# Patient Record
Sex: FEMALE | Race: WHITE | NOT HISPANIC OR LATINO | Employment: OTHER | URBAN - METROPOLITAN AREA
[De-identification: names, ages, dates, MRNs, and addresses within clinical notes are randomized per-mention and may not be internally consistent; named-entity substitution may affect disease eponyms.]

---

## 2017-03-04 ENCOUNTER — TRANSCRIBE ORDERS (OUTPATIENT)
Dept: ADMINISTRATIVE | Facility: HOSPITAL | Age: 57
End: 2017-03-04

## 2017-03-04 ENCOUNTER — APPOINTMENT (OUTPATIENT)
Dept: LAB | Facility: HOSPITAL | Age: 57
End: 2017-03-04
Payer: MEDICARE

## 2017-03-04 DIAGNOSIS — G40.901 STATUS EPILEPTICUS (HCC): Primary | ICD-10-CM

## 2017-03-04 DIAGNOSIS — G40.901 STATUS EPILEPTICUS (HCC): ICD-10-CM

## 2017-03-04 LAB
ANION GAP SERPL CALCULATED.3IONS-SCNC: 9 MMOL/L (ref 4–13)
BUN SERPL-MCNC: 25 MG/DL (ref 5–25)
CALCIUM SERPL-MCNC: 8.7 MG/DL (ref 8.3–10.1)
CHLORIDE SERPL-SCNC: 103 MMOL/L (ref 100–108)
CO2 SERPL-SCNC: 28 MMOL/L (ref 21–32)
CREAT SERPL-MCNC: 1.12 MG/DL (ref 0.6–1.3)
GFR SERPL CREATININE-BSD FRML MDRD: 50 ML/MIN/1.73SQ M
GLUCOSE SERPL-MCNC: 90 MG/DL (ref 65–140)
POTASSIUM SERPL-SCNC: 3.9 MMOL/L (ref 3.5–5.3)
SODIUM SERPL-SCNC: 140 MMOL/L (ref 136–145)

## 2017-03-04 PROCEDURE — 80157 ASSAY CARBAMAZEPINE FREE: CPT

## 2017-03-04 PROCEDURE — 36415 COLL VENOUS BLD VENIPUNCTURE: CPT

## 2017-03-04 PROCEDURE — 80048 BASIC METABOLIC PNL TOTAL CA: CPT

## 2017-03-04 PROCEDURE — 80177 DRUG SCRN QUAN LEVETIRACETAM: CPT

## 2017-03-06 LAB — CARBAMAZEPINE FREE SERPL-MCNC: 1.9 UG/ML (ref 0.6–4.2)

## 2017-03-07 LAB — LEVETIRACETAM SERPL-MCNC: 76.2 UG/ML (ref 10–40)

## 2017-11-09 ENCOUNTER — TRANSCRIBE ORDERS (OUTPATIENT)
Dept: ADMINISTRATIVE | Facility: HOSPITAL | Age: 57
End: 2017-11-09

## 2017-11-09 ENCOUNTER — APPOINTMENT (OUTPATIENT)
Dept: LAB | Facility: HOSPITAL | Age: 57
End: 2017-11-09
Payer: MEDICARE

## 2017-11-09 DIAGNOSIS — G40.901 STATUS EPILEPTICUS (HCC): ICD-10-CM

## 2017-11-09 DIAGNOSIS — E78.5 HYPERLIPIDEMIA, UNSPECIFIED HYPERLIPIDEMIA TYPE: Primary | ICD-10-CM

## 2017-11-09 DIAGNOSIS — E78.5 HYPERLIPIDEMIA, UNSPECIFIED HYPERLIPIDEMIA TYPE: ICD-10-CM

## 2017-11-09 LAB
ALT SERPL W P-5'-P-CCNC: 25 U/L (ref 12–78)
ANION GAP SERPL CALCULATED.3IONS-SCNC: 10 MMOL/L (ref 4–13)
AST SERPL W P-5'-P-CCNC: 17 U/L (ref 5–45)
BUN SERPL-MCNC: 27 MG/DL (ref 5–25)
CALCIUM SERPL-MCNC: 9.2 MG/DL (ref 8.3–10.1)
CARBAMAZEPINE SERPL-MCNC: 5.6 UG/ML (ref 4–12)
CHLORIDE SERPL-SCNC: 105 MMOL/L (ref 100–108)
CHOLEST SERPL-MCNC: 195 MG/DL (ref 50–200)
CO2 SERPL-SCNC: 26 MMOL/L (ref 21–32)
CREAT SERPL-MCNC: 1.17 MG/DL (ref 0.6–1.3)
ERYTHROCYTE [DISTWIDTH] IN BLOOD BY AUTOMATED COUNT: 14 % (ref 11.6–15.1)
GFR SERPL CREATININE-BSD FRML MDRD: 51 ML/MIN/1.73SQ M
GLUCOSE P FAST SERPL-MCNC: 103 MG/DL (ref 65–99)
HCT VFR BLD AUTO: 39.4 % (ref 37–47)
HDLC SERPL-MCNC: 89 MG/DL (ref 40–60)
HGB BLD-MCNC: 13.3 G/DL (ref 12–16)
LDLC SERPL CALC-MCNC: 92 MG/DL (ref 0–100)
MCH RBC QN AUTO: 31.4 PG (ref 27–31)
MCHC RBC AUTO-ENTMCNC: 33.8 G/DL (ref 31.4–37.4)
MCV RBC AUTO: 93 FL (ref 82–98)
PLATELET # BLD AUTO: 326 THOUSANDS/UL (ref 130–400)
PMV BLD AUTO: 9 FL (ref 8.9–12.7)
POTASSIUM SERPL-SCNC: 4.1 MMOL/L (ref 3.5–5.3)
RBC # BLD AUTO: 4.24 MILLION/UL (ref 4.2–5.4)
SODIUM SERPL-SCNC: 141 MMOL/L (ref 136–145)
TRIGL SERPL-MCNC: 71 MG/DL
WBC # BLD AUTO: 10.2 THOUSAND/UL (ref 4.8–10.8)

## 2017-11-09 PROCEDURE — 85027 COMPLETE CBC AUTOMATED: CPT

## 2017-11-09 PROCEDURE — 84460 ALANINE AMINO (ALT) (SGPT): CPT

## 2017-11-09 PROCEDURE — 80177 DRUG SCRN QUAN LEVETIRACETAM: CPT

## 2017-11-09 PROCEDURE — 80156 ASSAY CARBAMAZEPINE TOTAL: CPT

## 2017-11-09 PROCEDURE — 36415 COLL VENOUS BLD VENIPUNCTURE: CPT

## 2017-11-09 PROCEDURE — 80048 BASIC METABOLIC PNL TOTAL CA: CPT

## 2017-11-09 PROCEDURE — 84450 TRANSFERASE (AST) (SGOT): CPT

## 2017-11-09 PROCEDURE — 80061 LIPID PANEL: CPT

## 2017-11-12 LAB — LEVETIRACETAM SERPL-MCNC: 27.4 UG/ML (ref 10–40)

## 2018-02-18 ENCOUNTER — HOSPITAL ENCOUNTER (EMERGENCY)
Facility: HOSPITAL | Age: 58
Discharge: HOME | End: 2018-02-18
Admitting: EMERGENCY MEDICINE
Payer: MEDICARE

## 2018-02-18 VITALS
HEIGHT: 63 IN | SYSTOLIC BLOOD PRESSURE: 138 MMHG | BODY MASS INDEX: 46.07 KG/M2 | HEART RATE: 99 BPM | TEMPERATURE: 98 F | OXYGEN SATURATION: 97 % | RESPIRATION RATE: 18 BRPM | DIASTOLIC BLOOD PRESSURE: 83 MMHG | WEIGHT: 260 LBS

## 2018-02-18 DIAGNOSIS — H61.20 CERUMEN IMPACTION: Primary | ICD-10-CM

## 2018-02-18 PROCEDURE — 99282 EMERGENCY DEPT VISIT SF MDM: CPT

## 2018-02-18 RX ORDER — PRAMIPEXOLE DIHYDROCHLORIDE 1.5 MG/1
1.5 TABLET ORAL DAILY
COMMUNITY
End: 2019-02-13 | Stop reason: SDUPTHER

## 2018-02-18 RX ORDER — HYDROCHLOROTHIAZIDE 25 MG/1
25 TABLET ORAL DAILY
COMMUNITY
End: 2020-11-10 | Stop reason: ALTCHOICE

## 2018-02-18 RX ORDER — PAROXETINE HYDROCHLORIDE 20 MG/1
20 TABLET, FILM COATED ORAL DAILY
COMMUNITY
End: 2019-02-13

## 2018-02-18 RX ORDER — CARBAMAZEPINE 200 MG/1
200 TABLET ORAL 3 TIMES DAILY
COMMUNITY
End: 2019-02-13 | Stop reason: SDUPTHER

## 2018-02-18 RX ORDER — LEVETIRACETAM 1000 MG/1
2000 TABLET ORAL 2 TIMES DAILY
COMMUNITY
End: 2019-02-13

## 2018-02-18 NOTE — ED PROVIDER NOTES
History  Chief Complaint   Patient presents with    Ear Problem     pt c/o B/L ears feeling clogged  left one since last night, right one since this am        History provided by:  Patient   used: No    Ear Fullness   Location:  Bilateral ears  Quality:  Decreased hearing acuity, and feeling of fullness in bilateral ears  Severity:  Mild  Onset quality:  Sudden  Duration:  1 day  Timing:  Constant  Progression:  Worsening  Chronicity:  New  Context:  Patient states that she started with left ear pressure an loss of hearing acuity beginning last night  Woke up this morning with save in right ear to a lesser severity  Associated symptoms: no abdominal pain, no chest pain, no congestion, no cough, no diarrhea, no fatigue, no fever, no headaches, no loss of consciousness, no myalgias, no nausea, no rash, no rhinorrhea, no shortness of breath, no sore throat, no vomiting and no wheezing        Prior to Admission Medications   Prescriptions Last Dose Informant Patient Reported? Taking? PARoxetine (PAXIL) 20 mg tablet   Yes Yes   Sig: Take 20 mg by mouth daily   carBAMazepine (TEGretol) 200 mg tablet   Yes Yes   Sig: Take 200 mg by mouth 3 (three) times a day   hydrochlorothiazide (HYDRODIURIL) 25 mg tablet   Yes Yes   Sig: Take 25 mg by mouth daily   levETIRAcetam (KEPPRA) 1000 MG tablet   Yes Yes   Sig: Take 2,000 mg by mouth 2 (two) times a day   pramipexole (MIRAPEX) 1 5 MG tablet   Yes Yes   Sig: Take 1 5 mg by mouth daily      Facility-Administered Medications: None       Past Medical History:   Diagnosis Date    Depression     Epilepsy ([address] )     Restless leg        Past Surgical History:   Procedure Laterality Date    LASER ABLATION OF THE CERVIX         History reviewed  No pertinent family history  I have reviewed and agree with the history as documented      Social History   Substance Use Topics    Smoking status: Current Every Day Smoker    Smokeless tobacco: Current User Types: Chew    Alcohol use No        Review of Systems   Constitutional: Negative for appetite change, chills, diaphoresis, fatigue and fever  HENT: Positive for hearing loss  Negative for congestion, rhinorrhea, sore throat and trouble swallowing  Ear fullness   Eyes: Negative for photophobia and visual disturbance  Respiratory: Negative for cough, chest tightness, shortness of breath and wheezing  Cardiovascular: Negative for chest pain  Gastrointestinal: Negative for abdominal pain, diarrhea, nausea and vomiting  Genitourinary: Negative  Musculoskeletal: Negative for myalgias and neck stiffness  Skin: Negative for color change, pallor and rash  Neurological: Negative for dizziness, seizures, loss of consciousness, weakness, light-headedness, numbness and headaches  Hematological: Negative for adenopathy  Physical Exam  ED Triage Vitals [02/18/18 1033]   Temperature Pulse Respirations Blood Pressure SpO2   98 °F (36 7 °C) 99 18 138/83 97 %      Temp Source Heart Rate Source Patient Position - Orthostatic VS BP Location FiO2 (%)   Tympanic Monitor Sitting Left arm --      Pain Score       Worst Possible Pain           Orthostatic Vital Signs  Vitals:    02/18/18 1033   BP: 138/83   Pulse: 99   Patient Position - Orthostatic VS: Sitting       Physical Exam   Constitutional: She is oriented to person, place, and time  She appears well-developed and well-nourished  No distress  HENT:   Head: Normocephalic and atraumatic  Right Ear: External ear normal  No tenderness  No mastoid tenderness  Decreased hearing is noted  Left Ear: External ear normal  No tenderness  No mastoid tenderness  Decreased hearing is noted  Nose: Nose normal    Mouth/Throat: Oropharynx is clear and moist    Excessive cerumen buildup of bilateral auditory canals  No signs of bleeding or erythema  Eyes: Conjunctivae are normal  Right eye exhibits no discharge  Left eye exhibits no discharge     Neck: Normal range of motion  Neck supple  Cardiovascular: Normal rate, regular rhythm, normal heart sounds and intact distal pulses  Exam reveals no friction rub  No murmur heard  Pulmonary/Chest: Effort normal and breath sounds normal  No respiratory distress  She has no wheezes  Lymphadenopathy:     She has no cervical adenopathy  Neurological: She is alert and oriented to person, place, and time  She exhibits normal muscle tone  Coordination normal    Skin: Skin is warm and dry  Capillary refill takes less than 2 seconds  No rash noted  She is not diaphoretic  No pallor  Nursing note and vitals reviewed  ED Medications  Medications - No data to display    Diagnostic Studies  Results Reviewed     None                 No orders to display              Procedures  Procedures       Phone Contacts  ED Phone Contact    ED Course  ED Course                                MDM  Number of Diagnoses or Management Options  Cerumen impaction: new and does not require workup  Diagnosis management comments: Patient was discharged in acute distress with Debrox drops and instructed follow with her PCP if no improvement in 4-5 days, or return to the ED if any worsening symptoms develop  Amount and/or Complexity of Data Reviewed  Review and summarize past medical records: yes      CritCare Time    Disposition  Final diagnoses:   Cerumen impaction     Time reflects when diagnosis was documented in both MDM as applicable and the Disposition within this note     Time User Action Codes Description Comment    2/18/2018 11:21 AM Safia Standing Add [H61 20] Cerumen impaction       ED Disposition     ED Disposition Condition Comment    Discharge  Pearla Left discharge to home/self care      Condition at discharge: Stable        Follow-up Information     Follow up With Specialties Details Why John [facility] Emergency Department Emergency Medicine Go to If symptoms worsen [address] [facility] ED, [place], [place], [address] [place] [address], MD Internal Medicine Go to If no improvement in 3-4 days [facility] [address]           Discharge Medication List as of 2/18/2018 11:24 AM      START taking these medications    Details   carbamide peroxide (DEBROX) 6 5 % otic solution Administer 10 drops into both ears 2 (two) times a day for 4 days, Starting Sun 2/18/2018, Until Thu 2/22/2018, Print         CONTINUE these medications which have NOT CHANGED    Details   carBAMazepine (TEGretol) 200 mg tablet Take 200 mg by mouth 3 (three) times a day, Historical Med      hydrochlorothiazide (HYDRODIURIL) 25 mg tablet Take 25 mg by mouth daily, Historical Med      levETIRAcetam (KEPPRA) 1000 MG tablet Take 2,000 mg by mouth 2 (two) times a day, Historical Med      PARoxetine (PAXIL) 20 mg tablet Take 20 mg by mouth daily, Historical Med      pramipexole (MIRAPEX) 1 5 MG tablet Take 1 5 mg by mouth daily, Historical Med           No discharge procedures on file      ED Provider  Electronically Signed by           Melva Espinosa PA-C  02/18/18 2107

## 2018-02-18 NOTE — DISCHARGE INSTRUCTIONS
Cerumen Impaction   WHAT YOU NEED TO KNOW:   Cerumen impaction is the blockage of the outer ear canal by tightly packed cerumen (earwax)  It is generally treated with procedures such as flushing or suctioning the ear canal or the use of instruments to remove the impaction  DISCHARGE INSTRUCTIONS:   Medicines:  · Ear drops: These are used to soften the wax in your ear  Wax softening ear drops may be bought without a prescription  Ask your healthcare provider how often you should use this medicine  Read the instructions carefully before you use the ear drops  Do the following when you put in ear drops:     ¨ Warm the drops by holding the bottle in your hands for a few minutes  Cold ear drops may make you dizzy  ¨ Lie down with the affected ear toward the ceiling  You may also stand with your head tilted to one side  ¨ Pull your ear lobe up and back, and place the correct number of drops into the ear  ¨ Keep your ear facing up for 5 to 10 minutes so the drops coat the outer ear canal      ¨ Gently clean the outer part of the ear with a cotton swab  Do not  place the cotton swab or anything inside your ear canal  This increases the risk of damaging your eardrum  · Take your medicine as directed  Contact your healthcare provider if you think your medicine is not helping or if you have side effects  Tell him of her if you are allergic to any medicine  Keep a list of the medicines, vitamins, and herbs you take  Include the amounts, and when and why you take them  Bring the list or the pill bottles to follow-up visits  Carry your medicine list with you in case of an emergency  Follow up with your healthcare provider as directed:  Write down your questions so you remember to ask them during your visits  Contact your healthcare provider if:   · You have a fever  · You have trouble hearing or ringing in your ear  · You have questions about your condition or care    Return to the emergency department if:   · You feel dizzy  · You have discharge or blood coming out of your ear  · Your ear pain does not go away or gets worse  © 2017 [address] [employer] Information is for End User's use only and may not be sold, redistributed or otherwise used for commercial purposes  All illustrations and images included in CareNotes® are the copyrighted property of A D A M [employer]  or [employer] Raines  The above information is an  only  It is not intended as medical advice for individual conditions or treatments  Talk to your doctor, nurse or pharmacist before following any medical regimen to see if it is safe and effective for you

## 2018-02-20 ENCOUNTER — HOSPITAL ENCOUNTER (EMERGENCY)
Facility: HOSPITAL | Age: 58
Discharge: HOME | End: 2018-02-20
Admitting: EMERGENCY MEDICINE
Payer: MEDICARE

## 2018-02-20 VITALS
RESPIRATION RATE: 18 BRPM | DIASTOLIC BLOOD PRESSURE: 91 MMHG | WEIGHT: 240 LBS | HEART RATE: 86 BPM | OXYGEN SATURATION: 96 % | HEIGHT: 63 IN | TEMPERATURE: 98.3 F | SYSTOLIC BLOOD PRESSURE: 156 MMHG | BODY MASS INDEX: 42.52 KG/M2

## 2018-02-20 DIAGNOSIS — H61.23 BILATERAL IMPACTED CERUMEN: Primary | ICD-10-CM

## 2018-02-20 PROCEDURE — 99282 EMERGENCY DEPT VISIT SF MDM: CPT

## 2018-04-23 ENCOUNTER — APPOINTMENT (EMERGENCY)
Dept: RADIOLOGY | Facility: HOSPITAL | Age: 58
End: 2018-04-23
Payer: MEDICARE

## 2018-04-23 ENCOUNTER — HOSPITAL ENCOUNTER (EMERGENCY)
Facility: HOSPITAL | Age: 58
Discharge: HOME | End: 2018-04-23
Attending: EMERGENCY MEDICINE | Admitting: EMERGENCY MEDICINE
Payer: MEDICARE

## 2018-04-23 VITALS
DIASTOLIC BLOOD PRESSURE: 83 MMHG | SYSTOLIC BLOOD PRESSURE: 167 MMHG | WEIGHT: 250 LBS | BODY MASS INDEX: 44.29 KG/M2 | RESPIRATION RATE: 18 BRPM | HEART RATE: 106 BPM | TEMPERATURE: 98.6 F | OXYGEN SATURATION: 97 %

## 2018-04-23 DIAGNOSIS — G40.909 SEIZURE DISORDER (HCC): Primary | ICD-10-CM

## 2018-04-23 DIAGNOSIS — S09.90XA HEAD INJURY: ICD-10-CM

## 2018-04-23 PROCEDURE — 70450 CT HEAD/BRAIN W/O DYE: CPT

## 2018-04-23 PROCEDURE — 70486 CT MAXILLOFACIAL W/O DYE: CPT

## 2018-04-23 PROCEDURE — 99284 EMERGENCY DEPT VISIT MOD MDM: CPT

## 2018-04-23 RX ORDER — OXYCODONE HYDROCHLORIDE AND ACETAMINOPHEN 5; 325 MG/1; MG/1
1 TABLET ORAL ONCE
Status: COMPLETED | OUTPATIENT
Start: 2018-04-23 | End: 2018-04-23

## 2018-04-23 RX ADMIN — OXYCODONE HYDROCHLORIDE AND ACETAMINOPHEN 1 TABLET: 5; 325 TABLET ORAL at 18:40

## 2018-04-23 NOTE — DISCHARGE INSTRUCTIONS
Epilepsy   WHAT YOU NEED TO KNOW:   Epilepsy is a brain disorder that causes seizures  It is also called a seizure disorder  A seizure means an abnormal area in your brain sometimes sends bursts of electrical activity  A seizure may start in one part of your brain, or both sides may be affected  Depending on the type of seizure, you may have movements you cannot control, lose consciousness, or stare straight ahead  You may be confused or tired after the seizure  A seizure may last a few seconds or longer than 5 minutes  A birth defect, tumor, stroke, dementia, injury, or infection may cause epilepsy  The cause of your epilepsy may not be known  If your seizures are not controlled, epilepsy may become life-threatening  DISCHARGE INSTRUCTIONS:   Call 911 for any of the following:   · Your seizure lasts longer than 5 minutes  · You have trouble breathing after a seizure  · You have diabetes or are pregnant and have a seizure  · You have a seizure in water, such as a swimming pool or bathtub  Return to the emergency department if:   · You have a second seizure within 24 hours of the first      · You are injured during a seizure  Contact your healthcare provider if:   · You feel you are not able to cope with your condition  · Your seizures start to happen more often  · You are confused longer than usual after a seizure  · You are planning to get pregnant or are currently pregnant  · You have questions or concerns about your condition or care  Medicines:   · Antiseizure medicine  may control or prevent another seizure  Do not stop taking this medicine  Another person may need to give you rescue medicine to stop a seizure at home  Ask your healthcare provider for more information about rescue medicine  · Take your medicine as directed  Contact your healthcare provider if you think your medicine is not helping or if you have side effects  Tell him of her if you are allergic to any medicine  Keep a list of the medicines, vitamins, and herbs you take  Include the amounts, and when and why you take them  Bring the list or the pill bottles to follow-up visits  Carry your medicine list with you in case of an emergency  Follow up with your neurologist as directed: You may need tests to check the level of antiseizure medicine in your blood  Your neurologist may need to change or adjust your medicine  Write down your questions so you remember to ask them during your visits  What you can do to prevent a seizure: You may not be able to prevent every seizure  The following can help you manage triggers that may make a seizure start:  · Take your medicine every day at the same time  This will also help prevent medicine side effects  Set an alarm to help remind you to take your medicine every day  · Manage stress  Stress can be a trigger for epilepsy  Exercise can help you reduce stress  Talk to your healthcare provider about exercise that is safe for you  Illness can be a form of stress  Eat a variety of healthy foods and drink plenty of liquids during an illness  Talk to your healthcare provider about other ways to manage stress  · Set a regular sleep schedule  A lack of sleep can trigger a seizure  Try to go to sleep and wake up at the same time every day  Keep your bedroom quiet and dark  Talk to your healthcare provider if you are having trouble sleeping  · Limit or do not drink alcohol as directed  Alcohol can trigger a seizure, especially if you drink a large amount at one time  A drink of alcohol is 12 ounces of beer, 1½ ounces of liquor, or 5 ounces of wine  Talk to your healthcare provider about a safe amount of alcohol for you  Your provider may recommend that you do not drink any alcohol  Tell him or her if you need help to quit drinking  What you can do to manage epilepsy:   · Keep a seizure diary  This can help you find your triggers and avoid them   Write down the dates of your seizures, where you were, and what you were doing  Include how you felt before and after  Possible triggers include illness, lack of sleep, hormonal changes, alcohol, drugs, lights, or stress  · Record any auras you have before a seizure  An aura is a sign that you are about to have a seizure  Auras happen before certain types of seizures that are in only 1 part of the brain  The aura may happen seconds before a seizure, or up to an hour before  You may feel, see, hear, or smell something  Examples include part of your body becoming hot  You may see a flash of light or hear something  You may have anxiety or déjà vu  If you have an aura, include it in your seizure diary  · Create a care plan  Tell family, friends, and coworkers about your epilepsy  Give them instructions that tell them how they can keep you safe if you have a seizure  · Find support  You may be referred to a psychologist or   Ask your healthcare provider about support groups for people with epilepsy  · Ask what safety precautions you should take  Talk with your healthcare provider about driving  You may not be able to drive until you are seizure-free for a period of time  You will need to check the law where you live  Also talk to your healthcare provider about swimming and bathing  You may drown or develop life-threatening heart or lung damage if you have a seizure in water  · Carry medical alert identification  Wear medical alert jewelry or carry a card that says you have epilepsy  Ask your healthcare provider where to get these items  How others can keep you safe during a seizure:  Give the following instructions to family, friends, and coworkers:  · Do not panic  · Do not hold me down or put anything in my mouth  · Gently guide me to the floor or a soft surface  · Place me on my side to help prevent me from swallowing saliva or vomit  · Protect me from injury   Remove sharp or hard objects from the area surrounding me, or cushion my head  · Loosen the clothing around my head and neck  · Time how long my seizure lasts  Call 911 if my seizure lasts longer than 5 minutes or if I have a second seizure  · Stay with me until my seizure ends  Let me rest until I am fully awake  · Perform CPR if I stop breathing or you cannot feel my pulse  · Do not give me anything to eat or drink until I am fully awake  © 2017 [address] [facility] Information is for End User's use only and may not be sold, redistributed or otherwise used for commercial purposes  All illustrations and images included in CareNotes® are the copyrighted property of [employer] M , Inc  or [employer] Raines  The above information is an  only  It is not intended as medical advice for individual conditions or treatments  Talk to your doctor, nurse or pharmacist before following any medical regimen to see if it is safe and effective for you

## 2018-04-23 NOTE — ED NOTES
Pt reports having seizure today and falling  Pt noted with abrasion to lower right orbit of eye  Pt reports shoulder pain to right shoulder  Pt noted with full range of motion with right shoulder  Dr Zenia Sutherland aware, no new orders         Kam Schuster RN  04/23/18 2784

## 2018-04-23 NOTE — ED PROVIDER NOTES
History  Chief Complaint   Patient presents with    Seizure - Prior Hx Of    Fall     Patient states she had a seizure and fell down about 10 steps, c/o pain in right side of face, right wrist and left foot/ankle     Hx Sz  NO CHANGE IN MEDS  HAD Sz AT HOME AND FELL 10 STEPS  AWAKE, ALERT, ORIENTED, RT LAT PERIORBITAL ECCHYMOSIS, OTHERWISE NO INJURIES  History provided by:  Patient  Seizure - Prior Hx Of   Seizure activity on arrival: no    Seizure type:  Grand mal  Initial focality:  None  Episode characteristics: generalized shaking and unresponsiveness    Postictal symptoms: no confusion, no memory loss and no somnolence    Return to baseline: yes    Severity:  Unable to specify  Timing:  Once  Recent head injury:  No recent head injuries  Fall   Associated symptoms: seizures        Prior to Admission Medications   Prescriptions Last Dose Informant Patient Reported? Taking? PARoxetine (PAXIL) 20 mg tablet 4/23/2018 at 0630  Yes Yes   Sig: Take 20 mg by mouth daily   carBAMazepine (TEGretol) 200 mg tablet 4/23/2018 at 1700  Yes Yes   Sig: Take 200 mg by mouth 3 (three) times a day   hydrochlorothiazide (HYDRODIURIL) 25 mg tablet 4/23/2018 at 0630  Yes Yes   Sig: Take 25 mg by mouth daily   levETIRAcetam (KEPPRA) 1000 MG tablet 4/23/2018 at 1500  Yes Yes   Sig: Take 2,000 mg by mouth 2 (two) times a day   pramipexole (MIRAPEX) 1 5 MG tablet 4/22/2018 at 2030  Yes Yes   Sig: Take 1 5 mg by mouth daily      Facility-Administered Medications: None       Past Medical History:   Diagnosis Date    Depression     [address] [address] )     Restless leg        Past Surgical History:   Procedure Laterality Date    LASER ABLATION OF THE CERVIX         History reviewed  No pertinent family history  I have reviewed and agree with the history as documented      Social History   Substance Use Topics    Smoking status: Current Every Day Smoker     Packs/day: 0 50    Smokeless tobacco: Current User     Types: [place] Alcohol use No        Review of Systems   Musculoskeletal:        FALL     Skin:        RT FOREHEAD ECCHYMOSIS   Neurological: Positive for seizures  All other systems reviewed and are negative  Physical Exam  ED Triage Vitals [04/23/18 1740]   Temperature Pulse Respirations Blood Pressure SpO2   98 6 °F (37 °C) (!) 106 18 167/83 97 %      Temp Source Heart Rate Source Patient Position - Orthostatic VS BP Location FiO2 (%)   Tympanic Monitor Sitting Left arm --      Pain Score       9           Orthostatic Vital Signs  Vitals:    04/23/18 1740   BP: 167/83   Pulse: (!) 106   Patient Position - Orthostatic VS: Sitting       Physical Exam   Constitutional: She is oriented to person, place, and time  She appears well-developed and well-nourished  No distress  HENT:   RT FOREHEAD ECCHYMOSIS   Eyes: Conjunctivae and EOM are normal  Pupils are equal, round, and reactive to light  Neck: Normal range of motion  Neck supple  Cardiovascular: Normal rate and regular rhythm  Pulmonary/Chest: Effort normal and breath sounds normal  She exhibits no tenderness  Abdominal: Soft  There is no tenderness  Musculoskeletal: Normal range of motion  She exhibits no edema, tenderness or deformity  Neurological: She is alert and oriented to person, place, and time  No cranial nerve deficit  She exhibits normal muscle tone  Coordination normal    Skin: Skin is warm and dry  She is not diaphoretic  Nursing note and vitals reviewed  ED Medications  Medications   oxyCODONE-acetaminophen (PERCOCET) 5-325 mg per tablet 1 tablet (1 tablet Oral Given 4/23/18 1840)       Diagnostic Studies  Results Reviewed     None                 CT facial bones without contrast   Final Result by Kristyn Hernandez MD (04/23 1920)         1  No evidence of acute maxillofacial fracture  2   Right facial contusions  No retrobulbar hematoma  3   Chronic right maxillary sinus disease                 Workstation performed: [id number] CT head wo contrast   Final Result by Dea Ohara MD (04/23 1913)         1  No acute intracranial hemorrhage, mass effect or extra-axial collection  2   Right frontal orbital scalp and facial contusions  No acute calvarial fracture  Workstation performed: [id number]                    Procedures  Procedures       Phone Contacts  ED Phone Contact    ED Course  ED Course                                MDM  Number of Diagnoses or Management Options  Diagnosis management comments: PT COMPLIANT W/ HER MEDS, REMAINED NEG EXAM, WILL SEE HER NEURO TOMORROW    CritCare Time    Disposition  Final diagnoses:   Seizure disorder ([address] )   Head injury     Time reflects when diagnosis was documented in both MDM as applicable and the Disposition within this note     Time User Action Codes Description Comment    4/23/2018  7:25 PM Barsml Lonny Add [C31 676] Seizure disorder ([address] )     4/23/2018  7:25 PM Soniya Willsat Add [S09 90XA] Head injury       ED Disposition     ED Disposition Condition Comment    Discharge  Miah Layman discharge to home/self care  Condition at discharge: Stable        Follow-up Information     Follow up With Specialties Details Why [address] Madonna Ruiz MD Internal Medicine Schedule an appointment as soon as possible for a visit in 2 days  [address] [place] [address]  [phone]          Patient's Medications   Discharge Prescriptions    No medications on file     No discharge procedures on file      ED Provider  Electronically Signed by           Maxwell Salinas MD  04/23/18 5252

## 2018-07-05 NOTE — ED PROVIDER NOTES
History  Chief Complaint   Patient presents with    Ear Problem     pt c/o continued "clogged ears", seen & evaluated here this past weekend for same  60 y/o female presenting with bilateral ear fullness over the past few days  Was recently seen here for a cerumen impaction and given Debrox drops  Relays that she has been using the 3 times a day however persists with clogged ears  Denies fevers, nausea, vomiting, dizziness, ear drainage, facial swelling, fevers  Prior to Admission Medications   Prescriptions Last Dose Informant Patient Reported? Taking? PARoxetine (PAXIL) 20 mg tablet   Yes Yes   Sig: Take 20 mg by mouth daily   carBAMazepine (TEGretol) 200 mg tablet   Yes Yes   Sig: Take 200 mg by mouth 3 (three) times a day   carbamide peroxide (DEBROX) 6 5 % otic solution   No Yes   Sig: Administer 10 drops into both ears 2 (two) times a day for 4 days   hydrochlorothiazide (HYDRODIURIL) 25 mg tablet   Yes Yes   Sig: Take 25 mg by mouth daily   levETIRAcetam (KEPPRA) 1000 MG tablet   Yes Yes   Sig: Take 2,000 mg by mouth 2 (two) times a day   pramipexole (MIRAPEX) 1 5 MG tablet   Yes Yes   Sig: Take 1 5 mg by mouth daily      Facility-Administered Medications: None       Past Medical History:   Diagnosis Date    Depression     Epilepsy ([address] )     Restless leg        Past Surgical History:   Procedure Laterality Date    LASER ABLATION OF THE CERVIX         History reviewed  No pertinent family history  I have reviewed and agree with the history as documented  Social History   Substance Use Topics    Smoking status: Current Every Day Smoker    Smokeless tobacco: Current User     Types: Chew    Alcohol use No        Review of Systems   Constitutional: Negative  HENT: Positive for ear pain and hearing loss   Negative for congestion, dental problem, drooling, ear discharge, facial swelling, mouth sores, nosebleeds, postnasal drip, rhinorrhea, sinus pain, sinus pressure, sneezing, sore throat, tinnitus, trouble swallowing and voice change  Eyes: Negative  Respiratory: Negative  Cardiovascular: Negative  Gastrointestinal: Negative  Genitourinary: Negative  Musculoskeletal: Negative  Skin: Negative  Neurological: Negative  All other systems reviewed and are negative  Physical Exam  ED Triage Vitals [02/20/18 0900]   Temperature Pulse Respirations Blood Pressure SpO2   98 3 °F (36 8 °C) 86 18 156/91 96 %      Temp Source Heart Rate Source Patient Position - Orthostatic VS BP Location FiO2 (%)   Oral Monitor Sitting Left arm --      Pain Score       Worst Possible Pain           Orthostatic Vital Signs  Vitals:    02/20/18 0900   BP: 156/91   Pulse: 86   Patient Position - Orthostatic VS: Sitting       Physical Exam   Constitutional: She is oriented to person, place, and time  She appears well-developed and well-nourished  HENT:   Head: Normocephalic and atraumatic  Nose: Nose normal    Mouth/Throat: Oropharynx is clear and moist    Bilateral cerumen impaction  No swelling, drainage, exudates  Eyes: Conjunctivae and EOM are normal  Pupils are equal, round, and reactive to light  Neck: Normal range of motion  Neck supple  Cardiovascular: Normal rate, regular rhythm, normal heart sounds and intact distal pulses  Pulmonary/Chest: Effort normal and breath sounds normal    spo2 is 96% indicating adequate oxygenation  Neurological: She is alert and oriented to person, place, and time  Skin: Skin is warm and dry  Capillary refill takes less than 2 seconds  Nursing note and vitals reviewed        ED Medications  Medications - No data to display    Diagnostic Studies  Results Reviewed     None                 No orders to display              Procedures  Cerumen Removal  Date/Time: 2/20/2018 10:14 AM  Performed by: Lauren Tian by: Osorio Tucker     Patient location:  ED  Indications / Diagnosis:  Bilateral cerumen impaction Consent:     Consent obtained:  Verbal    Consent given by:  Patient    Risks discussed:  Bleeding, dizziness, incomplete removal, infection, pain and TM perforation  Universal protocol:     Patient identity confirmed:  Verbally with patient  Procedure details:     Local anesthetic:  None    Location: bilateral ears  Procedure type: irrigation      Approach:  External and natural orifice    Equipment used:  50 cc syringe with a 16 gauge rubber portion of an angiocath  Post-procedure details:     Complication:  None    Hearing quality:  Improved    Patient tolerance of procedure: Tolerated well, no immediate complications  Comments:      50/50 mixture of warm water and peroxide used to flush ears  Phone Contacts  ED Phone Contact    ED Course  ED Course                                MDM  Number of Diagnoses or Management Options  Bilateral impacted cerumen:   Diagnosis management comments: Able to remove most of the impacted ear wax from the right ear however some hardened wax remained  Left ear wax completely removed  Will have patient continue debrox drops in the right ear and have her f/u with her pcp for re-evaluation  Patient is informed to return to the emergency department for worsening of symptoms and was given proper education regarding their diagnosis and symptoms  Otherwise the patient is informed to follow up with their primary care doctor for re-evaluation  The patient verbalizes understanding and agrees with above assessment and plan  All questions were answered  Please Note: Fluency Direct voice recognition software may have been used in the creation of this document  Wrong words or sound a like substitutions may have occurred due to the inherent limitations of the voice software             Amount and/or Complexity of Data Reviewed  Review and summarize past medical records: yes  Independent visualization of images, tracings, or specimens: yes      Yuridia Time    Disposition  Final diagnoses:   Bilateral impacted cerumen     Time reflects when diagnosis was documented in both MDM as applicable and the Disposition within this note     Time User Action Codes Description Comment    2/20/2018 10:17 AM Lavina Gosselin Add [Q31 23] Bilateral impacted cerumen       ED Disposition     ED Disposition Condition Comment    Discharge  Geryl Murders discharge to home/self care  Condition at discharge: Good        Follow-up Information     Follow up With Specialties Details Why Contact Info Additional [address] Emergency Department Emergency Medicine Go to If symptoms worsen such as facial swelling, pus like drainage, fevers  Otherise please make an appointment with your primary care doctor in a few days  [address] [facility]  [phone] [facility] ED, [facility], [address]        Patient's Medications   Discharge Prescriptions    No medications on file     No discharge procedures on file      ED Provider  Electronically Signed by           Theodore Latham PA-C  02/20/18 5819 done

## 2018-12-02 ENCOUNTER — HOSPITAL ENCOUNTER (EMERGENCY)
Facility: HOSPITAL | Age: 58
Discharge: HOME | End: 2018-12-02
Attending: EMERGENCY MEDICINE
Payer: MEDICARE

## 2018-12-02 VITALS
TEMPERATURE: 98.5 F | HEART RATE: 76 BPM | RESPIRATION RATE: 20 BRPM | DIASTOLIC BLOOD PRESSURE: 59 MMHG | OXYGEN SATURATION: 96 % | WEIGHT: 273 LBS | BODY MASS INDEX: 48.36 KG/M2 | SYSTOLIC BLOOD PRESSURE: 130 MMHG

## 2018-12-02 DIAGNOSIS — E46 PROTEIN CALORIE MALNUTRITION (HCC): Primary | ICD-10-CM

## 2018-12-02 DIAGNOSIS — R60.0 BILATERAL LEG EDEMA: ICD-10-CM

## 2018-12-02 LAB
ALBUMIN SERPL BCP-MCNC: 2.8 G/DL (ref 3.5–5)
ALP SERPL-CCNC: 108 U/L (ref 46–116)
ALT SERPL W P-5'-P-CCNC: 18 U/L (ref 12–78)
ANION GAP SERPL CALCULATED.3IONS-SCNC: 12 MMOL/L (ref 4–13)
APTT PPP: 25 SECONDS (ref 26–38)
AST SERPL W P-5'-P-CCNC: 15 U/L (ref 5–45)
BASOPHILS # BLD AUTO: 0.07 THOUSANDS/ΜL (ref 0–0.1)
BASOPHILS NFR BLD AUTO: 1 % (ref 0–1)
BILIRUB SERPL-MCNC: 0.1 MG/DL (ref 0.2–1)
BUN SERPL-MCNC: 19 MG/DL (ref 5–25)
CALCIUM SERPL-MCNC: 8.8 MG/DL (ref 8.3–10.1)
CHLORIDE SERPL-SCNC: 107 MMOL/L (ref 100–108)
CO2 SERPL-SCNC: 24 MMOL/L (ref 21–32)
CREAT SERPL-MCNC: 1.13 MG/DL (ref 0.6–1.3)
EOSINOPHIL # BLD AUTO: 0.25 THOUSAND/ΜL (ref 0–0.61)
EOSINOPHIL NFR BLD AUTO: 2 % (ref 0–6)
ERYTHROCYTE [DISTWIDTH] IN BLOOD BY AUTOMATED COUNT: 13.3 % (ref 11.6–15.1)
GFR SERPL CREATININE-BSD FRML MDRD: 53 ML/MIN/1.73SQ M
GLUCOSE SERPL-MCNC: 108 MG/DL (ref 65–140)
HCT VFR BLD AUTO: 38.2 % (ref 34.8–46.1)
HGB BLD-MCNC: 12.1 G/DL (ref 11.5–15.4)
IMM GRANULOCYTES # BLD AUTO: 0.05 THOUSAND/UL (ref 0–0.2)
IMM GRANULOCYTES NFR BLD AUTO: 0 % (ref 0–2)
INR PPP: 0.98 (ref 0.86–1.16)
LYMPHOCYTES # BLD AUTO: 2.84 THOUSANDS/ΜL (ref 0.6–4.47)
LYMPHOCYTES NFR BLD AUTO: 22 % (ref 14–44)
MCH RBC QN AUTO: 30.6 PG (ref 26.8–34.3)
MCHC RBC AUTO-ENTMCNC: 31.7 G/DL (ref 31.4–37.4)
MCV RBC AUTO: 97 FL (ref 82–98)
MONOCYTES # BLD AUTO: 0.66 THOUSAND/ΜL (ref 0.17–1.22)
MONOCYTES NFR BLD AUTO: 5 % (ref 4–12)
NEUTROPHILS # BLD AUTO: 8.79 THOUSANDS/ΜL (ref 1.85–7.62)
NEUTS SEG NFR BLD AUTO: 70 % (ref 43–75)
NRBC BLD AUTO-RTO: 0 /100 WBCS
NT-PROBNP SERPL-MCNC: 157 PG/ML
PLATELET # BLD AUTO: 299 THOUSANDS/UL (ref 149–390)
PMV BLD AUTO: 10.7 FL (ref 8.9–12.7)
POTASSIUM SERPL-SCNC: 3.5 MMOL/L (ref 3.5–5.3)
PROT SERPL-MCNC: 7.2 G/DL (ref 6.4–8.2)
PROTHROMBIN TIME: 10.3 SECONDS (ref 9.4–11.7)
RBC # BLD AUTO: 3.95 MILLION/UL (ref 3.81–5.12)
SODIUM SERPL-SCNC: 143 MMOL/L (ref 136–145)
WBC # BLD AUTO: 12.66 THOUSAND/UL (ref 4.31–10.16)

## 2018-12-02 PROCEDURE — 36415 COLL VENOUS BLD VENIPUNCTURE: CPT | Performed by: EMERGENCY MEDICINE

## 2018-12-02 PROCEDURE — 80053 COMPREHEN METABOLIC PANEL: CPT | Performed by: EMERGENCY MEDICINE

## 2018-12-02 PROCEDURE — 83880 ASSAY OF NATRIURETIC PEPTIDE: CPT | Performed by: EMERGENCY MEDICINE

## 2018-12-02 PROCEDURE — 93005 ELECTROCARDIOGRAM TRACING: CPT

## 2018-12-02 PROCEDURE — 85025 COMPLETE CBC W/AUTO DIFF WBC: CPT | Performed by: EMERGENCY MEDICINE

## 2018-12-02 PROCEDURE — 85730 THROMBOPLASTIN TIME PARTIAL: CPT | Performed by: EMERGENCY MEDICINE

## 2018-12-02 PROCEDURE — 85610 PROTHROMBIN TIME: CPT | Performed by: EMERGENCY MEDICINE

## 2018-12-02 PROCEDURE — 99283 EMERGENCY DEPT VISIT LOW MDM: CPT

## 2018-12-02 NOTE — ED PROVIDER NOTES
History  Chief Complaint   Patient presents with    Foot Swelling     Pt reports swollen feet "really started right after I gave up my job in April "  Came in today "because it's bothering me right now "     The patient has noted progressive and increasing edema of both lower extremities since quitting her job in April 7 months ago  Patient states that when she wakes up in the morning she has only mild swelling and gets worse throughout the day  She has no breathing difficulty and no orthopnea  She states her weight has been roughly consistent over the last 7 months  She does not know of any heart kidney or liver problems  She states that she is finally just tired of dealing with the swelling and decided to be checked in the ER on this Sunday night  Prior to Admission Medications   Prescriptions Last Dose Informant Patient Reported? Taking? PARoxetine (PAXIL) 20 mg tablet   Yes No   Sig: Take 20 mg by mouth daily   carBAMazepine (TEGretol) 200 mg tablet   Yes No   Sig: Take 200 mg by mouth 3 (three) times a day   hydrochlorothiazide (HYDRODIURIL) 25 mg tablet   Yes No   Sig: Take 25 mg by mouth daily   levETIRAcetam (KEPPRA) 1000 MG tablet   Yes No   Sig: Take 2,000 mg by mouth 2 (two) times a day   pramipexole (MIRAPEX) 1 5 MG tablet   Yes No   Sig: Take 1 5 mg by mouth daily      Facility-Administered Medications: None       Past Medical History:   Diagnosis Date    Depression     EP [facility]epilepsy) ([facility] )     Epilepsy ([facility] )     Restless leg        Past Surgical History:   Procedure Laterality Date    LASER ABLATION OF THE CERVIX         History reviewed  No pertinent family history  I have reviewed and agree with the history as documented  Social History   Substance Use Topics    Smoking status: Current Every Day Smoker     Packs/day: 0 50    Smokeless tobacco: Never Used    Alcohol use No        Review of Systems   Constitutional: Negative for fever  HENT: Negative for congestion  Respiratory: Negative for cough and shortness of breath  Cardiovascular: Positive for leg swelling  Negative for chest pain  Gastrointestinal: Negative for abdominal pain and vomiting  Genitourinary: Negative for decreased urine volume and dysuria  Musculoskeletal: Negative for arthralgias and back pain  Skin: Negative for rash and wound  Neurological: Negative for weakness and numbness  Hematological: Does not bruise/bleed easily  Psychiatric/Behavioral: The patient is not nervous/anxious  All other systems reviewed and are negative  Physical Exam  Physical Exam   Constitutional: She is oriented to person, place, and time  Obese female NAD   HENT:   Head: Normocephalic and atraumatic  Eyes: Pupils are equal, round, and reactive to light  Conjunctivae and EOM are normal    Neck: Normal range of motion  Neck supple  Cardiovascular: Normal rate, regular rhythm and normal heart sounds  Pulmonary/Chest: Effort normal and breath sounds normal  She has no rales  Abdominal: Soft  Bowel sounds are normal  There is no tenderness  Musculoskeletal: Normal range of motion  She exhibits edema  She exhibits no tenderness  Pitting edema both feet and distal legs   Neurological: She is alert and oriented to person, place, and time  Skin: Skin is warm and dry  Psychiatric: She has a normal mood and affect  Her behavior is normal    Nursing note and vitals reviewed        Vital Signs  ED Triage Vitals [12/02/18 1845]   Temperature Pulse Respirations Blood Pressure SpO2   98 5 °F (36 9 °C) 100 20 (!) 178/80 99 %      Temp Source Heart Rate Source Patient Position - Orthostatic VS BP Location FiO2 (%)   Tympanic Monitor Sitting Right arm --      Pain Score       8           Vitals:    12/02/18 1845 12/02/18 1930   BP: (!) 178/80    Pulse: 100 84   Patient Position - Orthostatic VS: Sitting        Visual Acuity      ED Medications  Medications - No data to display    Diagnostic Studies  Results Reviewed     Procedure Component Value Units Date/Time    B-type natriuretic peptide [[id number]]  (Abnormal) Collected:  12/02/18 1938    Lab Status:  Final result Specimen:  Blood from Arm, Left Updated:  12/02/18 2012     NT-proBNP 157 (H) pg/mL     Comprehensive metabolic panel [[id number]]  (Abnormal) Collected:  12/02/18 1938    Lab Status:  Final result Specimen:  Blood from Arm, Left Updated:  12/02/18 2006     Sodium 143 mmol/L      Potassium 3 5 mmol/L      Chloride 107 mmol/L      CO2 24 mmol/L      ANION GAP 12 mmol/L      BUN 19 mg/dL      Creatinine 1 13 mg/dL      Glucose 108 mg/dL      Calcium 8 8 mg/dL      AST 15 U/L      ALT 18 U/L      Alkaline Phosphatase 108 U/L      Total Protein 7 2 g/dL      Albumin 2 8 (L) g/dL      Total Bilirubin 0 10 (L) mg/dL      eGFR 53 ml/min/1 73sq m     Narrative:         National Kidney Disease Education Program recommendations are as follows:  GFR calculation is accurate only with a steady state creatinine  Chronic Kidney disease less than 60 ml/min/1 73 sq  meters  Kidney failure less than 15 ml/min/1 73 sq  meters      CBC and differential [[id number]]  (Abnormal) Collected:  12/02/18 1938    Lab Status:  Final result Specimen:  Blood from Arm, Left Updated:  12/02/18 1948     WBC 12 66 (H) Thousand/uL      RBC 3 95 Million/uL      Hemoglobin 12 1 g/dL      Hematocrit 38 2 %      MCV 97 fL      MCH 30 6 pg      MCHC 31 7 g/dL      RDW 13 3 %      MPV 10 7 fL      Platelets 643 Thousands/uL      nRBC 0 /100 WBCs      Neutrophils Relative 70 %      Immat GRANS % 0 %      Lymphocytes Relative 22 %      Monocytes Relative 5 %      Eosinophils Relative 2 %      Basophils Relative 1 %      Neutrophils Absolute 8 79 (H) Thousands/µL      Immature Grans Absolute 0 05 Thousand/uL      Lymphocytes Absolute 2 84 Thousands/µL      Monocytes Absolute 0 66 Thousand/µL      Eosinophils Absolute 0 25 Thousand/µL      Basophils Absolute 0 07 Thousands/µL Logan Iraheta [[id number]] Collected:  12/02/18 1938    Lab Status: In process Specimen:  Blood from Arm, Left Updated:  12/02/18 1945    APTT [[id number]] Collected:  12/02/18 1938    Lab Status: In process Specimen:  Blood from Arm, Left Updated:  12/02/18 1945                 No orders to display              Procedures  ECG 12 Lead Documentation  Date/Time: 12/2/2018 7:20 PM  Performed by: Lety Marin  Authorized by: Lety Marin     Indications / Diagnosis:  LE edema  ECG reviewed by me, the ED Provider: yes    Patient location:  ED  Interpretation:     Interpretation: normal    Rate:     ECG rate:  83    ECG rate assessment: normal    Rhythm:     Rhythm: sinus rhythm    Ectopy:     Ectopy: none    QRS:     QRS axis:  Normal    QRS intervals:  Normal  Conduction:     Conduction: normal    ST segments:     ST segments:  Normal  T waves:     T waves: normal             Phone Contacts  ED Phone Contact    ED Course                               MDM  Number of Diagnoses or Management Options  Diagnosis management comments: Will check patient for possible kidney dysfunction, congestive heart failure, protein calorie malnutrition as possible causes for her LE edema  Either way she should increased elevation    CritCare Time    Disposition  Final diagnoses:   Protein calorie malnutrition ([address] )   Bilateral leg edema     Time reflects when diagnosis was documented in both MDM as applicable and the Disposition within this note     Time User Action Codes Description Comment    12/2/2018  8:22 PM Karly Taylor Add [E46] Protein calorie malnutrition ([address] )     12/2/2018  8:23 PM Christina Levo A Add [R60 0] Bilateral leg edema       ED Disposition     ED Disposition Condition Comment    Discharge  Romulo Tiesha discharge to home/self care      Condition at discharge: Stable        Follow-up Information     Follow up With Specialties Details Why Michell Bustamante MD Internal Medicine Schedule an appointment as soon as possible for a visit in 1 day  [facility]  [phone]            Patient's Medications   Discharge Prescriptions    No medications on file     No discharge procedures on file      ED Provider  Electronically Signed by           Vaishnavi Wellington MD  12/02/18 2023

## 2018-12-03 LAB
ATRIAL RATE: 83 BPM
P AXIS: 59 DEGREES
PR INTERVAL: 150 MS
QRS AXIS: 35 DEGREES
QRSD INTERVAL: 72 MS
QT INTERVAL: 380 MS
QTC INTERVAL: 446 MS
T WAVE AXIS: 76 DEGREES
VENTRICULAR RATE: 83 BPM

## 2018-12-03 PROCEDURE — 93010 ELECTROCARDIOGRAM REPORT: CPT | Performed by: INTERNAL MEDICINE

## 2018-12-03 NOTE — DISCHARGE INSTRUCTIONS
Malnutrition   WHAT YOU NEED TO KNOW:   Malnutrition occurs when you do not get enough calories or nutrients to keep you healthy  Nutrients include protein, fat, carbohydrates, vitamins, and minerals  DISCHARGE INSTRUCTIONS:   Medicines: You may need any of the following:  · Vitamins and minerals  may be needed to replace vitamins and minerals your body needs  They may be given in your IV, as a shot, or as a pill  · Appetite stimulants  are medicines that help improve your appetite so you will want to eat more  · Take your medicine as directed  Contact your healthcare provider if you think your medicine is not helping or you have side effects  Tell him if you are allergic to any medicine  Keep a list of the medicines, vitamins, and herbs you take  Include the amounts, and when and why you take them  Bring the list or the pill bottles to follow-up visits  Carry your medicine list with you in case of an emergency  Follow up with your healthcare provider as directed:  Write down your questions so you remember to ask them during your visits  Self-care:   · Increase calories and nutrients  A dietitian may help you plan larger, healthy meals  If you have trouble eating larger meals, eat small meals throughout the day  You may need to include snacks between meals  You may need to eat or drink a nutrition supplement if you have trouble eating the right kinds and amounts of food  · Find support  If you cannot buy or prepare the right kinds of foods, talk to your healthcare provider  Ask for information about community programs that can help you  Contact your healthcare provider if:   · You lose a large amount of weight within a short amount of time  · You feel depressed, confused, tired, irritable, and you do not feel like eating  · You have questions or concerns about your condition or care    Seek care immediately or call 911 if:   · You have pain in your chest, back, neck, jaw, stomach, or down one or both arms  · You have shortness of breath  © 2017 [address] Information is for End User's use only and may not be sold, redistributed or otherwise used for commercial purposes  All illustrations and images included in CareNotes® are the copyrighted property of A D SHAY BENITEZ , Wendy  or Pito Raines  The above information is an  only  It is not intended as medical advice for individual conditions or treatments  Talk to your doctor, nurse or pharmacist before following any medical regimen to see if it is safe and effective for you  Leg Edema   [facility] Jalloh EJ, Kevin RO, et al: AHA/ACCF Secondary Prevention and Risk Reduction Therapy for Patients With Coronary and Other Atherosclerotic Vascular Disease: 2011 Update: A Guideline From the American Heart Association and American College of Cardiology Foundation  Circulation 2011; 124(22):1450-6985  Blageraldine AD & Jacque GY: Venous thromboembolism  BMJ 2006; 332(3274):215-219  Papi Anderson RH & Brittanie PJ: Management of varicose veins  Am Fam Physician 2008; 78(11):5914-6538  Socrates Cano PA: Lower extremity venous disorders: implications for nursing practice  J Cardiovasc Nurs 2008; 23(2):132-143  Rebekah Guajardo: Venous Disease  In: [facility], [address] [place] Karla Robledobull  Cardiology, 3rd ed  [address] [place], [place], 2010  [place] L: Edema  In: Jamshid Huston RM, [employer], et al  Becki Gay  [address] Emergency Medicine Consult, 4th ed  [address] [place] [address], 2011  National Heart Lung and [place] (NHLBI): Heart Failure  National Heart Lung and Blood [place] ([address])  Shell, MD  2010  Available from URL: [employer]   As accessed 2011-04-07  Arpit BA & Joni RT: Chronic Venous Disorders: General Considerations  In: Johana Ulrich  Reynolds's Vascular Surgery, 7th ed   [address], Marie Kelley, 2010  Lamonte VF: Acute pulmonary embolism  N Engl J Med 2008; 358(10):9961-6871  © 2017 [address] Information is for End User's use only and may not be sold, redistributed or otherwise used for commercial purposes  All illustrations and images included in CareNotes® are the copyrighted property of A D A M , Inc  or Pito Raines  The above information is an  only  It is not intended as medical advice for individual conditions or treatments  Talk to your doctor, nurse or pharmacist before following any medical regimen to see if it is safe and effective for you

## 2018-12-10 ENCOUNTER — APPOINTMENT (OUTPATIENT)
Dept: LAB | Facility: HOSPITAL | Age: 58
End: 2018-12-10
Payer: MEDICARE

## 2018-12-10 ENCOUNTER — TRANSCRIBE ORDERS (OUTPATIENT)
Dept: ADMINISTRATIVE | Facility: HOSPITAL | Age: 58
End: 2018-12-10

## 2018-12-10 DIAGNOSIS — I10 HYPERTENSION, UNSPECIFIED TYPE: Primary | ICD-10-CM

## 2018-12-10 DIAGNOSIS — G40.909 SEIZURE DISORDER (HCC): ICD-10-CM

## 2018-12-10 DIAGNOSIS — G40.A11: Primary | ICD-10-CM

## 2018-12-10 DIAGNOSIS — G40.A11: ICD-10-CM

## 2018-12-10 LAB
ANION GAP SERPL CALCULATED.3IONS-SCNC: 8 MMOL/L (ref 4–13)
BUN SERPL-MCNC: 26 MG/DL (ref 5–25)
CALCIUM SERPL-MCNC: 9 MG/DL (ref 8.3–10.1)
CHLORIDE SERPL-SCNC: 104 MMOL/L (ref 100–108)
CO2 SERPL-SCNC: 29 MMOL/L (ref 21–32)
CREAT SERPL-MCNC: 1.32 MG/DL (ref 0.6–1.3)
ERYTHROCYTE [DISTWIDTH] IN BLOOD BY AUTOMATED COUNT: 13.2 % (ref 11.6–15.1)
GFR SERPL CREATININE-BSD FRML MDRD: 44 ML/MIN/1.73SQ M
GLUCOSE P FAST SERPL-MCNC: 88 MG/DL (ref 65–99)
HCT VFR BLD AUTO: 40 % (ref 34.8–46.1)
HGB BLD-MCNC: 12.7 G/DL (ref 11.5–15.4)
MCH RBC QN AUTO: 31.1 PG (ref 26.8–34.3)
MCHC RBC AUTO-ENTMCNC: 31.8 G/DL (ref 31.4–37.4)
MCV RBC AUTO: 98 FL (ref 82–98)
PLATELET # BLD AUTO: 356 THOUSANDS/UL (ref 149–390)
PMV BLD AUTO: 11.3 FL (ref 8.9–12.7)
POTASSIUM SERPL-SCNC: 3.9 MMOL/L (ref 3.5–5.3)
RBC # BLD AUTO: 4.09 MILLION/UL (ref 3.81–5.12)
SODIUM SERPL-SCNC: 141 MMOL/L (ref 136–145)
WBC # BLD AUTO: 10.97 THOUSAND/UL (ref 4.31–10.16)

## 2018-12-10 PROCEDURE — 80048 BASIC METABOLIC PNL TOTAL CA: CPT

## 2018-12-10 PROCEDURE — 36415 COLL VENOUS BLD VENIPUNCTURE: CPT

## 2018-12-10 PROCEDURE — 85027 COMPLETE CBC AUTOMATED: CPT | Performed by: PSYCHIATRY & NEUROLOGY

## 2018-12-10 PROCEDURE — 80177 DRUG SCRN QUAN LEVETIRACETAM: CPT

## 2018-12-10 PROCEDURE — 80157 ASSAY CARBAMAZEPINE FREE: CPT

## 2018-12-12 LAB — LEVETIRACETAM SERPL-MCNC: 46.5 UG/ML (ref 10–40)

## 2018-12-13 ENCOUNTER — HOSPITAL ENCOUNTER (OUTPATIENT)
Dept: RADIOLOGY | Facility: HOSPITAL | Age: 58
Discharge: HOME | End: 2018-12-13
Payer: MEDICARE

## 2018-12-13 DIAGNOSIS — I10 HYPERTENSION, UNSPECIFIED TYPE: ICD-10-CM

## 2018-12-13 DIAGNOSIS — G40.909 SEIZURE DISORDER (HCC): ICD-10-CM

## 2018-12-13 PROCEDURE — 93880 EXTRACRANIAL BILAT STUDY: CPT

## 2018-12-14 PROCEDURE — 93880 EXTRACRANIAL BILAT STUDY: CPT | Performed by: SURGERY

## 2018-12-15 LAB — CARBAMAZEPINE FREE SERPL-MCNC: 1 UG/ML (ref 0.6–4.2)

## 2019-01-15 ENCOUNTER — TELEPHONE (OUTPATIENT)
Dept: NEUROLOGY | Facility: CLINIC | Age: 59
End: 2019-01-15

## 2019-02-08 ENCOUNTER — TELEPHONE (OUTPATIENT)
Dept: NEUROLOGY | Facility: CLINIC | Age: 59
End: 2019-02-08

## 2019-02-08 ENCOUNTER — DOCUMENTATION (OUTPATIENT)
Dept: NEUROLOGY | Facility: CLINIC | Age: 59
End: 2019-02-08

## 2019-02-08 NOTE — TELEPHONE ENCOUNTER
Called patient regarding appointment with Dr Berry Camarena on 2/13/19  Reminded patient to bring completed new patient paperwork, medication list, and to arrive 15 minutes earlier for registration purposes

## 2019-02-08 NOTE — PROGRESS NOTES
A request for MRI disk images was faxed to [facility] today  MRI testing was done on Jan 2015  Waiting on disk

## 2019-02-13 ENCOUNTER — OFFICE VISIT (OUTPATIENT)
Dept: NEUROLOGY | Facility: CLINIC | Age: 59
End: 2019-02-13
Payer: MEDICARE

## 2019-02-13 VITALS
BODY MASS INDEX: 47.66 KG/M2 | DIASTOLIC BLOOD PRESSURE: 86 MMHG | WEIGHT: 269 LBS | HEART RATE: 108 BPM | HEIGHT: 63 IN | SYSTOLIC BLOOD PRESSURE: 110 MMHG

## 2019-02-13 DIAGNOSIS — G40.919 EPILEPSY WITH ALTERED CONSCIOUSNESS WITH INTRACTABLE EPILEPSY (HCC): Primary | ICD-10-CM

## 2019-02-13 DIAGNOSIS — M83.5 ANTICONVULSANT DRUG-INDUCED OSTEOMALACIA: ICD-10-CM

## 2019-02-13 DIAGNOSIS — F34.1 DYSTHYMIA: ICD-10-CM

## 2019-02-13 DIAGNOSIS — G31.84 MILD NEUROCOGNITIVE DISORDER: ICD-10-CM

## 2019-02-13 DIAGNOSIS — T42.75XA ANTICONVULSANT DRUG-INDUCED OSTEOMALACIA: ICD-10-CM

## 2019-02-13 DIAGNOSIS — E66.01 MORBIDLY OBESE (HCC): ICD-10-CM

## 2019-02-13 DIAGNOSIS — G25.81 RESTLESS LEG SYNDROME: ICD-10-CM

## 2019-02-13 DIAGNOSIS — M89.9 DISORDER OF BONE: ICD-10-CM

## 2019-02-13 PROCEDURE — 99205 OFFICE O/P NEW HI 60 MIN: CPT | Performed by: PSYCHIATRY & NEUROLOGY

## 2019-02-13 RX ORDER — CARBAMAZEPINE 200 MG/1
200 TABLET ORAL 3 TIMES DAILY
Qty: 270 TABLET | Refills: 1 | Status: ON HOLD | OUTPATIENT
Start: 2019-02-13 | End: 2020-11-02 | Stop reason: SDUPTHER

## 2019-02-13 RX ORDER — PRAMIPEXOLE DIHYDROCHLORIDE 1.5 MG/1
1.5 TABLET ORAL
Qty: 90 TABLET | Refills: 1 | Status: ON HOLD | OUTPATIENT
Start: 2019-02-13 | End: 2020-11-02 | Stop reason: SDUPTHER

## 2019-02-13 RX ORDER — LAMOTRIGINE 25 MG/1
TABLET ORAL
Qty: 170 TABLET | Refills: 0 | Status: SHIPPED | OUTPATIENT
Start: 2019-02-13 | End: 2019-02-14 | Stop reason: SDUPTHER

## 2019-02-13 RX ORDER — FLUOXETINE HYDROCHLORIDE 20 MG/1
CAPSULE ORAL
COMMUNITY
Start: 2018-11-16 | End: 2019-02-13

## 2019-02-13 RX ORDER — LAMOTRIGINE 100 MG/1
TABLET ORAL
Qty: 90 TABLET | Refills: 1 | Status: SHIPPED | OUTPATIENT
Start: 2019-02-13 | End: 2020-11-02 | Stop reason: HOSPADM

## 2019-02-13 RX ORDER — LEVETIRACETAM 750 MG/1
1500 TABLET ORAL 2 TIMES DAILY
Qty: 180 TABLET | Refills: 1 | Status: SHIPPED | OUTPATIENT
Start: 2019-02-13 | End: 2020-11-02 | Stop reason: HOSPADM

## 2019-02-13 NOTE — PROGRESS NOTES
Patient ID: Stephanie Nicholas is a 59 y o  female  Assessment/Plan:    No problem-specific Assessment & Plan notes found for this encounter  {Assess/PlanSmartLinks:90008}       Subjective:    HPI    {[facility] HPI texts:95619}    {Common ambulatory SmartLinks:74367}         Objective:    Height 5' 2 5" (1 588 m), weight 122 kg (269 lb), not currently breastfeeding  Physical Exam    Neurological Exam      ROS:    Review of Systems   Constitutional: Negative  HENT: Negative  Eyes: Negative  Respiratory: Positive for cough, shortness of breath and wheezing  Cardiovascular: Positive for leg swelling  Ankle swelling   Gastrointestinal: Negative  Endocrine: Negative  Genitourinary: Negative  Musculoskeletal: Negative  Skin: Negative  Allergic/Immunologic: Negative  Neurological: Positive for dizziness, seizures, light-headedness, numbness and headaches  Feet   Hematological: Negative  Psychiatric/Behavioral: The patient is nervous/anxious

## 2019-02-13 NOTE — PROGRESS NOTES
[facility]  Patient's Name: Grace Douglas   Patient's : 1960   Visit Type: consultation  Referring MD / PCP:  Bibi Carpio MD    Assessment:  Ms Grace Doulgas is a 59 y o  woman who qualifies for intractable epilepsy due to recurrent seizures in the setting of using two antiepileptic medications  She has variable frequency of breakthrough seizures but it is in part also due to a degree of her not being a great historian when it comes to her seizures  She may not be fully aware of what is happening, but I suspect that these are focal unaware seizures  An ambulatory EEG from nearly 10 years ago captured left frontotemporal epileptiform discharges  She also has co-morbid depression, and possibly a degree of neurocognitive impairment  I reviewed the diagnosis of epilepsy and focal epilepsy with the patient  Due to recurrent seizures, I recommend that she has an MRI brain imaging study to assess for focal pathology that can cause seizures (such as mesial temporal sclerosis or focal cortical malformation)  Since she continues to have seizures, she does not need to be on a high dose of levetiracetam   I will recommend that she start a new antiepileptic medication  Due to her depression, I recommend that we start with lamotrigine  I reviewed side effects of lamotrigine, which include, but not limited to, drug rash, Lanette Harms syndrome, fevers, bone marrow suppression, aplastic anemia, liver toxicity, mood swings, irritability, suicidal ideation, abnormal liver enzymes, ataxia, incoordination, cognitive impairment, headaches and insomnia  Alternatives included topiramate  We can wean her off of carbamazepine assuming that it had been ineffective since she has been on this medication since   Enzyme inducing AEDs also increases the risk of bone demineralization and I recommend that she takes extra vitamin D    We will check a vitamin D level to determine how much she needs to supplement  A DEXA scan can assess the severity of bone demineralization and risk of fracture  I will recommend a routine EEG study, and subsequently an ambulatory EEG study to determine if she is having subclinical seizures  However, if she is having frequent seizures then an inpatient EMU study is recommended for presurgical evaluation  The EMU study would also be useful in determining if she is having focal onset seizures or generalized seizures or nonepileptic events (accounting for dizziness or periods of confusion)  I discussed seizure safety and seizure first aid with the patient  Limits would be no unprotected heights (ladders, standing on chairs/tables), should not swim alone (need partners or ), cooking with a partner to avoid burn injuries, showers instead of baths  I reviewed that convulsive seizures typically last about 2 minutes with about 15-30 minutes of recovery  Should a seizure last more than 5 minutes or patient fails to recover after 30 minutes or there are multiple seizures in a day or if there is a suspected head injury then EMS should be called or they go to the nearest emergency room  If she only experiences altered awareness, confusion, or focal seizures, then they may call the office for guidance  Seizure first aid consists of preventing the patient from wandering or removal of dangerous objects or prevention of injury, for convulsive seizures, position the patient on the ground, may place a pillow under the head, turn the patient to his side, no objects or reaching for the mouth, no restraints but prevent injuries by removing glasses or sharp/heavy objects, and time the duration of the seizure  I recommend that she obtain a medical alert bracelet that states "Seizure disorder" or "Epilepsy" along with any medication allergies  She is not currently driving      Plan:   1 - start Lamotrigine titration (Lamictal) with 25mg tabs  Week 1 and 2 - take 2 tabs at bedtime  Week 3 and 4 - take 2 tabs twice a day  Week 5 and 6 - take 3 tabs twice a day  Week 7 - start using the 100mg tabs  Week 7 and 8 - take one 100mg tab twice a day  Week 9 and afterwards - take one and a half tab twice a day  2 - continue with Levetiracetam (Keppra) 1000mg tab take 2 tabs twice a day until you run out then change over to Levetiracetam 750mg tabs take 2 tabs twice a day  3 - continue with carbamazepine (Tegretol) 200mg tab take 1 tab three times a day  4 - check CBC, CMP, vitamin D, levetiracetam and carbamazepine level now  5 - MRI brain w/wo contrat  6 - Routine EEG  7 - DXA scan of your bone to assess for osteoporosis  8 - you should be taking vitamin D 2000 units daily with calcium 1200mg daily because you are on carbamazepine  9 - consider inpatient admission to the epilepsy monitoring unit for seizure characterization  10 - follow-up in 3 months  11 - she will continue with pramipexole 1 5mg at night for restless leg syndrome        Problem List Items Addressed This Visit        Nervous and Auditory    Epilepsy with altered consciousness with intractable epilepsy (HCC) - Primary    Relevant Medications    pramipexole (MIRAPEX) 1 5 MG tablet    carBAMazepine (TEGretol) 200 mg tablet    levETIRAcetam (KEPPRA) 750 mg tablet    lamoTRIgine (LaMICtal) 100 mg tablet    lamoTRIgine (LaMICtal) 25 mg tablet    Other Relevant Orders    Carbamazepine level, total    Levetiracetam level    MRI brain seizure wo and w contrast    EEG Awake and asleep       Musculoskeletal and Integument    Anticonvulsant drug-induced osteomalacia    Relevant Orders    Vitamin D 25 hydroxy    DXA bone density spine hip and pelvis       Other    Dysthymia    Morbidly obese (HCC)    Restless leg syndrome    RESOLVED: Mild neurocognitive disorder      Other Visit Diagnoses     Disorder of bone         Relevant Orders    DXA bone density spine hip and pelvis              Chief Complaint:    Chief Complaint Patient presents with    Seizures      HPI:      Dong Martinez is a 59 y o  right handed female here for consultation evaluation of epilepsy  She was previously evaluated by Dr Nelson Flanagan  The following is from interviewing the patient and review of the available office/hospital notes  Intake History 2/13/2019  Ms Danny Duckworth believes that she was diagnosed with epilepsy when she was 1years old  She was told that she had measles, mumps, and pneumonia in relatively quick succession  Her mother told her during that time she had a convulsion  She recalls that she was on phenobarbital and phenytoin until she was in her 35s  She continued to have "grand mal" seizures (she cannot remember what happened to her when she had her grand mal seizure)  She is unable to recall when she last had a grand mal seizure  She later started to have petit mal seizure in her late 35s  These are associated with a light feeling (floating) like something is going to happen, confused, unable to talk, staring, but she does not lose consciousness; this would last 3-4 minutes  She had been following up with Dr Nelson Flanagan from 2009 to 2018; over the course of 9 years she has stayed on Keppra and Tegretol with slow dose adjustment for suspected seizures  She is currently on Keppra 2000mg twice a day and Tegretol 200mg three times a day  The last time she had her "petit mal" seizure was this morning  She has multiple "petit mal" seizures in a week  In April 2018, she had an episode of dizziness walking upstairs, then blacked out and fell down the stair  She has no way of knowing if she had a convulsion  I am unable to get further information about her seizures from her  because he does not have a phone for us to call him  Review of Dr Jay Bravo notes:  Seen in 2009  When she was in her 25s she had near daily seizures  Seizures would start with squeezing her right forearm, then she passes out    There was an EEG in 10/2009 that showed brief paroxysmal burst of mixed frequency slowing and mild background slowing  She was previously seen by Dr Cande Ochoa and MRI brain study in  done at [facility]   She was on CBZ 600mg BID and LEV 1000mg BID  She had a 72 hours ambulatory EEG that showed left temporal and frontotemporal epileptogenic foci  LEV was increased to 4733-9952 but she was on -200  Gap in care until  (moved to [place]), neurologist in [place] recommended brain surgery  She had intermittently complained of lightheadedness, dizziness, and possible syncope  Dr Sara Thompson had found a vestibular problem early on  He also recommended referral to cardiology for possible syncope evaluation  Last visit was in 2018, when her LEV was at 8301-3234 and CBZ was at 200 TID  Restless leg symptoms - around 7-8 PM she notices that her legs will start kicking, worse and worse at night, these symptoms are not leg uneasiness, sensory changes, not improved by ambulation; however, since taking the pramipexole her symptoms are alleviated  AED/side effects/compliance:  Levetiracetam 2000mg twice a day  Carbamazepine 200mg three times a day    Event/Seizure semiology:  1  Grand mal seizures  2   Petit mal seizures - light feeling (floating) like something is going to happen, confused, unable to talk, staring, but she does not lose consciousness; this would last 3-4 minutes    Prior Epilepsy History:  x    Special Features  Status epilepticus: No  Self Injury Seizures: No  Precipitating Factors: None  Post-ictal state: None    Epilepsy Risk Factors:  Abnormal pregnancy: No  Abnormal birth/: No  Abnormal Development: No  Febrile seizures, simple: No  Febrile seizures, complex: No  CNS infection: unclear but she was told that she had measles and mumps and pneumonia when she had convulsions when she was 1years old  Mental retardation: No; but she was slow at learning  Cerebral palsy: No  Head injury (moderate/severe): No  CNS neoplasm: No  CNS malformation: No  Neurosurgical procedure: No  Stroke: No  Alcohol abuse: No  Drug abuse: No  Family history Sz/epilepsy: No    Prior AEDs:  medication Max dose Time used Reason to stop   phenytoin      phenobarbital      carbamazepine      Levitracetam                    Prior workup:  x  Imaging:  MRI brain 1/20/2015 [facility]  Cerebellar atrophy  Normal MR brain study    EEGs:  12/11-12/14/2009 - Dr Margareth Gurrola  Frequent spike and spike-wave discharges from the left mid-temporal and left frontotemporal head regions    Labs:  11/11/2015  LEV 32 6  Vitamin D 17 7    General exam   /86 (BP Location: Left arm, Patient Position: Sitting, Cuff Size: Large)   Pulse (!) 108   Ht 5' 2 5" (1 588 m)   Wt 122 kg (269 lb)   BMI 48 42 kg/m²    Appearance: depressed affect and normally developed, appears well  Carotids: no bruits present  Cardiovascular: regular rate and rhythm and normal heart sounds  Pulmonary: clear to auscultation  Abdominal: obese    HEENT: anicteric and moist mucus membranes / oral cavity ; there is a slight head tilt to the left (top of head to the left, more facing the right)  Fundoscopy: normal    Mental status  Orientation: alert and oriented to name, date, but unable to give [employer] of Knowledge: fair-limited degree of knowledge   Attention and Concentration: able to spell HOUSE forwards but unable to do so backwards   [facility]  Current and Remote Memory:recalled 0/3 words after five minutes  Language: she has difficulty naming specific parts of objects (face of a watch, collar of a coat), spontaneous speech is normal and comprehension is intact    Cranial Nerves  CN 1: not tested  CN 2: Visual fields intact to confrontation and pupils equal round reactive to direct and consenual light   CN 3, 4, 6: EOMI, no nystagmus  CN 5:sensation intact to all distriubtion V1, V2, V3  CN 7:muscles of facial expression are symmetric  CN 8:symmetric to finger rubs bilaterally  CN 9, 10:symmetric elevation of soft palate and uvula is midline  CN 11:symmetric strength of sternocleidomastoid and trapezius muscles  CN 12:tongue is midline    Motor:  Bulk, Tone: normal bulk, normal tone  Pronation: no pronator drift  Strength: Symmetric strength of the arms and legs, no lateralizing weakness     Sensory:  Lighttouch: intact in all limbs  Vibration: unable to feel vibration on the big toes bilaterally  Romberg:normal    Coordination:  FNF:FNF bilaterally intact  LEAH:intact  FFM:intact  Gait/Station:normal gait and normal tandem gait    Reflexes:  bilateral toes were down going and reflexes normal except: ankle reflexes 2+/4 on the left and 0/4 on the right    Past Medical/Surgical History:  Patient Active Problem List   Diagnosis    Epilepsy with altered consciousness with intractable epilepsy ([facility] )    Dysthymia    Morbidly obese ([facility] )    Anticonvulsant drug-induced osteomalacia    Restless leg syndrome     Past Medical History:   Diagnosis Date    Depression     EP (epilepsy) ([facility] )     Epilepsy ([place] [address] )     Restless leg      Past Surgical History:   Procedure Laterality Date    DENTAL SURGERY      all teeth removed    LASER ABLATION OF THE CERVIX         Past Psychiatric History:  Depression: Yes  Anxiety: No  Psychosis: No    Medications:    Current Outpatient Medications:     carBAMazepine (TEGretol) 200 mg tablet, Take 1 tablet (200 mg total) by mouth 3 (three) times a day, Disp: 270 tablet, Rfl: 1    hydrochlorothiazide (HYDRODIURIL) 25 mg tablet, Take 25 mg by mouth daily, Disp: , Rfl:     levETIRAcetam (KEPPRA) 750 mg tablet, Take 2 tablets (1,500 mg total) by mouth 2 (two) times a day, Disp: 180 tablet, Rfl: 1    pramipexole (MIRAPEX) 1 5 MG tablet, Take 1 tablet (1 5 mg total) by mouth daily at bedtime, Disp: 90 tablet, Rfl: 1    lamoTRIgine (LaMICtal) 100 mg tablet, After titration with 25mg tabs, take one 100mg tab twice a day for 2 weeks, then one and a half tab twice a day , Disp: 90 tablet, Rfl: 1    lamoTRIgine (LaMICtal) 25 mg tablet, Week 1 and 2 - take 2 tabs qHS, Week 3 and 4 - take 2 tabs twice a day, Week 5 and 6 - take 3 tabs twice a day, Week 7 change to 100mg tabs, Disp: 170 tablet, Rfl: 0    Allergies:  No Known Allergies    Family history:  Family History   Problem Relation Age of Onset    Kidney disease Mother     No Known Problems Brother     No Known Problems Son      There is no family history of seizure, epilepsy or developmental delay  Social History  Living situation:  Lives with   Work:  Disability due to depression  Driving:  Never had a 's license   reports that she has been smoking  She has been smoking about 0 50 packs per day  She has never used smokeless tobacco  She reports that she does not drink alcohol or use drugs  Review of Systems  A review of at least 12 organ/systems was obtained by the medical assistant and reviewed by me, including additional positives/negatives:  Respiratory: Positive for cough, shortness of breath and wheezing  Cardiovascular: Positive for leg swelling  Ankle swelling   Neurological: Positive for dizziness, seizures, light-headedness, numbness and headaches  Feet   Psychiatric/Behavioral: The patient is nervous/anxious  Decision making was of high-complexity due to the patient's high risk condition (seizures), psychiatric and neuropsychological comorbidities, behavioral problems, memory and cognitive problems and medication side effects

## 2019-02-13 NOTE — PATIENT INSTRUCTIONS
PLAN:  1 - start Lamotrigine titration (Lamictal) with 25mg tabs  Week 1 and 2 - take 2 tabs at bedtime  Week 3 and 4 - take 2 tabs twice a day  Week 5 and 6 - take 3 tabs twice a day  Week 7 - start using the 100mg tabs  Week 7 and 8 - take one 100mg tab twice a day  Week 9 and afterwards - take one and a half tab twice a day  2 - continue with Levetiracetam (Keppra) 1000mg tab take 2 tabs twice a day until you run out then change over to Levetiracetam 750mg tabs take 2 tabs twice a day  3 - continue with carbamazepine (Tegretol) 200mg tab take 1 tab three times a day  4 - check CBC, CMP, vitamin D, levetiracetam and carbamazepine level now  5 - MRI brain w/wo contrat  6 - Routine EEG  7 - DXA scan of your bone to assess for osteoporosis  8 - you should be taking vitamin D 2000 units daily with calcium 1200mg daily because you are on carbamazepine  9 - consider inpatient admission to the epilepsy monitoring unit for seizure characterization  10 - follow-up in 3 months    I reviewed side effects of lamotrigine, which include, but not limited to, drug rash, Iqra Abelson syndrome, fevers, bone marrow suppression, aplastic anemia, liver toxicity, mood swings, irritability, suicidal ideation, abnormal liver enzymes, ataxia, incoordination, cognitive impairment, headaches and insomnia  FIRST AID FOR SEIZURES    What to Do If You Witness a Seizure:     Generalized convulsive (called a generalized tonic-clonic or grand mal seizure)  During this seizure, the person may cry out, suddenly stiffen up, make jerking movements, and fall  The person may turn pale or blue from difficulty breathing  Actions:  1  Stay calm  Talk in a soothing voice and if possible keep onlookers away  2  Prevent injury  Move objects away that the person might hit while jerking uncontrollably  3  Time when the seizure starts and ends  Most seizures stop after only a few minutes  4  Turn him or her gently onto one side   This will help keep the airway clear  5  Never place anything in his/her mouth or give him/her anything by mouth during a seizure  -- Do not give the person water, pills, or food until fully alert  6  Loosen tight clothing or jewelry around his/her neck  7  Make the person as comfortable as possible  8  Place something soft under their head  9  Do not hold the person down  If the person having a seizure thrashes around there is no need for you to restrain them  They are more likely to be combative if restrained  Remember to consider your safety as well  10  Keep onlookers away  11  Be sensitive and supportive, and ask others to do the same  12  Stay with the person until he/she is fully alert  Complex partial seizure (confusional spells)  During this kind of seizure, the person may have a glassy stare; give no response or inappropriate responses when questioned; sit, stand, or walk about aimlessly; make lip smacking or chewing motions; fidget with clothes; appear to be drunk, drugged, or confused  Actions:  1  Make sure the person is safe and wont harm themselves  2  Try to remove harmful objects from around the person (tools, utensils, glasses)  3  Do NOT be aggressive or attempt to restrain the person  They are more likely to be combative if restrained  Remember to consider your safety as well  4  Help prevent the person from wandering, and direct the person to chair or safe position  5  Never place anything in his/her mouth or give him/her anything by mouth during a seizure  -- Do not give the person water, pills, or food until fully alert  6  Keep onlookers away  7  Be sensitive and supportive, and ask others to do the same  8  Stay with the person until he/she is fully alert  CALL 911 if:  1  A convulsive seizure lasts more than 5 minutes  2  The person turns blue during the seizure  3  The person does not start breathing after the seizure   Begin mouth to mouth resuscitation if this would occur  4  The person has one seizure right after the other without coming back to normal consciousness between the seizures  5  The person has not regained consciousness or is still confused after 30 minutes  6  You know the person does not have epilepsy  7  You know the person has diabetes or low blood sugar  8  The person is pregnant, ill, or injured  9  The seizure occurred in water, because the person may have inhaled water  10  The person requests an ambulance or medical help  Rescue medication  Your doctor may prescribe a rescue medication such as lorazepam (Ativan), diazepam (Valium / Diastat), or clonazepam (Klonopin) to terminate a seizure or if you have a history of cluster of seizures   Follow the instructions given by your doctor for these medications    Recognizing Common Seizures (examples)   · Simple partial seizures: Isolated twitching, numbness, sweating, dizziness, nausea/vomiting, disturbances to hearing, vision, smell or taste  No loss of consciousness occurs, and the person remains aware of his/her environment  · Complex partial seizures: Staring, motionless, picking at clothes, smacking lips, swallowing repeatedly or wandering around  The person is not aware of their surroundings and is not fully responsive  · Atonic seizures: Drop attacks or sudden, rapid fall to ground with rapid recovery  · Myoclonic seizures: Brief forceful jerks which can affect the whole body or just part of it  · Absence seizures: May appear to be daydreaming or spacing out   The person is momentarily unresponsive and unaware of what is happening around him/her  · Tonic seizures: Stiffening of part or of the entire body  · Generalized Tonic-Clonic Seizures  Grand-mal seizure   Sudden loss of consciousness with body stiffening followed by continuous jerking movements  A blue tinge around the mouth is likely but lack of oxygen is rare   Loss of bladder and/or bowel control may occur     Video Electroencephalopathy (VEEG) and the Epilepsy Monitoring Unit (EMU)    What is video EEG?  In video-EEG, you are video taped at the same time as your brain waves are recorded   This typically occurs in the hospital over a long period of time, often an average of 3-7 days   The doctor is able to review both the video and EEG at the same time to see exactly what your brain waves are doing while watching what your body is doing  Why do I need video-EEG? Typically the reasons to have a video-EEG study are to make a diagnosis, classify the type of seizures and epilepsy, and if medications do not prevent the seizures then offer an alternative therapy (pre-surgical evaluation)   Some people have events that look like an epileptic seizure (caused by electrical storm of the brain); but are in fact not due to abnormal electrical activity in the brain  Other causes include cardiac or vascular pathology, other neurological causes such as tics and movement disorders, or psychological / psychogenic nonepileptic events   There are also many different kinds of epileptic seizures  The video EEG will help classify the seizures and epilepsy syndrome to determine the best medications or treatments   Nearly a third of epilepsy patients are medically refractory (failed 2 or more appropriate anti-seizure medications) and epilepsy surgery may be an option for these patients  VEEG is used to evaluate where seizures start in the brain, determine if surgery may be possible and guide the specific surgical approach for patients who are found to be surgical candidates  What to expect in the Epilepsy Monitoring Unit (EMU)  The EMU is a specialized inpatient unit in the hospital specially designed for evaluation of seizure disorders  The unit is equipped with computer-based monitoring equipment, certified EEG technologists, trained nurses, and attending physicians trained in the management of epilepsy   In the EMU, seizures are recorded and specially reviewed so that a proper diagnosis can be made and treatment can be optimized   Each patient will have a private room and a private bathroom   Patients will be connected to video-EEG equipment continuously (24 hours a day)  o There is no video recording while in the restroom, but brain waves are recorded on the EEG at all times  o Although this can be bothersome, continuous monitoring at all times is necessary to make sure patients are safe and that the necessary information is collected   Because patients are connected to recording equipment, mobility is restricted to the patients room   Patients are not be able to take a shower or wash your hair while on EEG monitoring  o This would interfere with your EEG electrodes  o Washing with a basin or sink is permitted   Patients are asked to activate a push button when they experience an event and then state aloud what they are experiencing   Nurses will test the patient during an event to help the doctors see what is occurring   To increase the chance of capturing a seizure in a limited amount of time a variety of activation procedures are employed  o Sleep deprivation (no naps during the day and attempts to keep you awake all night as often as every other night)  o Photic stimulation and hyperventilation procedures  o Weaning or withdrawal of medications used to control seizures (the attending physician will discuss this with you)   You will need to have a peripheral IV placed in your hand or arm  This allows the doctors to give rescue medications in the event of a medical emergency while you are in the hospital  You also may be given a blood thinner in the form of daily subcutaneous injections to prevent deep venous thrombosis (DVT)   The hospitals is a non-smoking facility, therefore smoking is not allowed   Chewing gum is not allowed, this creates artifact on the EEG     If you are a woman of childbearing age, you may be asked to take a urine pregnancy test    We strive to make the EMU as safe as possible  Throughout the world video EEG monitoring is the standard of care and thousands of patients have been admitted to epilepsy monitoring units and there are rare cases of complications due to severely difficult to control epilepsy  These have included seizure clusters, status epilepticus (seizures that do not stop with typical medications and advanced measures are required), injuries (fractures and head injury), and very rarely death  How long do patients stay in the EMU?  The length of time varies for each patient   Prepare to stay about 1 week (even though it may not last that long)  o Typically, patients stay between 3-7 days, but may stay more or less time in special circumstances  Things you can do to prepare for admission   Take a shower and wash your hair the night before or the morning of admission   Do not use any hair products such as gels, sprays, mousse, or hair weaves  o It is NOT necessary to cut your hair or shave your head for the test     Unless you received specific instructions from your physician, continue to take your medications as you normally do, including the morning of your admission   Bring all of your current medications in the original prescription bottles to the hospital (some specialty medications may not be available in the hospital)   Bring a complete list of your medical and surgical history   Bring your seizure calendar   Wear comfortable clothing or pajamas  o Button-down shirts and elastic-waist pants are preferred    o You can bring slippers or sneakers for when you are walking around the room   You can bring activities like computers, phones, cards, music, movies, etc  with you to keep you occupied     o Electronic devices cannot be plugged in while you are touching them (this interferes with the equipment), but can be charging on the other side of the room      If you need any medical devices (such as a CPAP for obstructive sleep apnea), please bring it to the hospital

## 2019-02-14 ENCOUNTER — TELEPHONE (OUTPATIENT)
Dept: NEUROLOGY | Facility: CLINIC | Age: 59
End: 2019-02-14

## 2019-02-14 DIAGNOSIS — G40.919 EPILEPSY WITH ALTERED CONSCIOUSNESS WITH INTRACTABLE EPILEPSY (HCC): Primary | ICD-10-CM

## 2019-02-14 RX ORDER — LAMOTRIGINE 25 MG/1
TABLET ORAL
Qty: 170 TABLET | Refills: 0 | Status: SHIPPED | OUTPATIENT
Start: 2019-02-14 | End: 2020-11-02 | Stop reason: HOSPADM

## 2019-02-14 NOTE — TELEPHONE ENCOUNTER
Received a call from Daniel Monsivais with radiology at [facility]  Patient needs BUN and creat  Orders entered  Patient made aware  Labs faxed to [facility]  Unrelated, patient stated that Dr Charly Bruno wanted to speak to her  regarding her seizures, and he can be contacted at [phone] if Dr Charly Bruno wanted to call him

## 2019-02-14 NOTE — TELEPHONE ENCOUNTER
I looked at the Lamotrigine 25mg prescription: there were instructions in the Sig section and a different set of instruction in the Pharmacy Instruction that was confusing the pharmacist   I changed the lamotrigine 25mg tab prescription to correct the difference  Please call the pharmacy to make sure there is no conflict with instructions

## 2019-02-15 NOTE — TELEPHONE ENCOUNTER
Essie Todd called from [facility] stating script was sent out for Lamotrigine 25mg tabs with directions to continue with 25mg tabs for week 7&8  I reviewed with Essie Todd that I do not see that on any of the electronic scripts in patient's chart  We also discussed that patient does not have enough 25mg tabs to cover week 7&8  Per Essie Todd, she will call patient and notify her that week 7&8 she will switch to using the Lamotrigine 100mg tablets which is stated on the prescription for this medication  Call placed to patient and requested return call to our office to review above

## 2019-02-15 NOTE — TELEPHONE ENCOUNTER
Spoke with Tani Moscoso at [facility] regarding clarification  Nothing additional is needed at this time

## 2019-02-17 PROBLEM — G31.84 MILD NEUROCOGNITIVE DISORDER: Status: RESOLVED | Noted: 2019-02-13 | Resolved: 2019-02-17

## 2019-02-20 ENCOUNTER — TRANSCRIBE ORDERS (OUTPATIENT)
Dept: LAB | Facility: CLINIC | Age: 59
End: 2019-02-20

## 2019-02-25 ENCOUNTER — DOCUMENTATION (OUTPATIENT)
Dept: NEUROLOGY | Facility: CLINIC | Age: 59
End: 2019-02-25

## 2019-02-25 NOTE — TELEPHONE ENCOUNTER
I called [phone], to speak to Ms Lim's   I left a message for a return phone call to the office regarding Ms Lim's seizures  I want his witness account of what Massiel Wayne is doing during her seizures  Ms Tiana Amos does not seem to be sure what happens because she is confused

## 2019-02-25 NOTE — PROGRESS NOTES
Reviewed 2015 MRI brain study from [facility]  Incorrectly protocol'ed for coronal images, the images are not perpendicular to the axis of the hippocampi  Suspicious looking left hippocampus T2 signal     Patient needs repeat MRI brain study to be performed with T2 coronal acquisition and fine sections through the temporal lobes

## 2019-02-25 NOTE — TELEPHONE ENCOUNTER
Noted- when he calls back to you want me/one of the nurses to get the account or would you like to speak with him personally?

## 2019-02-25 NOTE — PROGRESS NOTES
Patient's MRI of brain images from [facility] have been loaded into PACS and are now available to view  The disk has been mailed to patient's mailing address today

## 2019-02-26 NOTE — TELEPHONE ENCOUNTER
Have not received a tomas back from the patient's , Did you want to attempt to call him again or would you like me to try to get him on the phone for you?

## 2019-03-11 ENCOUNTER — TELEPHONE (OUTPATIENT)
Dept: NEUROLOGY | Facility: CLINIC | Age: 59
End: 2019-03-11

## 2019-04-29 ENCOUNTER — TELEPHONE (OUTPATIENT)
Dept: NEUROLOGY | Facility: CLINIC | Age: 59
End: 2019-04-29

## 2019-06-06 LAB — HBA1C MFR BLD HPLC: 5.9 %

## 2019-08-28 ENCOUNTER — TELEPHONE (OUTPATIENT)
Dept: HEMATOLOGY ONCOLOGY | Facility: CLINIC | Age: 59
End: 2019-08-28

## 2019-08-28 NOTE — TELEPHONE ENCOUNTER
New Patient Encounter    New Patient Intake Form   Patient Details:  Antione Horton  1960  [id number]    Background Information:  [address] starts by opening a telephone encounter and gathering the following information   Who is calling to schedule? If not self, relationship to patient? self   Referring Provider Fiona Cr   What is the diagnosis? leukocytosis   When was the diagnosis? 8/2019   Is patient aware of diagnosis? Yes   Reason for visit? NP DX   Have you had any testing done? If so: when, where? Yes   Are records in [email or web address]? yes   Was the patient told to bring a disk? no   Scheduling Information:   [facility] [place]     Requesting Specific Provider? no   Are there any dates/time the patient cannot be seen? no   Counseling Pre-Screen:  If the patient answers YES to any of the below questions, please route to the appropriate location specific counselor    Have you felt anxious or worried about cancer and the treatment you are receiving? No   Has your diagnosis caused physical, emotional, or financial hardship for you? No   Note: Do not ask the patient about transportation issues/needs  Please notate if the patient brings it up and the counselor will schedule accordingly  Miscellaneous: outside records rcd and sent to [facility]   After completing the above information, please route to Financial Counselor and the appropriate Nurse Navigator for review

## 2019-10-10 ENCOUNTER — TELEPHONE (OUTPATIENT)
Dept: NEUROLOGY | Facility: CLINIC | Age: 59
End: 2019-10-10

## 2019-10-10 NOTE — TELEPHONE ENCOUNTER
Saurabh,   Can you look at this Faxed form from her PCP? Patient did not follow-up with me because she was going to see [facility] instead  But in the Faxed office note, there is a referral to -Neurology Adriane Stoddard  Is the PCP trying to refer the patient back to me? Or does she want to stay with [facility]        Huma Carver      ----- Message -----   From: Interface, Transcription Incoming   Sent: 10/10/2019   1:02 PM EDT   To: Magalis Greene MD

## 2019-10-10 NOTE — TELEPHONE ENCOUNTER
Hi Dr Martha Crook contacted the sender to clarify whether this is a re-referral to our practice  I was informed that the patient is unsatisfied with [facility] and wishes to re-establish care with [address] [place] [address] Neurology  Are you agreeable to her establishing care with another epileptologist at the [facility] office  I am guessing that transportation to [facility] might be issue

## 2019-10-11 NOTE — TELEPHONE ENCOUNTER
Can she follow-up with me, can she go to the [facility]? Or does she only want to go to the [facility] office

## 2019-10-17 NOTE — TELEPHONE ENCOUNTER
I spoke with patient today  She is willing to go to [facility] office to see you  I am waiting to hear from Neuro Ins referral team to eduardoiirm her insurance will allow  Provided patient with address to [facility] office address  She will look into getting transportation to this location in the meantime  Placed a hold on a 60 min appt for 1/8/2020, which is your next available  Will let you know the outcome

## 2019-10-23 NOTE — TELEPHONE ENCOUNTER
Received confirmation from Neurology Ins verification team, patient can be seen at [facility] office  Spoke with patient today to make aware  She accepted appt on 1/8/2020 at 10:30am with Dr Courtney Martinez  Mailed appt card and directions to [facility] office

## 2020-02-12 ENCOUNTER — TELEPHONE (OUTPATIENT)
Dept: NEUROLOGY | Facility: CLINIC | Age: 60
End: 2020-02-12

## 2020-02-17 NOTE — TELEPHONE ENCOUNTER
The patient was sent dismissal letter, and the dismissal was entered in [address] [place] [address] as well

## 2020-06-25 DIAGNOSIS — G40.919 EPILEPSY WITH ALTERED CONSCIOUSNESS WITH INTRACTABLE EPILEPSY (HCC): ICD-10-CM

## 2020-06-25 RX ORDER — CARBAMAZEPINE 200 MG/1
TABLET ORAL
Qty: 270 TABLET | Refills: 3 | OUTPATIENT
Start: 2020-06-25

## 2020-09-09 ENCOUNTER — APPOINTMENT (EMERGENCY)
Dept: CT IMAGING | Facility: HOSPITAL | Age: 60
End: 2020-09-09
Payer: MEDICARE

## 2020-09-09 ENCOUNTER — HOSPITAL ENCOUNTER (EMERGENCY)
Facility: HOSPITAL | Age: 60
Discharge: HOME | End: 2020-09-09
Attending: EMERGENCY MEDICINE | Admitting: EMERGENCY MEDICINE
Payer: MEDICARE

## 2020-09-09 VITALS
OXYGEN SATURATION: 98 % | HEIGHT: 63 IN | TEMPERATURE: 97.5 F | DIASTOLIC BLOOD PRESSURE: 79 MMHG | RESPIRATION RATE: 18 BRPM | WEIGHT: 215 LBS | SYSTOLIC BLOOD PRESSURE: 136 MMHG | BODY MASS INDEX: 38.09 KG/M2 | HEART RATE: 89 BPM

## 2020-09-09 DIAGNOSIS — R55 SYNCOPE: ICD-10-CM

## 2020-09-09 DIAGNOSIS — E04.1 THYROID NODULE: ICD-10-CM

## 2020-09-09 DIAGNOSIS — R91.1 LUNG NODULE SEEN ON IMAGING STUDY: Primary | ICD-10-CM

## 2020-09-09 DIAGNOSIS — N39.0 UTI (URINARY TRACT INFECTION): ICD-10-CM

## 2020-09-09 LAB
ALBUMIN SERPL BCP-MCNC: 3.5 G/DL (ref 3.4–4.8)
ALP SERPL-CCNC: 87.3 U/L (ref 35–140)
ALT SERPL W P-5'-P-CCNC: 11 U/L (ref 5–54)
AMMONIA PLAS-SCNC: 48.32 UMOL/L
AMPHETAMINES SERPL QL SCN: NEGATIVE
ANION GAP SERPL CALCULATED.3IONS-SCNC: 9 MMOL/L (ref 4–13)
APTT PPP: 22 SECONDS (ref 23–31)
AST SERPL W P-5'-P-CCNC: 11 U/L (ref 15–41)
BACTERIA UR QL AUTO: ABNORMAL /HPF
BARBITURATES UR QL: NEGATIVE
BASOPHILS # BLD AUTO: 0.04 THOUSANDS/ΜL (ref 0–0.1)
BASOPHILS NFR BLD AUTO: 0 % (ref 0–1)
BENZODIAZ UR QL: NEGATIVE
BILIRUB SERPL-MCNC: 0.24 MG/DL (ref 0.3–1.2)
BILIRUB UR QL STRIP: NEGATIVE
BUN SERPL-MCNC: 22 MG/DL (ref 6–20)
CALCIUM SERPL-MCNC: 9 MG/DL (ref 8.4–10.2)
CHLORIDE SERPL-SCNC: 103 MMOL/L (ref 96–108)
CLARITY UR: ABNORMAL
CO2 SERPL-SCNC: 26 MMOL/L (ref 22–33)
COCAINE UR QL: NEGATIVE
COLOR UR: YELLOW
CREAT SERPL-MCNC: 1.28 MG/DL (ref 0.4–1.1)
EOSINOPHIL # BLD AUTO: 0.3 THOUSAND/ΜL (ref 0–0.61)
EOSINOPHIL NFR BLD AUTO: 2 % (ref 0–6)
ERYTHROCYTE [DISTWIDTH] IN BLOOD BY AUTOMATED COUNT: 14.4 % (ref 11.6–15.1)
ETHANOL SERPL-MCNC: <10 MG/DL
GFR SERPL CREATININE-BSD FRML MDRD: 45 ML/MIN/1.73SQ M
GLUCOSE SERPL-MCNC: 94 MG/DL (ref 65–140)
GLUCOSE UR STRIP-MCNC: NEGATIVE MG/DL
HCT VFR BLD AUTO: 35 % (ref 34.8–46.1)
HGB BLD-MCNC: 11.2 G/DL (ref 11.5–15.4)
HGB UR QL STRIP.AUTO: ABNORMAL
IMM GRANULOCYTES # BLD AUTO: 0.05 THOUSAND/UL (ref 0–0.2)
IMM GRANULOCYTES NFR BLD AUTO: 0 % (ref 0–2)
INR PPP: 0.95 (ref 0.9–1.1)
KETONES UR STRIP-MCNC: NEGATIVE MG/DL
LEUKOCYTE ESTERASE UR QL STRIP: NEGATIVE
LYMPHOCYTES # BLD AUTO: 3.09 THOUSANDS/ΜL (ref 0.6–4.47)
LYMPHOCYTES NFR BLD AUTO: 23 % (ref 14–44)
MAGNESIUM SERPL-MCNC: 1.8 MG/DL (ref 1.6–2.6)
MCH RBC QN AUTO: 30.3 PG (ref 26.8–34.3)
MCHC RBC AUTO-ENTMCNC: 32 G/DL (ref 31.4–37.4)
MCV RBC AUTO: 95 FL (ref 82–98)
METHADONE UR QL: NEGATIVE
MONOCYTES # BLD AUTO: 0.63 THOUSAND/ΜL (ref 0.17–1.22)
MONOCYTES NFR BLD AUTO: 5 % (ref 4–12)
NEUTROPHILS # BLD AUTO: 9.16 THOUSANDS/ΜL (ref 1.85–7.62)
NEUTS SEG NFR BLD AUTO: 70 % (ref 43–75)
NITRITE UR QL STRIP: POSITIVE
NON-SQ EPI CELLS URNS QL MICRO: ABNORMAL /HPF
OPIATES UR QL SCN: NEGATIVE
OXYCODONE+OXYMORPHONE UR QL SCN: NEGATIVE
PCP UR QL: NEGATIVE
PH UR STRIP.AUTO: 5.5 [PH]
PLATELET # BLD AUTO: 385 THOUSANDS/UL (ref 149–390)
PMV BLD AUTO: 10.6 FL (ref 8.9–12.7)
POTASSIUM SERPL-SCNC: 3.7 MMOL/L (ref 3.5–5)
PROT SERPL-MCNC: 7.5 G/DL (ref 6.4–8.3)
PROT UR STRIP-MCNC: NEGATIVE MG/DL
PROTHROMBIN TIME: 10.1 SECONDS (ref 9.5–12.1)
RBC # BLD AUTO: 3.7 MILLION/UL (ref 3.81–5.12)
RBC #/AREA URNS AUTO: ABNORMAL /HPF
SODIUM SERPL-SCNC: 138 MMOL/L (ref 133–145)
SP GR UR STRIP.AUTO: 1.02 (ref 1–1.03)
THC UR QL: NEGATIVE
TROPONIN I SERPL-MCNC: <0.03 NG/ML (ref 0–0.07)
UROBILINOGEN UR QL STRIP.AUTO: 0.2 E.U./DL
WBC # BLD AUTO: 13.27 THOUSAND/UL (ref 4.31–10.16)
WBC #/AREA URNS AUTO: ABNORMAL /HPF

## 2020-09-09 PROCEDURE — 83735 ASSAY OF MAGNESIUM: CPT | Performed by: EMERGENCY MEDICINE

## 2020-09-09 PROCEDURE — 99285 EMERGENCY DEPT VISIT HI MDM: CPT

## 2020-09-09 PROCEDURE — 96365 THER/PROPH/DIAG IV INF INIT: CPT

## 2020-09-09 PROCEDURE — 36415 COLL VENOUS BLD VENIPUNCTURE: CPT | Performed by: EMERGENCY MEDICINE

## 2020-09-09 PROCEDURE — 93005 ELECTROCARDIOGRAM TRACING: CPT

## 2020-09-09 PROCEDURE — 85025 COMPLETE CBC W/AUTO DIFF WBC: CPT | Performed by: EMERGENCY MEDICINE

## 2020-09-09 PROCEDURE — 70450 CT HEAD/BRAIN W/O DYE: CPT

## 2020-09-09 PROCEDURE — 85730 THROMBOPLASTIN TIME PARTIAL: CPT | Performed by: EMERGENCY MEDICINE

## 2020-09-09 PROCEDURE — 84484 ASSAY OF TROPONIN QUANT: CPT | Performed by: EMERGENCY MEDICINE

## 2020-09-09 PROCEDURE — G1004 CDSM NDSC: HCPCS

## 2020-09-09 PROCEDURE — 87077 CULTURE AEROBIC IDENTIFY: CPT | Performed by: EMERGENCY MEDICINE

## 2020-09-09 PROCEDURE — 82140 ASSAY OF AMMONIA: CPT | Performed by: EMERGENCY MEDICINE

## 2020-09-09 PROCEDURE — 80320 DRUG SCREEN QUANTALCOHOLS: CPT | Performed by: EMERGENCY MEDICINE

## 2020-09-09 PROCEDURE — 87186 SC STD MICRODIL/AGAR DIL: CPT | Performed by: EMERGENCY MEDICINE

## 2020-09-09 PROCEDURE — 72125 CT NECK SPINE W/O DYE: CPT

## 2020-09-09 PROCEDURE — 80307 DRUG TEST PRSMV CHEM ANLYZR: CPT | Performed by: EMERGENCY MEDICINE

## 2020-09-09 PROCEDURE — 81001 URINALYSIS AUTO W/SCOPE: CPT | Performed by: EMERGENCY MEDICINE

## 2020-09-09 PROCEDURE — 99284 EMERGENCY DEPT VISIT MOD MDM: CPT | Performed by: EMERGENCY MEDICINE

## 2020-09-09 PROCEDURE — 87086 URINE CULTURE/COLONY COUNT: CPT | Performed by: EMERGENCY MEDICINE

## 2020-09-09 PROCEDURE — 85610 PROTHROMBIN TIME: CPT | Performed by: EMERGENCY MEDICINE

## 2020-09-09 PROCEDURE — 80053 COMPREHEN METABOLIC PANEL: CPT | Performed by: EMERGENCY MEDICINE

## 2020-09-09 PROCEDURE — 96361 HYDRATE IV INFUSION ADD-ON: CPT

## 2020-09-09 PROCEDURE — 80177 DRUG SCRN QUAN LEVETIRACETAM: CPT | Performed by: EMERGENCY MEDICINE

## 2020-09-09 RX ORDER — SULFAMETHOXAZOLE AND TRIMETHOPRIM 800; 160 MG/1; MG/1
1 TABLET ORAL 2 TIMES DAILY
Qty: 14 TABLET | Refills: 0 | Status: SHIPPED | OUTPATIENT
Start: 2020-09-09 | End: 2020-09-16

## 2020-09-09 RX ORDER — CEFTRIAXONE 1 G/50ML
1000 INJECTION, SOLUTION INTRAVENOUS ONCE
Status: COMPLETED | OUTPATIENT
Start: 2020-09-09 | End: 2020-09-09

## 2020-09-09 RX ADMIN — CEFTRIAXONE 1000 MG: 1 INJECTION, SOLUTION INTRAVENOUS at 18:01

## 2020-09-09 RX ADMIN — SODIUM CHLORIDE 1000 ML: 0.9 INJECTION, SOLUTION INTRAVENOUS at 15:28

## 2020-09-09 NOTE — DISCHARGE INSTRUCTIONS
There was an incidental finding on your CT scan as follows - it is important that you follow up with your PCP so that this maybe be further worked up:     CT spine cervical without contrast: 1  No cervical spine fracture or traumatic malalignment  Loss of the normal cervical lordosis which is likely secondary to patient positioning, cervical collar, or muscle spasm  Ligamentous injury cannot be entirely excluded , 2   3 mm right upper lobe nodule  Based on current Fleischner Society 2017 Guidelines on incidental pulmonary nodule, no routine follow-up is needed if the patient is considered low risk for lung cancer  If the patient is considered high risk for lung , cancer, 12 month follow-up non-contrast chest CT is recommended  , 3   Incidental thyroid nodule(s) for which nonemergent thyroid ultrasound is recommended ,      There also was an incidental finding of a thyroid nodule as follows - again it is imperative that you follow up with your PCP for further testing:    PREVERTEBRAL AND PARASPINAL SOFT TISSUES: Incidental discovery of one or more thyroid nodule(s) measuring more than 1 5 cm and without suspicious features is noted in this patient who is above [place]ayears old; according to guidelines published in the   February 2015 white paper on incidental thyroid nodules in the Journal of the [employer] of Radiology [facility]), further characterization with thyroid ultrasound is recommended

## 2020-09-09 NOTE — ED PROVIDER NOTES
History  Chief Complaint   Patient presents with    Dizziness     pt presents to ed via EMS after she was at the store and had a ?seizure/syncopy fell to the floor unsure if she hit her head was confused for EMS upon arrival      This is a 62year old obese female that presented to the ED by EMS    She has a PMH of a seizure disorder for which she does not know what meds she has on as she reports that her medications have recently been changed - cannot obtain accurate hx of when her last doses were taken  Today she reports that she was in the grocery store and felt lightheaded and then fell to the floor - does not know if she had a head strike or not  Denies feeling post-ictal     Denies incontinence  Denies recent illness  Blood sugar 77 en route by EMS    Denies recent illness, chest pain or shortness of breath          Prior to Admission Medications   Prescriptions Last Dose Informant Patient Reported? Taking?   carBAMazepine (TEGretol) 200 mg tablet 9/9/2020 at Unknown time  No Yes   Sig: Take 1 tablet (200 mg total) by mouth 3 (three) times a day   hydrochlorothiazide (HYDRODIURIL) 25 mg tablet 9/9/2020 at Unknown time  Yes Yes   Sig: Take 25 mg by mouth daily   lamoTRIgine (LaMICtal) 100 mg tablet 9/9/2020 at Unknown time  No Yes   Sig: After titration with 25mg tabs, take one 100mg tab twice a day for 2 weeks, then one and a half tab twice a day     lamoTRIgine (LaMICtal) 25 mg tablet Not Taking at Unknown time  No No   Sig: Week 1 and 2 - take 2 tabs qHS, Week 3 and 4 - take 2 tabs twice a day, Week 5 and 6 - take 3 tabs twice a day, Week 7 change to 100mg tabs   Patient not taking: Reported on 9/9/2020   levETIRAcetam (KEPPRA) 750 mg tablet 9/9/2020 at Unknown time  No Yes   Sig: Take 2 tablets (1,500 mg total) by mouth 2 (two) times a day   pramipexole (MIRAPEX) 1 5 MG tablet 9/9/2020 at Unknown time  No Yes   Sig: Take 1 tablet (1 5 mg total) by mouth daily at bedtime      Facility-Administered Medications: None       Past Medical History:   Diagnosis Date    Depression     [facility] [address] [facility]     Epilepsy [facility] [address] [facility]     Obesity     Restless leg        Past Surgical History:   Procedure Laterality Date    DENTAL SURGERY      all teeth removed    LASER ABLATION OF THE CERVIX      MAMMO (HISTORICAL)  05/01/2017       Family History   Problem Relation Age of Onset    Kidney disease Mother     Liver disease Mother     Heart attack Mother     Heart attack Father     No Known Problems Brother     No Known Problems Son      I have reviewed and agree with the history as documented  E-Cigarette/Vaping    E-Cigarette Use Never User      E-Cigarette/Vaping Substances    Nicotine No     THC No     CBD No     Flavoring No     Other No     Unknown No      Social History     Tobacco Use    Smoking status: Current Every Day Smoker     Packs/day: 0 50     Years: 23 00     Pack years: 11 50    Smokeless tobacco: Never Used    Tobacco comment: currently using nicotine 21 patch    Substance Use Topics    Alcohol use: No    Drug use: Never       Review of Systems   Constitutional: Negative for activity change, appetite change, chills, diaphoresis, fatigue, fever and unexpected weight change  HENT: Negative for congestion, ear discharge, ear pain, mouth sores, sinus pressure, sinus pain, sneezing, sore throat, trouble swallowing and voice change  Eyes: Negative for photophobia, pain, discharge, redness, itching and visual disturbance  Respiratory: Negative for cough, chest tightness and shortness of breath  Cardiovascular: Negative for chest pain, palpitations and leg swelling  Gastrointestinal: Negative for abdominal pain, constipation, nausea and vomiting  Endocrine: Negative for cold intolerance, heat intolerance, polydipsia, polyphagia and polyuria  Genitourinary: Negative for decreased urine volume, difficulty urinating, dysuria, frequency, hematuria and urgency     Musculoskeletal: Negative for arthralgias, back pain, gait problem, joint swelling, myalgias, neck pain and neck stiffness  Skin: Negative for color change and rash  Allergic/Immunologic: Negative for immunocompromised state  Neurological: Positive for syncope and light-headedness  Negative for dizziness, tremors, seizures, speech difficulty, weakness, numbness and headaches  Hematological: Does not bruise/bleed easily  Psychiatric/Behavioral: Negative for behavioral problems and suicidal ideas  Physical Exam  Physical Exam  Vitals signs and nursing note reviewed  Constitutional:       General: She is not in acute distress  Appearance: Normal appearance  She is well-developed  She is obese  She is not ill-appearing, toxic-appearing or diaphoretic  Comments: unkempt   HENT:      Head: Normocephalic and atraumatic  Right Ear: Tympanic membrane, ear canal and external ear normal  There is no impacted cerumen  Left Ear: Tympanic membrane, ear canal and external ear normal  There is no impacted cerumen  Nose: Nose normal  No congestion or rhinorrhea  Mouth/Throat:      Mouth: Mucous membranes are moist       Pharynx: Oropharynx is clear  No oropharyngeal exudate or posterior oropharyngeal erythema  Eyes:      General: No scleral icterus  Right eye: No discharge  Left eye: No discharge  Extraocular Movements: Extraocular movements intact  Conjunctiva/sclera: Conjunctivae normal       Pupils: Pupils are equal, round, and reactive to light  Neck:      Musculoskeletal: Normal range of motion and neck supple  No neck rigidity or muscular tenderness  Thyroid: No thyromegaly  Vascular: No hepatojugular reflux or JVD  Trachea: No tracheal deviation  Cardiovascular:      Rate and Rhythm: Normal rate and regular rhythm  Pulses: Normal pulses  Carotid pulses are 2+ on the right side and 2+ on the left side         Radial pulses are 2+ on the right side and 2+ on the left side  Dorsalis pedis pulses are 2+ on the right side and 2+ on the left side  Posterior tibial pulses are 2+ on the right side and 2+ on the left side  Heart sounds: Normal heart sounds  No murmur  Pulmonary:      Effort: Pulmonary effort is normal  No accessory muscle usage or respiratory distress  Breath sounds: Normal breath sounds  No wheezing or rales  Chest:      Chest wall: No mass, deformity, tenderness, crepitus or edema  Abdominal:      General: Bowel sounds are normal  There is no distension or abdominal bruit  Palpations: Abdomen is soft  There is no hepatomegaly, splenomegaly or mass  Tenderness: There is no abdominal tenderness  There is no right CVA tenderness, left CVA tenderness, guarding or rebound  Hernia: No hernia is present  Musculoskeletal: Normal range of motion  General: No tenderness  Right lower leg: She exhibits no tenderness  No edema  Left lower leg: She exhibits no tenderness  No edema  Lymphadenopathy:      Cervical: No cervical adenopathy  Skin:     General: Skin is warm and dry  Capillary Refill: Capillary refill takes less than 2 seconds  Findings: No ecchymosis or rash  Neurological:      General: No focal deficit present  Mental Status: She is alert and oriented to person, place, and time  Cranial Nerves: No cranial nerve deficit  Sensory: No sensory deficit  Motor: No weakness or abnormal muscle tone  Deep Tendon Reflexes: Reflexes normal    Psychiatric:         Mood and Affect: Mood normal          Behavior: Behavior normal          Thought Content:  Thought content normal          Judgment: Judgment normal          Vital Signs  ED Triage Vitals [09/09/20 1506]   Temperature Pulse Respirations Blood Pressure SpO2   97 5 °F (36 4 °C) 98 17 137/64 100 %      Temp Source Heart Rate Source Patient Position - Orthostatic VS BP Location FiO2 (%) Tympanic Monitor Lying Right arm --      Pain Score       --           Vitals:    09/09/20 1506 09/09/20 1709 09/09/20 1830   BP: 137/64 138/72 136/79   Pulse: 98 91 89   Patient Position - Orthostatic VS: Lying Lying Lying         Visual Acuity      ED Medications  Medications   sodium chloride 0 9 % bolus 1,000 mL (0 mL Intravenous Stopped 9/9/20 1802)   cefTRIAXone (ROCEPHIN) IVPB (premix) 1,000 mg 50 mL (0 mg Intravenous Stopped 9/9/20 1829)       Diagnostic Studies  Results Reviewed     Procedure Component Value Units Date/Time    Rapid drug screen, urine [[id number]]  (Normal) Collected:  09/09/20 1704    Lab Status:  Final result Specimen:  Urine, Clean Catch Updated:  09/09/20 1732     Amph/Meth UR Negative     Barbiturate Ur Negative     Benzodiazepine Urine Negative     Cocaine Urine Negative     Methadone Urine Negative     Opiate Urine Negative     PCP Ur Negative     THC Urine Negative     Oxycodone Urine Negative    Narrative:       FOR MEDICAL PURPOSES ONLY  IF CONFIRMATION NEEDED PLEASE CONTACT THE LAB WITHIN 5 DAYS  Drug Screen Cutoff Levels:  AMPHETAMINE/METHAMPHETAMINES  1000 ng/mL  BARBITURATES     200 ng/mL  BENZODIAZEPINES     200 ng/mL  COCAINE      300 ng/mL  METHADONE      300 ng/mL  OPIATES      300 ng/mL  PHENCYCLIDINE     25 ng/mL  THC       50 ng/mL  OXYCODONE      100 ng/mL    Urine Microscopic [[id number]]  (Abnormal) Collected:  09/09/20 1703    Lab Status:  Final result Specimen:  Urine, Clean Catch Updated:  09/09/20 1727     RBC, UA 1-2 /hpf      WBC, UA 10-20 /hpf      Epithelial Cells Innumerable /hpf      Bacteria, UA Innumerable /hpf     Urine culture [[id number]] Collected:  09/09/20 1703    Lab Status:   In process Specimen:  Urine, Clean Catch Updated:  09/09/20 1727    UA w Reflex to Microscopic w Reflex to Culture [[id number]]  (Abnormal) Collected:  09/09/20 1703    Lab Status:  Final result Specimen:  Urine, Clean Catch Updated:  09/09/20 1712     Color, UA Yellow Clarity, UA Slightly Cloudy     Specific Andrews, UA 1 020     pH, UA 5 5     Leukocytes, UA Negative     Nitrite, UA Positive     Protein, UA Negative mg/dl      Glucose, UA Negative mg/dl      Ketones, UA Negative mg/dl      Urobilinogen, UA 0 2 E U /dl      Bilirubin, UA Negative     Blood, UA 3+    Ammonia [[id number]]  (Abnormal) Collected:  09/09/20 1527    Lab Status:  Final result Specimen:  Blood from Arm, Left Updated:  09/09/20 1630     Ammonia 48 32 umol/L     Ethanol [[id number]]  (Normal) Collected:  09/09/20 1527    Lab Status:  Final result Specimen:  Blood from Arm, Left Updated:  09/09/20 1605     Ethanol Lvl <10 mg/dL     Comprehensive metabolic panel [[id number]]  (Abnormal) Collected:  09/09/20 1515    Lab Status:  Final result Specimen:  Blood from Arm, Left Updated:  09/09/20 1554     Sodium 138 mmol/L      Potassium 3 7 mmol/L      Chloride 103 mmol/L      CO2 26 mmol/L      ANION GAP 9 mmol/L      BUN 22 mg/dL      Creatinine 1 28 mg/dL      Glucose 94 mg/dL      Calcium 9 0 mg/dL      AST 11 U/L      ALT 11 U/L      Alkaline Phosphatase 87 3 U/L      Total Protein 7 5 g/dL      Albumin 3 5 g/dL      Total Bilirubin 0 24 mg/dL      eGFR 45 ml/min/1 73sq m     Narrative:       Adam guidelines for Chronic Kidney Disease (CKD):     Stage 1 with normal or high GFR (GFR > 90 mL/min/1 73 square meters)    Stage 2 Mild CKD (GFR = 60-89 mL/min/1 73 square meters)    Stage 3A Moderate CKD (GFR = 45-59 mL/min/1 73 square meters)    Stage 3B Moderate CKD (GFR = 30-44 mL/min/1 73 square meters)    Stage 4 Severe CKD (GFR = 15-29 mL/min/1 73 square meters)    Stage 5 End Stage CKD (GFR <15 mL/min/1 73 square meters)  Note: GFR calculation is accurate only with a steady state creatinine    Magnesium [[id number]]  (Normal) Collected:  09/09/20 1515    Lab Status:  Final result Specimen:  Blood from Arm, Left Updated:  09/09/20 1553     Magnesium 1 8 mg/dL     Troponin I [[id number]]  (Normal) Collected:  09/09/20 1514    Lab Status:  Final result Specimen:  Blood from Arm, Left Updated:  09/09/20 1553     Troponin I <0 03 ng/mL     Protime-INR [[id number]]  (Normal) Collected:  09/09/20 1515    Lab Status:  Final result Specimen:  Blood from Arm, Left Updated:  09/09/20 1536     Protime 10 1 seconds      INR 0 95    Narrative:       INR Reference Ranges:  No Anticoagulant, Normal:           0 9-1 1  Standard Dose, Oral Anticoagulant:  2 0-3 0  High Dose, Oral Anticoagulant:      2 5-3 5    APTT [[id number]]  (Abnormal) Collected:  09/09/20 1515    Lab Status:  Final result Specimen:  Blood from Arm, Left Updated:  09/09/20 1536     PTT 22 seconds     Levetiracetam level [[id number]] Collected:  09/09/20 1527    Lab Status: In process Specimen:  Blood from Arm, Left Updated:  09/09/20 1534    CBC and differential [[id number]]  (Abnormal) Collected:  09/09/20 1514    Lab Status:  Final result Specimen:  Blood from Arm, Left Updated:  09/09/20 1528     WBC 13 27 Thousand/uL      RBC 3 70 Million/uL      Hemoglobin 11 2 g/dL      Hematocrit 35 0 %      MCV 95 fL      MCH 30 3 pg      MCHC 32 0 g/dL      RDW 14 4 %      MPV 10 6 fL      Platelets 950 Thousands/uL      Neutrophils Relative 70 %      Immat GRANS % 0 %      Lymphocytes Relative 23 %      Monocytes Relative 5 %      Eosinophils Relative 2 %      Basophils Relative 0 %      Neutrophils Absolute 9 16 Thousands/µL      Immature Grans Absolute 0 05 Thousand/uL      Lymphocytes Absolute 3 09 Thousands/µL      Monocytes Absolute 0 63 Thousand/µL      Eosinophils Absolute 0 30 Thousand/µL      Basophils Absolute 0 04 Thousands/µL                  CT spine cervical without contrast   Final Result by Maggie Koch MD (09/09 1551)      1  No cervical spine fracture or traumatic malalignment  Loss of the normal cervical lordosis which is likely secondary to patient positioning, cervical collar, or muscle spasm    Ligamentous injury cannot be entirely excluded  2   3 mm right upper lobe nodule  Based on current Fleischner Society 2017 Guidelines on incidental pulmonary nodule, no routine follow-up is needed if the patient is considered low risk for lung cancer  If the patient is considered high risk for lung    cancer, 12 month follow-up non-contrast chest CT is recommended  3   Incidental thyroid nodule(s) for which nonemergent thyroid ultrasound is recommended  The study was marked in EPIC for significant notification  Workstation performed: [id number]         CT head without contrast   Final Result by Marin Andersen MD (09/09 1541)      No acute intracranial abnormality  Workstation performed: [id number]                    Procedures  Procedures         ED Course  ED Course as of Sep 09 1846   Wed Sep 09, 2020   1641 Ammonia(!): 48 32   1835 This is a 62year old obese female that presented to the ED after having a syncopal episode in a grocery store - reports that she was lightheaded at the time  Also reports that she has had many episodes such as this "through the years " Denies chest pain or shortness of breath  Head and cervical spine Cts obtained - no acute findings - incidental findings of thyroid nodule and lung nodule - discussed these findings with pt and the urgency of outpt follow up - also addressed the findings on her discharge instructions  She verbalized understanding of the need for follow up    UDS negative and ETOH negative  Creatinine elevated at 1 28 - no prior labs available for comparison - hydrated with one liter of NS  Leukocytosis at 13 27 and UA positive for UTI - 1 gram Rocephin given IV and ordered Bactrim  Trop negative and no acute EKG changes  Has a hx of seizure - Keppra level pending - denies post ictal feeling  Hemoglobin stable      Ammonia slightly elevated - alert and oriented - transaminases normal - to follow up with PCP    Ambulated - gait steady - denies lightheadedness    Stable for discahrge                              SBIRT 22yo+      Most Recent Value   SBIRT (24 yo +)   In order to provide better care to our patients, we are screening all of our patients for alcohol and drug use  Would it be okay to ask you these screening questions? Yes Filed at: 09/09/2020 1709   Initial Alcohol Screen: US AUDIT-C    1  How often do you have a drink containing alcohol?  0 Filed at: 09/09/2020 1709   2  How many drinks containing alcohol do you have on a typical day you are drinking? 0 Filed at: 09/09/2020 1709   3a  Male UNDER 65: How often do you have five or more drinks on one occasion? 0 Filed at: 09/09/2020 1709   3b  FEMALE Any Age, or MALE 65+: How often do you have 4 or more drinks on one occassion? 0 Filed at: 09/09/2020 1709   Audit-C Score  0 Filed at: 09/09/2020 1709   JOHN: How many times in the past year have you    Used an illegal drug or used a prescription medication for non-medical reasons?   Never Filed at: 09/09/2020 1720                  MDM  Number of Diagnoses or Management Options  Lung nodule seen on imaging study: new and requires workup  Syncope: established and worsening  Thyroid nodule: new and requires workup  UTI (urinary tract infection): new and requires workup     Amount and/or Complexity of Data Reviewed  Clinical lab tests: ordered and reviewed  Tests in the radiology section of CPT®: ordered and reviewed  Obtain history from someone other than the patient: yes (EMS)  Independent visualization of images, tracings, or specimens: yes    Risk of Complications, Morbidity, and/or Mortality  Presenting problems: high  Diagnostic procedures: low  Management options: low    Patient Progress  Patient progress: improved      Disposition  Final diagnoses:   Lung nodule seen on imaging study   Thyroid nodule   Syncope   UTI (urinary tract infection)     Time reflects when diagnosis was documented in both MDM as applicable and the Disposition within this note     Time User Action Codes Description Comment    9/9/2020  4:02 PM Di Galas Add [R91 1] Lung nodule seen on imaging study     9/9/2020  4:40 PM Di Galas Add [E04 1] Thyroid nodule     9/9/2020  5:48 PM Di Galas Add [R55] Syncope     9/9/2020  5:49 PM Di Galas Add [N39 0] UTI (urinary tract infection)       ED Disposition     ED Disposition Condition Date/Time Comment    Discharge Stable Wed Sep 9, 2020  4:40 PM Tod Duron discharge to home/self care              Follow-up Information     Follow up With Specialties Details Why Contact Info    DRU Escoto Nurse Practitioner, Family Medicine In 2 days  [address]   [phone]            Discharge Medication List as of 9/9/2020  5:52 PM      START taking these medications    Details   sulfamethoxazole-trimethoprim (BACTRIM DS) 800-160 mg per tablet Take 1 tablet by mouth 2 (two) times a day for 7 days smx-tmp DS (BACTRIM) 800-160 mg tabs (1tab q12 D10), Starting Wed 9/9/2020, Until Wed 9/16/2020, Normal         CONTINUE these medications which have NOT CHANGED    Details   carBAMazepine (TEGretol) 200 mg tablet Take 1 tablet (200 mg total) by mouth 3 (three) times a day, Starting Wed 2/13/2019, Normal      hydrochlorothiazide (HYDRODIURIL) 25 mg tablet Take 25 mg by mouth daily, Historical Med      !! lamoTRIgine (LaMICtal) 100 mg tablet After titration with 25mg tabs, take one 100mg tab twice a day for 2 weeks, then one and a half tab twice a day , Normal      levETIRAcetam (KEPPRA) 750 mg tablet Take 2 tablets (1,500 mg total) by mouth 2 (two) times a day, Starting Wed 2/13/2019, Normal      pramipexole (MIRAPEX) 1 5 MG tablet Take 1 tablet (1 5 mg total) by mouth daily at bedtime, Starting Wed 2/13/2019, Normal      !! lamoTRIgine (LaMICtal) 25 mg tablet Week 1 and 2 - take 2 tabs qHS, Week 3 and 4 - take 2 tabs twice a day, Week 5 and 6 - take 3 tabs twice a day, Week 7 change to 100mg tabs, Normal       !! - Potential duplicate medications found  Please discuss with provider  No discharge procedures on file      PDMP Review       Value Time User    PDMP Reviewed  Yes 9/9/2020  5:50 PM Jackson Donnelly MD          ED Provider  Electronically Signed by           Jackson Donnelly MD  09/09/20 7528

## 2020-09-09 NOTE — ED PROCEDURE NOTE
PROCEDURE  ECG 12 Lead Documentation Only    Date/Time: 9/9/2020 3:13 PM  Performed by: Sadaf Cast MD  Authorized by:  Sadaf Cast MD     Indications / Diagnosis:  Syncope  ECG reviewed by me, the ED Provider: yes    Patient location:  ED and bedside  Previous ECG:     Previous ECG:  Unavailable    Comparison to cardiac monitor: Yes    Interpretation:     Interpretation: normal    Rate:     ECG rate assessment: normal    Rhythm:     Rhythm: sinus rhythm    Ectopy:     Ectopy: none    QRS:     QRS axis:  Normal    QRS intervals:  Normal  Conduction:     Conduction: normal    ST segments:     ST segments:  Normal  T waves:     T waves: non-specific      T waves comment:  Borderline T wave abnormalities in anterior leads  Comments:      No acute ischemia or infarction         Sadaf Cast MD  09/09/20 0722

## 2020-09-09 NOTE — ED NOTES
Pt asked again at this time to provide urine sample pt states she doesn't have to go and doesn't want to try      Autumn Gomez RN  09/09/20 9114

## 2020-09-10 LAB
ATRIAL RATE: 95 BPM
P AXIS: 56 DEGREES
PR INTERVAL: 146 MS
QRS AXIS: 48 DEGREES
QRSD INTERVAL: 75 MS
QT INTERVAL: 351 MS
QTC INTERVAL: 442 MS
T WAVE AXIS: 53 DEGREES
VENTRICULAR RATE: 95 BPM

## 2020-09-10 PROCEDURE — 93010 ELECTROCARDIOGRAM REPORT: CPT | Performed by: INTERNAL MEDICINE

## 2020-09-11 LAB — BACTERIA UR CULT: ABNORMAL

## 2020-09-14 LAB — LEVETIRACETAM SERPL-MCNC: <1 UG/ML (ref 10–40)

## 2020-10-31 ENCOUNTER — APPOINTMENT (EMERGENCY)
Dept: RADIOLOGY | Facility: HOSPITAL | Age: 60
DRG: 604 | End: 2020-10-31
Payer: MEDICARE

## 2020-10-31 ENCOUNTER — HOSPITAL ENCOUNTER (INPATIENT)
Facility: HOSPITAL | Age: 60
LOS: 1 days | Discharge: HOME HEALTH | DRG: 604 | End: 2020-11-02
Attending: EMERGENCY MEDICINE | Admitting: INTERNAL MEDICINE
Payer: MEDICARE

## 2020-10-31 ENCOUNTER — APPOINTMENT (EMERGENCY)
Dept: CT IMAGING | Facility: HOSPITAL | Age: 60
DRG: 604 | End: 2020-10-31
Payer: MEDICARE

## 2020-10-31 DIAGNOSIS — S01.01XA SCALP LACERATION, INITIAL ENCOUNTER: ICD-10-CM

## 2020-10-31 DIAGNOSIS — N28.9 ACUTE RENAL INSUFFICIENCY: ICD-10-CM

## 2020-10-31 DIAGNOSIS — R55 SYNCOPE: Primary | ICD-10-CM

## 2020-10-31 DIAGNOSIS — S09.90XA CLOSED HEAD INJURY, INITIAL ENCOUNTER: ICD-10-CM

## 2020-10-31 DIAGNOSIS — Z72.0 TOBACCO ABUSE: ICD-10-CM

## 2020-10-31 DIAGNOSIS — N18.9 ACUTE KIDNEY INJURY SUPERIMPOSED ON CKD (HCC): ICD-10-CM

## 2020-10-31 DIAGNOSIS — G40.919 EPILEPSY WITH ALTERED CONSCIOUSNESS WITH INTRACTABLE EPILEPSY (HCC): ICD-10-CM

## 2020-10-31 DIAGNOSIS — N17.9 ACUTE KIDNEY INJURY SUPERIMPOSED ON CKD (HCC): ICD-10-CM

## 2020-10-31 PROBLEM — N18.4 CKD (CHRONIC KIDNEY DISEASE) STAGE 4, GFR 15-29 ML/MIN (HCC): Status: ACTIVE | Noted: 2020-10-31

## 2020-10-31 LAB
ALBUMIN SERPL BCP-MCNC: 4.1 G/DL (ref 3.4–4.8)
ALP SERPL-CCNC: 101.4 U/L (ref 35–140)
ALT SERPL W P-5'-P-CCNC: 16 U/L (ref 5–54)
AMMONIA PLAS-SCNC: 49.92 UMOL/L
ANION GAP SERPL CALCULATED.3IONS-SCNC: 10 MMOL/L (ref 4–13)
AST SERPL W P-5'-P-CCNC: 16 U/L (ref 15–41)
BACTERIA UR QL AUTO: ABNORMAL /HPF
BASOPHILS # BLD AUTO: 0.03 THOUSANDS/ΜL (ref 0–0.1)
BASOPHILS NFR BLD AUTO: 0 % (ref 0–1)
BILIRUB SERPL-MCNC: 0.35 MG/DL (ref 0.3–1.2)
BILIRUB UR QL STRIP: NEGATIVE
BUN SERPL-MCNC: 54 MG/DL (ref 6–20)
CALCIUM SERPL-MCNC: 9.3 MG/DL (ref 8.4–10.2)
CARBAMAZEPINE SERPL-MCNC: 8.2 UG/ML (ref 4–12)
CHLORIDE SERPL-SCNC: 101 MMOL/L (ref 96–108)
CK MB SERPL-MCNC: 3.5 % (ref 0–2.5)
CK MB SERPL-MCNC: 5.6 NG/ML (ref 0.1–5.9)
CK SERPL-CCNC: 161.3 U/L (ref 38–234)
CLARITY UR: CLEAR
CO2 SERPL-SCNC: 23 MMOL/L (ref 22–33)
COLOR UR: YELLOW
CREAT SERPL-MCNC: 2.06 MG/DL (ref 0.4–1.1)
EOSINOPHIL # BLD AUTO: 0.18 THOUSAND/ΜL (ref 0–0.61)
EOSINOPHIL NFR BLD AUTO: 2 % (ref 0–6)
ERYTHROCYTE [DISTWIDTH] IN BLOOD BY AUTOMATED COUNT: 13.9 % (ref 11.6–15.1)
GFR SERPL CREATININE-BSD FRML MDRD: 25 ML/MIN/1.73SQ M
GLUCOSE SERPL-MCNC: 106 MG/DL (ref 65–140)
GLUCOSE UR STRIP-MCNC: NEGATIVE MG/DL
HCT VFR BLD AUTO: 37.3 % (ref 34.8–46.1)
HGB BLD-MCNC: 12.3 G/DL (ref 11.5–15.4)
HGB UR QL STRIP.AUTO: ABNORMAL
IMM GRANULOCYTES # BLD AUTO: 0.03 THOUSAND/UL (ref 0–0.2)
IMM GRANULOCYTES NFR BLD AUTO: 0 % (ref 0–2)
KETONES UR STRIP-MCNC: NEGATIVE MG/DL
LEUKOCYTE ESTERASE UR QL STRIP: NEGATIVE
LYMPHOCYTES # BLD AUTO: 2.67 THOUSANDS/ΜL (ref 0.6–4.47)
LYMPHOCYTES NFR BLD AUTO: 23 % (ref 14–44)
MCH RBC QN AUTO: 30.9 PG (ref 26.8–34.3)
MCHC RBC AUTO-ENTMCNC: 33 G/DL (ref 31.4–37.4)
MCV RBC AUTO: 94 FL (ref 82–98)
MONOCYTES # BLD AUTO: 0.96 THOUSAND/ΜL (ref 0.17–1.22)
MONOCYTES NFR BLD AUTO: 8 % (ref 4–12)
NEUTROPHILS # BLD AUTO: 7.94 THOUSANDS/ΜL (ref 1.85–7.62)
NEUTS SEG NFR BLD AUTO: 67 % (ref 43–75)
NITRITE UR QL STRIP: NEGATIVE
NON-SQ EPI CELLS URNS QL MICRO: ABNORMAL /HPF
PH UR STRIP.AUTO: 5.5 [PH]
PLATELET # BLD AUTO: 353 THOUSANDS/UL (ref 149–390)
PMV BLD AUTO: 11.2 FL (ref 8.9–12.7)
POTASSIUM SERPL-SCNC: 3.8 MMOL/L (ref 3.5–5)
PROT SERPL-MCNC: 8.2 G/DL (ref 6.4–8.3)
PROT UR STRIP-MCNC: NEGATIVE MG/DL
RBC # BLD AUTO: 3.98 MILLION/UL (ref 3.81–5.12)
RBC #/AREA URNS AUTO: ABNORMAL /HPF
SODIUM SERPL-SCNC: 134 MMOL/L (ref 133–145)
SP GR UR STRIP.AUTO: 1.02 (ref 1–1.03)
TROPONIN I SERPL-MCNC: <0.03 NG/ML (ref 0–0.07)
UROBILINOGEN UR QL STRIP.AUTO: 0.2 E.U./DL
WBC # BLD AUTO: 11.81 THOUSAND/UL (ref 4.31–10.16)
WBC #/AREA URNS AUTO: ABNORMAL /HPF

## 2020-10-31 PROCEDURE — 72125 CT NECK SPINE W/O DYE: CPT

## 2020-10-31 PROCEDURE — G0008 ADMIN INFLUENZA VIRUS VAC: HCPCS | Performed by: INTERNAL MEDICINE

## 2020-10-31 PROCEDURE — 90682 RIV4 VACC RECOMBINANT DNA IM: CPT | Performed by: INTERNAL MEDICINE

## 2020-10-31 PROCEDURE — 82550 ASSAY OF CK (CPK): CPT | Performed by: INTERNAL MEDICINE

## 2020-10-31 PROCEDURE — 0HQ0XZZ REPAIR SCALP SKIN, EXTERNAL APPROACH: ICD-10-PCS | Performed by: EMERGENCY MEDICINE

## 2020-10-31 PROCEDURE — 12001 RPR S/N/AX/GEN/TRNK 2.5CM/<: CPT | Performed by: EMERGENCY MEDICINE

## 2020-10-31 PROCEDURE — 81001 URINALYSIS AUTO W/SCOPE: CPT | Performed by: PHYSICIAN ASSISTANT

## 2020-10-31 PROCEDURE — 80156 ASSAY CARBAMAZEPINE TOTAL: CPT | Performed by: INTERNAL MEDICINE

## 2020-10-31 PROCEDURE — 80177 DRUG SCRN QUAN LEVETIRACETAM: CPT | Performed by: INTERNAL MEDICINE

## 2020-10-31 PROCEDURE — 81003 URINALYSIS AUTO W/O SCOPE: CPT | Performed by: PHYSICIAN ASSISTANT

## 2020-10-31 PROCEDURE — 99285 EMERGENCY DEPT VISIT HI MDM: CPT | Performed by: EMERGENCY MEDICINE

## 2020-10-31 PROCEDURE — 82140 ASSAY OF AMMONIA: CPT | Performed by: INTERNAL MEDICINE

## 2020-10-31 PROCEDURE — 99220 PR INITIAL OBSERVATION CARE/DAY 70 MINUTES: CPT | Performed by: INTERNAL MEDICINE

## 2020-10-31 PROCEDURE — 85025 COMPLETE CBC W/AUTO DIFF WBC: CPT | Performed by: PHYSICIAN ASSISTANT

## 2020-10-31 PROCEDURE — 99285 EMERGENCY DEPT VISIT HI MDM: CPT

## 2020-10-31 PROCEDURE — 82553 CREATINE MB FRACTION: CPT | Performed by: INTERNAL MEDICINE

## 2020-10-31 PROCEDURE — 71046 X-RAY EXAM CHEST 2 VIEWS: CPT

## 2020-10-31 PROCEDURE — 84484 ASSAY OF TROPONIN QUANT: CPT | Performed by: PHYSICIAN ASSISTANT

## 2020-10-31 PROCEDURE — 80053 COMPREHEN METABOLIC PANEL: CPT | Performed by: PHYSICIAN ASSISTANT

## 2020-10-31 PROCEDURE — 36415 COLL VENOUS BLD VENIPUNCTURE: CPT | Performed by: PHYSICIAN ASSISTANT

## 2020-10-31 PROCEDURE — 70450 CT HEAD/BRAIN W/O DYE: CPT

## 2020-10-31 PROCEDURE — G1004 CDSM NDSC: HCPCS

## 2020-10-31 RX ORDER — SODIUM CHLORIDE, SODIUM LACTATE, POTASSIUM CHLORIDE, CALCIUM CHLORIDE 600; 310; 30; 20 MG/100ML; MG/100ML; MG/100ML; MG/100ML
100 INJECTION, SOLUTION INTRAVENOUS CONTINUOUS
Status: DISCONTINUED | OUTPATIENT
Start: 2020-10-31 | End: 2020-11-02 | Stop reason: HOSPADM

## 2020-10-31 RX ORDER — CARBAMAZEPINE 200 MG/1
200 TABLET ORAL 3 TIMES DAILY
Status: DISCONTINUED | OUTPATIENT
Start: 2020-10-31 | End: 2020-11-02 | Stop reason: HOSPADM

## 2020-10-31 RX ORDER — DOCUSATE SODIUM 100 MG/1
100 CAPSULE, LIQUID FILLED ORAL 2 TIMES DAILY PRN
Status: DISCONTINUED | OUTPATIENT
Start: 2020-10-31 | End: 2020-11-02 | Stop reason: HOSPADM

## 2020-10-31 RX ORDER — ONDANSETRON 2 MG/ML
4 INJECTION INTRAMUSCULAR; INTRAVENOUS EVERY 6 HOURS PRN
Status: DISCONTINUED | OUTPATIENT
Start: 2020-10-31 | End: 2020-11-02 | Stop reason: HOSPADM

## 2020-10-31 RX ORDER — NICOTINE 21 MG/24HR
1 PATCH, TRANSDERMAL 24 HOURS TRANSDERMAL DAILY
Status: DISCONTINUED | OUTPATIENT
Start: 2020-11-01 | End: 2020-11-02 | Stop reason: HOSPADM

## 2020-10-31 RX ORDER — HEPARIN SODIUM 5000 [USP'U]/ML
5000 INJECTION, SOLUTION INTRAVENOUS; SUBCUTANEOUS EVERY 8 HOURS SCHEDULED
Status: DISCONTINUED | OUTPATIENT
Start: 2020-10-31 | End: 2020-11-02 | Stop reason: HOSPADM

## 2020-10-31 RX ORDER — LEVETIRACETAM 500 MG/1
1500 TABLET ORAL EVERY 12 HOURS SCHEDULED
Status: DISCONTINUED | OUTPATIENT
Start: 2020-10-31 | End: 2020-11-02 | Stop reason: HOSPADM

## 2020-10-31 RX ORDER — MAGNESIUM HYDROXIDE/ALUMINUM HYDROXICE/SIMETHICONE 120; 1200; 1200 MG/30ML; MG/30ML; MG/30ML
30 SUSPENSION ORAL EVERY 6 HOURS PRN
Status: DISCONTINUED | OUTPATIENT
Start: 2020-10-31 | End: 2020-11-02 | Stop reason: HOSPADM

## 2020-10-31 RX ORDER — ACETAMINOPHEN 325 MG/1
650 TABLET ORAL EVERY 6 HOURS PRN
Status: DISCONTINUED | OUTPATIENT
Start: 2020-10-31 | End: 2020-11-02 | Stop reason: HOSPADM

## 2020-10-31 RX ORDER — LIDOCAINE HYDROCHLORIDE AND EPINEPHRINE 10; 10 MG/ML; UG/ML
10 INJECTION, SOLUTION INFILTRATION; PERINEURAL ONCE
Status: COMPLETED | OUTPATIENT
Start: 2020-10-31 | End: 2020-10-31

## 2020-10-31 RX ADMIN — CARBAMAZEPINE 200 MG: 200 TABLET ORAL at 17:23

## 2020-10-31 RX ADMIN — SODIUM CHLORIDE, SODIUM LACTATE, POTASSIUM CHLORIDE, AND CALCIUM CHLORIDE 100 ML/HR: .6; .31; .03; .02 INJECTION, SOLUTION INTRAVENOUS at 17:23

## 2020-10-31 RX ADMIN — SODIUM CHLORIDE 1000 ML: 0.9 INJECTION, SOLUTION INTRAVENOUS at 14:30

## 2020-10-31 RX ADMIN — CARBAMAZEPINE 200 MG: 200 TABLET ORAL at 21:19

## 2020-10-31 RX ADMIN — PRAMIPEXOLE DIHYDROCHLORIDE 2.25 MG: 0.5 TABLET ORAL at 21:19

## 2020-10-31 RX ADMIN — INFLUENZA A VIRUS A/HAWAII/70/2019 (H1N1) RECOMBINANT HEMAGGLUTININ ANTIGEN, INFLUENZA A VIRUS A/MINNESOTA/41/2019 (H3N2) RECOMBINANT HEMAGGLUTININ ANTIGEN, INFLUENZA B VIRUS B/WASHINGTON/02/2019 RECOMBINANT HEMAGGLUTININ ANTIGEN, AND INFLUENZA B VIRUS B/PHUKET/3073/2013 RECOMBINANT HEMAGGLUTININ ANTIGEN 0.5 ML: 45; 45; 45; 45 INJECTION INTRAMUSCULAR at 17:23

## 2020-10-31 RX ADMIN — LEVETIRACETAM 1500 MG: 500 TABLET, FILM COATED ORAL at 21:19

## 2020-10-31 RX ADMIN — LIDOCAINE HYDROCHLORIDE,EPINEPHRINE BITARTRATE 10 ML: 10; .01 INJECTION, SOLUTION INFILTRATION; PERINEURAL at 12:52

## 2020-10-31 RX ADMIN — HEPARIN SODIUM 5000 UNITS: 5000 INJECTION INTRAVENOUS; SUBCUTANEOUS at 21:23

## 2020-11-01 PROBLEM — N18.9 ACUTE RENAL FAILURE SUPERIMPOSED ON CHRONIC KIDNEY DISEASE (HCC): Status: ACTIVE | Noted: 2020-11-01

## 2020-11-01 PROBLEM — N17.9 ACUTE RENAL FAILURE SUPERIMPOSED ON CHRONIC KIDNEY DISEASE (HCC): Status: ACTIVE | Noted: 2020-11-01

## 2020-11-01 LAB
ALBUMIN SERPL BCP-MCNC: 3.4 G/DL (ref 3.4–4.8)
ALP SERPL-CCNC: 89.1 U/L (ref 35–140)
ALT SERPL W P-5'-P-CCNC: 13 U/L (ref 5–54)
ANION GAP SERPL CALCULATED.3IONS-SCNC: 9 MMOL/L (ref 4–13)
AST SERPL W P-5'-P-CCNC: 14 U/L (ref 15–41)
BASOPHILS # BLD AUTO: 0.05 THOUSANDS/ΜL (ref 0–0.1)
BASOPHILS NFR BLD AUTO: 1 % (ref 0–1)
BILIRUB SERPL-MCNC: 0.33 MG/DL (ref 0.3–1.2)
BUN SERPL-MCNC: 49 MG/DL (ref 6–20)
CALCIUM ALBUM COR SERPL-MCNC: 9.2 MG/DL (ref 8.3–10.1)
CALCIUM SERPL-MCNC: 8.7 MG/DL (ref 8.4–10.2)
CHLORIDE SERPL-SCNC: 104 MMOL/L (ref 96–108)
CO2 SERPL-SCNC: 24 MMOL/L (ref 22–33)
CREAT SERPL-MCNC: 1.68 MG/DL (ref 0.4–1.1)
EOSINOPHIL # BLD AUTO: 0.15 THOUSAND/ΜL (ref 0–0.61)
EOSINOPHIL NFR BLD AUTO: 2 % (ref 0–6)
ERYTHROCYTE [DISTWIDTH] IN BLOOD BY AUTOMATED COUNT: 13.9 % (ref 11.6–15.1)
GFR SERPL CREATININE-BSD FRML MDRD: 32 ML/MIN/1.73SQ M
GLUCOSE SERPL-MCNC: 89 MG/DL (ref 65–140)
HCT VFR BLD AUTO: 33.4 % (ref 34.8–46.1)
HGB BLD-MCNC: 10.9 G/DL (ref 11.5–15.4)
IMM GRANULOCYTES # BLD AUTO: 0.01 THOUSAND/UL (ref 0–0.2)
IMM GRANULOCYTES NFR BLD AUTO: 0 % (ref 0–2)
LYMPHOCYTES # BLD AUTO: 2.53 THOUSANDS/ΜL (ref 0.6–4.47)
LYMPHOCYTES NFR BLD AUTO: 27 % (ref 14–44)
MAGNESIUM SERPL-MCNC: 2.1 MG/DL (ref 1.6–2.6)
MCH RBC QN AUTO: 30.8 PG (ref 26.8–34.3)
MCHC RBC AUTO-ENTMCNC: 32.6 G/DL (ref 31.4–37.4)
MCV RBC AUTO: 94 FL (ref 82–98)
MONOCYTES # BLD AUTO: 0.71 THOUSAND/ΜL (ref 0.17–1.22)
MONOCYTES NFR BLD AUTO: 8 % (ref 4–12)
NEUTROPHILS # BLD AUTO: 5.85 THOUSANDS/ΜL (ref 1.85–7.62)
NEUTS SEG NFR BLD AUTO: 62 % (ref 43–75)
PHOSPHATE SERPL-MCNC: 4.1 MG/DL (ref 2.5–5)
PLATELET # BLD AUTO: 273 THOUSANDS/UL (ref 149–390)
PMV BLD AUTO: 11.9 FL (ref 8.9–12.7)
POTASSIUM SERPL-SCNC: 3.9 MMOL/L (ref 3.5–5)
PROT SERPL-MCNC: 6.9 G/DL (ref 6.4–8.3)
RBC # BLD AUTO: 3.54 MILLION/UL (ref 3.81–5.12)
SODIUM SERPL-SCNC: 137 MMOL/L (ref 133–145)
URATE SERPL-MCNC: 11.5 MG/DL (ref 2.6–8)
WBC # BLD AUTO: 9.3 THOUSAND/UL (ref 4.31–10.16)

## 2020-11-01 PROCEDURE — 80053 COMPREHEN METABOLIC PANEL: CPT | Performed by: INTERNAL MEDICINE

## 2020-11-01 PROCEDURE — 99233 SBSQ HOSP IP/OBS HIGH 50: CPT | Performed by: INTERNAL MEDICINE

## 2020-11-01 PROCEDURE — 99223 1ST HOSP IP/OBS HIGH 75: CPT | Performed by: INTERNAL MEDICINE

## 2020-11-01 PROCEDURE — 84100 ASSAY OF PHOSPHORUS: CPT | Performed by: INTERNAL MEDICINE

## 2020-11-01 PROCEDURE — 83735 ASSAY OF MAGNESIUM: CPT | Performed by: INTERNAL MEDICINE

## 2020-11-01 PROCEDURE — 85025 COMPLETE CBC W/AUTO DIFF WBC: CPT | Performed by: INTERNAL MEDICINE

## 2020-11-01 PROCEDURE — 84550 ASSAY OF BLOOD/URIC ACID: CPT | Performed by: INTERNAL MEDICINE

## 2020-11-01 RX ORDER — CALCIUM CARBONATE 200(500)MG
1000 TABLET,CHEWABLE ORAL DAILY
Qty: 30 TABLET | Refills: 2 | Status: CANCELLED | OUTPATIENT
Start: 2020-11-01

## 2020-11-01 RX ORDER — PRAMIPEXOLE DIHYDROCHLORIDE 0.5 MG/1
1.5 TABLET ORAL
Status: DISCONTINUED | OUTPATIENT
Start: 2020-11-01 | End: 2020-11-02 | Stop reason: HOSPADM

## 2020-11-01 RX ORDER — MELATONIN
2000 DAILY
Status: DISCONTINUED | OUTPATIENT
Start: 2020-11-01 | End: 2020-11-02 | Stop reason: HOSPADM

## 2020-11-01 RX ORDER — CALCIUM CARBONATE 200(500)MG
1000 TABLET,CHEWABLE ORAL DAILY
Status: DISCONTINUED | OUTPATIENT
Start: 2020-11-01 | End: 2020-11-02 | Stop reason: HOSPADM

## 2020-11-01 RX ADMIN — ONDANSETRON 4 MG: 2 INJECTION INTRAMUSCULAR; INTRAVENOUS at 00:10

## 2020-11-01 RX ADMIN — CARBAMAZEPINE 200 MG: 200 TABLET ORAL at 15:13

## 2020-11-01 RX ADMIN — HEPARIN SODIUM 5000 UNITS: 5000 INJECTION INTRAVENOUS; SUBCUTANEOUS at 22:06

## 2020-11-01 RX ADMIN — ACETAMINOPHEN 650 MG: 325 TABLET, FILM COATED ORAL at 13:40

## 2020-11-01 RX ADMIN — Medication 1 PATCH: at 10:00

## 2020-11-01 RX ADMIN — PRAMIPEXOLE DIHYDROCHLORIDE 1.5 MG: 0.5 TABLET ORAL at 22:06

## 2020-11-01 RX ADMIN — CARBAMAZEPINE 200 MG: 200 TABLET ORAL at 22:06

## 2020-11-01 RX ADMIN — HEPARIN SODIUM 5000 UNITS: 5000 INJECTION INTRAVENOUS; SUBCUTANEOUS at 13:36

## 2020-11-01 RX ADMIN — Medication 2000 UNITS: at 22:15

## 2020-11-01 RX ADMIN — LEVETIRACETAM 1500 MG: 500 TABLET, FILM COATED ORAL at 22:06

## 2020-11-01 RX ADMIN — CARBAMAZEPINE 200 MG: 200 TABLET ORAL at 09:59

## 2020-11-01 RX ADMIN — CALCIUM CARBONATE (ANTACID) CHEW TAB 500 MG 1000 MG: 500 CHEW TAB at 22:15

## 2020-11-01 RX ADMIN — HEPARIN SODIUM 5000 UNITS: 5000 INJECTION INTRAVENOUS; SUBCUTANEOUS at 06:36

## 2020-11-01 RX ADMIN — SODIUM CHLORIDE, SODIUM LACTATE, POTASSIUM CHLORIDE, AND CALCIUM CHLORIDE 100 ML/HR: .6; .31; .03; .02 INJECTION, SOLUTION INTRAVENOUS at 09:00

## 2020-11-01 RX ADMIN — LEVETIRACETAM 1500 MG: 500 TABLET, FILM COATED ORAL at 10:00

## 2020-11-02 ENCOUNTER — TELEPHONE (OUTPATIENT)
Dept: NEPHROLOGY | Facility: CLINIC | Age: 60
End: 2020-11-02

## 2020-11-02 ENCOUNTER — TELEPHONE (OUTPATIENT)
Dept: FAMILY MEDICINE CLINIC | Facility: CLINIC | Age: 60
End: 2020-11-02

## 2020-11-02 ENCOUNTER — TELEPHONE (OUTPATIENT)
Dept: OTHER | Facility: HOSPITAL | Age: 60
End: 2020-11-02

## 2020-11-02 ENCOUNTER — PATIENT OUTREACH (OUTPATIENT)
Dept: CASE MANAGEMENT | Facility: OTHER | Age: 60
End: 2020-11-02

## 2020-11-02 VITALS
HEIGHT: 63 IN | HEART RATE: 102 BPM | DIASTOLIC BLOOD PRESSURE: 74 MMHG | TEMPERATURE: 97.4 F | OXYGEN SATURATION: 96 % | RESPIRATION RATE: 20 BRPM | WEIGHT: 243.38 LBS | SYSTOLIC BLOOD PRESSURE: 141 MMHG | BODY MASS INDEX: 43.12 KG/M2

## 2020-11-02 DIAGNOSIS — Z71.89 COMPLEX CARE COORDINATION: Primary | ICD-10-CM

## 2020-11-02 PROBLEM — Z72.0 TOBACCO ABUSE: Status: ACTIVE | Noted: 2020-11-02

## 2020-11-02 LAB
25(OH)D3 SERPL-MCNC: 27 NG/ML (ref 30–100)
ANION GAP SERPL CALCULATED.3IONS-SCNC: 7 MMOL/L (ref 4–13)
BASOPHILS # BLD AUTO: 0.04 THOUSANDS/ΜL (ref 0–0.1)
BASOPHILS NFR BLD AUTO: 1 % (ref 0–1)
BUN SERPL-MCNC: 36 MG/DL (ref 6–20)
CALCIUM SERPL-MCNC: 9.2 MG/DL (ref 8.4–10.2)
CHLORIDE SERPL-SCNC: 104 MMOL/L (ref 96–108)
CO2 SERPL-SCNC: 26 MMOL/L (ref 22–33)
CREAT SERPL-MCNC: 1.27 MG/DL (ref 0.4–1.1)
EOSINOPHIL # BLD AUTO: 0.17 THOUSAND/ΜL (ref 0–0.61)
EOSINOPHIL NFR BLD AUTO: 3 % (ref 0–6)
ERYTHROCYTE [DISTWIDTH] IN BLOOD BY AUTOMATED COUNT: 13.8 % (ref 11.6–15.1)
GFR SERPL CREATININE-BSD FRML MDRD: 45 ML/MIN/1.73SQ M
GLUCOSE SERPL-MCNC: 95 MG/DL (ref 65–140)
HCT VFR BLD AUTO: 34.3 % (ref 34.8–46.1)
HGB BLD-MCNC: 10.8 G/DL (ref 11.5–15.4)
IMM GRANULOCYTES # BLD AUTO: 0.01 THOUSAND/UL (ref 0–0.2)
IMM GRANULOCYTES NFR BLD AUTO: 0 % (ref 0–2)
LYMPHOCYTES # BLD AUTO: 2.08 THOUSANDS/ΜL (ref 0.6–4.47)
LYMPHOCYTES NFR BLD AUTO: 30 % (ref 14–44)
MCH RBC QN AUTO: 29.8 PG (ref 26.8–34.3)
MCHC RBC AUTO-ENTMCNC: 31.5 G/DL (ref 31.4–37.4)
MCV RBC AUTO: 95 FL (ref 82–98)
MONOCYTES # BLD AUTO: 0.62 THOUSAND/ΜL (ref 0.17–1.22)
MONOCYTES NFR BLD AUTO: 9 % (ref 4–12)
NEUTROPHILS # BLD AUTO: 3.95 THOUSANDS/ΜL (ref 1.85–7.62)
NEUTS SEG NFR BLD AUTO: 57 % (ref 43–75)
PLATELET # BLD AUTO: 276 THOUSANDS/UL (ref 149–390)
PMV BLD AUTO: 11.9 FL (ref 8.9–12.7)
POTASSIUM SERPL-SCNC: 3.9 MMOL/L (ref 3.5–5)
RBC # BLD AUTO: 3.62 MILLION/UL (ref 3.81–5.12)
SODIUM SERPL-SCNC: 137 MMOL/L (ref 133–145)
WBC # BLD AUTO: 6.87 THOUSAND/UL (ref 4.31–10.16)

## 2020-11-02 PROCEDURE — 80048 BASIC METABOLIC PNL TOTAL CA: CPT | Performed by: INTERNAL MEDICINE

## 2020-11-02 PROCEDURE — 97116 GAIT TRAINING THERAPY: CPT

## 2020-11-02 PROCEDURE — 85025 COMPLETE CBC W/AUTO DIFF WBC: CPT | Performed by: INTERNAL MEDICINE

## 2020-11-02 PROCEDURE — 82306 VITAMIN D 25 HYDROXY: CPT | Performed by: INTERNAL MEDICINE

## 2020-11-02 PROCEDURE — 97163 PT EVAL HIGH COMPLEX 45 MIN: CPT

## 2020-11-02 PROCEDURE — 97166 OT EVAL MOD COMPLEX 45 MIN: CPT

## 2020-11-02 PROCEDURE — 99217 PR OBSERVATION CARE DISCHARGE MANAGEMENT: CPT | Performed by: INTERNAL MEDICINE

## 2020-11-02 RX ORDER — MELATONIN
2000 DAILY
Qty: 60 TABLET | Refills: 2 | Status: SHIPPED | OUTPATIENT
Start: 2020-11-02 | End: 2020-11-10 | Stop reason: SDUPTHER

## 2020-11-02 RX ORDER — CALCIUM CARBONATE 200(500)MG
1000 TABLET,CHEWABLE ORAL DAILY
Qty: 60 TABLET | Refills: 1 | Status: SHIPPED | OUTPATIENT
Start: 2020-11-03 | End: 2021-01-17

## 2020-11-02 RX ORDER — LEVETIRACETAM 750 MG/1
1500 TABLET ORAL EVERY 12 HOURS SCHEDULED
Qty: 120 TABLET | Refills: 1 | Status: SHIPPED | OUTPATIENT
Start: 2020-11-02 | End: 2020-11-06 | Stop reason: SDUPTHER

## 2020-11-02 RX ORDER — PRAMIPEXOLE DIHYDROCHLORIDE 1.5 MG/1
1.5 TABLET ORAL
Qty: 60 TABLET | Refills: 1 | Status: SHIPPED | OUTPATIENT
Start: 2020-11-02 | End: 2020-11-10 | Stop reason: SDUPTHER

## 2020-11-02 RX ORDER — CARBAMAZEPINE 200 MG/1
200 TABLET ORAL 3 TIMES DAILY
Qty: 90 TABLET | Refills: 1 | Status: SHIPPED | OUTPATIENT
Start: 2020-11-02 | End: 2020-11-10 | Stop reason: SDUPTHER

## 2020-11-02 RX ORDER — NICOTINE 21 MG/24HR
1 PATCH, TRANSDERMAL 24 HOURS TRANSDERMAL DAILY
Qty: 28 PATCH | Refills: 0 | Status: SHIPPED | OUTPATIENT
Start: 2020-11-02 | End: 2020-11-02 | Stop reason: HOSPADM

## 2020-11-02 RX ADMIN — CALCIUM CARBONATE (ANTACID) CHEW TAB 500 MG 1000 MG: 500 CHEW TAB at 08:21

## 2020-11-02 RX ADMIN — SODIUM CHLORIDE, SODIUM LACTATE, POTASSIUM CHLORIDE, AND CALCIUM CHLORIDE 100 ML/HR: .6; .31; .03; .02 INJECTION, SOLUTION INTRAVENOUS at 05:13

## 2020-11-02 RX ADMIN — Medication 1 PATCH: at 08:25

## 2020-11-02 RX ADMIN — HEPARIN SODIUM 5000 UNITS: 5000 INJECTION INTRAVENOUS; SUBCUTANEOUS at 05:14

## 2020-11-02 RX ADMIN — Medication 2000 UNITS: at 08:20

## 2020-11-02 RX ADMIN — LEVETIRACETAM 1500 MG: 500 TABLET, FILM COATED ORAL at 08:24

## 2020-11-02 RX ADMIN — CARBAMAZEPINE 200 MG: 200 TABLET ORAL at 08:21

## 2020-11-03 ENCOUNTER — TELEPHONE (OUTPATIENT)
Dept: NEUROLOGY | Facility: CLINIC | Age: 60
End: 2020-11-03

## 2020-11-03 ENCOUNTER — PATIENT OUTREACH (OUTPATIENT)
Dept: CASE MANAGEMENT | Facility: OTHER | Age: 60
End: 2020-11-03

## 2020-11-04 DIAGNOSIS — G40.919 EPILEPSY WITH ALTERED CONSCIOUSNESS WITH INTRACTABLE EPILEPSY (HCC): ICD-10-CM

## 2020-11-05 ENCOUNTER — PATIENT OUTREACH (OUTPATIENT)
Dept: CASE MANAGEMENT | Facility: OTHER | Age: 60
End: 2020-11-05

## 2020-11-06 DIAGNOSIS — G40.919 EPILEPSY WITH ALTERED CONSCIOUSNESS WITH INTRACTABLE EPILEPSY (HCC): ICD-10-CM

## 2020-11-06 LAB — LEVETIRACETAM SERPL-MCNC: <1 UG/ML (ref 10–40)

## 2020-11-06 RX ORDER — MELATONIN
2000 DAILY
Qty: 60 TABLET | Refills: 2 | OUTPATIENT
Start: 2020-11-06

## 2020-11-06 RX ORDER — LEVETIRACETAM 750 MG/1
1500 TABLET ORAL EVERY 12 HOURS SCHEDULED
Qty: 120 TABLET | Refills: 1 | OUTPATIENT
Start: 2020-11-06

## 2020-11-06 RX ORDER — LEVETIRACETAM 750 MG/1
1500 TABLET ORAL EVERY 12 HOURS SCHEDULED
Qty: 120 TABLET | Refills: 0 | Status: SHIPPED | OUTPATIENT
Start: 2020-11-06 | End: 2020-11-09 | Stop reason: SDUPTHER

## 2020-11-06 RX ORDER — CALCIUM CARBONATE 200(500)MG
1000 TABLET,CHEWABLE ORAL DAILY
Qty: 60 TABLET | Refills: 1 | OUTPATIENT
Start: 2020-11-06

## 2020-11-09 ENCOUNTER — PATIENT OUTREACH (OUTPATIENT)
Dept: CASE MANAGEMENT | Facility: OTHER | Age: 60
End: 2020-11-09

## 2020-11-09 DIAGNOSIS — G40.919 EPILEPSY WITH ALTERED CONSCIOUSNESS WITH INTRACTABLE EPILEPSY (HCC): ICD-10-CM

## 2020-11-09 RX ORDER — LEVETIRACETAM 750 MG/1
1500 TABLET ORAL EVERY 12 HOURS SCHEDULED
Qty: 120 TABLET | Refills: 0 | Status: SHIPPED | OUTPATIENT
Start: 2020-11-09 | End: 2020-11-10

## 2020-11-09 RX ORDER — LEVETIRACETAM 750 MG/1
1500 TABLET ORAL EVERY 12 HOURS SCHEDULED
Qty: 120 TABLET | Refills: 0 | Status: SHIPPED | OUTPATIENT
Start: 2020-11-09 | End: 2020-11-09 | Stop reason: SDUPTHER

## 2020-11-10 ENCOUNTER — OFFICE VISIT (OUTPATIENT)
Dept: FAMILY MEDICINE CLINIC | Facility: CLINIC | Age: 60
End: 2020-11-10
Payer: MEDICARE

## 2020-11-10 VITALS
HEART RATE: 86 BPM | TEMPERATURE: 97.5 F | RESPIRATION RATE: 12 BRPM | DIASTOLIC BLOOD PRESSURE: 70 MMHG | SYSTOLIC BLOOD PRESSURE: 122 MMHG | OXYGEN SATURATION: 98 %

## 2020-11-10 DIAGNOSIS — N17.9 ACUTE RENAL FAILURE SUPERIMPOSED ON CHRONIC KIDNEY DISEASE, UNSPECIFIED CKD STAGE, UNSPECIFIED ACUTE RENAL FAILURE TYPE (HCC): ICD-10-CM

## 2020-11-10 DIAGNOSIS — N18.9 ACUTE RENAL FAILURE SUPERIMPOSED ON CHRONIC KIDNEY DISEASE, UNSPECIFIED CKD STAGE, UNSPECIFIED ACUTE RENAL FAILURE TYPE (HCC): ICD-10-CM

## 2020-11-10 DIAGNOSIS — G40.919 EPILEPSY WITH ALTERED CONSCIOUSNESS WITH INTRACTABLE EPILEPSY (HCC): Primary | ICD-10-CM

## 2020-11-10 DIAGNOSIS — R73.03 PREDIABETES: ICD-10-CM

## 2020-11-10 DIAGNOSIS — E66.01 MORBIDLY OBESE (HCC): ICD-10-CM

## 2020-11-10 DIAGNOSIS — G40.919 EPILEPSY WITH ALTERED CONSCIOUSNESS WITH INTRACTABLE EPILEPSY (HCC): ICD-10-CM

## 2020-11-10 DIAGNOSIS — G25.81 RESTLESS LEG SYNDROME: ICD-10-CM

## 2020-11-10 DIAGNOSIS — I10 ESSENTIAL HYPERTENSION: ICD-10-CM

## 2020-11-10 DIAGNOSIS — R60.9 WATER RETENTION: ICD-10-CM

## 2020-11-10 PROCEDURE — 99214 OFFICE O/P EST MOD 30 MIN: CPT | Performed by: NURSE PRACTITIONER

## 2020-11-10 RX ORDER — CEPHALEXIN 500 MG/1
500 CAPSULE ORAL EVERY 6 HOURS SCHEDULED
OUTPATIENT
Start: 2020-11-10

## 2020-11-10 RX ORDER — BENZONATATE 100 MG/1
100 CAPSULE ORAL 3 TIMES DAILY PRN
COMMUNITY
End: 2020-11-10 | Stop reason: ALTCHOICE

## 2020-11-10 RX ORDER — CHLORTHALIDONE 50 MG/1
25 TABLET ORAL DAILY
COMMUNITY
End: 2021-07-26 | Stop reason: SDUPTHER

## 2020-11-10 RX ORDER — BENZONATATE 100 MG/1
100 CAPSULE ORAL
Qty: 20 CAPSULE | OUTPATIENT
Start: 2020-11-10

## 2020-11-10 RX ORDER — PRAMIPEXOLE DIHYDROCHLORIDE 1.5 MG/1
1.5 TABLET ORAL
Qty: 90 TABLET | Refills: 1 | Status: SHIPPED | OUTPATIENT
Start: 2020-11-10 | End: 2020-12-07 | Stop reason: SDUPTHER

## 2020-11-10 RX ORDER — LEVETIRACETAM 750 MG/1
1500 TABLET ORAL EVERY 12 HOURS SCHEDULED
Qty: 360 TABLET | Refills: 1 | Status: SHIPPED | OUTPATIENT
Start: 2020-11-10 | End: 2021-02-19 | Stop reason: SDUPTHER

## 2020-11-10 RX ORDER — PRAMIPEXOLE DIHYDROCHLORIDE 1.5 MG/1
2.5 TABLET ORAL
Qty: 60 TABLET | Refills: 3 | Status: CANCELLED | OUTPATIENT
Start: 2020-11-10

## 2020-11-10 RX ORDER — CEPHALEXIN 500 MG/1
500 CAPSULE ORAL EVERY 6 HOURS SCHEDULED
COMMUNITY
End: 2020-11-10 | Stop reason: ALTCHOICE

## 2020-11-10 RX ORDER — MELATONIN
2000 DAILY
Qty: 180 TABLET | Refills: 1 | Status: SHIPPED | OUTPATIENT
Start: 2020-11-10 | End: 2021-07-23 | Stop reason: HOSPADM

## 2020-11-10 RX ORDER — CHLORTHALIDONE 50 MG/1
50 TABLET ORAL DAILY
OUTPATIENT
Start: 2020-11-10

## 2020-11-10 RX ORDER — CARBAMAZEPINE 200 MG/1
200 TABLET ORAL 3 TIMES DAILY
Qty: 90 TABLET | Refills: 1 | Status: ON HOLD | OUTPATIENT
Start: 2020-11-10 | End: 2021-07-23 | Stop reason: SDUPTHER

## 2020-11-10 RX ORDER — METOPROLOL SUCCINATE 50 MG/1
50 TABLET, EXTENDED RELEASE ORAL DAILY
Qty: 90 TABLET | Refills: 0 | Status: SHIPPED | OUTPATIENT
Start: 2020-11-10 | End: 2021-02-03

## 2020-11-11 ENCOUNTER — PATIENT OUTREACH (OUTPATIENT)
Dept: CASE MANAGEMENT | Facility: OTHER | Age: 60
End: 2020-11-11

## 2020-11-12 ENCOUNTER — PATIENT OUTREACH (OUTPATIENT)
Dept: CASE MANAGEMENT | Facility: OTHER | Age: 60
End: 2020-11-12

## 2020-11-16 ENCOUNTER — TELEPHONE (OUTPATIENT)
Dept: FAMILY MEDICINE CLINIC | Facility: CLINIC | Age: 60
End: 2020-11-16

## 2020-11-17 ENCOUNTER — TELEPHONE (OUTPATIENT)
Dept: FAMILY MEDICINE CLINIC | Facility: CLINIC | Age: 60
End: 2020-11-17

## 2020-11-17 RX ORDER — LEVETIRACETAM 1000 MG/1
1000 TABLET ORAL 2 TIMES DAILY
COMMUNITY
Start: 2020-11-09 | End: 2020-11-17 | Stop reason: CLARIF

## 2020-11-18 ENCOUNTER — TELEPHONE (OUTPATIENT)
Dept: FAMILY MEDICINE CLINIC | Facility: CLINIC | Age: 60
End: 2020-11-18

## 2020-11-19 ENCOUNTER — PATIENT OUTREACH (OUTPATIENT)
Dept: CASE MANAGEMENT | Facility: OTHER | Age: 60
End: 2020-11-19

## 2020-11-25 ENCOUNTER — TELEPHONE (OUTPATIENT)
Dept: FAMILY MEDICINE CLINIC | Facility: CLINIC | Age: 60
End: 2020-11-25

## 2020-11-25 ENCOUNTER — PATIENT OUTREACH (OUTPATIENT)
Dept: CASE MANAGEMENT | Facility: HOSPITAL | Age: 60
End: 2020-11-25

## 2020-12-02 PROBLEM — E87.6 HYPOKALEMIA: Status: ACTIVE | Noted: 2020-12-02

## 2020-12-02 PROBLEM — N18.31 ANEMIA DUE TO STAGE 3A CHRONIC KIDNEY DISEASE (HCC): Status: ACTIVE | Noted: 2020-12-02

## 2020-12-02 PROBLEM — I12.9 PARENCHYMAL RENAL HYPERTENSION: Status: ACTIVE | Noted: 2020-12-02

## 2020-12-02 PROBLEM — N18.30 STAGE 3 CHRONIC KIDNEY DISEASE (HCC): Status: ACTIVE | Noted: 2020-12-02

## 2020-12-02 PROBLEM — D63.1 ANEMIA DUE TO STAGE 3A CHRONIC KIDNEY DISEASE (HCC): Status: ACTIVE | Noted: 2020-12-02

## 2020-12-03 ENCOUNTER — OFFICE VISIT (OUTPATIENT)
Dept: NEPHROLOGY | Facility: CLINIC | Age: 60
End: 2020-12-03
Payer: MEDICARE

## 2020-12-03 ENCOUNTER — TELEPHONE (OUTPATIENT)
Dept: NEPHROLOGY | Facility: CLINIC | Age: 60
End: 2020-12-03

## 2020-12-03 VITALS — BODY MASS INDEX: 46 KG/M2 | WEIGHT: 259.6 LBS | HEIGHT: 63 IN

## 2020-12-03 DIAGNOSIS — N18.9 ACUTE RENAL FAILURE SUPERIMPOSED ON CHRONIC KIDNEY DISEASE, UNSPECIFIED CKD STAGE, UNSPECIFIED ACUTE RENAL FAILURE TYPE (HCC): ICD-10-CM

## 2020-12-03 DIAGNOSIS — I10 ESSENTIAL HYPERTENSION: ICD-10-CM

## 2020-12-03 DIAGNOSIS — N17.0 ACUTE RENAL FAILURE WITH ACUTE TUBULAR NECROSIS SUPERIMPOSED ON STAGE 3A CHRONIC KIDNEY DISEASE (HCC): ICD-10-CM

## 2020-12-03 DIAGNOSIS — N17.9 ACUTE RENAL FAILURE SUPERIMPOSED ON CHRONIC KIDNEY DISEASE, UNSPECIFIED CKD STAGE, UNSPECIFIED ACUTE RENAL FAILURE TYPE (HCC): ICD-10-CM

## 2020-12-03 DIAGNOSIS — R60.9 WATER RETENTION: ICD-10-CM

## 2020-12-03 DIAGNOSIS — D63.1 ANEMIA DUE TO STAGE 3A CHRONIC KIDNEY DISEASE (HCC): ICD-10-CM

## 2020-12-03 DIAGNOSIS — N18.31 ANEMIA DUE TO STAGE 3A CHRONIC KIDNEY DISEASE (HCC): ICD-10-CM

## 2020-12-03 DIAGNOSIS — N18.31 ACUTE RENAL FAILURE WITH ACUTE TUBULAR NECROSIS SUPERIMPOSED ON STAGE 3A CHRONIC KIDNEY DISEASE (HCC): ICD-10-CM

## 2020-12-03 DIAGNOSIS — E87.6 HYPOKALEMIA: ICD-10-CM

## 2020-12-03 DIAGNOSIS — E55.9 VITAMIN D DEFICIENCY: ICD-10-CM

## 2020-12-03 DIAGNOSIS — N18.31 STAGE 3A CHRONIC KIDNEY DISEASE (HCC): ICD-10-CM

## 2020-12-03 DIAGNOSIS — I12.9 PARENCHYMAL RENAL HYPERTENSION, STAGE 1 THROUGH STAGE 4 OR UNSPECIFIED CHRONIC KIDNEY DISEASE: Primary | ICD-10-CM

## 2020-12-03 PROCEDURE — 99215 OFFICE O/P EST HI 40 MIN: CPT | Performed by: INTERNAL MEDICINE

## 2020-12-03 RX ORDER — LOSARTAN POTASSIUM 25 MG/1
25 TABLET ORAL DAILY
Qty: 90 TABLET | Refills: 3 | Status: SHIPPED | OUTPATIENT
Start: 2020-12-03 | End: 2021-07-26 | Stop reason: SDUPTHER

## 2020-12-03 RX ORDER — ERGOCALCIFEROL 1.25 MG/1
50000 CAPSULE ORAL WEEKLY
Qty: 12 CAPSULE | Refills: 0 | Status: SHIPPED | OUTPATIENT
Start: 2020-12-03 | End: 2021-07-26 | Stop reason: SDUPTHER

## 2020-12-04 ENCOUNTER — TELEPHONE (OUTPATIENT)
Dept: NEPHROLOGY | Facility: CLINIC | Age: 60
End: 2020-12-04

## 2020-12-04 ENCOUNTER — HOSPITAL ENCOUNTER (OUTPATIENT)
Dept: VASCULAR ULTRASOUND | Facility: HOSPITAL | Age: 60
Discharge: HOME | End: 2020-12-04
Attending: INTERNAL MEDICINE
Payer: MEDICARE

## 2020-12-04 DIAGNOSIS — N18.31 STAGE 3A CHRONIC KIDNEY DISEASE (HCC): ICD-10-CM

## 2020-12-04 DIAGNOSIS — N18.9 ACUTE RENAL FAILURE SUPERIMPOSED ON CHRONIC KIDNEY DISEASE, UNSPECIFIED CKD STAGE, UNSPECIFIED ACUTE RENAL FAILURE TYPE (HCC): ICD-10-CM

## 2020-12-04 DIAGNOSIS — N18.31 ANEMIA DUE TO STAGE 3A CHRONIC KIDNEY DISEASE (HCC): ICD-10-CM

## 2020-12-04 DIAGNOSIS — E87.6 HYPOKALEMIA: ICD-10-CM

## 2020-12-04 DIAGNOSIS — D63.1 ANEMIA DUE TO STAGE 3A CHRONIC KIDNEY DISEASE (HCC): ICD-10-CM

## 2020-12-04 DIAGNOSIS — N18.31 ACUTE RENAL FAILURE WITH ACUTE TUBULAR NECROSIS SUPERIMPOSED ON STAGE 3A CHRONIC KIDNEY DISEASE (HCC): ICD-10-CM

## 2020-12-04 DIAGNOSIS — N17.9 ACUTE RENAL FAILURE SUPERIMPOSED ON CHRONIC KIDNEY DISEASE, UNSPECIFIED CKD STAGE, UNSPECIFIED ACUTE RENAL FAILURE TYPE (HCC): ICD-10-CM

## 2020-12-04 DIAGNOSIS — I12.9 PARENCHYMAL RENAL HYPERTENSION, STAGE 1 THROUGH STAGE 4 OR UNSPECIFIED CHRONIC KIDNEY DISEASE: ICD-10-CM

## 2020-12-04 DIAGNOSIS — N17.0 ACUTE RENAL FAILURE WITH ACUTE TUBULAR NECROSIS SUPERIMPOSED ON STAGE 3A CHRONIC KIDNEY DISEASE (HCC): ICD-10-CM

## 2020-12-04 DIAGNOSIS — R60.9 WATER RETENTION: ICD-10-CM

## 2020-12-04 DIAGNOSIS — I10 ESSENTIAL HYPERTENSION: ICD-10-CM

## 2020-12-04 PROCEDURE — 93970 EXTREMITY STUDY: CPT

## 2020-12-04 PROCEDURE — 93970 EXTREMITY STUDY: CPT | Performed by: SURGERY

## 2020-12-07 ENCOUNTER — PATIENT OUTREACH (OUTPATIENT)
Dept: CASE MANAGEMENT | Facility: HOSPITAL | Age: 60
End: 2020-12-07

## 2020-12-07 DIAGNOSIS — G40.919 EPILEPSY WITH ALTERED CONSCIOUSNESS WITH INTRACTABLE EPILEPSY (HCC): ICD-10-CM

## 2020-12-07 RX ORDER — PRAMIPEXOLE DIHYDROCHLORIDE 1.5 MG/1
1.5 TABLET ORAL
Qty: 90 TABLET | Refills: 1 | Status: SHIPPED | OUTPATIENT
Start: 2020-12-07 | End: 2021-07-26 | Stop reason: SDUPTHER

## 2020-12-08 ENCOUNTER — PATIENT OUTREACH (OUTPATIENT)
Dept: CASE MANAGEMENT | Facility: HOSPITAL | Age: 60
End: 2020-12-08

## 2020-12-08 ENCOUNTER — HOSPITAL ENCOUNTER (OUTPATIENT)
Dept: CT IMAGING | Facility: HOSPITAL | Age: 60
Discharge: HOME | End: 2020-12-08
Attending: INTERNAL MEDICINE
Payer: MEDICARE

## 2020-12-08 DIAGNOSIS — N18.31 ANEMIA DUE TO STAGE 3A CHRONIC KIDNEY DISEASE (HCC): ICD-10-CM

## 2020-12-08 DIAGNOSIS — D63.1 ANEMIA DUE TO STAGE 3A CHRONIC KIDNEY DISEASE (HCC): ICD-10-CM

## 2020-12-08 DIAGNOSIS — I10 ESSENTIAL HYPERTENSION: ICD-10-CM

## 2020-12-08 DIAGNOSIS — N17.0 ACUTE RENAL FAILURE WITH ACUTE TUBULAR NECROSIS SUPERIMPOSED ON STAGE 3A CHRONIC KIDNEY DISEASE (HCC): ICD-10-CM

## 2020-12-08 DIAGNOSIS — I12.9 PARENCHYMAL RENAL HYPERTENSION, STAGE 1 THROUGH STAGE 4 OR UNSPECIFIED CHRONIC KIDNEY DISEASE: ICD-10-CM

## 2020-12-08 DIAGNOSIS — N18.31 STAGE 3A CHRONIC KIDNEY DISEASE (HCC): ICD-10-CM

## 2020-12-08 DIAGNOSIS — N18.9 ACUTE RENAL FAILURE SUPERIMPOSED ON CHRONIC KIDNEY DISEASE, UNSPECIFIED CKD STAGE, UNSPECIFIED ACUTE RENAL FAILURE TYPE (HCC): ICD-10-CM

## 2020-12-08 DIAGNOSIS — R60.9 WATER RETENTION: ICD-10-CM

## 2020-12-08 DIAGNOSIS — E87.6 HYPOKALEMIA: ICD-10-CM

## 2020-12-08 DIAGNOSIS — N17.9 ACUTE RENAL FAILURE SUPERIMPOSED ON CHRONIC KIDNEY DISEASE, UNSPECIFIED CKD STAGE, UNSPECIFIED ACUTE RENAL FAILURE TYPE (HCC): ICD-10-CM

## 2020-12-08 DIAGNOSIS — N18.31 ACUTE RENAL FAILURE WITH ACUTE TUBULAR NECROSIS SUPERIMPOSED ON STAGE 3A CHRONIC KIDNEY DISEASE (HCC): ICD-10-CM

## 2020-12-08 PROCEDURE — G1004 CDSM NDSC: HCPCS

## 2020-12-08 PROCEDURE — 74176 CT ABD & PELVIS W/O CONTRAST: CPT

## 2020-12-15 ENCOUNTER — PATIENT OUTREACH (OUTPATIENT)
Dept: CASE MANAGEMENT | Facility: HOSPITAL | Age: 60
End: 2020-12-15

## 2020-12-16 ENCOUNTER — PATIENT OUTREACH (OUTPATIENT)
Dept: CASE MANAGEMENT | Facility: HOSPITAL | Age: 60
End: 2020-12-16

## 2020-12-17 ENCOUNTER — PATIENT OUTREACH (OUTPATIENT)
Dept: CASE MANAGEMENT | Facility: HOSPITAL | Age: 60
End: 2020-12-17

## 2020-12-18 ENCOUNTER — PATIENT OUTREACH (OUTPATIENT)
Dept: CASE MANAGEMENT | Facility: HOSPITAL | Age: 60
End: 2020-12-18

## 2020-12-23 ENCOUNTER — HOSPITAL ENCOUNTER (EMERGENCY)
Facility: HOSPITAL | Age: 60
Discharge: HOME | End: 2020-12-23
Attending: EMERGENCY MEDICINE
Payer: MEDICARE

## 2020-12-23 ENCOUNTER — PATIENT OUTREACH (OUTPATIENT)
Dept: CASE MANAGEMENT | Facility: HOSPITAL | Age: 60
End: 2020-12-23

## 2020-12-23 ENCOUNTER — APPOINTMENT (EMERGENCY)
Dept: CT IMAGING | Facility: HOSPITAL | Age: 60
End: 2020-12-23
Payer: MEDICARE

## 2020-12-23 VITALS
RESPIRATION RATE: 19 BRPM | DIASTOLIC BLOOD PRESSURE: 79 MMHG | HEART RATE: 86 BPM | SYSTOLIC BLOOD PRESSURE: 156 MMHG | TEMPERATURE: 98.1 F | OXYGEN SATURATION: 98 %

## 2020-12-23 DIAGNOSIS — T74.21XA: ICD-10-CM

## 2020-12-23 DIAGNOSIS — G40.909 RECURRENT SEIZURES (HCC): Primary | ICD-10-CM

## 2020-12-23 DIAGNOSIS — R41.0 CONFUSION: ICD-10-CM

## 2020-12-23 PROBLEM — T76.22XA ALLEGED CHILD SEXUAL ABUSE: Status: ACTIVE | Noted: 2020-12-23

## 2020-12-23 LAB
ALBUMIN SERPL BCP-MCNC: 3.6 G/DL (ref 3.4–4.8)
ALP SERPL-CCNC: 98.1 U/L (ref 35–140)
ALT SERPL W P-5'-P-CCNC: 10 U/L (ref 5–54)
ANION GAP SERPL CALCULATED.3IONS-SCNC: 6 MMOL/L (ref 4–13)
AST SERPL W P-5'-P-CCNC: 10 U/L (ref 15–41)
BASOPHILS # BLD AUTO: 0.04 THOUSANDS/ΜL (ref 0–0.1)
BASOPHILS NFR BLD AUTO: 0 % (ref 0–1)
BILIRUB SERPL-MCNC: 0.25 MG/DL (ref 0.3–1.2)
BUN SERPL-MCNC: 21 MG/DL (ref 6–20)
CALCIUM SERPL-MCNC: 9.2 MG/DL (ref 8.4–10.2)
CHLORIDE SERPL-SCNC: 105 MMOL/L (ref 96–108)
CO2 SERPL-SCNC: 29 MMOL/L (ref 22–33)
CREAT SERPL-MCNC: 1.01 MG/DL (ref 0.4–1.1)
EOSINOPHIL # BLD AUTO: 0.26 THOUSAND/ΜL (ref 0–0.61)
EOSINOPHIL NFR BLD AUTO: 2 % (ref 0–6)
ERYTHROCYTE [DISTWIDTH] IN BLOOD BY AUTOMATED COUNT: 13.8 % (ref 11.6–15.1)
GFR SERPL CREATININE-BSD FRML MDRD: 60 ML/MIN/1.73SQ M
GLUCOSE SERPL-MCNC: 90 MG/DL (ref 65–140)
HCT VFR BLD AUTO: 36.6 % (ref 34.8–46.1)
HGB BLD-MCNC: 11.7 G/DL (ref 11.5–15.4)
IMM GRANULOCYTES # BLD AUTO: 0.03 THOUSAND/UL (ref 0–0.2)
IMM GRANULOCYTES NFR BLD AUTO: 0 % (ref 0–2)
LYMPHOCYTES # BLD AUTO: 2.4 THOUSANDS/ΜL (ref 0.6–4.47)
LYMPHOCYTES NFR BLD AUTO: 22 % (ref 14–44)
MCH RBC QN AUTO: 30.4 PG (ref 26.8–34.3)
MCHC RBC AUTO-ENTMCNC: 32 G/DL (ref 31.4–37.4)
MCV RBC AUTO: 95 FL (ref 82–98)
MONOCYTES # BLD AUTO: 0.75 THOUSAND/ΜL (ref 0.17–1.22)
MONOCYTES NFR BLD AUTO: 7 % (ref 4–12)
NEUTROPHILS # BLD AUTO: 7.51 THOUSANDS/ΜL (ref 1.85–7.62)
NEUTS SEG NFR BLD AUTO: 69 % (ref 43–75)
PLATELET # BLD AUTO: 328 THOUSANDS/UL (ref 149–390)
PMV BLD AUTO: 11.1 FL (ref 8.9–12.7)
POTASSIUM SERPL-SCNC: 3.9 MMOL/L (ref 3.5–5)
PROT SERPL-MCNC: 7 G/DL (ref 6.4–8.3)
RBC # BLD AUTO: 3.85 MILLION/UL (ref 3.81–5.12)
SODIUM SERPL-SCNC: 140 MMOL/L (ref 133–145)
WBC # BLD AUTO: 10.99 THOUSAND/UL (ref 4.31–10.16)

## 2020-12-23 PROCEDURE — 36415 COLL VENOUS BLD VENIPUNCTURE: CPT | Performed by: EMERGENCY MEDICINE

## 2020-12-23 PROCEDURE — 99285 EMERGENCY DEPT VISIT HI MDM: CPT | Performed by: EMERGENCY MEDICINE

## 2020-12-23 PROCEDURE — 80177 DRUG SCRN QUAN LEVETIRACETAM: CPT | Performed by: EMERGENCY MEDICINE

## 2020-12-23 PROCEDURE — 80053 COMPREHEN METABOLIC PANEL: CPT | Performed by: EMERGENCY MEDICINE

## 2020-12-23 PROCEDURE — 70450 CT HEAD/BRAIN W/O DYE: CPT

## 2020-12-23 PROCEDURE — 99285 EMERGENCY DEPT VISIT HI MDM: CPT

## 2020-12-23 PROCEDURE — 85025 COMPLETE CBC W/AUTO DIFF WBC: CPT | Performed by: EMERGENCY MEDICINE

## 2020-12-23 RX ORDER — LEVETIRACETAM 500 MG/1
500 TABLET ORAL ONCE
Status: COMPLETED | OUTPATIENT
Start: 2020-12-23 | End: 2020-12-23

## 2020-12-23 RX ADMIN — LEVETIRACETAM 500 MG: 500 TABLET ORAL at 17:24

## 2020-12-28 ENCOUNTER — PATIENT OUTREACH (OUTPATIENT)
Dept: CASE MANAGEMENT | Facility: OTHER | Age: 60
End: 2020-12-28

## 2020-12-28 PROBLEM — T74.21XA SEXUAL ABUSE OF ADULT: Status: ACTIVE | Noted: 2020-12-28

## 2020-12-28 LAB — LEVETIRACETAM SERPL-MCNC: 35.7 UG/ML (ref 10–40)

## 2020-12-29 PROBLEM — I89.0 LYMPHEDEMA: Status: ACTIVE | Noted: 2020-12-29

## 2021-01-07 ENCOUNTER — TELEPHONE (OUTPATIENT)
Dept: NEPHROLOGY | Facility: CLINIC | Age: 61
End: 2021-01-07

## 2021-01-07 ENCOUNTER — PATIENT OUTREACH (OUTPATIENT)
Dept: CASE MANAGEMENT | Facility: HOSPITAL | Age: 61
End: 2021-01-07

## 2021-01-07 NOTE — PROGRESS NOTES
Chart review completed  Outside records through [facility] requested and refreshed  Patient was a no show for appointment 12/29 with vascular and dis not show for appointment with nephro yesterday  No response to unable to reach letter mailed to patient mid month of Dec     Referral closed, complex episode resolved and CM removed self from care team      TT sent to CRNP regarding closure

## 2021-01-07 NOTE — TELEPHONE ENCOUNTER
----- Message from Aden Becker MD sent at 1/7/2021  7:19 AM EST -----  For completeness I would order a an aldosterone/renin ratio as well as plasma free metanephrines  Thanks

## 2021-01-15 ENCOUNTER — APPOINTMENT (EMERGENCY)
Dept: CT IMAGING | Facility: HOSPITAL | Age: 61
End: 2021-01-15
Payer: MEDICARE

## 2021-01-15 ENCOUNTER — APPOINTMENT (EMERGENCY)
Dept: RADIOLOGY | Facility: HOSPITAL | Age: 61
End: 2021-01-15
Payer: MEDICARE

## 2021-01-15 ENCOUNTER — HOSPITAL ENCOUNTER (EMERGENCY)
Facility: HOSPITAL | Age: 61
Discharge: HOME | End: 2021-01-15
Attending: EMERGENCY MEDICINE | Admitting: EMERGENCY MEDICINE
Payer: MEDICARE

## 2021-01-15 VITALS
HEIGHT: 63 IN | TEMPERATURE: 97.6 F | OXYGEN SATURATION: 97 % | HEART RATE: 71 BPM | DIASTOLIC BLOOD PRESSURE: 60 MMHG | BODY MASS INDEX: 46.09 KG/M2 | RESPIRATION RATE: 18 BRPM | SYSTOLIC BLOOD PRESSURE: 130 MMHG | WEIGHT: 260.14 LBS

## 2021-01-15 DIAGNOSIS — M54.30 SCIATICA: Primary | ICD-10-CM

## 2021-01-15 DIAGNOSIS — G40.909 RECURRENT SEIZURES (HCC): ICD-10-CM

## 2021-01-15 LAB
ALBUMIN SERPL BCP-MCNC: 3.5 G/DL (ref 3.4–4.8)
ALP SERPL-CCNC: 92.3 U/L (ref 35–140)
ALT SERPL W P-5'-P-CCNC: 14 U/L (ref 5–54)
ANION GAP SERPL CALCULATED.3IONS-SCNC: 5 MMOL/L (ref 4–13)
APTT PPP: 27 SECONDS (ref 23–31)
AST SERPL W P-5'-P-CCNC: 17 U/L (ref 15–41)
BASOPHILS # BLD AUTO: 0.04 THOUSANDS/ΜL (ref 0–0.1)
BASOPHILS NFR BLD AUTO: 0 % (ref 0–1)
BILIRUB SERPL-MCNC: 0.29 MG/DL (ref 0.3–1.2)
BNP SERPL-MCNC: 166.6 PG/ML (ref 1–100)
BUN SERPL-MCNC: 23 MG/DL (ref 6–20)
CALCIUM SERPL-MCNC: 8.7 MG/DL (ref 8.4–10.2)
CHLORIDE SERPL-SCNC: 105 MMOL/L (ref 96–108)
CO2 SERPL-SCNC: 26 MMOL/L (ref 22–33)
CREAT SERPL-MCNC: 1.1 MG/DL (ref 0.4–1.1)
EOSINOPHIL # BLD AUTO: 0.3 THOUSAND/ΜL (ref 0–0.61)
EOSINOPHIL NFR BLD AUTO: 3 % (ref 0–6)
ERYTHROCYTE [DISTWIDTH] IN BLOOD BY AUTOMATED COUNT: 13.7 % (ref 11.6–15.1)
GFR SERPL CREATININE-BSD FRML MDRD: 54 ML/MIN/1.73SQ M
GLUCOSE SERPL-MCNC: 92 MG/DL (ref 65–140)
HCT VFR BLD AUTO: 32.6 % (ref 34.8–46.1)
HGB BLD-MCNC: 10.6 G/DL (ref 11.5–15.4)
IMM GRANULOCYTES # BLD AUTO: 0.02 THOUSAND/UL (ref 0–0.2)
IMM GRANULOCYTES NFR BLD AUTO: 0 % (ref 0–2)
INR PPP: 0.99 (ref 0.9–1.1)
LYMPHOCYTES # BLD AUTO: 2.38 THOUSANDS/ΜL (ref 0.6–4.47)
LYMPHOCYTES NFR BLD AUTO: 21 % (ref 14–44)
MCH RBC QN AUTO: 31 PG (ref 26.8–34.3)
MCHC RBC AUTO-ENTMCNC: 32.5 G/DL (ref 31.4–37.4)
MCV RBC AUTO: 95 FL (ref 82–98)
MONOCYTES # BLD AUTO: 0.86 THOUSAND/ΜL (ref 0.17–1.22)
MONOCYTES NFR BLD AUTO: 8 % (ref 4–12)
NEUTROPHILS # BLD AUTO: 7.75 THOUSANDS/ΜL (ref 1.85–7.62)
NEUTS SEG NFR BLD AUTO: 68 % (ref 43–75)
PLATELET # BLD AUTO: 291 THOUSANDS/UL (ref 149–390)
PMV BLD AUTO: 10.7 FL (ref 8.9–12.7)
POTASSIUM SERPL-SCNC: 3.9 MMOL/L (ref 3.5–5)
PROT SERPL-MCNC: 6.8 G/DL (ref 6.4–8.3)
PROTHROMBIN TIME: 11.2 SECONDS (ref 9.5–12.1)
RBC # BLD AUTO: 3.42 MILLION/UL (ref 3.81–5.12)
SODIUM SERPL-SCNC: 136 MMOL/L (ref 133–145)
WBC # BLD AUTO: 11.35 THOUSAND/UL (ref 4.31–10.16)

## 2021-01-15 PROCEDURE — 85610 PROTHROMBIN TIME: CPT | Performed by: EMERGENCY MEDICINE

## 2021-01-15 PROCEDURE — 99284 EMERGENCY DEPT VISIT MOD MDM: CPT | Performed by: EMERGENCY MEDICINE

## 2021-01-15 PROCEDURE — 36415 COLL VENOUS BLD VENIPUNCTURE: CPT | Performed by: EMERGENCY MEDICINE

## 2021-01-15 PROCEDURE — 83880 ASSAY OF NATRIURETIC PEPTIDE: CPT | Performed by: EMERGENCY MEDICINE

## 2021-01-15 PROCEDURE — 85025 COMPLETE CBC W/AUTO DIFF WBC: CPT | Performed by: EMERGENCY MEDICINE

## 2021-01-15 PROCEDURE — G1004 CDSM NDSC: HCPCS

## 2021-01-15 PROCEDURE — 72131 CT LUMBAR SPINE W/O DYE: CPT

## 2021-01-15 PROCEDURE — 96372 THER/PROPH/DIAG INJ SC/IM: CPT

## 2021-01-15 PROCEDURE — 80053 COMPREHEN METABOLIC PANEL: CPT | Performed by: EMERGENCY MEDICINE

## 2021-01-15 PROCEDURE — 85730 THROMBOPLASTIN TIME PARTIAL: CPT | Performed by: EMERGENCY MEDICINE

## 2021-01-15 PROCEDURE — 96374 THER/PROPH/DIAG INJ IV PUSH: CPT

## 2021-01-15 PROCEDURE — 71045 X-RAY EXAM CHEST 1 VIEW: CPT

## 2021-01-15 PROCEDURE — 99285 EMERGENCY DEPT VISIT HI MDM: CPT

## 2021-01-15 RX ORDER — KETOROLAC TROMETHAMINE 30 MG/ML
15 INJECTION, SOLUTION INTRAMUSCULAR; INTRAVENOUS ONCE
Status: COMPLETED | OUTPATIENT
Start: 2021-01-15 | End: 2021-01-15

## 2021-01-15 RX ORDER — DIAZEPAM 5 MG/ML
5 INJECTION, SOLUTION INTRAMUSCULAR; INTRAVENOUS ONCE
Status: COMPLETED | OUTPATIENT
Start: 2021-01-15 | End: 2021-01-15

## 2021-01-15 RX ADMIN — DIAZEPAM 5 MG: 5 INJECTION, SOLUTION INTRAMUSCULAR; INTRAVENOUS at 21:40

## 2021-01-15 RX ADMIN — KETOROLAC TROMETHAMINE 15 MG: 30 INJECTION, SOLUTION INTRAMUSCULAR at 21:43

## 2021-01-16 ENCOUNTER — APPOINTMENT (EMERGENCY)
Dept: CT IMAGING | Facility: HOSPITAL | Age: 61
End: 2021-01-16
Payer: MEDICARE

## 2021-01-16 ENCOUNTER — HOSPITAL ENCOUNTER (EMERGENCY)
Facility: HOSPITAL | Age: 61
Discharge: HOME | End: 2021-01-16
Attending: EMERGENCY MEDICINE | Admitting: EMERGENCY MEDICINE
Payer: MEDICARE

## 2021-01-16 ENCOUNTER — APPOINTMENT (EMERGENCY)
Dept: RADIOLOGY | Facility: HOSPITAL | Age: 61
End: 2021-01-16
Payer: MEDICARE

## 2021-01-16 VITALS
SYSTOLIC BLOOD PRESSURE: 134 MMHG | WEIGHT: 240 LBS | BODY MASS INDEX: 42.52 KG/M2 | TEMPERATURE: 95 F | HEIGHT: 63 IN | RESPIRATION RATE: 20 BRPM | DIASTOLIC BLOOD PRESSURE: 77 MMHG | HEART RATE: 78 BPM | OXYGEN SATURATION: 97 %
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
## 2021-01-16 DIAGNOSIS — R07.9 CHEST PAIN: ICD-10-CM

## 2021-01-16 DIAGNOSIS — S20.20XA TRAUMATIC ECCHYMOSIS OF CHEST, INITIAL ENCOUNTER: ICD-10-CM

## 2021-01-16 DIAGNOSIS — S09.90XA CLOSED HEAD INJURY, INITIAL ENCOUNTER: ICD-10-CM

## 2021-01-16 LAB
ALBUMIN SERPL BCP-MCNC: 3.6 G/DL (ref 3.4–4.8)
ALP SERPL-CCNC: 96.2 U/L (ref 35–140)
ALT SERPL W P-5'-P-CCNC: 15 U/L (ref 5–54)
ANION GAP SERPL CALCULATED.3IONS-SCNC: 5 MMOL/L (ref 4–13)
AST SERPL W P-5'-P-CCNC: 19 U/L (ref 15–41)
ATRIAL RATE: 74 BPM
BASOPHILS # BLD AUTO: 0.03 THOUSANDS/ΜL (ref 0–0.1)
BASOPHILS NFR BLD AUTO: 0 % (ref 0–1)
BILIRUB SERPL-MCNC: 0.28 MG/DL (ref 0.3–1.2)
BUN SERPL-MCNC: 25 MG/DL (ref 6–20)
CALCIUM SERPL-MCNC: 8.7 MG/DL (ref 8.4–10.2)
CARBAMAZEPINE SERPL-MCNC: 4.9 UG/ML (ref 4–12)
CHLORIDE SERPL-SCNC: 106 MMOL/L (ref 96–108)
CO2 SERPL-SCNC: 27 MMOL/L (ref 22–33)
CREAT SERPL-MCNC: 1.01 MG/DL (ref 0.4–1.1)
EOSINOPHIL # BLD AUTO: 0.24 THOUSAND/ΜL (ref 0–0.61)
EOSINOPHIL NFR BLD AUTO: 2 % (ref 0–6)
ERYTHROCYTE [DISTWIDTH] IN BLOOD BY AUTOMATED COUNT: 13.8 % (ref 11.6–15.1)
GFR SERPL CREATININE-BSD FRML MDRD: 60 ML/MIN/1.73SQ M
GLUCOSE SERPL-MCNC: 100 MG/DL (ref 65–140)
HCT VFR BLD AUTO: 33.7 % (ref 34.8–46.1)
HGB BLD-MCNC: 10.9 G/DL (ref 11.5–15.4)
IMM GRANULOCYTES # BLD AUTO: 0.03 THOUSAND/UL (ref 0–0.2)
IMM GRANULOCYTES NFR BLD AUTO: 0 % (ref 0–2)
LYMPHOCYTES # BLD AUTO: 2.09 THOUSANDS/ΜL (ref 0.6–4.47)
LYMPHOCYTES NFR BLD AUTO: 17 % (ref 14–44)
MCH RBC QN AUTO: 30.8 PG (ref 26.8–34.3)
MCHC RBC AUTO-ENTMCNC: 32.3 G/DL (ref 31.4–37.4)
MCV RBC AUTO: 95 FL (ref 82–98)
MONOCYTES # BLD AUTO: 0.78 THOUSAND/ΜL (ref 0.17–1.22)
MONOCYTES NFR BLD AUTO: 6 % (ref 4–12)
NEUTROPHILS # BLD AUTO: 9.34 THOUSANDS/ΜL (ref 1.85–7.62)
NEUTS SEG NFR BLD AUTO: 75 % (ref 43–75)
P AXIS: 67 DEGREES
PLATELET # BLD AUTO: 300 THOUSANDS/UL (ref 149–390)
PMV BLD AUTO: 10.7 FL (ref 8.9–12.7)
POTASSIUM SERPL-SCNC: 3.9 MMOL/L (ref 3.5–5)
PR INTERVAL: 142 MS
PROT SERPL-MCNC: 6.9 G/DL (ref 6.4–8.3)
QRS AXIS: 52 DEGREES
QRSD INTERVAL: 74 MS
QT INTERVAL: 376 MS
QTC INTERVAL: 420 MS
RBC # BLD AUTO: 3.54 MILLION/UL (ref 3.81–5.12)
SODIUM SERPL-SCNC: 138 MMOL/L (ref 133–145)
T WAVE AXIS: 57 DEGREES
TROPONIN I SERPL-MCNC: <0.03 NG/ML (ref 0–0.07)
VENTRICULAR RATE: 75 BPM
WBC # BLD AUTO: 12.51 THOUSAND/UL (ref 4.31–10.16)

## 2021-01-16 PROCEDURE — 99285 EMERGENCY DEPT VISIT HI MDM: CPT | Performed by: EMERGENCY MEDICINE

## 2021-01-16 PROCEDURE — 36415 COLL VENOUS BLD VENIPUNCTURE: CPT | Performed by: EMERGENCY MEDICINE

## 2021-01-16 PROCEDURE — 72125 CT NECK SPINE W/O DYE: CPT

## 2021-01-16 PROCEDURE — 84484 ASSAY OF TROPONIN QUANT: CPT | Performed by: EMERGENCY MEDICINE

## 2021-01-16 PROCEDURE — 85025 COMPLETE CBC W/AUTO DIFF WBC: CPT | Performed by: EMERGENCY MEDICINE

## 2021-01-16 PROCEDURE — 71260 CT THORAX DX C+: CPT

## 2021-01-16 PROCEDURE — 99284 EMERGENCY DEPT VISIT MOD MDM: CPT

## 2021-01-16 PROCEDURE — 80156 ASSAY CARBAMAZEPINE TOTAL: CPT | Performed by: EMERGENCY MEDICINE

## 2021-01-16 PROCEDURE — 93010 ELECTROCARDIOGRAM REPORT: CPT | Performed by: INTERNAL MEDICINE

## 2021-01-16 PROCEDURE — 80177 DRUG SCRN QUAN LEVETIRACETAM: CPT | Performed by: EMERGENCY MEDICINE

## 2021-01-16 PROCEDURE — 93005 ELECTROCARDIOGRAM TRACING: CPT

## 2021-01-16 PROCEDURE — G1004 CDSM NDSC: HCPCS

## 2021-01-16 PROCEDURE — 70486 CT MAXILLOFACIAL W/O DYE: CPT

## 2021-01-16 PROCEDURE — 74177 CT ABD & PELVIS W/CONTRAST: CPT

## 2021-01-16 PROCEDURE — 80053 COMPREHEN METABOLIC PANEL: CPT | Performed by: EMERGENCY MEDICINE

## 2021-01-16 PROCEDURE — 73564 X-RAY EXAM KNEE 4 OR MORE: CPT

## 2021-01-16 PROCEDURE — 70450 CT HEAD/BRAIN W/O DYE: CPT

## 2021-01-16 PROCEDURE — 96360 HYDRATION IV INFUSION INIT: CPT

## 2021-01-16 RX ADMIN — SODIUM CHLORIDE 1000 ML: 0.9 INJECTION, SOLUTION INTRAVENOUS at 14:28

## 2021-01-16 RX ADMIN — IOHEXOL 100 ML: 350 INJECTION, SOLUTION INTRAVENOUS at 14:54

## 2021-01-16 NOTE — ED PROVIDER NOTES
History  Chief Complaint   Patient presents with    Fall     Patient arrives via EMS post fall from top of steps  No head injury, negative for thinners  HPI    56 y o  F hx of seizures on tegretol, htn presents to ed for eval after fall  Fell down 15 steps, after she tripped and fell  Sustained abrasion to forehead  No headache or vision changes  Patient is not on thinners  No neck pain  Denies loc  Denied chest pain initially, but after exam began to complain of left sided 3/10 chest pain  Also has left knee pain on ROS  Prior to Admission Medications   Prescriptions Last Dose Informant Patient Reported?  Taking?   calcium carbonate (TUMS) 500 mg chewable tablet   No No   Sig: Chew 2 tablets (1,000 mg total) daily   Patient not taking: Reported on 1/15/2021   carBAMazepine (TEGretol) 200 mg tablet   No No   Sig: Take 1 tablet (200 mg total) by mouth 3 (three) times a day   chlorthalidone (HYGROTEN) 50 MG tablet   Yes No   Sig: Take 25 mg by mouth daily   cholecalciferol (VITAMIN D3) 1,000 units tablet   No No   Sig: Take 2 tablets (2,000 Units total) by mouth daily   ergocalciferol (VITAMIN D2) 50,000 units   No No   Sig: Take 1 capsule (50,000 Units total) by mouth once a week for 12 doses   levETIRAcetam (KEPPRA) 750 mg tablet   No No   Sig: Take 2 tablets (1,500 mg total) by mouth every 12 (twelve) hours   losartan (COZAAR) 25 mg tablet   No No   Sig: Take 1 tablet (25 mg total) by mouth daily   metoprolol succinate (Toprol XL) 50 mg 24 hr tablet   No No   Sig: Take 1 tablet (50 mg total) by mouth daily   pramipexole (MIRAPEX) 1 5 MG tablet   No No   Sig: Take 1 tablet (1 5 mg total) by mouth daily at bedtime      Facility-Administered Medications: None       Past Medical History:   Diagnosis Date    Depression     [facility] [address] [facility]     Epilepsy [facility] [address] [facility]     Obesity     Restless leg        Past Surgical History:   Procedure Laterality Date    DENTAL SURGERY      all teeth removed    LASER ABLATION OF THE CERVIX      MAMMO (HISTORICAL)  05/01/2017       Family History   Problem Relation Age of Onset    Kidney disease Mother     Liver disease Mother     Heart attack Mother     Heart attack Father     No Known Problems Brother     No Known Problems Son      I have reviewed and agree with the history as documented  E-Cigarette/Vaping    E-Cigarette Use Never User      E-Cigarette/Vaping Substances    Nicotine No     THC No     CBD No     Flavoring No     Other No     Unknown No      Social History     Tobacco Use    Smoking status: Current Every Day Smoker     Packs/day: 0 50     Years: 23 00     Pack years: 11 50     Types: Cigarettes    Smokeless tobacco: Never Used   Substance Use Topics    Alcohol use: Never     Frequency: Never     Drinks per session: Patient refused     Binge frequency: Never     Comment: pt denies alcohol use    Drug use: Never       Review of Systems   Constitutional: Negative for chills, fatigue and fever  HENT: Negative for sore throat  Eyes: Negative for redness and visual disturbance  Respiratory: Negative for cough and shortness of breath  Cardiovascular: Positive for chest pain  Gastrointestinal: Negative for abdominal pain, diarrhea and nausea  Genitourinary: Negative for difficulty urinating, dysuria and pelvic pain  Musculoskeletal: Negative for back pain  Skin: Positive for wound  Negative for rash  Neurological: Negative for syncope, weakness and headaches  All other systems reviewed and are negative  Physical Exam  Physical Exam  Vitals signs and nursing note reviewed  Constitutional:       General: She is not in acute distress  HENT:      Head: Normocephalic  Comments: abrasion above nasal bridge  Eyes:      Conjunctiva/sclera: Conjunctivae normal    Neck:      Musculoskeletal: Normal range of motion  Cardiovascular:      Rate and Rhythm: Normal rate and regular rhythm  Heart sounds: Normal heart sounds  Pulmonary:      Effort: Pulmonary effort is normal  No respiratory distress  Breath sounds: Normal breath sounds  Abdominal:      General: Bowel sounds are normal       Palpations: Abdomen is soft  Tenderness: There is no abdominal tenderness  Musculoskeletal: Normal range of motion  Comments: No midline c t l spine tenderness  Tenderness on lateral aspect of left knee with echymosis   Skin:     General: Skin is warm and dry  Findings: No rash  Comments: Ecchymosis underneath left breast   Neurological:      Mental Status: She is alert and oriented to person, place, and time  Cranial Nerves: No cranial nerve deficit  Sensory: No sensory deficit  Motor: No abnormal muscle tone        Coordination: Coordination normal          Vital Signs  ED Triage Vitals   Temperature Pulse Respirations Blood Pressure SpO2   01/16/21 1337 01/16/21 1335 01/16/21 1335 01/16/21 1336 01/16/21 1335   (!) 95 °F (35 °C) 78 20 134/77 97 %      Temp Source Heart Rate Source Patient Position - Orthostatic VS BP Location FiO2 (%)   01/16/21 1337 01/16/21 1335 -- -- --   Tympanic Monitor         Pain Score       01/16/21 1335       Worst Possible Pain           Vitals:    01/16/21 1335 01/16/21 1336   BP:  134/77   Pulse: 78          Visual Acuity      ED Medications  Medications   sodium chloride 0 9 % bolus 1,000 mL (0 mL Intravenous Stopped 1/16/21 1544)   iohexol (OMNIPAQUE) 350 MG/ML injection (MULTI-DOSE) 100 mL (100 mL Intravenous Given 1/16/21 1454)       Diagnostic Studies  Results Reviewed     Procedure Component Value Units Date/Time    Troponin I [[id number]]  (Normal) Collected: 01/16/21 1423    Lab Status: Final result Specimen: Blood from Arm, Left Updated: 01/16/21 1452     Troponin I <0 03 ng/mL     Comprehensive metabolic panel [[id number]]  (Abnormal) Collected: 01/16/21 1423    Lab Status: Final result Specimen: Blood from Arm, Left Updated: 01/16/21 1449     Sodium 138 mmol/L Potassium 3 9 mmol/L      Chloride 106 mmol/L      CO2 27 mmol/L      ANION GAP 5 mmol/L      BUN 25 mg/dL      Creatinine 1 01 mg/dL      Glucose 100 mg/dL      Calcium 8 7 mg/dL      AST 19 U/L      ALT 15 U/L      Alkaline Phosphatase 96 2 U/L      Total Protein 6 9 g/dL      Albumin 3 6 g/dL      Total Bilirubin 0 28 mg/dL      eGFR 60 ml/min/1 73sq m     Narrative:      National Kid[employer] guidelines for Chronic Kidney Disease (CKD):     Stage 1 with normal or high GFR (GFR > 90 mL/min/1 73 square meters)    Stage 2 Mild CKD (GFR = 60-89 mL/min/1 73 square meters)    Stage 3A Moderate CKD (GFR = 45-59 mL/min/1 73 square meters)    Stage 3B Moderate CKD (GFR = 30-44 mL/min/1 73 square meters)    Stage 4 Severe CKD (GFR = 15-29 mL/min/1 73 square meters)    Stage 5 End Stage CKD (GFR <15 mL/min/1 73 square meters)  Note: GFR calculation is accurate only with a steady state creatinine    Carbamazepine level, total [[id number]]  (Normal) Collected: 01/16/21 1423    Lab Status: Final result Specimen: Blood from Arm, Left Updated: 01/16/21 1448     Carbamazepine Lvl 4 9 ug/mL     CBC and differential [[id number]]  (Abnormal) Collected: 01/16/21 1423    Lab Status: Final result Specimen: Blood from Arm, Left Updated: 01/16/21 1433     WBC 12 51 Thousand/uL      RBC 3 54 Million/uL      Hemoglobin 10 9 g/dL      Hematocrit 33 7 %      MCV 95 fL      MCH 30 8 pg      MCHC 32 3 g/dL      RDW 13 8 %      MPV 10 7 fL      Platelets 267 Thousands/uL      Neutrophils Relative 75 %      Immat GRANS % 0 %      Lymphocytes Relative 17 %      Monocytes Relative 6 %      Eosinophils Relative 2 %      Basophils Relative 0 %      Neutrophils Absolute 9 34 Thousands/µL      Immature Grans Absolute 0 03 Thousand/uL      Lymphocytes Absolute 2 09 Thousands/µL      Monocytes Absolute 0 78 Thousand/µL      Eosinophils Absolute 0 24 Thousand/µL      Basophils Absolute 0 03 Thousands/µL     Levetiracetam level [[id number]] Collected: 01/16/21 1423    Lab Status: In process Specimen: Blood from Arm, Left Updated: 01/16/21 1428                 CT head without contrast   Final Result by Marley Smiley MD (01/16 1459)      No acute intracranial abnormality  Minimal right frontal scalp swelling  No fracture  Workstation performed: [id number]         CT cervical spine without contrast   Final Result by Marley Smiley MD (01/16 1502)      No cervical spine fracture or traumatic malalignment  Workstation performed: [id number]         CT facial bones without contrast   Final Result by Marley Smiley MD (01/16 1510)      Normal noncontrast CT of the facial bones  Chronic stable right maxillary sinus disease  Workstation performed: [id number]         CT chest abdomen pelvis w contrast   Final Result by Kody Crawford MD (01/16 1526)      1  Subcutaneous fat stranding in the anterior proximal right thigh, nonspecific and may be due to contusion  Otherwise, no findings of acute traumatic injury in the chest, abdomen, or pelvis  2   1 5 cm subcutaneous nodule in the right axillary region, nonspecific  Correlation with physical exam is suggested  Workstation performed: [id number]         XR knee 4+ views Right injury   ED Interpretation by Olvin Pompa MD (01/16 1525)   No acute fx or dislocation                 Procedures  Procedures         ED Course  ED Course as of Jan 16 1650   Sat Jan 16, 2021   1522 Troponin I: <0 03   1541 Patient was re-evaluated after negative C spine imaging  Patient had no midline neck pain, no focal neurologic deficits signs/symptoms  Patient had Full ROM, and no neck pain with strength testing  Patient C-collar cleared  1542 Patient ambulated to the bathroom without difficulty              MDM   78-year-old female presents to the ED for evaluation after 15 stepped fall  Will get a head CT, C-spine, xray of knee   At time of eval, patient also complaining of chest pain, will check labs CT scan  Patient has ecchymosis of left breast, suspect contusion, less likely ACS  Imaging was negative for any acute pathology, no fracture, no head bleed  Trop negative  Patient ambulated without any difficulty, C-collar was cleared, see ED course  Discharged with PCP follow up  The patient was instructed to follow up as documented  Strict return precautions were discussed with the patient and the patient was instructed to return to the emergency department immediately if symptoms worsen  The patient/patient family member acknowledged and were in agreement with plan  Disposition  Final diagnoses:   Fall, initial encounter   Closed head injury, initial encounter   Chest pain   Traumatic ecchymosis of chest, initial encounter     Time reflects when diagnosis was documented in both MDM as applicable and the Disposition within this note     Time User Action Codes Description Comment    1/16/2021  3:42 PM Zacarias Mullins Ou  [id number]] Fall, initial encounter     1/16/2021  3:43 PM Shannon Litter Add [S09 90XA] Closed head injury, initial encounter     1/16/2021  3:43 PM Shannon Litter Add [R07 9] Chest pain     1/16/2021  3:43 PM Shannon Litter Add [S20 [id number]] Traumatic ecchymosis of chest, initial encounter       ED Disposition     ED Disposition Condition Date/Time Comment    Discharge Stable Sat Jan 16, 2021  3:42 PM Aidan Kendall discharge to home/self care              Follow-up Information     Follow up With Specialties Details Why Contact Info    DRU So Nurse Practitioner, Family Medicine Schedule an appointment as soon as possible for a visit   [phone]  [phone]            Discharge Medication List as of 1/16/2021  3:44 PM      CONTINUE these medications which have NOT CHANGED    Details   calcium carbonate (TUMS) 500 mg chewable tablet Chew 2 tablets (1,000 mg total) daily, Starting Tue 11/3/2020, Normal      carBAMazepine (TEGretol) 200 mg tablet Take 1 tablet (200 mg total) by mouth 3 (three) times a day, Starting Tue 11/10/2020, Normal      chlorthalidone (HYGROTEN) 50 MG tablet Take 25 mg by mouth daily, Historical Med      cholecalciferol (VITAMIN D3) 1,000 units tablet Take 2 tablets (2,000 Units total) by mouth daily, Starting Tue 11/10/2020, Until Mon 2/8/2021, Normal      ergocalciferol (VITAMIN D2) 50,000 units Take 1 capsule (50,000 Units total) by mouth once a week for 12 doses, Starting Thu 12/3/2020, Until Fri 2/19/2021, Normal      levETIRAcetam (KEPPRA) 750 mg tablet Take 2 tablets (1,500 mg total) by mouth every 12 (twelve) hours, Starting Tue 11/10/2020, Until Mon 2/8/2021, Normal      losartan (COZAAR) 25 mg tablet Take 1 tablet (25 mg total) by mouth daily, Starting Thu 12/3/2020, Normal      metoprolol succinate (Toprol XL) 50 mg 24 hr tablet Take 1 tablet (50 mg total) by mouth daily, Starting Tue 11/10/2020, Until Mon 2/8/2021, Normal      pramipexole (MIRAPEX) 1 5 MG tablet Take 1 tablet (1 5 mg total) by mouth daily at bedtime, Starting Mon 12/7/2020, Until Sat 6/5/2021, Normal           No discharge procedures on file      PDMP Review       Value Time User    PDMP Reviewed  Yes 9/9/2020  5:50 PM Skip Churchill MD          ED Provider  Electronically Signed by           Yuki Doss MD  01/16/21 [phone]

## 2021-01-16 NOTE — ED PROVIDER NOTES
History  Chief Complaint   Patient presents with    Leg Pain     Patient reports right leg pain and numbness, also reports history of seizures and says she had one seizure early this morning, describes it as a "dizzy spell"    Seizure - Prior Hx Of     This is a 56year old female with a hx of recurrent seizures that presented to the ED via EMS with complaints of right leg pain  Reports that the pain is not new  Reports that she has this pain frequently     She also reports that she had a short seizure earlier today however does admit that she has these mini seizures about every 3 days and that this seizure was no different than the ones she has frequently  Reports that she continues to take her medications as ordered          Prior to Admission Medications   Prescriptions Last Dose Informant Patient Reported?  Taking?   calcium carbonate (TUMS) 500 mg chewable tablet Not Taking at Unknown time  No No   Sig: Chew 2 tablets (1,000 mg total) daily   Patient not taking: Reported on 1/15/2021   carBAMazepine (TEGretol) 200 mg tablet 1/15/2021 at Unknown time  No Yes   Sig: Take 1 tablet (200 mg total) by mouth 3 (three) times a day   chlorthalidone (HYGROTEN) 50 MG tablet Not Taking at Unknown time  Yes No   Sig: Take 25 mg by mouth daily   cholecalciferol (VITAMIN D3) 1,000 units tablet 1/15/2021 at Unknown time  No Yes   Sig: Take 2 tablets (2,000 Units total) by mouth daily   ergocalciferol (VITAMIN D2) 50,000 units Past Week at Unknown time  No Yes   Sig: Take 1 capsule (50,000 Units total) by mouth once a week for 12 doses   levETIRAcetam (KEPPRA) 750 mg tablet 1/15/2021 at Unknown time  No Yes   Sig: Take 2 tablets (1,500 mg total) by mouth every 12 (twelve) hours   losartan (COZAAR) 25 mg tablet 1/15/2021 at Unknown time  No Yes   Sig: Take 1 tablet (25 mg total) by mouth daily   metoprolol succinate (Toprol XL) 50 mg 24 hr tablet 1/15/2021 at Unknown time  No Yes   Sig: Take 1 tablet (50 mg total) by mouth daily   pramipexole (MIRAPEX) 1 5 MG tablet 1/14/2021 at Unknown time  No Yes   Sig: Take 1 tablet (1 5 mg total) by mouth daily at bedtime      Facility-Administered Medications: None       Past Medical History:   Diagnosis Date    Depression     [facility]) [facility] [address] [facility]     [facility]     Obesity     Restless leg        Past Surgical History:   Procedure Laterality Date    DENTAL SURGERY      all teeth removed    LASER ABLATION OF THE CERVIX      MAMMO (HISTORICAL)  05/01/2017       Family History   Problem Relation Age of Onset    Kidney disease Mother     Liver disease Mother     Heart attack Mother     Heart attack Father     No Known Problems Brother     No Known Problems Son      I have reviewed and agree with the history as documented  E-Cigarette/Vaping    E-Cigarette Use Never User      E-Cigarette/Vaping Substances    Nicotine No     THC No     CBD No     Flavoring No     Other No     Unknown No      Social History     Tobacco Use    Smoking status: Current Every Day Smoker     Packs/day: 0 50     Years: 23 00     Pack years: 11 50     Types: Cigarettes    Smokeless tobacco: Never Used   Substance Use Topics    Alcohol use: Never     Frequency: Never     Drinks per session: Patient refused     Binge frequency: Never     Comment: pt denies alcohol use    Drug use: Never       Review of Systems   Constitutional: Negative for activity change, appetite change, chills, diaphoresis, fatigue, fever and unexpected weight change  HENT: Negative for congestion, ear discharge, ear pain, mouth sores, sinus pressure, sinus pain, sneezing, sore throat, trouble swallowing and voice change  Eyes: Negative for photophobia, pain, discharge, redness, itching and visual disturbance  Respiratory: Negative for cough, chest tightness and shortness of breath  Cardiovascular: Negative for chest pain, palpitations and leg swelling     Gastrointestinal: Negative for abdominal pain, constipation, nausea and vomiting  Endocrine: Negative for cold intolerance, heat intolerance, polydipsia, polyphagia and polyuria  Genitourinary: Negative for decreased urine volume, difficulty urinating, dysuria, frequency, hematuria and urgency  Musculoskeletal: Negative for arthralgias, back pain, gait problem, joint swelling, myalgias, neck pain and neck stiffness  Right leg pain as per HPI   Skin: Negative for color change and rash  Allergic/Immunologic: Negative for immunocompromised state  Neurological: Negative for dizziness, tremors, seizures, syncope, speech difficulty, weakness, light-headedness, numbness and headaches  Hx of seizures   Hematological: Does not bruise/bleed easily  Psychiatric/Behavioral: Negative for behavioral problems and suicidal ideas  Physical Exam  Physical Exam  Vitals signs and nursing note reviewed  Constitutional:       General: She is not in acute distress  Appearance: Normal appearance  She is well-developed  She is obese  She is not ill-appearing, toxic-appearing or diaphoretic  Comments: unkempt   HENT:      Head: Normocephalic and atraumatic  Right Ear: Tympanic membrane, ear canal and external ear normal  There is no impacted cerumen  Left Ear: Tympanic membrane, ear canal and external ear normal  There is no impacted cerumen  Nose: Nose normal  No congestion or rhinorrhea  Mouth/Throat:      Mouth: Mucous membranes are moist       Pharynx: Oropharynx is clear  No oropharyngeal exudate or posterior oropharyngeal erythema  Eyes:      General: No scleral icterus  Right eye: No discharge  Left eye: No discharge  Extraocular Movements: Extraocular movements intact  Conjunctiva/sclera: Conjunctivae normal       Pupils: Pupils are equal, round, and reactive to light  Neck:      Musculoskeletal: Normal range of motion and neck supple  No neck rigidity or muscular tenderness        Thyroid: No thyromegaly  Vascular: No hepatojugular reflux or JVD  Trachea: No tracheal deviation  Cardiovascular:      Rate and Rhythm: Normal rate and regular rhythm  Pulses: Normal pulses  Carotid pulses are 2+ on the right side and 2+ on the left side  Radial pulses are 2+ on the right side and 2+ on the left side  Dorsalis pedis pulses are 2+ on the right side and 2+ on the left side  Posterior tibial pulses are 2+ on the right side and 2+ on the left side  Heart sounds: Normal heart sounds  No murmur  Pulmonary:      Effort: Pulmonary effort is normal  No accessory muscle usage or respiratory distress  Breath sounds: Normal breath sounds  No wheezing or rales  Chest:      Chest wall: No mass, deformity, tenderness, crepitus or edema  Abdominal:      General: Bowel sounds are normal  There is no distension or abdominal bruit  Palpations: Abdomen is soft  There is no hepatomegaly, splenomegaly or mass  Tenderness: There is no abdominal tenderness  There is no right CVA tenderness, left CVA tenderness, guarding or rebound  Hernia: No hernia is present  Musculoskeletal: Normal range of motion  General: No tenderness  Right lower leg: She exhibits no tenderness  No edema  Left lower leg: She exhibits no tenderness  No edema  Lymphadenopathy:      Cervical: No cervical adenopathy  Skin:     General: Skin is warm and dry  Capillary Refill: Capillary refill takes less than 2 seconds  Findings: No ecchymosis or rash  Neurological:      General: No focal deficit present  Mental Status: She is alert and oriented to person, place, and time  Cranial Nerves: No cranial nerve deficit  Sensory: No sensory deficit  Motor: No weakness or abnormal muscle tone        Deep Tendon Reflexes: Reflexes normal    Psychiatric:         Mood and Affect: Mood normal          Behavior: Behavior normal          Thought Content:  Thought content normal          Judgment: Judgment normal          Vital Signs  ED Triage Vitals [01/15/21 2134]   Temperature Pulse Respirations Blood Pressure SpO2   97 6 °F (36 4 °C) 71 18 130/60 97 %      Temp Source Heart Rate Source Patient Position - Orthostatic VS BP Location FiO2 (%)   Oral Monitor Lying Left arm --      Pain Score       Worst Possible Pain           Vitals:    01/15/21 2134   BP: 130/60   Pulse: 71   Patient Position - Orthostatic VS: Lying         Visual Acuity  Visual Acuity      Most Recent Value   L Pupil Size (mm)  2   R Pupil Size (mm)  2          ED Medications  Medications   diazepam (VALIUM) injection 5 mg (5 mg Intramuscular Given 1/15/21 2140)   ketorolac (TORADOL) injection 15 mg (15 mg Intravenous Given 1/15/21 2143)       Diagnostic Studies  Results Reviewed     Procedure Component Value Units Date/Time    B-Type Natriuretic Peptide [facility] ONLY) [[id number]]  (Abnormal) Collected: 01/15/21 2143    Lab Status: Final result Specimen: Blood from Arm, Right Updated: 01/15/21 2214      6 pg/mL     Comprehensive metabolic panel [[id number]]  (Abnormal) Collected: 01/15/21 2143    Lab Status: Final result Specimen: Blood from Arm, Right Updated: 01/15/21 2208     Sodium 136 mmol/L      Potassium 3 9 mmol/L      Chloride 105 mmol/L      CO2 26 mmol/L      ANION GAP 5 mmol/L      BUN 23 mg/dL      Creatinine 1 10 mg/dL      Glucose 92 mg/dL      Calcium 8 7 mg/dL      AST 17 U/L      ALT 14 U/L      Alkaline Phosphatase 92 3 U/L      Total Protein 6 8 g/dL      Albumin 3 5 g/dL      Total Bilirubin 0 29 mg/dL      eGFR 54 ml/min/1 73sq m     Narrative:      Adam guidelines for Chronic Kidney Disease (CKD):     Stage 1 with normal or high GFR (GFR > 90 mL/min/1 73 square meters)    Stage 2 Mild CKD (GFR = 60-89 mL/min/1 73 square meters)    Stage 3A Moderate CKD (GFR = 45-59 mL/min/1 73 square meters)    Stage 3B Moderate CKD (GFR = 30-44 mL/min/1 73 square meters)    Stage 4 Severe CKD (GFR = 15-29 mL/min/1 73 square meters)    Stage 5 End Stage CKD (GFR <15 mL/min/1 73 square meters)  Note: GFR calculation is accurate only with a steady state creatinine    APTT [[id number]]  (Normal) Collected: 01/15/21 2143    Lab Status: Final result Specimen: Blood from Arm, Right Updated: 01/15/21 2204     PTT 27 seconds     Protime-INR [[id number]]  (Normal) Collected: 01/15/21 2143    Lab Status: Final result Specimen: Blood from Arm, Right Updated: 01/15/21 2204     Protime 11 2 seconds      INR 0 99    Narrative:      INR Reference Ranges:  No Anticoagulant, Normal:           0 9-1 1  Standard Dose, Oral Anticoagulant:  2 0-3 0  High Dose, Oral Anticoagulant:      2 5-3 5    CBC and differential [[id number]]  (Abnormal) Collected: 01/15/21 2143    Lab Status: Final result Specimen: Blood from Arm, Right Updated: 01/15/21 2149     WBC 11 35 Thousand/uL      RBC 3 42 Million/uL      Hemoglobin 10 6 g/dL      Hematocrit 32 6 %      MCV 95 fL      MCH 31 0 pg      MCHC 32 5 g/dL      RDW 13 7 %      MPV 10 7 fL      Platelets 760 Thousands/uL      Neutrophils Relative 68 %      Immat GRANS % 0 %      Lymphocytes Relative 21 %      Monocytes Relative 8 %      Eosinophils Relative 3 %      Basophils Relative 0 %      Neutrophils Absolute 7 75 Thousands/µL      Immature Grans Absolute 0 02 Thousand/uL      Lymphocytes Absolute 2 38 Thousands/µL      Monocytes Absolute 0 86 Thousand/µL      Eosinophils Absolute 0 30 Thousand/µL      Basophils Absolute 0 04 Thousands/µL                  CT spine lumbar without contrast   Final Result by Janet Persaud MD (01/15 2213)      Disc and facet degenerative change in the lower lumbar spine resulting in moderate to severe neural foraminal narrowing at L4-5 and L5-S1           Workstation performed: [id number]         XR chest 1 view portable   Final Result by Neyda Westbrook MD (01/16 6945)      No acute cardiopulmonary disease  Workstation performed: [id number]                    Procedures  Procedures         ED Course  ED Course as of Jan 17 0505   Javier Lozano Jan 17, 2021   51 This 56year old female presented with right sciatic distribution pain  CT of the lumbar spine shows no acute findings - mediated with Toradol IV and Valium IM - reported that the pain was "starting to go away " Also reported a short breakthrough seizure this AM which are recurrent  CMP unremarkable  No acute findings on CBC  Pt refused to provide urine sample for UA  No change in seizure pattern from her baseline  Stable for discharge  All imaging and/or lab testing discussed with patient, strict return to ED precautions discussed  Patient recommended to follow up promptly with appropriate outpatient provider  Patient and/or family members verbalizes understanding and agrees with plan  Patient is stable for discharge      Portions of the record may have been created with voice recognition software  Occasional wrong word or "sound a like" substitutions may have occurred due to the inherent limitations of voice recognition software  Read the chart carefully and recognize, using context, where substitutions have occurred  SBIRT 20yo+      Most Recent Value   SBIRT (24 yo +)   In order to provide better care to our patients, we are screening all of our patients for alcohol and drug use  Would it be okay to ask you these screening questions?   No Filed at: 01/15/2021 2208                    MDM  Number of Diagnoses or Management Options  Recurrent seizures ([facility] [address] ): established and worsening  Sciatica: new and requires workup  Diagnosis management comments: Sciatica,lumbar spine pathology,malingering,electrolyte imbalance, dehydration       Amount and/or Complexity of Data Reviewed  Clinical lab tests: ordered and reviewed  Tests in the radiology section of CPT®: ordered and reviewed  Obtain history from someone other than the patient: (EMS)  Independent visualization of images, tracings, or specimens: yes    Risk of Complications, Morbidity, and/or Mortality  Presenting problems: moderate  Diagnostic procedures: moderate  Management options: low    Patient Progress  Patient progress: improved      Disposition  Final diagnoses:   Sciatica   Recurrent seizures ([address] )     Time reflects when diagnosis was documented in both MDM as applicable and the Disposition within this note     Time User Action Codes Description Comment    1/15/2021 10:19 PM Alena Friend Add [M54 30] Sciatica     1/15/2021 10:20 PM Alena Friend Add [G40 909] Recurrent seizures [facility])       ED Disposition     ED Disposition Condition Date/Time Comment    Discharge Stable Fri Sumit 15, 2021 10:19 PM Kendal Linn discharge to home/self care              Follow-up Information     Follow up With Specialties Details Why Contact Info Additional [address] [facility], [facility]  Nurse Practitioner, Family Medicine Schedule an appointment as soon as possible for a visit in 3 days  Laura [address] [place] [address]       [address] In 1 week  [address], [address] [facility], [address] , [place], [place], [address] [facility]          Discharge Medication List as of 1/15/2021 10:21 PM      CONTINUE these medications which have NOT CHANGED    Details   carBAMazepine (TEGretol) 200 mg tablet Take 1 tablet (200 mg total) by mouth 3 (three) times a day, Starting Tue 11/10/2020, Normal      cholecalciferol (VITAMIN D3) 1,000 units tablet Take 2 tablets (2,000 Units total) by mouth daily, Starting Tue 11/10/2020, Until Mon 2/8/2021, Normal      ergocalciferol (VITAMIN D2) 50,000 units Take 1 capsule (50,000 Units total) by mouth once a week for 12 doses, Starting Thu 12/3/2020, Until Fri 2/19/2021, Normal levETIRAcetam (KEPPRA) 750 mg tablet Take 2 tablets (1,500 mg total) by mouth every 12 (twelve) hours, Starting Tue 11/10/2020, Until Mon 2/8/2021, Normal      losartan (COZAAR) 25 mg tablet Take 1 tablet (25 mg total) by mouth daily, Starting Thu 12/3/2020, Normal      metoprolol succinate (Toprol XL) 50 mg 24 hr tablet Take 1 tablet (50 mg total) by mouth daily, Starting Tue 11/10/2020, Until Mon 2/8/2021, Normal      pramipexole (MIRAPEX) 1 5 MG tablet Take 1 tablet (1 5 mg total) by mouth daily at bedtime, Starting Mon 12/7/2020, Until Sat 6/5/2021, Normal      calcium carbonate (TUMS) 500 mg chewable tablet Chew 2 tablets (1,000 mg total) daily, Starting Tue 11/3/2020, Normal      chlorthalidone (HYGROTEN) 50 MG tablet Take 25 mg by mouth daily, Historical Med           No discharge procedures on file      PDMP Review       Value Time User    PDMP Reviewed  Yes 9/9/2020  5:50 PM Pam Dhillon MD          ED Provider  Electronically Signed by           Pam Dhillon MD  01/17/21 0135

## 2021-01-17 ENCOUNTER — HOSPITAL ENCOUNTER (EMERGENCY)
Facility: HOSPITAL | Age: 61
Discharge: HOME | End: 2021-01-17
Attending: EMERGENCY MEDICINE
Payer: MEDICARE

## 2021-01-17 ENCOUNTER — APPOINTMENT (EMERGENCY)
Dept: RADIOLOGY | Facility: HOSPITAL | Age: 61
End: 2021-01-17
Payer: MEDICARE

## 2021-01-17 VITALS
HEIGHT: 63 IN | OXYGEN SATURATION: 99 % | TEMPERATURE: 98.1 F | WEIGHT: 240 LBS | SYSTOLIC BLOOD PRESSURE: 127 MMHG | BODY MASS INDEX: 42.52 KG/M2 | HEART RATE: 86 BPM | DIASTOLIC BLOOD PRESSURE: 57 MMHG | RESPIRATION RATE: 18 BRPM

## 2021-01-17 DIAGNOSIS — M79.604 RIGHT LEG PAIN: Primary | ICD-10-CM

## 2021-01-17 PROCEDURE — 99285 EMERGENCY DEPT VISIT HI MDM: CPT | Performed by: EMERGENCY MEDICINE

## 2021-01-17 PROCEDURE — 73502 X-RAY EXAM HIP UNI 2-3 VIEWS: CPT

## 2021-01-17 PROCEDURE — 99284 EMERGENCY DEPT VISIT MOD MDM: CPT

## 2021-01-17 PROCEDURE — 96372 THER/PROPH/DIAG INJ SC/IM: CPT

## 2021-01-17 RX ORDER — MORPHINE SULFATE 4 MG/ML
4 INJECTION, SOLUTION INTRAMUSCULAR; INTRAVENOUS ONCE
Status: COMPLETED | OUTPATIENT
Start: 2021-01-17 | End: 2021-01-17

## 2021-01-17 RX ORDER — ACETAMINOPHEN 325 MG/1
975 TABLET ORAL ONCE
Status: COMPLETED | OUTPATIENT
Start: 2021-01-17 | End: 2021-01-17

## 2021-01-17 RX ORDER — ACETAMINOPHEN 500 MG
500 TABLET ORAL EVERY 6 HOURS PRN
Qty: 20 TABLET | Refills: 0 | Status: SHIPPED | OUTPATIENT
Start: 2021-01-17 | End: 2021-01-19

## 2021-01-17 RX ADMIN — ACETAMINOPHEN 975 MG: 325 TABLET, FILM COATED ORAL at 09:15

## 2021-01-17 RX ADMIN — MORPHINE SULFATE 4 MG: 4 INJECTION INTRAVENOUS at 09:18

## 2021-01-17 RX ADMIN — DICLOFENAC SODIUM 2 G: 10 GEL TOPICAL at 09:15

## 2021-01-17 NOTE — ED PROVIDER NOTES
History  Chief Complaint   Patient presents with    Leg Pain     Patient arrives from home by EMS for persisting RIGHT leg pain s/p fall yesterday  Patient was here yesterday for the same  motrin @ 0530 per patient     HPI     56 y o  F known well to the ER, seen several times in the past week for various body pains, presenting to the ed for eval of right leg pain  She was seen yesterday for a fall  Had negative imaging of CT C spine head, negative ct chest a/p, and negative xray of her right knee  Continues to have pain  Per EMS patient was standing on her porch waiting for them, and walked stepped up into the ambulance without diffiuculty  10/10 pain in her right leg, radiates up from knee per patient, worse with ambulation  Denies any seizures  Patient tried motrin last night at 9 pm with some relief  No other complaints on ROS  Prior to Admission Medications   Prescriptions Last Dose Informant Patient Reported?  Taking?   carBAMazepine (TEGretol) 200 mg tablet   No No   Sig: Take 1 tablet (200 mg total) by mouth 3 (three) times a day   chlorthalidone (HYGROTEN) 50 MG tablet   Yes No   Sig: Take 25 mg by mouth daily   cholecalciferol (VITAMIN D3) 1,000 units tablet   No No   Sig: Take 2 tablets (2,000 Units total) by mouth daily   ergocalciferol (VITAMIN D2) 50,000 units   No No   Sig: Take 1 capsule (50,000 Units total) by mouth once a week for 12 doses   levETIRAcetam (KEPPRA) 750 mg tablet   No No   Sig: Take 2 tablets (1,500 mg total) by mouth every 12 (twelve) hours   Patient taking differently: Take 750 mg by mouth every 12 (twelve) hours    losartan (COZAAR) 25 mg tablet   No No   Sig: Take 1 tablet (25 mg total) by mouth daily   metoprolol succinate (Toprol XL) 50 mg 24 hr tablet   No No   Sig: Take 1 tablet (50 mg total) by mouth daily   pramipexole (MIRAPEX) 1 5 MG tablet   No No   Sig: Take 1 tablet (1 5 mg total) by mouth daily at bedtime      Facility-Administered Medications: None       Past Medical History:   Diagnosis Date    Depression     [facility] [address] [facility]     Epilepsy [facility] [address] [facility]     Obesity     Restless leg        Past Surgical History:   Procedure Laterality Date    DENTAL SURGERY      all teeth removed    LASER ABLATION OF THE CERVIX      MAMMO (HISTORICAL)  05/01/2017       Family History   Problem Relation Age of Onset    Kidney disease Mother     Liver disease Mother     Heart attack Mother     Heart attack Father     No Known Problems Brother     No Known Problems Son      I have reviewed and agree with the history as documented  E-Cigarette/Vaping    E-Cigarette Use Never User      E-Cigarette/Vaping Substances    Nicotine No     THC No     CBD No     Flavoring No     Other No     Unknown No      Social History     Tobacco Use    Smoking status: Current Every Day Smoker     Packs/day: 0 50     Years: 23 00     Pack years: 11 50     Types: Cigarettes    Smokeless tobacco: Never Used   Substance Use Topics    Alcohol use: Never     Frequency: Never     Drinks per session: Patient refused     Binge frequency: Never     Comment: pt denies alcohol use    Drug use: Never       Review of Systems   Constitutional: Negative for chills, fatigue and fever  HENT: Negative for sore throat  Eyes: Negative for redness and visual disturbance  Respiratory: Negative for cough and shortness of breath  Cardiovascular: Negative for chest pain  Gastrointestinal: Negative for abdominal pain, diarrhea and nausea  Genitourinary: Negative for difficulty urinating, dysuria and pelvic pain  Musculoskeletal: Positive for arthralgias  Negative for back pain  Right hip and knee pain   Skin: Negative for rash  Neurological: Negative for syncope, weakness and headaches  All other systems reviewed and are negative  Physical Exam  Physical Exam  Vitals signs and nursing note reviewed  Constitutional:       General: She is not in acute distress       Appearance: She is obese    HENT:      Head: Normocephalic  Comments: Abrasion noted over nasal bridge noted from yesterday     Right Ear: External ear normal       Left Ear: External ear normal    Eyes:      Extraocular Movements: Extraocular movements intact  Conjunctiva/sclera: Conjunctivae normal    Neck:      Musculoskeletal: Normal range of motion  Cardiovascular:      Rate and Rhythm: Normal rate and regular rhythm  Heart sounds: Normal heart sounds  Pulmonary:      Effort: Pulmonary effort is normal  No respiratory distress  Breath sounds: Normal breath sounds  Abdominal:      General: Bowel sounds are normal       Palpations: Abdomen is soft  Tenderness: There is no abdominal tenderness  Musculoskeletal: Normal range of motion  Comments: On examination:  Patient has full ROM  Ecchymosis developed over R hip and ecchymosis from yesterday of right knee still present  No laxity of the joint  Distally neurovascularly in tact    Generalized Tenderness on palpation of right hip and right knee     Skin:     General: Skin is warm and dry  Findings: No rash  Neurological:      Mental Status: She is alert and oriented to person, place, and time  Cranial Nerves: No cranial nerve deficit  Sensory: No sensory deficit  Motor: No abnormal muscle tone        Coordination: Coordination normal          Vital Signs  ED Triage Vitals   Temperature Pulse Respirations Blood Pressure SpO2   01/17/21 0854 01/17/21 0854 01/17/21 0854 01/17/21 0854 01/17/21 0854   98 1 °F (36 7 °C) 86 18 127/57 99 %      Temp Source Heart Rate Source Patient Position - Orthostatic VS BP Location FiO2 (%)   01/17/21 0854 01/17/21 0854 01/17/21 0854 01/17/21 0854 --   Oral Monitor Lying Right arm       Pain Score       01/17/21 0915       Worst Possible Pain           Vitals:    01/17/21 0854   BP: 127/57   Pulse: 86   Patient Position - Orthostatic VS: Lying         Visual Acuity      ED Medications  Medications   Diclofenac Sodium (VOLTAREN) 1 % topical gel 2 g (2 g Topical Given 1/17/21 0915)   morphine (PF) 4 mg/mL injection 4 mg (4 mg Intramuscular Given 1/17/21 0918)   acetaminophen (TYLENOL) tablet 975 mg (975 mg Oral Given 1/17/21 0915)       Diagnostic Studies  Results Reviewed     None                 XR hip/pelv 2-3 vws right   ED Interpretation by Wade Huston MD (01/17 2612)   No acute fx or dislocation                 Procedures  Procedures         ED Course  ED Course as of Jan 17 1000   Sun Jan 17, 2021   0908 Patient offered knee immobilizer and crutches but declined due to the steps in her house  MDM      56 y o  F presenting for re eval of right leg pain  MSK exam within normal limits  Xray of hip showed no acute pathology  Patient declined crutches and immobilizer  Given ace wrap  Limited analgesia due to ckd  Patient given voltaren gel  Follow up with ortho  The patient was instructed to follow up as documented  Strict return precautions were discussed with the patient and the patient was instructed to return to the emergency department immediately if symptoms worsen  The patient/patient family member acknowledged and were in agreement with plan  Disposition  Final diagnoses:   Right leg pain     Time reflects when diagnosis was documented in both MDM as applicable and the Disposition within this note     Time User Action Codes Description Comment    1/17/2021  9:40 AM Yin Brain Add [S85 532] Right leg pain       ED Disposition     ED Disposition Condition Date/Time Comment    Discharge Stable Sun Jan 17, 2021  9:40 AM Farhat Douglas discharge to home/self care              Follow-up Information     Follow up With Specialties Details Why Contact Info Additional [address] [facility] [place] [facility] Schedule an appointment as soon as possible for a visit in 1 week For follow up regarding your symptoms and recheck [address] [facility] [address] [facility] [address] [facility] [address] [place], [place], [facility]          Discharge Medication List as of 1/17/2021  9:41 AM      START taking these medications    Details   acetaminophen (TYLENOL) 500 mg tablet Take 1 tablet (500 mg total) by mouth every 6 (six) hours as needed for mild pain for up to 7 days, Starting Sun 1/17/2021, Until Sun 1/24/2021, Normal         CONTINUE these medications which have NOT CHANGED    Details   carBAMazepine (TEGretol) 200 mg tablet Take 1 tablet (200 mg total) by mouth 3 (three) times a day, Starting Tue 11/10/2020, Normal      chlorthalidone (HYGROTEN) 50 MG tablet Take 25 mg by mouth daily, Historical Med      cholecalciferol (VITAMIN D3) 1,000 units tablet Take 2 tablets (2,000 Units total) by mouth daily, Starting Tue 11/10/2020, Until Mon 2/8/2021, Normal      ergocalciferol (VITAMIN D2) 50,000 units Take 1 capsule (50,000 Units total) by mouth once a week for 12 doses, Starting Thu 12/3/2020, Until Fri 2/19/2021, Normal      levETIRAcetam (KEPPRA) 750 mg tablet Take 2 tablets (1,500 mg total) by mouth every 12 (twelve) hours, Starting Tue 11/10/2020, Until Mon 2/8/2021, Normal      losartan (COZAAR) 25 mg tablet Take 1 tablet (25 mg total) by mouth daily, Starting Thu 12/3/2020, Normal      metoprolol succinate (Toprol XL) 50 mg 24 hr tablet Take 1 tablet (50 mg total) by mouth daily, Starting Tue 11/10/2020, Until Mon 2/8/2021, Normal      pramipexole (MIRAPEX) 1 5 MG tablet Take 1 tablet (1 5 mg total) by mouth daily at bedtime, Starting Mon 12/7/2020, Until Sat 6/5/2021, Normal           No discharge procedures on file      PDMP Review       Value Time User    PDMP Reviewed  Yes 9/9/2020  5:50 PM Kallie Betts MD          ED Provider  Electronically Signed by           Maci Saeed MD  01/17/21 2692

## 2021-01-17 NOTE — ED NOTES
Patient transported to [address] [place] [address] [address]  Nitesh Ruiz, FABIAN  01/17/21 0671

## 2021-01-19 ENCOUNTER — OFFICE VISIT (OUTPATIENT)
Dept: FAMILY MEDICINE CLINIC | Facility: CLINIC | Age: 61
End: 2021-01-19
Payer: MEDICARE

## 2021-01-19 VITALS
TEMPERATURE: 96.8 F | HEART RATE: 91 BPM | HEIGHT: 63 IN | DIASTOLIC BLOOD PRESSURE: 64 MMHG | OXYGEN SATURATION: 98 % | SYSTOLIC BLOOD PRESSURE: 132 MMHG | WEIGHT: 256.8 LBS | BODY MASS INDEX: 45.5 KG/M2 | RESPIRATION RATE: 19 BRPM

## 2021-01-19 DIAGNOSIS — F33.9 DEPRESSION, RECURRENT (HCC): ICD-10-CM

## 2021-01-19 DIAGNOSIS — R56.9 CONVULSIONS, UNSPECIFIED CONVULSION TYPE (HCC): ICD-10-CM

## 2021-01-19 DIAGNOSIS — E46 PROTEIN-CALORIE MALNUTRITION, UNSPECIFIED SEVERITY (HCC): ICD-10-CM

## 2021-01-19 DIAGNOSIS — G89.4 CHRONIC PAIN SYNDROME: Primary | ICD-10-CM

## 2021-01-19 DIAGNOSIS — E66.01 MORBIDLY OBESE (HCC): ICD-10-CM

## 2021-01-19 DIAGNOSIS — N18.4 CHRONIC KIDNEY DISEASE, STAGE 4 (SEVERE) (HCC): ICD-10-CM

## 2021-01-19 PROCEDURE — 99214 OFFICE O/P EST MOD 30 MIN: CPT | Performed by: NURSE PRACTITIONER

## 2021-01-19 RX ORDER — DULOXETIN HYDROCHLORIDE 30 MG/1
30 CAPSULE, DELAYED RELEASE ORAL DAILY
Qty: 30 CAPSULE | Refills: 1 | Status: SHIPPED | OUTPATIENT
Start: 2021-01-19 | End: 2021-01-19

## 2021-01-19 RX ORDER — SENNOSIDES 8.6 MG
650 CAPSULE ORAL EVERY 8 HOURS PRN
Qty: 100 TABLET | Refills: 1 | Status: SHIPPED | OUTPATIENT
Start: 2021-01-19 | End: 2021-02-18
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
## 2021-01-20 ENCOUNTER — TELEPHONE (OUTPATIENT)
Dept: NEPHROLOGY | Facility: CLINIC | Age: 61
End: 2021-01-20

## 2021-01-20 ENCOUNTER — TELEPHONE (OUTPATIENT)
Dept: FAMILY MEDICINE CLINIC | Facility: HOSPITAL | Age: 61
End: 2021-01-20

## 2021-01-20 LAB — LEVETIRACETAM SERPL-MCNC: 30.8 UG/ML (ref 10–40)

## 2021-01-20 NOTE — TELEPHONE ENCOUNTER
Called and spoke with lynette tinajero have name and address and phone number for [facility]

## 2021-01-20 NOTE — TELEPHONE ENCOUNTER
Neurology ref -  Pt is confused about where you want her to go  - she said that you made an appt for her? But she has no phone# or name of Dr      There is no name in the referral in Epic  Please let her neighbor know the info since her phone is not working      Call  Her neighbor Yeimi Pompa - [phone]

## 2021-01-20 NOTE — TELEPHONE ENCOUNTER
I tried calling patient to reschedule her missed appointment on 1/6/21 but I was unable to leave a message due to patient not accepting calls at this time

## 2021-01-20 NOTE — TELEPHONE ENCOUNTER
[facility] neurology at [place]   I have her a sheet with address  and phone number I attached it to the referral

## 2021-01-22 NOTE — PATIENT INSTRUCTIONS
Recurrent Seizures in Adults   WHAT YOU NEED TO KNOW:   A seizure is an episode of abnormal brain activity  A seizure can cause jerky muscle movements, loss of consciousness, or confusion  Recurrent means you have a seizure more than once  The cause of your seizures may not be known  Recurrent seizures may occur if you do not take antiseizure medicine as directed  Some common triggers are alcohol, drugs, lack of sleep, fever, or a virus  High or low blood sugar levels can also trigger a seizure  DISCHARGE INSTRUCTIONS:   Call your local emergency number (911 in the [address] [facility]) or have someone else call for any of the following:   · Your seizure lasts longer than 5 minutes  · You have a second seizure within 24 hours of your first     · You have trouble breathing after a seizure  · You cannot be woken after your seizure  · You have more than 1 seizure before you are fully awake or aware  · You have diabetes or are pregnant and have a seizure  · You have a seizure in water  Call your doctor if:   · You are injured during a seizure  · You have a fever  · You are planning to get pregnant or are currently pregnant  · You have questions or concerns about your condition or care  Medicine:   · Antiepileptic  medicine may be given to control or prevent seizures  Do not  stop taking this medicine without the direction of a healthcare provider  · Take your medicine as directed  Contact your healthcare provider if you think your medicine is not helping or if you have side effects  Tell him of her if you are allergic to any medicine  Keep a list of the medicines, vitamins, and herbs you take  Include the amounts, and when and why you take them  Bring the list or the pill bottles to follow-up visits  Carry your medicine list with you in case of an emergency  Prevent another seizure:   · Take your antiseizure medicine every day at the same time  This will also help reduce side effects   Do not skip any doses  Do not stop taking this medicine unless directed by a healthcare provider  · Manage stress  Stress can trigger a seizure  Exercise can help you reduce stress  Talk to your healthcare provider about exercise that is safe for you  Other ways to manage stress include yoga, meditation, and biofeedback  Illness can be a form of stress  Eat a variety of healthy foods and drink plenty of liquids during an illness  · Set a regular sleep schedule  A lack of sleep can trigger a seizure  Try to go to sleep and wake up at the same times every day  Keep your bedroom quiet and dark  Talk to your healthcare provider if you are having trouble sleeping  · Manage other medical conditions  Manage other health conditions that may increase your risk for a seizure  Keep your blood sugar levels and blood pressure under control  · Limit or do not drink alcohol as directed  Alcohol can trigger a seizure, especially if you drink a large amount at one time  A drink of alcohol is 12 ounces of beer, 1½ ounces of liquor, or 5 ounces of wine  Talk to your healthcare provider about a safe amount of alcohol for you  Your provider may recommend that you do not drink any alcohol  Tell him or her if you need help to quit drinking  Manage recurrent seizures:   · Ask what safety precautions you should take  Talk with your healthcare provider about driving  You may not be able to drive until you are seizure-free for a period of time  You will need to check the law where you live  Also talk to your healthcare provider about swimming and bathing  You may drown or develop life-threatening heart or lung damage if you have a seizure in water  · Tell your friends, family members, and coworkers that you had a seizure  Give them the following instructions to use if you have another seizure:     ? Do not panic  ? Gently guide me to the floor or a soft surface  ? Do not hold me down or put anything in my mouth      ? Place me on my side to help prevent me from swallowing saliva or vomit  ? Protect me from injury  Remove sharp or hard objects from the area surrounding me, or cushion my head  ? Loosen the clothing around my head and neck  ? Time how long my seizure lasts  Call 911 if my seizure lasts longer than 5 minutes or if I have a second seizure  ? Stay with me until my seizure ends  Let me rest until I am fully awake  ? Perform CPR if I stop breathing or you cannot feel my pulse  ? Do not give me anything to eat or drink until I am fully awake  Follow up with your doctor or neurologist as directed: You may need more tests to find the cause of your seizure  You may also need tests to check the level of antiseizure medicine in your blood  Your neurologist may need to change or adjust your medicine  Write down your questions so you remember to ask them during your visits  © Copyright [address] Information is for End User's use only and may not be sold, redistributed or otherwise used for commercial purposes  All illustrations and images included in CareNotes® are the copyrighted property of A D A M , Inc  or Formerly named [facility] [employer]   The above information is an  only  It is not intended as medical advice for individual conditions or treatments  Talk to your doctor, nurse or pharmacist before following any medical regimen to see if it is safe and effective for you

## 2021-01-28 ENCOUNTER — CONSULT (OUTPATIENT)
Dept: VASCULAR SURGERY | Facility: CLINIC | Age: 61
End: 2021-01-28
Payer: MEDICARE

## 2021-01-28 VITALS
RESPIRATION RATE: 17 BRPM | SYSTOLIC BLOOD PRESSURE: 142 MMHG | HEART RATE: 82 BPM | BODY MASS INDEX: 47.13 KG/M2 | WEIGHT: 266 LBS | DIASTOLIC BLOOD PRESSURE: 82 MMHG | HEIGHT: 63 IN | TEMPERATURE: 98.2 F

## 2021-01-28 DIAGNOSIS — N18.31 ANEMIA DUE TO STAGE 3A CHRONIC KIDNEY DISEASE (HCC): ICD-10-CM

## 2021-01-28 DIAGNOSIS — E87.6 HYPOKALEMIA: ICD-10-CM

## 2021-01-28 DIAGNOSIS — N17.9 ACUTE RENAL FAILURE SUPERIMPOSED ON CHRONIC KIDNEY DISEASE, UNSPECIFIED CKD STAGE, UNSPECIFIED ACUTE RENAL FAILURE TYPE (HCC): ICD-10-CM

## 2021-01-28 DIAGNOSIS — N17.0 ACUTE RENAL FAILURE WITH ACUTE TUBULAR NECROSIS SUPERIMPOSED ON STAGE 3A CHRONIC KIDNEY DISEASE (HCC): ICD-10-CM

## 2021-01-28 DIAGNOSIS — N18.31 STAGE 3A CHRONIC KIDNEY DISEASE (HCC): ICD-10-CM

## 2021-01-28 DIAGNOSIS — N18.9 ACUTE RENAL FAILURE SUPERIMPOSED ON CHRONIC KIDNEY DISEASE, UNSPECIFIED CKD STAGE, UNSPECIFIED ACUTE RENAL FAILURE TYPE (HCC): ICD-10-CM

## 2021-01-28 DIAGNOSIS — E55.9 VITAMIN D DEFICIENCY: ICD-10-CM

## 2021-01-28 DIAGNOSIS — D63.1 ANEMIA DUE TO STAGE 3A CHRONIC KIDNEY DISEASE (HCC): ICD-10-CM

## 2021-01-28 DIAGNOSIS — I12.9 PARENCHYMAL RENAL HYPERTENSION, STAGE 1 THROUGH STAGE 4 OR UNSPECIFIED CHRONIC KIDNEY DISEASE: ICD-10-CM

## 2021-01-28 DIAGNOSIS — I10 ESSENTIAL HYPERTENSION: ICD-10-CM

## 2021-01-28 DIAGNOSIS — N18.31 ACUTE RENAL FAILURE WITH ACUTE TUBULAR NECROSIS SUPERIMPOSED ON STAGE 3A CHRONIC KIDNEY DISEASE (HCC): ICD-10-CM

## 2021-01-28 DIAGNOSIS — I89.0 LYMPHEDEMA: Primary | ICD-10-CM

## 2021-01-28 DIAGNOSIS — R60.9 WATER RETENTION: ICD-10-CM

## 2021-01-28 PROCEDURE — 99203 OFFICE O/P NEW LOW 30 MIN: CPT | Performed by: NURSE PRACTITIONER

## 2021-01-28 NOTE — ASSESSMENT & PLAN NOTE
58-year-old female with morbid obesity, chronic kidney disease, epilepsy, bilateral lower extremity lymphedema, L>R referred by nephrology for evaluation of lymphedema   Stage II/II BLE lymphedema   -LEV negative for acute or chronic DVT, triphasic  Arterial waveforms  -CT scan without any significant abdominal obstructing mass   -echo scheduled tomorrow   -Recommended prescription grade compression socks, weight loss, periodic leg elevation and regular exercise  -Moisturizers to maintain skin integrity   -Pneumatic compression pumps ordered to be used BID x1 hour each  -Follow up in the office in 1 month
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
## 2021-01-28 NOTE — PATIENT INSTRUCTIONS
Lymphedema   AMBULATORY CARE:   The lymphatic system  contains fluid, vessels, tissue, and organs  This system removes and carries fluid throughout the body  It also helps the body fight infection  Lymphedema is the buildup of lymph fluid in fat tissue under your skin  The buildup causes the area to swell  Lymphedema can happen any time that lymphatic vessels are blocked or damaged  Damage to lymphatic vessels may be caused by surgery, infection, injury, cancer, radiation, or scar tissue     Common signs and symptoms include the following:  Signs and symptoms may happen where lymph nodes were removed, or in the arm, leg, chest, or underarm  · Swelling or itching    · Pain, burning, or aching    · Tight, hard, or red skin    · Hair loss    · Heaviness or fullness    · Numbness or tingling    · Stiffness    Contact your healthcare provider or lymphedema specialist if:   · You have a fever or chills  · You have an open area of skin that looks red or swollen, or drains pus  · Your symptoms, such as swelling or pain, get worse  · Your arms or legs feel heavy, or you cannot move them  · Your skin becomes hard, thick, or rough  · You have a skin wound that will not heal      · Your shoes, clothes, or jewelry feel tighter  · You have questions or concerns about your condition or care  Treatment for lymphedema  can relieve symptoms and prevent lymphedema from getting worse  You may need therapeutic massage, physical therapy, or compression devices to help decrease swelling and pain  Surgery may be needed if other treatments do not work  Self-care:   · Elevate  your arm or leg above the level of your heart as often as you can  This will help decrease swelling and pain  Prop your arm or leg on pillows or blankets to keep it elevated comfortably  · Wear compression socks, sleeves, or bandages  as directed  Compression devices must be fitted by a healthcare provider   Compression devices may need to be replaced every 3 to 6 months  · Exercise  can help you maintain or regain function of your arm or leg  Ask your healthcare provider what type of exercise to do and how often to do it  Start slow, take breaks, and gradually do more each day  Do not do vigorous, repeated exercises  Watch for changes in your arm or leg during exercise  Stop and rest if you have swelling, increased pain, or heaviness  Elevate your arm or leg above the level of your heart  · Change your position often  to help move lymphatic fluid through your body  Do not sit or  one position for more than 30 minutes  Do not cross your legs when you sit  These actions can cause lymphatic fluid to buildup  · Maintain a healthy weight  Ask your healthcare provider what you should weigh  Weight loss may improve your symptoms  If you need to lose weight, your healthcare provider can help you create a weight loss program     Prevent infection with proper skin care:  A skin infection can make lymphedema worse  Do the following to decrease your risk for a skin infection in your arm or leg with lymphedema:  · Wash your skin gently and dry it well  Use a mild soap to wash your skin  Gently pat your skin dry after you bathe  Apply a mild cream or lotion to moisturize your skin and prevent dryness or cracking  Keep your feet clean and dry  · Protect your skin from injury  Wear gloves when you garden and wash dishes  Cut your nails straight across to prevent injury to your fingers and toes  Use sunscreen and insect repellant to avoid burns and punctures  Wear slippers in the house  Wear shoes when you go outside  · Check your skin every day for signs of infection  Signs of infection include redness, swelling, increased heat, or pus  You may also have a fever or chills  · Care for cuts, scratches or burns  Apply antibiotic ointment to cuts and other small breaks in the skin   Apply a cold pack or cold water to a burn for 15 minutes  Then wash it with soap and water  Cover scratches, cuts, or burns with a clean, dry gauze or bandage as directed  Keep the area clean and dry  · Tell healthcare providers that you have lymphedema  Tell them not to do, IVs, blood draws, and blood pressure readings in the arm or leg with lymphedema  If there is lymphedema in both arms, ask them to take your blood pressure on your leg  Do not allow flu shots or vaccinations in your arm with lymphedema  Follow up with your healthcare provider or lymphedema specialist as directed: You will need regular visits so healthcare providers can examine the affected areas  You may also be referred to a clinic that specializes in lymphedema treatment  Write down your questions so you remember to ask them during your visits  © Copyright [address] Information is for End User's use only and may not be sold, redistributed or otherwise used for commercial purposes  All illustrations and images included in CareNotes® are the copyrighted property of A D A M , Inc  or [facility] [employer]   The above information is an  only  It is not intended as medical advice for individual conditions or treatments  Talk to your doctor, nurse or pharmacist before following any medical regimen to see if it is safe and effective for you

## 2021-01-29 ENCOUNTER — TELEPHONE (OUTPATIENT)
Dept: VASCULAR SURGERY | Facility: CLINIC | Age: 61
End: 2021-01-29

## 2021-01-29 NOTE — TELEPHONE ENCOUNTER
DRU aDmon Ala (NPI: [id number]) ordered lymphedema pumps for patient  I have faxed the lymphedema pump order, patient's demographics, current medication list, office note, and recent imaging studies to [facility] - Phone: [phone] / Fax: [phone]

## 2021-02-03 DIAGNOSIS — I10 ESSENTIAL HYPERTENSION: ICD-10-CM

## 2021-02-03 RX ORDER — METOPROLOL SUCCINATE 50 MG/1
TABLET, EXTENDED RELEASE ORAL
Qty: 90 TABLET | Refills: 0 | Status: SHIPPED | OUTPATIENT
Start: 2021-02-03 | End: 2021-05-01

## 2021-02-17 DIAGNOSIS — R56.9 SEIZURES (HCC): Primary | ICD-10-CM

## 2021-02-19 ENCOUNTER — APPOINTMENT (EMERGENCY)
Dept: RADIOLOGY | Facility: HOSPITAL | Age: 61
End: 2021-02-19
Payer: MEDICARE

## 2021-02-19 ENCOUNTER — HOSPITAL ENCOUNTER (EMERGENCY)
Facility: HOSPITAL | Age: 61
Discharge: HOME | End: 2021-02-19
Attending: EMERGENCY MEDICINE | Admitting: EMERGENCY MEDICINE
Payer: MEDICARE

## 2021-02-19 ENCOUNTER — TELEPHONE (OUTPATIENT)
Dept: FAMILY MEDICINE CLINIC | Facility: CLINIC | Age: 61
End: 2021-02-19

## 2021-02-19 VITALS
WEIGHT: 273 LBS | DIASTOLIC BLOOD PRESSURE: 88 MMHG | SYSTOLIC BLOOD PRESSURE: 161 MMHG | HEART RATE: 85 BPM | OXYGEN SATURATION: 98 % | TEMPERATURE: 98.4 F | RESPIRATION RATE: 16 BRPM | BODY MASS INDEX: 48.36 KG/M2

## 2021-02-19 DIAGNOSIS — G40.919 EPILEPSY WITH ALTERED CONSCIOUSNESS WITH INTRACTABLE EPILEPSY (HCC): ICD-10-CM

## 2021-02-19 DIAGNOSIS — S43.401A SPRAIN OF RIGHT SHOULDER, UNSPECIFIED SHOULDER SPRAIN TYPE, INITIAL ENCOUNTER: Primary | ICD-10-CM

## 2021-02-19 PROCEDURE — 99284 EMERGENCY DEPT VISIT MOD MDM: CPT | Performed by: EMERGENCY MEDICINE

## 2021-02-19 PROCEDURE — 72040 X-RAY EXAM NECK SPINE 2-3 VW: CPT

## 2021-02-19 PROCEDURE — 99283 EMERGENCY DEPT VISIT LOW MDM: CPT

## 2021-02-19 PROCEDURE — 73030 X-RAY EXAM OF SHOULDER: CPT

## 2021-02-19 RX ORDER — LEVETIRACETAM 750 MG/1
1500 TABLET ORAL EVERY 12 HOURS SCHEDULED
Qty: 360 TABLET | Refills: 1 | Status: SHIPPED | OUTPATIENT
Start: 2021-02-19 | End: 2021-02-22 | Stop reason: SDUPTHER

## 2021-02-19 RX ORDER — CELECOXIB 100 MG/1
100 CAPSULE ORAL 2 TIMES DAILY
Qty: 30 CAPSULE | Refills: 0 | Status: SHIPPED | OUTPATIENT
Start: 2021-02-19 | End: 2021-07-23 | Stop reason: HOSPADM

## 2021-02-19 RX ORDER — LIDOCAINE 50 MG/G
1 PATCH TOPICAL DAILY
Qty: 30 PATCH | Refills: 0 | Status: SHIPPED | OUTPATIENT
Start: 2021-02-19

## 2021-02-19 RX ORDER — LEVETIRACETAM 1000 MG/1
TABLET ORAL
Qty: 60 TABLET | Refills: 1 | OUTPATIENT
Start: 2021-02-19

## 2021-02-19 NOTE — TELEPHONE ENCOUNTER
I see she missed her Neurology follow up   Please send a letter to make sure she sets an appt   We have set multiple appts and she now showed them now noone would give her an appt     And her phone is always not receiving calls    She needs to reach out to her insurance and a  with them or her own  and have them set her up with an appt with Neurology

## 2021-02-19 NOTE — ED PROVIDER NOTES
History  Chief Complaint   Patient presents with    Shoulder Injury     Rosibel Hackett onto right shoulder 1 week ago slipped on ice, taking aspirin and tylenol for pain  This is a 49-year-old female presenting for evaluation of right neck and shoulder pain that started shortly after fall on the ice about 1 week ago  States there was no loss of consciousness, no head strike, but she landed directly on her right shoulder  Later that night she became sore to her right shoulder and since then she has had gradual onset of pain with reduced range of motion to her right shoulder with pain and tenderness to the right side of her neck and right upper back  She denies any weakness, numbness or tingling  Denies taking any blood thinners  History provided by:  Patient   used: No    Shoulder Injury  Location:  Shoulder  Shoulder location:  R shoulder  Injury: yes    Time since incident:  1 week  Mechanism of injury: fall    Fall:     Fall occurred:  Standing    Impact surface:  [employer] of impact: R shoulder  Entrapped after fall: no    Pain details:     Quality:  Aching    Severity:  Moderate    Onset quality:  Gradual    Duration:  1 week  Associated symptoms: neck pain        Prior to Admission Medications   Prescriptions Last Dose Informant Patient Reported?  Taking?   acetaminophen (TYLENOL) 650 mg CR tablet  Self No No   Sig: Take 1 tablet (650 mg total) by mouth every 8 (eight) hours as needed for mild pain   carBAMazepine (TEGretol) 200 mg tablet  Self No No   Sig: Take 1 tablet (200 mg total) by mouth 3 (three) times a day   chlorthalidone (HYGROTEN) 50 MG tablet  Self Yes No   Sig: Take 25 mg by mouth daily   cholecalciferol (VITAMIN D3) 1,000 units tablet  Self No No   Sig: Take 2 tablets (2,000 Units total) by mouth daily   ergocalciferol (VITAMIN D2) 50,000 units  Self No No   Sig: Take 1 capsule (50,000 Units total) by mouth once a week for 12 doses   levETIRAcetam (KEPPRA) 750 mg tablet   No No   Sig: Take 2 tablets (1,500 mg total) by mouth every 12 (twelve) hours   losartan (COZAAR) 25 mg tablet  Self No No   Sig: Take 1 tablet (25 mg total) by mouth daily   metoprolol succinate (TOPROL-XL) 50 mg 24 hr tablet   No No   Sig: take 1 tablet by mouth daily   pramipexole (MIRAPEX) 1 5 MG tablet  Self No No   Sig: Take 1 tablet (1 5 mg total) by mouth daily at bedtime      Facility-Administered Medications: None       Past Medical History:   Diagnosis Date    Depression     [facility] [address] [facility]     [facility]ilepsy [facility] [address] [facility]     Obesity     Restless leg        Past Surgical History:   Procedure Laterality Date    DENTAL SURGERY      all teeth removed    LASER ABLATION OF THE CERVIX      MAMMO (HISTORICAL)  05/01/2017       Family History   Problem Relation Age of Onset    Kidney disease Mother     Liver disease Mother     Heart attack Mother     Heart attack Father     No Known Problems Brother     No Known Problems Son      I have reviewed and agree with the history as documented  E-Cigarette/Vaping    E-Cigarette Use Never User      E-Cigarette/Vaping Substances    Nicotine No     THC No     CBD No     Flavoring No     Other No     Unknown No      Social History     Tobacco Use    Smoking status: Current Every Day Smoker     Packs/day: 0 50     Years: 23 00     Pack years: 11 50     Types: Cigarettes    Smokeless tobacco: Never Used   Substance Use Topics    Alcohol use: Never     Frequency: Never     Drinks per session: Patient refused     Binge frequency: Never     Comment: pt denies alcohol use    Drug use: Never       Review of Systems   Musculoskeletal: Positive for neck pain  All other systems reviewed and are negative  Physical Exam  Physical Exam  Vitals signs and nursing note reviewed  Constitutional:       General: She is not in acute distress  Appearance: Normal appearance  She is well-developed and normal weight     HENT:      Head: Normocephalic and atraumatic  Nose: Nose normal       Mouth/Throat:      Mouth: Mucous membranes are moist       Pharynx: Oropharynx is clear  Eyes:      Conjunctiva/sclera: Conjunctivae normal    Neck:      Musculoskeletal: Normal range of motion and neck supple  Muscular tenderness present  Cardiovascular:      Rate and Rhythm: Normal rate and regular rhythm  Pulses: Normal pulses  Heart sounds: Normal heart sounds  No murmur  Pulmonary:      Effort: Pulmonary effort is normal  No respiratory distress  Breath sounds: Normal breath sounds  Abdominal:      General: Abdomen is flat  Palpations: Abdomen is soft  Tenderness: There is no abdominal tenderness  Musculoskeletal:         General: Swelling and tenderness present  No deformity  Comments: R shoulder - no deformity  + tenderness diffusely, mild swelling  No bony tenderness  No clavicular tenderness  Reduce ROM in shoulder abduction, flexion, extension  Unable to lift arm above 90 degrees actively  + pain w/ passive shoulder abduction above 90 degrees  + spasm and tenderness to R paraspinal neck and trapezius musculature  Skin:     General: Skin is warm and dry  Capillary Refill: Capillary refill takes less than 2 seconds  Neurological:      General: No focal deficit present  Mental Status: She is alert  Mental status is at baseline     Psychiatric:         Mood and Affect: Mood normal          Behavior: Behavior normal          Vital Signs  ED Triage Vitals [02/19/21 1500]   Temperature Pulse Respirations Blood Pressure SpO2   98 4 °F (36 9 °C) 85 16 161/88 98 %      Temp Source Heart Rate Source Patient Position - Orthostatic VS BP Location FiO2 (%)   Oral Monitor Sitting Left arm --      Pain Score       --           Vitals:    02/19/21 1500   BP: 161/88   Pulse: 85   Patient Position - Orthostatic VS: Sitting         Visual Acuity      ED Medications  Medications - No data to display    Diagnostic Studies  Results Reviewed     None                 XR shoulder 2+ views RIGHT   Final Result by Mathew Gaviria MD (02/19 6350)      No acute osseous abnormality  Workstation performed: [id number]         XR cervical spine 2 or 3 views   Final Result by Mathew Gaviria MD (02/19 6676)         1  Moderate to advanced multilevel spondylosis without acute fracture   2  There is nonspecific straightening of the cervical lordosis without subluxation  Workstation performed: [id number]                    Procedures  Procedures         ED Course                             SBIRT 20yo+      Most Recent Value   SBIRT (22 yo +)   In order to provide better care to our patients, we are screening all of our patients for alcohol and drug use  Would it be okay to ask you these screening questions? Yes Filed at: 02/19/2021 1508   Initial Alcohol Screen: US AUDIT-C    1  How often do you have a drink containing alcohol?  0 Filed at: 02/19/2021 1508   2  How many drinks containing alcohol do you have on a typical day you are drinking? 0 Filed at: 02/19/2021 1508   3b  FEMALE Any Age, or MALE 65+: How often do you have 4 or more drinks on one occassion? 0 Filed at: 02/19/2021 1508   Audit-C Score  0 Filed at: 02/19/2021 1508   JOHN: How many times in the past year have you    Used an illegal drug or used a prescription medication for non-medical reasons? Never Filed at: 02/19/2021 1508                    MDM  Number of Diagnoses or Management Options  Sprain of right shoulder, unspecified shoulder sprain type, initial encounter: new and requires workup  Diagnosis management comments: 63yo F w/ R shoulder sprain, spasms s/p fall  Likely rotator cuff injury  F/u ortho, given sling, pain meds         Amount and/or Complexity of Data Reviewed  Tests in the radiology section of CPT®: ordered and reviewed  Independent visualization of images, tracings, or specimens: yes    Risk of Complications, Morbidity, and/or Mortality  Presenting problems: low  Diagnostic procedures: low  Management options: low    Patient Progress  Patient progress: stable      Disposition  Final diagnoses:   Sprain of right shoulder, unspecified shoulder sprain type, initial encounter     Time reflects when diagnosis was documented in both MDM as applicable and the Disposition within this note     Time User Action Codes Description Comment    2/19/2021  4:08 PM Haily Walter Add [S45 401A] Sprain of right shoulder, unspecified shoulder sprain type, initial encounter       ED Disposition     ED Disposition Condition Date/Time Comment    Discharge Stable Fri Feb 19, 2021  4:07 PM Shaye Kemp discharge to home/self care              Follow-up Information     Follow up With Specialties Details Why Contact Info Additional [address], [address] Jordi Tran Nurse Practitioner, Family Medicine In 3 days  [address] [place] [address]       [address] [facility] In 3 days  [address] [phone] [address] [facility] [place], [address] [facility] [address] [place], [place], [facility]          Discharge Medication List as of 2/19/2021  4:13 PM      START taking these medications    Details   celecoxib (CeleBREX) 100 mg capsule Take 1 capsule (100 mg total) by mouth 2 (two) times a day, Starting Fri 2/19/2021, Normal      lidocaine (LIDODERM) 5 % Apply 1 patch topically daily Remove & Discard patch within 12 hours or as directed by MD, Starting Fri 2/19/2021, Normal         CONTINUE these medications which have NOT CHANGED    Details   carBAMazepine (TEGretol) 200 mg tablet Take 1 tablet (200 mg total) by mouth 3 (three) times a day, Starting Tue 11/10/2020, Normal      chlorthalidone (HYGROTEN) 50 MG tablet Take 25 mg by mouth daily, Historical Med      cholecalciferol (VITAMIN D3) 1,000 units tablet Take 2 tablets (2,000 Units total) by mouth daily, Starting Tue 11/10/2020, Until Mon 2/8/2021, Normal      ergocalciferol (VITAMIN D2) 50,000 units Take 1 capsule (50,000 Units total) by mouth once a week for 12 doses, Starting Thu 12/3/2020, Until Fri 2/19/2021, Normal      levETIRAcetam (KEPPRA) 750 mg tablet Take 2 tablets (1,500 mg total) by mouth every 12 (twelve) hours, Starting Fri 2/19/2021, Until Thu 5/20/2021, Normal      losartan (COZAAR) 25 mg tablet Take 1 tablet (25 mg total) by mouth daily, Starting Thu 12/3/2020, Normal      metoprolol succinate (TOPROL-XL) 50 mg 24 hr tablet take 1 tablet by mouth daily, Normal      pramipexole (MIRAPEX) 1 5 MG tablet Take 1 tablet (1 5 mg total) by mouth daily at bedtime, Starting Mon 12/7/2020, Until Sat 6/5/2021, Normal         STOP taking these medications       acetaminophen (TYLENOL) 650 mg CR tablet Comments:   Reason for Stopping:             No discharge procedures on file      PDMP Review       Value Time User    PDMP Reviewed  Yes 9/9/2020  5:50 PM Luis Pena MD          ED Provider  Electronically Signed by           Michael Ferrell DO  02/19/21 5441

## 2021-02-22 DIAGNOSIS — G40.919 EPILEPSY WITH ALTERED CONSCIOUSNESS WITH INTRACTABLE EPILEPSY (HCC): ICD-10-CM

## 2021-02-22 RX ORDER — LEVETIRACETAM 750 MG/1
1500 TABLET ORAL EVERY 12 HOURS SCHEDULED
Qty: 360 TABLET | Refills: 1 | Status: SHIPPED | OUTPATIENT
Start: 2021-02-22 | End: 2021-03-17 | Stop reason: SDUPTHER

## 2021-02-24 ENCOUNTER — DOCUMENTATION (OUTPATIENT)
Dept: FAMILY MEDICINE CLINIC | Facility: CLINIC | Age: 61
End: 2021-02-24

## 2021-02-24 NOTE — PROGRESS NOTES
We have been unable to reach you  We would like you to reach out to your  and/or insurance to help you set up an appointment with Neurology  You missed your appointment and we want to make sure that you follow up  Please call us if you have any questions or concerns

## 2021-03-02 DIAGNOSIS — I95.1 ORTHOSTATIC HYPOTENSION: ICD-10-CM

## 2021-03-02 DIAGNOSIS — N18.32 CHRONIC KIDNEY DISEASE (CKD) STAGE G3B/A2, MODERATELY DECREASED GLOMERULAR FILTRATION RATE (GFR) BETWEEN 30-44 ML/MIN/1.73 SQUARE METER AND ALBUMINURIA CREATININE RATIO BETWEEN 30-299 MG/G (HCC): Primary | ICD-10-CM

## 2021-03-03 ENCOUNTER — TELEPHONE (OUTPATIENT)
Dept: VASCULAR SURGERY | Facility: CLINIC | Age: 61
End: 2021-03-03

## 2021-03-04 ENCOUNTER — OFFICE VISIT (OUTPATIENT)
Dept: VASCULAR SURGERY | Facility: CLINIC | Age: 61
End: 2021-03-04
Payer: MEDICARE

## 2021-03-04 VITALS
HEIGHT: 63 IN | SYSTOLIC BLOOD PRESSURE: 136 MMHG | TEMPERATURE: 97.6 F | HEART RATE: 68 BPM | WEIGHT: 269 LBS | DIASTOLIC BLOOD PRESSURE: 88 MMHG | BODY MASS INDEX: 47.66 KG/M2

## 2021-03-04 DIAGNOSIS — I89.0 LYMPHEDEMA: Primary | ICD-10-CM

## 2021-03-04 PROCEDURE — 99213 OFFICE O/P EST LOW 20 MIN: CPT | Performed by: NURSE PRACTITIONER

## 2021-03-04 NOTE — PATIENT INSTRUCTIONS
Lymphedema   AMBULATORY CARE:   The lymphatic system  contains fluid, vessels, tissue, and organs  This system removes and carries fluid throughout the body  It also helps the body fight infection  Lymphedema is the buildup of lymph fluid in fat tissue under your skin  The buildup causes the area to swell  Lymphedema can happen any time that lymphatic vessels are blocked or damaged  Damage to lymphatic vessels may be caused by surgery, infection, injury, cancer, radiation, or scar tissue     Common signs and symptoms include the following:  Signs and symptoms may happen where lymph nodes were removed, or in the arm, leg, chest, or underarm  · Swelling or itching    · Pain, burning, or aching    · Tight, hard, or red skin    · Hair loss    · Heaviness or fullness    · Numbness or tingling    · Stiffness    Contact your healthcare provider or lymphedema specialist if:   · You have a fever or chills  · You have an open area of skin that looks red or swollen, or drains pus  · Your symptoms, such as swelling or pain, get worse  · Your arms or legs feel heavy, or you cannot move them  · Your skin becomes hard, thick, or rough  · You have a skin wound that will not heal      · Your shoes, clothes, or jewelry feel tighter  · You have questions or concerns about your condition or care  Treatment for lymphedema  can relieve symptoms and prevent lymphedema from getting worse  You may need therapeutic massage, physical therapy, or compression devices to help decrease swelling and pain  Surgery may be needed if other treatments do not work  Self-care:   · Elevate  your arm or leg above the level of your heart as often as you can  This will help decrease swelling and pain  Prop your arm or leg on pillows or blankets to keep it elevated comfortably  · Wear compression socks, sleeves, or bandages  as directed  Compression devices must be fitted by a healthcare provider   Compression devices may need to be replaced every 3 to 6 months  · Exercise  can help you maintain or regain function of your arm or leg  Ask your healthcare provider what type of exercise to do and how often to do it  Start slow, take breaks, and gradually do more each day  Do not do vigorous, repeated exercises  Watch for changes in your arm or leg during exercise  Stop and rest if you have swelling, increased pain, or heaviness  Elevate your arm or leg above the level of your heart  · Change your position often  to help move lymphatic fluid through your body  Do not sit or  one position for more than 30 minutes  Do not cross your legs when you sit  These actions can cause lymphatic fluid to buildup  · Maintain a healthy weight  Ask your healthcare provider what you should weigh  Weight loss may improve your symptoms  If you need to lose weight, your healthcare provider can help you create a weight loss program     Prevent infection with proper skin care:  A skin infection can make lymphedema worse  Do the following to decrease your risk for a skin infection in your arm or leg with lymphedema:  · Wash your skin gently and dry it well  Use a mild soap to wash your skin  Gently pat your skin dry after you bathe  Apply a mild cream or lotion to moisturize your skin and prevent dryness or cracking  Keep your feet clean and dry  · Protect your skin from injury  Wear gloves when you garden and wash dishes  Cut your nails straight across to prevent injury to your fingers and toes  Use sunscreen and insect repellant to avoid burns and punctures  Wear slippers in the house  Wear shoes when you go outside  · Check your skin every day for signs of infection  Signs of infection include redness, swelling, increased heat, or pus  You may also have a fever or chills  · Care for cuts, scratches or burns  Apply antibiotic ointment to cuts and other small breaks in the skin   Apply a cold pack or cold water to a burn for 15 minutes  Then wash it with soap and water  Cover scratches, cuts, or burns with a clean, dry gauze or bandage as directed  Keep the area clean and dry  · Tell healthcare providers that you have lymphedema  Tell them not to do, IVs, blood draws, and blood pressure readings in the arm or leg with lymphedema  If there is lymphedema in both arms, ask them to take your blood pressure on your leg  Do not allow flu shots or vaccinations in your arm with lymphedema  Follow up with your healthcare provider or lymphedema specialist as directed: You will need regular visits so healthcare providers can examine the affected areas  You may also be referred to a clinic that specializes in lymphedema treatment  Write down your questions so you remember to ask them during your visits  © Copyright [address] Information is for End User's use only and may not be sold, redistributed or otherwise used for commercial purposes  All illustrations and images included in CareNotes® are the copyrighted property of A D A M , Inc  or [facility] [employer]   The above information is an  only  It is not intended as medical advice for individual conditions or treatments  Talk to your doctor, nurse or pharmacist before following any medical regimen to see if it is safe and effective for you

## 2021-03-04 NOTE — ASSESSMENT & PLAN NOTE
38-year-old female with morbid obesity, chronic kidney disease, epilepsy, bilateral lower extremity lymphedema, L>R who returns to the office for one-month follow-up   -No reduction in lower extremity edema with conservative measures   -Formal compression unable to fit the size and the shape of her legs     -Continue double-layer Tubigrip size G   -Compreflex compression ordered   -Will follow-up on lymphedema pumps   -Follow up in the office in 1 month

## 2021-03-04 NOTE — TELEPHONE ENCOUNTER
Patient was here for her follow up visit today and didn't receive her lymphedema pumps yet  I have called and left a message for barak letting him know I will be faxing over lebron's note from today, and asked that he call us if he needs anything further to complete her pumps

## 2021-03-09 NOTE — TELEPHONE ENCOUNTER
Pt called the office today because she still has not heard anything from [address]  Called [facility] main number and spoke w/ Ilda Hernández  She said the reps have been trying to get in contact w/ the pt but were unable to leave a message  The number she gave is listed has the 's number  I gave them the number that is listed under the pt's mobile number  I tried to call that number back and there was no answer  Left a message requesting she call the office back

## 2021-03-16 DIAGNOSIS — N18.31 ACUTE RENAL FAILURE WITH ACUTE TUBULAR NECROSIS SUPERIMPOSED ON STAGE 3A CHRONIC KIDNEY DISEASE (HCC): ICD-10-CM

## 2021-03-16 DIAGNOSIS — N18.31 ANEMIA DUE TO STAGE 3A CHRONIC KIDNEY DISEASE (HCC): ICD-10-CM

## 2021-03-16 DIAGNOSIS — N18.9 ACUTE RENAL FAILURE SUPERIMPOSED ON CHRONIC KIDNEY DISEASE, UNSPECIFIED CKD STAGE, UNSPECIFIED ACUTE RENAL FAILURE TYPE (HCC): ICD-10-CM

## 2021-03-16 DIAGNOSIS — E87.6 HYPOKALEMIA: ICD-10-CM

## 2021-03-16 DIAGNOSIS — N18.31 STAGE 3A CHRONIC KIDNEY DISEASE (HCC): ICD-10-CM

## 2021-03-16 DIAGNOSIS — D63.1 ANEMIA DUE TO STAGE 3A CHRONIC KIDNEY DISEASE (HCC): ICD-10-CM

## 2021-03-16 DIAGNOSIS — N17.9 ACUTE RENAL FAILURE SUPERIMPOSED ON CHRONIC KIDNEY DISEASE, UNSPECIFIED CKD STAGE, UNSPECIFIED ACUTE RENAL FAILURE TYPE (HCC): ICD-10-CM

## 2021-03-16 DIAGNOSIS — I10 ESSENTIAL HYPERTENSION: ICD-10-CM

## 2021-03-16 DIAGNOSIS — R60.9 WATER RETENTION: ICD-10-CM

## 2021-03-16 DIAGNOSIS — I12.9 PARENCHYMAL RENAL HYPERTENSION, STAGE 1 THROUGH STAGE 4 OR UNSPECIFIED CHRONIC KIDNEY DISEASE: ICD-10-CM

## 2021-03-16 DIAGNOSIS — N17.0 ACUTE RENAL FAILURE WITH ACUTE TUBULAR NECROSIS SUPERIMPOSED ON STAGE 3A CHRONIC KIDNEY DISEASE (HCC): ICD-10-CM

## 2021-03-16 DIAGNOSIS — E55.9 VITAMIN D DEFICIENCY: ICD-10-CM

## 2021-03-16 RX ORDER — ERGOCALCIFEROL 1.25 MG/1
CAPSULE ORAL
Qty: 12 CAPSULE | Refills: 3 | OUTPATIENT
Start: 2021-03-16

## 2021-03-17 DIAGNOSIS — G40.919 EPILEPSY WITH ALTERED CONSCIOUSNESS WITH INTRACTABLE EPILEPSY (HCC): ICD-10-CM

## 2021-03-19 RX ORDER — LEVETIRACETAM 750 MG/1
1500 TABLET ORAL EVERY 12 HOURS SCHEDULED
Qty: 180 TABLET | Refills: 3 | Status: ON HOLD | OUTPATIENT
Start: 2021-03-19 | End: 2021-07-23

## 2021-03-24 ENCOUNTER — TELEPHONE (OUTPATIENT)
Dept: FAMILY MEDICINE CLINIC | Facility: CLINIC | Age: 61
End: 2021-03-24

## 2021-04-08 ENCOUNTER — TELEPHONE (OUTPATIENT)
Dept: VASCULAR SURGERY | Facility: CLINIC | Age: 61
End: 2021-04-08

## 2021-04-08 ENCOUNTER — OFFICE VISIT (OUTPATIENT)
Dept: VASCULAR SURGERY | Facility: CLINIC | Age: 61
End: 2021-04-08
Payer: MEDICARE

## 2021-04-08 VITALS
HEART RATE: 87 BPM | DIASTOLIC BLOOD PRESSURE: 80 MMHG | WEIGHT: 267 LBS | SYSTOLIC BLOOD PRESSURE: 148 MMHG | HEIGHT: 63 IN | TEMPERATURE: 96.8 F | BODY MASS INDEX: 47.31 KG/M2

## 2021-04-08 DIAGNOSIS — I89.0 LYMPHEDEMA: Primary | ICD-10-CM

## 2021-04-08 PROCEDURE — 99213 OFFICE O/P EST LOW 20 MIN: CPT | Performed by: NURSE PRACTITIONER

## 2021-04-08 NOTE — TELEPHONE ENCOUNTER
Patient states she has not received her compreflex  Spoke with Garrett Hallman at [facility]  He faxed me paper worked to fill out   Filled out  Order and sent back to don at [phone]

## 2021-04-08 NOTE — ASSESSMENT & PLAN NOTE
64-year-old female with morbid obesity, chronic kidney disease, epilepsy, tobacco abuse, restless leg, seizure disorder,  bilateral lower extremity stage II lymphedema, L>R who returns to the office for one-month follow-up   -Using pumps once a day in afternoon  Recommended she start using pumps twice a day for 1 hour in the morning 1 hour in the evening   -Follow up with [facility] for compreflex   -Continue current compression   -Exercise and weight loss recommended  -Follow up in the office in 3 months to recheck lower extremity swelling with the use of pumps consistently twice per day

## 2021-04-08 NOTE — PATIENT INSTRUCTIONS
Use pumps twice per day for 1 hour in the morning 1 hour in the afternoon  Attempt 30 minutes of exercise per day -can be broken down into 10 minutes 3 times per day  Follow-up in 1 month to recheck lower extremity lymphedema with the use of pumps twice per day  Will follow up with pump company for compreflex compression    Lymphedema   AMBULATORY CARE:   The lymphatic system  contains fluid, vessels, tissue, and organs  This system removes and carries fluid throughout the body  It also helps the body fight infection  Lymphedema is the buildup of lymph fluid in fat tissue under your skin  The buildup causes the area to swell  Lymphedema can happen any time that lymphatic vessels are blocked or damaged  Damage to lymphatic vessels may be caused by surgery, infection, injury, cancer, radiation, or scar tissue     Common signs and symptoms include the following:  Signs and symptoms may happen where lymph nodes were removed, or in the arm, leg, chest, or underarm  · Swelling or itching    · Pain, burning, or aching    · Tight, hard, or red skin    · Hair loss    · Heaviness or fullness    · Numbness or tingling    · Stiffness    Contact your healthcare provider or lymphedema specialist if:   · You have a fever or chills  · You have an open area of skin that looks red or swollen, or drains pus  · Your symptoms, such as swelling or pain, get worse  · Your arms or legs feel heavy, or you cannot move them  · Your skin becomes hard, thick, or rough  · You have a skin wound that will not heal      · Your shoes, clothes, or jewelry feel tighter  · You have questions or concerns about your condition or care  Treatment for lymphedema  can relieve symptoms and prevent lymphedema from getting worse  You may need therapeutic massage, physical therapy, or compression devices to help decrease swelling and pain  Surgery may be needed if other treatments do not work     Self-care:   · Elevate  your arm or leg above the level of your heart as often as you can  This will help decrease swelling and pain  Prop your arm or leg on pillows or blankets to keep it elevated comfortably  · Wear compression socks, sleeves, or bandages  as directed  Compression devices must be fitted by a healthcare provider  Compression devices may need to be replaced every 3 to 6 months  · Exercise  can help you maintain or regain function of your arm or leg  Ask your healthcare provider what type of exercise to do and how often to do it  Start slow, take breaks, and gradually do more each day  Do not do vigorous, repeated exercises  Watch for changes in your arm or leg during exercise  Stop and rest if you have swelling, increased pain, or heaviness  Elevate your arm or leg above the level of your heart  · Change your position often  to help move lymphatic fluid through your body  Do not sit or  one position for more than 30 minutes  Do not cross your legs when you sit  These actions can cause lymphatic fluid to buildup  · Maintain a healthy weight  Ask your healthcare provider what you should weigh  Weight loss may improve your symptoms  If you need to lose weight, your healthcare provider can help you create a weight loss program     Prevent infection with proper skin care:  A skin infection can make lymphedema worse  Do the following to decrease your risk for a skin infection in your arm or leg with lymphedema:  · Wash your skin gently and dry it well  Use a mild soap to wash your skin  Gently pat your skin dry after you bathe  Apply a mild cream or lotion to moisturize your skin and prevent dryness or cracking  Keep your feet clean and dry  · Protect your skin from injury  Wear gloves when you garden and wash dishes  Cut your nails straight across to prevent injury to your fingers and toes  Use sunscreen and insect repellant to avoid burns and punctures  Wear slippers in the house   Wear shoes when you go outside  · Check your skin every day for signs of infection  Signs of infection include redness, swelling, increased heat, or pus  You may also have a fever or chills  · Care for cuts, scratches or burns  Apply antibiotic ointment to cuts and other small breaks in the skin  Apply a cold pack or cold water to a burn for 15 minutes  Then wash it with soap and water  Cover scratches, cuts, or burns with a clean, dry gauze or bandage as directed  Keep the area clean and dry  · Tell healthcare providers that you have lymphedema  Tell them not to do, IVs, blood draws, and blood pressure readings in the arm or leg with lymphedema  If there is lymphedema in both arms, ask them to take your blood pressure on your leg  Do not allow flu shots or vaccinations in your arm with lymphedema  Follow up with your healthcare provider or lymphedema specialist as directed: You will need regular visits so healthcare providers can examine the affected areas  You may also be referred to a clinic that specializes in lymphedema treatment  Write down your questions so you remember to ask them during your visits  © Copyright [address] Information is for End User's use only and may not be sold, redistributed or otherwise used for commercial purposes  All illustrations and images included in CareNotes® are the copyrighted property of [employer]  or [facility] [employer]  The above information is an  only  It is not intended as medical advice for individual conditions or treatments  Talk to your doctor, nurse or pharmacist before following any medical regimen to see if it is safe and effective for you
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
## 2021-04-12 PROBLEM — E55.9 VITAMIN D DEFICIENCY: Status: ACTIVE | Noted: 2021-04-12

## 2021-04-12 NOTE — PROGRESS NOTES
RENAL FOLLOW UP NOTE: td    ASSESSMENT AND PLAN:  78-year-old female with history of seizure disorder/hypertension/pre diabetes mellitus/depression/obesity who was seen in [facility] for BETH on top of chronic kidney disease     1  CKD stage 3    · Etiology:  Probable hypertensive nephrosclerosis/arteriolar nephrosclerosis/AK I probably from prerenal state with maximal creatinine of 2 06 on 10/31/2020 associated with some mild hyponatremia  Renal function improved upon discharge after IV fluids  Certainly high-dose chlorthalidone may be contributing to the elevated creatinine  · Baseline creatinine:? 1 2-1 3  · Current creatinine:  0 97 mg/dL at baseline  · Urine protein creatinine ratio: To be obtained  · CT scan of the chest abdomen pelvis 01/16/2021:  Demonstrated subcutaneous fat stranding in the anterior proximal right thigh nonspecific possibly secondary to contusion otherwise negative except for 1 5 cm subcutaneous nodule in the right axillary region  Kidneys negative and no obstructive process leading to lower extremity edema  Recommendations:  · Treat hypertension-please see below  · Treat dyslipidemia-please see below  · Maintain proteinuria less than 1 g or as low as possible  · Avoid nephrotoxic agents such as NSAIDs, and proton pump inhibitors if possible; patient counseled as such       2   Volume:  Actually appears euvolemic, some lymphedema  To be complete would recommend a simple workup as follows:   · Thyroid function profile to rule out hypothyroidism  · 24 hour urine for free cortisol to rule out Cushing  · Urine protein creatinine ratio to make sure no evidence of nephrotic proteinuria:  Pending  · Liver function studies/albumin normal  · Venous duplex to rule out DVT: Negative  · Echocardiogram to evaluate ejection fraction  · CT scan to evaluate lymphatics: Negative for any obstruction of the lymphatics:  As of 01/16/2021  Treatment:  · Avoid salt/sodium restriction  · Leg elevation when able  · Vascular surgery is following closely for lymphedema using pumps exercise and weight loss per vascular     3  Hypertension:       Current blood pressure averages: none at this time    Secondary workup: To be done  · Aldosterone/renin ratio:  Pending  · Plasma free metanephrine:  Pending  · Thyroid profile: To be done     Goal blood pressure:  Less than 125 -130/80 given CKD     Recommendations:  · Push nonmedical regimen including weight loss, isotonic exercise and a low sodium diet  Patient has been counseled the such  ? MedicationChanges today:    § Please begin spironolactone 25 mg once a day for the potassium and the blood pressure     4   Electrolytes:   · Hypokalemia:  Most compatible with high-dose chlorthalidone  Recommend:  · High potassium diet  · Spironolactone to help both blood pressures/hypokalemia/borderline hypomagnesemia  · Magnesium supplement     5  Mineral bone disorder:  Of chronic kidney disease:  · Calcium/magnesium/phosphorus:   · Calcium and magnesium acceptable  · Borderline hypomagnesemia:  Replete  · PTH intact:  Check  · Vitamin-D:  31 3 at goal     6  Dyslipidemia:  · Goal LDL:  Less than 100  · Current lipid profile:  LDL 92/HDL 72/triglycerides 67 6  Recommendations:  no changes as patient is at goal     7  Anemia:  · Current hemoglobin:  Normal at 12 3  · Only further evaluation if recurrent     8  Other problems:  · Seizure disorder  · Lung nodule seen on admission  · RLS  · Thyroid nodule  · Recent hospitalization for syncope  · Depression  · Obesity  · Chronic mild leukocytosis stable with a WBC of 12 26 K    PATIENT INSTRUCTIONS:    Patient Instructions   1  Medication changes today:   Please begin spironolactone 25 mg once a day for your potassium which is low and for your blood pressure    Watch for breast soreness or breast enlargement and call if you developed either the symptoms   Please take magnesium over-the-counter once a day watch for diarrhea    2  Please go for non fasting  lab work BUT IN THE MORNING:  Please go 1 week after starting the new medication      ALSO THIS WILL INCLUDE A 24 HOUR URINE COLLECTION  [address]  3  Please also go for an echocardiogram of your heart at this time we will help to arrange for this study     4  PLEASE PURCHASE A BLOOD PRESSURE MACHINE FROM YOUR DRUG STORE WITH AN EXTRA LARGE CUFF TO FIT YOUR ARM:   THEN Please take 1 week a blood pressure readings  AT THIS TIME     AS FOLLOWS  MORNING AND EVENING, SITTING AND STANDING as follows:  · TAKE THE MORNING READINGS BEFORE ANY MEDICATIONS AND WHEN YOU ARE RELAXED FOR SEVERAL MINUTES  · TAKE THE EVENING READINGS:  BETWEEN 7-10 P M ; PRIOR TO ANY MEDICATIONS; AT LEAST IN OUR  FROM DINNER; AND CERTAINLY AFTER RELAXING FOR A FEW MINUTES  · PLEASE INCLUDE HEART RATE WITH YOUR BLOOD PRESSURE READINGS  · When taking standing readings, keep your arm supported at heart level and not dangling  · Make sure you are sitting with your back supported and feet on the ground and do not cross your legs or feet  · Make sure you have not taken any coffee or caffeine products or exercised or smoke cigarettes at least 30 minutes before taking your blood pressure  Then please bring in your blood pressure machine along with your blood pressure readings to see 1 of my advanced practitioner's in the next couple weeks  PLEASE REMEMBER TO TAKE YOUR BLOOD PRESSURE IN YOUR LEFT ARM    5  Follow-up in 4  months   Please bring in 1 week a blood pressure readings morning evening, sitting and standing is outlined above     Please go for fasting lab work 1-2 weeks prior to your appointment      6  General instructions:   AVOID SALT BUT NOT ADDING AN READING LABELS TO MAKE SURE THERE IS LOW-SALT IN THE FOOD THAT YOU ARE EATING  o Goal is less than 2 g of sodium intake or less than 5 g of sodium chloride intake per day  · PLEASE EAT A HIGH POTASSIUM DIET WITH A LOT OF FRUITS AND VEGETABLES, PLEASE SEE BELOW FOR A LIST OF FOODS THAT HAVE A LOT OF POTASSIUM IN THEM     Avoid nonsteroidal anti-inflammatory drugs such as Naprosyn, ibuprofen, Aleve, Advil, Celebrex, Meloxicam (Mobic) etc   You can use Tylenol as needed if you do not have any liver condition to be concerned about     Avoid medications such as Sudafed or decongestants and antihistamines that contained the D component which is the decongestant  You can take antihistamines without the decongestant or D component   Try to avoid medications such as pantoprazole or  Protonix/Nexium or Esomeprazole)/Prilosec or omeprazole/Prevacid or lansoprazole/AcipHex or Rabeprazole  If you are able to, use Pepcid as this is safer for your kidneys   Try to exercise at least 30 minutes 3 days a week to begin with with an ultimate goal of 5 days a week for at least 30 minutes     Try to lose 5-10 lb by your next visit     Please do not drink more than 2 glasses of alcohol/wine on a daily basis as this may contribute to your high blood pressure  Potassium Content of Foods List   WHAT YOU NEED TO KNOW:   Potassium is a mineral that is found in most foods  Potassium helps to balance fluids and minerals in your body  It also helps your body maintain a normal blood pressure  Potassium helps your muscles contract and your nerves function normally  DISCHARGE INSTRUCTIONS:   Why you may need to change the amount of potassium you eat:   · You may need more potassium  if you have hypokalemia (low potassium levels) or high blood pressure  You may also need more potassium if you are taking diuretics  Diuretics and certain medicines cause your body to lose potassium  · You may need less potassium  in your diet if you have hyperkalemia (high potassium levels) or kidney disease  Potassium content of fruit:  The amount of potassium in milligrams (mg) contained in each fruit or serving of fruit is listed beside the item    · High-potassium foods (more than 200 mg per serving):      ? 1 medium banana (425)    ? ½ of a papaya (390)    ? ½ cup of prune juice (370)    ? ¼ cup of raisins (270)    ? 1 medium connie (325) or kiwi (240)    ? 1 small orange (240) or ½ cup of orange juice (235)    ? ½ cup of cubed cantaloupe (215) or diced honeydew melon (200)    ? 1 medium pear (200)    · Medium-potassium foods (50 to 200 mg per serving):      ? 1 medium peach (185)    ? 1 small apple or ½ cup of apple juice (150)    ? ½ cup of peaches canned in juice (120)    ? ½ cup of canned pineapple (100)    ? ½ cup of fresh, sliced strawberries (022)    ? ½ cup of watermelon (85)    · Low-potassium foods (less than 50 mg per serving):      ? ½ cup of cranberries (45) or cranberry juice cocktail (20)    ? ½ cup of nectar of papaya, connie, or pear (35)    Potassium content of vegetables:   · High-potassium foods (more than 200 mg per serving):      ? 1 medium baked potato, with skin (925)    ? 1 baked medium sweet potato, with skin (450)    ? ½ cup of tomato or vegetable juice (275), or 1 medium raw tomato (290)    ? ½ cup of mushrooms (280)    ? ½ cup of fresh brussels sprouts (250)    ? ½ cup of cooked zucchini (220) or winter squash (250)    ? ¼ of a medium avocado (245)    ? ½ cup of broccoli (230)    · Medium-potassium foods (50 to 200 mg per serving):      ? ½ cup of corn (195)    ? ½ cup of fresh or cooked carrots (180)    ? ½ cup of fresh cauliflower (150)    ? ½ cup of asparagus (155)    ? ½ cup of canned peas (90)     ? 1 cup of lettuce, all types (100)    ? ½ cup of fresh green beans (90)    ? ½ cup of frozen green beans (85)    ? ½ cup of cucumber (80)    Potassium content of protein foods:   · High-potassium foods (more than 200 mg per serving):      ? ½ cup of cooked wilson beans (400) or lentils (365)    ? 1 cup of soy milk (300)    ? 3 ounces of baked or broiled salmon (319)    ? 3 ounces of roasted turkey, dark meat (250)    ?  ¼ cup of sunflower seeds (241)    ? 3 ounces of cooked lean beef (224)    ? 2 tablespoons of smooth peanut butter (210)    · Medium-potassium foods (50 to 200 mg per serving):      ? 1 ounce of salted peanuts, almonds, or cashews (200)    ? 1 large egg (60 mg)    Potassium content of dairy foods:   · High-potassium foods (more than 200 mg per serving):      ? 6 ounces of yogurt (260 to 435)    ? 1 cup of nonfat, low-fat, or whole milk (350 to 380)    · Medium-potassium foods (50 to 200 mg per serving):      ? ½ cup of ricotta cheese (154)    ? ½ cup of vanilla ice cream (131)    ? ½ cup of low-fat (2%) cottage cheese (110)    · Low-potassium foods (less than 50 mg per serving):      ? 1 ounce of cheese (20 to 30)      Potassium content of grains:   · 1 slice of white bread (30)    · ½ cup of white or brown rice (50)    · ½ cup of spaghetti or macaroni (30)    · 1 flour or corn tortilla (50)    · 1 four-inch waffle (50)    Potassium content of other foods:   · 1 tablespoon of molasses (295)    · 1½ ounces of chocolate (165)    · Some salt substitutes may contain a high amount of potassium  Check the food label to find the amount of potassium it contains  © Copyright [address] Information is for End User's use only and may not be sold, redistributed or otherwise used for commercial purposes  All illustrations and images included in CareNotes® are the copyrighted property of [facility] [employer]  or [facility] [employer]   The above information is an  only  It is not intended as medical advice for individual conditions or treatments  Talk to your doctor, nurse or pharmacist before following any medical regimen to see if it is safe and effective for you  Subjective: There has been no hospitalizations or acute illnesses since last visit  The patient overall is feeling well except lymphedema, evaluated by vascular surgery  No fevers, chills, or cough or colds    Good appetite and good energy  No hematuria, dysuria, voiding symptoms or foamy urine  No gastrointestinal symptoms  No chest pain, chronic dyspnea on exertion up steps unchanged  No headaches, does get dizziness which is chronic, but not position  Blood pressure medications:  · Toprol XL 50 mg daily in the a m  · Losartan 25 mg daily in the morning  · Chlorthalidone 25 mg daily in the morning    ROS:  See HPI, otherwise review of systems as completely reviewed with the patient are negative    Past Medical History:   Diagnosis Date    Depression     [facility] [address] [facility]     [facility]ilepsy [facility] [address] [facility]     Obesity     Restless leg      Past Surgical History:   Procedure Laterality Date    DENTAL SURGERY      all teeth removed    LASER ABLATION OF THE CERVIX      MAMMO (HISTORICAL)  05/01/2017     Family History   Problem Relation Age of Onset    Kidney disease Mother     Liver disease Mother     Heart attack Mother     Heart attack Father     No Known Problems Brother     No Known Problems Son       reports that she has been smoking cigarettes  She has a 11 50 pack-year smoking history  She has never used smokeless tobacco  She reports that she does not drink alcohol or use drugs  I COMPLETELY REVIEWED THE PAST MEDICAL HISTORY/PAST SURGICAL HISTORY/SOCIAL HISTORY/FAMILY HISTORY/AND MEDICATIONS  AND UPDATED ALL    Objective:     Vitals:   BP sitting on left arm:  146/86 with a heart rate of 72 and regular  BP standing on left arm:  164/90 with a heart rate 78 and regular      Weight (last 2 days)     Date/Time   Weight    04/20/21 1335   122 (269 2)            Wt Readings from Last 3 Encounters:   04/20/21 122 kg (269 lb 3 2 oz)   04/08/21 121 kg (267 lb)   03/04/21 122 kg (269 lb)       Body mass index is 47 69 kg/m²      Physical Exam: General:  No acute distress/morbidly obese  Skin:  No acute rash  Eyes:  No scleral icterus, noninjected, no discharge from eyes  ENT:  Moist mucous membranes  Neck:  Supple, no jugular venous distention, trachea is midline, no lymphadenopathy and no thyromegaly  Back   No CVAT  Chest:  Clear to auscultation and percussion, good respiratory effort  CVS:  Regular rate and rhythm without a rub, or gallops or murmurs  Abdomen:  Soft and nontender with normal bowel sounds/morbidly obese  Extremities:  No cyanosis and chronic lymphedema 1/2 the way up the pretibial region bilaterally essentially nonpitting, no arthritic changes, normal range of motion  Neuro:  Grossly intact  Psych:  Alert, oriented x3 and appropriate      Medications:    Current Outpatient Medications:     carBAMazepine (TEGretol) 200 mg tablet, Take 1 tablet (200 mg total) by mouth 3 (three) times a day, Disp: 90 tablet, Rfl: 1    chlorthalidone (HYGROTEN) 50 MG tablet, Take 25 mg by mouth daily, Disp: , Rfl:     ergocalciferol (VITAMIN D2) 50,000 units, Take 1 capsule (50,000 Units total) by mouth once a week for 12 doses, Disp: 12 capsule, Rfl: 0    levETIRAcetam (KEPPRA) 750 mg tablet, Take 2 tablets (1,500 mg total) by mouth every 12 (twelve) hours, Disp: 180 tablet, Rfl: 3    lidocaine (LIDODERM) 5 %, Apply 1 patch topically daily Remove & Discard patch within 12 hours or as directed by MD, Disp: 30 patch, Rfl: 0    losartan (COZAAR) 25 mg tablet, Take 1 tablet (25 mg total) by mouth daily, Disp: 90 tablet, Rfl: 3    metoprolol succinate (TOPROL-XL) 50 mg 24 hr tablet, take 1 tablet by mouth daily, Disp: 90 tablet, Rfl: 0    nicotine (NICODERM CQ) 21 mg/24 hr TD 24 hr patch, Place 1 patch on the skin every 24 hours, Disp: 28 patch, Rfl: 1    pramipexole (MIRAPEX) 1 5 MG tablet, Take 1 tablet (1 5 mg total) by mouth daily at bedtime, Disp: 90 tablet, Rfl: 1    celecoxib (CeleBREX) 100 mg capsule, Take 1 capsule (100 mg total) by mouth 2 (two) times a day (Patient not taking: Reported on 3/4/2021), Disp: 30 capsule, Rfl: 0    cholecalciferol (VITAMIN D3) 1,000 units tablet, Take 2 tablets (2,000 Units total) by mouth daily (Patient not taking: Reported on 4/20/2021), Disp: 180 tablet, Rfl: 1    spironolactone (ALDACTONE) 25 mg tablet, Take 1 tablet (25 mg total) by mouth daily, Disp: 90 tablet, Rfl: 3    Lab, Imaging and other studies: I have personally reviewed pertinent labs    Laboratory Results:  Results for orders placed or performed in visit on 04/19/21   Comprehensive metabolic panel   Result Value Ref Range    Sodium 140 133 - 145 mmol/L    Potassium 3 3 (L) 3 5 - 5 0 mmol/L    Chloride 103 96 - 108 mmol/L    CO2 29 22 - 33 mmol/L    ANION GAP 8 4 - 13 mmol/L    BUN 17 6 - 20 mg/dL    Creatinine 0 97 0 40 - 1 10 mg/dL    Glucose, Fasting 95 70 - 105 mg/dL    Calcium 8 9 8 4 - 10 2 mg/dL    Corrected Calcium 9 4 8 3 - 10 1 mg/dL    AST 11 (L) 15 - 41 U/L    ALT 9 5 - 54 U/L    Alkaline Phosphatase 115 5 35 - 140 U/L    Total Protein 7 2 6 4 - 8 3 g/dL    Albumin 3 4 3 4 - 4 8 g/dL    Total Bilirubin 0 32 0 30 - 1 20 mg/dL    eGFR 63 ml/min/1 73sq m   CBC   Result Value Ref Range    WBC 12 26 (H) 4 31 - 10 16 Thousand/uL    RBC 4 16 3 81 - 5 12 Million/uL    Hemoglobin 12 3 11 5 - 15 4 g/dL    Hematocrit 38 5 34 8 - 46 1 %    MCV 93 82 - 98 fL    MCH 29 6 26 8 - 34 3 pg    MCHC 31 9 31 4 - 37 4 g/dL    RDW 13 6 11 6 - 15 1 %    Platelets 834 160 - 938 Thousands/uL    MPV 10 9 8 9 - 12 7 fL   Lipid Panel with Direct LDL reflex   Result Value Ref Range    Cholesterol 178 <=200 mg/dL    Triglycerides 67 6 <=150 mg/dL    HDL, Direct 72 >=50 mg/dL    LDL Calculated 92 0 - 100 mg/dL   Magnesium   Result Value Ref Range    Magnesium 1 7 1 6 - 2 6 mg/dL   Phosphorus   Result Value Ref Range    Phosphorus 3 5 2 5 - 5 0 mg/dL   Vitamin D 25 hydroxy   Result Value Ref Range    Vit D, 25-Hydroxy 31 3 30 0 - 100 0 ng/mL       Results from last 7 days   Lab Units 04/19/21  0954   WBC Thousand/uL 12 26*   HEMOGLOBIN g/dL 12 3   HEMATOCRIT % 38 5   PLATELETS Thousands/uL 368   POTASSIUM mmol/L 3 3*   CHLORIDE mmol/L 103   CO2 mmol/L 29   BUN mg/dL 17   CREATININE mg/dL 0 97 CALCIUM mg/dL 8 9   MAGNESIUM mg/dL 1 7   PHOSPHORUS mg/dL 3 5         Radiology review:   chest X-ray    Ultrasound      Portions of the record may have been created with voice recognition software  Occasional wrong word or "sound a like" substitutions may have occurred due to the inherent limitations of voice recognition software  Read the chart carefully and recognize, using context, where substitutions have occurred

## 2021-04-13 DIAGNOSIS — G40.919 EPILEPSY WITH ALTERED CONSCIOUSNESS WITH INTRACTABLE EPILEPSY (HCC): ICD-10-CM

## 2021-04-13 RX ORDER — PRAMIPEXOLE DIHYDROCHLORIDE 1.5 MG/1
TABLET ORAL
Qty: 60 TABLET | OUTPATIENT
Start: 2021-04-13

## 2021-04-15 ENCOUNTER — TELEPHONE (OUTPATIENT)
Dept: FAMILY MEDICINE CLINIC | Facility: CLINIC | Age: 61
End: 2021-04-15

## 2021-04-15 DIAGNOSIS — F17.200 SMOKER: Primary | ICD-10-CM

## 2021-04-15 RX ORDER — NICOTINE 21 MG/24HR
1 PATCH, TRANSDERMAL 24 HOURS TRANSDERMAL EVERY 24 HOURS
COMMUNITY
End: 2021-04-15 | Stop reason: SDUPTHER

## 2021-04-15 RX ORDER — NICOTINE 21 MG/24HR
1 PATCH, TRANSDERMAL 24 HOURS TRANSDERMAL EVERY 24 HOURS
Qty: 28 PATCH | Refills: 1 | Status: SHIPPED | OUTPATIENT
Start: 2021-04-15 | End: 2021-07-26

## 2021-04-19 ENCOUNTER — APPOINTMENT (OUTPATIENT)
Dept: LAB | Facility: HOSPITAL | Age: 61
End: 2021-04-19
Attending: INTERNAL MEDICINE
Payer: MEDICARE

## 2021-04-19 ENCOUNTER — TELEPHONE (OUTPATIENT)
Dept: NEPHROLOGY | Facility: CLINIC | Age: 61
End: 2021-04-19

## 2021-04-19 DIAGNOSIS — E87.6 HYPOKALEMIA: ICD-10-CM

## 2021-04-19 DIAGNOSIS — I12.9 PARENCHYMAL RENAL HYPERTENSION, STAGE 1 THROUGH STAGE 4 OR UNSPECIFIED CHRONIC KIDNEY DISEASE: ICD-10-CM

## 2021-04-19 DIAGNOSIS — N18.31 ACUTE RENAL FAILURE WITH ACUTE TUBULAR NECROSIS SUPERIMPOSED ON STAGE 3A CHRONIC KIDNEY DISEASE (HCC): ICD-10-CM

## 2021-04-19 DIAGNOSIS — D63.1 ANEMIA DUE TO STAGE 3A CHRONIC KIDNEY DISEASE (HCC): ICD-10-CM

## 2021-04-19 DIAGNOSIS — I95.1 ORTHOSTATIC HYPOTENSION: ICD-10-CM

## 2021-04-19 DIAGNOSIS — N18.31 ANEMIA DUE TO STAGE 3A CHRONIC KIDNEY DISEASE (HCC): ICD-10-CM

## 2021-04-19 DIAGNOSIS — N18.32 CHRONIC KIDNEY DISEASE (CKD) STAGE G3B/A2, MODERATELY DECREASED GLOMERULAR FILTRATION RATE (GFR) BETWEEN 30-44 ML/MIN/1.73 SQUARE METER AND ALBUMINURIA CREATININE RATIO BETWEEN 30-299 MG/G (HCC): ICD-10-CM

## 2021-04-19 DIAGNOSIS — E55.9 VITAMIN D DEFICIENCY: ICD-10-CM

## 2021-04-19 DIAGNOSIS — N18.31 STAGE 3A CHRONIC KIDNEY DISEASE (HCC): ICD-10-CM

## 2021-04-19 DIAGNOSIS — N17.0 ACUTE RENAL FAILURE WITH ACUTE TUBULAR NECROSIS SUPERIMPOSED ON STAGE 3A CHRONIC KIDNEY DISEASE (HCC): ICD-10-CM

## 2021-04-19 DIAGNOSIS — R60.9 WATER RETENTION: ICD-10-CM

## 2021-04-19 DIAGNOSIS — N18.9 ACUTE RENAL FAILURE SUPERIMPOSED ON CHRONIC KIDNEY DISEASE, UNSPECIFIED CKD STAGE, UNSPECIFIED ACUTE RENAL FAILURE TYPE (HCC): ICD-10-CM

## 2021-04-19 DIAGNOSIS — N17.9 ACUTE RENAL FAILURE SUPERIMPOSED ON CHRONIC KIDNEY DISEASE, UNSPECIFIED CKD STAGE, UNSPECIFIED ACUTE RENAL FAILURE TYPE (HCC): ICD-10-CM

## 2021-04-19 DIAGNOSIS — I10 ESSENTIAL HYPERTENSION: ICD-10-CM

## 2021-04-19 PROBLEM — E83.42 HYPOMAGNESEMIA: Status: ACTIVE | Noted: 2021-04-19

## 2021-04-19 LAB
25(OH)D3 SERPL-MCNC: 31.3 NG/ML (ref 30–100)
ALBUMIN SERPL BCP-MCNC: 3.4 G/DL (ref 3.4–4.8)
ALP SERPL-CCNC: 115.5 U/L (ref 35–140)
ALT SERPL W P-5'-P-CCNC: 9 U/L (ref 5–54)
ANION GAP SERPL CALCULATED.3IONS-SCNC: 8 MMOL/L (ref 4–13)
AST SERPL W P-5'-P-CCNC: 11 U/L (ref 15–41)
BILIRUB SERPL-MCNC: 0.32 MG/DL (ref 0.3–1.2)
BUN SERPL-MCNC: 17 MG/DL (ref 6–20)
CALCIUM ALBUM COR SERPL-MCNC: 9.4 MG/DL (ref 8.3–10.1)
CALCIUM SERPL-MCNC: 8.9 MG/DL (ref 8.4–10.2)
CHLORIDE SERPL-SCNC: 103 MMOL/L (ref 96–108)
CHOLEST SERPL-MCNC: 178 MG/DL
CO2 SERPL-SCNC: 29 MMOL/L (ref 22–33)
CREAT SERPL-MCNC: 0.97 MG/DL (ref 0.4–1.1)
ERYTHROCYTE [DISTWIDTH] IN BLOOD BY AUTOMATED COUNT: 13.6 % (ref 11.6–15.1)
GFR SERPL CREATININE-BSD FRML MDRD: 63 ML/MIN/1.73SQ M
GLUCOSE P FAST SERPL-MCNC: 95 MG/DL (ref 70–105)
HCT VFR BLD AUTO: 38.5 % (ref 34.8–46.1)
HDLC SERPL-MCNC: 72 MG/DL
HGB BLD-MCNC: 12.3 G/DL (ref 11.5–15.4)
LDLC SERPL CALC-MCNC: 92 MG/DL (ref 0–100)
MAGNESIUM SERPL-MCNC: 1.7 MG/DL (ref 1.6–2.6)
MCH RBC QN AUTO: 29.6 PG (ref 26.8–34.3)
MCHC RBC AUTO-ENTMCNC: 31.9 G/DL (ref 31.4–37.4)
MCV RBC AUTO: 93 FL (ref 82–98)
PHOSPHATE SERPL-MCNC: 3.5 MG/DL (ref 2.5–5)
PLATELET # BLD AUTO: 368 THOUSANDS/UL (ref 149–390)
PMV BLD AUTO: 10.9 FL (ref 8.9–12.7)
POTASSIUM SERPL-SCNC: 3.3 MMOL/L (ref 3.5–5)
PROT SERPL-MCNC: 7.2 G/DL (ref 6.4–8.3)
RBC # BLD AUTO: 4.16 MILLION/UL (ref 3.81–5.12)
SODIUM SERPL-SCNC: 140 MMOL/L (ref 133–145)
TRIGL SERPL-MCNC: 67.6 MG/DL
WBC # BLD AUTO: 12.26 THOUSAND/UL (ref 4.31–10.16)

## 2021-04-19 PROCEDURE — 83735 ASSAY OF MAGNESIUM: CPT

## 2021-04-19 PROCEDURE — 83835 ASSAY OF METANEPHRINES: CPT

## 2021-04-19 PROCEDURE — 84244 ASSAY OF RENIN: CPT

## 2021-04-19 PROCEDURE — 82088 ASSAY OF ALDOSTERONE: CPT

## 2021-04-19 PROCEDURE — 80053 COMPREHEN METABOLIC PANEL: CPT

## 2021-04-19 PROCEDURE — 84100 ASSAY OF PHOSPHORUS: CPT

## 2021-04-19 PROCEDURE — 80061 LIPID PANEL: CPT

## 2021-04-19 PROCEDURE — 85027 COMPLETE CBC AUTOMATED: CPT

## 2021-04-19 PROCEDURE — 36415 COLL VENOUS BLD VENIPUNCTURE: CPT

## 2021-04-19 PROCEDURE — 82306 VITAMIN D 25 HYDROXY: CPT

## 2021-04-19 NOTE — TELEPHONE ENCOUNTER
Lm for the patient to increase fruits and vegetables in her diet for the remainder of the day until she is seen tomorrow  e

## 2021-04-19 NOTE — TELEPHONE ENCOUNTER
----- Message from Ang Worthington MD sent at 4/19/2021 11:11 AM EDT -----  The patient's labs will be reviewed at tomorrow's appointment but her potassium is low 3 3  I would recommend increasing the potassium in her diet until I see   her tomorrow    Thank you

## 2021-04-20 ENCOUNTER — OFFICE VISIT (OUTPATIENT)
Dept: NEPHROLOGY | Facility: CLINIC | Age: 61
End: 2021-04-20
Payer: MEDICARE

## 2021-04-20 VITALS — WEIGHT: 269.2 LBS | BODY MASS INDEX: 47.7 KG/M2 | HEIGHT: 63 IN

## 2021-04-20 DIAGNOSIS — E87.6 HYPOKALEMIA: ICD-10-CM

## 2021-04-20 DIAGNOSIS — E55.9 VITAMIN D DEFICIENCY: ICD-10-CM

## 2021-04-20 DIAGNOSIS — E83.42 HYPOMAGNESEMIA: ICD-10-CM

## 2021-04-20 DIAGNOSIS — D63.1 ANEMIA DUE TO STAGE 3A CHRONIC KIDNEY DISEASE (HCC): ICD-10-CM

## 2021-04-20 DIAGNOSIS — N18.31 STAGE 3A CHRONIC KIDNEY DISEASE (HCC): ICD-10-CM

## 2021-04-20 DIAGNOSIS — N18.31 ANEMIA DUE TO STAGE 3A CHRONIC KIDNEY DISEASE (HCC): ICD-10-CM

## 2021-04-20 DIAGNOSIS — I89.0 LYMPHEDEMA: ICD-10-CM

## 2021-04-20 DIAGNOSIS — I12.9 PARENCHYMAL RENAL HYPERTENSION, STAGE 1 THROUGH STAGE 4 OR UNSPECIFIED CHRONIC KIDNEY DISEASE: Primary | ICD-10-CM

## 2021-04-20 PROCEDURE — 99214 OFFICE O/P EST MOD 30 MIN: CPT | Performed by: INTERNAL MEDICINE

## 2021-04-20 RX ORDER — SPIRONOLACTONE 25 MG/1
25 TABLET ORAL DAILY
Qty: 90 TABLET | Refills: 3 | Status: SHIPPED | OUTPATIENT
Start: 2021-04-20 | End: 2021-07-26 | Stop reason: SDUPTHER

## 2021-04-20 NOTE — LETTER
April 20, 2021     Betty Falnnery, [facility] [address] [place], [place] [id number]    Patient: Maritza Schmid   YOB: 1960   Date of Visit: 4/20/2021       Dear Dr Celina Vidales: Thank you for referring Alexx Rice to me for evaluation  Below are my notes for this consultation  If you have questions, please do not hesitate to call me  I look forward to following your patient along with you  Sincerely,        Zee Arroyo MD        CC: DRU Ray MD  4/20/2021  2:06 PM  Sign when Signing Visit  RENAL FOLLOW UP NOTE: td    ASSESSMENT AND PLAN:  78-year-old female with history of seizure disorder/hypertension/pre diabetes mellitus/depression/obesity who was seen in [facility] for BETH on top of chronic kidney disease     1  CKD stage 3    · Etiology:  Probable hypertensive nephrosclerosis/arteriolar nephrosclerosis/AK I probably from prerenal state with maximal creatinine of 2 06 on 10/31/2020 associated with some mild hyponatremia  Renal function improved upon discharge after IV fluids  Certainly high-dose chlorthalidone may be contributing to the elevated creatinine  · Baseline creatinine:? 1 2-1 3  · Current creatinine:  0 97 mg/dL at baseline  · Urine protein creatinine ratio: To be obtained  · CT scan of the chest abdomen pelvis 01/16/2021:  Demonstrated subcutaneous fat stranding in the anterior proximal right thigh nonspecific possibly secondary to contusion otherwise negative except for 1 5 cm subcutaneous nodule in the right axillary region  Kidneys negative and no obstructive process leading to lower extremity edema  Recommendations:  · Treat hypertension-please see below  · Treat dyslipidemia-please see below  · Maintain proteinuria less than 1 g or as low as possible  · Avoid nephrotoxic agents such as NSAIDs, and proton pump inhibitors if possible; patient counseled as such       2   Volume:  Actually appears euvolemic, some lymphedema    To be complete would recommend a simple workup as follows: · Thyroid function profile to rule out hypothyroidism  · 24 hour urine for free cortisol to rule out Cushing  · Urine protein creatinine ratio to make sure no evidence of nephrotic proteinuria:  Pending  · Liver function studies/albumin normal  · Venous duplex to rule out DVT: Negative  · Echocardiogram to evaluate ejection fraction  · CT scan to evaluate lymphatics: Negative for any obstruction of the lymphatics:  As of 01/16/2021  Treatment:  · Avoid salt/sodium restriction  · Leg elevation when able  · Vascular surgery is following closely for lymphedema using pumps exercise and weight loss per vascular     3  Hypertension:       Current blood pressure averages: none at this time    Secondary workup: To be done  · Aldosterone/renin ratio:  Pending  · Plasma free metanephrine:  Pending  · Thyroid profile: To be done     Goal blood pressure:  Less than 125 -130/80 given CKD     Recommendations:  · Push nonmedical regimen including weight loss, isotonic exercise and a low sodium diet  Patient has been counseled the such  ? MedicationChanges today:    § Please begin spironolactone 25 mg once a day for the potassium and the blood pressure     4   Electrolytes:   · Hypokalemia:  Most compatible with high-dose chlorthalidone  Recommend:  · High potassium diet  · Spironolactone to help both blood pressures/hypokalemia/borderline hypomagnesemia  · Magnesium supplement     5  Mineral bone disorder:  Of chronic kidney disease:  · Calcium/magnesium/phosphorus:   · Calcium and magnesium acceptable  · Borderline hypomagnesemia:  Replete  · PTH intact:  Check  · Vitamin-D:  31 3 at goal     6  Dyslipidemia:  · Goal LDL:  Less than 100  · Current lipid profile:  LDL 92/HDL 72/triglycerides 67 6  Recommendations:  no changes as patient is at goal     7  Anemia:  · Current hemoglobin:  Normal at 12 3  · Only further evaluation if recurrent     8    Other problems:  · Seizure disorder  · Lung nodule seen on admission  · RLS  · Thyroid nodule  · Recent hospitalization for syncope  · Depression  · Obesity  · Chronic mild leukocytosis stable with a WBC of 12 26 K    PATIENT INSTRUCTIONS:    Patient Instructions   1  Medication changes today:   Please begin spironolactone 25 mg once a day for your potassium which is low and for your blood pressure  Watch for breast soreness or breast enlargement and call if you developed either the symptoms   Please take magnesium over-the-counter once a day watch for diarrhea    2  Please go for non fasting  lab work BUT IN THE MORNING:  Please go 1 week after starting the new medication      ALSO THIS WILL INCLUDE A 24 HOUR URINE COLLECTION AT [address] [place] [address]  3  Please also go for an echocardiogram of your heart at this time we will help to arrange for this study     4   PLEASE PURCHASE A BLOOD PRESSURE MACHINE FROM YOUR DRUG STORE WITH AN EXTRA LARGE CUFF TO FIT YOUR ARM:   THEN Please take 1 week a blood pressure readings  AT THIS TIME     AS FOLLOWS  MORNING AND EVENING, SITTING AND STANDING as follows:  · TAKE THE MORNING READINGS BEFORE ANY MEDICATIONS AND WHEN YOU ARE RELAXED FOR SEVERAL MINUTES  · TAKE THE EVENING READINGS:  BETWEEN 7-10 P M ; PRIOR TO ANY MEDICATIONS; AT LEAST IN OUR  FROM DINNER; AND CERTAINLY AFTER RELAXING FOR A FEW MINUTES  · PLEASE INCLUDE HEART RATE WITH YOUR BLOOD PRESSURE READINGS  · When taking standing readings, keep your arm supported at heart level and not dangling  · Make sure you are sitting with your back supported and feet on the ground and do not cross your legs or feet  · Make sure you have not taken any coffee or caffeine products or exercised or smoke cigarettes at least 30 minutes before taking your blood pressure  Then please bring in your blood pressure machine along with your blood pressure readings to see 1 of my advanced practitioner's in the next couple weeks  PLEASE REMEMBER TO TAKE YOUR BLOOD PRESSURE IN YOUR LEFT ARM    5  Follow-up in 4  months   Please bring in 1 week a blood pressure readings morning evening, sitting and standing is outlined above     Please go for fasting lab work 1-2 weeks prior to your appointment      6  General instructions:   AVOID SALT BUT NOT ADDING AN READING LABELS TO MAKE SURE THERE IS LOW-SALT IN THE FOOD THAT YOU ARE EATING  o Goal is less than 2 g of sodium intake or less than 5 g of sodium chloride intake per day  · PLEASE EAT A HIGH POTASSIUM DIET WITH A LOT OF FRUITS AND VEGETABLES, PLEASE SEE BELOW FOR A LIST OF FOODS THAT HAVE A LOT OF POTASSIUM IN THEM     Avoid nonsteroidal anti-inflammatory drugs such as Naprosyn, ibuprofen, Aleve, Advil, Celebrex, Meloxicam (Mobic) etc   You can use Tylenol as needed if you do not have any liver condition to be concerned about     Avoid medications such as Sudafed or decongestants and antihistamines that contained the D component which is the decongestant  You can take antihistamines without the decongestant or D component   Try to avoid medications such as pantoprazole or  Protonix/Nexium or Esomeprazole)/Prilosec or omeprazole/Prevacid or lansoprazole/AcipHex or Rabeprazole  If you are able to, use Pepcid as this is safer for your kidneys   Try to exercise at least 30 minutes 3 days a week to begin with with an ultimate goal of 5 days a week for at least 30 minutes     Try to lose 5-10 lb by your next visit     Please do not drink more than 2 glasses of alcohol/wine on a daily basis as this may contribute to your high blood pressure  Potassium Content of Foods List   WHAT YOU NEED TO KNOW:   Potassium is a mineral that is found in most foods  Potassium helps to balance fluids and minerals in your body  It also helps your body maintain a normal blood pressure  Potassium helps your muscles contract and your nerves function normally    DISCHARGE INSTRUCTIONS:   Why you may need to change the amount of potassium you eat:   · You may need more potassium  if you have hypokalemia (low potassium levels) or high blood pressure  You may also need more potassium if you are taking diuretics  Diuretics and certain medicines cause your body to lose potassium  · You may need less potassium  in your diet if you have hyperkalemia (high potassium levels) or kidney disease  Potassium content of fruit:  The amount of potassium in milligrams (mg) contained in each fruit or serving of fruit is listed beside the item  · High-potassium foods (more than 200 mg per serving):      ? 1 medium banana (425)    ? ½ of a papaya (390)    ? ½ cup of prune juice (370)    ? ¼ cup of raisins (270)    ? 1 medium connie (325) or kiwi (240)    ? 1 small orange (240) or ½ cup of orange juice (235)    ? ½ cup of cubed cantaloupe (215) or diced honeydew melon (200)    ? 1 medium pear (200)    · Medium-potassium foods (50 to 200 mg per serving):      ? 1 medium peach (185)    ? 1 small apple or ½ cup of apple juice (150)    ? ½ cup of peaches canned in juice (120)    ? ½ cup of canned pineapple (100)    ? ½ cup of fresh, sliced strawberries (018)    ? ½ cup of watermelon (85)    · Low-potassium foods (less than 50 mg per serving):      ? ½ cup of cranberries (45) or cranberry juice cocktail (20)    ? ½ cup of nectar of papaya, connie, or pear (35)    Potassium content of vegetables:   · High-potassium foods (more than 200 mg per serving):      ? 1 medium baked potato, with skin (925)    ? 1 baked medium sweet potato, with skin (450)    ? ½ cup of tomato or vegetable juice (275), or 1 medium raw tomato (290)    ? ½ cup of mushrooms (280)    ? ½ cup of fresh brussels sprouts (250)    ? ½ cup of cooked zucchini (220) or winter squash (250)    ? ¼ of a medium avocado (245)    ? ½ cup of broccoli (230)    · Medium-potassium foods (50 to 200 mg per serving):      ? ½ cup of corn (195)    ?  ½ cup of fresh or cooked carrots (180)    ? ½ cup of fresh cauliflower (150)    ? ½ cup of asparagus (155)    ? ½ cup of canned peas (90)     ? 1 cup of lettuce, all types (100)    ? ½ cup of fresh green beans (90)    ? ½ cup of frozen green beans (85)    ? ½ cup of cucumber (80)    Potassium content of protein foods:   · High-potassium foods (more than 200 mg per serving):      ? ½ cup of cooked wilson beans (400) or lentils (365)    ? 1 cup of soy milk (300)    ? 3 ounces of baked or broiled salmon (319)    ? 3 ounces of roasted turkey, dark meat (250)    ? ¼ cup of sunflower seeds (241)    ? 3 ounces of cooked lean beef (224)    ? 2 tablespoons of smooth peanut butter (210)    · Medium-potassium foods (50 to 200 mg per serving):      ? 1 ounce of salted peanuts, almonds, or cashews (200)    ? 1 large egg (60 mg)    Potassium content of dairy foods:   · High-potassium foods (more than 200 mg per serving):      ? 6 ounces of yogurt (260 to 435)    ? 1 cup of nonfat, low-fat, or whole milk (350 to 380)    · Medium-potassium foods (50 to 200 mg per serving):      ? ½ cup of ricotta cheese (154)    ? ½ cup of vanilla ice cream (131)    ? ½ cup of low-fat (2%) cottage cheese (110)    · Low-potassium foods (less than 50 mg per serving):      ? 1 ounce of cheese (20 to 30)      Potassium content of grains:   · 1 slice of white bread (30)    · ½ cup of white or brown rice (50)    · ½ cup of spaghetti or macaroni (30)    · 1 flour or corn tortilla (50)    · 1 four-inch waffle (50)    Potassium content of other foods:   · 1 tablespoon of molasses (295)    · 1½ ounces of chocolate (165)    · Some salt substitutes may contain a high amount of potassium  Check the food label to find the amount of potassium it contains  © [address] Information is for End User's use only and may not be sold, redistributed or otherwise used for commercial purposes   All illustrations and images included in CareNotes® are the copyrighted property of [employer]  or [facility]  The above information is an  only  It is not intended as medical advice for individual conditions or treatments  Talk to your doctor, nurse or pharmacist before following any medical regimen to see if it is safe and effective for you  Subjective: There has been no hospitalizations or acute illnesses since last visit  The patient overall is feeling well except lymphedema, evaluated by vascular surgery  No fevers, chills, or cough or colds  Good appetite and good energy  No hematuria, dysuria, voiding symptoms or foamy urine  No gastrointestinal symptoms  No chest pain, chronic dyspnea on exertion up steps unchanged  No headaches, does get dizziness which is chronic, but not position  Blood pressure medications:  · Toprol XL 50 mg daily in the a m  · Losartan 25 mg daily in the morning  · Chlorthalidone 25 mg daily in the morning    ROS:  See HPI, otherwise review of systems as completely reviewed with the patient are negative    Past Medical History:   Diagnosis Date    Depression     [facility] [address] [facility]     [facility] [address] [facility]     Obesity     Restless leg      Past Surgical History:   Procedure Laterality Date    DENTAL SURGERY      all teeth removed    LASER ABLATION OF THE CERVIX      MAMMO (HISTORICAL)  05/01/2017     Family History   Problem Relation Age of Onset    Kidney disease Mother     Liver disease Mother     Heart attack Mother     Heart attack Father     No Known Problems Brother     No Known Problems Son       reports that she has been smoking cigarettes  She has a 11 50 pack-year smoking history  She has never used smokeless tobacco  She reports that she does not drink alcohol or use drugs      I COMPLETELY REVIEWED THE PAST MEDICAL HISTORY/PAST SURGICAL HISTORY/SOCIAL HISTORY/FAMILY HISTORY/AND MEDICATIONS  AND UPDATED ALL    Objective:     Vitals:   BP sitting on left arm:  146/86 with a heart rate of 72 and regular  BP standing on left arm:  164/90 with a heart rate 78 and regular      Weight (last 2 days)     Date/Time   Weight    04/20/21 1335   122 (269 2)            Wt Readings from Last 3 Encounters:   04/20/21 122 kg (269 lb 3 2 oz)   04/08/21 121 kg (267 lb)   03/04/21 122 kg (269 lb)       Body mass index is 47 69 kg/m²      Physical Exam: General:  No acute distress/morbidly obese  Skin:  No acute rash  Eyes:  No scleral icterus, noninjected, no discharge from eyes  ENT:  Moist mucous membranes  Neck:  Supple, no jugular venous distention, trachea is midline, no lymphadenopathy and no thyromegaly  Back   No CVAT  Chest:  Clear to auscultation and percussion, good respiratory effort  CVS:  Regular rate and rhythm without a rub, or gallops or murmurs  Abdomen:  Soft and nontender with normal bowel sounds/morbidly obese  Extremities:  No cyanosis and chronic lymphedema 1/2 the way up the pretibial region bilaterally essentially nonpitting, no arthritic changes, normal range of motion  Neuro:  Grossly intact  Psych:  Alert, oriented x3 and appropriate      Medications:    Current Outpatient Medications:     carBAMazepine (TEGretol) 200 mg tablet, Take 1 tablet (200 mg total) by mouth 3 (three) times a day, Disp: 90 tablet, Rfl: 1    chlorthalidone (HYGROTEN) 50 MG tablet, Take 25 mg by mouth daily, Disp: , Rfl:     ergocalciferol (VITAMIN D2) 50,000 units, Take 1 capsule (50,000 Units total) by mouth once a week for 12 doses, Disp: 12 capsule, Rfl: 0    levETIRAcetam (KEPPRA) 750 mg tablet, Take 2 tablets (1,500 mg total) by mouth every 12 (twelve) hours, Disp: 180 tablet, Rfl: 3    lidocaine (LIDODERM) 5 %, Apply 1 patch topically daily Remove & Discard patch within 12 hours or as directed by MD, Disp: 30 patch, Rfl: 0    losartan (COZAAR) 25 mg tablet, Take 1 tablet (25 mg total) by mouth daily, Disp: 90 tablet, Rfl: 3    metoprolol succinate (TOPROL-XL) 50 mg 24 hr tablet, take 1 tablet by mouth daily, Disp: 90 tablet, Rfl: 0    nicotine (NICODERM CQ) 21 mg/24 hr TD 24 hr patch, Place 1 patch on the skin every 24 hours, Disp: 28 patch, Rfl: 1    pramipexole (MIRAPEX) 1 5 MG tablet, Take 1 tablet (1 5 mg total) by mouth daily at bedtime, Disp: 90 tablet, Rfl: 1    celecoxib (CeleBREX) 100 mg capsule, Take 1 capsule (100 mg total) by mouth 2 (two) times a day (Patient not taking: Reported on 3/4/2021), Disp: 30 capsule, Rfl: 0    cholecalciferol (VITAMIN D3) 1,000 units tablet, Take 2 tablets (2,000 Units total) by mouth daily (Patient not taking: Reported on 4/20/2021), Disp: 180 tablet, Rfl: 1    spironolactone (ALDACTONE) 25 mg tablet, Take 1 tablet (25 mg total) by mouth daily, Disp: 90 tablet, Rfl: 3    Lab, Imaging and other studies: I have personally reviewed pertinent labs    Laboratory Results:  Results for orders placed or performed in visit on 04/19/21   Comprehensive metabolic panel   Result Value Ref Range    Sodium 140 133 - 145 mmol/L    Potassium 3 3 (L) 3 5 - 5 0 mmol/L    Chloride 103 96 - 108 mmol/L    CO2 29 22 - 33 mmol/L    ANION GAP 8 4 - 13 mmol/L    BUN 17 6 - 20 mg/dL    Creatinine 0 97 0 40 - 1 10 mg/dL    Glucose, Fasting 95 70 - 105 mg/dL    Calcium 8 9 8 4 - 10 2 mg/dL    Corrected Calcium 9 4 8 3 - 10 1 mg/dL    AST 11 (L) 15 - 41 U/L    ALT 9 5 - 54 U/L    Alkaline Phosphatase 115 5 35 - 140 U/L    Total Protein 7 2 6 4 - 8 3 g/dL    Albumin 3 4 3 4 - 4 8 g/dL    Total Bilirubin 0 32 0 30 - 1 20 mg/dL    eGFR 63 ml/min/1 73sq m   CBC   Result Value Ref Range    WBC 12 26 (H) 4 31 - 10 16 Thousand/uL    RBC 4 16 3 81 - 5 12 Million/uL    Hemoglobin 12 3 11 5 - 15 4 g/dL    Hematocrit 38 5 34 8 - 46 1 %    MCV 93 82 - 98 fL    MCH 29 6 26 8 - 34 3 pg    MCHC 31 9 31 4 - 37 4 g/dL    RDW 13 6 11 6 - 15 1 %    Platelets 591 221 - 416 Thousands/uL    MPV 10 9 8 9 - 12 7 fL   Lipid Panel with Direct LDL reflex   Result Value Ref Range    Cholesterol 178 <=200 mg/dL    Triglycerides 67 6 <=150 mg/dL    HDL, Direct 72 >=50 mg/dL    LDL Calculated 92 0 - 100 mg/dL   Magnesium   Result Value Ref Range    Magnesium 1 7 1 6 - 2 6 mg/dL   Phosphorus   Result Value Ref Range    Phosphorus 3 5 2 5 - 5 0 mg/dL   Vitamin D 25 hydroxy   Result Value Ref Range    Vit D, 25-Hydroxy 31 3 30 0 - 100 0 ng/mL       Results from last 7 days   Lab Units 04/19/21  0954   WBC Thousand/uL 12 26*   HEMOGLOBIN g/dL 12 3   HEMATOCRIT % 38 5   PLATELETS Thousands/uL 368   POTASSIUM mmol/L 3 3*   CHLORIDE mmol/L 103   CO2 mmol/L 29   BUN mg/dL 17   CREATININE mg/dL 0 97   CALCIUM mg/dL 8 9   MAGNESIUM mg/dL 1 7   PHOSPHORUS mg/dL 3 5         Radiology review:   chest X-ray    Ultrasound      Portions of the record may have been created with voice recognition software  Occasional wrong word or "sound a like" substitutions may have occurred due to the inherent limitations of voice recognition software  Read the chart carefully and recognize, using context, where substitutions have occurred

## 2021-04-20 NOTE — PATIENT INSTRUCTIONS
1  Medication changes today:   Please begin spironolactone 25 mg once a day for your potassium which is low and for your blood pressure  Watch for breast soreness or breast enlargement and call if you developed either the symptoms   Please take magnesium over-the-counter once a day watch for diarrhea    2  Please go for non fasting  lab work BUT IN THE MORNING:  Please go 1 week after starting the new medication      ALSO THIS WILL INCLUDE A 24 HOUR URINE COLLECTION AT [address] [place] [address]  3  Please also go for an echocardiogram of your heart at this time we will help to arrange for this study     4  PLEASE PURCHASE A BLOOD PRESSURE MACHINE FROM YOUR DRUG STORE WITH AN EXTRA LARGE CUFF TO FIT YOUR ARM:   THEN Please take 1 week a blood pressure readings  AT THIS TIME     AS FOLLOWS  MORNING AND EVENING, SITTING AND STANDING as follows:  · TAKE THE MORNING READINGS BEFORE ANY MEDICATIONS AND WHEN YOU ARE RELAXED FOR SEVERAL MINUTES  · TAKE THE EVENING READINGS:  BETWEEN 7-10 P M ; PRIOR TO ANY MEDICATIONS; AT LEAST IN OUR  FROM DINNER; AND CERTAINLY AFTER RELAXING FOR A FEW MINUTES  · PLEASE INCLUDE HEART RATE WITH YOUR BLOOD PRESSURE READINGS  · When taking standing readings, keep your arm supported at heart level and not dangling  · Make sure you are sitting with your back supported and feet on the ground and do not cross your legs or feet  · Make sure you have not taken any coffee or caffeine products or exercised or smoke cigarettes at least 30 minutes before taking your blood pressure  Then please bring in your blood pressure machine along with your blood pressure readings to see 1 of my advanced practitioner's in the next couple weeks  PLEASE REMEMBER TO TAKE YOUR BLOOD PRESSURE IN YOUR LEFT ARM    5   Follow-up in 4  months   Please bring in 1 week a blood pressure readings morning evening, sitting and standing is outlined above     Please go for fasting lab work 1-2 weeks prior to your appointment      6  General instructions:   AVOID SALT BUT NOT ADDING AN READING LABELS TO MAKE SURE THERE IS LOW-SALT IN THE FOOD THAT YOU ARE EATING  o Goal is less than 2 g of sodium intake or less than 5 g of sodium chloride intake per day  · PLEASE EAT A HIGH POTASSIUM DIET WITH A LOT OF FRUITS AND VEGETABLES, PLEASE SEE BELOW FOR A LIST OF FOODS THAT HAVE A LOT OF POTASSIUM IN THEM     Avoid nonsteroidal anti-inflammatory drugs such as Naprosyn, ibuprofen, Aleve, Advil, Celebrex, Meloxicam (Mobic) etc   You can use Tylenol as needed if you do not have any liver condition to be concerned about     Avoid medications such as Sudafed or decongestants and antihistamines that contained the D component which is the decongestant  You can take antihistamines without the decongestant or D component   Try to avoid medications such as pantoprazole or  Protonix/Nexium or Esomeprazole)/Prilosec or omeprazole/Prevacid or lansoprazole/AcipHex or Rabeprazole  If you are able to, use Pepcid as this is safer for your kidneys   Try to exercise at least 30 minutes 3 days a week to begin with with an ultimate goal of 5 days a week for at least 30 minutes     Try to lose 5-10 lb by your next visit     Please do not drink more than 2 glasses of alcohol/wine on a daily basis as this may contribute to your high blood pressure  Potassium Content of Foods List   WHAT YOU NEED TO KNOW:   Potassium is a mineral that is found in most foods  Potassium helps to balance fluids and minerals in your body  It also helps your body maintain a normal blood pressure  Potassium helps your muscles contract and your nerves function normally  DISCHARGE INSTRUCTIONS:   Why you may need to change the amount of potassium you eat:   · You may need more potassium  if you have hypokalemia (low potassium levels) or high blood pressure  You may also need more potassium if you are taking diuretics   Diuretics and certain medicines cause your body to lose potassium  · You may need less potassium  in your diet if you have hyperkalemia (high potassium levels) or kidney disease  Potassium content of fruit:  The amount of potassium in milligrams (mg) contained in each fruit or serving of fruit is listed beside the item  · High-potassium foods (more than 200 mg per serving):      ? 1 medium banana (425)    ? ½ of a papaya (390)    ? ½ cup of prune juice (370)    ? ¼ cup of raisins (270)    ? 1 medium connie (325) or kiwi (240)    ? 1 small orange (240) or ½ cup of orange juice (235)    ? ½ cup of cubed cantaloupe (215) or diced honeydew melon (200)    ? 1 medium pear (200)    · Medium-potassium foods (50 to 200 mg per serving):      ? 1 medium peach (185)    ? 1 small apple or ½ cup of apple juice (150)    ? ½ cup of peaches canned in juice (120)    ? ½ cup of canned pineapple (100)    ? ½ cup of fresh, sliced strawberries (605)    ? ½ cup of watermelon (85)    · Low-potassium foods (less than 50 mg per serving):      ? ½ cup of cranberries (45) or cranberry juice cocktail (20)    ? ½ cup of nectar of papaya, connie, or pear (35)    Potassium content of vegetables:   · High-potassium foods (more than 200 mg per serving):      ? 1 medium baked potato, with skin (925)    ? 1 baked medium sweet potato, with skin (450)    ? ½ cup of tomato or vegetable juice (275), or 1 medium raw tomato (290)    ? ½ cup of mushrooms (280)    ? ½ cup of fresh brussels sprouts (250)    ? ½ cup of cooked zucchini (220) or winter squash (250)    ? ¼ of a medium avocado (245)    ? ½ cup of broccoli (230)    · Medium-potassium foods (50 to 200 mg per serving):      ? ½ cup of corn (195)    ? ½ cup of fresh or cooked carrots (180)    ? ½ cup of fresh cauliflower (150)    ? ½ cup of asparagus (155)    ? ½ cup of canned peas (90)     ? 1 cup of lettuce, all types (100)    ? ½ cup of fresh green beans (90)    ? ½ cup of frozen green beans (85)    ?  ½ cup of cucumber (80)    Potassium content of protein foods:   · High-potassium foods (more than 200 mg per serving):      ? ½ cup of cooked wilson beans (400) or lentils (365)    ? 1 cup of soy milk (300)    ? 3 ounces of baked or broiled salmon (319)    ? 3 ounces of roasted turkey, dark meat (250)    ? ¼ cup of sunflower seeds (241)    ? 3 ounces of cooked lean beef (224)    ? 2 tablespoons of smooth peanut butter (210)    · Medium-potassium foods (50 to 200 mg per serving):      ? 1 ounce of salted peanuts, almonds, or cashews (200)    ? 1 large egg (60 mg)    Potassium content of dairy foods:   · High-potassium foods (more than 200 mg per serving):      ? 6 ounces of yogurt (260 to 435)    ? 1 cup of nonfat, low-fat, or whole milk (350 to 380)    · Medium-potassium foods (50 to 200 mg per serving):      ? ½ cup of ricotta cheese (154)    ? ½ cup of vanilla ice cream (131)    ? ½ cup of low-fat (2%) cottage cheese (110)    · Low-potassium foods (less than 50 mg per serving):      ? 1 ounce of cheese (20 to 30)      Potassium content of grains:   · 1 slice of white bread (30)    · ½ cup of white or brown rice (50)    · ½ cup of spaghetti or macaroni (30)    · 1 flour or corn tortilla (50)    · 1 four-inch waffle (50)    Potassium content of other foods:   · 1 tablespoon of molasses (295)    · 1½ ounces of chocolate (165)    · Some salt substitutes may contain a high amount of potassium  Check the food label to find the amount of potassium it contains  © Copyright [address] Information is for End User's use only and may not be sold, redistributed or otherwise used for commercial purposes  All illustrations and images included in CareNotes® are the copyrighted property of [employer]  or [employer]  The above information is an  only  It is not intended as medical advice for individual conditions or treatments   Talk to your doctor, nurse or pharmacist before following any medical regimen to see if it is safe and effective for you

## 2021-04-24 LAB — RENIN PLAS-CCNC: <0.167 NG/ML/HR (ref 0.17–5.38)

## 2021-04-25 LAB — ALDOST SERPL-MCNC: <1 NG/DL (ref 0–30)

## 2021-04-26 LAB
METANEPH FREE SERPL-MCNC: 10.9 PG/ML (ref 0–88)
NORMETANEPHRINE SERPL-MCNC: 46.3 PG/ML (ref 0–191.8)

## 2021-05-01 DIAGNOSIS — I10 ESSENTIAL HYPERTENSION: ICD-10-CM

## 2021-05-01 RX ORDER — METOPROLOL SUCCINATE 50 MG/1
TABLET, EXTENDED RELEASE ORAL
Qty: 90 TABLET | Refills: 0 | Status: SHIPPED | OUTPATIENT
Start: 2021-05-01 | End: 2021-07-26 | Stop reason: SDUPTHER

## 2021-05-13 PROBLEM — I10 ESSENTIAL HYPERTENSION: Status: ACTIVE | Noted: 2021-05-13

## 2021-05-23 ENCOUNTER — HOSPITAL ENCOUNTER (EMERGENCY)
Facility: HOSPITAL | Age: 61
Discharge: HOME | End: 2021-05-23
Attending: EMERGENCY MEDICINE | Admitting: EMERGENCY MEDICINE
Payer: MEDICARE

## 2021-05-23 ENCOUNTER — APPOINTMENT (EMERGENCY)
Dept: CT IMAGING | Facility: HOSPITAL | Age: 61
End: 2021-05-23
Payer: MEDICARE

## 2021-05-23 VITALS
OXYGEN SATURATION: 98 % | RESPIRATION RATE: 18 BRPM | SYSTOLIC BLOOD PRESSURE: 152 MMHG | HEART RATE: 101 BPM | TEMPERATURE: 98.9 F | DIASTOLIC BLOOD PRESSURE: 98 MMHG

## 2021-05-23 DIAGNOSIS — G44.89 OTHER HEADACHE SYNDROME: Primary | ICD-10-CM

## 2021-05-23 PROCEDURE — 70450 CT HEAD/BRAIN W/O DYE: CPT

## 2021-05-23 PROCEDURE — 96375 TX/PRO/DX INJ NEW DRUG ADDON: CPT

## 2021-05-23 PROCEDURE — 99284 EMERGENCY DEPT VISIT MOD MDM: CPT | Performed by: PHYSICIAN ASSISTANT

## 2021-05-23 PROCEDURE — 96374 THER/PROPH/DIAG INJ IV PUSH: CPT

## 2021-05-23 PROCEDURE — G1004 CDSM NDSC: HCPCS

## 2021-05-23 PROCEDURE — 99284 EMERGENCY DEPT VISIT MOD MDM: CPT

## 2021-05-23 RX ORDER — ONDANSETRON 2 MG/ML
4 INJECTION INTRAMUSCULAR; INTRAVENOUS ONCE
Status: COMPLETED | OUTPATIENT
Start: 2021-05-23 | End: 2021-05-23

## 2021-05-23 RX ORDER — DIPHENHYDRAMINE HYDROCHLORIDE 50 MG/ML
50 INJECTION INTRAMUSCULAR; INTRAVENOUS ONCE
Status: COMPLETED | OUTPATIENT
Start: 2021-05-23 | End: 2021-05-23

## 2021-05-23 RX ORDER — BUTALBITAL, ACETAMINOPHEN AND CAFFEINE 50; 325; 40 MG/1; MG/1; MG/1
1 TABLET ORAL EVERY 6 HOURS PRN
Qty: 15 TABLET | Refills: 0 | Status: SHIPPED | OUTPATIENT
Start: 2021-05-23 | End: 2021-07-26

## 2021-05-23 RX ORDER — BUTALBITAL, ACETAMINOPHEN AND CAFFEINE 50; 325; 40 MG/1; MG/1; MG/1
1 TABLET ORAL ONCE
Status: COMPLETED | OUTPATIENT
Start: 2021-05-23 | End: 2021-05-23

## 2021-05-23 RX ORDER — KETOROLAC TROMETHAMINE 30 MG/ML
15 INJECTION, SOLUTION INTRAMUSCULAR; INTRAVENOUS ONCE
Status: COMPLETED | OUTPATIENT
Start: 2021-05-23 | End: 2021-05-23

## 2021-05-23 RX ADMIN — DIPHENHYDRAMINE HYDROCHLORIDE 50 MG: 50 INJECTION, SOLUTION INTRAMUSCULAR; INTRAVENOUS at 14:40

## 2021-05-23 RX ADMIN — BUTALBITAL, ACETAMINOPHEN, AND CAFFEINE 1 TABLET: 50; 325; 40 TABLET ORAL at 16:10

## 2021-05-23 RX ADMIN — ONDANSETRON 4 MG: 2 INJECTION INTRAMUSCULAR; INTRAVENOUS at 14:38

## 2021-05-23 RX ADMIN — KETOROLAC TROMETHAMINE 15 MG: 30 INJECTION, SOLUTION INTRAMUSCULAR at 14:39

## 2021-05-23 NOTE — ED PROVIDER NOTES
History  Chief Complaint   Patient presents with    Migraine     right sided head ache x 3 days, took one tylenol around noon, no relief     Pt with Past Medical History: Depression, Epilepsy, Obesity, Restless leg,   No pertinent PSH   presents to emergency department complaining of 3 day history of atraumatic, intermittent but persistent right-sided headache without radiation, no fever, no visual changes, no chest pain, shortness of breath, no abdominal pain, no NVD, no rash, no joint pain or swelling  Patient took 1 dose of Tylenol with no improvement of symptoms  Prior to Admission Medications   Prescriptions Last Dose Informant Patient Reported?  Taking?   carBAMazepine (TEGretol) 200 mg tablet  Self No No   Sig: Take 1 tablet (200 mg total) by mouth 3 (three) times a day   celecoxib (CeleBREX) 100 mg capsule  Self No No   Sig: Take 1 capsule (100 mg total) by mouth 2 (two) times a day   Patient not taking: Reported on 3/4/2021   chlorthalidone (HYGROTEN) 50 MG tablet  Self Yes No   Sig: Take 25 mg by mouth daily   cholecalciferol (VITAMIN D3) 1,000 units tablet  Self No No   Sig: Take 2 tablets (2,000 Units total) by mouth daily   Patient not taking: Reported on 4/20/2021   ergocalciferol (VITAMIN D2) 50,000 units  Self No No   Sig: Take 1 capsule (50,000 Units total) by mouth once a week for 12 doses   levETIRAcetam (KEPPRA) 750 mg tablet  Self No No   Sig: Take 2 tablets (1,500 mg total) by mouth every 12 (twelve) hours   lidocaine (LIDODERM) 5 %  Self No No   Sig: Apply 1 patch topically daily Remove & Discard patch within 12 hours or as directed by MD   losartan (COZAAR) 25 mg tablet  Self No No   Sig: Take 1 tablet (25 mg total) by mouth daily   metoprolol succinate (TOPROL-XL) 50 mg 24 hr tablet   No No   Sig: take 1 tablet by mouth once daily   nicotine (NICODERM CQ) 21 mg/24 hr TD 24 hr patch   No No   Sig: Place 1 patch on the skin every 24 hours   pramipexole (MIRAPEX) 1 5 MG tablet  Self No No   Sig: Take 1 tablet (1 5 mg total) by mouth daily at bedtime   spironolactone (ALDACTONE) 25 mg tablet   No No   Sig: Take 1 tablet (25 mg total) by mouth daily      Facility-Administered Medications: None       Past Medical History:   Diagnosis Date    Depression     [facility] [address] [facility]     [facility]     Obesity     Restless leg        Past Surgical History:   Procedure Laterality Date    DENTAL SURGERY      all teeth removed    LASER ABLATION OF THE CERVIX      MAMMO (HISTORICAL)  05/01/2017       Family History   Problem Relation Age of Onset    Kidney disease Mother     Liver disease Mother     Heart attack Mother     Heart attack Father     No Known Problems Brother     No Known Problems Son      I have reviewed and agree with the history as documented  E-Cigarette/Vaping    E-Cigarette Use Never User      E-Cigarette/Vaping Substances    Nicotine No     THC No     CBD No     Flavoring No     Other No     Unknown No      Social History     Tobacco Use    Smoking status: Current Every Day Smoker     Packs/day: 0 50     Years: 23 00     Pack years: 11 50     Types: Cigarettes    Smokeless tobacco: Never Used   Substance Use Topics    Alcohol use: Never     Frequency: Never     Drinks per session: Patient refused     Binge frequency: Never     Comment: pt denies alcohol use    Drug use: Never       Review of Systems   Constitutional: Negative for chills and fever  HENT: Negative for hearing loss, mouth sores, sore throat and trouble swallowing  Eyes: Negative for visual disturbance  Respiratory: Negative for cough and shortness of breath  Cardiovascular: Negative for chest pain and leg swelling  Gastrointestinal: Negative for abdominal pain and vomiting  Genitourinary: Negative for dysuria, frequency and genital sores  Musculoskeletal: Negative for arthralgias and myalgias  Skin: Negative for color change and pallor  Neurological: Positive for headaches  Negative for dizziness, seizures, weakness, light-headedness and numbness  Psychiatric/Behavioral: Negative for behavioral problems  The patient is not nervous/anxious  All other systems reviewed and are negative  Physical Exam  Physical Exam  Vitals signs and nursing note reviewed  Constitutional:       General: She is in acute distress (Mild)  Appearance: She is well-developed  She is not ill-appearing  HENT:      Head: Normocephalic and atraumatic  Right Ear: External ear normal       Left Ear: External ear normal       Nose: Nose normal       Mouth/Throat:      Mouth: Mucous membranes are moist       Pharynx: Oropharynx is clear  Eyes:      Conjunctiva/sclera: Conjunctivae normal    Neck:      Musculoskeletal: Normal range of motion and neck supple  Cardiovascular:      Rate and Rhythm: Normal rate and regular rhythm  Pulmonary:      Effort: Pulmonary effort is normal  No respiratory distress  Breath sounds: Normal breath sounds  Abdominal:      General: Bowel sounds are normal       Palpations: Abdomen is soft  Tenderness: There is no abdominal tenderness  There is no right CVA tenderness or left CVA tenderness  Musculoskeletal: Normal range of motion  Right lower leg: No edema  Left lower leg: No edema  Lymphadenopathy:      Cervical: No cervical adenopathy  Skin:     General: Skin is warm and dry  Capillary Refill: Capillary refill takes less than 2 seconds  Findings: No rash  Neurological:      General: No focal deficit present  Mental Status: She is alert and oriented to person, place, and time  Mental status is at baseline  Cranial Nerves: No cranial nerve deficit  Motor: No weakness     Psychiatric:         Mood and Affect: Mood normal          Behavior: Behavior normal          Vital Signs  ED Triage Vitals [05/23/21 1406]   Temperature Pulse Respirations Blood Pressure SpO2   98 9 °F (37 2 °C) 101 18 152/98 98 % Temp src Heart Rate Source Patient Position - Orthostatic VS BP Location FiO2 (%)   -- -- -- -- --      Pain Score       --           Vitals:    05/23/21 1406   BP: 152/98   Pulse: 101         Visual Acuity      ED Medications  Medications   butalbital-acetaminophen-caffeine (FIORICET,ESGIC) -40 mg per tablet 1 tablet (has no administration in time range)   ondansetron (ZOFRAN) injection 4 mg (4 mg Intravenous Given 5/23/21 1438)   ketorolac (TORADOL) injection 15 mg (15 mg Intravenous Given 5/23/21 1439)   diphenhydrAMINE (BENADRYL) injection 50 mg (50 mg Intravenous Given 5/23/21 1440)       Diagnostic Studies  Results Reviewed     None                 CT head without contrast   Final Result by Kyra Watts MD (05/23 1501)      No acute intracranial abnormality  Trace sphenoid sinusitis  Workstation performed: [id number]                    Procedures  Procedures         ED Course                             SBIRT 22yo+      Most Recent Value   SBIRT (24 yo +)   In order to provide better care to our patients, we are screening all of our patients for alcohol and drug use  Would it be okay to ask you these screening questions? Yes Filed at: 05/23/2021 1406   Initial Alcohol Screen: US AUDIT-C    1  How often do you have a drink containing alcohol?  0 Filed at: 05/23/2021 1406   2  How many drinks containing alcohol do you have on a typical day you are drinking? 0 Filed at: 05/23/2021 1406   3a  Male UNDER 65: How often do you have five or more drinks on one occasion? 0 Filed at: 05/23/2021 1406   3b  FEMALE Any Age, or MALE 65+: How often do you have 4 or more drinks on one occassion? 0 Filed at: 05/23/2021 1406   Audit-C Score  0 Filed at: 05/23/2021 1406   JOHN: How many times in the past year have you    Used an illegal drug or used a prescription medication for non-medical reasons?   Never Filed at: 05/23/2021 1406                    MDM    Disposition  Final diagnoses: Other headache syndrome     Time reflects when diagnosis was documented in both MDM as applicable and the Disposition within this note     Time User Action Codes Description Comment    5/23/2021  3:59 PM Tania Dickerson [address]  [[id number] [id number]] Other headache syndrome       ED Disposition     ED Disposition Condition Date/Time Comment    Discharge Stable Sun May 23, 2021  3:58 PM Dong Martinez discharge to home/self care  Follow-up Information     Follow up With Specialties Details Why Contact Info    DRU [place] Nurse Practitioner, Family Medicine  As needed Laura [address] [facility]   [phone]            Patient's Medications   Discharge Prescriptions    BUTALBITAL-ACETAMINOPHEN-CAFFEINE (FIORICET,ESGIC) -40 MG PER TABLET    Take 1 tablet by mouth every 6 (six) hours as needed for headaches       Start Date: 5/23/2021 End Date: --       Order Dose: 1 tablet       Quantity: 15 tablet    Refills: 0     No discharge procedures on file      PDMP Review       Value Time User    PDMP Reviewed  Yes 9/9/2020  5:50 PM Richard Treviño MD          ED Provider  Electronically Signed by           Bridgette Hauser PA-C  05/23/21 5369

## 2021-05-23 NOTE — DISCHARGE INSTRUCTIONS
Use Tylenol every 4 hours or Motrin every 6 hours; you can alternate the 2 medications taking something every 3 hours for pain/Headache, no no relief you can try OTC Excedrin migraine and if that doesn't work, use  Rx: Fioricet    If no improvement follow-up with your doctor in next few days

## 2021-05-27 ENCOUNTER — TELEPHONE (OUTPATIENT)
Dept: FAMILY MEDICINE CLINIC | Facility: CLINIC | Age: 61
End: 2021-05-27

## 2021-06-18 ENCOUNTER — TELEPHONE (OUTPATIENT)
Dept: FAMILY MEDICINE CLINIC | Facility: CLINIC | Age: 61
End: 2021-06-18

## 2021-06-18 DIAGNOSIS — E04.1 THYROID NODULE: Primary | ICD-10-CM

## 2021-06-18 NOTE — TELEPHONE ENCOUNTER
She needs follow up US of thyroid for nodule noted  and  CT chest for nodules noted on the lung   Needs follow up     Please let her know I have ordered the tests and schedule for follow up in 3-4 months after done     CT chest was done on 1/16/2021 -- she needs US of thyroid I have ordered   Noted     CT chest abdomen pelvis w contrast  Status: Final result   PACS Images     Show images for CT chest abdomen pelvis w contrast   Study Result    Narrative & Impression   CT CHEST, ABDOMEN AND PELVIS WITH IV CONTRAST     INDICATION:  Trauma      COMPARISON:  CT abdomen and pelvis 1220     TECHNIQUE: CT examination of the chest, abdomen and pelvis was performed  Axial, sagittal, and coronal 2D reformatted images were created from the source data and submitted for interpretation      Radiation dose length product (DLP) for this visit:  4583 mGy-cm   This examination, like all CT scans performed in the [facility], was performed utilizing techniques to minimize radiation dose exposure, including the use of iterative   reconstruction and automated exposure control      IV Contrast:  100 mL of iohexol (OMNIPAQUE)  Enteric Contrast: Enteric contrast was administered      FINDINGS:     CHEST     LUNGS:  No focal consolidation  Central airways are patent  Left upper lobe calcified granuloma      PLEURA:  Unremarkable      HEART, GREAT VESSELS:  Unremarkable for patient's age      MEDIASTINUM, SERVANDO:  Unremarkable      CHEST WALL, VISUALIZED LOWER NECK:  1 5 cm  subcutaneous nodule in the right axillary region (series 01 image 11), nonspecific        ABDOMEN     LIVER/BILIARY TREE:  Unremarkable      GALLBLADDER:  No calcified gallstones  No pericholecystic inflammatory change      SPLEEN:  Unremarkable      PANCREAS:  Unremarkable      ADRENAL GLANDS:  Similar nodular thickening of the adrenal glands, nonspecific and could be due to adenomatous hyperplasia      KIDNEYS/URETERS:  Unremarkable    No hydronephrosis  STOMACH AND BOWEL:  No disproportionate dilatation of small or large bowel  Colonic diverticulosis without findings of acute diverticulitis  Small hiatal hernia      APPENDIX:  The appendix is unremarkable      ABDOMINOPELVIC CAVITY:  No ascites  No pneumoperitoneum  No lymphadenopathy      VESSELS:  No abdominal aortic aneurysm  Mild aortoiliac atherosclerotic changes      PELVIS     REPRODUCTIVE ORGANS:  Leiomyomatous uterus      URINARY BLADDER:  Unremarkable      ABDOMINAL WALL/INGUINAL REGIONS:  Subcutaneous fat stranding in the anterior proximal right thigh (series 301 image 122), nonspecific and may be due to contusion        OSSEOUS STRUCTURES:  No acute fracture or destructive osseous lesion  Degenerative changes of the spine  Stable grade 1 anterolisthesis of L4 on L5      IMPRESSION:     1   Subcutaneous fat stranding in the anterior proximal right thigh, nonspecific and may be due to contusion  Otherwise, no findings of acute traumatic injury in the chest, abdomen, or pelvis      2   1 5 cm subcutaneous nodule in the right axillary region, nonspecific    Correlation with physical exam is suggested            Workstation performed: [id number]      Imaging    CT chest abdomen pelvis w contrast (Order: [id number]) - 1/16/2021

## 2021-07-22 ENCOUNTER — APPOINTMENT (EMERGENCY)
Dept: RADIOLOGY | Facility: HOSPITAL | Age: 61
End: 2021-07-22
Payer: MEDICARE

## 2021-07-22 ENCOUNTER — HOSPITAL ENCOUNTER (OUTPATIENT)
Facility: HOSPITAL | Age: 61
Setting detail: OBSERVATION
Discharge: HOME | End: 2021-07-23
Attending: INTERNAL MEDICINE | Admitting: INTERNAL MEDICINE
Payer: MEDICARE

## 2021-07-22 DIAGNOSIS — G40.909 RECURRENT SEIZURES (HCC): Primary | ICD-10-CM

## 2021-07-22 DIAGNOSIS — G40.919 EPILEPSY WITH ALTERED CONSCIOUSNESS WITH INTRACTABLE EPILEPSY (HCC): ICD-10-CM

## 2021-07-22 PROBLEM — R65.10 SIRS (SYSTEMIC INFLAMMATORY RESPONSE SYNDROME) (HCC): Status: ACTIVE | Noted: 2021-07-22

## 2021-07-22 LAB
ALBUMIN SERPL BCP-MCNC: 3.4 G/DL (ref 3.4–4.8)
ALP SERPL-CCNC: 93.8 U/L (ref 35–140)
ALT SERPL W P-5'-P-CCNC: 11 U/L (ref 5–54)
ANION GAP SERPL CALCULATED.3IONS-SCNC: 8 MMOL/L (ref 4–13)
AST SERPL W P-5'-P-CCNC: 12 U/L (ref 15–41)
ATRIAL RATE: 100 BPM
BASOPHILS # BLD AUTO: 0.03 THOUSANDS/ΜL (ref 0–0.1)
BASOPHILS NFR BLD AUTO: 0 % (ref 0–1)
BILIRUB SERPL-MCNC: 0.39 MG/DL (ref 0.3–1.2)
BUN SERPL-MCNC: 14 MG/DL (ref 6–20)
CALCIUM ALBUM COR SERPL-MCNC: 9.3 MG/DL (ref 8.3–10.1)
CALCIUM SERPL-MCNC: 8.8 MG/DL (ref 8.4–10.2)
CARBAMAZEPINE SERPL-MCNC: <2 UG/ML (ref 4–12)
CHLORIDE SERPL-SCNC: 104 MMOL/L (ref 96–108)
CO2 SERPL-SCNC: 25 MMOL/L (ref 22–33)
CREAT SERPL-MCNC: 1.02 MG/DL (ref 0.4–1.1)
EOSINOPHIL # BLD AUTO: 0.13 THOUSAND/ΜL (ref 0–0.61)
EOSINOPHIL NFR BLD AUTO: 1 % (ref 0–6)
ERYTHROCYTE [DISTWIDTH] IN BLOOD BY AUTOMATED COUNT: 14 % (ref 11.6–15.1)
GFR SERPL CREATININE-BSD FRML MDRD: 59 ML/MIN/1.73SQ M
GLUCOSE SERPL-MCNC: 105 MG/DL (ref 65–140)
HCT VFR BLD AUTO: 35.7 % (ref 34.8–46.1)
HGB BLD-MCNC: 11.4 G/DL (ref 11.5–15.4)
IMM GRANULOCYTES # BLD AUTO: 0.06 THOUSAND/UL (ref 0–0.2)
IMM GRANULOCYTES NFR BLD AUTO: 0 % (ref 0–2)
LYMPHOCYTES # BLD AUTO: 2.22 THOUSANDS/ΜL (ref 0.6–4.47)
LYMPHOCYTES NFR BLD AUTO: 13 % (ref 14–44)
MCH RBC QN AUTO: 29.5 PG (ref 26.8–34.3)
MCHC RBC AUTO-ENTMCNC: 31.9 G/DL (ref 31.4–37.4)
MCV RBC AUTO: 92 FL (ref 82–98)
MONOCYTES # BLD AUTO: 0.79 THOUSAND/ΜL (ref 0.17–1.22)
MONOCYTES NFR BLD AUTO: 5 % (ref 4–12)
NEUTROPHILS # BLD AUTO: 13.41 THOUSANDS/ΜL (ref 1.85–7.62)
NEUTS SEG NFR BLD AUTO: 81 % (ref 43–75)
P AXIS: 27 DEGREES
PLATELET # BLD AUTO: 355 THOUSANDS/UL (ref 149–390)
PLATELET # BLD AUTO: 369 THOUSANDS/UL (ref 149–390)
PMV BLD AUTO: 11.2 FL (ref 8.9–12.7)
PMV BLD AUTO: 11.4 FL (ref 8.9–12.7)
POTASSIUM SERPL-SCNC: 3.2 MMOL/L (ref 3.5–5)
PR INTERVAL: 129 MS
PROT SERPL-MCNC: 7.1 G/DL (ref 6.4–8.3)
QRS AXIS: 36 DEGREES
QRSD INTERVAL: 120 MS
QT INTERVAL: 384 MS
QTC INTERVAL: 501 MS
RBC # BLD AUTO: 3.87 MILLION/UL (ref 3.81–5.12)
SODIUM SERPL-SCNC: 137 MMOL/L (ref 133–145)
T WAVE AXIS: 25 DEGREES
TROPONIN I SERPL-MCNC: 0.05 NG/ML (ref 0–0.07)
TSH SERPL DL<=0.05 MIU/L-ACNC: 3.39 UIU/ML (ref 0.34–5.6)
VENTRICULAR RATE: 102 BPM
WBC # BLD AUTO: 16.64 THOUSAND/UL (ref 4.31–10.16)

## 2021-07-22 PROCEDURE — 96360 HYDRATION IV INFUSION INIT: CPT

## 2021-07-22 PROCEDURE — 85049 AUTOMATED PLATELET COUNT: CPT | Performed by: PHYSICIAN ASSISTANT

## 2021-07-22 PROCEDURE — 99285 EMERGENCY DEPT VISIT HI MDM: CPT

## 2021-07-22 PROCEDURE — 85025 COMPLETE CBC W/AUTO DIFF WBC: CPT | Performed by: INTERNAL MEDICINE

## 2021-07-22 PROCEDURE — 93010 ELECTROCARDIOGRAM REPORT: CPT | Performed by: INTERNAL MEDICINE

## 2021-07-22 PROCEDURE — 80053 COMPREHEN METABOLIC PANEL: CPT | Performed by: INTERNAL MEDICINE

## 2021-07-22 PROCEDURE — 80156 ASSAY CARBAMAZEPINE TOTAL: CPT | Performed by: INTERNAL MEDICINE

## 2021-07-22 PROCEDURE — 80177 DRUG SCRN QUAN LEVETIRACETAM: CPT | Performed by: INTERNAL MEDICINE

## 2021-07-22 PROCEDURE — 36415 COLL VENOUS BLD VENIPUNCTURE: CPT | Performed by: INTERNAL MEDICINE

## 2021-07-22 PROCEDURE — 84443 ASSAY THYROID STIM HORMONE: CPT | Performed by: PHYSICIAN ASSISTANT

## 2021-07-22 PROCEDURE — 99220 PR INITIAL OBSERVATION CARE/DAY 70 MINUTES: CPT | Performed by: PHYSICIAN ASSISTANT

## 2021-07-22 PROCEDURE — 99285 EMERGENCY DEPT VISIT HI MDM: CPT | Performed by: INTERNAL MEDICINE

## 2021-07-22 PROCEDURE — 84484 ASSAY OF TROPONIN QUANT: CPT | Performed by: INTERNAL MEDICINE

## 2021-07-22 PROCEDURE — 71045 X-RAY EXAM CHEST 1 VIEW: CPT

## 2021-07-22 PROCEDURE — 93005 ELECTROCARDIOGRAM TRACING: CPT

## 2021-07-22 RX ORDER — METOPROLOL SUCCINATE 50 MG/1
50 TABLET, EXTENDED RELEASE ORAL DAILY
Status: DISCONTINUED | OUTPATIENT
Start: 2021-07-23 | End: 2021-07-23 | Stop reason: HOSPADM

## 2021-07-22 RX ORDER — CARBAMAZEPINE 200 MG/1
600 TABLET ORAL ONCE
Status: COMPLETED | OUTPATIENT
Start: 2021-07-22 | End: 2021-07-22

## 2021-07-22 RX ORDER — HEPARIN SODIUM 5000 [USP'U]/ML
5000 INJECTION, SOLUTION INTRAVENOUS; SUBCUTANEOUS EVERY 8 HOURS SCHEDULED
Status: DISCONTINUED | OUTPATIENT
Start: 2021-07-22 | End: 2021-07-23 | Stop reason: HOSPADM

## 2021-07-22 RX ORDER — ACETAMINOPHEN 325 MG/1
650 TABLET ORAL EVERY 6 HOURS PRN
Status: DISCONTINUED | OUTPATIENT
Start: 2021-07-22 | End: 2021-07-23 | Stop reason: HOSPADM

## 2021-07-22 RX ORDER — SPIRONOLACTONE 25 MG/1
25 TABLET ORAL DAILY
Status: DISCONTINUED | OUTPATIENT
Start: 2021-07-23 | End: 2021-07-23 | Stop reason: HOSPADM

## 2021-07-22 RX ORDER — NICOTINE 21 MG/24HR
1 PATCH, TRANSDERMAL 24 HOURS TRANSDERMAL DAILY
Status: DISCONTINUED | OUTPATIENT
Start: 2021-07-23 | End: 2021-07-23 | Stop reason: HOSPADM

## 2021-07-22 RX ORDER — LEVETIRACETAM 500 MG/1
1500 TABLET ORAL EVERY 12 HOURS SCHEDULED
Status: DISCONTINUED | OUTPATIENT
Start: 2021-07-22 | End: 2021-07-23 | Stop reason: HOSPADM

## 2021-07-22 RX ORDER — CLONAZEPAM 0.5 MG/1
0.5 TABLET ORAL ONCE
Status: COMPLETED | OUTPATIENT
Start: 2021-07-22 | End: 2021-07-22

## 2021-07-22 RX ORDER — POTASSIUM CHLORIDE 20 MEQ/1
40 TABLET, EXTENDED RELEASE ORAL ONCE
Status: COMPLETED | OUTPATIENT
Start: 2021-07-22 | End: 2021-07-22

## 2021-07-22 RX ORDER — BUTALBITAL, ACETAMINOPHEN AND CAFFEINE 50; 325; 40 MG/1; MG/1; MG/1
1 TABLET ORAL EVERY 6 HOURS PRN
Status: DISCONTINUED | OUTPATIENT
Start: 2021-07-22 | End: 2021-07-23 | Stop reason: HOSPADM

## 2021-07-22 RX ORDER — LEVETIRACETAM 10 MG/ML
1000 INJECTION INTRAVASCULAR ONCE
Status: COMPLETED | OUTPATIENT
Start: 2021-07-22 | End: 2021-07-22

## 2021-07-22 RX ORDER — CARBAMAZEPINE 200 MG/1
200 TABLET ORAL 3 TIMES DAILY
Status: DISCONTINUED | OUTPATIENT
Start: 2021-07-23 | End: 2021-07-23 | Stop reason: HOSPADM

## 2021-07-22 RX ORDER — ONDANSETRON 2 MG/ML
4 INJECTION INTRAMUSCULAR; INTRAVENOUS EVERY 6 HOURS PRN
Status: DISCONTINUED | OUTPATIENT
Start: 2021-07-22 | End: 2021-07-23 | Stop reason: HOSPADM

## 2021-07-22 RX ORDER — CHLORTHALIDONE 25 MG/1
25 TABLET ORAL DAILY
Status: DISCONTINUED | OUTPATIENT
Start: 2021-07-23 | End: 2021-07-23 | Stop reason: HOSPADM

## 2021-07-22 RX ORDER — PRAMIPEXOLE DIHYDROCHLORIDE 0.5 MG/1
1.5 TABLET ORAL
Status: DISCONTINUED | OUTPATIENT
Start: 2021-07-22 | End: 2021-07-23 | Stop reason: HOSPADM

## 2021-07-22 RX ORDER — SODIUM CHLORIDE 9 MG/ML
125 INJECTION, SOLUTION INTRAVENOUS CONTINUOUS
Status: DISCONTINUED | OUTPATIENT
Start: 2021-07-22 | End: 2021-07-23

## 2021-07-22 RX ORDER — LOSARTAN POTASSIUM 25 MG/1
25 TABLET ORAL DAILY
Status: DISCONTINUED | OUTPATIENT
Start: 2021-07-23 | End: 2021-07-23 | Stop reason: HOSPADM

## 2021-07-22 RX ADMIN — CLONAZEPAM 0.5 MG: 0.5 TABLET ORAL at 18:44

## 2021-07-22 RX ADMIN — CARBAMAZEPINE 600 MG: 200 TABLET ORAL at 18:44

## 2021-07-22 RX ADMIN — PRAMIPEXOLE DIHYDROCHLORIDE 1.5 MG: 0.5 TABLET ORAL at 22:39

## 2021-07-22 RX ADMIN — POTASSIUM CHLORIDE 40 MEQ: 1500 TABLET, EXTENDED RELEASE ORAL at 18:44

## 2021-07-22 RX ADMIN — LEVETIRACETAM 1500 MG: 500 TABLET, FILM COATED ORAL at 22:39

## 2021-07-22 RX ADMIN — LEVETIRACETAM 1000 MG: 10 INJECTION INTRAVENOUS at 18:43

## 2021-07-22 RX ADMIN — HEPARIN SODIUM 5000 UNITS: 5000 INJECTION INTRAVENOUS; SUBCUTANEOUS at 22:40

## 2021-07-22 RX ADMIN — SODIUM CHLORIDE 125 ML/HR: 0.9 INJECTION, SOLUTION INTRAVENOUS at 18:00

## 2021-07-22 NOTE — ED PROVIDER NOTES
History  Chief Complaint   Patient presents with    Altered Mental Status     ems called by neighbor who initially reported a "seizure"  ems states patient just wanted to sit down  shei is not answering questions appropriately  neighbor reports patient is normally confused     This is a 56years old brought by EMS as patient having seizure  Patient was visiting her neighbors and while she was there she has seizure so the call 911 and by the time EMS at home patient we was awake alert oriented and in no distress  Patient has very long history of seizure since was child  Patient she is on medication for it she is on Tegretol 200 mg 3 times a day, Keppra 1500 mg twice daily  Patient used to follow-up with neurologist but she has no follow-up for some time  Patient has 3 seizures today 1 at 2:00 a m  The 2nd at 8:00 a m  3rd just before came to the ER  Patient has seizure almost every few days  This is a history from the patient who is alert oriented x3 and she can answer all the questions  The neighbors said that she has history of confusion and this is her baseline  When I talked to the patient patient is not confused at all and she give good history  Patient denies any chest pain shortness of breaths abdominal pain  Patient is obese 269 lb patient has no symptoms at the ER at this time  Patient denies any dysuria hematuria also she stated that her urine smells bad  Patient has no vaginal bleeding or discharge  Prior to Admission Medications   Prescriptions Last Dose Informant Patient Reported? Taking?    butalbital-acetaminophen-caffeine (FIORICET,ESGIC) -40 mg per tablet   No No   Sig: Take 1 tablet by mouth every 6 (six) hours as needed for headaches   carBAMazepine (TEGretol) 200 mg tablet  Self No No   Sig: Take 1 tablet (200 mg total) by mouth 3 (three) times a day   celecoxib (CeleBREX) 100 mg capsule  Self No No   Sig: Take 1 capsule (100 mg total) by mouth 2 (two) times a day Patient not taking: Reported on 3/4/2021   chlorthalidone (HYGROTEN) 50 MG tablet  Self Yes No   Sig: Take 25 mg by mouth daily   cholecalciferol (VITAMIN D3) 1,000 units tablet  Self No No   Sig: Take 2 tablets (2,000 Units total) by mouth daily   Patient not taking: Reported on 4/20/2021   ergocalciferol (VITAMIN D2) 50,000 units  Self No No   Sig: Take 1 capsule (50,000 Units total) by mouth once a week for 12 doses   levETIRAcetam (KEPPRA) 750 mg tablet  Self No No   Sig: Take 2 tablets (1,500 mg total) by mouth every 12 (twelve) hours   lidocaine (LIDODERM) 5 %  Self No No   Sig: Apply 1 patch topically daily Remove & Discard patch within 12 hours or as directed by MD   losartan (COZAAR) 25 mg tablet  Self No No   Sig: Take 1 tablet (25 mg total) by mouth daily   metoprolol succinate (TOPROL-XL) 50 mg 24 hr tablet   No No   Sig: take 1 tablet by mouth once daily   nicotine (NICODERM CQ) 21 mg/24 hr TD 24 hr patch   No No   Sig: Place 1 patch on the skin every 24 hours   pramipexole (MIRAPEX) 1 5 MG tablet  Self No No   Sig: Take 1 tablet (1 5 mg total) by mouth daily at bedtime   spironolactone (ALDACTONE) 25 mg tablet   No No   Sig: Take 1 tablet (25 mg total) by mouth daily      Facility-Administered Medications: None       Past Medical History:   Diagnosis Date    Depression     [facility]) [facility] [address] [facility]     Epilepsy ([facility] )     Obesity     Restless leg        Past Surgical History:   Procedure Laterality Date    DENTAL SURGERY      all teeth removed    LASER ABLATION OF THE CERVIX      MAMMO (HISTORICAL)  05/01/2017       Family History   Problem Relation Age of Onset    Kidney disease Mother     Liver disease Mother     Heart attack Mother     Heart attack Father     No Known Problems Brother     No Known Problems Son      I have reviewed and agree with the history as documented      E-Cigarette/Vaping    E-Cigarette Use Never User      E-Cigarette/Vaping Substances    Nicotine No     THC No     CBD No     Flavoring No     Other No     Unknown No      Social History     Tobacco Use    Smoking status: Current Every Day Smoker     Packs/day: 0 50     Years: 23 00     Pack years: 11 50     Types: Cigarettes    Smokeless tobacco: Never Used   Vaping Use    Vaping Use: Never used   Substance Use Topics    Alcohol use: Never     Comment: pt denies alcohol use    Drug use: Never       Review of Systems   Constitutional: Negative for fatigue and fever  Respiratory: Negative for cough and shortness of breath  Cardiovascular: Negative for chest pain and palpitations  Gastrointestinal: Negative for abdominal pain, diarrhea, nausea and vomiting  Genitourinary: Negative for difficulty urinating, dysuria, flank pain and frequency  Musculoskeletal: Negative for back pain, myalgias, neck pain and neck stiffness  Skin: Negative for color change, pallor and rash  Neurological: Positive for seizures  Negative for dizziness, tremors, weakness, light-headedness and headaches  Hematological: Negative for adenopathy  Does not bruise/bleed easily  Psychiatric/Behavioral: Negative for agitation and behavioral problems  Physical Exam  Physical Exam  Vitals and nursing note reviewed  Constitutional:       General: She is not in acute distress  Appearance: She is well-developed  She is not ill-appearing, toxic-appearing or diaphoretic  HENT:      Head: Normocephalic and atraumatic  Mouth/Throat:      Mouth: Mucous membranes are moist       Pharynx: No oropharyngeal exudate  Eyes:      General: No visual field deficit or scleral icterus  Extraocular Movements: Extraocular movements intact  Right eye: Normal extraocular motion and no nystagmus  Left eye: Normal extraocular motion and no nystagmus  Pupils: Pupils are equal       Right eye: Pupil is round and reactive  Left eye: Pupil is round and reactive     Cardiovascular:      Rate and Rhythm: Normal rate and regular rhythm  Heart sounds: Normal heart sounds  No murmur heard  No friction rub  No gallop  Pulmonary:      Effort: Pulmonary effort is normal  No respiratory distress  Breath sounds: Normal breath sounds  No wheezing, rhonchi or rales  Abdominal:      General: Bowel sounds are normal  There is no distension  Palpations: Abdomen is soft  There is no mass  Tenderness: There is no abdominal tenderness  There is no guarding  Musculoskeletal:         General: No swelling, tenderness or deformity  Normal range of motion  Cervical back: Normal range of motion and neck supple  No rigidity  Lymphadenopathy:      Cervical: No cervical adenopathy  Skin:     General: Skin is warm and dry  Capillary Refill: Capillary refill takes less than 2 seconds  Neurological:      Mental Status: She is alert and oriented to person, place, and time  Mental status is at baseline  GCS: GCS eye subscore is 4  GCS verbal subscore is 5  GCS motor subscore is 6  Cranial Nerves: No cranial nerve deficit, dysarthria or facial asymmetry  Sensory: No sensory deficit  Motor: No weakness  Coordination: Romberg sign negative   Coordination normal       Deep Tendon Reflexes: Reflexes normal    Psychiatric:         Mood and Affect: Mood normal          Behavior: Behavior normal          Vital Signs  ED Triage Vitals   Temperature Pulse Respirations Blood Pressure SpO2   07/22/21 1622 07/22/21 1622 07/22/21 1622 07/22/21 1622 07/22/21 1622   99 1 °F (37 3 °C) 102 (!) 24 146/63 95 %      Temp Source Heart Rate Source Patient Position - Orthostatic VS BP Location FiO2 (%)   07/22/21 1622 07/22/21 1622 07/22/21 1622 07/22/21 1622 --   Oral Monitor Lying Left arm       Pain Score       07/22/21 2000       No Pain           Vitals:    07/22/21 1844 07/22/21 1950 07/22/21 2358 07/23/21 0716   BP: 140/73 136/79 132/57 135/78   Pulse: 94 92 84 84   Patient Position - Orthostatic VS:  Sitting Lying Lying         Visual Acuity      ED Medications  Medications   sodium chloride 0 9 % infusion (125 mL/hr Intravenous New Bag 7/23/21 0546)   carBAMazepine (TEGretol) tablet 200 mg (has no administration in time range)   chlorthalidone tablet 25 mg (has no administration in time range)   levETIRAcetam (KEPPRA) tablet 1,500 mg (1,500 mg Oral Given 7/22/21 2239)   losartan (COZAAR) tablet 25 mg (has no administration in time range)   pramipexole (MIRAPEX) tablet 1 5 mg (1 5 mg Oral Given 7/22/21 2239)   spironolactone (ALDACTONE) tablet 25 mg (has no administration in time range)   butalbital-acetaminophen-caffeine (FIORICET,ESGIC) -40 mg per tablet 1 tablet (has no administration in time range)   metoprolol succinate (TOPROL-XL) 24 hr tablet 50 mg (has no administration in time range)   acetaminophen (TYLENOL) tablet 650 mg (has no administration in time range)   ondansetron (ZOFRAN) injection 4 mg (has no administration in time range)   nicotine (NICODERM CQ) 21 mg/24 hr TD 24 hr patch 1 patch (has no administration in time range)   heparin (porcine) subcutaneous injection 5,000 Units (5,000 Units Subcutaneous Given 7/23/21 0546)   levetiracetam in NaCl (KEPPRA) infusion 1,000 mg (1,000 mg Intravenous Given 7/22/21 1843)   clonazePAM (KlonoPIN) tablet 0 5 mg (0 5 mg Oral Given 7/22/21 1844)   carBAMazepine (TEGretol) tablet 600 mg (600 mg Oral Given 7/22/21 1844)   potassium chloride (K-DUR,KLOR-CON) CR tablet 40 mEq (40 mEq Oral Given 7/22/21 1844)       Diagnostic Studies  Results Reviewed     Procedure Component Value Units Date/Time    TSH, 3rd generation [[id number]]  (Normal) Collected: 07/22/21 1653    Lab Status: Final result Specimen: Blood from Arm, Right Updated: 07/22/21 2127     TSH 3RD GENERATON 3 389 uIU/mL     Narrative:      Patients undergoing fluorescein dye angiography may retain small amounts of fluorescein in the body for 48-72 hours post procedure   Samples containing fluorescein can produce falsely depressed TSH values  If the patient had this procedure,a specimen should be resubmitted post fluorescein clearance        Carbamazepine level, total [[id number]]  (Abnormal) Collected: 07/22/21 1653    Lab Status: Final result Specimen: Blood from Arm, Right Updated: 07/22/21 1800     Carbamazepine Lvl <2 0 ug/mL     Troponin I [[id number]]  (Normal) Collected: 07/22/21 1653    Lab Status: Final result Specimen: Blood from Arm, Right Updated: 07/22/21 1722     Troponin I 0 05 ng/mL     Comprehensive metabolic panel [[id number]]  (Abnormal) Collected: 07/22/21 1653    Lab Status: Final result Specimen: Blood from Arm, Right Updated: 07/22/21 1720     Sodium 137 mmol/L      Potassium 3 2 mmol/L      Chloride 104 mmol/L      CO2 25 mmol/L      ANION GAP 8 mmol/L      BUN 14 mg/dL      Creatinine 1 02 mg/dL      Glucose 105 mg/dL      Calcium 8 8 mg/dL      Corrected Calcium 9 3 mg/dL      AST 12 U/L      ALT 11 U/L      Alkaline Phosphatase 93 8 U/L      Total Protein 7 1 g/dL      Albumin 3 4 g/dL      Total Bilirubin 0 39 mg/dL      eGFR 59 ml/min/1 73sq m     Narrative:      [place] guidelines for Chronic Kidney Disease (CKD):     Stage 1 with normal or high GFR (GFR > 90 mL/min/1 73 square meters)    Stage 2 Mild CKD (GFR = 60-89 mL/min/1 73 square meters)    Stage 3A Moderate CKD (GFR = 45-59 mL/min/1 73 square meters)    Stage 3B Moderate CKD (GFR = 30-44 mL/min/1 73 square meters)    Stage 4 Severe CKD (GFR = 15-29 mL/min/1 73 square meters)    Stage 5 End Stage CKD (GFR <15 mL/min/1 73 square meters)  Note: GFR calculation is accurate only with a steady state creatinine    CBC and differential [[id number]]  (Abnormal) Collected: 07/22/21 1653    Lab Status: Final result Specimen: Blood from Arm, Right Updated: 07/22/21 1710     WBC 16 64 Thousand/uL      RBC 3 87 Million/uL      Hemoglobin 11 4 g/dL      Hematocrit 35 7 %      MCV 92 fL      MCH 29 5 pg      MCHC 31 9 g/dL      RDW 14 0 %      MPV 11 2 fL      Platelets 104 Thousands/uL      Neutrophils Relative 81 %      Immat GRANS % 0 %      Lymphocytes Relative 13 %      Monocytes Relative 5 %      Eosinophils Relative 1 %      Basophils Relative 0 %      Neutrophils Absolute 13 41 Thousands/µL      Immature Grans Absolute 0 06 Thousand/uL      Lymphocytes Absolute 2 22 Thousands/µL      Monocytes Absolute 0 79 Thousand/µL      Eosinophils Absolute 0 13 Thousand/µL      Basophils Absolute 0 03 Thousands/µL     Levetiracetam level [[id number]] Collected: 07/22/21 1653    Lab Status: In process Specimen: Blood from Arm, Right Updated: 07/22/21 1700    UA w Reflex to Microscopic w Reflex to Culture [[id number]]     Lab Status: No result Specimen: Urine                  XR chest portable    (Results Pending)              Procedures  Procedures         ED Course  ED Course as of Jul 23 0725   Thu Jul 22, 2021 1811 The case discussed with neurologist dr Lopez Horne who recommend to give Keppra 1000mg at er, and clonzepam 0 5mg and keep under observation for 24 hr        1835 EKG; Sinus tachy rate 102/min, intraventricular conduction delay  SBIRT 20yo+      Most Recent Value   SBIRT (22 yo +)   In order to provide better care to our patients, we are screening all of our patients for alcohol and drug use  Would it be okay to ask you these screening questions? Yes Filed at: 07/22/2021 7666   Initial Alcohol Screen: US AUDIT-C    1  How often do you have a drink containing alcohol?  0 Filed at: 07/22/2021 1628   2  How many drinks containing alcohol do you have on a typical day you are drinking? 0 Filed at: 07/22/2021 1628   3b  FEMALE Any Age, or MALE 65+: How often do you have 4 or more drinks on one occassion? 0 Filed at: 07/22/2021 1628   Audit-C Score  0 Filed at: 07/22/2021 8991   JOHN: How many times in the past year have you       Used an illegal drug or used a prescription medication for non-medical reasons? Never Filed at: 07/22/2021 1628                    [facility]  Number of Diagnoses or Management Options  Diagnosis management comments: The case discussed with the patient deformity her about the result of the blood test   The case Juan Banner DR Kale Montero, will give the patient 1 g of Keppra IV and clonazepam  Patient agreed to level is less than 2  Will keep the patient under observation for 24 hours for possible another seizure as patient she had 3 seizures today  Amount and/or Complexity of Data Reviewed  Clinical lab tests: ordered and reviewed    Risk of Complications, Morbidity, and/or Mortality  Presenting problems: moderate  Diagnostic procedures: moderate  Management options: moderate        Disposition  Final diagnoses:   Recurrent seizures ([address] )     Time reflects when diagnosis was documented in both MDM as applicable and the Disposition within this note     Time User Action Codes Description Comment    7/22/2021  6:37 PM Dandy Angeles, [address] [[id number]] Recurrent seizures [facility])       ED Disposition     ED Disposition Condition Date/Time Comment    Admit Stable Thu Jul 22, 2021  6:38 PM Case was discussed with Cat Bautista and the patient's admission status was agreed to be admitted for observation to [facility] to the service of Dr Cat Bautista           Follow-up Information    None         Current Discharge Medication List      CONTINUE these medications which have NOT CHANGED    Details   butalbital-acetaminophen-caffeine (FIORICET,ESGIC) -40 mg per tablet Take 1 tablet by mouth every 6 (six) hours as needed for headaches  Qty: 15 tablet, Refills: 0    Associated Diagnoses: Other headache syndrome      carBAMazepine (TEGretol) 200 mg tablet Take 1 tablet (200 mg total) by mouth 3 (three) times a day  Qty: 90 tablet, Refills: 1    Associated Diagnoses: Epilepsy with altered consciousness with intractable epilepsy (HCC)      celecoxib (CeleBREX) 100 mg capsule Take 1 capsule (100 mg total) by mouth 2 (two) times a day  Qty: 30 capsule, Refills: 0    Associated Diagnoses: Sprain of right shoulder, unspecified shoulder sprain type, initial encounter      chlorthalidone (HYGROTEN) 50 MG tablet Take 25 mg by mouth daily      cholecalciferol (VITAMIN D3) 1,000 units tablet Take 2 tablets (2,000 Units total) by mouth daily  Qty: 180 tablet, Refills: 1    Associated Diagnoses: Epilepsy with altered consciousness with intractable epilepsy (HCC)      ergocalciferol (VITAMIN D2) 50,000 units Take 1 capsule (50,000 Units total) by mouth once a week for 12 doses  Qty: 12 capsule, Refills: 0    Associated Diagnoses: Parenchymal renal hypertension, stage 1 through stage 4 or unspecified chronic kidney disease; Stage 3a chronic kidney disease ([facility] [address] ); Acute renal failure with acute tubular necrosis superimposed on stage 3a chronic kidney disease ([facility] [address] ); Hypokalemia; Anemia due to stage 3a chronic kidney disease ([address] ); Essential hypertension; Water retention; Acute renal failure superimposed on chronic kidney disease, unspecified CKD stage, unspecified acute renal failure type ([facility] [address] ); Vitamin D deficiency      levETIRAcetam (KEPPRA) 750 mg tablet Take 2 tablets (1,500 mg total) by mouth every 12 (twelve) hours  Qty: 180 tablet, Refills: 3    Associated Diagnoses: Epilepsy with altered consciousness with intractable epilepsy (HCC)      lidocaine (LIDODERM) 5 % Apply 1 patch topically daily Remove & Discard patch within 12 hours or as directed by MD  Qty: 30 patch, Refills: 0    Associated Diagnoses: Sprain of right shoulder, unspecified shoulder sprain type, initial encounter      losartan (COZAAR) 25 mg tablet Take 1 tablet (25 mg total) by mouth daily  Qty: 90 tablet, Refills: 3    Associated Diagnoses: Parenchymal renal hypertension, stage 1 through stage 4 or unspecified chronic kidney disease; Stage 3a chronic kidney disease ([facility] [address] );  Acute renal failure with acute tubular necrosis superimposed on stage 3a chronic kidney disease ([facility] [place] [address] ); Hypokalemia; Anemia due to stage 3a chronic kidney disease ([facility] [address] ); Essential hypertension; Water retention; Acute renal failure superimposed on chronic kidney disease, unspecified CKD stage, unspecified acute renal failure type (HCC)      metoprolol succinate (TOPROL-XL) 50 mg 24 hr tablet take 1 tablet by mouth once daily  Qty: 90 tablet, Refills: 0    Associated Diagnoses: Essential hypertension      nicotine (NICODERM CQ) 21 mg/24 hr TD 24 hr patch Place 1 patch on the skin every 24 hours  Qty: 28 patch, Refills: 1    Associated Diagnoses: Smoker      pramipexole (MIRAPEX) 1 5 MG tablet Take 1 tablet (1 5 mg total) by mouth daily at bedtime  Qty: 90 tablet, Refills: 1    Associated Diagnoses: Epilepsy with altered consciousness with intractable epilepsy (HCC)      spironolactone (ALDACTONE) 25 mg tablet Take 1 tablet (25 mg total) by mouth daily  Qty: 90 tablet, Refills: 3    Associated Diagnoses: Parenchymal renal hypertension, stage 1 through stage 4 or unspecified chronic kidney disease; Stage 3a chronic kidney disease ([facility] [address] ); Vitamin D deficiency; Lymphedema; Anemia due to stage 3a chronic kidney disease ([facility] [address] ); Hypokalemia; Hypomagnesemia           No discharge procedures on file      PDMP Review       Value Time User    PDMP Reviewed  Yes 9/9/2020  5:50 PM Juan Ge MD          ED Provider  Electronically Signed by           Army Rosemary MD  07/23/21 7918

## 2021-07-22 NOTE — ED NOTES
Patient attempted to urinate on bedside commode, unable to urinate at this time  Hospital socks applied  [place] wanda Hdez RN  07/22/21 9804

## 2021-07-23 VITALS
SYSTOLIC BLOOD PRESSURE: 135 MMHG | BODY MASS INDEX: 46.6 KG/M2 | WEIGHT: 263 LBS | RESPIRATION RATE: 22 BRPM | OXYGEN SATURATION: 91 % | DIASTOLIC BLOOD PRESSURE: 78 MMHG | TEMPERATURE: 98.1 F | HEIGHT: 63 IN | HEART RATE: 84 BPM

## 2021-07-23 LAB
ANION GAP SERPL CALCULATED.3IONS-SCNC: 8 MMOL/L (ref 4–13)
BUN SERPL-MCNC: 11 MG/DL (ref 6–20)
CALCIUM SERPL-MCNC: 8.6 MG/DL (ref 8.4–10.2)
CHLORIDE SERPL-SCNC: 108 MMOL/L (ref 96–108)
CO2 SERPL-SCNC: 26 MMOL/L (ref 22–33)
CREAT SERPL-MCNC: 0.95 MG/DL (ref 0.4–1.1)
ERYTHROCYTE [DISTWIDTH] IN BLOOD BY AUTOMATED COUNT: 14.2 % (ref 11.6–15.1)
GFR SERPL CREATININE-BSD FRML MDRD: 64 ML/MIN/1.73SQ M
GLUCOSE P FAST SERPL-MCNC: 88 MG/DL (ref 70–105)
GLUCOSE SERPL-MCNC: 88 MG/DL (ref 65–140)
HCT VFR BLD AUTO: 35.5 % (ref 34.8–46.1)
HGB BLD-MCNC: 11.4 G/DL (ref 11.5–15.4)
MCH RBC QN AUTO: 29.7 PG (ref 26.8–34.3)
MCHC RBC AUTO-ENTMCNC: 32.1 G/DL (ref 31.4–37.4)
MCV RBC AUTO: 92 FL (ref 82–98)
PLATELET # BLD AUTO: 362 THOUSANDS/UL (ref 149–390)
PMV BLD AUTO: 11.3 FL (ref 8.9–12.7)
POTASSIUM SERPL-SCNC: 3.5 MMOL/L (ref 3.5–5)
RBC # BLD AUTO: 3.84 MILLION/UL (ref 3.81–5.12)
SODIUM SERPL-SCNC: 142 MMOL/L (ref 133–145)
WBC # BLD AUTO: 12.64 THOUSAND/UL (ref 4.31–10.16)

## 2021-07-23 PROCEDURE — 99217 PR OBSERVATION CARE DISCHARGE MANAGEMENT: CPT | Performed by: INTERNAL MEDICINE

## 2021-07-23 PROCEDURE — 80048 BASIC METABOLIC PNL TOTAL CA: CPT | Performed by: PHYSICIAN ASSISTANT

## 2021-07-23 PROCEDURE — 85027 COMPLETE CBC AUTOMATED: CPT | Performed by: PHYSICIAN ASSISTANT

## 2021-07-23 RX ORDER — CARBAMAZEPINE 200 MG/1
200 TABLET ORAL 3 TIMES DAILY
Qty: 90 TABLET | Refills: 0 | Status: SHIPPED | OUTPATIENT
Start: 2021-07-23 | End: 2021-07-26 | Stop reason: SDUPTHER

## 2021-07-23 RX ORDER — LEVETIRACETAM 750 MG/1
1500 TABLET ORAL EVERY 12 HOURS SCHEDULED
Qty: 120 TABLET | Refills: 0 | Status: SHIPPED | OUTPATIENT
Start: 2021-07-23 | End: 2021-07-26 | Stop reason: SDUPTHER

## 2021-07-23 RX ADMIN — HEPARIN SODIUM 5000 UNITS: 5000 INJECTION INTRAVENOUS; SUBCUTANEOUS at 05:46

## 2021-07-23 RX ADMIN — LOSARTAN POTASSIUM 25 MG: 25 TABLET, FILM COATED ORAL at 08:59

## 2021-07-23 RX ADMIN — SPIRONOLACTONE 25 MG: 25 TABLET, FILM COATED ORAL at 08:59

## 2021-07-23 RX ADMIN — CHLORTHALIDONE 25 MG: 25 TABLET ORAL at 08:59

## 2021-07-23 RX ADMIN — Medication 1 PATCH: at 08:59

## 2021-07-23 RX ADMIN — METOPROLOL SUCCINATE 50 MG: 50 TABLET, EXTENDED RELEASE ORAL at 08:59

## 2021-07-23 RX ADMIN — CARBAMAZEPINE 200 MG: 200 TABLET ORAL at 08:59

## 2021-07-23 RX ADMIN — LEVETIRACETAM 1500 MG: 500 TABLET, FILM COATED ORAL at 08:59

## 2021-07-23 RX ADMIN — SODIUM CHLORIDE 125 ML/HR: 0.9 INJECTION, SOLUTION INTRAVENOUS at 05:46

## 2021-07-23 NOTE — PLAN OF CARE
Problem: Potential for Falls  Goal: Patient will remain free of falls  Description: INTERVENTIONS:  - Educate patient/family on patient safety including physical limitations  - Instruct patient to call for assistance with activity   - Consult OT/PT to assist with strengthening/mobility   - Keep Call bell within reach  - Keep bed low and locked with side rails adjusted as appropriate  - Keep care items and personal belongings within reach  - Initiate and maintain comfort rounds  - Make Fall Risk Sign visible to staff  - Offer Toileting every Hours, in advance of need  - Initiate/Maintain alarm  - Obtain necessary fall risk management equipment:  - Apply yellow socks and bracelet for high fall risk patients  - Consider moving patient to room near nurses station  Outcome: Progressing     Problem: PAIN - ADULT  Goal: Verbalizes/displays adequate comfort level or baseline comfort level  Description: Interventions:  - Encourage patient to monitor pain and request assistance  - Assess pain using appropriate pain scale  - Administer analgesics based on type and severity of pain and evaluate response  - Implement non-pharmacological measures as appropriate and evaluate response  - Consider cultural and social influences on pain and pain management  - Notify physician/advanced practitioner if interventions unsuccessful or patient reports new pain  Outcome: Progressing     Problem: INFECTION - ADULT  Goal: Absence or prevention of progression during hospitalization  Description: INTERVENTIONS:  - Assess and monitor for signs and symptoms of infection  - Monitor lab/diagnostic results  - Monitor all insertion sites, i e  indwelling lines, tubes, and drains  - Administer medications as ordered  - Instruct and encourage patient and family to use good hand hygiene technique  - Identify and instruct in appropriate isolation precautions for identified infection/condition  Outcome: Progressing  Goal: Absence of fever/infection during neutropenic period  Description: INTERVENTIONS:  - Monitor WBC    Outcome: Progressing     Problem: SAFETY ADULT  Goal: Patient will remain free of falls  Description: INTERVENTIONS:  - Educate patient/family on patient safety including physical limitations  - Instruct patient to call for assistance with activity   - Consult OT/PT to assist with strengthening/mobility   - Keep Call bell within reach  - Keep bed low and locked with side rails adjusted as appropriate  - Keep care items and personal belongings within reach  - Initiate and maintain comfort rounds  - Make Fall Risk Sign visible to staff  - Offer Toileting every  Hours, in advance of need  - Initiate/Maintain alarm  - Obtain necessary fall risk management equipment:  - Apply yellow socks and bracelet for high fall risk patients  - Consider moving patient to room near nurses station  Outcome: Progressing  Goal: Maintain or return to baseline ADL function  Description: INTERVENTIONS:  -  Assess patient's ability to carry out ADLs; assess patient's baseline for ADL function and identify physical deficits which impact ability to perform ADLs (bathing, care of mouth/teeth, toileting, grooming, dressing, etc )  - Assess/evaluate cause of self-care deficits   - Assess range of motion  - Assess patient's mobility; develop plan if impaired  - Assess patient's need for assistive devices and provide as appropriate  - Encourage maximum independence but intervene and supervise when necessary  - Involve family in performance of ADLs  - Assess for home care needs following discharge   - Consider OT consult to assist with ADL evaluation and planning for discharge  - Provide patient education as appropriate  Outcome: Progressing  Goal: Maintains/Returns to pre admission functional level  Description: INTERVENTIONS:  - Perform BMAT or MOVE assessment daily    - Set and communicate daily mobility goal to care team and patient/family/caregiver     - Collaborate with rehabilitation services on mobility goals if consulted  - Perform Range of Motion times a day  - Reposition patient every hours  - Dangle patient  times a day  - Stand patient times a day  - Ambulate patient  times a day  - Out of bed to chair times a day   - Out of bed for meals times a day  - Out of bed for toileting  - Record patient progress and toleration of activity level   Outcome: Progressing     Problem: DISCHARGE PLANNING  Goal: Discharge to home or other facility with appropriate resources  Description: INTERVENTIONS:  - Identify barriers to discharge w/patient and caregiver  - Arrange for needed discharge resources and transportation as appropriate  - Identify discharge learning needs (meds, wound care, etc )  - Arrange for interpretive services to assist at discharge as needed  - Refer to Case Management Department for coordinating discharge planning if the patient needs post-hospital services based on physician/advanced practitioner order or complex needs related to functional status, cognitive ability, or social support system  Outcome: Progressing     Problem: Knowledge Deficit  Goal: Patient/family/caregiver demonstrates understanding of disease process, treatment plan, medications, and discharge instructions  Description: Complete learning assessment and assess knowledge base    Interventions:  - Provide teaching at level of understanding  - Provide teaching via preferred learning methods  Outcome: Progressing

## 2021-07-23 NOTE — ASSESSMENT & PLAN NOTE
Lab Results   Component Value Date    EGFR 64 07/23/2021    EGFR 59 07/22/2021    EGFR 63 04/19/2021    CREATININE 0 95 07/23/2021    CREATININE 1 02 07/22/2021    CREATININE 0 97 04/19/2021   · Creatinine appears at baseline

## 2021-07-23 NOTE — CASE MANAGEMENT
CM made aware patient's spouse unable to provide transportation at this time  CM met with patient bedside, patient qualifies for LYFT ride at this time  Waiver signed, home address confirmed, and patient has keys to enter residence  ALESSANDRA s/w Patrick Samson at [facility] to request [address] ride,  to call [phone]

## 2021-07-23 NOTE — DISCHARGE INSTRUCTIONS
Chronic Hypertension   WHAT YOU NEED TO KNOW:   Hypertension is high blood pressure  Your blood pressure is the force of your blood moving against the walls of your arteries  Hypertension causes your blood pressure to get so high that your heart has to work much harder than normal  This can damage your heart  Even if you have hypertension for years, lifestyle changes, medicines, or both can help bring your blood pressure to normal   DISCHARGE INSTRUCTIONS:   Call your local emergency number (911 in the [address] [facility]) or have someone call if:   · You have chest pain  · You have any of the following signs of a heart attack:      ? Squeezing, pressure, or pain in your chest    ? You may  also have any of the following:     § Discomfort or pain in your back, neck, jaw, stomach, or arm    § Shortness of breath    § Nausea or vomiting    § Lightheadedness or a sudden cold sweat    · You become confused or have difficulty speaking  · You suddenly feel lightheaded or have trouble breathing  Return to the emergency department if:   · You have a severe headache or vision loss  · You have weakness in an arm or leg  Call your doctor if:   · You feel faint, dizzy, confused, or drowsy  · You have been taking your blood pressure medicine but your pressure is higher than your provider says it should be  · You have questions or concerns about your condition or care  Medicines: You may need any of the following:  · Antihypertensives  may be used to help lower your blood pressure  Several kinds of medicines are available  Your healthcare provider may change the medicine or medicines you currently take  This may be needed if your blood pressure is often high when you check it at home or you are having other problems with blood pressure control  · Diuretics  help decrease extra fluid that collects in your body  This will help lower your BP   You may urinate more often while you take this medicine  · Cholesterol medicine  helps lower your cholesterol level  A low cholesterol level helps prevent heart disease and makes it easier to control your blood pressure  · Take your medicine as directed  Contact your healthcare provider if you think your medicine is not helping or if you have side effects  Tell him or her if you are allergic to any medicine  Keep a list of the medicines, vitamins, and herbs you take  Include the amounts, and when and why you take them  Bring the list or the pill bottles to follow-up visits  Carry your medicine list with you in case of an emergency  Follow up with your doctor as directed: You will need to return to have your blood pressure checked and to have other lab tests done  Write down your questions so you remember to ask them during your visits  Stages of hypertension:       · Normal blood pressure is 119/79 or lower   Your healthcare provider may only check your blood pressure each year if it stays at a normal level  · Elevated blood pressure is 120/79 to 129/79   This is sometimes called prehypertension  Your healthcare provider may suggest lifestyle changes to help lower your blood pressure to a normal level  He or she may then check it again in 3 to 6 months  · Stage 1 hypertension is 130/80  to 139/89   Your provider may recommend lifestyle changes, medication, and checks every 3 to 6 months until your blood pressure is controlled  · Stage 2 hypertension is 140/90 or higher   Your provider will recommend lifestyle changes and have you take 2 kinds of hypertension medicines  You will also need to have your blood pressure checked monthly until it is controlled  Manage chronic hypertension:   · Check your blood pressure at home  Avoid smoking, caffeine, and exercise at least 30 minutes before checking your blood pressure  Sit and rest for 5 minutes before you take your blood pressure  Extend your arm and support it on a flat surface   Your arm should be at the same level as your heart  Follow the directions that came with your blood pressure monitor  Check your blood pressure 2 times, 1 minute apart, before you take your medicine in the morning  Also check your blood pressure before your evening meal  Keep a record of your readings and bring it to your follow-up visits  Ask your healthcare provider what your blood pressure should be  · Manage any other health conditions you have  Health conditions such as diabetes can increase your risk for hypertension  Follow your healthcare provider's instructions and take all your medicines as directed  Talk to your healthcare provider about any new health conditions you have recently developed  · Ask about all medicines  Certain medicines can increase your blood pressure  Examples include oral birth control pills, decongestants, herbal supplements, and NSAIDs, such as ibuprofen  Your healthcare provider can tell you which medicines are safe for you to take  This includes prescription and over-the-counter medicines  Lifestyle changes you can make to lower your blood pressure: Your provider may want you to make more lifestyle changes if you are having trouble controlling your blood pressure  This may feel difficult over time, especially if you think you are making good changes but your pressure is still high  It might help to focus on one new change at a time  For example, try to add 1 more day of exercise, or exercise for an extra 10 minutes on 2 days  Small changes can make a big difference  Your healthcare provider can also refer you to specialists such as a dietitian who can help you make small changes  Your family members may be included in helping you learn to create lifestyle changes, such as the following:  · Limit sodium (salt) as directed  Too much sodium can affect your fluid balance  Check labels to find low-sodium or no-salt-added foods   Some low-sodium foods use potassium salts for flavor  Too much potassium can also cause health problems  Your healthcare provider will tell you how much sodium and potassium are safe for you to have in a day  He or she may recommend that you limit sodium to 2,300 mg a day  · Follow the meal plan recommended by your healthcare provider  A dietitian or your provider can give you more information on low-sodium plans or the DASH (Dietary Approaches to Stop Hypertension) eating plan  The DASH plan is low in sodium, processed sugar, unhealthy fats, and total fat  It is high in potassium, calcium, and fiber  These can be found in vegetables, fruit, and whole-grain foods  · Be physically active throughout the day  Physical activity, such as exercise, can help control your blood pressure and your weight  Be physically active for at least 30 minutes per day, on most days of the week  Include aerobic activity, such as walking or riding a bicycle  Also include strength training at least 2 times each week  Your healthcare providers can help you create a physical activity plan  · Decrease stress  This may help lower your blood pressure  Learn ways to relax, such as deep breathing or listening to music  · Limit alcohol as directed  Alcohol can increase your blood pressure  A drink of alcohol is 12 ounces of beer, 5 ounces of wine, or 1½ ounces of liquor  · Do not smoke  Nicotine and other chemicals in cigarettes and cigars can increase your blood pressure and also cause lung damage  Ask your healthcare provider for information if you currently smoke and need help to quit  E-cigarettes or smokeless tobacco still contain nicotine  Talk to your healthcare provider before you use these products  © Copyright [employer] 2021 Information is for End User's use only and may not be sold, redistributed or otherwise used for commercial purposes   All illustrations and images included in CareNotes® are the copyrighted property of [employer]  or [facility]   The above information is an  only  It is not intended as medical advice for individual conditions or treatments  Talk to your doctor, nurse or pharmacist before following any medical regimen to see if it is safe and effective for you  How to Stop Smoking   WHAT YOU NEED TO KNOW:   You will improve your health and the health of others around you if you stop smoking  Your risk for heart and lung disease, cancer, stroke, heart attack, and vision problems will also decrease  Your adolescent can help prevent or stop harm to his or her brain or body  This will help him or her become a healthy adult  You can benefit from quitting no matter how long you have smoked  DISCHARGE INSTRUCTIONS:   Prepare to stop smoking:  Nicotine is a highly addictive drug found in cigarettes  Withdrawal symptoms can happen when you stop smoking and make it hard to quit  These include anxiety, depression, irritability, trouble sleeping, and increased appetite  You increase your chances of success if you prepare to quit  · Set a quit date  [place] a date that is within the next 2 weeks  Do not pick a day that you think may be stressful or busy  Write down the day or [place] it on your calender  · Tell friends and family that you plan to quit  Explain that you may have withdrawal symptoms when you try to quit  Ask them to support you  They may be able to encourage you and help reduce your stress to make it easier for you to quit  · Make a list of your reasons for quitting  Put the list somewhere you will see it every day, such as your refrigerator  You can look at the list when you have a craving  · Remove all tobacco and nicotine products from your home, car, and workplace  Also, remove anything else that will tempt you to smoke, such as lighters, matches, or ashtrays  Clean your car, home, and places at work that smell like smoke  The smell of smoke can trigger a craving      · Identify triggers that make you want to smoke  This may include activities, feelings, or people  Also write down 1 way you can deal with each of your triggers  For example, if you want to smoke as soon as you wake up, plan another activity during this time, such as exercise  · Make a plan for how you will quit  Learn about the tools that can help you quit, such as medicine, counseling, or nicotine replacement therapy  Choose at least 2 options to help you quit  · Help your adolescent make a plan to quit  The plan will be more successful if your adolescent makes his or her own decisions  Do not try to pressure him or her to quit immediately or in a certain way  Be supportive and offer help if needed  Tools to help you stop smoking:   · Counseling  from a trained healthcare provider can provide you with support and skills to quit smoking  The provider will also teach you to manage your withdrawal symptoms and cravings  You may receive counseling from one counselor, in group therapy, or through phone therapy called a quit line  · Nicotine replacement therapy (NRT)  such as nicotine patches, gum, or lozenges may help reduce your nicotine cravings  You may get these without a doctor's order  Do not use e-cigarettes or smokeless tobacco in place of cigarettes or to help you quit  They still contain nicotine  · Prescription medicines  such as nasal sprays or nicotine inhalers may help reduce your withdrawal symptoms  Other medicines may also be used to reduce your urge to smoke  Ask your healthcare provider about these medicines  You may need to start certain medicines 2 weeks before your quit date for them to work well  · Hypnosis  is a practice that helps guide you through thoughts and feelings  Hypnosis may help decrease your cravings and make you more willing to quit  · Acupuncture therapy  uses very thin needles to balance energy channels in the body   This is thought to help decrease cravings and symptoms of nicotine withdrawal     · Support groups  let you talk to others who are trying to quit or have already quit  It may be helpful to speak with others about how they quit  Manage your cravings:   · Avoid situations, people, and places that tempt you to smoke  Go to nonsmoking places, such as libraries or restaurants  Understand what tempts you and try to avoid these things  · Keep your hands busy  Hold things such as a stress ball or pen  · Put candy or toothpicks in your mouth  Keep lollipops, sugarless gum, or toothpicks with you at all times  · Do not have alcohol or caffeine  These drinks may tempt you to smoke  Drink healthy liquids such as water or juice instead  · Reward yourself when you resist your cravings  Rewards will motivate you and help you stay positive  · Do an activity that distracts you from your craving  Examples include cleaning, creating art, or gardening  Prevent weight gain after you quit:  You may gain a few pounds after you quit smoking  It is healthier for you to gain a few pounds than to continue to smoke  The following can help you prevent weight gain:  · Eat a variety of healthy foods  Healthy foods include fruits, vegetables, whole-grain breads, low-fat dairy products, beans, lean meats, and fish  Eat healthy snacks, such as low-fat yogurt, if you get hungry between meals  · Drink water before, during, and between meals  This will make your stomach feel full and help prevent you from overeating  Ask your healthcare provider how much liquid to drink each day and which liquids are best for you  · Be physically active  Activity may help reduce your cravings and reduce stress  Take a walk or do some kind of physical activity every day  Ask your healthcare provider which activities are right for you  For support and more information:   · American [employer] Association  [address] [facility]   [address] '[facility]  Phone: 1- 202 - 126-6197  Phone: 9- 476 - 306-0613  Web Address: Shagufta March  jacques    · Smokefree  gov  Phone: 2- 382 - 449-1753  Web Address: www smokefree  [address] 2021 Information is for End User's use only and may not be sold, redistributed or otherwise used for commercial purposes  All illustrations and images included in CareNotes® are the copyrighted property of [employer] A M , Inc  or [address] [place] [address]  The above information is an  only  It is not intended as medical advice for individual conditions or treatments  Talk to your doctor, nurse or pharmacist before following any medical regimen to see if it is safe and effective for you

## 2021-07-23 NOTE — ASSESSMENT & PLAN NOTE
· POA, patient with supposed 3 seizures today  Questionable compliance with medications  Carbamazepine levels undetectable  Keppra levels pending   · Patient hospitalized under observation status and is hemodynamically stable for discharge  · Status post Keppra and Carbamazepine load in the ED   · Restart home seizure meds  Patient has noncompliance and compliance with medications has been stressed  · Prescriptions have been sent to local [facility]    Patient states that she gets 90 day supply from outpatient [address] and she has been told that we cannot wait for that  · Neurology consult as outpatient

## 2021-07-23 NOTE — DISCHARGE SUMMARY
Cheyanne U  64   Discharge- Parul Graft 1960, 56 y o  female MRN: [id number]  Unit/Bed#: -01 Encounter: [id number]  Primary Care Provider: DRU Mirza   Date and time admitted to hospital: 7/22/2021  4:18 PM    SIRS (systemic inflammatory response syndrome) ([facility] )  Assessment & Plan  · POA, evidenced by leukocytosis, tachycardia  No obvious sign of infection   · Likely secondary to seizures and there is no infection  No antibiotics at discharge    Essential hypertension  Assessment & Plan  · BP acceptable   · Continue home regimen     Lymphedema  Assessment & Plan  · Noted history   · Continue Chlorthalidone, Spironolactone     CKD (chronic kidney disease) stage 4, GFR 15-29 ml/min [facility])  Assessment & Plan  Lab Results   Component Value Date    EGFR 64 07/23/2021    EGFR 59 07/22/2021    EGFR 63 04/19/2021    CREATININE 0 95 07/23/2021    CREATININE 1 02 07/22/2021    CREATININE 0 97 04/19/2021   · Creatinine appears at baseline     Morbidly obese (HCC)  Assessment & Plan  · BMI noted   · Diet and lifestyle modifications needed    * Recurrent seizures ([facility] )  Assessment & Plan  · POA, patient with supposed 3 seizures today  Questionable compliance with medications  Carbamazepine levels undetectable  Keppra levels pending   · Patient hospitalized under observation status and is hemodynamically stable for discharge  · Status post Keppra and Carbamazepine load in the ED   · Restart home seizure meds  Patient has noncompliance and compliance with medications has been stressed  · Prescriptions have been sent to local [facility]    Patient states that she gets 90 day supply from outpatient [facility] and she has been told that we cannot wait for that  · Neurology consult as outpatient        Admitting Provider:  Hood Alarcon MD  Discharge Provider:  Autumn Dunne MD  Admission Date: 7/22/2021       Discharge Date: 07/23/21   LOS: 0  Primary Care Physician at Discharge: Mitul Reyes, [facility] COURSE:  Logan Bryson is a 56 y o  female who presented with recurrent seizures likely secondary to noncompliance  Her carbamazepine levels were undetectable on hospitalization  Patient also very noncompliant because of the fact that she has 90 day mail order pharmacy and she says she may have missed some doses  Patient also does not have a local PCP  Patient has been seizure-free through the hospital course  No chest pain, palpitations or diaphoresis  Patient neurologically stable at discharge  REASON FOR ADMISSION/ ADMISSION DIAGNOSES    Seizures    DISCHARGE DIAGNOSES  SIRS (systemic inflammatory response syndrome) (HCC)  Assessment & Plan  · POA, evidenced by leukocytosis, tachycardia  No obvious sign of infection   · Likely secondary to seizures and there is no infection  No antibiotics at discharge    Essential hypertension  Assessment & Plan  · BP acceptable   · Continue home regimen     Lymphedema  Assessment & Plan  · Noted history   · Continue Chlorthalidone, Spironolactone     CKD (chronic kidney disease) stage 4, GFR 15-29 ml/min [facility])  Assessment & Plan  Lab Results   Component Value Date    EGFR 64 07/23/2021    EGFR 59 07/22/2021    EGFR 63 04/19/2021    CREATININE 0 95 07/23/2021    CREATININE 1 02 07/22/2021    CREATININE 0 97 04/19/2021   · Creatinine appears at baseline     Morbidly obese (HCC)  Assessment & Plan  · BMI noted   · Diet and lifestyle modifications needed    * Recurrent seizures ([address] )  Assessment & Plan  · POA, patient with supposed 3 seizures today  Questionable compliance with medications  Carbamazepine levels undetectable  Keppra levels pending   · Patient hospitalized under observation status and is hemodynamically stable for discharge  · Status post Keppra and Carbamazepine load in the ED   · Restart home seizure meds    Patient has noncompliance and compliance with medications has been stressed  · Prescriptions have been sent to local [facility]  Patient states that she gets 90 day supply from outpatient [address] [facility] and she has been told that we cannot wait for that  · Neurology consult as outpatient      [facility]  * No surgery found *    RADIOLOGY RESULTS  XR chest portable    Result Date: 7/23/2021  Impression: No acute cardiopulmonary disease   Workstation performed: [id number]       LABS  Results from last 7 days   Lab Units 07/23/21  0544 07/22/21 2055 07/22/21  1653   WBC Thousand/uL 12 64*  --  16 64*   HEMOGLOBIN g/dL 11 4*  --  11 4*   HEMATOCRIT % 35 5  --  35 7   MCV fL 92  --  92   PLATELETS Thousands/uL 362 355 369     Results from last 7 days   Lab Units 07/23/21  0544 07/22/21  1653   SODIUM mmol/L 142 137   POTASSIUM mmol/L 3 5 3 2*   CHLORIDE mmol/L 108 104   CO2 mmol/L 26 25   BUN mg/dL 11 14   CREATININE mg/dL 0 95 1 02   CALCIUM mg/dL 8 6 8 8   ALBUMIN g/dL  --  3 4   TOTAL BILIRUBIN mg/dL  --  0 39   ALK PHOS U/L  --  93 8   ALT U/L  --  11   AST U/L  --  12*   EGFR ml/min/1 73sq m 64 59   GLUCOSE RANDOM mg/dL 88 105     Results from last 7 days   Lab Units 07/22/21  1653   TROPONIN I ng/mL 0 05                      Results from last 7 days   Lab Units 07/22/21  1653   TSH 3RD GENERATON uIU/mL 3 389               Cultures:         Invalid input(s): URIBILINOGEN              PHYSICAL EXAM:  Vitals:   Blood Pressure: 135/78 (07/23/21 0716)  Pulse: 84 (07/23/21 0716)  Temperature: 98 1 °F (36 7 °C) (07/23/21 0716)  Temp Source: Tympanic (07/23/21 0716)  Respirations: 22 (07/23/21 0716)  Height: 5' 3" (160 cm) (07/22/21 1950)  Weight - Scale: 119 kg (263 lb) (07/23/21 0536)  SpO2: 91 % (07/23/21 0716)    General appearance: alert, appears stated age and cooperative  HEENT - atraumatic and normocephalic  Neck- supple  Skin - no fresh rash  Extremities no fresh focal deformities  Cardiovascular- S1-S2 heard  Respiratory- bilateral air entry present, no crackles or rhonchi  Skin - no fresh rash  Abdomen - normal bowel sounds present, no rebound tenderness  CNS- No fresh focal deficits  Psych- no acute psychosis     Planned Re-admission:  No  Discharge Disposition:  Home    Test Results Pending at Discharge:   Pending Labs     Order Current Status    Levetiracetam level In process      Incidental findings:  None    Medications   · Summary of Medication Adjustments made as a result of this hospitalization:  Continue carbamazepine and Keppra, for which the patient had missed some doses  · Medication Dosing Tapers - Please refer to Discharge Medication List for details on any medication dosing tapers (if applicable to patient)  · Discharge Medication List: See after visit summary for reconciled discharge medications  Diet restrictions:  None        Diet Orders   (From admission, onward)             Start     Ordered    07/22/21 2017  Diet Regular; Regular House  Diet effective now     Question Answer Comment   Diet Type Regular    Regular Regular House    RD to adjust diet per protocol? Yes        07/22/21 2019              Activity restrictions: No strenuous activity  Discharge Condition: stable    Outpatient Follow-Up and Discharge Instructions  See after visit summary section titled Discharge Instructions for information provided to patient and family  Code Status: Level 1 - Full Code  Discharge Statement   I spent 35 minutes discharging the patient  This time was spent on the day of discharge  Greater than 50% of total time was spent with the patient and / or family counseling and / or coordination of care  Shayy Mays MD  [place] [address] Internal Medicine    ** Please Note: This note has been constructed using a voice recognition system   **

## 2021-07-23 NOTE — NURSING NOTE
Patient is discharged home via [facility]  All belongings are with the patient  Medication list is given to the patient and explained the schedule  Patient was in no distress upon discharge

## 2021-07-23 NOTE — H&P
Cheyanne U  64   H&P- Sana [place] 1960, 56 y o  female MRN: [id number]  Unit/Bed#: -01 Encounter: [id number]  Primary Care Provider: DRU Cleaning   Date and time admitted to hospital: 7/22/2021  4:18 PM    REQUIRED DOCUMENTATION:     1  This service was provided via Telemedicine  2  Provider located at [facility] Internal Medicine Office  3  TeleMed provider: Keke Meeks PA-C   4  Identify all parties in room with patient during tele consult:  RN  5  Patient was then informed that this was a Telemedicine visit and that the exam was being conducted confidentially over secure lines  My office door was closed  The patient was notified the following individuals were present in the room Ginger Mcclellan PA-C, Eulalio Mora, [address] Jordi Tran  Patient acknowledged consent and understanding of privacy and security of the Telemedicine visit, and gave us permission to have the assistant stay in the room in order to assist with the history and to conduct the exam   I informed the patient that I have reviewed their record in Epic and presented the opportunity for them to ask any questions regarding the visit today  The patient agreed to participate  * Recurrent seizures ([facility] )  Assessment & Plan  · POA, patient with supposed 3 seizures today  Questionable compliance with medications  Carbamazepine levels undetectable  Keppra levels pending   · Admit patient to med/surg under observation status   · Status post Keppra and Carbamazepine load in the ED   · Restart home seizure meds  · Neurology consult    SIRS (systemic inflammatory response syndrome) ([facility] )  Assessment & Plan  · POA, evidenced by leukocytosis, tachycardia   No obvious sign of infection   · Monitor off antibiotics   · Hydrate patient     Morbidly obese ([facility] )  Assessment & Plan  · BMI noted   · Diet and lifestyle modifications needed    Lymphedema  Assessment & Plan  · Noted history   · Continue Chlorthalidone, Spironolactone     CKD (chronic kidney disease) stage 4, GFR 15-29 ml/min [facility])  Assessment & Plan  Lab Results   Component Value Date    EGFR 59 07/22/2021    EGFR 63 04/19/2021    EGFR 60 01/16/2021    CREATININE 1 02 07/22/2021    CREATININE 0 97 04/19/2021    CREATININE 1 01 01/16/2021   · Creatinine appears at baseline   · Monitor BMP    Essential hypertension  Assessment & Plan  · BP acceptable   · Continue home regimen       VTE Pharmacologic Prophylaxis: VTE Score: 4 Moderate Risk (Score 3-4) - Pharmacological DVT Prophylaxis Ordered: heparin  Code Status: Level 1 - Full Code   Discussion with family: None at bedside   Anticipated Length of Stay: Patient will be admitted on an observation basis with an anticipated length of stay of less than 2 midnights secondary to As per above assessment and plan   Total Time for Visit, including Counseling / Coordination of Care: 70 minutes Greater than 50% of this total time spent on direct patient counseling and coordination of care  Chief Complaint: Seizure    History of Present Illness:  Nia Castro is a 56 y o  female with a PMH of seizures, lymphedema, HTN, and CKD who presents with seizures  She reports that she had 3 seizures today  She reports that she feels something burning and then gets dizziness for about 15 minutes  Denies confusion after the seizures  She reports that she has not had her Tegretol for about 1 month  She reports restarting her Keppra about 1 week ago  Denies ETOH or drug use  Denies recent fevers or chills  Reports that she is having a hard time finding a provider here to write her medications  Review of Systems:  Review of Systems   Constitutional: Negative for appetite change, chills, diaphoresis, fatigue and fever  HENT: Negative for congestion, rhinorrhea and sore throat  Eyes: Negative for visual disturbance  Respiratory: Negative for cough, chest tightness, shortness of breath and wheezing      Cardiovascular: Negative for chest pain, palpitations and leg swelling  Gastrointestinal: Negative for abdominal pain, constipation, diarrhea, nausea and vomiting  Genitourinary: Negative for dysuria  Musculoskeletal: Negative for arthralgias and myalgias  Neurological: Positive for dizziness and seizures  Negative for syncope, weakness, light-headedness, numbness and headaches  All other systems reviewed and are negative  Past Medical and Surgical History:   Past Medical History:   Diagnosis Date    Depression     EP [facility]epilepsy) [facility] )     Epilepsy ([facility] )     Obesity     Restless leg        Past Surgical History:   Procedure Laterality Date    DENTAL SURGERY      all teeth removed    LASER ABLATION OF THE CERVIX      MAMMO (HISTORICAL)  05/01/2017       Meds/Allergies:  Prior to Admission medications    Medication Sig Start Date End Date Taking?  Authorizing Provider   butalbital-acetaminophen-caffeine (FIORICET,ESGIC) -40 mg per tablet Take 1 tablet by mouth every 6 (six) hours as needed for headaches 5/23/21   Deneen Cary PA-C   carBAMazepine (TEGretol) 200 mg tablet Take 1 tablet (200 mg total) by mouth 3 (three) times a day 11/10/20   DRU Lua   celecoxib (CeleBREX) 100 mg capsule Take 1 capsule (100 mg total) by mouth 2 (two) times a day  Patient not taking: Reported on 3/4/2021 2/19/21   Alonso Bullard DO   chlorthalidone (HYGROTEN) 50 MG tablet Take 25 mg by mouth daily    Historical Provider, MD   cholecalciferol (VITAMIN D3) 1,000 units tablet Take 2 tablets (2,000 Units total) by mouth daily  Patient not taking: Reported on 4/20/2021 11/10/20 4/8/21  DRU Lua   ergocalciferol (VITAMIN D2) 50,000 units Take 1 capsule (50,000 Units total) by mouth once a week for 12 doses 12/3/20 4/20/21  Marlene Hayward MD   levETIRAcetam (KEPPRA) 750 mg tablet Take 2 tablets (1,500 mg total) by mouth every 12 (twelve) hours 3/19/21 6/17/21  DRU Lua   lidocaine (LIDODERM) 5 % Apply 1 patch topically daily Remove & Discard patch within 12 hours or as directed by MD 2/19/21   Katrin Farias DO   losartan (COZAAR) 25 mg tablet Take 1 tablet (25 mg total) by mouth daily 12/3/20   Ellen Dubin, MD   metoprolol succinate (TOPROL-XL) 50 mg 24 hr tablet take 1 tablet by mouth once daily 5/1/21   DRU Barker   nicotine (NICODERM CQ) 21 mg/24 hr TD 24 hr patch Place 1 patch on the skin every 24 hours 4/15/21   DRU Barker   pramipexole (MIRAPEX) 1 5 MG tablet Take 1 tablet (1 5 mg total) by mouth daily at bedtime 12/7/20 6/5/21  DRU Barker   spironolactone (ALDACTONE) 25 mg tablet Take 1 tablet (25 mg total) by mouth daily 4/20/21   Ellen Dubin, MD     I have reviewed home medications with patient personally      Allergies: No Known Allergies    Social History:  Marital Status: /Civil Union   Occupation: Noncontributory   Patient Pre-hospital Living Situation: Home, Apartment  Patient Pre-hospital Level of Mobility: walks  Patient Pre-hospital Diet Restrictions: None  Substance Use History:   Social History     Substance and Sexual Activity   Alcohol Use Never    Comment: pt denies alcohol use     Social History     Tobacco Use   Smoking Status Current Every Day Smoker    Packs/day: 0 50    Years: 23 00    Pack years: 11 50    Types: Cigarettes   Smokeless Tobacco Never Used     Social History     Substance and Sexual Activity   Drug Use Never       Family History:  Family History   Problem Relation Age of Onset    Kidney disease Mother     Liver disease Mother     Heart attack Mother     Heart attack Father     No Known Problems Brother     No Known Problems Son        Physical Exam:     Vitals:   Blood Pressure: 136/79 (07/22/21 1950)  Pulse: 92 (07/22/21 1950)  Temperature: 98 3 °F (36 8 °C) (07/22/21 1950)  Temp Source: Tympanic (07/22/21 1950)  Respirations: 20 (07/22/21 1950)  Height: 5' 3" (160 cm) (07/22/21 1950)  Weight - Scale: 121 kg (266 lb 15 6 oz) (07/22/21 1950)  SpO2: 95 % (07/22/21 1950)    Physical Exam  Constitutional:       General: She is not in acute distress  Appearance: Normal appearance  She is obese  She is not ill-appearing or diaphoretic  HENT:      Head: Normocephalic and atraumatic  Mouth/Throat:      Mouth: Mucous membranes are moist    Eyes:      General: No scleral icterus  Pupils: Pupils are equal, round, and reactive to light  Cardiovascular:      Rate and Rhythm: Normal rate and regular rhythm  Pulses: Normal pulses  Heart sounds: Normal heart sounds, S1 normal and S2 normal  No murmur heard  No systolic murmur is present  No diastolic murmur is present  No gallop  No S3 or S4 sounds  Pulmonary:      Effort: Pulmonary effort is normal  No accessory muscle usage or respiratory distress  Breath sounds: Normal breath sounds  No stridor  No decreased breath sounds, wheezing, rhonchi or rales  Chest:      Chest wall: No tenderness  Abdominal:      General: Bowel sounds are normal  There is no distension  Palpations: Abdomen is soft  Tenderness: There is no abdominal tenderness  There is no guarding  Musculoskeletal:      Right lower leg: No edema  Left lower leg: No edema  Skin:     General: Skin is warm and dry  Coloration: Skin is not jaundiced  Neurological:      General: No focal deficit present  Mental Status: She is alert  Mental status is at baseline  Motor: No tremor or seizure activity  Psychiatric:         Behavior: Behavior is cooperative            Additional Data:     Lab Results:  Results from last 7 days   Lab Units 07/22/21  1653   WBC Thousand/uL 16 64*   HEMOGLOBIN g/dL 11 4*   HEMATOCRIT % 35 7   PLATELETS Thousands/uL 369   NEUTROS PCT % 81*   LYMPHS PCT % 13*   MONOS PCT % 5   EOS PCT % 1     Results from last 7 days   Lab Units 07/22/21  1653   SODIUM mmol/L 137   POTASSIUM mmol/L 3 2*   CHLORIDE mmol/L 104 CO2 mmol/L 25   BUN mg/dL 14   CREATININE mg/dL 1 02   ANION GAP mmol/L 8   CALCIUM mg/dL 8 8   ALBUMIN g/dL 3 4   TOTAL BILIRUBIN mg/dL 0 39   ALK PHOS U/L 93 8   ALT U/L 11   AST U/L 12*   GLUCOSE RANDOM mg/dL 105                       Imaging: Personally reviewed the following imaging: chest xray  XR chest portable    (Results Pending)       EKG and Other Studies Reviewed on Admission:   · EKG: Sinus Tachycardia   BPM    · CXR: No acute pulmonary disease - my personal read     ** Please Note: This note has been constructed using a voice recognition system   **

## 2021-07-23 NOTE — ASSESSMENT & PLAN NOTE
· POA, evidenced by leukocytosis, tachycardia   No obvious sign of infection   · Monitor off antibiotics   · Hydrate patient

## 2021-07-23 NOTE — ASSESSMENT & PLAN NOTE
· POA, evidenced by leukocytosis, tachycardia  No obvious sign of infection   · Likely secondary to seizures and there is no infection    No antibiotics at discharge

## 2021-07-23 NOTE — ASSESSMENT & PLAN NOTE
Lab Results   Component Value Date    EGFR 59 07/22/2021    EGFR 63 04/19/2021    EGFR 60 01/16/2021    CREATININE 1 02 07/22/2021    CREATININE 0 97 04/19/2021    CREATININE 1 01 01/16/2021   · Creatinine appears at baseline   · Monitor BMP

## 2021-07-24 RX ORDER — PRAMIPEXOLE DIHYDROCHLORIDE 1.5 MG/1
1.5 TABLET ORAL
Qty: 90 TABLET | Refills: 0 | Status: SHIPPED | OUTPATIENT
Start: 2021-07-24 | End: 2021-07-26

## 2021-07-26 ENCOUNTER — TELEPHONE (OUTPATIENT)
Dept: FAMILY MEDICINE CLINIC | Facility: CLINIC | Age: 61
End: 2021-07-26

## 2021-07-26 ENCOUNTER — TELEMEDICINE (OUTPATIENT)
Dept: FAMILY MEDICINE CLINIC | Facility: CLINIC | Age: 61
End: 2021-07-26
Payer: MEDICARE

## 2021-07-26 ENCOUNTER — TRANSITIONAL CARE MANAGEMENT (OUTPATIENT)
Dept: FAMILY MEDICINE CLINIC | Facility: CLINIC | Age: 61
End: 2021-07-26

## 2021-07-26 VITALS — BODY MASS INDEX: 46.07 KG/M2 | HEIGHT: 63 IN | WEIGHT: 260 LBS

## 2021-07-26 DIAGNOSIS — E83.42 HYPOMAGNESEMIA: ICD-10-CM

## 2021-07-26 DIAGNOSIS — E87.6 HYPOKALEMIA: ICD-10-CM

## 2021-07-26 DIAGNOSIS — S43.401A SPRAIN OF RIGHT SHOULDER, UNSPECIFIED SHOULDER SPRAIN TYPE, INITIAL ENCOUNTER: ICD-10-CM

## 2021-07-26 DIAGNOSIS — E55.9 VITAMIN D DEFICIENCY: ICD-10-CM

## 2021-07-26 DIAGNOSIS — N17.0 ACUTE RENAL FAILURE WITH ACUTE TUBULAR NECROSIS SUPERIMPOSED ON STAGE 3A CHRONIC KIDNEY DISEASE (HCC): ICD-10-CM

## 2021-07-26 DIAGNOSIS — N18.9 ACUTE RENAL FAILURE SUPERIMPOSED ON CHRONIC KIDNEY DISEASE, UNSPECIFIED CKD STAGE, UNSPECIFIED ACUTE RENAL FAILURE TYPE (HCC): ICD-10-CM

## 2021-07-26 DIAGNOSIS — I12.9 PARENCHYMAL RENAL HYPERTENSION, STAGE 1 THROUGH STAGE 4 OR UNSPECIFIED CHRONIC KIDNEY DISEASE: ICD-10-CM

## 2021-07-26 DIAGNOSIS — G44.89 OTHER HEADACHE SYNDROME: ICD-10-CM

## 2021-07-26 DIAGNOSIS — N18.31 ACUTE RENAL FAILURE WITH ACUTE TUBULAR NECROSIS SUPERIMPOSED ON STAGE 3A CHRONIC KIDNEY DISEASE (HCC): ICD-10-CM

## 2021-07-26 DIAGNOSIS — Z12.31 ENCOUNTER FOR SCREENING MAMMOGRAM FOR MALIGNANT NEOPLASM OF BREAST: ICD-10-CM

## 2021-07-26 DIAGNOSIS — R60.9 WATER RETENTION: ICD-10-CM

## 2021-07-26 DIAGNOSIS — N18.31 STAGE 3A CHRONIC KIDNEY DISEASE (HCC): ICD-10-CM

## 2021-07-26 DIAGNOSIS — I89.0 LYMPHEDEMA: ICD-10-CM

## 2021-07-26 DIAGNOSIS — N18.31 ANEMIA DUE TO STAGE 3A CHRONIC KIDNEY DISEASE (HCC): ICD-10-CM

## 2021-07-26 DIAGNOSIS — I10 ESSENTIAL HYPERTENSION: ICD-10-CM

## 2021-07-26 DIAGNOSIS — G40.919 EPILEPSY WITH ALTERED CONSCIOUSNESS WITH INTRACTABLE EPILEPSY (HCC): ICD-10-CM

## 2021-07-26 DIAGNOSIS — Z11.59 NEED FOR HEPATITIS C SCREENING TEST: Primary | ICD-10-CM

## 2021-07-26 DIAGNOSIS — D63.1 ANEMIA DUE TO STAGE 3A CHRONIC KIDNEY DISEASE (HCC): ICD-10-CM

## 2021-07-26 DIAGNOSIS — N17.9 ACUTE RENAL FAILURE SUPERIMPOSED ON CHRONIC KIDNEY DISEASE, UNSPECIFIED CKD STAGE, UNSPECIFIED ACUTE RENAL FAILURE TYPE (HCC): ICD-10-CM

## 2021-07-26 LAB — LEVETIRACETAM SERPL-MCNC: 43.4 UG/ML (ref 10–40)

## 2021-07-26 PROCEDURE — 99496 TRANSJ CARE MGMT HIGH F2F 7D: CPT | Performed by: NURSE PRACTITIONER

## 2021-07-26 RX ORDER — SPIRONOLACTONE 25 MG/1
25 TABLET ORAL DAILY
Qty: 90 TABLET | Refills: 3 | Status: SHIPPED | OUTPATIENT
Start: 2021-07-26

## 2021-07-26 RX ORDER — PRAMIPEXOLE DIHYDROCHLORIDE 1.5 MG/1
1.5 TABLET ORAL
Qty: 90 TABLET | Refills: 0 | OUTPATIENT
Start: 2021-07-26 | End: 2022-01-22

## 2021-07-26 RX ORDER — LIDOCAINE 50 MG/G
1 PATCH TOPICAL DAILY
Qty: 30 PATCH | Refills: 0 | OUTPATIENT
Start: 2021-07-26

## 2021-07-26 RX ORDER — CHLORTHALIDONE 50 MG/1
25 TABLET ORAL DAILY
Qty: 45 TABLET | Refills: 1 | Status: SHIPPED | OUTPATIENT
Start: 2021-07-26 | End: 2022-01-24

## 2021-07-26 RX ORDER — LOSARTAN POTASSIUM 25 MG/1
25 TABLET ORAL DAILY
Qty: 90 TABLET | Refills: 3 | Status: SHIPPED | OUTPATIENT
Start: 2021-07-26

## 2021-07-26 RX ORDER — LEVETIRACETAM 750 MG/1
1500 TABLET ORAL EVERY 12 HOURS SCHEDULED
Qty: 360 TABLET | Refills: 1 | Status: SHIPPED | OUTPATIENT
Start: 2021-07-26 | End: 2021-10-25

## 2021-07-26 RX ORDER — CHLORTHALIDONE 50 MG/1
25 TABLET ORAL DAILY
Status: CANCELLED | OUTPATIENT
Start: 2021-07-26

## 2021-07-26 RX ORDER — ERGOCALCIFEROL 1.25 MG/1
50000 CAPSULE ORAL WEEKLY
Qty: 12 CAPSULE | Refills: 0 | Status: SHIPPED | OUTPATIENT
Start: 2021-07-26 | End: 2021-11-17

## 2021-07-26 RX ORDER — CARBAMAZEPINE 200 MG/1
200 TABLET ORAL 3 TIMES DAILY
Qty: 90 TABLET | Refills: 0 | Status: CANCELLED | OUTPATIENT
Start: 2021-07-26 | End: 2021-08-25

## 2021-07-26 RX ORDER — BUTALBITAL, ACETAMINOPHEN AND CAFFEINE 50; 325; 40 MG/1; MG/1; MG/1
1 TABLET ORAL EVERY 6 HOURS PRN
Qty: 15 TABLET | Refills: 0 | OUTPATIENT
Start: 2021-07-26

## 2021-07-26 RX ORDER — ERGOCALCIFEROL 1.25 MG/1
50000 CAPSULE ORAL WEEKLY
Qty: 12 CAPSULE | Refills: 0 | Status: CANCELLED | OUTPATIENT
Start: 2021-07-26 | End: 2021-10-12

## 2021-07-26 RX ORDER — METOPROLOL SUCCINATE 50 MG/1
50 TABLET, EXTENDED RELEASE ORAL DAILY
Qty: 90 TABLET | Refills: 1 | Status: SHIPPED | OUTPATIENT
Start: 2021-07-26 | End: 2022-01-24

## 2021-07-26 RX ORDER — CARBAMAZEPINE 200 MG/1
200 TABLET ORAL 3 TIMES DAILY
Qty: 90 TABLET | Refills: 1 | Status: SHIPPED | OUTPATIENT
Start: 2021-07-26 | End: 2021-07-27 | Stop reason: SDUPTHER

## 2021-07-26 RX ORDER — PRAMIPEXOLE DIHYDROCHLORIDE 1.5 MG/1
1.5 TABLET ORAL
Qty: 180 TABLET | Refills: 1 | Status: SHIPPED | OUTPATIENT
Start: 2021-07-26 | End: 2021-11-01 | Stop reason: SDUPTHER

## 2021-07-26 RX ORDER — METOPROLOL SUCCINATE 50 MG/1
50 TABLET, EXTENDED RELEASE ORAL DAILY
Qty: 90 TABLET | Refills: 0 | Status: CANCELLED | OUTPATIENT
Start: 2021-07-26

## 2021-07-26 NOTE — TELEPHONE ENCOUNTER
----- Message from Lambert Barker sent at 7/23/2021  4:20 PM EDT -----  Regarding: RE: Hospitalization update  Thank you   I will follow up with patient     Clerical please add her on for virtual I doubt she will be able to come in   ----- Message -----  From: Demetrio Coleman MD  Sent: 7/23/2021  11:08 AM EDT  To: DRU Barker  Subject: Hospitalization update                           Thank you for allowing us to participate in the care of your patient, Kristie Chaney, who was hospitalized from 7/22/21 through 7/23/2021 with the admitting diagnosis of recurrent seizure  Patient has been on carbamazepine and Keppra as home dose  She was noncompliant  Carbamazepine levels were undetectable  She used to get the medications from [facility] as a 90 day supply  I have given her 30 day scripts here and MS stressed the importance of compliance  No driving for at least 6 months and she needs to follow-up with outpatient Neurology      If you have any additional questions or would like to discuss further, please feel free to contact me      Demetrio Coleman MD  [place] [address] Internal Medicine, Hospitalist  [phone]

## 2021-07-26 NOTE — PATIENT INSTRUCTIONS
Recurrent Seizures in Adults   WHAT YOU NEED TO KNOW:   A seizure is an episode of abnormal brain activity  A seizure can cause jerky muscle movements, loss of consciousness, or confusion  Recurrent means you have a seizure more than once  The cause of your seizures may not be known  Recurrent seizures may occur if you do not take antiseizure medicine as directed  Some common triggers are alcohol, drugs, lack of sleep, fever, or a virus  High or low blood sugar levels can also trigger a seizure  DISCHARGE INSTRUCTIONS:   Call your local emergency number (911 in the [address] [facility]) or have someone else call for any of the following:   · Your seizure lasts longer than 5 minutes  · You have a second seizure within 24 hours of your first     · You have trouble breathing after a seizure  · You cannot be woken after your seizure  · You have more than 1 seizure before you are fully awake or aware  · You have diabetes or are pregnant and have a seizure  · You have a seizure in water  Call your doctor if:   · You are injured during a seizure  · You have a fever  · You are planning to get pregnant or are currently pregnant  · You have questions or concerns about your condition or care  Medicine:   · Antiepileptic  medicine may be given to control or prevent seizures  Do not  stop taking this medicine without the direction of a healthcare provider  · Take your medicine as directed  Contact your healthcare provider if you think your medicine is not helping or if you have side effects  Tell him of her if you are allergic to any medicine  Keep a list of the medicines, vitamins, and herbs you take  Include the amounts, and when and why you take them  Bring the list or the pill bottles to follow-up visits  Carry your medicine list with you in case of an emergency  Prevent another seizure:   · Take your antiseizure medicine every day at the same time  This will also help reduce side effects   Do not skip any doses  Do not stop taking this medicine unless directed by a healthcare provider  · Manage stress  Stress can trigger a seizure  Exercise can help you reduce stress  Talk to your healthcare provider about exercise that is safe for you  Other ways to manage stress include yoga, meditation, and biofeedback  Illness can be a form of stress  Eat a variety of healthy foods and drink plenty of liquids during an illness  · Set a regular sleep schedule  A lack of sleep can trigger a seizure  Try to go to sleep and wake up at the same times every day  Keep your bedroom quiet and dark  Talk to your healthcare provider if you are having trouble sleeping  · Manage other medical conditions  Manage other health conditions that may increase your risk for a seizure  Keep your blood sugar levels and blood pressure under control  · Limit or do not drink alcohol as directed  Alcohol can trigger a seizure, especially if you drink a large amount at one time  A drink of alcohol is 12 ounces of beer, 1½ ounces of liquor, or 5 ounces of wine  Talk to your healthcare provider about a safe amount of alcohol for you  Your provider may recommend that you do not drink any alcohol  Tell him or her if you need help to quit drinking  Manage recurrent seizures:   · Ask what safety precautions you should take  Talk with your healthcare provider about driving  You may not be able to drive until you are seizure-free for a period of time  You will need to check the law where you live  Also talk to your healthcare provider about swimming and bathing  You may drown or develop life-threatening heart or lung damage if you have a seizure in water  · Tell your friends, family members, and coworkers that you had a seizure  Give them the following instructions to use if you have another seizure:     ? Do not panic  ? Gently guide me to the floor or a soft surface  ? Do not hold me down or put anything in my mouth      ? Place me on my side to help prevent me from swallowing saliva or vomit  ? Protect me from injury  Remove sharp or hard objects from the area surrounding me, or cushion my head  ? Loosen the clothing around my head and neck  ? Time how long my seizure lasts  Call 911 if my seizure lasts longer than 5 minutes or if I have a second seizure  ? Stay with me until my seizure ends  Let me rest until I am fully awake  ? Perform CPR if I stop breathing or you cannot feel my pulse  ? Do not give me anything to eat or drink until I am fully awake  Follow up with your doctor or neurologist as directed: You may need more tests to find the cause of your seizure  You may also need tests to check the level of antiseizure medicine in your blood  Your neurologist may need to change or adjust your medicine  Write down your questions so you remember to ask them during your visits  © [employer] 2021 Information is for End User's use only and may not be sold, redistributed or otherwise used for commercial purposes  All illustrations and images included in CareNotes® are the copyrighted property of [employer]  or [facility]   The above information is an  only  It is not intended as medical advice for individual conditions or treatments  Talk to your doctor, nurse or pharmacist before following any medical regimen to see if it is safe and effective for you

## 2021-07-26 NOTE — Clinical Note
Clerical set up in person follow up for 6 week please   CLINICAL please arrange to have labs done in about 4 weeks please with [facility] please
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
## 2021-07-27 ENCOUNTER — TELEPHONE (OUTPATIENT)
Dept: FAMILY MEDICINE CLINIC | Facility: CLINIC | Age: 61
End: 2021-07-27

## 2021-07-27 DIAGNOSIS — G40.919 EPILEPSY WITH ALTERED CONSCIOUSNESS WITH INTRACTABLE EPILEPSY (HCC): ICD-10-CM

## 2021-07-27 RX ORDER — CARBAMAZEPINE 200 MG/1
200 TABLET ORAL 3 TIMES DAILY
Qty: 90 TABLET | Refills: 1 | Status: SHIPPED | OUTPATIENT
Start: 2021-07-27 | End: 2021-10-25

## 2021-07-27 NOTE — TELEPHONE ENCOUNTER
----- Message from Greg Cast, [address] sent at 7/26/2021  3:07 PM EDT -----  Clerical set up in person follow up for 6 week please CLINICAL please arrange to have labs done in about 4 weeks please with [facility] please

## 2021-07-30 ENCOUNTER — HOSPITAL ENCOUNTER (EMERGENCY)
Facility: HOSPITAL | Age: 61
Discharge: HOME | End: 2021-07-30
Attending: EMERGENCY MEDICINE
Payer: MEDICARE

## 2021-07-30 VITALS
DIASTOLIC BLOOD PRESSURE: 84 MMHG | HEART RATE: 89 BPM | HEIGHT: 63 IN | RESPIRATION RATE: 18 BRPM | SYSTOLIC BLOOD PRESSURE: 156 MMHG | WEIGHT: 265 LBS | OXYGEN SATURATION: 95 % | BODY MASS INDEX: 46.95 KG/M2 | TEMPERATURE: 97.9 F

## 2021-07-30 DIAGNOSIS — G25.81 RESTLESS LEG SYNDROME: Primary | ICD-10-CM

## 2021-07-30 LAB
ANION GAP SERPL CALCULATED.3IONS-SCNC: 6 MMOL/L (ref 4–13)
BASOPHILS # BLD AUTO: 0.04 THOUSANDS/ΜL (ref 0–0.1)
BASOPHILS NFR BLD AUTO: 0 % (ref 0–1)
BUN SERPL-MCNC: 13 MG/DL (ref 6–20)
CALCIUM SERPL-MCNC: 8.6 MG/DL (ref 8.4–10.2)
CHLORIDE SERPL-SCNC: 107 MMOL/L (ref 96–108)
CO2 SERPL-SCNC: 26 MMOL/L (ref 22–33)
CREAT SERPL-MCNC: 0.94 MG/DL (ref 0.4–1.1)
EOSINOPHIL # BLD AUTO: 0.3 THOUSAND/ΜL (ref 0–0.61)
EOSINOPHIL NFR BLD AUTO: 3 % (ref 0–6)
ERYTHROCYTE [DISTWIDTH] IN BLOOD BY AUTOMATED COUNT: 14.2 % (ref 11.6–15.1)
GFR SERPL CREATININE-BSD FRML MDRD: 65 ML/MIN/1.73SQ M
GLUCOSE SERPL-MCNC: 115 MG/DL (ref 65–140)
HCT VFR BLD AUTO: 33.6 % (ref 34.8–46.1)
HGB BLD-MCNC: 10.8 G/DL (ref 11.5–15.4)
IMM GRANULOCYTES # BLD AUTO: 0.04 THOUSAND/UL (ref 0–0.2)
IMM GRANULOCYTES NFR BLD AUTO: 0 % (ref 0–2)
LYMPHOCYTES # BLD AUTO: 2.07 THOUSANDS/ΜL (ref 0.6–4.47)
LYMPHOCYTES NFR BLD AUTO: 18 % (ref 14–44)
MCH RBC QN AUTO: 30.1 PG (ref 26.8–34.3)
MCHC RBC AUTO-ENTMCNC: 32.1 G/DL (ref 31.4–37.4)
MCV RBC AUTO: 94 FL (ref 82–98)
MONOCYTES # BLD AUTO: 0.78 THOUSAND/ΜL (ref 0.17–1.22)
MONOCYTES NFR BLD AUTO: 7 % (ref 4–12)
NEUTROPHILS # BLD AUTO: 8.41 THOUSANDS/ΜL (ref 1.85–7.62)
NEUTS SEG NFR BLD AUTO: 72 % (ref 43–75)
PLATELET # BLD AUTO: 337 THOUSANDS/UL (ref 149–390)
PMV BLD AUTO: 10.6 FL (ref 8.9–12.7)
POTASSIUM SERPL-SCNC: 3.5 MMOL/L (ref 3.5–5)
RBC # BLD AUTO: 3.59 MILLION/UL (ref 3.81–5.12)
SODIUM SERPL-SCNC: 139 MMOL/L (ref 133–145)
WBC # BLD AUTO: 11.64 THOUSAND/UL (ref 4.31–10.16)

## 2021-07-30 PROCEDURE — 36415 COLL VENOUS BLD VENIPUNCTURE: CPT | Performed by: STUDENT IN AN ORGANIZED HEALTH CARE EDUCATION/TRAINING PROGRAM

## 2021-07-30 PROCEDURE — 80048 BASIC METABOLIC PNL TOTAL CA: CPT | Performed by: STUDENT IN AN ORGANIZED HEALTH CARE EDUCATION/TRAINING PROGRAM

## 2021-07-30 PROCEDURE — 99284 EMERGENCY DEPT VISIT MOD MDM: CPT | Performed by: STUDENT IN AN ORGANIZED HEALTH CARE EDUCATION/TRAINING PROGRAM

## 2021-07-30 PROCEDURE — 96372 THER/PROPH/DIAG INJ SC/IM: CPT

## 2021-07-30 PROCEDURE — 80177 DRUG SCRN QUAN LEVETIRACETAM: CPT | Performed by: STUDENT IN AN ORGANIZED HEALTH CARE EDUCATION/TRAINING PROGRAM

## 2021-07-30 PROCEDURE — 99283 EMERGENCY DEPT VISIT LOW MDM: CPT

## 2021-07-30 PROCEDURE — 85025 COMPLETE CBC W/AUTO DIFF WBC: CPT | Performed by: STUDENT IN AN ORGANIZED HEALTH CARE EDUCATION/TRAINING PROGRAM

## 2021-07-30 RX ORDER — KETOROLAC TROMETHAMINE 30 MG/ML
15 INJECTION, SOLUTION INTRAMUSCULAR; INTRAVENOUS ONCE
Status: COMPLETED | OUTPATIENT
Start: 2021-07-30 | End: 2021-07-30

## 2021-07-30 RX ADMIN — KETOROLAC TROMETHAMINE 15 MG: 30 INJECTION, SOLUTION INTRAMUSCULAR; INTRAVENOUS at 04:02

## 2021-07-30 NOTE — ED PROVIDER NOTES
History  Chief Complaint   Patient presents with    Medical Problem     Pt with restless legs tonight, getting worse  PT waiting on meds to be sent [facility]  Analia Alvarez is a 56year old female with a PMHx of lymphedema, epilepsy, restless leg syndrome, HTN who presents to the emergency department for evaluation of b/l leg twitching that began at 1AM tonight and has occurred for the past 5 nights  Patient states symptoms consistent with her restless leg syndrome, typically improved by mirapex but states has not yet received from [place]  Patient also with h/o epilepsy on carbamazepine and keppra, states not taking carbamazepine as is also being delivered from [address] [facility]  Patient states seizures typically tonic-clonic with LOC, denies any seizure like activity  She denies fever/chills, lightheadedness, syncope, falls, sore throat, SOB, chest pain, abdominal pain, n/v/d, dysuria, numbness/tingling, and any other complaints at this time  History provided by:  Patient and medical records   used: No    Medical Problem  Associated symptoms: no abdominal pain, no chest pain, no cough, no diarrhea, no ear pain, no fever, no headaches, no loss of consciousness, no nausea, no rash, no shortness of breath, no sore throat and no vomiting        Prior to Admission Medications   Prescriptions Last Dose Informant Patient Reported?  Taking?   carBAMazepine (TEGretol) 200 mg tablet   No No   Sig: Take 1 tablet (200 mg total) by mouth 3 (three) times a day   chlorthalidone (HYGROTEN) 50 MG tablet   No No   Sig: Take 0 5 tablets (25 mg total) by mouth daily   ergocalciferol (VITAMIN D2) 50,000 units   No No   Sig: Take 1 capsule (50,000 Units total) by mouth once a week for 12 doses   levETIRAcetam (KEPPRA) 750 mg tablet   No No   Sig: Take 2 tablets (1,500 mg total) by mouth every 12 (twelve) hours   lidocaine (LIDODERM) 5 %  Self No No   Sig: Apply 1 patch topically daily Remove & Discard patch within 12 hours or as directed by MD   losartan (COZAAR) 25 mg tablet   No No   Sig: Take 1 tablet (25 mg total) by mouth daily   metoprolol succinate (TOPROL-XL) 50 mg 24 hr tablet   No No   Sig: Take 1 tablet (50 mg total) by mouth daily   pramipexole (MIRAPEX) 1 5 MG tablet   No No   Sig: Take 1 tablet (1 5 mg total) by mouth daily at bedtime   spironolactone (ALDACTONE) 25 mg tablet   No No   Sig: Take 1 tablet (25 mg total) by mouth daily      Facility-Administered Medications: None       Past Medical History:   Diagnosis Date    Depression     [facility] [facility]     Epilepsy [facility] [facility]     Obesity     Restless leg        Past Surgical History:   Procedure Laterality Date    DENTAL SURGERY      all teeth removed    LASER ABLATION OF THE CERVIX      MAMMO (HISTORICAL)  05/01/2017       Family History   Problem Relation Age of Onset    Kidney disease Mother     Liver disease Mother     Heart attack Mother     Heart attack Father     No Known Problems Brother     No Known Problems Son      I have reviewed and agree with the history as documented  E-Cigarette/Vaping    E-Cigarette Use Never User      E-Cigarette/Vaping Substances    Nicotine No     THC No     CBD No     Flavoring No     Other No     Unknown No      Social History     Tobacco Use    Smoking status: Current Every Day Smoker     Packs/day: 0 50     Years: 23 00     Pack years: 11 50     Types: Cigarettes    Smokeless tobacco: Never Used   Vaping Use    Vaping Use: Never used   Substance Use Topics    Alcohol use: Never     Comment: pt denies alcohol use    Drug use: Never       Review of Systems   Constitutional: Negative for chills and fever  HENT: Negative for ear pain, sore throat and trouble swallowing  Eyes: Negative for pain and visual disturbance  Respiratory: Negative for cough and shortness of breath  Cardiovascular: Negative for chest pain and palpitations     Gastrointestinal: Negative for abdominal pain, diarrhea, nausea and vomiting  Genitourinary: Negative for dysuria and frequency  Musculoskeletal: Negative for arthralgias and back pain  Skin: Negative for color change and rash  Neurological: Negative for seizures, loss of consciousness, syncope, light-headedness and headaches  Physical Exam  Physical Exam  Vitals and nursing note reviewed  Constitutional:       General: She is not in acute distress  Appearance: Normal appearance  She is well-developed  She is obese  HENT:      Head: Normocephalic and atraumatic  Nose: Nose normal       Mouth/Throat:      Mouth: Mucous membranes are moist       Pharynx: No oropharyngeal exudate or posterior oropharyngeal erythema  Eyes:      Extraocular Movements: Extraocular movements intact  Conjunctiva/sclera: Conjunctivae normal       Pupils: Pupils are equal, round, and reactive to light  Cardiovascular:      Rate and Rhythm: Normal rate and regular rhythm  Heart sounds: No murmur heard  Pulmonary:      Effort: Pulmonary effort is normal  No respiratory distress  Breath sounds: Normal breath sounds  No wheezing, rhonchi or rales  Abdominal:      General: Bowel sounds are normal       Palpations: Abdomen is soft  Tenderness: There is no abdominal tenderness  There is no right CVA tenderness, left CVA tenderness, guarding or rebound  Musculoskeletal:         General: No swelling, deformity or signs of injury  Normal range of motion  Cervical back: Normal range of motion and neck supple  Comments: No leg twitching observed on exam    Skin:     General: Skin is warm and dry  Capillary Refill: Capillary refill takes less than 2 seconds  Findings: No rash  Neurological:      General: No focal deficit present  Mental Status: She is alert and oriented to person, place, and time  GCS: GCS eye subscore is 4  GCS verbal subscore is 5  GCS motor subscore is 6  Cranial Nerves: Cranial nerves are intact   No cranial nerve deficit  Sensory: Sensation is intact  No sensory deficit  Motor: Motor function is intact  No weakness  Coordination: Coordination is intact           Vital Signs  ED Triage Vitals   Temperature Pulse Respirations Blood Pressure SpO2   07/30/21 0246 07/30/21 0246 07/30/21 0245 07/30/21 0246 07/30/21 0246   97 9 °F (36 6 °C) 89 18 156/84 95 %      Temp src Heart Rate Source Patient Position - Orthostatic VS BP Location FiO2 (%)   -- -- -- -- --             Pain Score       --                  Vitals:    07/30/21 0246   BP: 156/84   Pulse: 89         Visual Acuity      ED Medications  Medications   ketorolac (TORADOL) injection 15 mg (15 mg Intramuscular Given 7/30/21 0402)       Diagnostic Studies  Results Reviewed     Procedure Component Value Units Date/Time    Basic metabolic panel [[id number]] Collected: 07/30/21 0321    Lab Status: Final result Specimen: Blood from Arm, Right Updated: 07/30/21 0342     Sodium 139 mmol/L      Potassium 3 5 mmol/L      Chloride 107 mmol/L      CO2 26 mmol/L      ANION GAP 6 mmol/L      BUN 13 mg/dL      Creatinine 0 94 mg/dL      Glucose 115 mg/dL      Calcium 8 6 mg/dL      eGFR 65 ml/min/1 73sq m     Narrative:      [place] guidelines for Chronic Kidney Disease (CKD):     Stage 1 with normal or high GFR (GFR > 90 mL/min/1 73 square meters)    Stage 2 Mild CKD (GFR = 60-89 mL/min/1 73 square meters)    Stage 3A Moderate CKD (GFR = 45-59 mL/min/1 73 square meters)    Stage 3B Moderate CKD (GFR = 30-44 mL/min/1 73 square meters)    Stage 4 Severe CKD (GFR = 15-29 mL/min/1 73 square meters)    Stage 5 End Stage CKD (GFR <15 mL/min/1 73 square meters)  Note: GFR calculation is accurate only with a steady state creatinine    CBC and differential [[id number]]  (Abnormal) Collected: 07/30/21 0321    Lab Status: Final result Specimen: Blood from Arm, Right Updated: 07/30/21 0340     WBC 11 64 Thousand/uL      RBC 3 59 Million/uL Hemoglobin 10 8 g/dL      Hematocrit 33 6 %      MCV 94 fL      MCH 30 1 pg      MCHC 32 1 g/dL      RDW 14 2 %      MPV 10 6 fL      Platelets 834 Thousands/uL      Neutrophils Relative 72 %      Immat GRANS % 0 %      Lymphocytes Relative 18 %      Monocytes Relative 7 %      Eosinophils Relative 3 %      Basophils Relative 0 %      Neutrophils Absolute 8 41 Thousands/µL      Immature Grans Absolute 0 04 Thousand/uL      Lymphocytes Absolute 2 07 Thousands/µL      Monocytes Absolute 0 78 Thousand/µL      Eosinophils Absolute 0 30 Thousand/µL      Basophils Absolute 0 04 Thousands/µL     Levetiracetam level [[id number]] Collected: 07/30/21 0308    Lab Status: In process Specimen: Blood Updated: 07/30/21 0312                 No orders to display              Procedures  Procedures         ED Course  ED Course as of Jul 30 1809 Fri Jul 30, 2021   9493 Mild leukocytosis, improved from 7/23, admitted 7/22 and initial leukocytosis most likely from seizure  6917 Patient now reporting b/l ankle pain, stating chronic d/t lymphedema, denies calf pain, lower LE non-tender to palpation b/l, ankles tender to light palpation b/l  Per chart review h/o of CKD; however, creatinine and GFR chronically normal, will give toradol       0419 States pain improved, BP elevated with h/o HTN, states compliant on BP medications, patient to f/u with PCP                                              MDM  Number of Diagnoses or Management Options  Restless leg syndrome: new and requires workup  Diagnosis management comments: Padmini Coates is a 56year old female with a PMHx of lymphedema, epilepsy, restless leg syndrome, HTN who presents to the ED with b/l leg twitching, states is similar to her restless leg syndrome that is typically improved with mirapex but requires medication to be sent from [facility] and has not received yet, no twitching observed throughout evaluation   Patient states compliant on keppra, has also not received carbamazepine yet, denies seizure activity  CBC with mild leukocytosis, per chart review improved from 7/23 where leukocytosis was most likely d/t seizure activity, VSS, no signs of infection  CMP unremarkable, keppra level ordered  Patient then reporting b/l ankle pain with limited ROM d/t pain, n/v intact, states chronic d/t lymphedema, denies calf pain, calf non-tender to palpation b/l with no erythema, DVT unlikely  Pain improved with toradol, confirmed normal kidney function prior to administration  Patient hypertensive to 156/84, states compliant on BP medications  -f/u with PCP, continue to monitor BP  -Strict ED return instructions discussed     Results discussed with patient, who verbalized understanding and agreement with the management plan  Strict ED return instructions were discussed at bedside and all questions were answered  Prior to discharge, I provided both verbal and written instructions of the management plan and the signs and symptoms that should prompt the patient to return to the ED  All questions were answered and the patient was comfortable with the plan of care and discharged home  The patient agrees to return to the Emergency Department for concerns and/or progression of illness           Amount and/or Complexity of Data Reviewed  Clinical lab tests: reviewed and ordered    Patient Progress  Patient progress: improved      Disposition  Final diagnoses:   Restless leg syndrome     Time reflects when diagnosis was documented in both MDM as applicable and the Disposition within this note     Time User Action Codes Description Comment    7/30/2021  4:01 AM Ryan Dickens Add [G25 81] Restless leg syndrome       ED Disposition     ED Disposition Condition Date/Time Comment    Discharge Stable Fri Jul 30, 2021  4:00 AM Evelina Khan discharge to home/self care              Follow-up Information     Follow up With Specialties Details Why Contact Info Additional Information    Leobardo Henry Nurse Practitioner, Family Medicine Schedule an appointment as soon as possible for a visit in 1 day  [phone]  [facility] Emergency Department Emergency Medicine  If symptoms worsen [phone] [address] Emergency Department, [address], [address]          Discharge Medication List as of 7/30/2021  4:19 AM      CONTINUE these medications which have NOT CHANGED    Details   carBAMazepine (TEGretol) 200 mg tablet Take 1 tablet (200 mg total) by mouth 3 (three) times a day, Starting Tue 7/27/2021, Until Thu 8/26/2021, Normal      chlorthalidone (HYGROTEN) 50 MG tablet Take 0 5 tablets (25 mg total) by mouth daily, Starting Mon 7/26/2021, Until Sun 10/24/2021, Normal      ergocalciferol (VITAMIN D2) 50,000 units Take 1 capsule (50,000 Units total) by mouth once a week for 12 doses, Starting Mon 7/26/2021, Until Tue 10/12/2021, Normal      levETIRAcetam (KEPPRA) 750 mg tablet Take 2 tablets (1,500 mg total) by mouth every 12 (twelve) hours, Starting Mon 7/26/2021, Until Sun 10/24/2021, Normal      lidocaine (LIDODERM) 5 % Apply 1 patch topically daily Remove & Discard patch within 12 hours or as directed by MD, Starting Fri 2/19/2021, Normal      losartan (COZAAR) 25 mg tablet Take 1 tablet (25 mg total) by mouth daily, Starting Mon 7/26/2021, Normal      metoprolol succinate (TOPROL-XL) 50 mg 24 hr tablet Take 1 tablet (50 mg total) by mouth daily, Starting Mon 7/26/2021, Until Sun 10/24/2021, Normal      pramipexole (MIRAPEX) 1 5 MG tablet Take 1 tablet (1 5 mg total) by mouth daily at bedtime, Starting Mon 7/26/2021, Until Sat 1/22/2022, Normal      spironolactone (ALDACTONE) 25 mg tablet Take 1 tablet (25 mg total) by mouth daily, Starting Mon 7/26/2021, Normal           No discharge procedures on file      PDMP Review       Value Time User    PDMP Reviewed Yes 7/23/2021 10:52 AM Leti Cam MD          ED Provider  Electronically Signed by           Guilherme Garcia PA-C  07/30/21 1814

## 2021-08-02 LAB — LEVETIRACETAM SERPL-MCNC: 35.2 UG/ML (ref 10–40)

## 2021-08-05 DIAGNOSIS — G40.919 EPILEPSY WITH ALTERED CONSCIOUSNESS WITH INTRACTABLE EPILEPSY (HCC): ICD-10-CM

## 2021-08-25 PROBLEM — N18.30 STAGE 3 CHRONIC KIDNEY DISEASE (HCC): Status: ACTIVE | Noted: 2021-08-25

## 2021-08-26 ENCOUNTER — TELEPHONE (OUTPATIENT)
Dept: NEPHROLOGY | Facility: CLINIC | Age: 61
End: 2021-08-26

## 2021-08-26 NOTE — TELEPHONE ENCOUNTER
Unable to leave a message per vm "the person you have called is unable to receive calls at this time" , no option to lm

## 2021-08-26 NOTE — TELEPHONE ENCOUNTER
----- Message from Gearld Boxer, MD sent at 8/25/2021  4:05 PM EDT -----  The patient should get the following labs prior to her appointment  CMP/magnesium/urine protein creatinine ratio  She was supposed to have also obtain 24 hour urine for free cortisol  She was also specific go for an echocardiogram  And finally vascular surgery consultation which was never done    At the very least have her do labs  Try to have her bring in 1 week a blood pressures along with machine  Thank you

## 2021-10-14 ENCOUNTER — TELEPHONE (OUTPATIENT)
Dept: VASCULAR SURGERY | Facility: CLINIC | Age: 61
End: 2021-10-14

## 2021-10-25 DIAGNOSIS — G40.919 EPILEPSY WITH ALTERED CONSCIOUSNESS WITH INTRACTABLE EPILEPSY (HCC): ICD-10-CM

## 2021-10-25 RX ORDER — LEVETIRACETAM 750 MG/1
TABLET ORAL
Qty: 360 TABLET | Refills: 3 | Status: SHIPPED | OUTPATIENT
Start: 2021-10-25

## 2021-10-25 RX ORDER — CARBAMAZEPINE 200 MG
TABLET ORAL
Qty: 270 TABLET | Refills: 3 | Status: SHIPPED | OUTPATIENT
Start: 2021-10-25

## 2021-11-01 DIAGNOSIS — G40.919 EPILEPSY WITH ALTERED CONSCIOUSNESS WITH INTRACTABLE EPILEPSY (HCC): ICD-10-CM

## 2021-11-01 RX ORDER — PRAMIPEXOLE DIHYDROCHLORIDE 1.5 MG/1
1.5 TABLET ORAL
Qty: 180 TABLET | Refills: 1 | Status: SHIPPED | OUTPATIENT
Start: 2021-11-01 | End: 2021-11-05 | Stop reason: SDUPTHER

## 2021-11-04 ENCOUNTER — TELEPHONE (OUTPATIENT)
Dept: FAMILY MEDICINE CLINIC | Facility: CLINIC | Age: 61
End: 2021-11-04

## 2021-11-04 DIAGNOSIS — G40.919 EPILEPSY WITH ALTERED CONSCIOUSNESS WITH INTRACTABLE EPILEPSY (HCC): ICD-10-CM

## 2021-11-05 DIAGNOSIS — G40.919 EPILEPSY WITH ALTERED CONSCIOUSNESS WITH INTRACTABLE EPILEPSY (HCC): ICD-10-CM

## 2021-11-05 RX ORDER — PRAMIPEXOLE DIHYDROCHLORIDE 1.5 MG/1
1.5 TABLET ORAL
Qty: 180 TABLET | Refills: 1 | Status: SHIPPED | OUTPATIENT
Start: 2021-11-05 | End: 2022-05-04

## 2021-11-17 ENCOUNTER — HOSPITAL ENCOUNTER (EMERGENCY)
Facility: HOSPITAL | Age: 61
Discharge: HOME | End: 2021-11-17
Attending: INTERNAL MEDICINE | Admitting: INTERNAL MEDICINE
Payer: MEDICARE

## 2021-11-17 VITALS
HEART RATE: 72 BPM | HEIGHT: 63 IN | TEMPERATURE: 98.5 F | OXYGEN SATURATION: 98 % | WEIGHT: 275 LBS | BODY MASS INDEX: 48.73 KG/M2 | RESPIRATION RATE: 20 BRPM | DIASTOLIC BLOOD PRESSURE: 67 MMHG | SYSTOLIC BLOOD PRESSURE: 133 MMHG

## 2021-11-17 DIAGNOSIS — M79.604 BILATERAL LEG PAIN: Primary | ICD-10-CM

## 2021-11-17 DIAGNOSIS — M79.605 BILATERAL LEG PAIN: Primary | ICD-10-CM

## 2021-11-17 LAB
ALBUMIN SERPL BCP-MCNC: 3.3 G/DL (ref 3.4–4.8)
ALP SERPL-CCNC: 92.4 U/L (ref 35–140)
ALT SERPL W P-5'-P-CCNC: 10 U/L (ref 5–54)
ANION GAP SERPL CALCULATED.3IONS-SCNC: 8 MMOL/L (ref 4–13)
AST SERPL W P-5'-P-CCNC: 10 U/L (ref 15–41)
BASOPHILS # BLD AUTO: 0.04 THOUSANDS/ΜL (ref 0–0.1)
BASOPHILS NFR BLD AUTO: 0 % (ref 0–1)
BILIRUB SERPL-MCNC: 0.24 MG/DL (ref 0.3–1.2)
BNP SERPL-MCNC: 54.4 PG/ML (ref 1–100)
BUN SERPL-MCNC: 20 MG/DL (ref 6–20)
CALCIUM ALBUM COR SERPL-MCNC: 9.5 MG/DL (ref 8.3–10.1)
CALCIUM SERPL-MCNC: 8.9 MG/DL (ref 8.4–10.2)
CHLORIDE SERPL-SCNC: 103 MMOL/L (ref 96–108)
CO2 SERPL-SCNC: 26 MMOL/L (ref 22–33)
CREAT SERPL-MCNC: 1.29 MG/DL (ref 0.4–1.1)
EOSINOPHIL # BLD AUTO: 0.34 THOUSAND/ΜL (ref 0–0.61)
EOSINOPHIL NFR BLD AUTO: 3 % (ref 0–6)
ERYTHROCYTE [DISTWIDTH] IN BLOOD BY AUTOMATED COUNT: 14 % (ref 11.6–15.1)
GFR SERPL CREATININE-BSD FRML MDRD: 44 ML/MIN/1.73SQ M
GLUCOSE SERPL-MCNC: 96 MG/DL (ref 65–140)
HCT VFR BLD AUTO: 35.7 % (ref 34.8–46.1)
HGB BLD-MCNC: 11.5 G/DL (ref 11.5–15.4)
IMM GRANULOCYTES # BLD AUTO: 0.03 THOUSAND/UL (ref 0–0.2)
IMM GRANULOCYTES NFR BLD AUTO: 0 % (ref 0–2)
LYMPHOCYTES # BLD AUTO: 2.75 THOUSANDS/ΜL (ref 0.6–4.47)
LYMPHOCYTES NFR BLD AUTO: 22 % (ref 14–44)
MCH RBC QN AUTO: 29.9 PG (ref 26.8–34.3)
MCHC RBC AUTO-ENTMCNC: 32.2 G/DL (ref 31.4–37.4)
MCV RBC AUTO: 93 FL (ref 82–98)
MONOCYTES # BLD AUTO: 0.98 THOUSAND/ΜL (ref 0.17–1.22)
MONOCYTES NFR BLD AUTO: 8 % (ref 4–12)
NEUTROPHILS # BLD AUTO: 8.21 THOUSANDS/ΜL (ref 1.85–7.62)
NEUTS SEG NFR BLD AUTO: 67 % (ref 43–75)
PLATELET # BLD AUTO: 343 THOUSANDS/UL (ref 149–390)
PMV BLD AUTO: 10.4 FL (ref 8.9–12.7)
POTASSIUM SERPL-SCNC: 4.2 MMOL/L (ref 3.5–5)
PROT SERPL-MCNC: 7 G/DL (ref 6.4–8.3)
RBC # BLD AUTO: 3.85 MILLION/UL (ref 3.81–5.12)
SODIUM SERPL-SCNC: 137 MMOL/L (ref 133–145)
WBC # BLD AUTO: 12.35 THOUSAND/UL (ref 4.31–10.16)

## 2021-11-17 PROCEDURE — 96366 THER/PROPH/DIAG IV INF ADDON: CPT

## 2021-11-17 PROCEDURE — 99283 EMERGENCY DEPT VISIT LOW MDM: CPT

## 2021-11-17 PROCEDURE — 96365 THER/PROPH/DIAG IV INF INIT: CPT

## 2021-11-17 PROCEDURE — 85025 COMPLETE CBC W/AUTO DIFF WBC: CPT | Performed by: PHYSICIAN ASSISTANT

## 2021-11-17 PROCEDURE — 99284 EMERGENCY DEPT VISIT MOD MDM: CPT | Performed by: PHYSICIAN ASSISTANT

## 2021-11-17 PROCEDURE — 96372 THER/PROPH/DIAG INJ SC/IM: CPT

## 2021-11-17 PROCEDURE — 36415 COLL VENOUS BLD VENIPUNCTURE: CPT | Performed by: PHYSICIAN ASSISTANT

## 2021-11-17 PROCEDURE — 80053 COMPREHEN METABOLIC PANEL: CPT | Performed by: PHYSICIAN ASSISTANT

## 2021-11-17 PROCEDURE — 83880 ASSAY OF NATRIURETIC PEPTIDE: CPT | Performed by: PHYSICIAN ASSISTANT

## 2021-11-17 RX ORDER — KETOROLAC TROMETHAMINE 30 MG/ML
15 INJECTION, SOLUTION INTRAMUSCULAR; INTRAVENOUS ONCE
Status: COMPLETED | OUTPATIENT
Start: 2021-11-17 | End: 2021-11-17

## 2021-11-17 RX ORDER — SODIUM CHLORIDE, SODIUM GLUCONATE, SODIUM ACETATE, POTASSIUM CHLORIDE, MAGNESIUM CHLORIDE, SODIUM PHOSPHATE, DIBASIC, AND POTASSIUM PHOSPHATE .53; .5; .37; .037; .03; .012; .00082 G/100ML; G/100ML; G/100ML; G/100ML; G/100ML; G/100ML; G/100ML
1000 INJECTION, SOLUTION INTRAVENOUS ONCE
Status: COMPLETED | OUTPATIENT
Start: 2021-11-17 | End: 2021-11-17

## 2021-11-17 RX ADMIN — SODIUM CHLORIDE, SODIUM GLUCONATE, SODIUM ACETATE, POTASSIUM CHLORIDE, MAGNESIUM CHLORIDE, SODIUM PHOSPHATE, DIBASIC, AND POTASSIUM PHOSPHATE 1000 ML: .53; .5; .37; .037; .03; .012; .00082 INJECTION, SOLUTION INTRAVENOUS at 13:05

## 2021-11-17 RX ADMIN — KETOROLAC TROMETHAMINE 15 MG: 30 INJECTION, SOLUTION INTRAMUSCULAR at 11:46

## 2022-01-24 DIAGNOSIS — I10 ESSENTIAL HYPERTENSION: ICD-10-CM

## 2022-01-24 RX ORDER — CHLORTHALIDONE 50 MG/1
TABLET ORAL
Qty: 45 TABLET | Refills: 3 | Status: SHIPPED | OUTPATIENT
Start: 2022-01-24

## 2022-01-24 RX ORDER — METOPROLOL SUCCINATE 50 MG/1
TABLET, EXTENDED RELEASE ORAL
Qty: 90 TABLET | Refills: 3 | Status: SHIPPED | OUTPATIENT
Start: 2022-01-24

## 2022-02-22 NOTE — ASSESSMENT & PLAN NOTE
[address] Medicine  Progress Note    Patient Name: Luisito Stuart  MRN: [id number]  Patient Class: IP- Inpatient   Admission Date: 2/15/2022  Length of Stay: 7 days  Attending Physician: Stephen Malcolm MD  Primary Care Provider: JOYA iHll        Subjective:     Principal Problem:Transient alteration of awareness        HPI:  No notes on file    Overview/Hospital Course:  02/22 Patient voices no complaints. Await NH placement. Amiodurone. PT      Interval History: Patient  voices no complaints.    Review of Systems   Constitutional:  Negative for fatigue and fever.   Respiratory:  Negative for chest tightness and shortness of breath.    Cardiovascular:  Negative for chest pain.   Objective:     Vital Signs (Most Recent):  Temp: 97.2 °F (36.2 °C) (02/22/22 0748)  Pulse: 83 (02/22/22 0748)  Resp: 16 (02/22/22 0748)  BP: (!) 98/52 (02/22/22 0748)  SpO2: (!) 94 % (02/22/22 0748)   Vital Signs (24h Range):  Temp:  [97.2 °F (36.2 °C)-98.1 °F (36.7 °C)] 97.2 °F (36.2 °C)  Pulse:  [81-96] 83  Resp:  [16-18] 16  SpO2:  [94 %-100 %] 94 %  BP: ()/(52-81) 98/52     Weight: 76.3 kg (168 lb 3.4 oz)  Body mass index is 24.84 kg/m².    Intake/Output Summary (Last 24 hours) at 2/22/2022 0939  Last data filed at 2/22/2022 0455  Gross per 24 hour   Intake --   Output 800 ml   Net -800 ml      Physical Exam  Vitals and nursing note reviewed.   Constitutional:       Appearance: He is normal weight.   Cardiovascular:      Rate and Rhythm: Regular rhythm.      Heart sounds: Normal heart sounds.   Pulmonary:      Breath sounds: Normal breath sounds.   Abdominal:      Palpations: Abdomen is soft.       Significant Labs: All pertinent labs within the past 24 hours have been reviewed.  BMP: No results for input(s): GLU, NA, K, CL, CO2, BUN, CREATININE, CALCIUM, MG in the last 48 hours.  CBC: No results for input(s): WBC, HGB, HCT, PLT in the last 48 hours.    Significant  · POA, patient with supposed 3 seizures today  Questionable compliance with medications  Carbamazepine levels undetectable   Keppra levels pending   · Admit patient to med/surg under observation status   · Status post Keppra and Carbamazepine load in the ED   · Restart home seizure meds  · Neurology consult Imaging:  none      Assessment/Plan:      * Transient alteration of awareness    Telestroke consult done at admission.  Likely TIA, perhaps related to a. Fib and questionable compliance with anticoagulation.  Seems to have resolved.   2/21: Pt awake and seems oriented today    CKD (chronic kidney disease), stage III        A-fib  Amiodarone  Apixaban (Also on DAPT)  Rate controlled.     Cardiomyopathy  Cardiology has adjusted meds. Optimized for the moment. They will consider referral to [facility] for advanced therapies (LVAD etc). Hospice would be another consideration if he is not interested in going to [facility]. Consult Social Work for nursing home placement.     Acute on chronic congestive heart failure    Creatinine improved a bit. We may have gotten him too dry Friday. Holding lasix, possible resume tomorrow. He has end stage disease that is near optimized.     Chronic obstructive pulmonary disease  Stable, monitor        VTE Risk Mitigation (From admission, onward)         Ordered     apixaban tablet 5 mg  2 times daily         02/15/22 1146                Discharge Planning   MORGAN:      Code Status: Partial Code   Is the patient medically ready for discharge?:     Reason for patient still in hospital (select all that apply): Other (specify) PT. Amiodurone.   Discharge Plan A: Home with family                  Stephen Malcolm MD  Department of Hospital Medicine   [address]

## 2022-06-07 ENCOUNTER — TELEPHONE (OUTPATIENT)
Dept: FAMILY MEDICINE CLINIC | Facility: CLINIC | Age: 62
End: 2022-06-07

## 2022-06-22 NOTE — TELEPHONE ENCOUNTER
Attempted to contact pt x3  Could not leave VM   Phone continued to ring until it was disconnected

## 2022-08-24 DIAGNOSIS — N18.31 ANEMIA DUE TO STAGE 3A CHRONIC KIDNEY DISEASE (HCC): ICD-10-CM

## 2022-08-24 DIAGNOSIS — D63.1 ANEMIA DUE TO STAGE 3A CHRONIC KIDNEY DISEASE (HCC): ICD-10-CM

## 2022-08-24 DIAGNOSIS — N18.31 STAGE 3A CHRONIC KIDNEY DISEASE (HCC): ICD-10-CM

## 2022-08-24 DIAGNOSIS — R60.9 WATER RETENTION: ICD-10-CM

## 2022-08-24 DIAGNOSIS — N17.9 ACUTE RENAL FAILURE SUPERIMPOSED ON CHRONIC KIDNEY DISEASE, UNSPECIFIED CKD STAGE, UNSPECIFIED ACUTE RENAL FAILURE TYPE (HCC): ICD-10-CM

## 2022-08-24 DIAGNOSIS — E87.6 HYPOKALEMIA: ICD-10-CM

## 2022-08-24 DIAGNOSIS — N18.31 ACUTE RENAL FAILURE WITH ACUTE TUBULAR NECROSIS SUPERIMPOSED ON STAGE 3A CHRONIC KIDNEY DISEASE (HCC): ICD-10-CM

## 2022-08-24 DIAGNOSIS — I10 ESSENTIAL HYPERTENSION: ICD-10-CM

## 2022-08-24 DIAGNOSIS — I12.9 PARENCHYMAL RENAL HYPERTENSION, STAGE 1 THROUGH STAGE 4 OR UNSPECIFIED CHRONIC KIDNEY DISEASE: ICD-10-CM

## 2022-08-24 DIAGNOSIS — N17.0 ACUTE RENAL FAILURE WITH ACUTE TUBULAR NECROSIS SUPERIMPOSED ON STAGE 3A CHRONIC KIDNEY DISEASE (HCC): ICD-10-CM

## 2022-08-24 DIAGNOSIS — N18.9 ACUTE RENAL FAILURE SUPERIMPOSED ON CHRONIC KIDNEY DISEASE, UNSPECIFIED CKD STAGE, UNSPECIFIED ACUTE RENAL FAILURE TYPE (HCC): ICD-10-CM

## 2022-08-24 RX ORDER — LOSARTAN POTASSIUM 25 MG/1
TABLET ORAL
Qty: 90 TABLET | Refills: 3 | OUTPATIENT
Start: 2022-08-24

## 2022-12-02 ENCOUNTER — RA CDI HCC (OUTPATIENT)
Dept: OTHER | Facility: HOSPITAL | Age: 62
End: 2022-12-02

## 2022-12-02 NOTE — PROGRESS NOTES
[facility]  coding opportunities          Chart Reviewed number of suggestions sent to Provider: 3  [phone]  [phone]  [id number]     Patients Insurance     Medicare Insurance: Medicare

## 2022-12-26 DIAGNOSIS — G40.919 EPILEPSY WITH ALTERED CONSCIOUSNESS WITH INTRACTABLE EPILEPSY (HCC): ICD-10-CM

## 2022-12-27 RX ORDER — CARBAMAZEPINE 200 MG
TABLET ORAL
Qty: 270 TABLET | Refills: 3 | OUTPATIENT
Start: 2022-12-27

## 2023-01-12 NOTE — TELEPHONE ENCOUNTER
Left patient a message to call office to schedule an appointment for medication refills Gucci casillas

## 2023-01-17 DIAGNOSIS — I10 ESSENTIAL HYPERTENSION: ICD-10-CM

## 2023-01-17 RX ORDER — CHLORTHALIDONE 50 MG/1
TABLET ORAL
Qty: 45 TABLET | Refills: 3 | OUTPATIENT
Start: 2023-01-17

## 2023-04-01 ENCOUNTER — APPOINTMENT (EMERGENCY)
Dept: CT IMAGING | Facility: HOSPITAL | Age: 63
End: 2023-04-01

## 2023-04-01 ENCOUNTER — HOSPITAL ENCOUNTER (EMERGENCY)
Facility: HOSPITAL | Age: 63
Discharge: HOME | End: 2023-04-01
Attending: EMERGENCY MEDICINE | Admitting: EMERGENCY MEDICINE

## 2023-04-01 VITALS
HEART RATE: 86 BPM | DIASTOLIC BLOOD PRESSURE: 63 MMHG | OXYGEN SATURATION: 96 % | BODY MASS INDEX: 38.62 KG/M2 | RESPIRATION RATE: 18 BRPM | TEMPERATURE: 97.9 F | SYSTOLIC BLOOD PRESSURE: 132 MMHG | WEIGHT: 218 LBS

## 2023-04-01 DIAGNOSIS — R56.9 SEIZURE (HCC): Primary | ICD-10-CM
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
## 2023-04-01 LAB
ALBUMIN SERPL BCP-MCNC: 3.4 G/DL (ref 3.5–5)
ALP SERPL-CCNC: 95 U/L (ref 34–104)
ALT SERPL W P-5'-P-CCNC: 10 U/L (ref 7–52)
ANION GAP SERPL CALCULATED.3IONS-SCNC: 6 MMOL/L (ref 4–13)
AST SERPL W P-5'-P-CCNC: 13 U/L (ref 13–39)
BASOPHILS # BLD AUTO: 0.08 THOUSANDS/ÂΜL (ref 0–0.1)
BASOPHILS NFR BLD AUTO: 1 % (ref 0–1)
BILIRUB SERPL-MCNC: 0.21 MG/DL (ref 0.2–1)
BUN SERPL-MCNC: 21 MG/DL (ref 5–25)
CALCIUM ALBUM COR SERPL-MCNC: 8.8 MG/DL (ref 8.3–10.1)
CALCIUM SERPL-MCNC: 8.3 MG/DL (ref 8.4–10.2)
CHLORIDE SERPL-SCNC: 107 MMOL/L (ref 96–108)
CK SERPL-CCNC: 297 U/L (ref 26–192)
CO2 SERPL-SCNC: 26 MMOL/L (ref 21–32)
CREAT SERPL-MCNC: 1.12 MG/DL (ref 0.6–1.3)
EOSINOPHIL # BLD AUTO: 0.2 THOUSAND/ÂΜL (ref 0–0.61)
EOSINOPHIL NFR BLD AUTO: 1 % (ref 0–6)
ERYTHROCYTE [DISTWIDTH] IN BLOOD BY AUTOMATED COUNT: 14 % (ref 11.6–15.1)
GFR SERPL CREATININE-BSD FRML MDRD: 52 ML/MIN/1.73SQ M
GLUCOSE SERPL-MCNC: 111 MG/DL (ref 65–140)
HCT VFR BLD AUTO: 36.7 % (ref 34.8–46.1)
HGB BLD-MCNC: 11.5 G/DL (ref 11.5–15.4)
IMM GRANULOCYTES # BLD AUTO: 0.12 THOUSAND/UL (ref 0–0.2)
IMM GRANULOCYTES NFR BLD AUTO: 1 % (ref 0–2)
LYMPHOCYTES # BLD AUTO: 1.71 THOUSANDS/ÂΜL (ref 0.6–4.47)
LYMPHOCYTES NFR BLD AUTO: 12 % (ref 14–44)
MCH RBC QN AUTO: 30.8 PG (ref 26.8–34.3)
MCHC RBC AUTO-ENTMCNC: 31.3 G/DL (ref 31.4–37.4)
MCV RBC AUTO: 98 FL (ref 82–98)
MONOCYTES # BLD AUTO: 0.82 THOUSAND/ÂΜL (ref 0.17–1.22)
MONOCYTES NFR BLD AUTO: 6 % (ref 4–12)
NEUTROPHILS # BLD AUTO: 10.89 THOUSANDS/ÂΜL (ref 1.85–7.62)
NEUTS SEG NFR BLD AUTO: 79 % (ref 43–75)
NRBC BLD AUTO-RTO: 0 /100 WBCS
PLATELET # BLD AUTO: 326 THOUSANDS/UL (ref 149–390)
PMV BLD AUTO: 10.3 FL (ref 8.9–12.7)
POTASSIUM SERPL-SCNC: 4 MMOL/L (ref 3.5–5.3)
PROT SERPL-MCNC: 7 G/DL (ref 6.4–8.4)
RBC # BLD AUTO: 3.73 MILLION/UL (ref 3.81–5.12)
SODIUM SERPL-SCNC: 139 MMOL/L (ref 135–147)
WBC # BLD AUTO: 13.82 THOUSAND/UL (ref 4.31–10.16)

## 2023-04-01 RX ORDER — GINSENG 100 MG
1 CAPSULE ORAL ONCE
Status: COMPLETED | OUTPATIENT
Start: 2023-04-01 | End: 2023-04-01

## 2023-04-01 RX ORDER — LIDOCAINE HYDROCHLORIDE AND EPINEPHRINE 10; 10 MG/ML; UG/ML
1 INJECTION, SOLUTION INFILTRATION; PERINEURAL ONCE
Status: COMPLETED | OUTPATIENT
Start: 2023-04-01 | End: 2023-04-01

## 2023-04-01 RX ORDER — ACETAMINOPHEN 325 MG/1
975 TABLET ORAL ONCE
Status: COMPLETED | OUTPATIENT
Start: 2023-04-01 | End: 2023-04-01

## 2023-04-01 RX ORDER — LEVETIRACETAM 250 MG/1
750 TABLET ORAL ONCE
Status: COMPLETED | OUTPATIENT
Start: 2023-04-01 | End: 2023-04-01

## 2023-04-01 RX ADMIN — ACETAMINOPHEN 975 MG: 325 TABLET ORAL at 18:43

## 2023-04-01 RX ADMIN — LIDOCAINE HYDROCHLORIDE,EPINEPHRINE BITARTRATE 1 ML: 10; .01 INJECTION, SOLUTION INFILTRATION; PERINEURAL at 19:27

## 2023-04-01 RX ADMIN — BACITRACIN 1 SMALL APPLICATION: 500 OINTMENT TOPICAL at 19:27

## 2023-04-01 RX ADMIN — LEVETIRACETAM 750 MG: 250 TABLET, FILM COATED ORAL at 18:44

## 2023-04-01 NOTE — ED PROVIDER NOTES
History  Chief Complaint   Patient presents with   • Miles Belcher down 6 concrete steps - laceration to head; unknown LOC - denies BT     66-year-old female with history of epilepsy presents today after a fall at her home  She states that she had a seizure and does not recall the event  She endorses head strike in the back of her head  No other symptoms at this time  Upon presentation to ED patient states she is back to her baseline and no longer postictal   She has been having issues refilling her medications, she states she got a year supply and most are running out  Her Keppra ran out over a week ago  Patient denies any lateralizing deficits  Prior to Admission Medications   Prescriptions Last Dose Informant Patient Reported? Taking?    TEGretol 200 MG tablet 4/1/2023  No Yes   Sig: TAKE 1 TABLET THREE TIMES A DAY   chlorthalidone (HYGROTEN) 50 MG tablet 4/1/2023  No Yes   Sig: TAKE ONE-HALF (1/2) TABLET DAILY   levETIRAcetam (KEPPRA) 750 mg tablet Past Week  No Yes   Sig: TAKE 2 TABLETS EVERY 12 HOURS   lidocaine (LIDODERM) 5 % 4/1/2023  No Yes   Sig: Apply 1 patch topically daily Remove & Discard patch within 12 hours or as directed by MD   losartan (COZAAR) 25 mg tablet 4/1/2023  No Yes   Sig: Take 1 tablet (25 mg total) by mouth daily   metoprolol succinate (TOPROL-XL) 50 mg 24 hr tablet 4/1/2023  No Yes   Sig: TAKE 1 TABLET DAILY   pramipexole (MIRAPEX) 1 5 MG tablet   No No   Sig: Take 1 tablet (1 5 mg total) by mouth daily at bedtime   spironolactone (ALDACTONE) 25 mg tablet 4/1/2023  No Yes   Sig: Take 1 tablet (25 mg total) by mouth daily      Facility-Administered Medications: None       Past Medical History:   Diagnosis Date   • Depression    • EP (epilepsy) ([facility] [address] )    • Epilepsy ([facility] [address] )    • Obesity    • Restless leg        Past Surgical History:   Procedure Laterality Date   • DENTAL SURGERY      all teeth removed   • LASER ABLATION OF THE CERVIX     • MAMMO (HISTORICAL)  05/01/2017 Family History   Problem Relation Age of Onset   • Kidney disease Mother    • Liver disease Mother    • Heart attack Mother    • Heart attack Father    • No Known Problems Brother    • No Known Problems Son      I have reviewed and agree with the history as documented  E-Cigarette/Vaping   • E-Cigarette Use Never User      E-Cigarette/Vaping Substances   • Nicotine No    • THC No    • CBD No    • Flavoring No    • Other No    • Unknown No      Social History     Tobacco Use   • Smoking status: Every Day     Packs/day: 0 50     Years: 23 00     Pack years: 11 50     Types: Cigarettes   • Smokeless tobacco: Never   Vaping Use   • Vaping Use: Never used   Substance Use Topics   • Alcohol use: Never     Comment: pt denies alcohol use   • Drug use: Never        Review of Systems   Constitutional: Negative for chills and fever  HENT: Negative for congestion, rhinorrhea and sneezing  Eyes: Negative for pain and visual disturbance  Respiratory: Negative for cough and shortness of breath  Cardiovascular: Negative for chest pain and palpitations  Gastrointestinal: Negative for abdominal pain, constipation, diarrhea, nausea and vomiting  Genitourinary: Negative for dysuria and hematuria  Musculoskeletal: Negative for arthralgias and back pain  Skin: Positive for wound (On back of her head)  Negative for color change and rash  Neurological: Positive for seizures (With fall)  Negative for syncope, weakness, numbness and headaches  All other systems reviewed and are negative        Physical Exam  ED Triage Vitals   Temperature Pulse Respirations Blood Pressure SpO2   04/01/23 1750 04/01/23 1750 04/01/23 1750 04/01/23 1750 04/01/23 1750   97 9 °F (36 6 °C) 99 20 139/80 98 %      Temp Source Heart Rate Source Patient Position - Orthostatic VS BP Location FiO2 (%)   04/01/23 1750 04/01/23 2030 04/01/23 2030 04/01/23 2030 --   Oral Monitor Lying Right arm       Pain Score       04/01/23 1750       3 Orthostatic Vital Signs  Vitals:    04/01/23 1750 04/01/23 2030   BP: 139/80 132/63   Pulse: 99 86   Patient Position - Orthostatic VS:  Lying       Physical Exam  Vitals and nursing note reviewed  Constitutional:       General: She is not in acute distress  Appearance: She is not ill-appearing  HENT:      Head: Normocephalic  Laceration present  No raccoon eyes or Ferguson's sign  Right Ear: External ear normal       Left Ear: External ear normal       Nose: Nose normal  No congestion or rhinorrhea  Mouth/Throat:      Mouth: Mucous membranes are moist       Pharynx: Oropharynx is clear  Eyes:      General: No scleral icterus  Extraocular Movements: Extraocular movements intact  Cardiovascular:      Rate and Rhythm: Normal rate and regular rhythm  Pulses: Normal pulses  Heart sounds: Normal heart sounds  Pulmonary:      Effort: Pulmonary effort is normal       Breath sounds: Normal breath sounds  Abdominal:      Palpations: Abdomen is soft  Tenderness: There is no abdominal tenderness  Musculoskeletal:         General: Signs of injury present  Normal range of motion  Cervical back: Normal range of motion  Skin:     General: Skin is warm and dry  Neurological:      General: No focal deficit present  Mental Status: She is alert and oriented to person, place, and time     Psychiatric:         Mood and Affect: Mood normal          Behavior: Behavior normal          ED Medications  Medications   levETIRAcetam (KEPPRA) tablet 750 mg (750 mg Oral Given 4/1/23 1844)   acetaminophen (TYLENOL) tablet 975 mg (975 mg Oral Given 4/1/23 1843)   lidocaine-epinephrine (XYLOCAINE/EPINEPHRINE) 1 %-1:100,000 injection 1 mL (1 mL Infiltration Given 4/1/23 1927)   bacitracin topical ointment 1 small application (1 small application Topical Given 4/1/23 1927)       Diagnostic Studies  Results Reviewed     Procedure Component Value Units Date/Time    Comprehensive metabolic panel [[id number]]  (Abnormal) Collected: 04/01/23 1850    Lab Status: Final result Specimen: Blood from Arm, Right Updated: 04/01/23 1922     Sodium 139 mmol/L      Potassium 4 0 mmol/L      Chloride 107 mmol/L      CO2 26 mmol/L      ANION GAP 6 mmol/L      BUN 21 mg/dL      Creatinine 1 12 mg/dL      Glucose 111 mg/dL      Calcium 8 3 mg/dL      Corrected Calcium 8 8 mg/dL      AST 13 U/L      ALT 10 U/L      Alkaline Phosphatase 95 U/L      Total Protein 7 0 g/dL      Albumin 3 4 g/dL      Total Bilirubin 0 21 mg/dL      eGFR 52 ml/min/1 73sq m     Narrative:      [place] guidelines for Chronic Kidney Disease (CKD):   •  Stage 1 with normal or high GFR (GFR > 90 mL/min/1 73 square meters)  •  Stage 2 Mild CKD (GFR = 60-89 mL/min/1 73 square meters)  •  Stage 3A Moderate CKD (GFR = 45-59 mL/min/1 73 square meters)  •  Stage 3B Moderate CKD (GFR = 30-44 mL/min/1 73 square meters)  •  Stage 4 Severe CKD (GFR = 15-29 mL/min/1 73 square meters)  •  Stage 5 End Stage CKD (GFR <15 mL/min/1 73 square meters)  Note: GFR calculation is accurate only with a steady state creatinine    CK [[id number]]  (Abnormal) Collected: 04/01/23 1850    Lab Status: Final result Specimen: Blood from Arm, Right Updated: 04/01/23 1922     Total  U/L     CBC and differential [[id number]]  (Abnormal) Collected: 04/01/23 1850    Lab Status: Final result Specimen: Blood from Arm, Right Updated: 04/01/23 1859     WBC 13 82 Thousand/uL      RBC 3 73 Million/uL      Hemoglobin 11 5 g/dL      Hematocrit 36 7 %      MCV 98 fL      MCH 30 8 pg      MCHC 31 3 g/dL      RDW 14 0 %      MPV 10 3 fL      Platelets 722 Thousands/uL      nRBC 0 /100 WBCs      Neutrophils Relative 79 %      Immat GRANS % 1 %      Lymphocytes Relative 12 %      Monocytes Relative 6 %      Eosinophils Relative 1 %      Basophils Relative 1 %      Neutrophils Absolute 10 89 Thousands/µL      Immature Grans Absolute 0 12 Thousand/uL Lymphocytes Absolute 1 71 Thousands/µL      Monocytes Absolute 0 82 Thousand/µL      Eosinophils Absolute 0 20 Thousand/µL      Basophils Absolute 0 08 Thousands/µL                  CT head without contrast   Final Result by Blaze Henriquez MD (04/01 2054)      No acute intracranial abnormality  Scalp soft tissue swelling in bilateral posterior parietal regions  Workstation performed: [id number]               Procedures  Laceration repair    Date/Time: 4/2/2023 12:21 AM  Performed by: Frannie Calloway DO  Authorized by: Frannie Calloway DO   Consent: Verbal consent obtained  Consent given by: patient  Patient identity confirmed: verbally with patient and arm band  Body area: head/neck  Location details: scalp  Laceration length: 4 cm  Foreign bodies: no foreign bodies  Anesthesia: local infiltration    Anesthesia:  Local Anesthetic: lidocaine 1% with epinephrine  Anesthetic total: 5 mL    Sedation:  Patient sedated: no      Wound Dehiscence:  Superficial Wound Dehiscence: simple closure      Procedure Details:  Irrigation solution: saline  Irrigation method: jet lavage  Amount of cleaning: extensive  Skin closure: staples  Number of sutures: 6  Approximation: close  Approximation difficulty: simple  Patient tolerance: patient tolerated the procedure well with no immediate complications            ED Course                             SBIRT 22yo+    Flowsheet Row Most Recent Value   SBIRT (23 yo +)    In order to provide better care to our patients, we are screening all of our patients for alcohol and drug use  Would it be okay to ask you these screening questions? Yes Filed at: 04/01/2023 1755   Initial Alcohol Screen: US AUDIT-C     1  How often do you have a drink containing alcohol? 0 Filed at: 04/01/2023 1755   2  How many drinks containing alcohol do you have on a typical day you are drinking? 0 Filed at: 04/01/2023 1755   3a  Male UNDER 65:  How often do you have five or more drinks on one occasion? 0 Filed at: 04/01/2023 1755   3b  FEMALE Any Age, or MALE 65+: How often do you have 4 or more drinks on one occassion? 0 Filed at: 04/01/2023 1755   Audit-C Score 0 Filed at: 04/01/2023 1755   JOHN: How many times in the past year have you    Used an illegal drug or used a prescription medication for non-medical reasons? Never Filed at: 04/01/2023 Pr-155 Meena Sharma presented due to a fall at home where she struck her head  Patient states she has a history of epilepsy and had a seizure with loss of consciousness  Upon arrival to the ED she was well-appearing and states she was only mildly postictal   Patient has not taken her Keppra in 1 week and her other seizure medications are starting to run out  Patient was given her home dose of Keppra here  Patient's work-up was largely reassuring with no signs of endorgan damage  CT head showed no acute abnormalities  Patient's laceration was stapled per procedure note above  Patient tolerated the procedure well  Patient was instructed to follow-up with her neurologist and PCP for further evaluation and treatment  Patient discharged in stable condition  Strict return precautions given if symptoms are worsening or not resolving  Amount and/or Complexity of Data Reviewed  Labs: ordered  Radiology: ordered  Risk  OTC drugs  Prescription drug management  Disposition  Final diagnoses:   Seizure [facility])   Fall     Time reflects when diagnosis was documented in both MDM as applicable and the Disposition within this note     Time User Action Codes Description Comment    4/1/2023  9:20 PM Phillip Pizarro Add [R56 9] Seizure ([address] )     4/1/2023  9:20 PM Phillip Pizarro Add [L28  XXXA] Fall       ED Disposition     ED Disposition   Discharge    Condition   Stable    Date/Time   Sat Apr 1, 2023  9:20 PM    Comment   Juan Carr discharge to home/self care                 Follow-up Information     Follow up With Specialties Details Why Contact Info Additional Information    Enmanuel Ortiz MD Internal Medicine Call in 3 days For ED follow-up, For suture removal [address] Emergency Department Emergency Medicine Go to  If symptoms worsen or do not resolve, For suture removal [address] [facility] [address] [facility] Emergency Department, [address], [place], [place], [address]          Discharge Medication List as of 4/1/2023  9:21 PM      CONTINUE these medications which have NOT CHANGED    Details   chlorthalidone (HYGROTEN) 50 MG tablet TAKE ONE-HALF (1/2) TABLET DAILY, Normal      levETIRAcetam (KEPPRA) 750 mg tablet TAKE 2 TABLETS EVERY 12 HOURS, Normal      lidocaine (LIDODERM) 5 % Apply 1 patch topically daily Remove & Discard patch within 12 hours or as directed by MD, Starting Fri 2/19/2021, Normal      losartan (COZAAR) 25 mg tablet Take 1 tablet (25 mg total) by mouth daily, Starting Mon 7/26/2021, Normal      metoprolol succinate (TOPROL-XL) 50 mg 24 hr tablet TAKE 1 TABLET DAILY, Normal      spironolactone (ALDACTONE) 25 mg tablet Take 1 tablet (25 mg total) by mouth daily, Starting Mon 7/26/2021, Normal      TEGretol 200 MG tablet TAKE 1 TABLET THREE TIMES A DAY, Normal      pramipexole (MIRAPEX) 1 5 MG tablet Take 1 tablet (1 5 mg total) by mouth daily at bedtime, Starting Fri 11/5/2021, Until Wed 5/4/2022, Normal           No discharge procedures on file  PDMP Review       Value Time User    PDMP Reviewed  Yes 7/23/2021 10:52 AM Balaji Luke MD           ED Provider  Attending physically available and evaluated [facility]  I managed the patient along with the ED Attending      Electronically Signed by         Dang De La Torre DO  04/02/23 8090

## 2023-04-01 NOTE — ED ATTENDING ATTESTATION
4/1/2023  IMary MD, saw and evaluated the patient  I have discussed the patient with the resident/non-physician practitioner and agree with the resident's/non-physician practitioner's findings, Plan of Care, and MDM as documented in the resident's/non-physician practitioner's note, except where noted  All available labs and Radiology studies were reviewed  I was present for key portions of any procedure(s) performed by the resident/non-physician practitioner and I was immediately available to provide assistance  At this point I agree with the current assessment done in the Emergency Department  I have conducted an independent evaluation of this patient a history and physical is as follows: Patient with a history of seizure disorder, states she been out of her Keppra for about 1 week due to difficulty obtaining follow-up appointment  She reports fall down roughly 4-5 stairs  Head strike  No loss of consciousness  No anticoagulation  No trauma criteria  No chest pain, shortness of breath  CT head obtained to evaluate for acute surgical pathology, CT head with no acute abnormality  I provided general supervision for laceration repair performed by resident physician  Patient remained at her baseline in the emergency department  She states she has access to her other medications  She is stable at time of discharge home from the emergency department        Results Reviewed     Procedure Component Value Units Date/Time    Comprehensive metabolic panel [[id number]]  (Abnormal) Collected: 04/01/23 1850    Lab Status: Final result Specimen: Blood from Arm, Right Updated: 04/01/23 1922     Sodium 139 mmol/L      Potassium 4 0 mmol/L      Chloride 107 mmol/L      CO2 26 mmol/L      ANION GAP 6 mmol/L      BUN 21 mg/dL      Creatinine 1 12 mg/dL      Glucose 111 mg/dL      Calcium 8 3 mg/dL      Corrected Calcium 8 8 mg/dL      AST 13 U/L      ALT 10 U/L      Alkaline Phosphatase 95 U/L Total Protein 7 0 g/dL      Albumin 3 4 g/dL      Total Bilirubin 0 21 mg/dL      eGFR 52 ml/min/1 73sq m     Narrative:      National Kidney Disease Foundation guidelines for Chronic Kidney Disease (CKD):   •  Stage 1 with normal or high GFR (GFR > 90 mL/min/1 73 square meters)  •  Stage 2 Mild CKD (GFR = 60-89 mL/min/1 73 square meters)  •  Stage 3A Moderate CKD (GFR = 45-59 mL/min/1 73 square meters)  •  Stage 3B Moderate CKD (GFR = 30-44 mL/min/1 73 square meters)  •  Stage 4 Severe CKD (GFR = 15-29 mL/min/1 73 square meters)  •  Stage 5 End Stage CKD (GFR <15 mL/min/1 73 square meters)  Note: GFR calculation is accurate only with a steady state creatinine    CK [[id number]]  (Abnormal) Collected: 04/01/23 1850    Lab Status: Final result Specimen: Blood from Arm, Right Updated: 04/01/23 1922     Total  U/L     CBC and differential [[id number]]  (Abnormal) Collected: 04/01/23 1850    Lab Status: Final result Specimen: Blood from Arm, Right Updated: 04/01/23 1859     WBC 13 82 Thousand/uL      RBC 3 73 Million/uL      Hemoglobin 11 5 g/dL      Hematocrit 36 7 %      MCV 98 fL      MCH 30 8 pg      MCHC 31 3 g/dL      RDW 14 0 %      MPV 10 3 fL      Platelets 219 Thousands/uL      nRBC 0 /100 WBCs      Neutrophils Relative 79 %      Immat GRANS % 1 %      Lymphocytes Relative 12 %      Monocytes Relative 6 %      Eosinophils Relative 1 %      Basophils Relative 1 %      Neutrophils Absolute 10 89 Thousands/µL      Immature Grans Absolute 0 12 Thousand/uL      Lymphocytes Absolute 1 71 Thousands/µL      Monocytes Absolute 0 82 Thousand/µL      Eosinophils Absolute 0 20 Thousand/µL      Basophils Absolute 0 08 Thousands/µL         CT head without contrast   Final Result by Ramakrishna Rodríguez MD (04/01 2054)      No acute intracranial abnormality  Scalp soft tissue swelling in bilateral posterior parietal regions                 Workstation performed: [id number]               CT head without contrast Final Result by Chuck Hi MD (04/01 2054)      No acute intracranial abnormality  Scalp soft tissue swelling in bilateral posterior parietal regions                 Workstation performed: [id number]               ED Course         Critical Care Time  Procedures

## 2023-04-02 LAB
ATRIAL RATE: 92 BPM
P AXIS: 28 DEGREES
PR INTERVAL: 126 MS
QRS AXIS: 38 DEGREES
QRSD INTERVAL: 64 MS
QT INTERVAL: 368 MS
QTC INTERVAL: 455 MS
T WAVE AXIS: 68 DEGREES
VENTRICULAR RATE: 92 BPM

## 2023-04-03 ENCOUNTER — VBI (OUTPATIENT)
Dept: FAMILY MEDICINE CLINIC | Facility: CLINIC | Age: 63
End: 2023-04-03

## 2023-04-03 NOTE — TELEPHONE ENCOUNTER
Juan Cao    ED Visit Information     Ed visit date: 4/1/23  Diagnosis Description: Seizure; Fall  [facility] [address] [facility] [facility]  Discharge status: Home  Discharged with meds ? No  Number of ED visits to date: 2  ED Severity:3     Outreach Information    Outreach successful: No 2  Date letter mailed: eva bernard  Date Finalized:4/6/2023    Care Coordination    Follow up appointment with pcp: no no  Transportation issues ?  No    Value [employer] type: 7 Day Outreach  [facility] PCP: No  Called PCP first?: No  04/05/2023 02:25 PM EDT by Chris Madden 04/05/2023 02:25 PM EDT by Benjamin Castillo (Self) [phone] (AEA)[phone] (Mobile)  [phone] (Mobile) Remove  - No Answer/BusyCommunicated - not able to [facility]    04/06/2023 01:33 PM EDT by [place] Diamond 04/06/2023 01:33 PM EDT by Mary Rivas (Self) [phone] [facility][phone] (Mobile)  [phone] (Mobile) Remove  - No Answer/BusyCommunicated - not able to

## 2023-04-06 NOTE — ED PROCEDURE NOTE
Procedure  Procedures    Laceration repair was performed on 04/01/2023               Karley Ramirez DO  04/06/23 Kristian

## 2023-04-11 PROBLEM — Z00.00 ANNUAL PHYSICAL EXAM: Status: ACTIVE | Noted: 2023-04-11

## 2023-04-11 PROBLEM — Z11.59 NEED FOR HEPATITIS C SCREENING TEST: Status: ACTIVE | Noted: 2023-04-11

## 2023-04-11 PROBLEM — Z12.4 SCREENING FOR CERVICAL CANCER: Status: ACTIVE | Noted: 2023-04-11

## 2023-04-11 PROBLEM — Z12.31 SCREENING MAMMOGRAM FOR BREAST CANCER: Status: ACTIVE | Noted: 2023-04-11

## 2023-04-11 PROBLEM — Z23 ENCOUNTER FOR VACCINATION: Status: ACTIVE | Noted: 2023-04-11

## 2023-04-11 PROBLEM — Z11.4 SCREENING FOR HIV (HUMAN IMMUNODEFICIENCY VIRUS): Status: ACTIVE | Noted: 2023-04-11

## 2023-04-11 PROBLEM — Z12.11 SCREENING FOR COLON CANCER: Status: ACTIVE | Noted: 2023-04-11

## 2023-05-11 ENCOUNTER — APPOINTMENT (EMERGENCY)
Dept: CT IMAGING | Facility: HOSPITAL | Age: 63
End: 2023-05-11

## 2023-05-11 ENCOUNTER — HOSPITAL ENCOUNTER (EMERGENCY)
Facility: HOSPITAL | Age: 63
Discharge: HOME | End: 2023-05-11
Attending: EMERGENCY MEDICINE

## 2023-05-11 ENCOUNTER — APPOINTMENT (EMERGENCY)
Dept: RADIOLOGY | Facility: HOSPITAL | Age: 63
End: 2023-05-11

## 2023-05-11 VITALS
RESPIRATION RATE: 18 BRPM | SYSTOLIC BLOOD PRESSURE: 122 MMHG | OXYGEN SATURATION: 99 % | TEMPERATURE: 98.1 F | DIASTOLIC BLOOD PRESSURE: 68 MMHG | HEART RATE: 96 BPM

## 2023-05-11 DIAGNOSIS — Z76.0 MEDICATION REFILL: ICD-10-CM

## 2023-05-11 DIAGNOSIS — G40.919 EPILEPSY WITH ALTERED CONSCIOUSNESS WITH INTRACTABLE EPILEPSY (HCC): ICD-10-CM

## 2023-05-11 DIAGNOSIS — R55 SYNCOPE: Primary | ICD-10-CM

## 2023-05-11 DIAGNOSIS — G25.81 RESTLESS LEG SYNDROME: ICD-10-CM

## 2023-05-11 DIAGNOSIS — G40.909 SEIZURE DISORDER (HCC): ICD-10-CM

## 2023-05-11 LAB
ALBUMIN SERPL BCP-MCNC: 3.7 G/DL (ref 3.5–5)
ALP SERPL-CCNC: 106 U/L (ref 34–104)
ALT SERPL W P-5'-P-CCNC: 13 U/L (ref 7–52)
ANION GAP SERPL CALCULATED.3IONS-SCNC: 11 MMOL/L (ref 4–13)
AST SERPL W P-5'-P-CCNC: 14 U/L (ref 13–39)
BASOPHILS # BLD AUTO: 0.06 THOUSANDS/ÂΜL (ref 0–0.1)
BASOPHILS NFR BLD AUTO: 1 % (ref 0–1)
BILIRUB SERPL-MCNC: 0.27 MG/DL (ref 0.2–1)
BUN SERPL-MCNC: 38 MG/DL (ref 5–25)
CALCIUM SERPL-MCNC: 8.9 MG/DL (ref 8.4–10.2)
CARDIAC TROPONIN I PNL SERPL HS: 4 NG/L (ref 8–18)
CHLORIDE SERPL-SCNC: 106 MMOL/L (ref 96–108)
CO2 SERPL-SCNC: 22 MMOL/L (ref 21–32)
CREAT SERPL-MCNC: 1.49 MG/DL (ref 0.6–1.3)
EOSINOPHIL # BLD AUTO: 0.22 THOUSAND/ÂΜL (ref 0–0.61)
EOSINOPHIL NFR BLD AUTO: 2 % (ref 0–6)
ERYTHROCYTE [DISTWIDTH] IN BLOOD BY AUTOMATED COUNT: 13.1 % (ref 11.6–15.1)
GFR SERPL CREATININE-BSD FRML MDRD: 36 ML/MIN/1.73SQ M
GLUCOSE SERPL-MCNC: 103 MG/DL (ref 65–140)
HCT VFR BLD AUTO: 35.7 % (ref 34.8–46.1)
HGB BLD-MCNC: 11.8 G/DL (ref 11.5–15.4)
IMM GRANULOCYTES # BLD AUTO: 0.05 THOUSAND/UL (ref 0–0.2)
IMM GRANULOCYTES NFR BLD AUTO: 0 % (ref 0–2)
LYMPHOCYTES # BLD AUTO: 2.55 THOUSANDS/ÂΜL (ref 0.6–4.47)
LYMPHOCYTES NFR BLD AUTO: 21 % (ref 14–44)
MCH RBC QN AUTO: 31.1 PG (ref 26.8–34.3)
MCHC RBC AUTO-ENTMCNC: 33.1 G/DL (ref 31.4–37.4)
MCV RBC AUTO: 94 FL (ref 82–98)
MONOCYTES # BLD AUTO: 0.82 THOUSAND/ÂΜL (ref 0.17–1.22)
MONOCYTES NFR BLD AUTO: 7 % (ref 4–12)
NEUTROPHILS # BLD AUTO: 8.61 THOUSANDS/ÂΜL (ref 1.85–7.62)
NEUTS SEG NFR BLD AUTO: 69 % (ref 43–75)
NRBC BLD AUTO-RTO: 0 /100 WBCS
PLATELET # BLD AUTO: 293 THOUSANDS/UL (ref 149–390)
PMV BLD AUTO: 11.1 FL (ref 8.9–12.7)
POTASSIUM SERPL-SCNC: 4.2 MMOL/L (ref 3.5–5.3)
PROT SERPL-MCNC: 7.5 G/DL (ref 6.4–8.4)
RBC # BLD AUTO: 3.79 MILLION/UL (ref 3.81–5.12)
SODIUM SERPL-SCNC: 139 MMOL/L (ref 135–147)
WBC # BLD AUTO: 12.31 THOUSAND/UL (ref 4.31–10.16)

## 2023-05-11 RX ORDER — LEVETIRACETAM 10 MG/ML
1000 INJECTION INTRAVASCULAR ONCE
Status: COMPLETED | OUTPATIENT
Start: 2023-05-11 | End: 2023-05-11

## 2023-05-11 RX ORDER — LEVETIRACETAM 750 MG/1
1500 TABLET ORAL 2 TIMES DAILY
Qty: 120 TABLET | Refills: 0 | Status: SHIPPED | OUTPATIENT
Start: 2023-05-11 | End: 2023-06-10

## 2023-05-11 RX ORDER — SODIUM CHLORIDE 9 MG/ML
125 INJECTION, SOLUTION INTRAVENOUS CONTINUOUS
Status: DISCONTINUED | OUTPATIENT
Start: 2023-05-11 | End: 2023-05-11 | Stop reason: HOSPADM

## 2023-05-11 RX ADMIN — LEVETIRACETAM 1000 MG: 10 INJECTION INTRAVASCULAR at 18:05

## 2023-05-11 RX ADMIN — SODIUM CHLORIDE 125 ML/HR: 0.9 INJECTION, SOLUTION INTRAVENOUS at 18:03

## 2023-05-11 NOTE — ED PROVIDER NOTES
History  Chief Complaint   Patient presents with   • Syncope     Pt presents to the ed via ems [place] reports dizziness and light headiness, per ems pt tachy at 150     This is a 57years old brought in for having syncopal attack  [place] she was standing and all of a sudden she passed out  Patient has history of seizure and she is on Keppra 1500 mg twice daily but she did not take it for 2 days  Also patient take Tegretol 200 mg x 3 daily  Patient stated that she is compliant with her medication except that the [address] which she ran out of it  As she ran out of it  Patient denies any tongue biting and she has no urinary incontinence  Patient has a history of syncope in the past   Patient denies any cardiac issues  On the arrival to the ER patient has /min  Patient denies any CP or SOB  Patient has no headache  Patient denies abdominal pain nausea vomiting diarrhea  Patient has no body injuries  Patient denies urinary symptoms  Patient stated that she has a history of epilepsy for many years  Prior to Admission Medications   Prescriptions Last Dose Informant Patient Reported? Taking?    TEGretol 200 MG tablet   No No   Sig: TAKE 1 TABLET THREE TIMES A DAY   carBAMazepine (TEGretol) 200 mg tablet   No No   Sig: Take 1 tablet (200 mg total) by mouth 3 (three) times a day for 14 days   chlorthalidone (HYGROTEN) 50 MG tablet   No No   Sig: TAKE ONE-HALF (1/2) TABLET DAILY   levETIRAcetam (KEPPRA) 750 mg tablet   No No   Sig: TAKE 2 TABLETS EVERY 12 HOURS   levETIRAcetam (Keppra) 750 mg tablet   No No   Sig: Take 2 tablets (1,500 mg total) by mouth 2 (two) times a day for 14 days   levETIRAcetam (Keppra) 750 mg tablet   No Yes   Sig: Take 2 tablets (1,500 mg total) by mouth 2 (two) times a day   lidocaine (LIDODERM) 5 %   No No   Sig: Apply 1 patch topically daily Remove & Discard patch within 12 hours or as directed by MD losartan (COZAAR) 25 mg tablet   No No   Sig: Take 1 tablet (25 mg total) by mouth daily   metoprolol succinate (TOPROL-XL) 50 mg 24 hr tablet   No No   Sig: TAKE 1 TABLET DAILY   pramipexole (MIRAPEX) 1 5 MG tablet   No No   Sig: Take 1 tablet (1 5 mg total) by mouth daily at bedtime   spironolactone (ALDACTONE) 25 mg tablet   No No   Sig: Take 1 tablet (25 mg total) by mouth daily      Facility-Administered Medications: None       Past Medical History:   Diagnosis Date   • Depression    • EP (epilepsy) ([facility] )    • [facility]    • Obesity    • Restless leg        Past Surgical History:   Procedure Laterality Date   • DENTAL SURGERY      all teeth removed   • LASER ABLATION OF THE CERVIX     • MAMMO (HISTORICAL)  05/01/2017       Family History   Problem Relation Age of Onset   • Kidney disease Mother    • Liver disease Mother    • Heart attack Mother    • Heart attack Father    • No Known Problems Brother    • No Known Problems Son      I have reviewed and agree with the history as documented  E-Cigarette/Vaping   • E-Cigarette Use Never User      E-Cigarette/Vaping Substances   • Nicotine No    • THC No    • CBD No    • Flavoring No    • Other No    • Unknown No      Social History     Tobacco Use   • Smoking status: Every Day     Packs/day: 0 50     Years: 23 00     Pack years: 11 50     Types: Cigarettes   • Smokeless tobacco: Never   Vaping Use   • Vaping Use: Never used   Substance Use Topics   • Alcohol use: Never     Comment: pt denies alcohol use   • Drug use: Never       Review of Systems   Constitutional: Negative for fatigue and fever  HENT: Negative for congestion, rhinorrhea, sinus pressure, sinus pain, sneezing and sore throat  Respiratory: Negative for cough and shortness of breath  Cardiovascular: Negative for chest pain and palpitations  Gastrointestinal: Negative for abdominal pain, diarrhea, nausea and vomiting     Genitourinary: Negative for difficulty urinating, dysuria, flank pain and frequency  Musculoskeletal: Negative for back pain, myalgias, neck pain and neck stiffness  Skin: Negative for color change, pallor and rash  Neurological: Positive for syncope  Negative for dizziness, tremors, seizures, speech difficulty, weakness, light-headedness, numbness and headaches  Psychiatric/Behavioral: Negative for agitation and behavioral problems  Physical Exam  Physical Exam  Vitals and nursing note reviewed  Constitutional:       General: She is not in acute distress  Appearance: Normal appearance  She is well-developed  She is not ill-appearing, toxic-appearing or diaphoretic  HENT:      Head: Normocephalic and atraumatic  Right Ear: Tympanic membrane and ear canal normal       Left Ear: Tympanic membrane and ear canal normal       Nose: Nose normal  No congestion or rhinorrhea  Mouth/Throat:      Pharynx: No oropharyngeal exudate or posterior oropharyngeal erythema  Eyes:      General:         Right eye: No discharge  Left eye: No discharge  Extraocular Movements: Extraocular movements intact  Pupils: Pupils are equal, round, and reactive to light  Neck:      Vascular: No carotid bruit  Cardiovascular:      Rate and Rhythm: Normal rate and regular rhythm  Heart sounds: Normal heart sounds  No murmur heard  No friction rub  No gallop  Pulmonary:      Effort: Pulmonary effort is normal  No respiratory distress  Breath sounds: Normal breath sounds  No wheezing, rhonchi or rales  Abdominal:      General: Bowel sounds are normal  There is no distension  Palpations: Abdomen is soft  There is no mass  Tenderness: There is no abdominal tenderness  There is no right CVA tenderness, left CVA tenderness, guarding or rebound  Hernia: No hernia is present  Musculoskeletal:         General: No swelling, tenderness, deformity or signs of injury  Normal range of motion        Cervical back: Normal range of motion and neck supple  No rigidity or tenderness  Right lower leg: No edema  Left lower leg: No edema  Lymphadenopathy:      Cervical: No cervical adenopathy  Skin:     General: Skin is warm and dry  Capillary Refill: Capillary refill takes less than 2 seconds  Coloration: Skin is not jaundiced or pale  Findings: No bruising, erythema, lesion or rash  Neurological:      General: No focal deficit present  Mental Status: She is alert and oriented to person, place, and time  Cranial Nerves: No cranial nerve deficit  Sensory: No sensory deficit  Motor: No weakness        Coordination: Coordination normal       Deep Tendon Reflexes: Reflexes normal    Psychiatric:         Behavior: Behavior normal          Vital Signs  ED Triage Vitals   Temperature Pulse Respirations Blood Pressure SpO2   05/11/23 1744 05/11/23 1744 05/11/23 1744 05/11/23 1744 05/11/23 1744   98 1 °F (36 7 °C) (!) 148 20 (!) 96/49 98 %      Temp src Heart Rate Source Patient Position - Orthostatic VS BP Location FiO2 (%)   -- 05/11/23 1744 05/11/23 1744 05/11/23 1744 --    Monitor Lying Left arm       Pain Score       05/11/23 1750       No Pain           Vitals:    05/11/23 1744 05/11/23 1750 05/11/23 1810   BP: (!) 96/49 111/64 122/68   Pulse: (!) 148 (!) 148 96   Patient Position - Orthostatic VS: Lying Lying Lying         Visual Acuity      ED Medications  Medications   levetiracetam in NaCl (KEPPRA) infusion 1,000 mg (1,000 mg Intravenous Given 5/11/23 1805)       Diagnostic Studies  Results Reviewed     Procedure Component Value Units Date/Time    High Sensitivity Troponin I Random [[id number]]  (Abnormal) Collected: 05/11/23 1758    Lab Status: Final result Specimen: Blood from Arm, Right Updated: 05/11/23 1824     HS TnI random 4 ng/L     Comprehensive metabolic panel [[id number]]  (Abnormal) Collected: 05/11/23 1758    Lab Status: Final result Specimen: Blood from Arm, Right Updated: 05/11/23 1820 Sodium 139 mmol/L      Potassium 4 2 mmol/L      Chloride 106 mmol/L      CO2 22 mmol/L      ANION GAP 11 mmol/L      BUN 38 mg/dL      Creatinine 1 49 mg/dL      Glucose 103 mg/dL      Calcium 8 9 mg/dL      AST 14 U/L      ALT 13 U/L      Alkaline Phosphatase 106 U/L      Total Protein 7 5 g/dL      Albumin 3 7 g/dL      Total Bilirubin 0 27 mg/dL      eGFR 36 ml/min/1 73sq m     Narrative:      National Kid[employer] Disease Foundation guidelines for Chronic Kidney Disease (CKD):   •  Stage 1 with normal or high GFR (GFR > 90 mL/min/1 73 square meters)  •  Stage 2 Mild CKD (GFR = 60-89 mL/min/1 73 square meters)  •  Stage 3A Moderate CKD (GFR = 45-59 mL/min/1 73 square meters)  •  Stage 3B Moderate CKD (GFR = 30-44 mL/min/1 73 square meters)  •  Stage 4 Severe CKD (GFR = 15-29 mL/min/1 73 square meters)  •  Stage 5 End Stage CKD (GFR <15 mL/min/1 73 square meters)  Note: GFR calculation is accurate only with a steady state creatinine    CBC and differential [[id number]]  (Abnormal) Collected: 05/11/23 1758    Lab Status: Final result Specimen: Blood from Arm, Right Updated: 05/11/23 1802     WBC 12 31 Thousand/uL      RBC 3 79 Million/uL      Hemoglobin 11 8 g/dL      Hematocrit 35 7 %      MCV 94 fL      MCH 31 1 pg      MCHC 33 1 g/dL      RDW 13 1 %      MPV 11 1 fL      Platelets 283 Thousands/uL      nRBC 0 /100 WBCs      Neutrophils Relative 69 %      Immat GRANS % 0 %      Lymphocytes Relative 21 %      Monocytes Relative 7 %      Eosinophils Relative 2 %      Basophils Relative 1 %      Neutrophils Absolute 8 61 Thousands/µL      Immature Grans Absolute 0 05 Thousand/uL      Lymphocytes Absolute 2 55 Thousands/µL      Monocytes Absolute 0 82 Thousand/µL      Eosinophils Absolute 0 22 Thousand/µL      Basophils Absolute 0 06 Thousands/µL     Levetiracetam level [[id number]] Collected: 05/11/23 1758    Lab Status:  In process Specimen: Blood from Arm, Right Updated: 05/11/23 1800                 CT head wo contrast ED Interpretation by Nat Santana MD (05/11 1911)   Cxr; no acute cardiopulmonary findings      Final Result by Briana Zamora MD (05/11 1946)      No acute intracranial abnormality  Workstation performed: [id number]         XR chest portable   Final Result by Comfort Arroyo MD (05/12 0725)      No acute cardiopulmonary disease  Findings are stable            Workstation performed: [id number]                    Procedures  ECG 12 Lead Documentation Only    Date/Time: 5/11/2023 7:07 PM  Performed by: Nat Santana MD  Authorized by: Nat Santana MD     Indications / Diagnosis:  Syncope  ECG reviewed by me, the ED Provider: yes    Patient location:  ED and bedside  Previous ECG:     Comparison to cardiac monitor: Yes    Interpretation:     Interpretation: abnormal    Rate:     ECG rate:  150  Rhythm:     Rhythm: sinus tachycardia    Ectopy:     Ectopy: none    QRS:     QRS intervals:  Normal  Conduction:     Conduction: normal    ST segments:     ST segments:  Normal  T waves:     T waves: normal    Comments:      Low voltage  ECG 12 Lead Documentation Only    Date/Time: 5/11/2023 7:08 PM  Performed by: Nat Santana MD  Authorized by: Nat Santana MD     Indications / Diagnosis:  Syncope  ECG reviewed by me, the ED Provider: yes    Patient location:  ED and bedside  Previous ECG:     Previous ECG:  Compared to current    Similarity:  No change  Interpretation:     Interpretation: abnormal    Rate:     ECG rate:  101    ECG rate assessment: tachycardic    Rhythm:     Rhythm: sinus tachycardia    Ectopy:     Ectopy: none    QRS:     QRS axis:  Normal    QRS intervals:  Normal  Conduction:     Conduction: normal    ST segments:     ST segments:  Normal  T waves:     T waves: normal    Comments:      Low voltage             ED Course                               SBIRT 22yo+    Flowsheet Row Most Recent Value   Initial Alcohol Screen: US AUDIT-C     1   How often do you have a drink containing alcohol? 0 Filed at: 05/11/2023 1744   Audit-C Score 0 Filed at: 05/11/2023 1744                    Medical Decision Making  This is a 57years old [place]  Patient had history of seizure disorder and she does not take any seizure medication  Patient stated that she take only Keppra  Last time she took it was 2 days ago  Patient also take Tegretol 200 mg 3 times a day and she is compliant with it  Patient denies any biting of the tongue or urinary incontinence  Patient brought to the ER with heart rate 150/min sinus rhythm  Eventually her heart rate slowed down to   Then the heart rate goes down to 80/min  Patient denies any headache or dizziness  Patient has no tongue biting or urinary incontinence  Reviewed all her labs came back within normal limits  EKG done 2 times shows no ischemic changes only low voltage  CXR shows no acute infiltrate  CT head; We will send patient home on Keppra and advised him not to miss any doses  Amount and/or Complexity of Data Reviewed  Labs: ordered  Details: WBC 12 CMP normal  Radiology: ordered and independent interpretation performed  Details: CXR; NO acute infilterate  CT head ; no acute intracranial findings  ECG/medicine tests: ordered and independent interpretation performed  Details: 1st  EKG; sinus tachycardia /min low voltage  Second EKG sinus rhythm /min low voltage  Risk  Prescription drug management            Disposition  Final diagnoses:   Syncope   Seizure disorder ([facility] )   Epilepsy with altered consciousness with intractable epilepsy ([facility] )   Medication refill   Restless leg syndrome     Time reflects when diagnosis was documented in both MDM as applicable and the Disposition within this note     Time User Action Codes Description Comment    5/11/2023  8:48 PM Nam Casas Add [R55] Syncope     5/11/2023  8:48 PM Augusto Fredyeb Add [J38 830] [facility]iz[facility] ([facility] )     5/11/2023  8:49 PM Santoshkain Nam Add [G40 919] Epilepsy with altered consciousness with intractable epilepsy ([facility] )     5/11/2023  8:50 PM Santoshkain Nam Add [Z76 0] Medication refill     5/11/2023  8:55 PM Nam Casas Add [G25 81] Restless leg syndrome       ED Disposition     ED Disposition   Discharge    Condition   Stable    Date/Time   Thu May 11, 2023  8:56 PM    Comment   Gilbert Martinez discharge to home/self care                 Follow-up Information     Follow up With Specialties Details Why Arcadio Trevino MD Neurology In 1 week  [address] [place] [address]  [phone]            Discharge Medication List as of 5/11/2023  8:58 PM      CONTINUE these medications which have CHANGED    Details   levETIRAcetam (Keppra) 750 mg tablet Take 2 tablets (1,500 mg total) by mouth 2 (two) times a day, Starting u 5/11/2023, Until Sat 6/10/2023, Normal         CONTINUE these medications which have NOT CHANGED    Details   chlorthalidone (HYGROTEN) 50 MG tablet TAKE ONE-HALF (1/2) TABLET DAILY, Normal      lidocaine (LIDODERM) 5 % Apply 1 patch topically daily Remove & Discard patch within 12 hours or as directed by MD, Starting Fri 2/19/2021, Normal      losartan (COZAAR) 25 mg tablet Take 1 tablet (25 mg total) by mouth daily, Starting Mon 7/26/2021, Normal      metoprolol succinate (TOPROL-XL) 50 mg 24 hr tablet TAKE 1 TABLET DAILY, Normal      pramipexole (MIRAPEX) 1 5 MG tablet Take 1 tablet (1 5 mg total) by mouth daily at bedtime, Starting Fri 11/5/2021, Until Wed 5/4/2022, Normal      spironolactone (ALDACTONE) 25 mg tablet Take 1 tablet (25 mg total) by mouth daily, Starting Mon 7/26/2021, Normal      TEGretol 200 MG tablet TAKE 1 TABLET THREE TIMES A DAY, Normal                 PDMP Review       Value Time User    PDMP Reviewed  Yes 7/23/2021 10:52 Elvin Elder MD          ED Provider  Electronically Signed by           Sindhu Bullock MD  05/12/23 1051

## 2023-05-11 NOTE — ED NOTES
Patient transported to [address] [place] [address] [facility]  05/11/23 4864

## 2023-05-12 ENCOUNTER — VBI (OUTPATIENT)
Dept: ADMINISTRATIVE | Facility: OTHER | Age: 63
End: 2023-05-12

## 2023-05-12 LAB
ATRIAL RATE: 101 BPM
ATRIAL RATE: 150 BPM
P AXIS: 39 DEGREES
PR INTERVAL: 120 MS
PR INTERVAL: 148 MS
QRS AXIS: 21 DEGREES
QRS AXIS: 30 DEGREES
QRSD INTERVAL: 58 MS
QRSD INTERVAL: 60 MS
QT INTERVAL: 320 MS
QT INTERVAL: 336 MS
QTC INTERVAL: 435 MS
QTC INTERVAL: 505 MS
T WAVE AXIS: 72 DEGREES
T WAVE AXIS: 72 DEGREES
VENTRICULAR RATE: 101 BPM
VENTRICULAR RATE: 150 BPM

## 2023-05-12 NOTE — TELEPHONE ENCOUNTER
Gilbert Moniques    ED Visit Information     Ed visit date: 5/11/23  Diagnosis Description: Syncope  In Network? [employer]  Discharge status: Home  Discharged with meds ?  Yes  Number of ED visits to date: 4  ED Severity:2     Outreach Information    Outreach successful: No 3  Date letter mailed:na  Date Ken[address] Outreach    Outreach type: 7 Day Outreach  05/12/2023 03:56 PM EDT by Michael Wilder MA 05/12/2023 03:56 PM EDT by JASMYN Ovalle (Self) [phone] (QKIEKA)[phone] (Mobile)  [phone] (Mobile) Remove  - Left Message    05/15/2023 01:46 PM EDT by Michael Wilder MA 05/15/2023 01:46 PM EDT by JASMYN Ovalle (Self) [phone] ([email or web address])[phone] (Mobile)  [phone] (Mobile) Remove  - Left Message    05/16/2023 11:46 AM EDT by Michael Wilder MA 05/16/2023 11:46 AM EDT by JASMYN Ovalle (Self) [phone] (MQJJSS)[phone] (Mobile)  [phone] (Mobile) Remove  - Left Message

## 2023-05-12 NOTE — DISCHARGE INSTRUCTIONS
Follow-up with neurology as instructed  Take medication as prescribed and do not miss any doses  If you miss any doses you are liable to have seizure  Labs Reviewed   CBC AND DIFFERENTIAL - Abnormal       Result Value Ref Range Status    WBC 12 31 (*) 4 31 - 10 16 Thousand/uL Final    RBC 3 79 (*) 3 81 - 5 12 Million/uL Final    Hemoglobin 11 8  11 5 - 15 4 g/dL Final    Hematocrit 35 7  34 8 - 46 1 % Final    MCV 94  82 - 98 fL Final    MCH 31 1  26 8 - 34 3 pg Final    MCHC 33 1  31 4 - 37 4 g/dL Final    RDW 13 1  11 6 - 15 1 % Final    MPV 11 1  8 9 - 12 7 fL Final    Platelets 624  261 - 390 Thousands/uL Final    nRBC 0  /100 WBCs Final    Neutrophils Relative 69  43 - 75 % Final    Immat GRANS % 0  0 - 2 % Final    Lymphocytes Relative 21  14 - 44 % Final    Monocytes Relative 7  4 - 12 % Final    Eosinophils Relative 2  0 - 6 % Final    Basophils Relative 1  0 - 1 % Final    Neutrophils Absolute 8 61 (*) 1 85 - 7 62 Thousands/µL Final    Immature Grans Absolute 0 05  0 00 - 0 20 Thousand/uL Final    Lymphocytes Absolute 2 55  0 60 - 4 47 Thousands/µL Final    Monocytes Absolute 0 82  0 17 - 1 22 Thousand/µL Final    Eosinophils Absolute 0 22  0 00 - 0 61 Thousand/µL Final    Basophils Absolute 0 06  0 00 - 0 10 Thousands/µL Final   COMPREHENSIVE METABOLIC PANEL - Abnormal    Sodium 139  135 - 147 mmol/L Final    Potassium 4 2  3 5 - 5 3 mmol/L Final    Chloride 106  96 - 108 mmol/L Final    CO2 22  21 - 32 mmol/L Final    ANION GAP 11  4 - 13 mmol/L Final    BUN 38 (*) 5 - 25 mg/dL Final    Creatinine 1 49 (*) 0 60 - 1 30 mg/dL Final    Comment: Standardized to IDMS reference method    Glucose 103  65 - 140 mg/dL Final    Comment: If the patient is fasting, the ADA then defines impaired fasting glucose as > 100 mg/dL and diabetes as > or equal to 123 mg/dL  Calcium 8 9  8 4 - 10 2 mg/dL Final    AST 14  13 - 39 U/L Final    Comment: Slightly Hemolyzed:Results may be affected      ALT 13  7 - 52 U/L Final Comment: Specimen collection should occur prior to Sulfasalazine administration due to the potential for falsely depressed results  Alkaline Phosphatase 106 (*) 34 - 104 U/L Final    Total Protein 7 5  6 4 - 8 4 g/dL Final    Albumin 3 7  3 5 - 5 0 g/dL Final    Total Bilirubin 0 27  0 20 - 1 00 mg/dL Final    Comment: Use of this assay is not recommended for patients undergoing treatment with eltrombopag due to the potential for falsely elevated results  N-acetyl-p-benzoquinone imine (metabolite of Acetaminophen) will generate erroneously low results in samples for patients that have taken an overdose of Acetaminophen  eGFR 36  ml/min/1 73sq m Final    Narrative:     [facility] guidelines for Chronic Kidney Disease (CKD):     Stage 1 with normal or high GFR (GFR > 90 mL/min/1 73 square meters)    Stage 2 Mild CKD (GFR = 60-89 mL/min/1 73 square meters)    Stage 3A Moderate CKD (GFR = 45-59 mL/min/1 73 square meters)    Stage 3B Moderate CKD (GFR = 30-44 mL/min/1 73 square meters)    Stage 4 Severe CKD (GFR = 15-29 mL/min/1 73 square meters)    Stage 5 End Stage CKD (GFR <15 mL/min/1 73 square meters)  Note: GFR calculation is accurate only with a steady state creatinine   HIGH SENSITIVITY TROPONIN I RANDOM - Abnormal    HS TnI random 4 (*) 8 - 18 ng/L Final    Comment:                                              Initial (time 0) result  If >=50 ng/L, Myocardial injury suggested ;  Type of myocardial injury and treatment strategy  to be determined  If 5-49 ng/L, a delta result at 2 hours and or 4 hours will be needed to further evaluate  If <4 ng/L, and chest pain has been >3 hours since onset, patient may qualify for discharge based on the HEART score in the ED  If <5 ng/L and <3hours since onset of chest pain, a delta result at 2 hours will be needed to further evaluate      HS Troponin 99th Percentile URL of a Health Population=12 ng/L with a 95% Confidence Interval of 8-18 ng/L     Second Troponin (time 2 hours)  If calculated delta >= 20 ng/L,  Myocardial injury suggested ; Type of myocardial injury and treatment strategy to be determined  If 5-49 ng/L and the calculated delta is 5-19 ng/L, consult medical service for evaluation  Continue evaluation for ischemia on ecg and other possible etiology and repeat hs troponin at 4 hours  If delta is <5 ng/L at 2 hours, consider discharge based on risk stratification via the HEART score (if in ED), or MER risk score in IP/Observation  HS Troponin 99th Percentile URL of a Health Population=12 ng/L with a 95% Confidence Interval of 8-18 ng/L  LEVETIRACETAM LEVEL     CT head wo contrast   ED Interpretation   Cxr; no acute cardiopulmonary findings      Final Result      No acute intracranial abnormality                    Workstation performed: [id number]         XR chest portable    (Results Pending)

## 2023-05-15 NOTE — ED NOTES
Patients cell phone left in ED calls to patient attempted and to her  without success cell phone left with security       Monika Schuler  05/15/23 1157

## 2023-05-16 LAB — LEVETIRACETAM SERPL-MCNC: 4.2 UG/ML (ref 10–40)

## 2023-06-06 ENCOUNTER — APPOINTMENT (EMERGENCY)
Dept: CT IMAGING | Facility: HOSPITAL | Age: 63
End: 2023-06-06
Payer: MEDICARE

## 2023-06-06 ENCOUNTER — HOSPITAL ENCOUNTER (EMERGENCY)
Facility: HOSPITAL | Age: 63
Discharge: HOME | End: 2023-06-06
Attending: EMERGENCY MEDICINE
Payer: MEDICARE

## 2023-06-06 VITALS
SYSTOLIC BLOOD PRESSURE: 104 MMHG | OXYGEN SATURATION: 97 % | HEART RATE: 91 BPM | DIASTOLIC BLOOD PRESSURE: 59 MMHG | RESPIRATION RATE: 18 BRPM | TEMPERATURE: 98.4 F

## 2023-06-06 DIAGNOSIS — R55 SYNCOPE: Primary | ICD-10-CM

## 2023-06-06 DIAGNOSIS — N18.9 CKD (CHRONIC KIDNEY DISEASE): ICD-10-CM

## 2023-06-06 DIAGNOSIS — R04.0 EPISTAXIS DUE TO TRAUMA: ICD-10-CM

## 2023-06-06 LAB
ALBUMIN SERPL BCP-MCNC: 3.6 G/DL (ref 3.5–5)
ALP SERPL-CCNC: 111 U/L (ref 34–104)
ALT SERPL W P-5'-P-CCNC: 12 U/L (ref 7–52)
ANION GAP SERPL CALCULATED.3IONS-SCNC: 8 MMOL/L (ref 4–13)
APTT PPP: 26 SECONDS (ref 23–37)
AST SERPL W P-5'-P-CCNC: 13 U/L (ref 13–39)
BASOPHILS # BLD AUTO: 0.05 THOUSANDS/ÂΜL (ref 0–0.1)
BASOPHILS NFR BLD AUTO: 0 % (ref 0–1)
BILIRUB SERPL-MCNC: 0.32 MG/DL (ref 0.2–1)
BUN SERPL-MCNC: 31 MG/DL (ref 5–25)
CALCIUM SERPL-MCNC: 9 MG/DL (ref 8.4–10.2)
CARDIAC TROPONIN I PNL SERPL HS: 3 NG/L
CHLORIDE SERPL-SCNC: 103 MMOL/L (ref 96–108)
CO2 SERPL-SCNC: 28 MMOL/L (ref 21–32)
CREAT SERPL-MCNC: 1.46 MG/DL (ref 0.6–1.3)
EOSINOPHIL # BLD AUTO: 0.14 THOUSAND/ÂΜL (ref 0–0.61)
EOSINOPHIL NFR BLD AUTO: 1 % (ref 0–6)
ERYTHROCYTE [DISTWIDTH] IN BLOOD BY AUTOMATED COUNT: 13.1 % (ref 11.6–15.1)
GFR SERPL CREATININE-BSD FRML MDRD: 37 ML/MIN/1.73SQ M
GLUCOSE SERPL-MCNC: 105 MG/DL (ref 65–140)
HCT VFR BLD AUTO: 35.5 % (ref 34.8–46.1)
HGB BLD-MCNC: 11.7 G/DL (ref 11.5–15.4)
IMM GRANULOCYTES # BLD AUTO: 0.05 THOUSAND/UL (ref 0–0.2)
IMM GRANULOCYTES NFR BLD AUTO: 0 % (ref 0–2)
INR PPP: 0.99 (ref 0.84–1.19)
LYMPHOCYTES # BLD AUTO: 2.21 THOUSANDS/ÂΜL (ref 0.6–4.47)
LYMPHOCYTES NFR BLD AUTO: 17 % (ref 14–44)
MCH RBC QN AUTO: 31.4 PG (ref 26.8–34.3)
MCHC RBC AUTO-ENTMCNC: 33 G/DL (ref 31.4–37.4)
MCV RBC AUTO: 95 FL (ref 82–98)
MONOCYTES # BLD AUTO: 0.75 THOUSAND/ÂΜL (ref 0.17–1.22)
MONOCYTES NFR BLD AUTO: 6 % (ref 4–12)
NEUTROPHILS # BLD AUTO: 9.52 THOUSANDS/ÂΜL (ref 1.85–7.62)
NEUTS SEG NFR BLD AUTO: 76 % (ref 43–75)
NRBC BLD AUTO-RTO: 0 /100 WBCS
PLATELET # BLD AUTO: 326 THOUSANDS/UL (ref 149–390)
PMV BLD AUTO: 10.1 FL (ref 8.9–12.7)
POTASSIUM SERPL-SCNC: 3.9 MMOL/L (ref 3.5–5.3)
PROT SERPL-MCNC: 7.4 G/DL (ref 6.4–8.4)
PROTHROMBIN TIME: 13.4 SECONDS (ref 11.6–14.5)
RBC # BLD AUTO: 3.73 MILLION/UL (ref 3.81–5.12)
SODIUM SERPL-SCNC: 139 MMOL/L (ref 135–147)
WBC # BLD AUTO: 12.72 THOUSAND/UL (ref 4.31–10.16)

## 2023-06-06 PROCEDURE — G1004 CDSM NDSC: HCPCS

## 2023-06-06 PROCEDURE — 36415 COLL VENOUS BLD VENIPUNCTURE: CPT | Performed by: EMERGENCY MEDICINE

## 2023-06-06 PROCEDURE — 84484 ASSAY OF TROPONIN QUANT: CPT | Performed by: EMERGENCY MEDICINE

## 2023-06-06 PROCEDURE — 80177 DRUG SCRN QUAN LEVETIRACETAM: CPT | Performed by: EMERGENCY MEDICINE

## 2023-06-06 PROCEDURE — 80053 COMPREHEN METABOLIC PANEL: CPT | Performed by: EMERGENCY MEDICINE

## 2023-06-06 PROCEDURE — 85730 THROMBOPLASTIN TIME PARTIAL: CPT | Performed by: EMERGENCY MEDICINE

## 2023-06-06 PROCEDURE — 85610 PROTHROMBIN TIME: CPT | Performed by: EMERGENCY MEDICINE

## 2023-06-06 PROCEDURE — 85025 COMPLETE CBC W/AUTO DIFF WBC: CPT | Performed by: EMERGENCY MEDICINE

## 2023-06-06 PROCEDURE — 93005 ELECTROCARDIOGRAM TRACING: CPT

## 2023-06-06 PROCEDURE — 70486 CT MAXILLOFACIAL W/O DYE: CPT

## 2023-06-06 RX ORDER — LEVETIRACETAM 10 MG/ML
1000 INJECTION INTRAVASCULAR ONCE
Status: COMPLETED | OUTPATIENT
Start: 2023-06-06 | End: 2023-06-06

## 2023-06-06 RX ADMIN — LEVETIRACETAM 1000 MG: 10 INJECTION INTRAVENOUS at 20:27

## 2023-06-06 NOTE — ED PROVIDER NOTES
History  Chief Complaint   Patient presents with   • Fall     Pt presents after fall face forward while walking  Denies thinners or LOC, + hx of seizure     26-year-old female reports that she was walking to the bus stop when she suddenly lost consciousness, she reports that she did not have any dizziness or lightheadedness prior to her period of unconsciousness, she reports that she fell down and struck her face on concrete, she does not know how long she was unconscious for, but her fall was witnessed by 2 bystanders who called the police  It is unknown whether she had any seizure-like activity, she has a history of seizures, takes Keppra, recently had her dose of her Keppra decreased, and reports that otherwise she has been compliant with her medications, does not drink, use drugs, but she smokes approximately 4 cigarettes a day  The patient reports she does not have any pain anywhere in her body, does note that she has some swelling in her legs that has been ongoing and worsening slowly over a period of months, but is not any more significant today than in other days  The patient has also had no dizziness, vision changes, hearing changes, chest pain, cough, shortness of breath, nausea, vomiting, or other complaints  Prior to Admission Medications   Prescriptions Last Dose Informant Patient Reported? Taking?    TEGretol 200 MG tablet   No No   Sig: TAKE 1 TABLET THREE TIMES A DAY   carBAMazepine (TEGretol) 200 mg tablet   No No   Sig: Take 1 tablet (200 mg total) by mouth 3 (three) times a day for 14 days   chlorthalidone (HYGROTEN) 50 MG tablet   No No   Sig: TAKE ONE-HALF (1/2) TABLET DAILY   levETIRAcetam (Keppra) 750 mg tablet   No No   Sig: Take 2 tablets (1,500 mg total) by mouth 2 (two) times a day   lidocaine (LIDODERM) 5 %  Self No No   Sig: Apply 1 patch topically daily Remove & Discard patch within 12 hours or as directed by MD   losartan (COZAAR) 25 mg tablet   No No   Sig: Take 1 tablet (25 mg total) by mouth daily   metoprolol succinate (TOPROL-XL) 50 mg 24 hr tablet   No No   Sig: TAKE 1 TABLET DAILY   pramipexole (MIRAPEX) 1 5 MG tablet   No No   Sig: Take 1 tablet (1 5 mg total) by mouth daily at bedtime   spironolactone (ALDACTONE) 25 mg tablet   No No   Sig: Take 1 tablet (25 mg total) by mouth daily      Facility-Administered Medications: None       Past Medical History:   Diagnosis Date   • Depression    • EP (epilepsy) ([facility] [address] )    • Epilepsy ([facility] )    • Obesity    • Restless leg        Past Surgical History:   Procedure Laterality Date   • DENTAL SURGERY      all teeth removed   • LASER ABLATION OF THE CERVIX     • MAMMO (HISTORICAL)  05/01/2017       Family History   Problem Relation Age of Onset   • Kidney disease Mother    • Liver disease Mother    • Heart attack Mother    • Heart attack Father    • No Known Problems Brother    • No Known Problems Son      I have reviewed and agree with the history as documented  E-Cigarette/Vaping   • E-Cigarette Use Never User      E-Cigarette/Vaping Substances   • Nicotine No    • THC No    • CBD No    • Flavoring No    • Other No    • Unknown No      Social History     Tobacco Use   • Smoking status: Every Day     Packs/day: 0 50     Years: 23 00     Total pack years: 11 50     Types: Cigarettes   • Smokeless tobacco: Never   Vaping Use   • Vaping Use: Never used   Substance Use Topics   • Alcohol use: Never     Comment: pt denies alcohol use   • Drug use: Never       Review of Systems   Constitutional: Negative for fever  HENT: Negative for congestion  Eyes: Negative for visual disturbance  Respiratory: Negative for cough and shortness of breath  Cardiovascular: Negative for chest pain  Gastrointestinal: Negative for abdominal pain, constipation, diarrhea, nausea and vomiting  Endocrine: Negative for polyuria  Genitourinary: Negative for dysuria and hematuria  Musculoskeletal: Negative for myalgias  Skin: Positive for wound  Neurological: Positive for syncope  Negative for dizziness and headaches  Physical Exam  Physical Exam  Vitals and nursing note reviewed  Constitutional:       General: She is not in acute distress  Appearance: Normal appearance  She is well-developed  She is obese  HENT:      Head: Normocephalic  Comments: Blood on her face and in her bilateral naris, left greater than right  No blood in the oropharynx or signs of tongue trauma or other abnormalities  Right Ear: Tympanic membrane normal       Left Ear: Tympanic membrane normal    Eyes:      Extraocular Movements: Extraocular movements intact  Conjunctiva/sclera: Conjunctivae normal    Neck:      Comments: Cervical spine cleared clinically using the Nexus criteria  Cardiovascular:      Rate and Rhythm: Normal rate and regular rhythm  Pulmonary:      Effort: Pulmonary effort is normal  No respiratory distress  Breath sounds: Normal breath sounds  Comments: Mild tenderness to palpation over the right chest wall at approximately the 10th rib, lateral clavicular line  Abdominal:      General: There is no distension  Palpations: Abdomen is soft  Tenderness: There is no abdominal tenderness  Musculoskeletal:         General: No swelling  Cervical back: Neck supple  Skin:     General: Skin is warm and dry  Capillary Refill: Capillary refill takes less than 2 seconds  Neurological:      General: No focal deficit present  Mental Status: She is alert and oriented to person, place, and time     Psychiatric:         Mood and Affect: Mood normal          Vital Signs  ED Triage Vitals   Temperature Pulse Respirations Blood Pressure SpO2   06/06/23 2030 06/06/23 1957 06/06/23 1957 06/06/23 1957 06/06/23 1957   98 4 °F (36 9 °C) 91 18 104/59 97 %      Temp Source Heart Rate Source Patient Position - Orthostatic VS BP Location FiO2 (%)   06/06/23 2030 06/06/23 1957 06/06/23 1957 06/06/23 1957 --   Oral Monitor Lying Left arm       Pain Score       --                  Vitals:    06/06/23 1957   BP: 104/59   Pulse: 91   Patient Position - Orthostatic VS: Lying         Visual Acuity      ED Medications  Medications   levetiracetam in NaCl (KEPPRA) infusion 1,000 mg (1,000 mg Intravenous Given 6/6/23 2027)       Diagnostic Studies  Results Reviewed     Procedure Component Value Units Date/Time    HS Troponin 0hr (reflex protocol) [[id number]]  (Normal) Collected: 06/06/23 2024    Lab Status: Final result Specimen: Blood from Arm, Right Updated: 06/06/23 2051     hs TnI 0hr 3 ng/L     Comprehensive metabolic panel [[id number]]  (Abnormal) Collected: 06/06/23 2024    Lab Status: Final result Specimen: Blood from Arm, Right Updated: 06/06/23 2043     Sodium 139 mmol/L      Potassium 3 9 mmol/L      Chloride 103 mmol/L      CO2 28 mmol/L      ANION GAP 8 mmol/L      BUN 31 mg/dL      Creatinine 1 46 mg/dL      Glucose 105 mg/dL      Calcium 9 0 mg/dL      AST 13 U/L      ALT 12 U/L      Alkaline Phosphatase 111 U/L      Total Protein 7 4 g/dL      Albumin 3 6 g/dL      Total Bilirubin 0 32 mg/dL      eGFR 37 ml/min/1 73sq m     Narrative:      Adam guidelines for Chronic Kidney Disease (CKD):   •  Stage 1 with normal or high GFR (GFR > 90 mL/min/1 73 square meters)  •  Stage 2 Mild CKD (GFR = 60-89 mL/min/1 73 square meters)  •  Stage 3A Moderate CKD (GFR = 45-59 mL/min/1 73 square meters)  •  Stage 3B Moderate CKD (GFR = 30-44 mL/min/1 73 square meters)  •  Stage 4 Severe CKD (GFR = 15-29 mL/min/1 73 square meters)  •  Stage 5 End Stage CKD (GFR <15 mL/min/1 73 square meters)  Note: GFR calculation is accurate only with a steady state creatinine    Protime-INR [[id number]]  (Normal) Collected: 06/06/23 2024    Lab Status: Final result Specimen: Blood from Arm, Right Updated: 06/06/23 2037     Protime 13 4 seconds      INR 0 99    APTT [[id number]]  (Normal) Collected: 06/06/23 2024    Lab Status: Final result Specimen: Blood from Arm, Right Updated: 06/06/23 2037     PTT 26 seconds     CBC and differential [[id number]]  (Abnormal) Collected: 06/06/23 2024    Lab Status: Final result Specimen: Blood from Arm, Right Updated: 06/06/23 2028     WBC 12 72 Thousand/uL      RBC 3 73 Million/uL      Hemoglobin 11 7 g/dL      Hematocrit 35 5 %      MCV 95 fL      MCH 31 4 pg      MCHC 33 0 g/dL      RDW 13 1 %      MPV 10 1 fL      Platelets 538 Thousands/uL      nRBC 0 /100 WBCs      Neutrophils Relative 76 %      Immat GRANS % 0 %      Lymphocytes Relative 17 %      Monocytes Relative 6 %      Eosinophils Relative 1 %      Basophils Relative 0 %      Neutrophils Absolute 9 52 Thousands/µL      Immature Grans Absolute 0 05 Thousand/uL      Lymphocytes Absolute 2 21 Thousands/µL      Monocytes Absolute 0 75 Thousand/µL      Eosinophils Absolute 0 14 Thousand/µL      Basophils Absolute 0 05 Thousands/µL     Levetiracetam level [[id number]] Collected: 06/06/23 2024    Lab Status: In process Specimen: Blood from Arm, Right Updated: 06/06/23 2026                 CT facial bones without contrast   Final Result by Kalpesh Mcnamara MD (06/06 2130)      Normal noncontrast CT of the facial bones  Workstation performed: [id number]                    Procedures  Procedures         ED Course       Medical Decision Making  The patient had a syncopal event with no prodromal symptoms, and facial trauma with epistaxis, with a recent change in her Keppra for underlying seizure disorder  I have some concerns about possible facial bone fractures, an epileptic seizure, or possible cardiac arrhythmia with sudden loss of consciousness  The patient has no oral trauma or incontinence of urine consistent with a seizure, however my suspicion is still very high  The patient will be evaluated with blood work, will be given additional dose of her Keppra, and will have cardiac studies evaluated    If her work-up is unremarkable she will be discharged home and will be encouraged to follow-up closely with neurology for medication adjustment, with return indications and follow-up instructions given  Amount and/or Complexity of Data Reviewed  Labs: ordered  Risk  Prescription drug management  Disposition  Final diagnoses:   Syncope   Epistaxis due to trauma   CKD (chronic kidney disease)     Time reflects when diagnosis was documented in both MDM as applicable and the Disposition within this note     Time User Action Codes Description Comment    6/6/2023  9:59 PM Charisma Closs Add [R55] Syncope     6/6/2023 10:00 PM Charisma Closs Add [R04 0] Epistaxis due to trauma     6/6/2023 10:04 PM Charisma Closs Add [N18 9] CKD (chronic kidney disease)       ED Disposition     ED Disposition   Discharge    Condition   Stable    Date/Time   Tue Jun 6, 2023  9:58 PM    Comment   April Kenyon discharge to home/self care                 Follow-up Information     Follow up With Specialties Details Why Contact Info Additional Information    Carline Robles MD Internal Medicine Schedule an appointment as soon as possible for a visit in 3 days For follow-up [address]  [facility] [address]  [address] [facility] Emergency Department Emergency Medicine Go to  As needed [address] [facility],[phone]  [address] [facility] Emergency Department, [address], [address] [place] [address]    [facility] Schedule an appointment as soon as possible for a visit in 3 days For follow-up [address], [phone]  [address] [place] [address] Neurology [address] [facility], [address] [facility], [address] [place] [address], [address] [place] [address]    [facility] Schedule an appointment as soon as possible for a visit in 3 days For follow-up [address] [place] [address] Dr GARCIA [address] [facility]  [phone] [facility] [address] [facility], [address] [place] [address], [address] [phone]          Discharge Medication List as of 6/6/2023 10:05 PM      CONTINUE these medications which have NOT CHANGED    Details   chlorthalidone (HYGROTEN) 50 MG tablet TAKE ONE-HALF (1/2) TABLET DAILY, Normal      levETIRAcetam (Keppra) 750 mg tablet Take 2 tablets (1,500 mg total) by mouth 2 (two) times a day, Starting Thu 5/11/2023, Until Sat 6/10/2023, Normal      lidocaine (LIDODERM) 5 % Apply 1 patch topically daily Remove & Discard patch within 12 hours or as directed by MD, Starting Fri 2/19/2021, Normal      losartan (COZAAR) 25 mg tablet Take 1 tablet (25 mg total) by mouth daily, Starting Mon 7/26/2021, Normal      metoprolol succinate (TOPROL-XL) 50 mg 24 hr tablet TAKE 1 TABLET DAILY, Normal      pramipexole (MIRAPEX) 1 5 MG tablet Take 1 tablet (1 5 mg total) by mouth daily at bedtime, Starting Fri 11/5/2021, Until Wed 5/4/2022, Normal      spironolactone (ALDACTONE) 25 mg tablet Take 1 tablet (25 mg total) by mouth daily, Starting Mon 7/26/2021, Normal      TEGretol 200 MG tablet TAKE 1 TABLET THREE TIMES A DAY, Normal             No discharge procedures on file      PDMP Review       Value Time User    PDMP Reviewed  Yes 7/23/2021 10:52 Carlos Gauthier MD          ED Provider  Electronically Signed by           Dilip Joe MD  06/08/23 5107

## 2023-06-07 ENCOUNTER — VBI (OUTPATIENT)
Dept: ADMINISTRATIVE | Facility: OTHER | Age: 63
End: 2023-06-07

## 2023-06-07 LAB
ATRIAL RATE: 85 BPM
P AXIS: 37 DEGREES
PR INTERVAL: 146 MS
QRS AXIS: 18 DEGREES
QRSD INTERVAL: 68 MS
QT INTERVAL: 368 MS
QTC INTERVAL: 437 MS
T WAVE AXIS: 47 DEGREES
VENTRICULAR RATE: 85 BPM

## 2023-06-07 PROCEDURE — 93010 ELECTROCARDIOGRAM REPORT: CPT | Performed by: INTERNAL MEDICINE

## 2023-06-07 NOTE — TELEPHONE ENCOUNTER
Tali Morelos    ED Visit Information     Ed visit date: 6/6/23  Diagnosis Description: Syncope  In Network? [facility]  Discharge status: Home  Discharged with meds ?  No  Number of ED visits to date: 4  ED Severity:3     Outreach Information    Outreach successful: No 3  Date letter mailed:na   Date Finalized:6/9/23        Value Base Outreach    06/07/2023 02:26 PM EDT by Qiana Gu MA 06/07/2023 02:26 PM EDT by JASMYN Whitfield (Self) [phone] (HLMZME)[phone] (Mobile)  [phone] (Mobile) Remove  - Left Message    06/08/2023 01:42 PM EDT by Qiana Gu MA 06/08/2023 01:42 PM EDT by JASMYN Whitfield (Self) [phone] (XOXBOR)[phone] (Mobile)  [phone] (Mobile) Remove  - Left Message

## 2023-06-09 LAB — LEVETIRACETAM SERPL-MCNC: 42.7 UG/ML (ref 10–40)

## 2023-06-10 PROBLEM — Z12.11 SCREENING FOR COLON CANCER: Status: RESOLVED | Noted: 2023-04-11 | Resolved: 2023-06-10

## 2023-06-10 PROBLEM — Z12.4 SCREENING FOR CERVICAL CANCER: Status: RESOLVED | Noted: 2023-04-11 | Resolved: 2023-06-10

## 2023-06-10 PROBLEM — Z11.59 NEED FOR HEPATITIS C SCREENING TEST: Status: RESOLVED | Noted: 2023-04-11 | Resolved: 2023-06-10

## 2023-06-19 ENCOUNTER — TELEPHONE (OUTPATIENT)
Dept: FAMILY MEDICINE CLINIC | Facility: CLINIC | Age: 63
End: 2023-06-19

## 2023-06-19 ENCOUNTER — HOSPITAL ENCOUNTER (EMERGENCY)
Facility: HOSPITAL | Age: 63
End: 2023-06-20
Attending: EMERGENCY MEDICINE
Payer: MEDICARE

## 2023-06-19 ENCOUNTER — APPOINTMENT (EMERGENCY)
Dept: RADIOLOGY | Facility: HOSPITAL | Age: 63
End: 2023-06-19
Payer: MEDICARE

## 2023-06-19 ENCOUNTER — HOSPITAL ENCOUNTER (EMERGENCY)
Facility: HOSPITAL | Age: 63
Discharge: HOME | End: 2023-06-19
Attending: EMERGENCY MEDICINE
Payer: MEDICARE

## 2023-06-19 VITALS
BODY MASS INDEX: 38.98 KG/M2 | OXYGEN SATURATION: 98 % | HEART RATE: 107 BPM | RESPIRATION RATE: 20 BRPM | WEIGHT: 220 LBS | HEIGHT: 63 IN | SYSTOLIC BLOOD PRESSURE: 150 MMHG | TEMPERATURE: 97.9 F | DIASTOLIC BLOOD PRESSURE: 69 MMHG

## 2023-06-19 DIAGNOSIS — R55 SYNCOPE: Primary | ICD-10-CM

## 2023-06-19 DIAGNOSIS — Z00.8 MEDICAL CLEARANCE FOR PSYCHIATRIC ADMISSION: ICD-10-CM

## 2023-06-19 DIAGNOSIS — R55 SYNCOPE: ICD-10-CM

## 2023-06-19 DIAGNOSIS — N39.0 UTI (URINARY TRACT INFECTION): ICD-10-CM

## 2023-06-19 DIAGNOSIS — R45.851 SUICIDAL IDEATION: Primary | ICD-10-CM

## 2023-06-19 LAB
2HR DELTA HS TROPONIN: 0 NG/L
ALBUMIN SERPL BCP-MCNC: 3.9 G/DL (ref 3.5–5)
ALP SERPL-CCNC: 101 U/L (ref 34–104)
ALT SERPL W P-5'-P-CCNC: 10 U/L (ref 7–52)
AMPHETAMINES SERPL QL SCN: NEGATIVE
ANION GAP SERPL CALCULATED.3IONS-SCNC: 10 MMOL/L (ref 4–13)
AST SERPL W P-5'-P-CCNC: 13 U/L (ref 13–39)
BACTERIA UR QL AUTO: ABNORMAL /HPF
BARBITURATES UR QL: NEGATIVE
BASOPHILS # BLD AUTO: 0.05 THOUSANDS/ÂΜL (ref 0–0.1)
BASOPHILS NFR BLD AUTO: 0 % (ref 0–1)
BENZODIAZ UR QL: NEGATIVE
BILIRUB DIRECT SERPL-MCNC: 0.08 MG/DL (ref 0–0.2)
BILIRUB SERPL-MCNC: 0.32 MG/DL (ref 0.2–1)
BILIRUB UR QL STRIP: NEGATIVE
BUN SERPL-MCNC: 37 MG/DL (ref 5–25)
CALCIUM SERPL-MCNC: 8.9 MG/DL (ref 8.4–10.2)
CARDIAC TROPONIN I PNL SERPL HS: 2 NG/L
CARDIAC TROPONIN I PNL SERPL HS: 2 NG/L
CHLORIDE SERPL-SCNC: 108 MMOL/L (ref 96–108)
CLARITY UR: ABNORMAL
CO2 SERPL-SCNC: 22 MMOL/L (ref 21–32)
COCAINE UR QL: NEGATIVE
COLOR UR: ABNORMAL
CREAT SERPL-MCNC: 1.62 MG/DL (ref 0.6–1.3)
EOSINOPHIL # BLD AUTO: 0.12 THOUSAND/ÂΜL (ref 0–0.61)
EOSINOPHIL NFR BLD AUTO: 1 % (ref 0–6)
ERYTHROCYTE [DISTWIDTH] IN BLOOD BY AUTOMATED COUNT: 13.1 % (ref 11.6–15.1)
ETHANOL EXG-MCNC: 0 MG/DL
GFR SERPL CREATININE-BSD FRML MDRD: 33 ML/MIN/1.73SQ M
GLUCOSE SERPL-MCNC: 114 MG/DL (ref 65–140)
GLUCOSE UR STRIP-MCNC: NEGATIVE MG/DL
HCT VFR BLD AUTO: 37.7 % (ref 34.8–46.1)
HGB BLD-MCNC: 12.2 G/DL (ref 11.5–15.4)
HGB UR QL STRIP.AUTO: ABNORMAL
IMM GRANULOCYTES # BLD AUTO: 0.06 THOUSAND/UL (ref 0–0.2)
IMM GRANULOCYTES NFR BLD AUTO: 0 % (ref 0–2)
KETONES UR STRIP-MCNC: NEGATIVE MG/DL
LEUKOCYTE ESTERASE UR QL STRIP: NEGATIVE
LYMPHOCYTES # BLD AUTO: 2.77 THOUSANDS/ÂΜL (ref 0.6–4.47)
LYMPHOCYTES NFR BLD AUTO: 20 % (ref 14–44)
MAGNESIUM SERPL-MCNC: 1.9 MG/DL (ref 1.9–2.7)
MCH RBC QN AUTO: 30.3 PG (ref 26.8–34.3)
MCHC RBC AUTO-ENTMCNC: 32.4 G/DL (ref 31.4–37.4)
MCV RBC AUTO: 94 FL (ref 82–98)
METHADONE UR QL: NEGATIVE
MONOCYTES # BLD AUTO: 0.85 THOUSAND/ÂΜL (ref 0.17–1.22)
MONOCYTES NFR BLD AUTO: 6 % (ref 4–12)
MUCOUS THREADS UR QL AUTO: ABNORMAL
NEUTROPHILS # BLD AUTO: 9.94 THOUSANDS/ÂΜL (ref 1.85–7.62)
NEUTS SEG NFR BLD AUTO: 73 % (ref 43–75)
NITRITE UR QL STRIP: POSITIVE
NON-SQ EPI CELLS URNS QL MICRO: ABNORMAL /HPF
NRBC BLD AUTO-RTO: 0 /100 WBCS
OPIATES UR QL SCN: NEGATIVE
OXYCODONE+OXYMORPHONE UR QL SCN: NEGATIVE
PCP UR QL: NEGATIVE
PH UR STRIP.AUTO: 5 [PH]
PLATELET # BLD AUTO: 388 THOUSANDS/UL (ref 149–390)
PMV BLD AUTO: 10.7 FL (ref 8.9–12.7)
POTASSIUM SERPL-SCNC: 4 MMOL/L (ref 3.5–5.3)
PROT SERPL-MCNC: 7.9 G/DL (ref 6.4–8.4)
PROT UR STRIP-MCNC: ABNORMAL MG/DL
RBC # BLD AUTO: 4.03 MILLION/UL (ref 3.81–5.12)
RBC #/AREA URNS AUTO: ABNORMAL /HPF
SODIUM SERPL-SCNC: 140 MMOL/L (ref 135–147)
SP GR UR STRIP.AUTO: 1.01 (ref 1–1.03)
THC UR QL: NEGATIVE
TSH SERPL DL<=0.05 MIU/L-ACNC: 1.31 UIU/ML (ref 0.45–4.5)
UROBILINOGEN UR STRIP-ACNC: <2 MG/DL
WBC # BLD AUTO: 13.79 THOUSAND/UL (ref 4.31–10.16)
WBC #/AREA URNS AUTO: ABNORMAL /HPF

## 2023-06-19 PROCEDURE — 82075 ASSAY OF BREATH ETHANOL: CPT | Performed by: EMERGENCY MEDICINE

## 2023-06-19 PROCEDURE — 93005 ELECTROCARDIOGRAM TRACING: CPT

## 2023-06-19 PROCEDURE — 80048 BASIC METABOLIC PNL TOTAL CA: CPT | Performed by: EMERGENCY MEDICINE

## 2023-06-19 PROCEDURE — 80076 HEPATIC FUNCTION PANEL: CPT | Performed by: EMERGENCY MEDICINE

## 2023-06-19 PROCEDURE — 80307 DRUG TEST PRSMV CHEM ANLYZR: CPT | Performed by: EMERGENCY MEDICINE

## 2023-06-19 PROCEDURE — 36415 COLL VENOUS BLD VENIPUNCTURE: CPT | Performed by: EMERGENCY MEDICINE

## 2023-06-19 PROCEDURE — 81001 URINALYSIS AUTO W/SCOPE: CPT | Performed by: EMERGENCY MEDICINE

## 2023-06-19 PROCEDURE — 84443 ASSAY THYROID STIM HORMONE: CPT | Performed by: EMERGENCY MEDICINE

## 2023-06-19 PROCEDURE — 99285 EMERGENCY DEPT VISIT HI MDM: CPT

## 2023-06-19 PROCEDURE — 96374 THER/PROPH/DIAG INJ IV PUSH: CPT

## 2023-06-19 PROCEDURE — 83735 ASSAY OF MAGNESIUM: CPT | Performed by: EMERGENCY MEDICINE

## 2023-06-19 PROCEDURE — 99284 EMERGENCY DEPT VISIT MOD MDM: CPT

## 2023-06-19 PROCEDURE — 71045 X-RAY EXAM CHEST 1 VIEW: CPT

## 2023-06-19 PROCEDURE — 84484 ASSAY OF TROPONIN QUANT: CPT | Performed by: EMERGENCY MEDICINE

## 2023-06-19 PROCEDURE — 85025 COMPLETE CBC W/AUTO DIFF WBC: CPT | Performed by: EMERGENCY MEDICINE

## 2023-06-19 RX ORDER — PRAMIPEXOLE DIHYDROCHLORIDE 0.5 MG/1
0.5 TABLET ORAL
Status: DISCONTINUED | OUTPATIENT
Start: 2023-06-19 | End: 2023-06-20 | Stop reason: HOSPADM

## 2023-06-19 RX ORDER — CARBAMAZEPINE 200 MG/1
200 TABLET ORAL ONCE
Status: COMPLETED | OUTPATIENT
Start: 2023-06-19 | End: 2023-06-19

## 2023-06-19 RX ORDER — CARBAMAZEPINE 200 MG/1
200 TABLET ORAL 3 TIMES DAILY
Status: DISCONTINUED | OUTPATIENT
Start: 2023-06-20 | End: 2023-06-20 | Stop reason: HOSPADM

## 2023-06-19 RX ORDER — LORAZEPAM 0.5 MG/1
0.5 TABLET ORAL EVERY 8 HOURS PRN
Status: DISCONTINUED | OUTPATIENT
Start: 2023-06-19 | End: 2023-06-20 | Stop reason: HOSPADM

## 2023-06-19 RX ORDER — LOSARTAN POTASSIUM 25 MG/1
25 TABLET ORAL DAILY
Status: DISCONTINUED | OUTPATIENT
Start: 2023-06-20 | End: 2023-06-20 | Stop reason: HOSPADM

## 2023-06-19 RX ORDER — LEVETIRACETAM 10 MG/ML
1000 INJECTION INTRAVASCULAR ONCE
Status: COMPLETED | OUTPATIENT
Start: 2023-06-19 | End: 2023-06-19

## 2023-06-19 RX ORDER — LORAZEPAM 0.5 MG/1
0.5 TABLET ORAL ONCE
Status: COMPLETED | OUTPATIENT
Start: 2023-06-19 | End: 2023-06-19

## 2023-06-19 RX ORDER — METOPROLOL SUCCINATE 50 MG/1
50 TABLET, EXTENDED RELEASE ORAL DAILY
Status: DISCONTINUED | OUTPATIENT
Start: 2023-06-20 | End: 2023-06-20 | Stop reason: HOSPADM

## 2023-06-19 RX ORDER — LEVETIRACETAM 250 MG/1
750 TABLET ORAL EVERY 12 HOURS SCHEDULED
Status: DISCONTINUED | OUTPATIENT
Start: 2023-06-20 | End: 2023-06-20 | Stop reason: HOSPADM

## 2023-06-19 RX ORDER — NICOTINE 21 MG/24HR
21 PATCH, TRANSDERMAL 24 HOURS TRANSDERMAL ONCE
Status: DISCONTINUED | OUTPATIENT
Start: 2023-06-19 | End: 2023-06-20 | Stop reason: HOSPADM

## 2023-06-19 RX ADMIN — NICOTINE 21 MG: 21 PATCH, EXTENDED RELEASE TRANSDERMAL at 19:38

## 2023-06-19 RX ADMIN — LEVETIRACETAM 1000 MG: 10 INJECTION INTRAVASCULAR at 12:04

## 2023-06-19 RX ADMIN — CARBAMAZEPINE 200 MG: 200 TABLET ORAL at 19:38

## 2023-06-19 RX ADMIN — LORAZEPAM 0.5 MG: 0.5 TABLET ORAL at 19:38

## 2023-06-19 RX ADMIN — PRAMIPEXOLE DIHYDROCHLORIDE 0.5 MG: 0.5 TABLET ORAL at 23:58

## 2023-06-19 NOTE — LETTER
[address] [place] [address]  Dept: [phone]      EMTALA TRANSFER CONSENT    NAME Harlan HOROWITZ 1960                              MRN [id number]    I have been informed of my rights regarding examination, treatment, and transfer   by Dr Nj He MD    Benefits: Specialized equipment and/or services available at the receiving facility (Include comment)________________________ (psychiatric treatment)    Risks: Increased discomfort during transfer, Potential for delay in receiving treatment    Transfer Request   I acknowledge that my medical condition has been evaluated and explained to me by the emergency department physician or other qualified medical person and/or my attending physician who has recommended and offered to me further medical examination and treatment  I understand the Hospital's obligation with respect to the treatment and stabilization of my emergency medical condition  I nevertheless request to be transferred  I release the Hospital, the doctor, and any other persons caring for me from all responsibility or liability for any injury or ill effects that may result from my transfer and agree to accept all responsibility for the consequences of my choice to transfer, rather than receive stabilizing treatment at the Hospital  I understand that because the transfer is my request, my insurance may not provide reimbursement for the services  The Hospital will assist and direct me and my family in how to make arrangements for transfer, but the hospital is not liable for any fees charged by the transport service  In spite of this understanding, I refuse to consent to further medical examination and treatment which has been offered to me, and request transfer to [address] [place] [address] Name, [facility] : [address] [place] [address],    I authorize the performance of emergency medical procedures and treatments upon me in both transit and upon arrival at the receiving facility  Additionally, I authorize the release of any and all medical records to the receiving facility and request they be transported with me, if possible  I authorize the performance of emergency medical procedures and treatments upon me in both transit and upon arrival at the receiving facility  Additionally, I authorize the release of any and all medical records to the receiving facility and request they be transported with me, if possible  I understand that the safest mode of transportation during a medical emergency is an ambulance and that the Hospital advocates the use of this mode of transport  Risks of traveling to the receiving facility by car, including absence of medical control, life sustaining equipment, such as oxygen, and medical personnel has been explained to me and I fully understand them  (GUMARO CORRECT BOX BELOW)  [  ]  I consent to the stated transfer and to be transported by ambulance/helicopter  [  ]  I consent to the stated transfer, but refuse transportation by ambulance and accept full responsibility for my transportation by car  I understand the risks of non-ambulance transfers and I exonerate the Hospital and its staff from any deterioration in my condition that results from this refusal     X___________________________________________    DATE  23  TIME___13:25___  Signature of patient or legally responsible individual signing on patient behalf           RELATIONSHIP TO PATIENT_____self________________        Provider Certification    NAME Harlan Singh                                        [facility] 1960                              MRN [id number]    A medical screening exam was performed on the above named patient  Based on the examination:    Condition Necessitating Transfer The primary encounter diagnosis was Suicidal ideation   Diagnoses of Syncope, UTI (urinary tract infection), and Medical clearance for psychiatric admission were also pertinent to this visit  Patient Condition: The patient has been stabilized such that within reasonable medical probability, no material deterioration of the patient condition or the condition of the unborn child(marcos) is likely to result from the transfer    Reason for Transfer: Level of Care needed not available at this facility    Transfer Requirements: Facility [facility], 6B   Space available and qualified personnel available for treatment as acknowledged by Tu Munoz - [phone]  Agreed to accept transfer and to provide appropriate medical treatment as acknowledged by       Dr Hailey Smyth  Appropriate medical records of the examination and treatment of the patient are provided at the time of transfer   [address] [facility], [address] _______  Transfer will be performed by qualified personnel from [address] [place] [address]  and appropriate transfer equipment as required, including the use of necessary and appropriate life support measures      Provider Certification: I have examined the patient and explained the following risks and benefits of being transferred/refusing transfer to the patient/family:  The patient is stable for psychiatric transfer because they are medically stable, and is protected from harming him/herself or others during transport, General risk, such as traffic hazards, adverse weather conditions, rough terrain or turbulence, possible failure of equipment (including vehicle or aircraft), or consequences of actions of persons outside the control of the transport personnel      Based on these reasonable risks and benefits to the patient and/or the unborn child(marcos), and based upon the information available at the time of the patient’s examination, I certify that the medical benefits reasonably to be expected from the provision of appropriate medical treatments at another medical facility outweigh the increasing risks, if any, to the individual’s medical condition, and in the case of labor to the unborn child, from effecting the transfer      X____________________________________________ DATE 06/20/23        TIME___13:30_      ORIGINAL - SEND TO MEDICAL RECORDS   COPY - SEND WITH PATIENT DURING TRANSFER

## 2023-06-19 NOTE — DISCHARGE INSTRUCTIONS
Your blood and heart tests were reassuring  We recommend following up with your primary care doctor and neurologist   As well as a cardiologist since you have had multiple episodes of syncope which are unexplained at this point

## 2023-06-19 NOTE — TELEPHONE ENCOUNTER
Patient needs refills on  keppra , her epilepsy medication  And pramipexole sent to [facility]

## 2023-06-19 NOTE — ED PROVIDER NOTES
"History  Chief Complaint   Patient presents with   • Syncope     Pt reports \"passing out\" while waiting at the bus stop; pt reports that she fell down     57-year-old female with history of epilepsy and obesity presents after syncopal event  Patient states that she was standing at the bus stop and feeling lightheaded with some palpitations and then passed out  She reports that she was feeling normal this morning  She also reports that she has had trouble filling her medications recently due to financial issues and just ran out of her [facility]  Prior to Admission Medications   Prescriptions Last Dose Informant Patient Reported? Taking?    TEGretol 200 MG tablet   No No   Sig: TAKE 1 TABLET THREE TIMES A DAY   carBAMazepine (TEGretol) 200 mg tablet   No No   Sig: Take 1 tablet (200 mg total) by mouth 3 (three) times a day for 14 days   chlorthalidone (HYGROTEN) 50 MG tablet   No No   Sig: TAKE ONE-HALF (1/2) TABLET DAILY   levETIRAcetam (Keppra) 750 mg tablet   No No   Sig: Take 2 tablets (1,500 mg total) by mouth 2 (two) times a day   lidocaine (LIDODERM) 5 %  Self No No   Sig: Apply 1 patch topically daily Remove & Discard patch within 12 hours or as directed by MD   losartan (COZAAR) 25 mg tablet   No No   Sig: Take 1 tablet (25 mg total) by mouth daily   metoprolol succinate (TOPROL-XL) 50 mg 24 hr tablet   No No   Sig: TAKE 1 TABLET DAILY   pramipexole (MIRAPEX) 1 5 MG tablet   No No   Sig: Take 1 tablet (1 5 mg total) by mouth daily at bedtime   spironolactone (ALDACTONE) 25 mg tablet   No No   Sig: Take 1 tablet (25 mg total) by mouth daily      Facility-Administered Medications: None       Past Medical History:   Diagnosis Date   • Depression    • EP (epilepsy) ([facility] [address] )    • Epilepsy ([facility] [address] )    • Obesity    • Restless leg        Past Surgical History:   Procedure Laterality Date   • DENTAL SURGERY      all teeth removed   • LASER ABLATION OF THE CERVIX     • MAMMO (HISTORICAL)  05/01/2017       Family " Assessment/Plan:  1  Acute right-sided low back pain with right-sided sciatica  - POCT urine dip auto non-scope-neg  - predniSONE 20 mg tablet; Take 1 tablet (20 mg total) by mouth 2 (two) times a day with meals for 7 days  Dispense: 14 tablet; Refill: 0  - ketorolac (TORADOL) 60 mg/2 mL IM injection 30 mg  2  Lumbar radiculopathy  - predniSONE 20 mg tablet; Take 1 tablet (20 mg total) by mouth 2 (two) times a day with meals for 7 days  Dispense: 14 tablet; Refill: 0  3  Muscle spasm  - cyclobenzaprine (FLEXERIL) 10 mg tablet; Take 1 tablet (10 mg total) by mouth 3 (three) times a day as needed for muscle spasms  Dispense: 20 tablet; Refill: 0      Alternate ice and heat- 20 minutes on, 20 minutes off  Prednisone twice daily with food for next 7 days   Do not take Ibuprofen while taking Prednisone  Flexeril 10mg every 8 hours as needed  Do not drive or operate machinery while taking this medication  Gentle stretching exercises  If symptoms persist or worsen, consider physical therapy as discussed  You were given Toradol injection in the office today  Subjective:      Patient ID: Elsa Davila is a 59 y o  female who presents for back pain    Here for back pain   Started about 4 days ago  No injury  Pain low back into right buttock and right leg  Right leg numb at times   Was excruciating yesterday  Slightly better today but still very painful  Using ice and heat  Taking ibuprofen 800mg  Using otc pain patch  The following portions of the patient's history were reviewed and updated as appropriate: allergies, current medications, past family history, past medical history, past social history, past surgical history and problem list     Review of Systems   Constitutional: Negative for fever  Genitourinary: Negative for dysuria and hematuria  Musculoskeletal: Positive for back pain  Right hip pain  Right leg pain   Skin: Negative for rash     Neurological: Positive for numbness (right leg at times)  Objective:  poct ua: neg nitrites, neg leuk, neg blood    /72 (BP Location: Right arm, Patient Position: Sitting, Cuff Size: Adult)   Pulse 76   Temp 98 3 °F (36 8 °C) (Temporal)   Resp 12   Ht 5' 2" (1 575 m)   Wt 76 2 kg (168 lb)   SpO2 98%   BMI 30 73 kg/m²          Physical Exam   Constitutional: She is oriented to person, place, and time  She appears well-developed and well-nourished  She appears distressed (visibly uncomfortable  )  Cardiovascular: Normal rate and regular rhythm  Pulmonary/Chest: Effort normal and breath sounds normal  No respiratory distress  She has no wheezes  Abdominal: Soft  Bowel sounds are normal  She exhibits no distension  There is no tenderness  Musculoskeletal: Normal range of motion  No point vertebral tenderness  Decreased lumbar flexion  (+) reproducible radicular symptoms to right leg with palpation of right SI notch   Neurological: She is alert and oriented to person, place, and time  She displays normal reflexes  No cranial nerve deficit  Coordination normal    Skin: Skin is warm and dry  No rash noted  She is not diaphoretic  No erythema  No pallor  Vitals reviewed  History   Problem Relation Age of Onset   • Kidney disease Mother    • Liver disease Mother    • Heart attack Mother    • Heart attack Father    • No Known Problems Brother    • No Known Problems Son      I have reviewed and agree with the history as documented  E-Cigarette/Vaping   • E-Cigarette Use Never User      E-Cigarette/Vaping Substances   • Nicotine No    • THC No    • CBD No    • Flavoring No    • Other No    • Unknown No      Social History     Tobacco Use   • Smoking status: Every Day     Packs/day: 0 50     Years: 23 00     Total pack years: 11 50     Types: Cigarettes   • Smokeless tobacco: Never   Vaping Use   • Vaping Use: Never used   Substance Use Topics   • Alcohol use: Never     Comment: pt denies alcohol use   • Drug use: Never       Review of Systems   Constitutional: Negative for fever  Respiratory: Negative for shortness of breath  Cardiovascular: Positive for palpitations  Negative for chest pain and leg swelling  Neurological: Positive for syncope and light-headedness  Physical Exam  Physical Exam  Constitutional:       General: She is not in acute distress  HENT:      Head: Normocephalic and atraumatic  Mouth/Throat:      Mouth: Mucous membranes are moist    Eyes:      Extraocular Movements: Extraocular movements intact  Conjunctiva/sclera: Conjunctivae normal       Pupils: Pupils are equal, round, and reactive to light  Cardiovascular:      Rate and Rhythm: Normal rate and regular rhythm  Heart sounds: Normal heart sounds  Pulmonary:      Effort: Pulmonary effort is normal       Comments: Trace scattered rhonchi  Musculoskeletal:         General: Normal range of motion  Cervical back: Normal range of motion  Right lower leg: No edema  Left lower leg: No edema  Skin:     General: Skin is warm and dry  Neurological:      General: No focal deficit present  Mental Status: She is alert and oriented to person, place, and time  Psychiatric:         Mood and Affect: Mood normal          Behavior: Behavior normal          Vital Signs  ED Triage Vitals   Temperature Pulse Respirations Blood Pressure SpO2   06/19/23 1147 06/19/23 1145 06/19/23 1145 06/19/23 1146 06/19/23 1145   97 9 °F (36 6 °C) (!) 107 20 150/69 98 %      Temp Source Heart Rate Source Patient Position - Orthostatic VS BP Location FiO2 (%)   06/19/23 1147 06/19/23 1145 06/19/23 1145 06/19/23 1145 --   Oral Monitor Lying Left arm       Pain Score       --                  Vitals:    06/19/23 1145 06/19/23 1146   BP:  150/69   Pulse: (!) 107    Patient Position - Orthostatic VS: Lying          Visual Acuity      ED Medications  Medications   levetiracetam in NaCl (KEPPRA) infusion 1,000 mg (1,000 mg Intravenous Given 6/19/23 1204)       Diagnostic Studies  Results Reviewed     Procedure Component Value Units Date/Time    HS Troponin I 2hr [[id number]]  (Normal) Collected: 06/19/23 1402    Lab Status: Final result Specimen: Blood from Arm, Right Updated: 06/19/23 1438     hs TnI 2hr 2 ng/L      Delta 2hr hsTnI 0 ng/L     HS Troponin 0hr (reflex protocol) [[id number]]  (Normal) Collected: 06/19/23 1154    Lab Status: Final result Specimen: Blood from Arm, Right Updated: 06/19/23 1221     hs TnI 0hr 2 ng/L     HS Troponin I 4hr [[id number]]     Lab Status: No result Specimen: Blood     Basic metabolic panel [[id number]]  (Abnormal) Collected: 06/19/23 1154    Lab Status: Final result Specimen: Blood from Arm, Right Updated: 06/19/23 1212     Sodium 140 mmol/L      Potassium 4 0 mmol/L      Chloride 108 mmol/L      CO2 22 mmol/L      ANION GAP 10 mmol/L      BUN 37 mg/dL      Creatinine 1 62 mg/dL      Glucose 114 mg/dL      Calcium 8 9 mg/dL      eGFR 33 ml/min/1 73sq m     Narrative:      [place] guidelines for Chronic Kidney Disease (CKD):   •  Stage 1 with normal or high GFR (GFR > 90 mL/min/1 73 square meters)  •  Stage 2 Mild CKD (GFR = 60-89 mL/min/1 73 square meters)  •  Stage 3A Moderate CKD (GFR = 45-59 mL/min/1 73 square meters)  •  Stage 3B Moderate CKD (GFR = 30-44 mL/min/1 73 square meters)  •  Stage 4 Severe CKD (GFR = 15-29 mL/min/1 73 square meters)  •  Stage 5 End Stage CKD (GFR <15 mL/min/1 73 square meters)  Note: GFR calculation is accurate only with a steady state creatinine    Magnesium [[id number]]  (Normal) Collected: 06/19/23 1154    Lab Status: Final result Specimen: Blood from Arm, Right Updated: 06/19/23 1212     Magnesium 1 9 mg/dL     CBC and differential [[id number]]  (Abnormal) Collected: 06/19/23 1154    Lab Status: Final result Specimen: Blood from Arm, Right Updated: 06/19/23 1158     WBC 13 79 Thousand/uL      RBC 4 03 Million/uL      Hemoglobin 12 2 g/dL      Hematocrit 37 7 %      MCV 94 fL      MCH 30 3 pg      MCHC 32 4 g/dL      RDW 13 1 %      MPV 10 7 fL      Platelets 327 Thousands/uL      nRBC 0 /100 WBCs      Neutrophils Relative 73 %      Immat GRANS % 0 %      Lymphocytes Relative 20 %      Monocytes Relative 6 %      Eosinophils Relative 1 %      Basophils Relative 0 %      Neutrophils Absolute 9 94 Thousands/µL      Immature Grans Absolute 0 06 Thousand/uL      Lymphocytes Absolute 2 77 Thousands/µL      Monocytes Absolute 0 85 Thousand/µL      Eosinophils Absolute 0 12 Thousand/µL      Basophils Absolute 0 05 Thousands/µL                  XR chest 1 view portable   Final Result by Armando Farah MD (06/19 1357)      No acute cardiopulmonary disease  Findings are stable            Workstation performed: [id number]                    Procedures  Procedures         ED Course  ED Course as of 06/19/23 1447   Mon Jun 19, 2023   1152 EKG shows normal sinus rhythm 98 bpm with normal intervals, QTc 454, precordial T wave inversion  Patient presenting to ED after syncopal event  Preceding the syncope she had lightheadedness as well as palpitations    Patient's mental status on arrival is normal with intact neuro exam   Not consistent with postictal phase  Will perform cardiac work-up  Negative troponin x2 which adequately rules out ACS  No evidence of arrhythmogenic abnormalities on EKG  Patient given an extra dose of IV Keppra since she missed her morning dose  Will provide patient with good Rx card to receive discounted Keppra refill  As patient has had multiple visits recently for syncope will refer patient to cardiology  Stable for discharge  MDM    Disposition  Final diagnoses:   Syncope     Time reflects when diagnosis was documented in both MDM as applicable and the Disposition within this note     Time User Action Codes Description Comment    6/19/2023  2:41 PM Yara Wilcox Add [R55] Syncope       ED Disposition     ED Disposition   Discharge    Condition   Stable    Date/Time   Mon Jun 19, 2023  2:41 PM    Comment   Azeem Seaman discharge to home/self care  Follow-up Information     Follow up With Specialties Details Why Contact Info Additional Information    Maris Garcia MD Internal Medicine Schedule an appointment as soon as possible for a visit   [address] [phone]       [address] [facility]   [place] [phone] [address] [facility] [address] [place] [address], [address] [place] [address] [phone]          Patient's Medications   Discharge Prescriptions    No medications on file       No discharge procedures on file      PDMP Review       Value Time User    PDMP Reviewed  Yes 7/23/2021 10:52 Iván Riley MD          ED Provider  Electronically Signed by           Lucas Roth MD  06/19/23 1660

## 2023-06-19 NOTE — TELEPHONE ENCOUNTER
Does patient have neurologist? If so they should be refilling epilepsy med  If not, I will give courtesy refill but she will have to establish with neuro   Please advise me after clarifying with pt thanks

## 2023-06-20 ENCOUNTER — HOSPITAL ENCOUNTER (INPATIENT)
Facility: HOSPITAL | Age: 63
LOS: 13 days | Discharge: HOME | DRG: 885 | End: 2023-07-03
Attending: STUDENT IN AN ORGANIZED HEALTH CARE EDUCATION/TRAINING PROGRAM | Admitting: STUDENT IN AN ORGANIZED HEALTH CARE EDUCATION/TRAINING PROGRAM
Payer: MEDICARE

## 2023-06-20 VITALS
SYSTOLIC BLOOD PRESSURE: 153 MMHG | OXYGEN SATURATION: 94 % | TEMPERATURE: 97.9 F | DIASTOLIC BLOOD PRESSURE: 65 MMHG | RESPIRATION RATE: 16 BRPM | HEART RATE: 72 BPM

## 2023-06-20 DIAGNOSIS — E87.6 HYPOKALEMIA: ICD-10-CM

## 2023-06-20 DIAGNOSIS — N18.31 ACUTE RENAL FAILURE WITH ACUTE TUBULAR NECROSIS SUPERIMPOSED ON STAGE 3A CHRONIC KIDNEY DISEASE (HCC): ICD-10-CM

## 2023-06-20 DIAGNOSIS — N17.0 ACUTE RENAL FAILURE WITH ACUTE TUBULAR NECROSIS SUPERIMPOSED ON STAGE 3A CHRONIC KIDNEY DISEASE (HCC): ICD-10-CM

## 2023-06-20 DIAGNOSIS — F03.90 MAJOR NEUROCOGNITIVE DISORDER (HCC): Primary | ICD-10-CM

## 2023-06-20 DIAGNOSIS — R60.9 WATER RETENTION: ICD-10-CM

## 2023-06-20 DIAGNOSIS — N18.31 ANEMIA DUE TO STAGE 3A CHRONIC KIDNEY DISEASE (HCC): ICD-10-CM

## 2023-06-20 DIAGNOSIS — N18.9 ACUTE RENAL FAILURE SUPERIMPOSED ON CHRONIC KIDNEY DISEASE, UNSPECIFIED CKD STAGE, UNSPECIFIED ACUTE RENAL FAILURE TYPE (HCC): ICD-10-CM

## 2023-06-20 DIAGNOSIS — Z76.0 MEDICATION REFILL: ICD-10-CM

## 2023-06-20 DIAGNOSIS — Z72.0 TOBACCO ABUSE: ICD-10-CM

## 2023-06-20 DIAGNOSIS — G25.81 RESTLESS LEG SYNDROME: ICD-10-CM

## 2023-06-20 DIAGNOSIS — N18.31 STAGE 3A CHRONIC KIDNEY DISEASE (HCC): ICD-10-CM

## 2023-06-20 DIAGNOSIS — Z00.8 MEDICAL CLEARANCE FOR PSYCHIATRIC ADMISSION: ICD-10-CM

## 2023-06-20 DIAGNOSIS — D63.1 ANEMIA DUE TO STAGE 3A CHRONIC KIDNEY DISEASE (HCC): ICD-10-CM

## 2023-06-20 DIAGNOSIS — H93.8X3 SENSATION OF FULLNESS IN BOTH EARS: ICD-10-CM

## 2023-06-20 DIAGNOSIS — G40.919 EPILEPSY WITH ALTERED CONSCIOUSNESS WITH INTRACTABLE EPILEPSY (HCC): ICD-10-CM

## 2023-06-20 DIAGNOSIS — N17.9 ACUTE RENAL FAILURE SUPERIMPOSED ON CHRONIC KIDNEY DISEASE, UNSPECIFIED CKD STAGE, UNSPECIFIED ACUTE RENAL FAILURE TYPE (HCC): ICD-10-CM

## 2023-06-20 DIAGNOSIS — I12.9 PARENCHYMAL RENAL HYPERTENSION, STAGE 1 THROUGH STAGE 4 OR UNSPECIFIED CHRONIC KIDNEY DISEASE: ICD-10-CM

## 2023-06-20 DIAGNOSIS — F33.3 SEVERE EPISODE OF RECURRENT MAJOR DEPRESSIVE DISORDER, WITH PSYCHOTIC FEATURES (HCC): ICD-10-CM

## 2023-06-20 DIAGNOSIS — I10 ESSENTIAL HYPERTENSION: ICD-10-CM

## 2023-06-20 PROBLEM — E66.9 OBESITY (BMI 30-39.9): Status: ACTIVE | Noted: 2023-06-20

## 2023-06-20 PROBLEM — R29.6 FALLS FREQUENTLY: Status: ACTIVE | Noted: 2023-06-20

## 2023-06-20 LAB
25(OH)D3 SERPL-MCNC: 24.8 NG/ML (ref 30–100)
ATRIAL RATE: 82 BPM
ATRIAL RATE: 99 BPM
FLUAV RNA RESP QL NAA+PROBE: NEGATIVE
FLUBV RNA RESP QL NAA+PROBE: NEGATIVE
P AXIS: 52 DEGREES
P AXIS: 58 DEGREES
PR INTERVAL: 134 MS
PR INTERVAL: 146 MS
QRS AXIS: 10 DEGREES
QRS AXIS: 50 DEGREES
QRSD INTERVAL: 66 MS
QRSD INTERVAL: 68 MS
QT INTERVAL: 338 MS
QT INTERVAL: 394 MS
QTC INTERVAL: 433 MS
QTC INTERVAL: 460 MS
RSV RNA RESP QL NAA+PROBE: NEGATIVE
SARS-COV-2 RNA RESP QL NAA+PROBE: NEGATIVE
T WAVE AXIS: 64 DEGREES
T WAVE AXIS: 67 DEGREES
VENTRICULAR RATE: 82 BPM
VENTRICULAR RATE: 99 BPM

## 2023-06-20 PROCEDURE — 99222 1ST HOSP IP/OBS MODERATE 55: CPT | Performed by: NURSE PRACTITIONER

## 2023-06-20 PROCEDURE — 82306 VITAMIN D 25 HYDROXY: CPT | Performed by: NURSE PRACTITIONER

## 2023-06-20 PROCEDURE — 36415 COLL VENOUS BLD VENIPUNCTURE: CPT | Performed by: EMERGENCY MEDICINE

## 2023-06-20 PROCEDURE — 0241U HB NFCT DS VIR RESP RNA 4 TRGT: CPT | Performed by: EMERGENCY MEDICINE

## 2023-06-20 PROCEDURE — 93010 ELECTROCARDIOGRAM REPORT: CPT | Performed by: INTERNAL MEDICINE

## 2023-06-20 PROCEDURE — 93005 ELECTROCARDIOGRAM TRACING: CPT

## 2023-06-20 PROCEDURE — 1124F ACP DISCUSS-NO DSCNMKR DOCD: CPT | Performed by: STUDENT IN AN ORGANIZED HEALTH CARE EDUCATION/TRAINING PROGRAM

## 2023-06-20 PROCEDURE — 80177 DRUG SCRN QUAN LEVETIRACETAM: CPT | Performed by: EMERGENCY MEDICINE

## 2023-06-20 RX ORDER — LORAZEPAM 1 MG/1
1 TABLET ORAL
Status: CANCELLED | OUTPATIENT
Start: 2023-06-20

## 2023-06-20 RX ORDER — HYDROXYZINE HYDROCHLORIDE 25 MG/1
25 TABLET, FILM COATED ORAL
Status: DISCONTINUED | OUTPATIENT
Start: 2023-06-20 | End: 2023-07-03 | Stop reason: HOSPADM

## 2023-06-20 RX ORDER — RISPERIDONE 0.25 MG/1
0.5 TABLET ORAL
Status: CANCELLED | OUTPATIENT
Start: 2023-06-20

## 2023-06-20 RX ORDER — RISPERIDONE 0.25 MG/1
0.25 TABLET ORAL
Status: CANCELLED | OUTPATIENT
Start: 2023-06-20

## 2023-06-20 RX ORDER — TRAZODONE HYDROCHLORIDE 50 MG/1
50 TABLET ORAL
Status: DISCONTINUED | OUTPATIENT
Start: 2023-06-20 | End: 2023-07-03 | Stop reason: HOSPADM

## 2023-06-20 RX ORDER — METOPROLOL SUCCINATE 50 MG/1
50 TABLET, EXTENDED RELEASE ORAL DAILY
Status: DISCONTINUED | OUTPATIENT
Start: 2023-06-21 | End: 2023-07-03 | Stop reason: HOSPADM

## 2023-06-20 RX ORDER — ACETAMINOPHEN 325 MG/1
975 TABLET ORAL EVERY 6 HOURS PRN
Status: DISCONTINUED | OUTPATIENT
Start: 2023-06-20 | End: 2023-07-03 | Stop reason: HOSPADM

## 2023-06-20 RX ORDER — MELATONIN
1000 DAILY
Status: CANCELLED | OUTPATIENT
Start: 2023-06-20

## 2023-06-20 RX ORDER — RISPERIDONE 1 MG/1
1 TABLET ORAL
Status: CANCELLED | OUTPATIENT
Start: 2023-06-20

## 2023-06-20 RX ORDER — HALOPERIDOL 5 MG/ML
5 INJECTION INTRAMUSCULAR
Status: CANCELLED | OUTPATIENT
Start: 2023-06-20

## 2023-06-20 RX ORDER — ACETAMINOPHEN 325 MG/1
650 TABLET ORAL EVERY 4 HOURS PRN
Status: CANCELLED | OUTPATIENT
Start: 2023-06-20

## 2023-06-20 RX ORDER — LOSARTAN POTASSIUM 25 MG/1
25 TABLET ORAL DAILY
Status: DISCONTINUED | OUTPATIENT
Start: 2023-06-21 | End: 2023-07-03 | Stop reason: HOSPADM

## 2023-06-20 RX ORDER — RISPERIDONE 0.5 MG/1
0.5 TABLET ORAL
Status: DISCONTINUED | OUTPATIENT
Start: 2023-06-20 | End: 2023-07-03 | Stop reason: HOSPADM

## 2023-06-20 RX ORDER — CEPHALEXIN 250 MG/1
500 CAPSULE ORAL ONCE
Status: COMPLETED | OUTPATIENT
Start: 2023-06-20 | End: 2023-06-20

## 2023-06-20 RX ORDER — HYDROXYZINE 50 MG/1
50 TABLET, FILM COATED ORAL
Status: DISCONTINUED | OUTPATIENT
Start: 2023-06-20 | End: 2023-07-03 | Stop reason: HOSPADM

## 2023-06-20 RX ORDER — TRAZODONE HYDROCHLORIDE 50 MG/1
50 TABLET ORAL
Status: CANCELLED | OUTPATIENT
Start: 2023-06-20

## 2023-06-20 RX ORDER — HALOPERIDOL 5 MG/ML
5 INJECTION INTRAMUSCULAR
Status: DISCONTINUED | OUTPATIENT
Start: 2023-06-20 | End: 2023-07-03 | Stop reason: HOSPADM

## 2023-06-20 RX ORDER — ACETAMINOPHEN 325 MG/1
650 TABLET ORAL EVERY 4 HOURS PRN
Status: DISCONTINUED | OUTPATIENT
Start: 2023-06-20 | End: 2023-07-03 | Stop reason: HOSPADM

## 2023-06-20 RX ORDER — HYDROXYZINE HYDROCHLORIDE 25 MG/1
25 TABLET, FILM COATED ORAL
Status: CANCELLED | OUTPATIENT
Start: 2023-06-20

## 2023-06-20 RX ORDER — CEPHALEXIN 500 MG/1
500 CAPSULE ORAL EVERY 8 HOURS SCHEDULED
Status: COMPLETED | OUTPATIENT
Start: 2023-06-20 | End: 2023-06-23

## 2023-06-20 RX ORDER — NICOTINE 21 MG/24HR
1 PATCH, TRANSDERMAL 24 HOURS TRANSDERMAL DAILY
Status: DISCONTINUED | OUTPATIENT
Start: 2023-06-21 | End: 2023-07-03 | Stop reason: HOSPADM

## 2023-06-20 RX ORDER — RISPERIDONE 0.25 MG/1
0.25 TABLET ORAL
Status: DISCONTINUED | OUTPATIENT
Start: 2023-06-20 | End: 2023-07-03 | Stop reason: HOSPADM

## 2023-06-20 RX ORDER — LORAZEPAM 2 MG/ML
1 INJECTION INTRAMUSCULAR
Status: CANCELLED | OUTPATIENT
Start: 2023-06-20

## 2023-06-20 RX ORDER — LORAZEPAM 2 MG/ML
1 INJECTION INTRAMUSCULAR
Status: DISCONTINUED | OUTPATIENT
Start: 2023-06-20 | End: 2023-07-03 | Stop reason: HOSPADM

## 2023-06-20 RX ORDER — RISPERIDONE 1 MG/1
1 TABLET ORAL
Status: DISCONTINUED | OUTPATIENT
Start: 2023-06-20 | End: 2023-07-03 | Stop reason: HOSPADM

## 2023-06-20 RX ORDER — HYDROXYZINE HYDROCHLORIDE 25 MG/1
50 TABLET, FILM COATED ORAL
Status: CANCELLED | OUTPATIENT
Start: 2023-06-20

## 2023-06-20 RX ORDER — ACETAMINOPHEN 325 MG/1
975 TABLET ORAL EVERY 6 HOURS PRN
Status: CANCELLED | OUTPATIENT
Start: 2023-06-20

## 2023-06-20 RX ORDER — LORAZEPAM 1 MG/1
1 TABLET ORAL
Status: DISCONTINUED | OUTPATIENT
Start: 2023-06-20 | End: 2023-07-03 | Stop reason: HOSPADM

## 2023-06-20 RX ORDER — CARBAMAZEPINE 200 MG/1
200 TABLET ORAL 3 TIMES DAILY
Status: CANCELLED | OUTPATIENT
Start: 2023-06-20

## 2023-06-20 RX ADMIN — CEPHALEXIN 500 MG: 500 CAPSULE ORAL at 21:10

## 2023-06-20 RX ADMIN — CARBAMAZEPINE 200 MG: 200 TABLET ORAL at 09:13

## 2023-06-20 RX ADMIN — CEPHALEXIN 500 MG: 500 CAPSULE ORAL at 16:50

## 2023-06-20 RX ADMIN — PRAMIPEXOLE DIHYDROCHLORIDE 1.5 MG: 1 TABLET ORAL at 21:10

## 2023-06-20 RX ADMIN — LOSARTAN POTASSIUM 25 MG: 25 TABLET, FILM COATED ORAL at 09:12

## 2023-06-20 RX ADMIN — METOPROLOL SUCCINATE 50 MG: 50 TABLET, EXTENDED RELEASE ORAL at 09:12

## 2023-06-20 RX ADMIN — CEPHALEXIN 500 MG: 250 CAPSULE ORAL at 09:12

## 2023-06-20 RX ADMIN — LEVETIRACETAM 750 MG: 250 TABLET, FILM COATED ORAL at 09:12

## 2023-06-20 NOTE — ED NOTES
Bed Search:    [facility]eyan[facility] Mckinnon - will review; chart faxed  Bob Huynh - will review for tomorrow; chart faxed  [facility]ara Carvajal - will review for tomorrow; chart faxed  [facility] - at capacity per [facility] - no beds per [facility] - will review per Katie Momin; chart faxed  [facility] - will review per Mercedes Esquivel; chart faxed  [facility] - will review for tomorrow per Perez Baron; chart faxed  Robert Long - at capacity per Latha Lemos - per [facility], referrals can be made to [email or web address]  [facility] - no response  [facility] - will review; chart faxed
Breakfast tray ordered for pt at this time        Diony Collier RN  06/20/23 9819
Calls were placed to facilities to which the patient's clinical was faxed  [facility] - full per Triston Barrientos - patient denied secondary to CKD  [facility] - clinical not yet reviewed per [facility] - a bed may be available, clinical was faxed  [facility] - a bed is available, Syeda Cormier will review clinical shortly  [facility]iallo - clinical was re-faxed, as clinical was never received last night per Y[facility]    201/face sheet also faxed to Intake 
Crisis bedside        Kvng Gonzalez RN  06/19/23 2045
Dr Vishal Young made aware of [address] Connor Panchal RN  06/19/23 9421
Meal tray given     Maxi Torres RN  06/20/23 0025
Patient is accepted at [facility], 6[facility]  Patient is accepted by Dr Deven Troncoso  Transportation is arranged with [facility]  Transportation is scheduled for 14:15  Patient may go to the floor after 14:00  Nurse report is to be called to [phone] prior to patient transfer 
Pt presents to ED with SI with plan to suffocate herself with a bag  She reports having SI for the past week, and in the past she had SI with a plan to stab herself in the chest  No HI, no VH, but reports AH of people singing  She reports the following additional symptoms: depressed mood, decreased appetite/sleep, low optimism, difficulty thinking clearly, indecisiveness, decreased interest/motivation, trouble relaxing/restlessness  She reports the following triggers: she does not believe friends/family are trustworthy, and she feels they are unsupportive/insensitive to her needs  She also reports that living alone is a stressor, and her  is currently in a nursing home  She also reports financial strain, and that she is retired  She does not currently see a psychiatrist/therapist, and she would like to sign a 201 for inpatient treatment  She reports no restrictions on facility location 
Pt sleeping comfortably at this time with no signs of distress noted  Will continue to monitor        Cesar Navas RN  06/20/23 4008
Received call from [address] [place] [address] from [place] to deny admission 
Received call from Sarah from [facility] to deny admission 
Unable to locate [facility] psych bed overnight 
Writer received call from Gutierrez at [facility] who stated that the patient was accepted by Dr Yenny Moran  Calls were placed to other facilities to inform them that a bed was no longer needed  A few moments after receiving the call from Gutierrez, she called this writer once again to inform this writer that the patient had been un-accepted by the physician  An in-network bed will most likely become available today 
yes

## 2023-06-20 NOTE — ASSESSMENT & PLAN NOTE
· Currently we will continue her outpatient hyper antihypertensive losartan 25 mg p.o. daily and metoprolol XL 50 mg p.o. daily  · We will monitor her blood pressure closely  · We may have to change her blood pressure medication around based on her creatinine

## 2023-06-20 NOTE — ASSESSMENT & PLAN NOTE
· Vital signs stable afebrile patient seen by myself Labs from the acute [facility] reviewed  · Patient medically cleared for admit  · SL IM will sign off please call for any questions or concerns

## 2023-06-20 NOTE — ED PROCEDURE NOTE
PROCEDURE  ECG 12 Lead Documentation Only    Date/Time: 6/19/2023 8:11 PM    Performed by: Mauricio Mcclure MD  Authorized by: Mauricio Mcclure MD    Indications / Diagnosis:  PSYCH CLEARANCE   ECG reviewed by me, the ED Provider: yes    Patient location:  ED  Previous ECG:     Previous ECG:  Compared to current    Comparison ECG info:  COMAPRED TO  ECG FROM EARLIER TODAY - NO SIGN CHANGES    Similarity:  No change    Comparison to cardiac monitor: Yes    Interpretation:     Interpretation: non-specific    Rate:     ECG rate:  99    ECG rate assessment: normal    Rhythm:     Rhythm: sinus rhythm    Ectopy:     Ectopy: none    QRS:     QRS axis:  Normal    QRS intervals:  Normal  Conduction:     Conduction: normal    ST segments:     ST segments:  Normal  T waves:     T waves: flattening      Flattening:  III, V1, V2, V3, aVL and I  Q waves:     Q waves:  V1  Comments:      NO ECG SIGNS OF ISCHEMIA/ INJURY / R HEART STRAIN / TABITHA/PERICARDITIS - NO ECG SIGNS OF SHORT- LONG QTC - WPW- B RUGADA SYNDROME- HCM - EPSILON WAVES          Mauricio Mcclure MD  06/19/23 2020

## 2023-06-20 NOTE — ED CARE HANDOFF
Emergency Department Sign Out Note        Sign out and transfer of care from Dr Yomaira sinclair  See Separate Emergency Department note  The patient, Noe Parada, was evaluated by the previous provider for syncope, SI, UTI  Workup Completed:  yes    ED Course / Workup Pending (followup):  201 placement as per crisis  Patient medically cleared for psychiatric placement  Signed out to Dr Virginia Verde in AM                                        Procedures  MDM        Disposition  Final diagnoses:   Suicidal ideation   Syncope   UTI (urinary tract infection)     Time reflects when diagnosis was documented in both MDM as applicable and the Disposition within this note     Time User Action Codes Description Comment    6/20/2023  7:28 AM Alessia Escamilla [A62 799] Suicidal ideation     6/20/2023  7:28 AM Alessia Escamilla [R55] Syncope     6/20/2023  7:28 AM Alessia Escamilla [N39 0] UTI (urinary tract infection)       ED Disposition     None      Follow-up Information    None       Patient's Medications   Discharge Prescriptions    No medications on file     No discharge procedures on file         ED Provider  Electronically Signed by     Lilia Cuellar MD  06/20/23 6170       Lilia Cuellar MD  06/20/23 3788

## 2023-06-20 NOTE — ASSESSMENT & PLAN NOTE
Lab Results   Component Value Date    EGFR 33 06/19/2023    EGFR 37 06/06/2023    EGFR 36 05/11/2023    CREATININE 1.62 (H) 06/19/2023    CREATININE 1.46 (H) 06/06/2023    CREATININE 1.49 (H) 05/11/2023   · She has CKD stage IV her baseline creatinine for the last 2 years has been between 1.29-1.46 currently her creatinine is 1.62  · She does take Chlorithalidone and Aldactone at home, we will hold these  · She will have a BMP checked tomorrow 6/21, if creatinine is not improved she will need IV fluid as well as a nephrology consult

## 2023-06-20 NOTE — CONSULTS
[facility]  Consult  Name: Ros Watson 57 y.o. female I MRN: [id number]  Unit/Bed#: [facility] 453-10 I Date of Admission: 6/20/2023   Date of Service: 6/20/2023 I Hospital Day: 0    Inpatient consult for Medical Clearance for [address] patient  Consult performed by: DRU Melgar  Consult ordered by: DRU Flores          Assessment/Plan   Medical clearance for psychiatric admission  Assessment & Plan  · Vital signs stable afebrile patient seen by myself Labs from the [facility] reviewed  · Patient medically cleared for admit  · SL IM will sign off please call for any questions or concerns    Falls frequently  Assessment & Plan  · Fall precautions  · Pt either has a seizure or syncope that causes her to fall    Obesity (BMI 30-39. 9)  Assessment & Plan  · She has a BMI of 38.97  · She needs nutritional education    Essential hypertension  Assessment & Plan  · Currently we will continue her outpatient hyper antihypertensive losartan 25 mg p.o. daily and metoprolol XL 50 mg p.o. daily  · We will monitor her blood pressure closely  · We may have to change her blood pressure medication around based on her creatinine    Tobacco abuse  Assessment & Plan  · She is an active tobacco abuser smoking half a pack per day  · I will discuss with her whether she would like a nicotine patch or she will use the gum  · Smoking cessation education    Acute renal failure superimposed on chronic kidney disease [facility])  Assessment & Plan  Lab Results   Component Value Date    EGFR 33 06/19/2023    EGFR 37 06/06/2023    EGFR 36 05/11/2023    CREATININE 1.62 (H) 06/19/2023    CREATININE 1.46 (H) 06/06/2023    CREATININE 1.49 (H) 05/11/2023   · She has CKD stage IV her baseline creatinine for the last 2 years has been between 1.29-1.46 currently her creatinine is 1.62  · She does take Chlorithalidone and Aldactone at home, we will hold these  · She will have a BMP checked tomorrow 6/21, if creatinine is not improved she will need IV fluid as well as a nephrology consult    UTI (urinary tract infection)  Assessment & Plan  · She had a urine sent at the [facility] hospital on 619 which had leukoesterase and nitrates  · She had 1 dose of Keflex while she was there I will continue the Keflex 500 mg p.o. 3 times daily for total of 9 doses  · We will have to follow-up the culture    Epilepsy with altered consciousness with intractable epilepsy ([address])  Assessment & Plan  · Per her med rec takes Tegretol 200 mg p.o. 3 times daily  · She has a Tegretol level pending  · She takes Keppra 1500 mg p.o. twice daily  · She has a Keppra level pending  · She has been at [address] and had 2 IV doses of Keppra in the last 48 hours secondary to running out of her p.o. Keppra  · She takes Mirapex 1.5 mg p.o. nightly  · Seizure percautions       Counseling / Coordination of Care Time: 30 minutes. Greater than 50% of total time spent on patient counseling and coordination of care. Collaboration of Care: Were Recommendations Directly Discussed with Primary Treatment Team? - No     History of Present Illness:    Leti Stanton is a 57 y.o. female who is originally admitted to the psychiatry service due to suicidal ideation. We are consulted for medical clearance for admission to [address] [place] [address] and treatment of underlying psychiatric illness. Patient has had frequent trips to the emergency room in the last 2 to 3 days she is presented to [address] [place] [address] in ER with complaints of syncope falling seizures running out of her p.o. Keppra she has had IV Keppra infusions yesterday on 6/19/2023 she presents to [facility] with the same like symptoms at this point she states that she would like to commit suicide and has a plan. She has a past medical history of hypertension seizure disorder obesity syncope tobacco abuse CKD stage III-IV.   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 Review of Systems:    Review of Systems   Constitutional: Negative for chills and fever. HENT: Negative for ear pain and sore throat. Eyes: Negative for pain and visual disturbance. Respiratory: Negative for cough and shortness of breath. Cardiovascular: Negative for chest pain and palpitations. Gastrointestinal: Negative for abdominal pain and vomiting. Genitourinary: Negative for dysuria and hematuria. Musculoskeletal: Negative for arthralgias and back pain. Skin: Negative for color change and rash. Neurological: Negative for seizures and syncope. All other systems reviewed and are negative.       Past Medical and Surgical History:     Past Medical History:   Diagnosis Date   • Depression    • EP (epilepsy) ([address])    • Epilepsy ([address])    • Obesity    • Restless leg        Past Surgical History:   Procedure Laterality Date   • DENTAL SURGERY      all teeth removed   • LASER ABLATION OF THE CERVIX     • MAMMO (HISTORICAL)  05/01/2017       Meds/Allergies:    all medications and allergies reviewed    Allergies: No Known Allergies    Social History:     Marital Status: /Civil Union    Substance Use History:   Social History     Substance and Sexual Activity   Alcohol Use Never    Comment: pt denies alcohol use     Social History     Tobacco Use   Smoking Status Every Day   • Packs/day: 0.50   • Years: 23.00   • Total pack years: 11.50   • Types: Cigarettes   Smokeless Tobacco Never     Social History     Substance and Sexual Activity   Drug Use Never       Family History:    non-contributory    Physical Exam:     Vitals:   Blood Pressure: 121/72 (06/20/23 1445)  Pulse: 88 (06/20/23 1445)  Temperature: 98.8 °F (37.1 °C) (06/20/23 1445)  Temp Source: Temporal (06/20/23 1445)  Respirations: 16 (06/20/23 1445)  Height: 5' 3" (160 cm) (06/20/23 1445)  Weight - Scale: 103 kg (226 lb 8 oz) (06/20/23 1445)  SpO2: 95 % (06/20/23 1445)    Physical Exam  Constitutional:       General: She is not in acute distress. Appearance: Normal appearance. She is not ill-appearing. HENT:      Head: Normocephalic and atraumatic. Nose: Nose normal.      Mouth/Throat:      Mouth: Mucous membranes are moist.   Eyes:      Extraocular Movements: Extraocular movements intact. Cardiovascular:      Rate and Rhythm: Normal rate and regular rhythm. Heart sounds: Normal heart sounds. Pulmonary:      Breath sounds: Normal breath sounds. No wheezing. Abdominal:      General: Abdomen is flat. Bowel sounds are normal.      Palpations: Abdomen is soft. Skin:     General: Skin is warm and dry. Neurological:      Mental Status: She is alert. Additional Data:     Lab Results: I have personally reviewed pertinent reports. Results from last 7 days   Lab Units 06/19/23  1154   WBC Thousand/uL 13.79*   HEMOGLOBIN g/dL 12.2   HEMATOCRIT % 37.7   PLATELETS Thousands/uL 388   NEUTROS PCT % 73   LYMPHS PCT % 20   MONOS PCT % 6   EOS PCT % 1     Results from last 7 days   Lab Units 06/19/23  1941 06/19/23  1154   SODIUM mmol/L  --  140   POTASSIUM mmol/L  --  4.0   CHLORIDE mmol/L  --  108   CO2 mmol/L  --  22   BUN mg/dL  --  37*   CREATININE mg/dL  --  1.62*   ANION GAP mmol/L  --  10   CALCIUM mg/dL  --  8.9   ALBUMIN g/dL 3.9  --    TOTAL BILIRUBIN mg/dL 0.32  --    ALK PHOS U/L 101  --    ALT U/L 10  --    AST U/L 13  --    GLUCOSE RANDOM mg/dL  --  114             Lab Results   Component Value Date/Time    HGBA1C 5.9 06/06/2019 12:00 AM           EKG, Pathology, and Other Studies Reviewed on Admission:   EKG 6/19/2023 ventricular rate 99 QTc 433 and my interpretation normal sinus rhythm normal EKG with an EKG compared with 6/6/2023 there is no new acute changes  ** Please Note: This note has been constructed using a voice recognition system.  **

## 2023-06-20 NOTE — ASSESSMENT & PLAN NOTE
· She is an active tobacco abuser smoking half a pack per day  · I will discuss with her whether she would like a nicotine patch or she will use the gum  · Smoking cessation education
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
## 2023-06-20 NOTE — ASSESSMENT & PLAN NOTE
· Per her med rec takes Tegretol 200 mg p.o. 3 times daily  · She has a Tegretol level pending  · She takes Keppra 1500 mg p.o. twice daily  · She has a Keppra level pending  · She has been at [address] and had 2 IV doses of Keppra in the last 48 hours secondary to running out of her p.o.  Keppra  · She takes Mirapex 1.5 mg p.o. nightly  · Seizure percautions
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
## 2023-06-20 NOTE — ASSESSMENT & PLAN NOTE
· She had a urine sent at the [facility] on 619 which had leukoesterase and nitrates  · She had 1 dose of Keflex while she was there I will continue the Keflex 500 mg p.o. 3 times daily for total of 9 doses  · We will have to follow-up the culture

## 2023-06-21 ENCOUNTER — APPOINTMENT (INPATIENT)
Dept: CT IMAGING | Facility: HOSPITAL | Age: 63
DRG: 885 | End: 2023-06-21
Payer: MEDICARE

## 2023-06-21 PROBLEM — F03.90 MAJOR NEUROCOGNITIVE DISORDER (HCC): Status: ACTIVE | Noted: 2023-06-21

## 2023-06-21 PROBLEM — F33.3 SEVERE EPISODE OF RECURRENT MAJOR DEPRESSIVE DISORDER, WITH PSYCHOTIC FEATURES (HCC): Status: ACTIVE | Noted: 2023-06-21

## 2023-06-21 LAB
ALBUMIN SERPL BCP-MCNC: 3.6 G/DL (ref 3.5–5)
ALP SERPL-CCNC: 91 U/L (ref 34–104)
ALT SERPL W P-5'-P-CCNC: 9 U/L (ref 7–52)
ANION GAP SERPL CALCULATED.3IONS-SCNC: 9 MMOL/L
AST SERPL W P-5'-P-CCNC: 12 U/L (ref 13–39)
BASOPHILS # BLD AUTO: 0.07 THOUSANDS/ÂΜL (ref 0–0.1)
BASOPHILS NFR BLD AUTO: 1 % (ref 0–1)
BILIRUB SERPL-MCNC: 0.32 MG/DL (ref 0.2–1)
BUN SERPL-MCNC: 32 MG/DL (ref 5–25)
CALCIUM SERPL-MCNC: 9.2 MG/DL (ref 8.4–10.2)
CARBAMAZEPINE SERPL-MCNC: 2.8 UG/ML (ref 4–12)
CHLORIDE SERPL-SCNC: 104 MMOL/L (ref 96–108)
CO2 SERPL-SCNC: 22 MMOL/L (ref 21–32)
CREAT SERPL-MCNC: 1.2 MG/DL (ref 0.6–1.3)
EOSINOPHIL # BLD AUTO: 0.23 THOUSAND/ÂΜL (ref 0–0.61)
EOSINOPHIL NFR BLD AUTO: 2 % (ref 0–6)
ERYTHROCYTE [DISTWIDTH] IN BLOOD BY AUTOMATED COUNT: 12.9 % (ref 11.6–15.1)
FOLATE SERPL-MCNC: 7.6 NG/ML
GFR SERPL CREATININE-BSD FRML MDRD: 47 ML/MIN/1.73SQ M
GLUCOSE P FAST SERPL-MCNC: 84 MG/DL (ref 65–99)
GLUCOSE SERPL-MCNC: 84 MG/DL (ref 65–140)
HCT VFR BLD AUTO: 39 % (ref 34.8–46.1)
HGB BLD-MCNC: 12.3 G/DL (ref 11.5–15.4)
IMM GRANULOCYTES # BLD AUTO: 0.05 THOUSAND/UL (ref 0–0.2)
IMM GRANULOCYTES NFR BLD AUTO: 0 % (ref 0–2)
LYMPHOCYTES # BLD AUTO: 3.26 THOUSANDS/ÂΜL (ref 0.6–4.47)
LYMPHOCYTES NFR BLD AUTO: 25 % (ref 14–44)
MAGNESIUM SERPL-MCNC: 2.1 MG/DL (ref 1.9–2.7)
MCH RBC QN AUTO: 29.7 PG (ref 26.8–34.3)
MCHC RBC AUTO-ENTMCNC: 31.5 G/DL (ref 31.4–37.4)
MCV RBC AUTO: 94 FL (ref 82–98)
MONOCYTES # BLD AUTO: 0.9 THOUSAND/ÂΜL (ref 0.17–1.22)
MONOCYTES NFR BLD AUTO: 7 % (ref 4–12)
NEUTROPHILS # BLD AUTO: 8.71 THOUSANDS/ÂΜL (ref 1.85–7.62)
NEUTS SEG NFR BLD AUTO: 65 % (ref 43–75)
NRBC BLD AUTO-RTO: 0 /100 WBCS
PHOSPHATE SERPL-MCNC: 3.3 MG/DL (ref 2.3–4.1)
PLATELET # BLD AUTO: 382 THOUSANDS/UL (ref 149–390)
PMV BLD AUTO: 11.4 FL (ref 8.9–12.7)
POTASSIUM SERPL-SCNC: 4.4 MMOL/L (ref 3.5–5.3)
PROT SERPL-MCNC: 7.3 G/DL (ref 6.4–8.4)
RBC # BLD AUTO: 4.14 MILLION/UL (ref 3.81–5.12)
SODIUM SERPL-SCNC: 135 MMOL/L (ref 135–147)
TSH SERPL DL<=0.05 MIU/L-ACNC: 0.76 UIU/ML (ref 0.45–4.5)
VIT B12 SERPL-MCNC: 120 PG/ML (ref 180–914)
WBC # BLD AUTO: 13.22 THOUSAND/UL (ref 4.31–10.16)

## 2023-06-21 PROCEDURE — 82607 VITAMIN B-12: CPT

## 2023-06-21 PROCEDURE — 82746 ASSAY OF FOLIC ACID SERUM: CPT

## 2023-06-21 PROCEDURE — 84443 ASSAY THYROID STIM HORMONE: CPT | Performed by: STUDENT IN AN ORGANIZED HEALTH CARE EDUCATION/TRAINING PROGRAM

## 2023-06-21 PROCEDURE — 80053 COMPREHEN METABOLIC PANEL: CPT

## 2023-06-21 PROCEDURE — 86780 TREPONEMA PALLIDUM: CPT | Performed by: STUDENT IN AN ORGANIZED HEALTH CARE EDUCATION/TRAINING PROGRAM

## 2023-06-21 PROCEDURE — 99223 1ST HOSP IP/OBS HIGH 75: CPT | Performed by: STUDENT IN AN ORGANIZED HEALTH CARE EDUCATION/TRAINING PROGRAM

## 2023-06-21 PROCEDURE — 85025 COMPLETE CBC W/AUTO DIFF WBC: CPT

## 2023-06-21 PROCEDURE — 87389 HIV-1 AG W/HIV-1&-2 AB AG IA: CPT | Performed by: STUDENT IN AN ORGANIZED HEALTH CARE EDUCATION/TRAINING PROGRAM

## 2023-06-21 PROCEDURE — 83735 ASSAY OF MAGNESIUM: CPT

## 2023-06-21 PROCEDURE — G1004 CDSM NDSC: HCPCS

## 2023-06-21 PROCEDURE — 80156 ASSAY CARBAMAZEPINE TOTAL: CPT

## 2023-06-21 PROCEDURE — 70450 CT HEAD/BRAIN W/O DYE: CPT

## 2023-06-21 PROCEDURE — 84100 ASSAY OF PHOSPHORUS: CPT

## 2023-06-21 RX ORDER — LANOLIN ALCOHOL/MO/W.PET/CERES
3 CREAM (GRAM) TOPICAL
Status: DISCONTINUED | OUTPATIENT
Start: 2023-06-21 | End: 2023-06-24

## 2023-06-21 RX ORDER — QUETIAPINE FUMARATE 25 MG/1
12.5 TABLET, FILM COATED ORAL
Status: DISCONTINUED | OUTPATIENT
Start: 2023-06-21 | End: 2023-07-03 | Stop reason: HOSPADM

## 2023-06-21 RX ORDER — MELATONIN
1000 DAILY
Status: DISCONTINUED | OUTPATIENT
Start: 2023-06-21 | End: 2023-06-25

## 2023-06-21 RX ORDER — CARBAMAZEPINE 200 MG/1
200 TABLET ORAL 3 TIMES DAILY
Status: DISCONTINUED | OUTPATIENT
Start: 2023-06-21 | End: 2023-07-03 | Stop reason: HOSPADM

## 2023-06-21 RX ORDER — ESCITALOPRAM OXALATE 5 MG/1
5 TABLET ORAL DAILY
Status: DISCONTINUED | OUTPATIENT
Start: 2023-06-21 | End: 2023-06-23

## 2023-06-21 RX ADMIN — Medication 3 MG: at 21:13

## 2023-06-21 RX ADMIN — NICOTINE 1 PATCH: 21 PATCH, EXTENDED RELEASE TRANSDERMAL at 08:18

## 2023-06-21 RX ADMIN — CARBAMAZEPINE 200 MG: 200 TABLET ORAL at 10:22

## 2023-06-21 RX ADMIN — CEPHALEXIN 500 MG: 500 CAPSULE ORAL at 13:39

## 2023-06-21 RX ADMIN — CEPHALEXIN 500 MG: 500 CAPSULE ORAL at 05:26

## 2023-06-21 RX ADMIN — METOPROLOL SUCCINATE 50 MG: 50 TABLET, EXTENDED RELEASE ORAL at 08:18

## 2023-06-21 RX ADMIN — CEPHALEXIN 500 MG: 500 CAPSULE ORAL at 21:13

## 2023-06-21 RX ADMIN — ESCITSLOPRAM 5 MG: 5 TABLET ORAL at 11:54

## 2023-06-21 RX ADMIN — CARBAMAZEPINE 200 MG: 200 TABLET ORAL at 21:13

## 2023-06-21 RX ADMIN — CARBAMAZEPINE 200 MG: 200 TABLET ORAL at 16:20

## 2023-06-21 RX ADMIN — Medication 1000 UNITS: at 10:22

## 2023-06-21 RX ADMIN — LOSARTAN POTASSIUM 25 MG: 25 TABLET, FILM COATED ORAL at 08:18

## 2023-06-21 RX ADMIN — PRAMIPEXOLE DIHYDROCHLORIDE 1.5 MG: 1 TABLET ORAL at 21:13

## 2023-06-21 RX ADMIN — QUETIAPINE FUMARATE 12.5 MG: 25 TABLET ORAL at 21:13

## 2023-06-21 NOTE — PROGRESS NOTES
06/21/23 1237   Team Meeting   Meeting Type Tx Team Meeting   Initial Conference Date 06/21/23   Next Conference Date 07/21/23   Team Members Present   Team Members Present Physician;Nurse;   Physician Team Member Dr Wayne Loyola Team Member [facility] Management Team Member [facility] TRANSITIONAL CARE & REHABILITATION   Patient/Family Present   Patient Present Yes   Patient's Family Present No     Met with the patient to go over the treatment plan. Goals discussed were to have a decrease in depressive symptoms, decrease in anxiety symptoms, decrease in psychotic symptoms, decrease in suicidal thoughts, ability to stay safe on the unit, ability to stay free of restraints, improvement in ability to express basic needs, improvement in insight, and sleep improvement. She can attain these goals by medication management, and group/milieu therapy. Patient is in agreement to her treatment plan.

## 2023-06-21 NOTE — TREATMENT PLAN
TREATMENT PLAN REVIEW - [id number] [facility] 57 y.o. 1960 female MRN: [id number]    [address] [facility] PSY[facility]IATRIC [facility]: Karen Veloz [facility] 654-57 Encounter: [id number]          Admit Date/Time:  6/20/2023  3:04 PM    Treatment Team: Attending Provider: Delgado Preston MD; Consulting Physician: Lindsey Wolf MD; Registered Nurse: Celio Willson RN; Patient Care Assistant: Pieter Collado; Patient Care Assistant: Rosio Alexander; Patient Care Assistant: Bakari Paul; Licensed Practical Nurse: Salvador Chu; : Paige Waddell; Nurse Manager: Chadwick Lmi RN; Occupational Therapy Assistant: Natalie Manzanares, Kamala Nicholson; Nurse Practitioner: DRU Barrett; Patient Care Assistant: Carlos Tony    Diagnosis: Principal Problem:    Severe episode of recurrent major depressive disorder, with psychotic features ([address])  Active Problems:    Epilepsy with altered consciousness with intractable epilepsy ([address])    UTI (urinary tract infection)    Acute renal failure superimposed on chronic kidney disease ([address])    Tobacco abuse    Essential hypertension    Medical clearance for psychiatric admission    Obesity (BMI 30-39. 9)    Falls frequently    Major neurocognitive disorder [facility])      Patient Strengths/Assets: communication skills, family ties    Patient Barriers/Limitations: difficulty adapting, impaired cognition, limited family ties, limited support system, noncompliant with medication, self-care deficit, unresourceful    Short Term Goals: decrease in depressive symptoms, decrease in anxiety symptoms, decrease in psychotic symptoms, decrease in suicidal thoughts, ability to stay safe on the unit, ability to stay free of restraints, improvement in ability to express basic needs, improvement in insight, sleep improvement    Long Term Goals: improvement in depression, improvement in anxiety, free of suicidal thoughts, free of homicidal thoughts, resolution of psychotic symptoms, improved insight, able to express basic needs, adequate sleep    Progress Towards Goals: starting psychiatric medications as prescribed    Recommended Treatment: medication management, patient medication education, group therapy, milieu therapy, continued Behavioral Health psychiatric evaluation/assessment process    Treatment Frequency: daily medication monitoring, group and milieu therapy daily, monitoring through interdisciplinary rounds, monitoring through weekly patient care conferences    Expected Discharge Date:  14 days    Discharge Plan: placement in alternative living arrangement, placement in nursing home, referral for outpatient medication management with a psychiatrist, referral for outpatient psychotherapy    Treatment Plan Created/Updated By: Madonna Sullivan MD

## 2023-06-21 NOTE — TREATMENT TEAM
Completed admission self assessment. Pt  tangential and cooperative. Pt indicated biggest stressor "waiting for furniture to coming". Pt challenges "not right now"  Pt likes most about her life "everything is rough with me". Pt likes least, " is in nursing home". 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 Pt enjoys music. Pt would like groups on knowing medication and relationships. Pt indicated when "doctor will let me know" I will be ready for d/c. Encouraged pt to attend groups when able and make needs known. 06/21/23 1415   Activity/Group Checklist   Group Admission/Discharge   Attendance Attended   Attendance Duration (min) 16-30   Interactions Other (Comment)  (tangetial statements)   Affect/Mood Wide   Goals Achieved Identified feelings; Able to listen to others; Able to engage in interactions

## 2023-06-21 NOTE — PLAN OF CARE
Problem: SELF HARM/SUICIDALITY  Goal: Will have no self-injury during hospital stay  Description: INTERVENTIONS:  - Q 15 MINUTES: Routine safety checks  - Q WAKING SHIFT & PRN: Assess risk to determine if routine checks are adequate to maintain patient safety  - Encourage patient to participate actively in care by formulating a plan to combat response to suicidal ideation, identify supports and resources  Outcome: Progressing     Problem: DEPRESSION  Goal: Will be euthymic at discharge  Description: INTERVENTIONS:  - Administer medication as ordered  - Provide emotional support via 1:1 interaction with staff  - Encourage involvement in milieu/groups/activities  - Monitor for social isolation  Outcome: Progressing     Problem: BEHAVIOR  Goal: Pt/Family maintain appropriate behavior and adhere to behavioral management agreement, if implemented  Description: INTERVENTIONS:  - Assess the family dynamic   - Encourage verbalization of thoughts and concerns in a socially appropriate manner  - Assess patient/family's coping skills and non-compliant behavior (including use of illegal substances). - Utilize positive, consistent limit setting strategies supporting safety of patient, staff and others  - Initiate consult with Case Management, Spiritual Care or other ancillary services as appropriate  - If a patient's/visitor's behavior jeopardizes the safety of the patient, staff, or others, refer to organization procedure.    - Notify Security of behavior or suspected illegal substances which indicate the need for search of the patient and/or belongings  - Encourage participation in the decision making process about a behavioral management agreement; implement if patient meets criteria  Outcome: Progressing     Problem: ANXIETY  Goal: Will report anxiety at manageable levels  Description: INTERVENTIONS:  - Administer medication as ordered  - Teach and encourage coping skills  - Provide emotional support  - Assess patient/family for anxiety and ability to cope  Outcome: Progressing  Goal: By discharge: Patient will verbalize 2 strategies to deal with anxiety  Description: Interventions:  - Identify any obvious source/trigger to anxiety  - Staff will assist patient in applying identified coping technique/skills  - Encourage attendance of scheduled groups and activities  Outcome: Progressing     Problem: SELF CARE DEFICIT  Goal: Return ADL status to a safe level of function  Description: INTERVENTIONS:  - Administer medication as ordered  - Assess ADL deficits and provide assistive devices as needed  - Obtain PT/OT consults as needed  - Assist and instruct patient to increase activity and self care as tolerated  Outcome: Progressing

## 2023-06-21 NOTE — CMS CERTIFICATION NOTE
Certification: Based upon physical, mental and social evaluations, I certify that inpatient psychiatric services are medically necessary for this patient for a duration of 21 midnights for the treatment of Severe episode of recurrent major depressive disorder, with psychotic features ([address])    Available alternative community resources do not meet the patient's mental health care needs. I further attest that an established written individualized plan of care has been implemented and is outlined in the patient's medical records.

## 2023-06-21 NOTE — NURSING NOTE
Alert and oriented able to make needs known. Adjusting well to unit and peers. Denies any suicidal ideations or intent to harm. Continues on Cephalexin for a UTI fluids encouraged. No complaints of pain or discomfort.
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
## 2023-06-21 NOTE — PROGRESS NOTES
06/21/23 1140   Patient Intake   Living Arrangement Lives alone   Can patient return home?  Yes   Address to be Discharge to: [address] [facility], [address] [place] [address] Sameer Talbert, [facility]   Patient's Telephone Number patient is unsure of phone number   Access to Firearms No   Type of Work previous  for the [employer]   Work History Retired   John Hastings Name went to [employer] school, did not graduate   School Grade/Year 12th   Admission Status   Status of Admission [address] [facility] [address] of [facility]   Patient History   Treatment History none   Currently in Treatment No   Medical Problems see chart   Legal Issues denies   Substance Abuse No   Crisis Info   Release of Information Signed Yes  Ashley Hurtado (brother) [phone];  Primary [address] [facility]; Amie Chambers (spouse) [phone])

## 2023-06-21 NOTE — PROGRESS NOTES
Pt attended afternoon group late.      06/21/23 1330   Activity/Group Checklist   Group Other (Comment)  (Communication and boundaries)   Attendance Attended; Other (Comment)  (late)   Attendance Duration (min) 31-45   Interactions Interacted appropriately   Affect/Mood Calm   Goals Achieved Identified feelings; Discussed self-esteem issues; Able to listen to others; Able to engage in interactions

## 2023-06-21 NOTE — PROGRESS NOTES
06/21/23 1138   Referral Data   Referral Reason [address] [place]   Readmission Root Cause   30 Day Readmission No   Patient Information   Mental Status Alert   Primary Caregiver Self   Support System Immediate family   Latter-day/Cultural Requests none   Legal Information   Tx Plan Signed Yes   Current Status: 789   Legal Issues denies   Advance Directives Power of  for health care; Power of  for [employer] Proxy Appointed No   Activities of Daily Living Prior to Admission   Functional Status Independent   Assistive Device Other (Comment)  (wants a cane, does not have one.  PT will assess)   Living Arrangement Lives alone   Ambulation Minimum assistance   Access to Firearms   Access to Firearms No   Income [address] SSI/SSD   [place] 2d2c of Transportation   [place] 2d2c [facility] [employer] - [facility]

## 2023-06-21 NOTE — NURSING NOTE
Pt is visible in dayroom social with staff and peers. Pt denies depression and anxiety stating " I'm okay today." No reports of SI/HI or AH/VH. Pt is compliant with medications and care. Safety checks ongoing.
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
## 2023-06-21 NOTE — PROGRESS NOTES
06/21/23 0820   Team Meeting   Meeting Type Daily Rounds   Initial Conference Date 06/21/23   Next Conference Date 06/22/23   Team Members Present   Team Members Present Physician;Nurse;;; Occupational Therapist   Physician Team Member Dr Deepthi Franco Team Member [facility] Management Team Member [address] [place] [address] Work Team Member Thor   OT Team Member Giovanna Flaherty   Patient/Family Present   Patient Present No   Patient's Family Present No     Dennard, new admit, SI to stab or choke self, hx seizures, keflex for UTI

## 2023-06-21 NOTE — PROGRESS NOTES
06/21/23 1100   Activity/Group Checklist   Group Life Skills  (outburst game on stress/vitality.)   Attendance Attended   Attendance Duration (min) 31-45   Interactions Interacted appropriately  (engaged in task when staff prompted.)   Affect/Mood Calm   Goals Achieved Able to listen to others; Able to engage in interactions; Discussed coping strategies

## 2023-06-22 LAB
HIV 1+2 AB+HIV1 P24 AG SERPL QL IA: NORMAL
HIV 2 AB SERPL QL IA: NORMAL
HIV1 AB SERPL QL IA: NORMAL
HIV1 P24 AG SERPL QL IA: NORMAL
LEVETIRACETAM SERPL-MCNC: 31.9 UG/ML (ref 10–40)
TREPONEMA PALLIDUM IGG+IGM AB [PRESENCE] IN SERUM OR PLASMA BY IMMUNOASSAY: NORMAL

## 2023-06-22 PROCEDURE — 99232 SBSQ HOSP IP/OBS MODERATE 35: CPT | Performed by: STUDENT IN AN ORGANIZED HEALTH CARE EDUCATION/TRAINING PROGRAM

## 2023-06-22 RX ADMIN — Medication 1000 UNITS: at 08:12

## 2023-06-22 RX ADMIN — CEPHALEXIN 500 MG: 500 CAPSULE ORAL at 05:25

## 2023-06-22 RX ADMIN — CARBAMAZEPINE 200 MG: 200 TABLET ORAL at 16:56

## 2023-06-22 RX ADMIN — NICOTINE 1 PATCH: 21 PATCH, EXTENDED RELEASE TRANSDERMAL at 08:19

## 2023-06-22 RX ADMIN — CEPHALEXIN 500 MG: 500 CAPSULE ORAL at 13:07

## 2023-06-22 RX ADMIN — QUETIAPINE FUMARATE 12.5 MG: 25 TABLET ORAL at 21:29

## 2023-06-22 RX ADMIN — CEPHALEXIN 500 MG: 500 CAPSULE ORAL at 21:29

## 2023-06-22 RX ADMIN — ESCITSLOPRAM 5 MG: 5 TABLET ORAL at 08:12

## 2023-06-22 RX ADMIN — LOSARTAN POTASSIUM 25 MG: 25 TABLET, FILM COATED ORAL at 08:12

## 2023-06-22 RX ADMIN — Medication 3 MG: at 21:29

## 2023-06-22 RX ADMIN — PRAMIPEXOLE DIHYDROCHLORIDE 1.5 MG: 1 TABLET ORAL at 21:28

## 2023-06-22 RX ADMIN — CARBAMAZEPINE 200 MG: 200 TABLET ORAL at 21:29

## 2023-06-22 RX ADMIN — CARBAMAZEPINE 200 MG: 200 TABLET ORAL at 08:12

## 2023-06-22 RX ADMIN — METOPROLOL SUCCINATE 50 MG: 50 TABLET, EXTENDED RELEASE ORAL at 08:12

## 2023-06-22 NOTE — PROGRESS NOTES
06/22/23 0756   Team Meeting   Meeting Type Daily Rounds   Initial Conference Date 06/22/23   Next Conference Date 06/23/23   Team Members Present   Team Members Present Physician;Nurse;;; Occupational Therapist   Physician Team Member Dr Julissa Davison Team Member [address] [facility] Management Team Member [address] [facility] Work Team Member Thor   OT Team Member Anastacia Thorpe   Patient/Family Present   Patient Present No   Patient's Family Present No     C/o anx, denies all other, signed EVELIO for brother Allyn Becker and he wants an update, doing LOC assessment, ran out of meds and feeling depressed and isolated. Slums 9, active UTI.

## 2023-06-22 NOTE — NURSING NOTE
Alert and oriented able to make needs known. Present and interacting with peers in milieu. Keflex continues for UTI fluids encouraged. No suicidal ideations or intents to self harm. Denies pain or discomfort.

## 2023-06-22 NOTE — CASE MANAGEMENT
Called the patient's brother, Alejandra Montanez ([phone]), to touch bases with him regarding the patient and to obtain collateral information. Unable to reach him, left a voicemail.

## 2023-06-22 NOTE — PLAN OF CARE
Problem: SELF HARM/SUICIDALITY  Goal: Will have no self-injury during hospital stay  Description: INTERVENTIONS:  - Q 15 MINUTES: Routine safety checks  - Q WAKING SHIFT & PRN: Assess risk to determine if routine checks are adequate to maintain patient safety  - Encourage patient to participate actively in care by formulating a plan to combat response to suicidal ideation, identify supports and resources  Outcome: Progressing     Problem: DEPRESSION  Goal: Will be euthymic at discharge  Description: INTERVENTIONS:  - Administer medication as ordered  - Provide emotional support via 1:1 interaction with staff  - Encourage involvement in milieu/groups/activities  - Monitor for social isolation  Outcome: Progressing     Problem: BEHAVIOR  Goal: Pt/Family maintain appropriate behavior and adhere to behavioral management agreement, if implemented  Description: INTERVENTIONS:  - Assess the family dynamic   - Encourage verbalization of thoughts and concerns in a socially appropriate manner  - Assess patient/family's coping skills and non-compliant behavior (including use of illegal substances). - Utilize positive, consistent limit setting strategies supporting safety of patient, staff and others  - Initiate consult with Case Management, Spiritual Care or other ancillary services as appropriate  - If a patient's/visitor's behavior jeopardizes the safety of the patient, staff, or others, refer to organization procedure.    - Notify Security of behavior or suspected illegal substances which indicate the need for search of the patient and/or belongings  - Encourage participation in the decision making process about a behavioral management agreement; implement if patient meets criteria  Outcome: Progressing     Problem: ANXIETY  Goal: Will report anxiety at manageable levels  Description: INTERVENTIONS:  - Administer medication as ordered  - Teach and encourage coping skills  - Provide emotional support  - Assess patient/family for anxiety and ability to cope  Outcome: Progressing  Goal: By discharge: Patient will verbalize 2 strategies to deal with anxiety  Description: Interventions:  - Identify any obvious source/trigger to anxiety  - Staff will assist patient in applying identified coping technique/skills  - Encourage attendance of scheduled groups and activities  Outcome: Progressing     Problem: SELF CARE DEFICIT  Goal: Return ADL status to a safe level of function  Description: INTERVENTIONS:  - Administer medication as ordered  - Assess ADL deficits and provide assistive devices as needed  - Obtain PT/OT consults as needed  - Assist and instruct patient to increase activity and self care as tolerated  Outcome: Progressing     Problem: DISCHARGE PLANNING - CARE MANAGEMENT  Goal: Discharge to post-acute care or home with appropriate resources  Description: INTERVENTIONS:  - Conduct assessment to determine patient/family and health care team treatment goals, and need for post-acute services based on payer coverage, community resources, and patient preferences, and barriers to discharge  - Address psychosocial, clinical, and financial barriers to discharge as identified in assessment in conjunction with the patient/family and health care team  - Arrange appropriate level of post-acute services according to patient’s   needs and preference and payer coverage in collaboration with the physician and health care team  - Communicate with and update the patient/family, physician, and health care team regarding progress on the discharge plan  - Arrange appropriate transportation to post-acute venues  Outcome: Progressing     Problem: Alteration in Thoughts and Perception  Goal: Attend and participate in unit activities, including therapeutic, recreational, and educational groups  Description: Interventions:  -Encourage Visitation and family involvement in care  Outcome: Progressing

## 2023-06-22 NOTE — PROGRESS NOTES
Progress Note - [address] Talbert 57 y.o. female MRN: [id number]  Unit/Bed#: [facility] 400-27 Encounter: [id number]       Patient was visited on unit for continuing care; chart reviewed and discussed with multidisciplinary treatment team. On approach, the patient was calm and cooperative. She reported feeling "much better", but concerned about her living situation at home and poor support system. Denied any changes in appetite, and energy level. No problem initiating and maintaining sleep. Denied A/VH currently. Denied SI/HI, intent or plan upon direct inquiry at this time. Patient continues to be visible in the milieu and interacts with staff and peers. No reports of aggression or self-injurious behavior on unit. No PRN medications used in the past 24 hours. Patient accepted all offered medications and reported feeling better. No adverse effects of medications noted or reported. SLUMS: 9/30 on 6/21/22. The patient was restarted on Tegretol, and also started on Lexapro 5 mg daily and Seroqiuel 12.5 mg nightly for psychotic sxs on 6/21/23. Doses to be adjusted as indicated. UA done on 6/19/23 was positive for UTI and the patient was started on Kefex as per SLIM. Head CT on 6/21/23 showed decreased attenuation in periventricular and subcortical whilte matter representing microvascular changes. The patient was placed on seizure and fall precautions. PT eval and OT cog eval requested. The patient benefits from higher level of care (e.g. NH placement) upon further psychiatric stabilization.      Current Mental Status Evaluation:  Appearance and attitude: appeared as stated age, dressed in hospital attire, with fair hygiene  Eye contact: good  Motor Function: within normal limits, intact gait, No PMA/PMR  Gait/station: normal gait/station and normal balance  Speech: normal for rate, rhythm, volume, and latency with decreased amount  Language: No overt abnormality  Mood/affect: "better" / Affect was constricted but reactive, mood congruent  Thought Processes: linear, slowing of thoughts, preoccupied with psychosocial stressors  Thought content: denied suicidal ideations or homicidal ideations, no overt delusions elicited  Associations: concrete associations  Perceptual disturbances: denies Auditory/Visual/Tactile Hallucinations  Orientation: oriented to time, person, place and to the situational context  Cognitive Function: intact  Memory: impaired recall  Intellect: average  Fund of knowledge: diminished  Impulse control: good  Insight/judgment: fair/fair    Vital signs in last 24 hours:    Temp:  [97.2 °F (36.2 °C)-97.5 °F (36.4 °C)] 97.2 °F (36.2 °C)  HR:  [72-81] 80  Resp:  [15-16] 16  BP: (118-153)/(61-90) 137/61    Laboratory results: I have personally reviewed all pertinent laboratory/tests results    Results from the past 24 hours:   Recent Results (from the past 24 hour(s))   Total Syphilis (IgG/IgM) Screening    Collection Time: 06/21/23  4:19 PM   Result Value Ref Range    Syphilis Total Antibody Non-reactive Non-Reactive   HIV 1/2 AG/AB w Reflex [facility] for 2 yr old and above    Collection Time: 06/21/23  4:19 PM   Result Value Ref Range    HIV-1 p24 Antigen Non-Reactive Non-Reactive    HIV-1 Antibody Non-Reactive Non-Reactive    HIV-2 Antibody Non-Reactive Non-Reactive    HIV Ag-Ab 5th Gen Non-Reactive Non-Reactive       Progress Toward Goals: gradual improvement    Assessment:  Principal Problem:    Severe episode of recurrent major depressive disorder, with psychotic features ([address])  Active Problems:    Epilepsy with altered consciousness with intractable epilepsy ([address])    UTI (urinary tract infection)    Acute renal failure superimposed on chronic kidney disease ([address])    Tobacco abuse    Essential hypertension    Medical clearance for psychiatric admission    Obesity (BMI 30-39. 9)    Falls frequently    Major neurocognitive disorder ([address])        Plan:  - f/u SLIM recs regarding the medical problems  - Encourage early mobility and having a structured day  - Provide frequent re-orientation, and cognitive stimulation  - Ensure assistive devices are in proper working order (eye-glasses, hearing aids)  - Encourage adequate hydration, nutrition and monitor bowel movements  - Maintain sleep-wake cycle: Uninterrupted sleep time; low-level lighting at night  - Fall precaution  - Seizure precaution  - Continue medication titration and treatment plan; adjust medication to optimize treatment response and as clinically indicated.      Scheduled medications:  Current Facility-Administered Medications   Medication Dose Route Frequency Provider Last Rate   • acetaminophen  650 mg Oral Q4H PRN DRU Moffett     • acetaminophen  650 mg Oral Q4H PRN DRU Moffett     • acetaminophen  975 mg Oral Q6H PRN DRU Moffett     • carBAMazepine  200 mg Oral TID DRU Moffett     • cephalexin  500 mg Oral [facility] DRU Wagoner     • cholecalciferol  1,000 Units Oral Daily DRU Moffett     • escitalopram  5 mg Oral Daily Sandy Montemayor MD     • haloperidol lactate  5 mg Intramuscular Q4H PRN Max 4/day DRU Moffett     • hydrOXYzine HCL  25 mg Oral Q6H PRN Max 4/day DRU Moffett     • hydrOXYzine HCL  50 mg Oral Q6H PRN Max 4/day DRU Moffett     • LORazepam  1 mg Intramuscular Q6H PRN Max 3/day DRU Moffett     • LORazepam  1 mg Oral Q6H PRN Max 3/day DRU Moffett     • losartan  25 mg Oral Daily DRU Wagoner     • melatonin  3 mg Oral HS Sandy Montemayor MD     • metoprolol succinate  50 mg Oral Daily DRU Wagoner     • nicotine  1 patch Transdermal Daily DRU Wagoner     • pramipexole  1.5 mg Oral HS DRU Wagoner     • QUEtiapine  12.5 mg Oral HS Sandy Montemayor MD     • risperiDONE  0.25 mg Oral Q4H PRN Max 6/day DRU Moffett     • risperiDONE  0.5 mg Oral Q4H PRN Max 3/day Fausto Sick, CRNP     • risperiDONE  1 mg Oral Q2H PRN Max 3/day Fausto Sick, CRNP     • traZODone  50 mg Oral HS PRN Fausto Reveles, CRNP          PRN:  •  acetaminophen  •  acetaminophen  •  acetaminophen  •  haloperidol lactate  •  hydrOXYzine HCL  •  hydrOXYzine HCL  •  LORazepam  •  LORazepam  •  risperiDONE  •  risperiDONE  •  risperiDONE  •  traZODone    - Observation: routine    - VS: as per unit protocol  - Diet: Regular diet  - Psychoeducation (benefits and potential risks) discussed, importance of compliance with the psychiatric treatment reiterated, and the patient verbalized understanding of the matter  - Encourage group attendance and milieu therapy    - The pt was educated and agreed to verbalize any suicidal thoughts, frustrations or concerns to the nursing staff, immediately. - Dispo: TBD       Next of Kin  · Extended Emergency Contact Information  · Primary Emergency Contact: Krishna Lim  · Address: [facility]  ·          [address]  ·          [place], [address] [place] [address]  · Home Phone: [phone]  · Mobile Phone: [phone]  · Relation: Spouse      Counseling / Coordination of Care  Patient's progress discussed with staff in treatment team meeting. Medications, treatment progress and treatment plan reviewed with patient. Supportive therapy provided to patient. Cognitive techniques utilized during the session. Reassurance and supportive therapy provided. Reoriented to reality and reassured. Encouraged participation in milieu and group therapy on the unit.      Vladislav Fam MD  Attending [address] [place] [address] Drive

## 2023-06-22 NOTE — NURSING NOTE
Pt approached nurse's station visibly agitated. She was irritable with this RN and said "I refuse to stay here throughout the weekend. I don't see why I need to stay here". She requested that this nurse call her brother, and RN explained that her brother did not answer the phone. She got agitated after that. Pt redirected.

## 2023-06-22 NOTE — NURSING NOTE
Pt signed 72hr notice at [phone]. no abdominal distension/no blood in stool/no burning urination/no chills/no dysuria/no hematuria

## 2023-06-22 NOTE — NURSING NOTE
RN spoke to Alejandra Montanez, pt's brother. He said he has been managing her finances for her, as pt has a gambling problem. [facility] [address] [facility]  RN told pt's brother that  This will be relayed to the treatment team.

## 2023-06-23 LAB
ATRIAL RATE: 98 BPM
P AXIS: 14 DEGREES
PR INTERVAL: 134 MS
QRS AXIS: 8 DEGREES
QRSD INTERVAL: 68 MS
QT INTERVAL: 356 MS
QTC INTERVAL: 454 MS
T WAVE AXIS: 66 DEGREES
VENTRICULAR RATE: 98 BPM

## 2023-06-23 PROCEDURE — 99232 SBSQ HOSP IP/OBS MODERATE 35: CPT | Performed by: STUDENT IN AN ORGANIZED HEALTH CARE EDUCATION/TRAINING PROGRAM

## 2023-06-23 PROCEDURE — 93010 ELECTROCARDIOGRAM REPORT: CPT | Performed by: INTERNAL MEDICINE

## 2023-06-23 PROCEDURE — 97163 PT EVAL HIGH COMPLEX 45 MIN: CPT

## 2023-06-23 RX ORDER — ESCITALOPRAM OXALATE 10 MG/1
10 TABLET ORAL DAILY
Status: DISCONTINUED | OUTPATIENT
Start: 2023-06-24 | End: 2023-07-03 | Stop reason: HOSPADM

## 2023-06-23 RX ADMIN — METOPROLOL SUCCINATE 50 MG: 50 TABLET, EXTENDED RELEASE ORAL at 08:14

## 2023-06-23 RX ADMIN — QUETIAPINE FUMARATE 12.5 MG: 25 TABLET ORAL at 21:02

## 2023-06-23 RX ADMIN — Medication 3 MG: at 21:02

## 2023-06-23 RX ADMIN — ESCITSLOPRAM 5 MG: 5 TABLET ORAL at 08:14

## 2023-06-23 RX ADMIN — PRAMIPEXOLE DIHYDROCHLORIDE 1.5 MG: 1 TABLET ORAL at 21:01

## 2023-06-23 RX ADMIN — CARBAMAZEPINE 200 MG: 200 TABLET ORAL at 21:02

## 2023-06-23 RX ADMIN — CARBAMAZEPINE 200 MG: 200 TABLET ORAL at 08:14

## 2023-06-23 RX ADMIN — LOSARTAN POTASSIUM 25 MG: 25 TABLET, FILM COATED ORAL at 08:14

## 2023-06-23 RX ADMIN — Medication 1000 UNITS: at 08:14

## 2023-06-23 RX ADMIN — NICOTINE 1 PATCH: 21 PATCH, EXTENDED RELEASE TRANSDERMAL at 08:14

## 2023-06-23 RX ADMIN — CEPHALEXIN 500 MG: 500 CAPSULE ORAL at 05:58

## 2023-06-23 RX ADMIN — CARBAMAZEPINE 200 MG: 200 TABLET ORAL at 17:03

## 2023-06-23 NOTE — PLAN OF CARE
Problem: SELF HARM/SUICIDALITY  Goal: Will have no self-injury during hospital stay  Description: INTERVENTIONS:  - Q 15 MINUTES: Routine safety checks  - Q WAKING SHIFT & PRN: Assess risk to determine if routine checks are adequate to maintain patient safety  - Encourage patient to participate actively in care by formulating a plan to combat response to suicidal ideation, identify supports and resources  Outcome: Progressing     Problem: DEPRESSION  Goal: Will be euthymic at discharge  Description: INTERVENTIONS:  - Administer medication as ordered  - Provide emotional support via 1:1 interaction with staff  - Encourage involvement in milieu/groups/activities  - Monitor for social isolation  Outcome: Progressing     Problem: BEHAVIOR  Goal: Pt/Family maintain appropriate behavior and adhere to behavioral management agreement, if implemented  Description: INTERVENTIONS:  - Assess the family dynamic   - Encourage verbalization of thoughts and concerns in a socially appropriate manner  - Assess patient/family's coping skills and non-compliant behavior (including use of illegal substances). - Utilize positive, consistent limit setting strategies supporting safety of patient, staff and others  - Initiate consult with Case Management, Spiritual Care or other ancillary services as appropriate  - If a patient's/visitor's behavior jeopardizes the safety of the patient, staff, or others, refer to organization procedure.    - Notify Security of behavior or suspected illegal substances which indicate the need for search of the patient and/or belongings  - Encourage participation in the decision making process about a behavioral management agreement; implement if patient meets criteria  Outcome: Progressing     Problem: ANXIETY  Goal: Will report anxiety at manageable levels  Description: INTERVENTIONS:  - Administer medication as ordered  - Teach and encourage coping skills  - Provide emotional support  - Assess patient/family for anxiety and ability to cope  Outcome: Progressing  Goal: By discharge: Patient will verbalize 2 strategies to deal with anxiety  Description: Interventions:  - Identify any obvious source/trigger to anxiety  - Staff will assist patient in applying identified coping technique/skills  - Encourage attendance of scheduled groups and activities  Outcome: Progressing     Problem: SELF CARE DEFICIT  Goal: Return ADL status to a safe level of function  Description: INTERVENTIONS:  - Administer medication as ordered  - Assess ADL deficits and provide assistive devices as needed  - Obtain PT/OT consults as needed  - Assist and instruct patient to increase activity and self care as tolerated  Outcome: Progressing     Problem: DISCHARGE PLANNING - CARE MANAGEMENT  Goal: Discharge to post-acute care or home with appropriate resources  Description: INTERVENTIONS:  - Conduct assessment to determine patient/family and health care team treatment goals, and need for post-acute services based on payer coverage, community resources, and patient preferences, and barriers to discharge  - Address psychosocial, clinical, and financial barriers to discharge as identified in assessment in conjunction with the patient/family and health care team  - Arrange appropriate level of post-acute services according to patient’s   needs and preference and payer coverage in collaboration with the physician and health care team  - Communicate with and update the patient/family, physician, and health care team regarding progress on the discharge plan  - Arrange appropriate transportation to post-acute venues  Outcome: Progressing     Problem: Alteration in Thoughts and Perception  Goal: Attend and participate in unit activities, including therapeutic, recreational, and educational groups  Description: Interventions:  -Encourage Visitation and family involvement in care  Outcome: Progressing     Problem: ANXIETY  Goal: Will report anxiety at manageable levels  Description: INTERVENTIONS:  - Administer medication as ordered  - Teach and encourage coping skills  - Provide emotional support  - Assess patient/family for anxiety and ability to cope  Outcome: Progressing     Problem: Alteration in Thoughts and Perception  Goal: Attend and participate in unit activities, including therapeutic, recreational, and educational groups  Description: Interventions:  -Encourage Visitation and family involvement in care  Outcome: Progressing

## 2023-06-23 NOTE — CASE MANAGEMENT
Filled out an application for a level of care assessment and when it was brought to the patient to sign, she refused. She told this writer that she does not want to go to a skilled nursing facility. She also refused to have a home health aide come into her home. It was mentioned in rounds that the patient's brother wanted to go for guardianship, will contact the brother. Dr Candido Azar is going to order a neuropsych consult for the patient to determine capacity.

## 2023-06-23 NOTE — CASE MANAGEMENT
Spoke to the patient's brother today for collateral information. He states that he has been in the picture fore about the last 13 years assisting his sister when she had issues like getting evicted for instance. He states the patient cannot manage on her own at this point. He believes that she needs to be in a supervised environment. He states she misses her appointments and does not manage her money well. He states he is the rep payee for her SSI/SSD because she was taking all of her money and buying scratch offs. He states the patient has a gambling problem. The brother states he has gotten her a teenage credit chard and sends her an allowance on there because with these you cannot buy scratch offs. He told this writer that he believes that his sister needs a guardian at this point, but that he is not willing to be that guardian. He states the patient gets very nasty and cranky and sends him so many explicit messages. He says that he has enough health issues that he cannot take on guardianship of her. He would rather have the state appoint one for her.

## 2023-06-23 NOTE — NURSING NOTE
Patient is alert and oriented x 3 able to make her needs known. Pt withdrawn to her room. Denies depression, anxiety, SI/HI/AVH. Patient is medication compliant.  Safety check ongoing

## 2023-06-23 NOTE — PROGRESS NOTES
06/23/23 0740   Team Meeting   Meeting Type Daily Rounds   Initial Conference Date 06/23/23   Next Conference Date 06/26/23   Team Members Present   Team Members Present Physician;;Nurse;;Occupational Therapist   Physician Team Member Dr Joanna Olivares, [address] [place] [address]   Nursing Team Member Poly Flores   Care Management Team Member [address] [employer] Team Member Thor   OT Team Member Dahlia Albarran   Patient/Family Present   Patient Present No   Patient's Family Present No     Demanding and yelling saying she was not staying through the weekend, preoccupied with her bills, insisting to call her brother, brother wants to pursue guardianship, signed a 67 hr at 0

## 2023-06-23 NOTE — NURSING NOTE
Pt was irritable this morning about staying on the unit. After talking with the doctor, she rescinded her 72 hour notice and was able to calm down. She currently denies psych symptoms at this time. Visible and social with select peers on the unit. Med/meal compliant. Denies any unmet needs.

## 2023-06-23 NOTE — PLAN OF CARE
Problem: SELF HARM/SUICIDALITY  Goal: Will have no self-injury during hospital stay  Description: INTERVENTIONS:  - Q 15 MINUTES: Routine safety checks  - Q WAKING SHIFT & PRN: Assess risk to determine if routine checks are adequate to maintain patient safety  - Encourage patient to participate actively in care by formulating a plan to combat response to suicidal ideation, identify supports and resources  Outcome: Progressing     Problem: DEPRESSION  Goal: Will be euthymic at discharge  Description: INTERVENTIONS:  - Administer medication as ordered  - Provide emotional support via 1:1 interaction with staff  - Encourage involvement in milieu/groups/activities  - Monitor for social isolation  Outcome: Progressing     Problem: BEHAVIOR  Goal: Pt/Family maintain appropriate behavior and adhere to behavioral management agreement, if implemented  Description: INTERVENTIONS:  - Assess the family dynamic   - Encourage verbalization of thoughts and concerns in a socially appropriate manner  - Assess patient/family's coping skills and non-compliant behavior (including use of illegal substances). - Utilize positive, consistent limit setting strategies supporting safety of patient, staff and others  - Initiate consult with Case Management, Spiritual Care or other ancillary services as appropriate  - If a patient's/visitor's behavior jeopardizes the safety of the patient, staff, or others, refer to organization procedure.    - Notify Security of behavior or suspected illegal substances which indicate the need for search of the patient and/or belongings  - Encourage participation in the decision making process about a behavioral management agreement; implement if patient meets criteria  Outcome: Progressing     Problem: ANXIETY  Goal: Will report anxiety at manageable levels  Description: INTERVENTIONS:  - Administer medication as ordered  - Teach and encourage coping skills  - Provide emotional support  - Assess patient/family for anxiety and ability to cope  Outcome: Progressing  Goal: By discharge: Patient will verbalize 2 strategies to deal with anxiety  Description: Interventions:  - Identify any obvious source/trigger to anxiety  - Staff will assist patient in applying identified coping technique/skills  - Encourage attendance of scheduled groups and activities  Outcome: Progressing

## 2023-06-23 NOTE — PROGRESS NOTES
Pt attended all groups. Pt calm and cooperative. Pleasant with peers. 06/23/23 1000   Activity/Group Checklist   Group Other (Comment)  ( Recovery and wellness)   Attendance Attended   Attendance Duration (min) 31-45   Interactions Interacted appropriately   Affect/Mood Appropriate;Calm   Goals Achieved Identified feelings; Able to listen to others; Able to engage in interactions; Able to reflect/comment on own behavior;Able to manage/cope with feelings;Verbalized increased hopefulness; Able to self-disclose;Discussed self-esteem issues; Identified relapse prevention strategies

## 2023-06-23 NOTE — PLAN OF CARE
Pt attends group and participates.     Problem: Alteration in Thoughts and Perception  Goal: Attend and participate in unit activities, including therapeutic, recreational, and educational groups  Description: Interventions:  -Encourage Visitation and family involvement in care  Outcome: Progressing

## 2023-06-23 NOTE — PROGRESS NOTES
06/23/23 1100   Activity/Group Checklist   Group Life Skills  (task on leisure values.)   Attendance Attended   Attendance Duration (min) 46-60   Interactions Interacted appropriately  (Pt. identified that she has lost her sense of humor and can not remember the last time she laughted.)   Affect/Mood Blunted/flat   Goals Achieved Able to engage in interactions; Able to self-disclose; Identified feelings; Discussed coping strategies; Able to listen to others

## 2023-06-23 NOTE — PROGRESS NOTES
Progress Note - [address] Talbert 57 y.o. female MRN: [id number]  Unit/Bed#: Sharon Le 450-24 Encounter: [id number]       Patient was visited on unit for continuing care; chart reviewed and discussed with multidisciplinary treatment team. On approach, the patient was initially on irritable edge, perseverating on being discharge. She was upset that the staff were not able to provide her another pant in her size, but later after providing supportive psychotherapy and as received three pants, she became calm and cooperative. She reported feeling anxious about going home, not being able to see a neurologist, and concerned about paying the rents (reportedly being paid by her brother). Patient's emotions were validated and reassurance and supportive psychotherapy provided. The patient rescinded her 24 h notice and noted that she would like to get better an would stay in the hospital. Denied any changes in mood, appetite, and energy level. No problem initiating and maintaining sleep. Denied A/VH currently. Denied SI/HI, intent or plan upon direct inquiry at this time. Patient continues to be intermittently visible in the milieu and interacts with select staff and peers. No reports of aggression or self-injurious behavior on unit. No PRN medications used in the past 24 hours. As per Gelacio Quiñonez RN: "RN spoke to Ozzie Urban, pt's brother. He said he has been managing her finances for her, as pt has a gambling problem. Ozz[facility] Urban [facility] [address] [place] [address] [facility] "    Patient accepted all offered medications and reported feeling anxious. No adverse effects of medications noted or reported. SLUMS: 9/30 on 6/21/22. The patient was restarted on Tegretol, and also started on Lexapro 5 mg daily which is being uptitrated to 10 mg daily tomorrow and Seroqiuel 12.5 mg nightly for psychotic sxs on 6/21/23. Doses to be adjusted as indicated.  UA done on 6/19/23 was positive for UTI and the patient was started on Keflex as per SLIM. Head CT on 6/21/23 showed decreased attenuation in periventricular and subcortical whilte matter representing microvascular changes. The patient was placed on seizure and fall precautions. PT eval and OT cog eval requested. Also, Neuropsychological evaluation for capacity regarding informed decision making (safe dispo) requested. The patient's brother is willing to apply for guardianship. The patient benefits from higher level of care (e.g. NH placement) upon further psychiatric stabilization.     Current Mental Status Evaluation:  Appearance and attitude: appeared as stated age, dressed in hospital attire, with fair hygiene  Eye contact: good  Motor Function: within normal limits, No PMA/PMR  Gait/station: slow  Speech: normal for rate, rhythm, volume, latency, amount  Language: No overt abnormality  Mood/affect: dysphoric, anxious / Affect was constricted, hyper-intense, mood-congruent  Thought Processes: ruminating  Thought content: denied suicidal ideations or homicidal ideations, ruminating thoughts  Associations: concrete associations, perseverative  Perceptual disturbances: denies Auditory/Visual/Tactile Hallucinations  Orientation: oriented to place and person and partially to time (month and year, but not the date/day of the week)  Cognitive Function: intact  Memory: not cooperative with formal MMSE  Intellect: unable to assess  Fund of knowledge: diminished  Impulse control: fair  Insight/judgment: impaired/impaired    Vital signs in last 24 hours:    Temp:  [98 °F (36.7 °C)-98.2 °F (36.8 °C)] 98.2 °F (36.8 °C)  HR:  [78-81] 78  Resp:  [16-18] 16  BP: (111-120)/(58-65) 111/65    Laboratory results: I have personally reviewed all pertinent laboratory/tests results    Results from the past 24 hours: No results found for this or any previous visit (from the past 24 hour(s)).     Progress Toward Goals: limited improvement    Assessment:  Principal Problem:    Severe episode of recurrent major depressive disorder, with psychotic features ([facility])  Active Problems:    Epilepsy with altered consciousness with intractable epilepsy ([facility])    UTI (urinary tract infection)    Acute renal failure superimposed on chronic kidney disease ([facility])    Tobacco abuse    Essential hypertension    Medical clearance for psychiatric admission    Obesity (BMI 30-39. 9)    Falls frequently    Major neurocognitive disorder ([facility])        Plan:  - f/u SLIM recs regarding the medical problems  - Encourage early mobility and having a structured day  - Provide frequent re-orientation, and cognitive stimulation  - Ensure assistive devices are in proper working order (eye-glasses, hearing aids)  - Encourage adequate hydration, nutrition and monitor bowel movements  - Maintain sleep-wake cycle: Uninterrupted sleep time; low-level lighting at night  - Fall precaution  - Continue medication titration and treatment plan; adjust medication to optimize treatment response and as clinically indicated.      Scheduled medications:  Current Facility-Administered Medications   Medication Dose Route Frequency Provider Last Rate   • acetaminophen  650 mg Oral Q4H PRN Ozzie Settler, CRNP     • acetaminophen  650 mg Oral Q4H PRN Ozzie Settler, CRNP     • acetaminophen  975 mg Oral Q6H PRN Ozzie Settler, CRNP     • carBAMazepine  200 mg Oral TID Ozzie Settler, CRNP     • cholecalciferol  1,000 Units Oral Daily Ozzie Settler, CRNP     • [START ON 6/24/2023] escitalopram  10 mg Oral Daily Larry Doyle MD     • haloperidol lactate  5 mg Intramuscular Q4H PRN Max 4/day Ozzie Settler, CRNP     • hydrOXYzine HCL  25 mg Oral Q6H PRN Max 4/day Ozzie Settler, CRNP     • hydrOXYzine HCL  50 mg Oral Q6H PRN Max 4/day Ozzie Settler, CRNP     • LORazepam  1 mg Intramuscular Q6H PRN Max 3/day Ozzie Settler, CRNP     • LORazepam  1 mg Oral Q6H PRN Max 3/day Ozzie Settler, CRNP     • losartan  25 mg Oral Daily DRU Wagoner     • melatonin 3 mg Oral HS Holly Gonzalez MD     • metoprolol succinate  50 mg Oral Daily DRU Wagoner     • nicotine  1 patch Transdermal Daily DRU Wagoner     • pramipexole  1.5 mg Oral HS DRU Wagoner     • QUEtiapine  12.5 mg Oral HS Holly Gonzalez MD     • risperiDONE  0.25 mg Oral Q4H PRN Max 6/day Cleo Rule, CRNP     • risperiDONE  0.5 mg Oral Q4H PRN Max 3/day Cleo Rule, CRNP     • risperiDONE  1 mg Oral Q2H PRN Max 3/day Cleo Rule, CRNP     • traZODone  50 mg Oral HS PRN Cleo Rule, CRNP          PRN:  •  acetaminophen  •  acetaminophen  •  acetaminophen  •  haloperidol lactate  •  hydrOXYzine HCL  •  hydrOXYzine HCL  •  LORazepam  •  LORazepam  •  risperiDONE  •  risperiDONE  •  risperiDONE  •  traZODone    - Observation: routine    - VS: as per unit protocol  - Diet: Regular diet  - Psychoeducation (benefits and potential risks) discussed, importance of compliance with the psychiatric treatment reiterated, and the patient verbalized understanding of the matter  - Encourage group attendance and milieu therapy    - The pt was educated and agreed to verbalize any suicidal thoughts, frustrations or concerns to the nursing staff, immediately. - Dispo: TBD       Next of Kin  · Extended Emergency Contact Information  · Primary Emergency Contact: Krishna Lim  · Address: [facility]  ·          [address]  ·          [address]  · Home Phone: [phone]  · Mobile Phone: [phone]  · Relation: Spouse      Counseling / Coordination of Care  Patient's progress discussed with staff in treatment team meeting. Medications, treatment progress and treatment plan reviewed with patient. Medication changes discussed with patient. Coping with stress reviewed with patient. Coping skills reviewed with patient. Supportive therapy provided to patient. Cognitive techniques utilized during the session.   Reassurance and supportive therapy provided. Encouraged participation in milieu and group therapy on the unit.      Michelle Denny MD  Attending [address] [place] [address] Drive

## 2023-06-23 NOTE — PROGRESS NOTES
Progress Note - [address] Miguel Angel Talbert 57 y.o. female MRN: [id number]  Unit/Bed#: Brody Charlton 895-49 Encounter: [id number]      Behavior over the last 24 hours:  unchanged    Subjective: I saw Keya Luke for follow up and continuation of care. I have reviewed the chart and discussed progress with the nursing staff. Patient is calm, cooperative, visible and social. She is medication and meal compliant. Denies depression, anxiety, SI, HI, AVH. She is attending groups. Reports restless sleep. She remains in good behavorial control. No PRNs in the last 24 hours. On assessment, Keya Luke is seen in bed awake. She is pleasant, reports feeling "tired" for on/ off sleep for unknown reasons. She appears slightly confused and preoccupied with her medical medications, which she cannot recall names of. Memory is not triggered when reading off her list of medications. She complains of left ankle pain and swelling. On physical assessment, both legs appear with mild edema that patient reports is chronic. She denies depression, anxiety, SI, HI, plan, intent or self-injurious behaviors. Keya Luke does not voice any paranoia or delusions, denies A/VH and does not appear to be responding to internal stimuli. She is writing a to-do list in her notebook. She agrees to increase Melatonin to 6 mg HS for insomnia.        Psychiatric Review of Systems:  Sleep: slept off and on  Appetite: normal  Medication side effects: No   ROS: reports leg pain, all other systems are negative    Mental Status Evaluation:    Appearance:  age appropriate, casually dressed, dressed appropriately, adequate grooming, overweight, no distress   Behavior:  pleasant, cooperative, calm, improved eye contact   Speech:  normal rate, normal volume, normal pitch   Mood:  depressed, anxious, "tired"   Affect:  mood-congruent   Thought Process:  logical, perseverative, somewhat tangential   Thought Content:  no overt delusions, no overt paranoia noted on exam   Perceptual Disturbances: no auditory hallucinations, no visual hallucinations, does not appear responding to internal stimuli   Risk Potential: Suicidal ideation - None at present  Homicidal ideation - None  Potential for aggression - Not at present   Memory:  recent and remote memory grossly intact   Consciousness:  alert and awake   Attention: attention span and concentration are age appropriate   Insight:  limited   Judgment: limited   Gait/Station: normal gait/station   Motor Activity: no abnormal movements     Progress Toward Goals: no significant improvement today, making slow admission goals, still at times confused. No significant changes in behaviors or clinical status over the last 24 hours. Discharge Disposition: TBD    Assessment/Plan   Principal Problem:    Severe episode of recurrent major depressive disorder, with psychotic features ([facility] [address] [facility] )  Active Problems:    Epilepsy with altered consciousness with intractable epilepsy ([facility])    UTI (urinary tract infection)    Acute renal failure superimposed on chronic kidney disease ([facility])    Tobacco abuse    Essential hypertension    Medical clearance for psychiatric admission    Obesity (BMI 30-39. 9)    Falls frequently    Major neurocognitive disorder ([facility])      Treatment Plan:  1. Continue with group therapy, individual therapy and goals for admission  2. Behavioral Health checks every 7 minutes for safety  3. Observe progress over weekend  4. Fall and seizure precautions  5. SLIM- patient being treated for UTI with Keflex  6. Neuropsychology consult pending for capacity assessment   7. Increase Lexapro to 10 mg daily for depression/ anxiety per primary team   8. Increase Melatonin to 6 mg HS for insomnia   9.  Continue current medications:      Current Facility-Administered Medications   Medication Dose Route Frequency Provider Last Rate   • acetaminophen  650 mg Oral Q4H PRN Catrina Horse, CRNP     • acetaminophen  650 mg Oral Q4H PRN Catrina Horse, CRNP     • acetaminophen  975 mg Oral Q6H PRN DRU Siddiqui     • carBAMazepine  200 mg Oral TID DRU Siddiqui     • cholecalciferol  1,000 Units Oral Daily DRU Siddiqui     • escitalopram  10 mg Oral Daily Tiarra Griffin MD     • haloperidol lactate  5 mg Intramuscular Q4H PRN Max 4/day DRU Siddiqui     • hydrOXYzine HCL  25 mg Oral Q6H PRN Max 4/day DRU Siddiqui     • hydrOXYzine HCL  50 mg Oral Q6H PRN Max 4/day DRU Siddiqui     • LORazepam  1 mg Intramuscular Q6H PRN Max 3/day DRU Siddiqui     • LORazepam  1 mg Oral Q6H PRN Max 3/day DRU Siddiqui     • losartan  25 mg Oral Daily DRU Wagoner     • melatonin  6 mg Oral HS DRU Bearden     • metoprolol succinate  50 mg Oral Daily DRU Wagoner     • nicotine  1 patch Transdermal Daily DRU Wagoner     • pramipexole  1.5 mg Oral HS DRU Wagoner     • QUEtiapine  12.5 mg Oral HS Tiarra Griffin MD     • risperiDONE  0.25 mg Oral Q4H PRN Max 6/day DRU Siddiqui     • risperiDONE  0.5 mg Oral Q4H PRN Max 3/day DRU Siddiqui     • risperiDONE  1 mg Oral Q2H PRN Max 3/day DRU Siddiqui     • traZODone  50 mg Oral HS PRN DRU Siddiqui         Vitals:  Vitals:    06/24/23 0746   BP: 111/76   Pulse: 77   Resp: 17   Temp: (!) 96.2 °F (35.7 °C)   SpO2: 94%       Laboratory Results:  I have personally reviewed all pertinent laboratory/tests results. Labs in last 72 hours: No results for input(s): "WBC", "RBC", "HGB", "HCT", "PLT", "RDW", "TOTANEUTABS", "NEUTROABS", "SODIUM", "K", "CL", "CO2", "BUN", "CREATININE", "GLUC", "GLUF", "CALCIUM", "AST", "ALT", "ALKPHOS", "TP", "ALB", "TBILI", "CHOLESTEROL", "HDL", "TRIG", "LDLCALC", "VALPROICTOT", "CARBAMAZEPIN", "LITHIUM", "AMMONIA", "[id number]", "Sophie Flair", "Liz Munoz", "PREGTESTUR", "PREGSERUM", "HCG", "HCGQUANT", "RPR" in the last 72 hours.       Risks / Benefits of Treatment:    Risks, benefits, and possible side effects of medications explained to patient. Patient has limited understanding of risks and benefits of treatment at this time, but agrees to take medications as prescribed. Counseling / Coordination of Care: Total floor / unit time spent today 30 minutes. Greater than 50% of total time was spent with the patient and / or family counseling and / or coordination of care. A description of the counseling / coordination of care:   Patient's presentation on admission and proposed treatment plan discussed with treatment team.  Diagnosis, medication changes and treatment plan reviewed with patient. Supportive therapy provided to patient. This note has been constructed using a voice recognition system. There may be translation, syntax, or grammatical errors. If you have any questions, please contact the dictating author.   Dilshad Arce, [address] [place] [address]  06/24/23

## 2023-06-24 PROCEDURE — 99232 SBSQ HOSP IP/OBS MODERATE 35: CPT

## 2023-06-24 RX ORDER — LANOLIN ALCOHOL/MO/W.PET/CERES
6 CREAM (GRAM) TOPICAL
Status: DISCONTINUED | OUTPATIENT
Start: 2023-06-24 | End: 2023-07-03 | Stop reason: HOSPADM

## 2023-06-24 RX ADMIN — CARBAMAZEPINE 200 MG: 200 TABLET ORAL at 17:54

## 2023-06-24 RX ADMIN — QUETIAPINE FUMARATE 12.5 MG: 25 TABLET ORAL at 21:05

## 2023-06-24 RX ADMIN — CARBAMAZEPINE 200 MG: 200 TABLET ORAL at 08:17

## 2023-06-24 RX ADMIN — NICOTINE 1 PATCH: 21 PATCH, EXTENDED RELEASE TRANSDERMAL at 08:17

## 2023-06-24 RX ADMIN — CARBAMAZEPINE 200 MG: 200 TABLET ORAL at 21:05

## 2023-06-24 RX ADMIN — Medication 6 MG: at 21:05

## 2023-06-24 RX ADMIN — LOSARTAN POTASSIUM 25 MG: 25 TABLET, FILM COATED ORAL at 08:17

## 2023-06-24 RX ADMIN — ESCITALOPRAM OXALATE 10 MG: 10 TABLET ORAL at 08:17

## 2023-06-24 RX ADMIN — METOPROLOL SUCCINATE 50 MG: 50 TABLET, EXTENDED RELEASE ORAL at 08:17

## 2023-06-24 RX ADMIN — PRAMIPEXOLE DIHYDROCHLORIDE 1.5 MG: 1 TABLET ORAL at 21:05

## 2023-06-24 RX ADMIN — Medication 1000 UNITS: at 08:17

## 2023-06-24 NOTE — NURSING NOTE
Patient denies anxiety, depression, SI/HI/AH/VH. Patient is cooperative with staff. Medication and meal compliant. Patient is visible in the dining room eating dinner and socializing with peers. Patient was educated on sharing her meals with her peers. Patient verbalized that she understood. Patient encouraged to make needs known. Safety checks ongoing.

## 2023-06-24 NOTE — NURSING NOTE
Patient calm, cooperative, and visible on millieu. She reports interrupted sleep for unknown reason and is sociable with select peers. She denies depression, anxiety, HI/SI/AVH and is able to make needs known. She takes medications as scheduled.

## 2023-06-24 NOTE — PLAN OF CARE
Problem: SELF HARM/SUICIDALITY  Goal: Will have no self-injury during hospital stay  Description: INTERVENTIONS:  - Q 15 MINUTES: Routine safety checks  - Q WAKING SHIFT & PRN: Assess risk to determine if routine checks are adequate to maintain patient safety  - Encourage patient to participate actively in care by formulating a plan to combat response to suicidal ideation, identify supports and resources  Outcome: Progressing     Problem: DEPRESSION  Goal: Will be euthymic at discharge  Description: INTERVENTIONS:  - Administer medication as ordered  - Provide emotional support via 1:1 interaction with staff  - Encourage involvement in milieu/groups/activities  - Monitor for social isolation  Outcome: Progressing     Problem: BEHAVIOR  Goal: Pt/Family maintain appropriate behavior and adhere to behavioral management agreement, if implemented  Description: INTERVENTIONS:  - Assess the family dynamic   - Encourage verbalization of thoughts and concerns in a socially appropriate manner  - Assess patient/family's coping skills and non-compliant behavior (including use of illegal substances). - Utilize positive, consistent limit setting strategies supporting safety of patient, staff and others  - Initiate consult with Case Management, Spiritual Care or other ancillary services as appropriate  - If a patient's/visitor's behavior jeopardizes the safety of the patient, staff, or others, refer to organization procedure.    - Notify Security of behavior or suspected illegal substances which indicate the need for search of the patient and/or belongings  - Encourage participation in the decision making process about a behavioral management agreement; implement if patient meets criteria  Outcome: Progressing     Problem: ANXIETY  Goal: Will report anxiety at manageable levels  Description: INTERVENTIONS:  - Administer medication as ordered  - Teach and encourage coping skills  - Provide emotional support  - Assess patient/family for anxiety and ability to cope  Outcome: Progressing  Goal: By discharge: Patient will verbalize 2 strategies to deal with anxiety  Description: Interventions:  - Identify any obvious source/trigger to anxiety  - Staff will assist patient in applying identified coping technique/skills  - Encourage attendance of scheduled groups and activities  Outcome: Progressing     Problem: SELF CARE DEFICIT  Goal: Return ADL status to a safe level of function  Description: INTERVENTIONS:  - Administer medication as ordered  - Assess ADL deficits and provide assistive devices as needed  - Obtain PT/OT consults as needed  - Assist and instruct patient to increase activity and self care as tolerated  Outcome: Progressing     Problem: DISCHARGE PLANNING - CARE MANAGEMENT  Goal: Discharge to post-acute care or home with appropriate resources  Description: INTERVENTIONS:  - Conduct assessment to determine patient/family and health care team treatment goals, and need for post-acute services based on payer coverage, community resources, and patient preferences, and barriers to discharge  - Address psychosocial, clinical, and financial barriers to discharge as identified in assessment in conjunction with the patient/family and health care team  - Arrange appropriate level of post-acute services according to patient’s   needs and preference and payer coverage in collaboration with the physician and health care team  - Communicate with and update the patient/family, physician, and health care team regarding progress on the discharge plan  - Arrange appropriate transportation to post-acute venues  Outcome: Progressing

## 2023-06-25 PROCEDURE — 99232 SBSQ HOSP IP/OBS MODERATE 35: CPT

## 2023-06-25 RX ORDER — LEVETIRACETAM 500 MG/1
1500 TABLET ORAL 2 TIMES DAILY
Status: DISCONTINUED | OUTPATIENT
Start: 2023-06-25 | End: 2023-07-03 | Stop reason: HOSPADM

## 2023-06-25 RX ORDER — MELATONIN
2000 DAILY
Status: DISCONTINUED | OUTPATIENT
Start: 2023-06-26 | End: 2023-07-03 | Stop reason: HOSPADM

## 2023-06-25 RX ADMIN — METOPROLOL SUCCINATE 50 MG: 50 TABLET, EXTENDED RELEASE ORAL at 09:34

## 2023-06-25 RX ADMIN — CARBAMAZEPINE 200 MG: 200 TABLET ORAL at 09:34

## 2023-06-25 RX ADMIN — PRAMIPEXOLE DIHYDROCHLORIDE 1.5 MG: 1 TABLET ORAL at 21:45

## 2023-06-25 RX ADMIN — CARBAMAZEPINE 200 MG: 200 TABLET ORAL at 16:54

## 2023-06-25 RX ADMIN — Medication 1000 UNITS: at 09:34

## 2023-06-25 RX ADMIN — QUETIAPINE FUMARATE 12.5 MG: 25 TABLET ORAL at 21:46

## 2023-06-25 RX ADMIN — CARBAMAZEPINE 200 MG: 200 TABLET ORAL at 21:45

## 2023-06-25 RX ADMIN — LEVETIRACETAM 1500 MG: 500 TABLET, FILM COATED ORAL at 21:45

## 2023-06-25 RX ADMIN — NICOTINE 1 PATCH: 21 PATCH, EXTENDED RELEASE TRANSDERMAL at 09:34

## 2023-06-25 RX ADMIN — Medication 6 MG: at 21:45

## 2023-06-25 RX ADMIN — ESCITALOPRAM OXALATE 10 MG: 10 TABLET ORAL at 09:34

## 2023-06-25 RX ADMIN — LOSARTAN POTASSIUM 25 MG: 25 TABLET, FILM COATED ORAL at 09:34
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
## 2023-06-25 NOTE — QUICK NOTE
Neurology plan of care note:    I was contacted by psychiatry NP about Ms. Lorenza Garcia and her AED regimen. She has hx of intractable epilepsy complicated by compliance with medications and f/u. Per psych NP report to me, she has not been seeing neurologist as outpatient. Has been getting her AED prescriptions from ED  visits and PCP. Ms. Lorenza Garcia was admitted to [facility] on 6/19/23 after a syncopal event, associated with lightheadedness and palpitations while standing. (She is listed as on multiple anti-hypertensive agents including diuretic, chlorthalidone. Troponin neg x2. TSH normal. Did not have TTE.) Has then been admitted further for SI / behavioral health. She has been receiving carbamazepine (CBZ) 200mg TID and received levetiracetam (LEV) 1000mg IV x1 on 6/19, followed by LEV 750mg PO on 6/20am, but then LEV was stopped (CBZ continued throughout hospitalization). No seizure-like activity witnessed while she has been an inpatient. LEV level 6/20/23 was therapeutic at 31.9, but was collected on 6/20, after the IV LEV load the previous day 6/19, so is not informative about compliance with AEDs prior to admission. LEV level on 6/6/23 was supratherapeutic while on 5/11/23 was subtherapeutic, so it seems that it was inconsistently being taken prior to current admission. CBZ level not checked in the ED at admission 6/19/23 either. Neurology asked regarding current AED regimen. Ms. Lorenza Garcia was last seen by [address] [place] [address]  neurology epilepsy clinic on 2/12/20 (Dr. Elkin Azar), but was discharged from the practice due to no-shows and medication non-compliance. (See his note for hx of epilepsy.) According to the last clinic note of 11/1/20, she was on LEV 1500mg BID and CB 200mg TID.  (Had been on LTG but was non-compliant with titration schedule for this.) It seems, from [address] notes of 1/21/21 and 9/19/22, that there was an attempt by family medicine to get her to see [facility] neurology as well, but it does not seem like she was ever seen there. REC:   -For now, continue LEV 1500mg BID and CBZ 200mg TID, as per last [facility] neurology note from 11/1/20, and which it seems she was getting sporadically this year per primary team discussions with pharmacies.   -She needs to have follow up with neurology epilepsy clinic - would call [facility] clinic to see if she can be seen in f/u; if not, will need to be seen by neurologist at [facility] or elsewhere.  There are multiple tests -- like MRI brain, EEG, DXA scan -- that were recommended when she was last seen by [facility] neurology but which were never completed and can be done as outpatient.   -She was supposed to be on vitamin D 2000 units daily with calcium 1200mg daily while on CBZ; she is currently on only 1000 units daily of vitamin D. 25-OH vitamin D level on 6/19 was low at 24.8.   -She is also on pramipexole 1.5mg qHS for restless leg syndrome; she is receiving this  -Seizure precautions  -No driving; if has a license needs to be reported to [facility]  -Medical syncope w/u per primary / medicine team.   -D/w psychiatry NP    Marilyn Childress

## 2023-06-25 NOTE — PROGRESS NOTES
Progress Note - Behavioral Health   Ignacio William 57 y.o. female MRN: [id number]  Unit/Bed#: [facility] 063-74 Encounter: [id number]      Behavior over the last 24 hours:  unchanged     Subjective: I saw Omero Tee for follow up and continuation of care. I have reviewed the chart and discussed progress with the nursing staff. Patient is calm, cooperative, visible and social. She is medication and meal compliant. Slept better. Denies depression, anxiety, SI, HI, AVH. She is attending groups. Has somatic complaints of hearing difficulties d/t wax build up. She is fixated on receiving her Keppra, but cannot clarify OP history, dose, provider, etc. She remains in good behavorial control. No PRNs in the last 24 hours. On assessment, Omero Tee is seen in the group room looking through multiple papers. She is preservative, "very upset" about not receiving her Keppra and is disorganized about the details stating it is prescribed by an MD in [place] and/ or [address] [place] [address] Rd. She appears anxious, slightly irritable. She denies depression, SI, HI, plan, intent or self-injurious behaviors. Omero Tee does not voice any paranoia, denies A/VH and does not appear to be responding to internal stimuli. She reports difficulty hearing and requests to go to ED for evaluation, but was informed [facility] provider will be notified. [address] with [employer] ([phone]) about her Keppra. Patient had Keppra 750mg 2 tabs BID (3,000mg/day) filled for 10 days on 2/25/23, for 60 pills on 4/13/23 and 30 days on 5/12/23. These are the only Keppra prescriptions from this pharmacy. Per chart review, patient appear to have gotten these scripts filled from ED visits. [facility] provider recommends Neurology consult for further clarification. [address] with on-call neurologist, Dr. Tabitha Thurston, about patient's epilepsy medication.  He recommended restarting Keppra 1,500mg BID, continue Tegretol 200 mg TID and follow up with outpatient neurologist/ epileptologist. See note/ recs. Psychiatric Review of Systems:  Sleep: improved  Appetite: normal  Medication side effects: No   ROS: reports hearing loss d/t wax, all other systems are negative    Mental Status Evaluation:    Appearance:  adequate grooming, disheveled, no distress   Behavior:  cooperative, calm, fair eye contact   Speech:  normal rate, normal volume, normal pitch   Mood:  irritable   Affect:  increased in intensity   Thought Process:  perseverative   Thought Content:  no overt delusions, preoccupied with keppra, no overt paranoia noted on exam   Perceptual Disturbances: no auditory hallucinations, no visual hallucinations, does not appear responding to internal stimuli   Risk Potential: Suicidal ideation - None at present  Homicidal ideation - None  Potential for aggression - Not at present   Memory:  recent and remote memory grossly intact   Consciousness:  alert and awake   Attention: attention span and concentration are age appropriate   Insight:  poor   Judgment: limited   Gait/Station: normal gait/station   Motor Activity: no abnormal movements     Progress Toward Goals: progressing, attends groups, participates in milieu therapy, mood is stabilizing. No significant changes in behaviors or clinical status over the last 24 hours. Discharge Disposition: TBD    Assessment/Plan   Principal Problem:    Severe episode of recurrent major depressive disorder, with psychotic features ([address])  Active Problems:    Epilepsy with altered consciousness with intractable epilepsy ([address])    UTI (urinary tract infection)    Acute renal failure superimposed on chronic kidney disease ([address])    Tobacco abuse    Essential hypertension    Medical clearance for psychiatric admission    Obesity (BMI 30-39. 9)    Falls frequently    Major neurocognitive disorder ([address])      Treatment Plan:  1. Continue with group therapy, individual therapy and goals for admission  2.  Behavioral Health checks every 7 minutes for safety  3. Observe progress over weekend  4. Fall and seizure precautions  5. SLIM- patient being treated for UTI with Keflex, f/u with hearing difficulty   6. Neuropsychology consult pending for capacity assessment   7. Neurology consulted for clarification of epilepsy medication who recommends  a. For now, continue LEV 1500mg BID and CBZ 200mg TID, as per last [facility] neurology note from 11/1/20, and which it seems she was getting sporadically this year per primary team discussions with pharmacies. b. She needs to have follow up with neurology epilepsy clinic - would call [facility] clinic to see if she can be seen in f/u; if not, will need to be seen by neurologist at [facility] or elsewhere. There are multiple tests -- like MRI brain, EEG, DXA scan -- that were recommended when she was last seen by [facility] neurology but which were never completed and can be done as outpatient. c. She was supposed to be on vitamin D 2000 units daily with calcium 1200mg daily while on CBZ; she is currently on only 1000 units daily of vitamin D. 25-OH vitamin D level on 6/19 was low at 24.8.   d. She is also on pramipexole 1.5mg qHS for restless leg syndrome; she is receiving this  e. Seizure precautions  f.  No driving; if has a license needs to be reported to [facility] syncope w/u per primary / medicine team.  8. Continue current medications:      Current Facility-Administered Medications   Medication Dose Route Frequency Provider Last Rate   • acetaminophen  650 mg Oral Q4H PRN DRU Moffett     • acetaminophen  650 mg Oral Q4H PRN DRU Moffett     • acetaminophen  975 mg Oral Q6H PRN DRU Moffett     • carBAMazepine  200 mg Oral TID DRU Moffett     • [START ON 6/26/2023] cholecalciferol  2,000 Units Oral Daily DRU Jarquin     • escitalopram  10 mg Oral Daily Sandy Montemayor MD     • haloperidol lactate  5 mg Intramuscular Q4H PRN Max 4/day DRU Mofftet     • hydrOXYzine HCL  25 mg Oral Q6H PRN Max 4/day [facility], DRU     • hydrOXYzine HCL  50 mg Oral Q6H PRN Max 4/day [facility], DRU     • levETIRAcetam  1,500 mg Oral BID [place] Sandi Norwood, DRU     • LORazepam  1 mg Intramuscular Q6H PRN Max 3/day [facility], DRU     • LORazepam  1 mg Oral Q6H PRN Max 3/day [facility], DRU     • losartan  25 mg Oral Daily Blanquita Freeman, DRU     • melatonin  6 mg Oral HS Niko Garcia, DRU     • metoprolol succinate  50 mg Oral Daily Blanquita Freeman, DRU     • nicotine  1 patch Transdermal Daily Blanquita Freeman, DRU     • pramipexole  1.5 mg Oral HS Blanquita Freeman, DRU     • QUEtiapine  12.5 mg Oral HS Tonny Bowie MD     • risperiDONE  0.25 mg Oral Q4H PRN Max 6/day [facility], DRU     • risperiDONE  0.5 mg Oral Q4H PRN Max 3/day [facility], DRU     • risperiDONE  1 mg Oral Q2H PRN Max 3/day [facility], DRU     • traZODone  50 mg Oral HS PRN [facility], DRU         Vitals:  Vitals:    06/25/23 0728   BP: 112/55   Pulse: 78   Resp: 17   Temp: 97.6 °F (36.4 °C)   SpO2: 97%       Laboratory Results:  I have personally reviewed all pertinent laboratory/tests results. Labs in last 72 hours: No results for input(s): "WBC", "RBC", "HGB", "HCT", "PLT", "RDW", "TOTANEUTABS", "NEUTROABS", "SODIUM", "K", "CL", "CO2", "BUN", "CREATININE", "GLUC", "GLUF", "CALCIUM", "AST", "ALT", "ALKPHOS", "TP", "ALB", "TBILI", "CHOLESTEROL", "HDL", "TRIG", "LDLCALC", "VALPROICTOT", "CARBAMAZEPIN", "LITHIUM", "AMMONIA", "[id number]", "Olman Dickerson", "Yani Lecher", "PREGTESTUR", "PREGSERUM", "HCG", "HCGQUANT", "RPR" in the last 72 hours. Risks / Benefits of Treatment:    Risks, benefits, and possible side effects of medications explained to patient and patient verbalizes understanding and agreement for treatment. Counseling / Coordination of Care: Total floor / unit time spent today 60 minutes.  Greater than 50% of total time was spent with the patient and / or family counseling and / or coordination of care. A description of the counseling / coordination of care:   Patient's presentation on admission and proposed treatment plan discussed with treatment team.  Diagnosis, medication changes and treatment plan reviewed with patient. Events leading to admission reviewed with patient. Supportive therapy provided to patient. This note has been constructed using a voice recognition system. There may be translation, syntax, or grammatical errors. If you have any questions, please contact the dictating author.   Dilshad Arce, [address] [place] [address]  06/25/23

## 2023-06-25 NOTE — NURSING NOTE
Patient cooperative and visible on millieu. She is preoccupied with taking Keppra and SLIM consulted and a neuro consult was placed as there are no records found of her under the care of a provider prescribing. Patient is a poor historian. Records indicate she has visited several ED's reporting she needs a refill. She is sociable with select peers and reports improved sleep. She denies depression, anxiety, SI/HI/AVH and is able to make needs known.

## 2023-06-25 NOTE — PLAN OF CARE
Problem: PHYSICAL THERAPY ADULT  Goal: Performs mobility at highest level of function for planned discharge setting. See evaluation for individualized goals. Description: Treatment/Interventions: ADL retraining, Functional transfer training, Elevations, LE strengthening/ROM, Therapeutic exercise, Endurance training, Bed mobility, Patient/family training, Equipment eval/education, Gait training, Spoke to nursing, Spoke to case management, OT          See flowsheet documentation for full assessment, interventions and recommendations. Outcome: Progressing  Note: Prognosis: Good  Problem List: Decreased strength, Decreased endurance, Impaired balance, Decreased mobility     Barriers to Discharge: Decreased caregiver support     PT Discharge Recommendation:  (D/C dispo will depend on OT COG eval and capacity to make own decisions)    See flowsheet documentation for full assessment.

## 2023-06-26 PROBLEM — H93.8X3 SENSATION OF FULLNESS IN BOTH EARS: Status: ACTIVE | Noted: 2023-06-26

## 2023-06-26 PROCEDURE — 99232 SBSQ HOSP IP/OBS MODERATE 35: CPT | Performed by: PSYCHIATRY & NEUROLOGY

## 2023-06-26 RX ORDER — FLUTICASONE PROPIONATE 50 MCG
1 SPRAY, SUSPENSION (ML) NASAL DAILY
Status: DISCONTINUED | OUTPATIENT
Start: 2023-06-26 | End: 2023-07-03 | Stop reason: HOSPADM

## 2023-06-26 RX ORDER — CALCIUM CARBONATE 500(1250)
2 TABLET ORAL
Status: DISCONTINUED | OUTPATIENT
Start: 2023-06-26 | End: 2023-07-03 | Stop reason: HOSPADM

## 2023-06-26 RX ADMIN — LEVETIRACETAM 1500 MG: 500 TABLET, FILM COATED ORAL at 21:09

## 2023-06-26 RX ADMIN — METOPROLOL SUCCINATE 50 MG: 50 TABLET, EXTENDED RELEASE ORAL at 08:29

## 2023-06-26 RX ADMIN — CARBAMAZEPINE 200 MG: 200 TABLET ORAL at 21:08

## 2023-06-26 RX ADMIN — ESCITALOPRAM OXALATE 10 MG: 10 TABLET ORAL at 08:29

## 2023-06-26 RX ADMIN — Medication 6 MG: at 21:08

## 2023-06-26 RX ADMIN — CHOLECALCIFEROL TAB 25 MCG (1000 UNIT) 2000 UNITS: 25 TAB at 08:29

## 2023-06-26 RX ADMIN — LOSARTAN POTASSIUM 25 MG: 25 TABLET, FILM COATED ORAL at 08:29

## 2023-06-26 RX ADMIN — CALCIUM 2 TABLET: 500 TABLET ORAL at 08:29

## 2023-06-26 RX ADMIN — CARBAMAZEPINE 200 MG: 200 TABLET ORAL at 17:49

## 2023-06-26 RX ADMIN — NICOTINE 1 PATCH: 21 PATCH, EXTENDED RELEASE TRANSDERMAL at 08:30

## 2023-06-26 RX ADMIN — CARBAMIDE PEROXIDE 6.5% 5 DROP: 6.5 LIQUID AURICULAR (OTIC) at 18:22

## 2023-06-26 RX ADMIN — QUETIAPINE FUMARATE 12.5 MG: 25 TABLET ORAL at 21:07

## 2023-06-26 RX ADMIN — FLUTICASONE PROPIONATE 1 SPRAY: 50 SPRAY, METERED NASAL at 18:21

## 2023-06-26 RX ADMIN — LEVETIRACETAM 1500 MG: 500 TABLET, FILM COATED ORAL at 08:29

## 2023-06-26 RX ADMIN — PRAMIPEXOLE DIHYDROCHLORIDE 1.5 MG: 1 TABLET ORAL at 21:07

## 2023-06-26 RX ADMIN — CARBAMAZEPINE 200 MG: 200 TABLET ORAL at 08:29

## 2023-06-26 NOTE — PROGRESS NOTES
Progress Note - Behavioral Health   Colt Trinh 57 y.o. female MRN: [id number]  Unit/Bed#: [facility] 541-08 Encounter: [id number]    Assessment/Plan   Principal Problem:    Severe episode of recurrent major depressive disorder, with psychotic features ([address])  Active Problems:    Epilepsy with altered consciousness with intractable epilepsy ([address])    UTI (urinary tract infection)    Acute renal failure superimposed on chronic kidney disease ([address])    Tobacco abuse    Essential hypertension    Medical clearance for psychiatric admission    Obesity (BMI 30-39. 9)    Falls frequently    Major neurocognitive disorder (HCC)      Subjective: Patient was seen, chart was reviewed, and case was discussed with entire team.   Pt seen in therapy room. Pt is pleasant calm and cooperative. Pt is glad she is on her Keppra. She talks about medications but can't speak of doses or her regimen saying she has a list. Pt is wanting to go home to apartment and feels she has no difficulty living alone. Pt is denying any thoughts to harm herself or anyone else. Pt denies any auditory or visual hallucinations. She is attending groups and socializes with peers. Pt does not verbalize any over delusions or paranoia at this time. Pt did talk about her hearing is difficult because of wax. Yesterdays note reports that [facility] provider was notified. Pt has been medication compliant. Psychiatric Review of Systems:  Behavior over the last 24 hours: unchanged  Sleep: improving  Appetite: adequate  Medication side effects: none verbalized  Medical ROS: Complete review of systems is negative except as noted above.             Vitals:  Vitals:    06/26/23 0742   BP: 120/74   Pulse: 88   Resp: 16   Temp: 97.5 °F (36.4 °C)   SpO2: 98%       Mental Status Exam:    Appearance:  alert, appears stated age, casually dressed and disheveled   Behavior:  calm and cooperative   Speech:  normal rate and normal volume   Mood:  anxious   Affect:  mood-congruent Thought Process:  generally linear and goal-directed but seems disorganized at times. at times   Thought Content:  no verbalized delusions or overt paranoia   Perceptual Disturbances: no reported hallucinations and does not appear to be responding to internal stimuli at this time   Risk Potential: Suicidal ideation - None at present  Homicidal ideation - None at present  Potential for aggression - Not at present   Memory:  recent memory grossly intact   Consciousness:  alert and awake   Attention/Concentration: attention span and concentration are age appropriate   Insight:  limited   Judgment: limited   Gait/Station: normal gait/station   Motor Activity: no abnormal movements     Progress Toward Goals: Progressing    Recommended Treatment: Continue with pharmacotherapy, group therapy, milieu therapy and occupational therapy. Continue frequent safety checks and vitals per unit protocol. Continue with medical management as indicated. Continue coordinating with case management regarding disposition  1. Continue current medications and treatments for now. 2.Discharge planning. Risks, benefits and possible side effects of Medications: Risks, benefits, and possible side effects of medications have previously been explained. No new medications at this time.       Current Medications:  Current Facility-Administered Medications   Medication Dose Route Frequency Provider Last Rate   • acetaminophen  650 mg Oral Q4H PRN DRU Ruvalcaba     • acetaminophen  650 mg Oral Q4H PRN DRU Ruvalcaba     • acetaminophen  975 mg Oral Q6H PRN DRU Ruvalcaba     • calcium carbonate  2 tablet Oral Daily With Breakfast Arturo Sawyer PA-C     • carBAMazepine  200 mg Oral TID DRU Ruvalcaba     • cholecalciferol  2,000 Units Oral Daily DRU Barrios     • escitalopram  10 mg Oral Daily Glenny Richard MD     • haloperidol lactate  5 mg Intramuscular Q4H PRN Max 4/day DRU Ruvalcaba • hydrOXYzine HCL  25 mg Oral Q6H PRN Max 4/day DRU Siddiqui     • hydrOXYzine HCL  50 mg Oral Q6H PRN Max 4/day DRU Siddiqui     • levETIRAcetam  1,500 mg Oral BID Ross [place], DRU     • LORazepam  1 mg Intramuscular Q6H PRN Max 3/day ROWDY SiddiquiNP     • LORazepam  1 mg Oral Q6H PRN Max 3/day DRU Siddiqui     • losartan  25 mg Oral Daily DRU Wagoner     • melatonin  6 mg Oral HS DRU Bearden     • metoprolol succinate  50 mg Oral Daily DRU Wagoner     • nicotine  1 patch Transdermal Daily DRU Wagoner     • pramipexole  1.5 mg Oral HS DRU Wagoner     • QUEtiapine  12.5 mg Oral HS Tiarra Griffin MD     • risperiDONE  0.25 mg Oral Q4H PRN Max 6/day DRU Siddiqui     • risperiDONE  0.5 mg Oral Q4H PRN Max 3/day DRU Siddiqui     • risperiDONE  1 mg Oral Q2H PRN Max 3/day DRU Siddiqui     • traZODone  50 mg Oral HS PRN DRU Siddiqui         Behavioral Health Medications:   all current active meds have been reviewed. Laboratory results:  I have personally reviewed all pertinent laboratory/tests results. No results found for this or any previous visit (from the past 48 hour(s)).          Tiara Lee DO 06/26/23

## 2023-06-26 NOTE — PROGRESS NOTES
06/26/23 0815   Team Meeting   Meeting Type Daily Rounds   Initial Conference Date 06/26/23   Next Conference Date 06/27/23   Team Members Present   Team Members Present Physician;Nurse;;Occupational Therapist;   Physician Team Member Dr Wayne Loyola Team Member [id number] Management Team Member [facility] Work Team Member Thor   OT Team Member Abby Wolf   Patient/Family Present   Patient Present No   Patient's Family Present No     Brother doesn't want to be the guardian for patient, refused to sign LOC application, back on kepra, slept last night.

## 2023-06-26 NOTE — CASE MANAGEMENT
Spoke to the patient today and she advised that she could barely hear me and wanted to know how long she will have to wait for her ear drops that were ordered by medical today. Asked the nurse and she said once the order is in she will give them.

## 2023-06-26 NOTE — PROGRESS NOTES
[facility]  Progress Note  Name: Raul Gaston  MRN: [id number]  Unit/Bed#: [facility] 685-55 I Date of Admission: 2023   Date of Service: 2023 I Hospital Day: 6    Assessment/Plan   Sensation of fullness in both ears  Assessment & Plan  · Patient complaining of bilateral ear fullness. Reports decreased hearing. Denies pain or discharge. · On otoscopic exam, there is cerumen in the right ear canal and the TM cannot be visualized. There is some cerumen in the left ear and the TM can be partially visualize. The TM appears pearly gray, there is no erythema or edema. · Likely cerumen impaction vs. eustachian tube dysfunction. · Will order debrox 5 drops bid for 4 days and flonase daily           Nurse Coordination of Care Discussion: discussed assessment and plan with primary RN    Discussions with Specialists or Other Care Team Provider: None    Education and Discussions with Family / Patient: Answered patient's questions to the best of my ability    Time Spent for Care: 20 minutes. More than 50% of total time spent on counseling and coordination of care as described above. Current Length of Stay: 6 day(s)    Code Status: Level 1 - Full Code      Subjective:   Patient complaining of bilateral ear fullness. Reports her ears feel clogged. Denies any pain or discharge. Objective:     Vitals:   Temp (24hrs), Av.3 °F (36.3 °C), Min:97 °F (36.1 °C), Max:97.5 °F (36.4 °C)    Temp:  [97 °F (36.1 °C)-97.5 °F (36.4 °C)] 97 °F (36.1 °C)  HR:  [69-88] 69  Resp:  [16-17] 17  BP: (119-124)/(59-74) 119/59  SpO2:  [98 %-99 %] 98 %  Body mass index is 40.12 kg/m². Physical Exam:     Physical Exam  Constitutional:       Appearance: Normal appearance. HENT:      Right Ear: Ear canal normal. There is impacted cerumen. Left Ear: Tympanic membrane and ear canal normal.      Mouth/Throat:      Mouth: Mucous membranes are moist.   Skin:     General: Skin is warm and dry. Neurological:      Mental Status: She is alert. Additional Data:     Labs:    Results from last 7 days   Lab Units 06/21/23  0501   WBC Thousand/uL 13.22*   HEMOGLOBIN g/dL 12.3   HEMATOCRIT % 39.0   PLATELETS Thousands/uL 382   NEUTROS PCT % 65   LYMPHS PCT % 25   MONOS PCT % 7   EOS PCT % 2     Results from last 7 days   Lab Units 06/21/23  0501   SODIUM mmol/L 135   POTASSIUM mmol/L 4.4   CHLORIDE mmol/L 104   CO2 mmol/L 22   BUN mg/dL 32*   CREATININE mg/dL 1.20   ANION GAP mmol/L 9   CALCIUM mg/dL 9.2   ALBUMIN g/dL 3.6   TOTAL BILIRUBIN mg/dL 0.32   ALK PHOS U/L 91   ALT U/L 9   AST U/L 12*   GLUCOSE RANDOM mg/dL 84                     * I Have Reviewed All Lab Data Listed Above. * Additional Pertinent Lab Tests Reviewed:  All Labs Within Last 24 Hours Reviewed    Imaging:    Imaging Reports Reviewed Today Include: no imaging reviewed today      Last 24 Hours Medication List:   Current Facility-Administered Medications   Medication Dose Route Frequency Provider Last Rate   • acetaminophen  650 mg Oral Q4H PRN DRU Thakkar     • acetaminophen  650 mg Oral Q4H PRN DRU Thakkar     • acetaminophen  975 mg Oral Q6H PRN DRU Thakkar     • calcium carbonate  2 tablet Oral Daily With Breakfast Sabi Roe PA-C     • carBAMazepine  200 mg Oral TID DRU Thakkar     • carbamide peroxide  5 drop Both Ears BID Sabi Roe PA-C     • cholecalciferol  2,000 Units Oral Daily IsabellDRU Rico     • escitalopram  10 mg Oral Daily Bacilio Chowdhury MD     • fluticasone  1 spray Each Nare Daily Sabi Roe PA-C     • haloperidol lactate  5 mg Intramuscular Q4H PRN Max 4/day DRU Thakkar     • hydrOXYzine HCL  25 mg Oral Q6H PRN Max 4/day DRU Thakkar     • hydrOXYzine HCL  50 mg Oral Q6H PRN Max 4/day DRU Thakkar     • levETIRAcetam  1,500 mg Oral BID IsabellDRU [place]     • LORazepam  1 mg Intramuscular Q6H PRN Max 3/day DRU Siddiqui     • LORazepam  1 mg Oral Q6H PRN Max 3/day DRU Siddiqui     • losartan  25 mg Oral Daily DRU Wagoner     • melatonin  6 mg Oral HS GerryzettDRU Cooley     • metoprolol succinate  50 mg Oral Daily DRU Wagoner     • nicotine  1 patch Transdermal Daily DRU Wagoner     • pramipexole  1.5 mg Oral HS DRU Wagoner     • QUEtiapine  12.5 mg Oral HS Tiarra Griffin MD     • risperiDONE  0.25 mg Oral Q4H PRN Max 6/day DRU Siddiqui     • risperiDONE  0.5 mg Oral Q4H PRN Max 3/day DRU Siddiqui     • risperiDONE  1 mg Oral Q2H PRN Max 3/day DRU Siddiqui     • traZODone  50 mg Oral HS PRN DRU Siddiqui          Today, Patient Was Seen By: Dana Douglas PA-C      ** Please Note: Dictation voice to text software may have been used in the creation of this document.  **

## 2023-06-26 NOTE — NURSING NOTE
Pt present on the unit and otherwise withdrawn to room. Pt intermittently seen amongst and no interaction observed. On approach, pt reported doing Ok and her affect appears to be depressed. However, pt denies depression and anxiety. Compliant with scheduled medication.

## 2023-06-26 NOTE — ASSESSMENT & PLAN NOTE
· Patient complaining of bilateral ear fullness. Reports decreased hearing. Denies pain or discharge. · On otoscopic exam, there is cerumen in the right ear canal and the TM cannot be visualized. There is some cerumen in the left ear and the TM can be partially visualize. The TM appears pearly gray, there is no erythema or edema. · Likely cerumen impaction vs. eustachian tube dysfunction.    · Will order debrox 5 drops bid for 4 days and flonase daily

## 2023-06-26 NOTE — NURSING NOTE
Patient calm, cooperative, visible on millieu with minimal to no interaction with peers. She is focused on her insurance paying for her hospital visit, nicotine patches, medications, and no longer having her brother take care of her affairs. She reports sleeping well, takes medications as scheduled and denies HI/SI/AVH and is able to make needs known.

## 2023-06-27 PROCEDURE — 97167 OT EVAL HIGH COMPLEX 60 MIN: CPT

## 2023-06-27 PROCEDURE — 99232 SBSQ HOSP IP/OBS MODERATE 35: CPT | Performed by: STUDENT IN AN ORGANIZED HEALTH CARE EDUCATION/TRAINING PROGRAM

## 2023-06-27 RX ADMIN — CARBAMAZEPINE 200 MG: 200 TABLET ORAL at 21:01

## 2023-06-27 RX ADMIN — QUETIAPINE FUMARATE 12.5 MG: 25 TABLET ORAL at 21:01

## 2023-06-27 RX ADMIN — LEVETIRACETAM 1500 MG: 500 TABLET, FILM COATED ORAL at 21:01

## 2023-06-27 RX ADMIN — METOPROLOL SUCCINATE 50 MG: 50 TABLET, EXTENDED RELEASE ORAL at 08:54

## 2023-06-27 RX ADMIN — ESCITALOPRAM OXALATE 10 MG: 10 TABLET ORAL at 08:55

## 2023-06-27 RX ADMIN — CARBAMIDE PEROXIDE 6.5% 5 DROP: 6.5 LIQUID AURICULAR (OTIC) at 08:57

## 2023-06-27 RX ADMIN — CHOLECALCIFEROL TAB 25 MCG (1000 UNIT) 2000 UNITS: 25 TAB at 08:54

## 2023-06-27 RX ADMIN — CALCIUM 2 TABLET: 500 TABLET ORAL at 08:54

## 2023-06-27 RX ADMIN — Medication 6 MG: at 21:01

## 2023-06-27 RX ADMIN — PRAMIPEXOLE DIHYDROCHLORIDE 1.5 MG: 1 TABLET ORAL at 21:01

## 2023-06-27 RX ADMIN — CARBAMAZEPINE 200 MG: 200 TABLET ORAL at 17:18

## 2023-06-27 RX ADMIN — CARBAMAZEPINE 200 MG: 200 TABLET ORAL at 08:55

## 2023-06-27 RX ADMIN — NICOTINE 1 PATCH: 21 PATCH, EXTENDED RELEASE TRANSDERMAL at 09:15

## 2023-06-27 RX ADMIN — CARBAMIDE PEROXIDE 6.5% 5 DROP: 6.5 LIQUID AURICULAR (OTIC) at 17:28

## 2023-06-27 RX ADMIN — LOSARTAN POTASSIUM 25 MG: 25 TABLET, FILM COATED ORAL at 08:55

## 2023-06-27 RX ADMIN — LEVETIRACETAM 1500 MG: 500 TABLET, FILM COATED ORAL at 08:55

## 2023-06-27 RX ADMIN — FLUTICASONE PROPIONATE 1 SPRAY: 50 SPRAY, METERED NASAL at 08:57

## 2023-06-27 NOTE — NURSING NOTE
Patient was visible in the milieu socializing with select peers. VSS. Denies all psych s/s. C/o Samish on B/L ears, provider aware and was started on debrox otic solution on 6/26. No behaviors noted. Preoccupied with her medications. Had 100% for both breakfast/lunch. Took her medications. Safety checks ongoing.

## 2023-06-27 NOTE — PROGRESS NOTES
06/27/23 1000   Activity/Group Checklist   Group Other (Comment)  (Group Art Therapy/Psychodynamic, Open Choice with Discussion)   Attendance Attended   Attendance Duration (min) 46-60   Interactions Interacted appropriately   Affect/Mood Appropriate   Goals Achieved Discussed coping strategies; Able to listen to others; Able to engage in interactions; Able to recieve feedback; Able to give feedback to another  (Able to engage materials; full participation with discussion)

## 2023-06-27 NOTE — PROGRESS NOTES
Progress Note - [address] 57 y.o. female MRN: [id number]  Unit/Bed#: [facility] 596-15 Encounter: [id number]       Patient was visited on unit for continuing care; chart reviewed and discussed with multidisciplinary treatment team. On approach, the patient was calm and superficially cooperative. Denied any changes in mood, appetite, and energy level. No problem initiating and maintaining sleep. Denied A/VH currently. Denied SI/HI, intent or plan upon direct inquiry at this time. She was preoccupied with "clogged ear" and her seizure meds, and acknowledged that she is not able to function independently at home and agreed with referral to higher level of care (e.g NH placement). Patient continues to be intermittently visible in the milieu and interacts with select staff and peers. No reports of aggression or self-injurious behavior on unit. No PRN medications used in the past 24 hours. Patient accepted all offered medications and reported feeling better. No adverse effects of medications noted or reported. Neuropsychological evaluation was done by Dr. Alin Moreno on 6/26/23: "Results of Neuropsychological Exam revealed cognitive deficits in working memory, generative ability, semantic retrieval, declarative memory, abstract reasoning ability and commonsense reasoning and judgment. However, on a measure assessing awareness of personal health status and ability to evaluate health problems, handle medical emergencies and take safety precautions, patient performed within normal limits. During this encounter, patient appeared to have capacity to make informed medical decisions. Unspecified Neurocognitive Disorder. Recommend patient be provided with assistance with medication management." The patient has to remain in the hospital while awaiting placement- as their mental illness does not allow for them to be treated at a lower level of care.  Case management is assertively/actively working on securing the necessary level of care. Current Mental Status Evaluation:  Appearance and attitude: appeared as stated age, dressed in hospital attire, with fair hygiene  Eye contact: good  Motor Function: within normal limits, No PMA/PMR  Gait/station: Not observed  Speech: normal for rate, rhythm, volume, with increased latency and decreased amount  Language: No overt abnormality  Mood/affect: "good" / Affect was constricted but reactive, mood congruent  Thought Processes: slowing of thoughts  Thought content: denied suicidal ideations or homicidal ideations, no overt delusions elicited, paucity of thought  Associations: concrete associations  Perceptual disturbances: denies Auditory/Visual/Tactile Hallucinations  Orientation: oriented to place and person and partially to the time (month and year, but not the date/day of the week)  Cognitive Function: intact  Memory: impaired recall  Intellect: unable to assess  Fund of knowledge: diminished  Impulse control: good  Insight/judgment: limited/limited    Vital signs in last 24 hours:    Temp:  [96.9 °F (36.1 °C)-97.3 °F (36.3 °C)] 96.9 °F (36.1 °C)  HR:  [69-72] 69  Resp:  [16-17] 16  BP: (119-134)/(59-82) 134/82    Laboratory results: I have personally reviewed all pertinent laboratory/tests results    Results from the past 24 hours: No results found for this or any previous visit (from the past 24 hour(s)). Progress Toward Goals: minimal improvement    Assessment:  Principal Problem:    Severe episode of recurrent major depressive disorder, with psychotic features ([address])  Active Problems:    Epilepsy with altered consciousness with intractable epilepsy ([address])    UTI (urinary tract infection)    Acute renal failure superimposed on chronic kidney disease (HCC)    Tobacco abuse    Essential hypertension    Medical clearance for psychiatric admission    Obesity (BMI 30-39. 9)    Falls frequently    Major neurocognitive disorder (HCC)    Sensation of fullness in both ears        Plan:  - f/u [facility] recs regarding the medical problems  - Encourage early mobility and having a structured day  - Provide frequent re-orientation, and cognitive stimulation  - Ensure assistive devices are in proper working order (eye-glasses, hearing aids)  - Encourage adequate hydration, nutrition and monitor bowel movements  - Maintain sleep-wake cycle: Uninterrupted sleep time; low-level lighting at night  - Fall precaution  - Continue medication titration and treatment plan; adjust medication to optimize treatment response and as clinically indicated.      Scheduled medications:  Current Facility-Administered Medications   Medication Dose Route Frequency Provider Last Rate   • acetaminophen  650 mg Oral Q4H PRN Michaeleen Kehr, CRNP     • acetaminophen  650 mg Oral Q4H PRN Michaeleen Kehr, CRNP     • acetaminophen  975 mg Oral Q6H PRN Michaeleen Kehr, CRNP     • calcium carbonate  2 tablet Oral Daily With Breakfast Maximo BeverageVIN     • carBAMazepine  200 mg Oral TID Michaeleen Kehr, CRNP     • carbamide peroxide  5 drop Both Ears BID Maximo BeverageVIN     • cholecalciferol  2,000 Units Oral Daily Hunter Halsted [place], CRNP     • escitalopram  10 mg Oral Daily Emiliano Clarke MD     • fluticasone  1 spray Each Nare Daily Maximo BeverageVIN     • haloperidol lactate  5 mg Intramuscular Q4H PRN Max 4/day Michaeleen Kehr, CRNP     • hydrOXYzine HCL  25 mg Oral Q6H PRN Max 4/day Michaeleen Kehr, CRNP     • hydrOXYzine HCL  50 mg Oral Q6H PRN Max 4/day Michaeleen Kehr, CRNP     • levETIRAcetam  1,500 mg Oral BID Hunter Halsted [place], CRNP     • LORazepam  1 mg Intramuscular Q6H PRN Max 3/day Michaeleen Kehr, CRNP     • LORazepam  1 mg Oral Q6H PRN Max 3/day Michaeleen Kehr, CRNP     • losartan  25 mg Oral Daily DRU Wagoner     • melatonin  6 mg Oral HS Hunter Halsted [place], CRNP     • metoprolol succinate  50 mg Oral Daily DRU Wagoner     • nicotine  1 patch Transdermal Daily DUR Wagoner     • pramipexole  1.5 mg Oral HS DRU Wagoner     • QUEtiapine  12.5 mg Oral HS Jam Fay MD     • risperiDONE  0.25 mg Oral Q4H PRN Max 6/day Yareli List, CRNP     • risperiDONE  0.5 mg Oral Q4H PRN Max 3/day Yareli List, CRNP     • risperiDONE  1 mg Oral Q2H PRN Max 3/day Yareli List, CRNP     • traZODone  50 mg Oral HS PRN Yareli List, CRNP          PRN:  •  acetaminophen  •  acetaminophen  •  acetaminophen  •  haloperidol lactate  •  hydrOXYzine HCL  •  hydrOXYzine HCL  •  LORazepam  •  LORazepam  •  risperiDONE  •  risperiDONE  •  risperiDONE  •  traZODone    - Observation: routine    - VS: as per unit protocol  - Diet: Regular diet  - Psychoeducation (benefits and potential risks) discussed, importance of compliance with the psychiatric treatment reiterated, and the patient verbalized understanding of the matter  - Encourage group attendance and milieu therapy    - The pt was educated and agreed to verbalize any suicidal thoughts, frustrations or concerns to the nursing staff, immediately. - Dispo: TBD       Next of Kin  · Extended Emergency Contact Information  · Primary Emergency Contact: Krishna Lim  · Address: [facility]  ·          [address]  ·          [address]  · Home Phone: [phone]  · Mobile Phone: [phone]  · Relation: Spouse      Counseling / Coordination of Care  Patient's progress discussed with staff in treatment team meeting. Medications, treatment progress and treatment plan reviewed with patient. Supportive therapy provided to patient. Cognitive techniques utilized during the session. Reassurance and supportive therapy provided. Reoriented to reality and reassured. Encouraged participation in milieu and group therapy on the unit.      Jam Fay MD  Attending [address] [place] [address] Drive

## 2023-06-27 NOTE — NURSING NOTE
Pt is withdrawn to room and self. Pt reports that she is "very depressed" due her hearing issues and the possibility of living at nursing home. Pt stated to writer " I do not want to go to nursing home I don't need a nursing home and I refuse to go." Rn provided counseling. Pt denies SI/HI and AH/VH. Pt is compliant with medications and care. Safety checks ongoing.

## 2023-06-27 NOTE — PROGRESS NOTES
06/27/23 0843   Team Meeting   Initial Conference Date 06/27/23   Team Members Present   Team Members Present Physician;Nurse;;; Occupational Therapist   Physician Team Member [address] Team Member [address] Management Team Member [address] Work Team Member Thor   OT Team Member Daylin Johns   Patient/Family Present   Patient Present No   Patient's Family Present No     Patient is sleeping through the night. She is currently on a 201 but currently does not have good insight. Potential to complete guardianship here as brother does not want to be the guardian. Discharge is pending.

## 2023-06-28 PROCEDURE — 99232 SBSQ HOSP IP/OBS MODERATE 35: CPT | Performed by: STUDENT IN AN ORGANIZED HEALTH CARE EDUCATION/TRAINING PROGRAM

## 2023-06-28 RX ADMIN — METOPROLOL SUCCINATE 50 MG: 50 TABLET, EXTENDED RELEASE ORAL at 08:09

## 2023-06-28 RX ADMIN — CARBAMAZEPINE 200 MG: 200 TABLET ORAL at 21:44

## 2023-06-28 RX ADMIN — PRAMIPEXOLE DIHYDROCHLORIDE 1.5 MG: 1 TABLET ORAL at 21:44

## 2023-06-28 RX ADMIN — LOSARTAN POTASSIUM 25 MG: 25 TABLET, FILM COATED ORAL at 08:08

## 2023-06-28 RX ADMIN — LEVETIRACETAM 1500 MG: 500 TABLET, FILM COATED ORAL at 08:09

## 2023-06-28 RX ADMIN — QUETIAPINE FUMARATE 12.5 MG: 25 TABLET ORAL at 21:44

## 2023-06-28 RX ADMIN — FLUTICASONE PROPIONATE 1 SPRAY: 50 SPRAY, METERED NASAL at 08:10

## 2023-06-28 RX ADMIN — LEVETIRACETAM 1500 MG: 500 TABLET, FILM COATED ORAL at 21:44

## 2023-06-28 RX ADMIN — NICOTINE 1 PATCH: 21 PATCH, EXTENDED RELEASE TRANSDERMAL at 08:12

## 2023-06-28 RX ADMIN — ESCITALOPRAM OXALATE 10 MG: 10 TABLET ORAL at 08:09

## 2023-06-28 RX ADMIN — CARBAMIDE PEROXIDE 6.5% 5 DROP: 6.5 LIQUID AURICULAR (OTIC) at 17:22

## 2023-06-28 RX ADMIN — CALCIUM 2 TABLET: 500 TABLET ORAL at 08:08

## 2023-06-28 RX ADMIN — Medication 6 MG: at 21:44

## 2023-06-28 RX ADMIN — CARBAMAZEPINE 200 MG: 200 TABLET ORAL at 08:07

## 2023-06-28 RX ADMIN — CHOLECALCIFEROL TAB 25 MCG (1000 UNIT) 2000 UNITS: 25 TAB at 08:08

## 2023-06-28 RX ADMIN — CARBAMAZEPINE 200 MG: 200 TABLET ORAL at 17:22

## 2023-06-28 RX ADMIN — CARBAMIDE PEROXIDE 6.5% 5 DROP: 6.5 LIQUID AURICULAR (OTIC) at 08:11

## 2023-06-28 NOTE — PROGRESS NOTES
Progress Note - [address] Talbert 57 y.o. female MRN: [id number]  Unit/Bed#: Jenifer Lin 000-20 Encounter: [id number]    Patient was seen today for continuation of care, records reviewed and patient was discussed with the morning case review team.    Katherine Meng was seen today for psychiatric follow-up. On assessment today, Katherine Meng was calm and very goal directed. Upset about possibly needing nursing home placement secondary to the neuropsychological evaluation performed by Dr. Gerald Dominguez and patient need for medication management. Katherine Meng is only agreeable to returning home with nursing assistance. We will continue to evaluate needs to plan accordingly for discharge. No depression or anxiety verbalized, patient does not need any medical adjustments at this time as depression is improved with no behavioral issues or perceptual disturbances noted by staff. Katherine Meng denies acute suicidal/self-harm ideation/intent/plan upon direct inquiry at this time. Katherine Meng remains behaviorally appropriate, no agitation or aggression noted on exam or in report. Katherine Meng also denies HI/AH/VH, and does not appear overtly manic. No overt delusions or paranoia are verbalized. Impulse control is intact. Katherine Meng remains adherent to her current psychotropic medication regimen and denies any side effects from medications, as well as none noted on exam.    Vitals:  Vitals:    06/28/23 0741   BP: 114/56   Pulse: 64   Resp: 16   Temp: 98 °F (36.7 °C)   SpO2: 96%       Laboratory Results:  I have personally reviewed all pertinent laboratory/tests results. Psychiatric Review of Systems:  Behavior over the last 24 hours:  unchanged.    Sleep: good  Appetite: good  Medication side effects: none  ROS: no complaints, denies shortness of breath or chest pain and all other systems are negative for acute changes    Mental Status Evaluation:  Appearance:  adequate grooming   Behavior:  cooperative, calm   Speech:  normal rate and volume Mood:  euthymic   Affect:  brighter   Thought Process:  goal directed, linear   Thought Content:  no overt delusions   Perceptual Disturbances: no auditory hallucinations, no visual hallucinations   Risk Potential: Suicidal ideation - None  Homicidal ideation - None  Potential for aggression - No   Memory:  recent and remote memory grossly intact   Sensorium  person, place and time/date      Consciousness:  alert and awake   Attention: attention span and concentration are age appropriate   Insight:  partial   Judgment: partial   Gait/Station: normal gait/station   Motor Activity: no abnormal movements   Progress Toward Goals:   Tunde Koch is progressing towards goals of inpatient psychiatric treatment by continued medication compliance and is attending therapeutic modalities on the milieu. However, the patient continues to require inpatient psychiatric hospitalization for continued medication management and titration to optimize symptom reduction, improve sleep hygiene, and demonstrate adequate self-care. Assessment/Plan   Principal Problem:    Severe episode of recurrent major depressive disorder, with psychotic features ([address])  Active Problems:    Epilepsy with altered consciousness with intractable epilepsy ([address])    UTI (urinary tract infection)    Acute renal failure superimposed on chronic kidney disease (HCC)    Tobacco abuse    Essential hypertension    Medical clearance for psychiatric admission    Obesity (BMI 30-39. 9)    Falls frequently    Major neurocognitive disorder (HCC)    Sensation of fullness in both ears      Recommended Treatment: Treatment plan and medication changes discussed and per the attending physician the plan is: 1. Continue with group therapy, milieu therapy and occupational therapy  2. Behavioral Health checks every 7 minutes  3. Continue frequent safety checks and vitals per unit protocol  4. Continue with SLIM medical management as indicated  5. Continue with current medication regimen  6. Will review labs in the a.m. 7.Disposition Planning: Discharge planning and efforts remain ongoing    Behavioral Health Medications: all current active meds have been reviewed and continue current psychiatric medications.   Current Facility-Administered Medications   Medication Dose Route Frequency Provider Last Rate   • acetaminophen  650 mg Oral Q4H PRN DRU Luna     • acetaminophen  650 mg Oral Q4H PRN DRU Luna     • acetaminophen  975 mg Oral Q6H PRN DRU Luna     • calcium carbonate  2 tablet Oral Daily With Breakfast Gabino Hamman, PA-C     • carBAMazepine  200 mg Oral TID DRU Luna     • carbamide peroxide  5 drop Both Ears BID Gabino Hamman, PA-C     • cholecalciferol  2,000 Units Oral Daily DRU Pitt     • escitalopram  10 mg Oral Daily Misty Mars MD     • fluticasone  1 spray Each Nare Daily Gabino Hamman, PA-C     • haloperidol lactate  5 mg Intramuscular Q4H PRN Max 4/day DRU Luna     • hydrOXYzine HCL  25 mg Oral Q6H PRN Max 4/day DRU Luna     • hydrOXYzine HCL  50 mg Oral Q6H PRN Max 4/day DRU Luna     • levETIRAcetam  1,500 mg Oral BID DRU Pitt     • LORazepam  1 mg Intramuscular Q6H PRN Max 3/day DRU Luna     • LORazepam  1 mg Oral Q6H PRN Max 3/day DRU Luna     • losartan  25 mg Oral Daily DRU Wagoner     • melatonin  6 mg Oral HS HeDRU Holder     • metoprolol succinate  50 mg Oral Daily DRU Wagoner     • nicotine  1 patch Transdermal Daily DRU Wagoner     • pramipexole  1.5 mg Oral HS DRU Wagoner     • QUEtiapine  12.5 mg Oral HS Misty Mars MD     • risperiDONE  0.25 mg Oral Q4H PRN Max 6/day DRU Luna     • risperiDONE  0.5 mg Oral Q4H PRN Max 3/day DRU Luna     • risperiDONE  1 mg Oral Q2H PRN Max 3/day Rayray Hanna, ROWDYNP     • traZODone  50 mg Oral HS PRN DRU Davison         Risks / Benefits of Treatment:  Risks, benefits, and possible side effects of medications explained to patient and patient verbalizes understanding and agreement for treatment. Counseling / Coordination of Care:  Patient's progress reviewed with nursing staff. Medications, treatment progress and treatment plan reviewed with patient. Supportive counseling provided to the patient. Total floor/unit time spent today 25 minutes. Greater than 50% of total time was spent with the patient and / or family counseling and / or coordination of care. A description of the counseling / coordination of care: medication education, treatment plan, supportive therapy. This note was completed in part utilizing Dragon dictation Software. Grammatical, translation, syntax errors, random word insertions, spelling mistakes, and incomplete sentences may be an occasional consequence of this system secondary to software limitations with voice recognition, ambient noise, and hardware issues.  If you have any questions or concerns about the content, text, or information contained within the body of this dictation, please contact the provider for clarification

## 2023-06-28 NOTE — PLAN OF CARE
Problem: SELF HARM/SUICIDALITY  Goal: Will have no self-injury during hospital stay  Description: INTERVENTIONS:  - Q 15 MINUTES: Routine safety checks  - Q WAKING SHIFT & PRN: Assess risk to determine if routine checks are adequate to maintain patient safety  - Encourage patient to participate actively in care by formulating a plan to combat response to suicidal ideation, identify supports and resources  Outcome: Progressing     Problem: ANXIETY  Goal: Will report anxiety at manageable levels  Description: INTERVENTIONS:  - Administer medication as ordered  - Teach and encourage coping skills  - Provide emotional support  - Assess patient/family for anxiety and ability to cope  Outcome: Progressing  Goal: By discharge: Patient will verbalize 2 strategies to deal with anxiety  Description: Interventions:  - Identify any obvious source/trigger to anxiety  - Staff will assist patient in applying identified coping technique/skills  - Encourage attendance of scheduled groups and activities  Outcome: Progressing

## 2023-06-28 NOTE — CASE MANAGEMENT
Called the patient's brother, Allyn Becker ([phone]), to update him on the neuropsych consult and the PT/OT evals. He did advise that he can certainly set up some unskilled home health care services for the patient. Sent him a list of home health agencies via e-mail. He also asked if we could give him something that would make it so that she can not have him removed as her rep-payee at this time. She did this in the past and then all of her bills were not paid.

## 2023-06-28 NOTE — NURSING NOTE
Pt isolative to self on unit. Pt denies all psych and symptoms. Pt is compliant with scheduled medications and meals. Pt has minimal interaction with peers and is able to make needs known. Will maintain q7 min checks.

## 2023-06-28 NOTE — NURSING NOTE
Patient denies anxiety, depression, SI/HI/AH/VH. Patient is calm, pleasant and cooperative with staff. Medication and meal compliant. Patient is visible in the dining room eating dinner with peers. Patient encouraged to make needs known. Safety checks ongoing.

## 2023-06-28 NOTE — PROGRESS NOTES
06/28/23 0749   Team Meeting   Meeting Type Daily Rounds   Initial Conference Date 06/28/23   Next Conference Date 06/29/23   Team Members Present   Team Members Present Physician;Nurse;;; Occupational Therapist   Physician Team Member Dr Cate Saeed, [address] [place] [address]   Nursing Team Member [facility] Management Team Member [facility] Work Team Member Thor   OT Team Member Karsten Fernandez   Patient/Family Present   Patient Present No   Patient's Family Present No     Add Suly - pharmacy: depressed due to her hearing issues and possibly living in a nursing home, compliant with medications, withdrawn, slept ok, neurocog eval and found to have capacity to make informed decisions

## 2023-06-28 NOTE — OCCUPATIONAL THERAPY NOTE
Occupational Therapy Evaluation     Patient Name: Carissa MELARALTHAldoI Date: 6/28/2023  Problem List  Principal Problem:    Severe episode of recurrent major depressive disorder, with psychotic features ([address])  Active Problems:    Epilepsy with altered consciousness with intractable epilepsy ([address])    UTI (urinary tract infection)    Acute renal failure superimposed on chronic kidney disease ([address])    Tobacco abuse    Essential hypertension    Medical clearance for psychiatric admission    Obesity (BMI 30-39. 9)    Falls frequently    Major neurocognitive disorder (HCC)    Sensation of fullness in both ears    Past Medical History  Past Medical History:   Diagnosis Date    Depression     EP (epilepsy) ([address])     Epilepsy ([address])     Obesity     Restless leg      Past Surgical History  Past Surgical History:   Procedure Laterality Date    DENTAL SURGERY      all teeth removed    LASER ABLATION OF THE CERVIX      MAMMO (HISTORICAL)  05/01/2017 06/28/23 0801   OT Last Visit   OT Visit Date 06/28/23   Note Type   Note type Evaluation   Pain Assessment   Pain Assessment Tool 0-10   Pain Score No Pain   Restrictions/Precautions   Weight Bearing Precautions Per Order No   Other Precautions Hard of hearing   Home Living   Type of Home Apartment   Home Layout One level   Bathroom Shower/Tub Walk-in shower   Bathroom Toilet Standard   Bathroom Accessibility Accessible via walker   Prior Function   Level of [place] Independent with ADLs; Independent with IADLS   Lives With Alone   ADL   Grooming Assistance 7  Independent   UB Bathing Assistance 7  Independent   LB Bathing Assistance 7  Independent   UB Dressing Assistance 7  Independent    [place] to Stand 7  Independent   Stand to Sit 7  Independent   Toilet transfer 7  Independent   Functional Mobility   Functional Mobility 7  Independent   Balance   Static Sitting Good Dynamic Sitting Fair +   Static Standing Fair   Dynamic Standing Fair   Ambulatory Fair   Activity Tolerance   Activity Tolerance Patient tolerated treatment well   RUE Assessment   RUE Assessment WFL   LUE Assessment   LUE Assessment WFL   Hand Function   Gross Motor Coordination Functional   Sensation   Light Touch No apparent deficits   Proprioception   Proprioception No apparent deficits   Cognition   Arousal/Participation Alert; Cooperative   Attention Within functional limits   Orientation Level Oriented X4   Memory Within functional limits   Following Commands Follows all commands and directions without difficulty   Cognition Assessment Tools ACLS   Score   5.0    Administered [place] Cognitive Level Screen (ACLS). The patient scored 5.0/6.0 indicating that they may live alone with weekly checks to monitor safety and check problem solving effectiveness. Behavior:  Stops working to talk. Questions need to do activity. Knows need to keep scheduled appointments however may be apt for forget or miss appointments. Tangential.  Manipulative. May blame others when mistakes are made. May argue with suggestions. May be inconsistent with carryover. May resist attempts to provide assistance. Inflexible. May insist upon using an inefficient way of doing things. Conversations may be concrete and self centered. Abstract reasoning may not be understood. Grooming: Completes grooming activity independently. May forget newly learned techniques. May fail to anticipate need to purchase grooming supplies. May not read directions before using new products. Dressing:  May not consider social standards. May argue with suggestions to change selections. Bathing:  Completes bathing routine independently. May solve problems. May fail to anticipate secondary effects of new products like allergies. Walking/Exercising:  May forget newly discovered routes and may have to rediscover them.   May refuse to comply with exercise program.  Eating:  Eats and talks with others. May not see crumbs or small spills. May need help to comply with dietary restrictions. Toileting:  Independently performs all toileting. Can explore a new environment to locate a bathroom without assistance. May not anticipate needs for new toileting solutions. Medications:  Opens new containers. May understand medication schedules but has trouble taking them on time. May choose to discontinue medications. May recognize side effects like tremors or sedation. Utilize daily pill box. Use of adaptive equipment:  May choose not to use equipment. May utilize in unsafe manner. Housekeeping:  Able to learn new sequence of actions. Does not change or alter pace. May set and follow a weekly schedule. Identifies potential hazards posed by electric, gas, toxins, poisons or improper storage/disposal of items. Food preparation:  May make reservations to eat out but may fail to remember and arrive late. Passes heavy serving dishes safely. May forget discovered solutions to problems. Spending money:  May be able to identify monthly budget of routine expenses but may have trouble adhering to it. May run up bills or overextend credit. May have trouble balancing checkbook. Does not plan for long term financial security. Shopping:  May make daily or weekly schedule of shopping needs with trouble adhering to it. May be impulsive in spending. May discriminate between stores, labels and brands. Laundry:  May set and follow weekly schedule with difficulty adhering to it. Learns new sequence of actions. Uses iron effectively. Traveling: Can make a schedule for traveling to new locations but has difficulty adhering to it. May miss bus. May need assistance to read and understand new schedules. Telephone:  Learns new telephone procedures by initiating several steps at a time. May look up new numbers. May not use yellow pages.   May become confused by new options or ignore call waiting. May run up large phone bills. Driving:  Should NOT operate a motor vehicle. Assessment   Limitation Decreased high-level ADLs   Assessment   Pt is a 57 y.o. female seen for OT evaluation s/p admit to [facility]/2023 w/ Severe episode of recurrent major depressive disorder, with psychotic features ([address] See medical history above for extensive list of comorbidities affecting pt's functional performance at time of assessment. Personal factors affecting Pt at time of IE include:behavioral pattern, difficulty performing IADLS , and health management . Upon evaluation: Pt pleasant, does voice difficulty with hearing, however able to hear with increased volume. Pt with noted poor insight and health literacy, unable to verbalize events leading to hospitalization, stating she is here because " I just needed to get a refill on my meds."  Pt was able to identify 3/3 home safety scenarios, state action to take in an emergency and address. Pt able to describe med management systems at home. Pt does demonstrate difficulty with problem-solving in novel situations, limited carry over and application of new learning, poor insight into causation. Formal cognition assessment completed, see above for details and below for discharge recommendation. Plan   OT Frequency Eval only   Recommendation   OT Discharge Recommendation   The patient scored 5.0/6.0 indicating that they may live alone with weekly checks to monitor safety and check problem solving effectiveness. Recommend weekly checks to monitor compliance with medication management, recommend assistance with higher level IADLs including financial management.

## 2023-06-28 NOTE — PROGRESS NOTES
06/28/23 1100   Activity/Group Checklist   Group Exercise  (seated musical exercises.)   Attendance Attended   Attendance Duration (min) 46-60   Interactions Interacted appropriately  (needed some prompting yet follow exercises well.)   Affect/Mood Appropriate;Calm;Normal range   Goals Achieved Able to engage in interactions; Discussed coping strategies

## 2023-06-29 PROCEDURE — 99232 SBSQ HOSP IP/OBS MODERATE 35: CPT | Performed by: STUDENT IN AN ORGANIZED HEALTH CARE EDUCATION/TRAINING PROGRAM

## 2023-06-29 RX ADMIN — CARBAMIDE PEROXIDE 6.5% 5 DROP: 6.5 LIQUID AURICULAR (OTIC) at 08:44

## 2023-06-29 RX ADMIN — LEVETIRACETAM 1500 MG: 500 TABLET, FILM COATED ORAL at 08:43

## 2023-06-29 RX ADMIN — CALCIUM 2 TABLET: 500 TABLET ORAL at 08:44

## 2023-06-29 RX ADMIN — PRAMIPEXOLE DIHYDROCHLORIDE 1.5 MG: 1 TABLET ORAL at 21:22

## 2023-06-29 RX ADMIN — ESCITALOPRAM OXALATE 10 MG: 10 TABLET ORAL at 08:42

## 2023-06-29 RX ADMIN — FLUTICASONE PROPIONATE 1 SPRAY: 50 SPRAY, METERED NASAL at 08:46

## 2023-06-29 RX ADMIN — CARBAMAZEPINE 200 MG: 200 TABLET ORAL at 16:25

## 2023-06-29 RX ADMIN — LOSARTAN POTASSIUM 25 MG: 25 TABLET, FILM COATED ORAL at 08:42

## 2023-06-29 RX ADMIN — CARBAMAZEPINE 200 MG: 200 TABLET ORAL at 08:42

## 2023-06-29 RX ADMIN — CHOLECALCIFEROL TAB 25 MCG (1000 UNIT) 2000 UNITS: 25 TAB at 08:43

## 2023-06-29 RX ADMIN — Medication 6 MG: at 21:22

## 2023-06-29 RX ADMIN — CARBAMIDE PEROXIDE 6.5% 5 DROP: 6.5 LIQUID AURICULAR (OTIC) at 17:28

## 2023-06-29 RX ADMIN — QUETIAPINE FUMARATE 12.5 MG: 25 TABLET ORAL at 21:22

## 2023-06-29 RX ADMIN — LEVETIRACETAM 1500 MG: 500 TABLET, FILM COATED ORAL at 21:22

## 2023-06-29 RX ADMIN — CARBAMAZEPINE 200 MG: 200 TABLET ORAL at 21:22

## 2023-06-29 RX ADMIN — NICOTINE 1 PATCH: 21 PATCH, EXTENDED RELEASE TRANSDERMAL at 08:46

## 2023-06-29 RX ADMIN — METOPROLOL SUCCINATE 50 MG: 50 TABLET, EXTENDED RELEASE ORAL at 08:43

## 2023-06-29 NOTE — PROGRESS NOTES
06/29/23 0814   Team Meeting   Meeting Type Daily Rounds   Initial Conference Date 06/29/23   Next Conference Date 06/30/23   Team Members Present   Team Members Present Physician;Nurse;;; Occupational Therapist   Physician Team Member Dr Jaylon Hodges, [facility]   Nursing Team Member [address] [id number], [address] Management Team Member [facility] Work Team Member Thor   OT Team Member Zena Grider   Patient/Family Present   Patient Present No   Patient's Family Present No     Deny s/s, medication compliant, visible, home health list sent to brother, tried to remove brother for rep payee in the past, slept ok, no seizures.  Possible dc monday

## 2023-06-29 NOTE — NURSING NOTE
Pt visible on unit. Pt denies all psych signs and symptoms. Pt continues to c/o hearing issues. Debrox drops applied as scheduled. Pt is compliant with medications and meals. Minimal interaction with peers. Able to make needs known. Will maintain q7 min checks.

## 2023-06-29 NOTE — NURSING NOTE
Patient denies anxiety, depression, SI/HI/AH/VH. Patient is cooperative with staff. Patient states that her ears are not getting better, even with debrox ear drops. Patient educated on medication and encouraged to continue the drops as prescribed. Patient verbalized that she understands. Medication and meal compliant. Patient is visible in the dayroom watching television and eating dinner with peers. Patient encouraged to make needs known. Safety checks ongoing.

## 2023-06-29 NOTE — PROGRESS NOTES
Progress Note - [address] Talbert 57 y.o. female MRN: [id number]  Unit/Bed#: Martha Donohue 556-57 Encounter: [id number]    Patient was seen today for continuation of care, records reviewed and patient was discussed with the morning case review team.    Reynaldo Pete was seen today for psychiatric follow-up. On assessment today, Reynaldo Pete was seen in the day room. Calm and cooperative, she denies all depression and anxiety at this time. Still determined to remove brother as rep payee for medicaid, patient states that she does not want brother involved in those decisions but is acceptable to him helping her find home care resources. NO thoughts of self harm, paranoia or delusions verbalized. Patient continues to need inpatient hospitalization as she is in need of additional assistance with medication management as outlined by  Neuropsych eval performed by Dr. Toshia Freeman on 6/26/2023. Will anticipate discharge Monday. No medication changes to be made as Reynaldo Pete continues to approach her baseline. Reynaldo Pete denies acute suicidal/self-harm ideation/intent/plan upon direct inquiry at this time. Reynaldo Pete remains behaviorally appropriate, no agitation or aggression noted on exam or in report. Reynaldo Pete also denies HI/AH/VH, and does not appear overtly manic. No overt delusions or paranoia are verbalized. Impulse control is intact. Reynaldo Pete remains adherent to her current psychotropic medication regimen and denies any side effects from medications, as well as none noted on exam.    Vitals:  Vitals:    06/29/23 0746   BP: 122/57   Pulse: 77   Resp: 16   Temp: 97.9 °F (36.6 °C)   SpO2: 93%       Laboratory Results:  I have personally reviewed all pertinent laboratory/tests results. Psychiatric Review of Systems:  Behavior over the last 24 hours:  unchanged.    Sleep: good  Appetite: good  Medication side effects: none   ROS: no complaints, denies shortness of breath or chest pain and all other systems are negative for acute changes    Mental Status Evaluation:  Appearance:  adequate grooming   Behavior:  cooperative, calm   Speech:  normal rate and volume   Mood:  less depressed   Affect:  brighter   Thought Process:  goal directed, linear   Thought Content:  no overt delusions   Perceptual Disturbances: none   Risk Potential: Suicidal ideation - None  Homicidal ideation - None  Potential for aggression - No   Memory:  recent and remote memory grossly intact   Sensorium  person, place and time/date      Consciousness:  alert and awake   Attention: attention span and concentration are age appropriate   Insight:  moderate   Judgment: moderate   Gait/Station: normal gait/station   Motor Activity: no abnormal movements   Progress Toward Goals:   Dee Blue is progressing towards goals of inpatient psychiatric treatment by continued medication compliance and is attending therapeutic modalities on the milieu. However, the patient continues to require inpatient psychiatric hospitalization for continued medication management and titration to optimize symptom reduction, improve sleep hygiene, and demonstrate adequate self-care. Assessment/Plan   Principal Problem:    Severe episode of recurrent major depressive disorder, with psychotic features ([address])  Active Problems:    Epilepsy with altered consciousness with intractable epilepsy ([address])    UTI (urinary tract infection)    Acute renal failure superimposed on chronic kidney disease (HCC)    Tobacco abuse    Essential hypertension    Medical clearance for psychiatric admission    Obesity (BMI 30-39. 9)    Falls frequently    Major neurocognitive disorder (HCC)    Sensation of fullness in both ears      Recommended Treatment: Treatment plan and medication changes discussed and per the attending physician the plan is: 1. Continue with group therapy, milieu therapy and occupational therapy  2. Behavioral Health checks every 7 minutes  3. Continue frequent safety checks and vitals per unit protocol  4. Continue with SLIM medical management as indicated  5. Continue with current medication regimen  6. Will review labs in the a.m. 7.Disposition Planning: Discharge planning and efforts remain ongoing    Behavioral Health Medications: all current active meds have been reviewed and continue current psychiatric medications.   Current Facility-Administered Medications   Medication Dose Route Frequency Provider Last Rate   • acetaminophen  650 mg Oral Q4H PRN Junior Dellrose, CRNP     • acetaminophen  650 mg Oral Q4H PRN Junior Dellrose, CRNP     • acetaminophen  975 mg Oral Q6H PRN Junior Dellrose, CRNP     • calcium carbonate  2 tablet Oral Daily With Breakfast Lexy Ceballos PA-C     • carBAMazepine  200 mg Oral TID Junior Dellrose, CRNP     • carbamide peroxide  5 drop Both Ears BID Lexy Ceballos PA-C     • cholecalciferol  2,000 Units Oral Daily [address] [facility], CRNP     • escitalopram  10 mg Oral Daily Adama Treviño MD     • fluticasone  1 spray Each Nare Daily Lexy Ceballos PA-C     • haloperidol lactate  5 mg Intramuscular Q4H PRN Max 4/day Junior Dellrose, CRNP     • hydrOXYzine HCL  25 mg Oral Q6H PRN Max 4/day Junior Dellrose, CRNP     • hydrOXYzine HCL  50 mg Oral Q6H PRN Max 4/day Junior Dellrose, CRNP     • levETIRAcetam  1,500 mg Oral BID [address] [facility], CRNP     • LORazepam  1 mg Intramuscular Q6H PRN Max 3/day Junior Dellrose, CRNP     • LORazepam  1 mg Oral Q6H PRN Max 3/day Junior Dellrose, CRNP     • losartan  25 mg Oral Daily DRU Wagoner     • melatonin  6 mg Oral HS [address] [facility], CRNP     • metoprolol succinate  50 mg Oral Daily DRU Wagoner     • nicotine  1 patch Transdermal Daily DRU Wagoner     • pramipexole  1.5 mg Oral HS DRU Wagoner     • QUEtiapine  12.5 mg Oral HS Adama Treviño MD     • risperiDONE  0.25 mg Oral Q4H PRN Max 6/day Junior Dellrose, CRNP     • risperiDONE  0.5 mg Oral Q4H PRN Max 3/day Casandra Cagey, CRNP     • risperiDONE  1 mg Oral Q2H PRN Max 3/day Casandra Cagey, CRNP     • traZODone  50 mg Oral HS PRN Casandra Cagey, CRNP         Risks / Benefits of Treatment:  Risks, benefits, and possible side effects of medications explained to patient and patient verbalizes understanding and agreement for treatment. Counseling / Coordination of Care:  Patient's progress reviewed with nursing staff. Medications, treatment progress and treatment plan reviewed with patient. Supportive counseling provided to the patient. Total floor/unit time spent today 25 minutes. Greater than 50% of total time was spent with the patient and / or family counseling and / or coordination of care. A description of the counseling / coordination of care: medication education, treatment plan, supportive therapy. This note was completed in part utilizing Dragon dictation Software. Grammatical, translation, syntax errors, random word insertions, spelling mistakes, and incomplete sentences may be an occasional consequence of this system secondary to software limitations with voice recognition, ambient noise, and hardware issues.  If you have any questions or concerns about the content, text, or information contained within the body of this dictation, please contact the provider for clarification

## 2023-06-29 NOTE — CASE MANAGEMENT
Sent an in basket message to [address] [place] [address] to see if she can get in for a new patient visit. Awaiting a response.

## 2023-06-30 PROCEDURE — 99232 SBSQ HOSP IP/OBS MODERATE 35: CPT | Performed by: STUDENT IN AN ORGANIZED HEALTH CARE EDUCATION/TRAINING PROGRAM

## 2023-06-30 RX ADMIN — LOSARTAN POTASSIUM 25 MG: 25 TABLET, FILM COATED ORAL at 08:17

## 2023-06-30 RX ADMIN — LEVETIRACETAM 1500 MG: 500 TABLET, FILM COATED ORAL at 08:17

## 2023-06-30 RX ADMIN — CHOLECALCIFEROL TAB 25 MCG (1000 UNIT) 2000 UNITS: 25 TAB at 08:17

## 2023-06-30 RX ADMIN — METOPROLOL SUCCINATE 50 MG: 50 TABLET, EXTENDED RELEASE ORAL at 08:17

## 2023-06-30 RX ADMIN — CARBAMAZEPINE 200 MG: 200 TABLET ORAL at 08:17

## 2023-06-30 RX ADMIN — CALCIUM 2 TABLET: 500 TABLET ORAL at 08:17

## 2023-06-30 RX ADMIN — LEVETIRACETAM 1500 MG: 500 TABLET, FILM COATED ORAL at 21:36

## 2023-06-30 RX ADMIN — CARBAMAZEPINE 200 MG: 200 TABLET ORAL at 21:36

## 2023-06-30 RX ADMIN — Medication 6 MG: at 21:36

## 2023-06-30 RX ADMIN — FLUTICASONE PROPIONATE 1 SPRAY: 50 SPRAY, METERED NASAL at 08:17

## 2023-06-30 RX ADMIN — ESCITALOPRAM OXALATE 10 MG: 10 TABLET ORAL at 08:17

## 2023-06-30 RX ADMIN — CARBAMIDE PEROXIDE 6.5% 5 DROP: 6.5 LIQUID AURICULAR (OTIC) at 08:21

## 2023-06-30 RX ADMIN — NICOTINE 1 PATCH: 21 PATCH, EXTENDED RELEASE TRANSDERMAL at 08:18

## 2023-06-30 RX ADMIN — CARBAMIDE PEROXIDE 6.5% 10 DROP: 6.5 LIQUID AURICULAR (OTIC) at 21:37

## 2023-06-30 RX ADMIN — CARBAMAZEPINE 200 MG: 200 TABLET ORAL at 17:09

## 2023-06-30 RX ADMIN — PRAMIPEXOLE DIHYDROCHLORIDE 1.5 MG: 1 TABLET ORAL at 21:36

## 2023-06-30 RX ADMIN — QUETIAPINE FUMARATE 12.5 MG: 25 TABLET ORAL at 21:36

## 2023-06-30 NOTE — ASSESSMENT & PLAN NOTE
· Patient complaining of bilateral ear fullness. Reports decreased hearing. Denies pain or discharge. · On otoscopic exam, there is cerumen in the right ear canal and the TM cannot be visualized. There is some cerumen in the left ear and the TM can be partially visualize. The TM appears pearly gray, there is no erythema or edema. · Likely cerumen impaction vs. eustachian tube dysfunction. · Patient completed 4 days of debrox 5 drops bid, however when administering the drops she did not allow time for the drops to penetrate.  She is still complaing of decreased hearing and sensation of ears feeling "clogged"  · On otoscopic exam,

## 2023-06-30 NOTE — PROGRESS NOTES
Pt attended groups. Pt stated she was not able to hear anything. Pt was f/u by medical.      06/30/23 1000   Activity/Group Checklist   Group Other (Comment)  ( Recovery: guiding principles and ZO[employer] info)   Attendance Attended   Attendance Duration (min) 31-45   Interactions Other (Comment)  (stated she could not hear)   Affect/Mood Other (Comment)  (anxious)   Goals Achieved Identified feelings; Identified relapse prevention strategies; Able to engage in interactions; Able to listen to others; Able to reflect/comment on own behavior;Able to manage/cope with feelings;Verbalized increased hopefulness; Able to self-disclose; Identified resources and support systems; Discussed self-esteem issues; Discussed coping strategies

## 2023-06-30 NOTE — NURSING NOTE
Patient calm and cooperative this shift. Compliant with meals and medications. Able to make needs known; denies any needs at this time. Denies all s/s at this time. Safety checks ongoing.

## 2023-06-30 NOTE — PROGRESS NOTES
06/30/23 1100   Activity/Group Checklist   Group Life Skills  (topic perspective and guided imagery relaxation,beach walk.)   Attendance Attended   Attendance Duration (min) 46-60   Interactions Other (Comment)  (fair eye contact and minimal self expression.)   Affect/Mood Constricted   Goals Achieved Able to listen to others;Discussed coping strategies

## 2023-06-30 NOTE — CASE MANAGEMENT
Scheduled the patient at [facility] with Dr Theresa Hernandez on 8/29/23 and then with her PCP on 7/6/23.      Sent the brother an e-mail to advise of all the discharge plans

## 2023-06-30 NOTE — DISCHARGE INSTR - OTHER ORDERS
You are being discharged to [address] Sameer Talbert, [address] [place] [address] Triggers you have identified during your hospitalization that led to your admission are suicidal ideation, and a distressed mood. Coping skills you have identified during your hospitalization include sitting outside and keeping to yourself. If you are unable to deal with your distressed mood alone please contact your brother or [facility]. If that is not effective and you continue to have suicidal ideation, distressed mood, are feeling overwhelmed, and/or are in crisis please contact [facility] [place] at [phone], dial 911 or go to the nearest emergency center. [address]: [phone]    National Suicide Hotline: 731.598.6768    Crisis Text Line: 837348       Misa Ramirez, our [address], will be calling you after your discharge, on the phone number that you provided. They will be available as an additional support, if needed. If you wish to speak with one of them, you may contact Terra Cordoba at [phone] or Mayra Maharaj at [phone].

## 2023-06-30 NOTE — PROGRESS NOTES
Progress Note - [address] Miguel Angel Talbert 57 y.o. female MRN: [id number]  Unit/Bed#: Deven Morales 994-58 Encounter: [id number]    Patient was seen today for continuation of care, records reviewed and patient was discussed with the morning case review team.    Ludy Garcaí was seen today for psychiatric follow-up. On assessment today, Ludy García was calm and cooperative. Still concerned about her hearing, she states that ear drops were not as effective. Medicine made aware. No depression or anxiety verbalized, she was upbeat and happy when discussing her brother and that he was able to assist in cleaning her apartment. In agreement with discharge on Monday with home nursing she is able to verbalize coping mechanisms as well as steps to take if she is feeling unsafe. Medications reviewed, will not make any changes at this time as patient is approaching her baseline and anticipated discharge Monday. Ludy García denies acute suicidal/self-harm ideation/intent/plan upon direct inquiry at this time. Ludy García remains behaviorally appropriate, no agitation or aggression noted on exam or in report. Ludy García also denies HI/AH/VH, and does not appear overtly manic. No overt delusions or paranoia are verbalized. Impulse control is intact. Ludy García remains adherent to her current psychotropic medication regimen and denies any side effects from medications, as well as none noted on exam.    Vitals:  Vitals:    06/30/23 0728   BP: 111/63   Pulse: 74   Resp: 16   Temp: 97.7 °F (36.5 °C)   SpO2: 97%       Laboratory Results:  I have personally reviewed all pertinent laboratory/tests results. Psychiatric Review of Systems:  Behavior over the last 24 hours:  unchanged.    Sleep: good  Appetite: good  Medication side effects: none  ROS: no complaints, denies shortness of breath or chest pain and all other systems are negative for acute changes    Mental Status Evaluation:  Appearance:  adequate grooming   Behavior:  cooperative, calm Speech:  normal rate and volume   Mood:  improved   Affect:  brighter   Thought Process:  goal directed   Thought Content:  no overt delusions   Perceptual Disturbances: no auditory hallucinations, no visual hallucinations   Risk Potential: Suicidal ideation - None  Homicidal ideation - None  Potential for aggression - No   Memory:  recent and remote memory grossly intact   Sensorium  person, place and time/date      Consciousness:  alert and awake   Attention: attention span and concentration are age appropriate   Insight:  improving   Judgment: improving   Gait/Station: normal gait/station   Motor Activity: no abnormal movements   Progress Toward Goals:   Nereyda Chavarria is progressing towards goals of inpatient psychiatric treatment by continued medication compliance and is attending therapeutic modalities on the milieu. However, the patient continues to require inpatient psychiatric hospitalization for continued medication management and titration to optimize symptom reduction, improve sleep hygiene, and demonstrate adequate self-care. Assessment/Plan   Principal Problem:    Severe episode of recurrent major depressive disorder, with psychotic features ([address])  Active Problems:    Epilepsy with altered consciousness with intractable epilepsy ([address])    UTI (urinary tract infection)    Acute renal failure superimposed on chronic kidney disease (HCC)    Tobacco abuse    Essential hypertension    Medical clearance for psychiatric admission    Obesity (BMI 30-39. 9)    Falls frequently    Major neurocognitive disorder (HCC)    Sensation of fullness in both ears      Recommended Treatment: Treatment plan and medication changes discussed and per the attending physician the plan is: 1. Continue with group therapy, milieu therapy and occupational therapy  2. Behavioral Health checks every 7 minutes  3. Continue frequent safety checks and vitals per unit protocol  4. Continue with SLIM medical management as indicated  5. Continue with current medication regimen  6. Will review labs in the a.m. 7.Disposition Planning: Discharge planning and efforts remain ongoing    Behavioral Health Medications: all current active meds have been reviewed and continue current psychiatric medications.   Current Facility-Administered Medications   Medication Dose Route Frequency Provider Last Rate   • acetaminophen  650 mg Oral Q4H PRN DRU Luna     • acetaminophen  650 mg Oral Q4H PRN DRU Luna     • acetaminophen  975 mg Oral Q6H PRN DRU Luna     • calcium carbonate  2 tablet Oral Daily With Breakfast Gabino Hamman, PA-C     • carBAMazepine  200 mg Oral TID DRU Luna     • carbamide peroxide  10 drop Both Ears BID Gabino Hamman, PA-C     • cholecalciferol  2,000 Units Oral Daily DRU Pitt     • escitalopram  10 mg Oral Daily Misty Mars MD     • fluticasone  1 spray Each Nare Daily Gabino Hamman, PA-C     • haloperidol lactate  5 mg Intramuscular Q4H PRN Max 4/day DRU Luna     • hydrOXYzine HCL  25 mg Oral Q6H PRN Max 4/day DRU Luna     • hydrOXYzine HCL  50 mg Oral Q6H PRN Max 4/day DRU Luna     • levETIRAcetam  1,500 mg Oral BID DRU Pitt     • LORazepam  1 mg Intramuscular Q6H PRN Max 3/day DRU Luna     • LORazepam  1 mg Oral Q6H PRN Max 3/day DRU Luna     • losartan  25 mg Oral Daily DRU Wagoner     • melatonin  6 mg Oral HS HeDRU Holder     • metoprolol succinate  50 mg Oral Daily DRU Wagoner     • nicotine  1 patch Transdermal Daily DRU Wagoner     • pramipexole  1.5 mg Oral HS DRU Wagoner     • QUEtiapine  12.5 mg Oral HS Misty Mars MD     • risperiDONE  0.25 mg Oral Q4H PRN Max 6/day DRU Luna     • risperiDONE  0.5 mg Oral Q4H PRN Max 3/day DRU Luna     • risperiDONE  1 mg Oral Q2H PRN Max 3/day DRU Srinivasan     • traZODone  50 mg Oral HS PRN DRU Srinivasan         Risks / Benefits of Treatment:  Risks, benefits, and possible side effects of medications explained to patient and patient verbalizes understanding and agreement for treatment. Counseling / Coordination of Care:  Patient's progress reviewed with nursing staff. Medications, treatment progress and treatment plan reviewed with patient. Supportive counseling provided to the patient. Total floor/unit time spent today 25 minutes. Greater than 50% of total time was spent with the patient and / or family counseling and / or coordination of care. A description of the counseling / coordination of care: medication education, treatment plan, supportive therapy. This note was completed in part utilizing Dragon dictation Software. Grammatical, translation, syntax errors, random word insertions, spelling mistakes, and incomplete sentences may be an occasional consequence of this system secondary to software limitations with voice recognition, ambient noise, and hardware issues.  If you have any questions or concerns about the content, text, or information contained within the body of this dictation, please contact the provider for clarification

## 2023-06-30 NOTE — PROGRESS NOTES
06/30/23 0755   Team Meeting   Meeting Type Daily Rounds   Initial Conference Date 06/30/23   Next Conference Date 07/03/23   Team Members Present   Team Members Present Physician;Nurse;;; Occupational Therapist   Physician Team Member Dr Loren Varner, [address] [facility]   Nursing Team Member [address], [id number] [place] [address] Loop Management Team Member [facility] Work Team Member Thor   OT Team Member Nii Vazquez   Patient/Family Present   Patient Present No   Patient's Family Present No     Deny s/s, complains her ears are not working

## 2023-07-01 PROCEDURE — 99232 SBSQ HOSP IP/OBS MODERATE 35: CPT

## 2023-07-01 RX ORDER — LEVETIRACETAM 750 MG/1
1500 TABLET ORAL 2 TIMES DAILY
Qty: 120 TABLET | Refills: 1 | Status: SHIPPED | OUTPATIENT
Start: 2023-07-01 | End: 2023-07-03 | Stop reason: SDUPTHER

## 2023-07-01 RX ORDER — FLUTICASONE PROPIONATE 50 MCG
1 SPRAY, SUSPENSION (ML) NASAL DAILY
Qty: 11.1 ML | Refills: 0 | Status: SHIPPED | OUTPATIENT
Start: 2023-07-01 | End: 2023-07-03 | Stop reason: SDUPTHER

## 2023-07-01 RX ORDER — LANOLIN ALCOHOL/MO/W.PET/CERES
6 CREAM (GRAM) TOPICAL
Qty: 60 TABLET | Refills: 1 | Status: SHIPPED | OUTPATIENT
Start: 2023-07-01 | End: 2023-07-03 | Stop reason: SDUPTHER

## 2023-07-01 RX ORDER — ESCITALOPRAM OXALATE 10 MG/1
10 TABLET ORAL DAILY
Qty: 30 TABLET | Refills: 1 | Status: SHIPPED | OUTPATIENT
Start: 2023-07-01 | End: 2023-07-03 | Stop reason: SDUPTHER

## 2023-07-01 RX ORDER — METOPROLOL SUCCINATE 50 MG/1
50 TABLET, EXTENDED RELEASE ORAL DAILY
Qty: 30 TABLET | Refills: 1 | Status: SHIPPED | OUTPATIENT
Start: 2023-07-01 | End: 2023-07-03 | Stop reason: SDUPTHER

## 2023-07-01 RX ORDER — QUETIAPINE FUMARATE 25 MG/1
12.5 TABLET, FILM COATED ORAL
Qty: 15 TABLET | Refills: 1 | Status: SHIPPED | OUTPATIENT
Start: 2023-07-01 | End: 2023-07-03 | Stop reason: SDUPTHER

## 2023-07-01 RX ORDER — CARBAMAZEPINE 200 MG/1
200 TABLET ORAL 3 TIMES DAILY
Qty: 90 TABLET | Refills: 1 | Status: SHIPPED | OUTPATIENT
Start: 2023-07-01 | End: 2023-07-03 | Stop reason: SDUPTHER

## 2023-07-01 RX ORDER — CALCIUM CARBONATE 500(1250)
2 TABLET ORAL
Qty: 60 TABLET | Refills: 1 | Status: SHIPPED | OUTPATIENT
Start: 2023-07-01 | End: 2023-07-03 | Stop reason: SDUPTHER

## 2023-07-01 RX ORDER — NICOTINE 21 MG/24HR
1 PATCH, TRANSDERMAL 24 HOURS TRANSDERMAL DAILY
Qty: 28 PATCH | Refills: 0 | Status: SHIPPED | OUTPATIENT
Start: 2023-07-01 | End: 2023-07-03 | Stop reason: SDUPTHER

## 2023-07-01 RX ORDER — PRAMIPEXOLE DIHYDROCHLORIDE 1.5 MG/1
1.5 TABLET ORAL
Qty: 30 TABLET | Refills: 1 | Status: SHIPPED | OUTPATIENT
Start: 2023-07-01 | End: 2023-07-03 | Stop reason: SDUPTHER

## 2023-07-01 RX ORDER — LOSARTAN POTASSIUM 25 MG/1
25 TABLET ORAL DAILY
Qty: 30 TABLET | Refills: 1 | Status: SHIPPED | OUTPATIENT
Start: 2023-07-01 | End: 2023-07-03 | Stop reason: SDUPTHER

## 2023-07-01 RX ORDER — MELATONIN
2000 DAILY
Qty: 60 TABLET | Refills: 1 | Status: SHIPPED | OUTPATIENT
Start: 2023-07-01 | End: 2023-07-03 | Stop reason: SDUPTHER

## 2023-07-01 RX ADMIN — CARBAMAZEPINE 200 MG: 200 TABLET ORAL at 17:17

## 2023-07-01 RX ADMIN — LEVETIRACETAM 1500 MG: 500 TABLET, FILM COATED ORAL at 08:59

## 2023-07-01 RX ADMIN — ESCITALOPRAM OXALATE 10 MG: 10 TABLET ORAL at 08:59

## 2023-07-01 RX ADMIN — NICOTINE 1 PATCH: 21 PATCH, EXTENDED RELEASE TRANSDERMAL at 09:00

## 2023-07-01 RX ADMIN — PRAMIPEXOLE DIHYDROCHLORIDE 1.5 MG: 1 TABLET ORAL at 21:35

## 2023-07-01 RX ADMIN — QUETIAPINE FUMARATE 12.5 MG: 25 TABLET ORAL at 21:35

## 2023-07-01 RX ADMIN — CARBAMIDE PEROXIDE 6.5% 10 DROP: 6.5 LIQUID AURICULAR (OTIC) at 06:21

## 2023-07-01 RX ADMIN — CHOLECALCIFEROL TAB 25 MCG (1000 UNIT) 2000 UNITS: 25 TAB at 08:59

## 2023-07-01 RX ADMIN — CALCIUM 2 TABLET: 500 TABLET ORAL at 09:00

## 2023-07-01 RX ADMIN — CARBAMIDE PEROXIDE 6.5% 10 DROP: 6.5 LIQUID AURICULAR (OTIC) at 21:37

## 2023-07-01 RX ADMIN — CARBAMAZEPINE 200 MG: 200 TABLET ORAL at 09:00

## 2023-07-01 RX ADMIN — CARBAMAZEPINE 200 MG: 200 TABLET ORAL at 21:35

## 2023-07-01 RX ADMIN — FLUTICASONE PROPIONATE 1 SPRAY: 50 SPRAY, METERED NASAL at 09:01

## 2023-07-01 RX ADMIN — Medication 6 MG: at 21:35

## 2023-07-01 RX ADMIN — LEVETIRACETAM 1500 MG: 500 TABLET, FILM COATED ORAL at 21:34

## 2023-07-01 NOTE — NURSING NOTE
Patient calm, cooperative, pleasant, and able to make needs known. She takes medications as scheduled and denies depression, anxiety, HI/SI/AVH. She reports sleeping well.

## 2023-07-01 NOTE — BH TRANSITION RECORD
Contact Information: If you have any questions, concerns, pended studies, tests and/or procedures, or emergencies regarding your inpatient behavioral health visit. Please contact [address] [facility] [phone] and ask to speak to a , nurse or physician. A contact is available 24 hours/ 7 days a week at this number. Summary of Procedures Performed During your Stay:  Below is a list of major procedures performed during your hospital stay and a summary of results:  - Cardiac Procedures/Studies: ECG- Normal Sinus Rhythm . Pending Studies (From admission, onward)    None        Please follow up on the above pending studies with your PCP and/or referring provider.

## 2023-07-01 NOTE — PROGRESS NOTES
Progress Note - [address] Talbert 57 y.o. female MRN: [id number]  Unit/Bed#: Sara Johnson 658-09 Encounter: [id number]    Patient was seen today for continuation of care, records reviewed and patient was discussed with the morning case review team.    Satish Rodriguez was seen today for psychiatric follow-up. On assessment today, Satish Rodriguez was calm and cooperative. In agreement with discharge on Monday, Satish Rodriguez is mood is much more euthymic with no depression or anxiety reported. Medications sent as requested, she is visible on the unit and social with select peers. Medications reviewed, will not make any changes at this time. Satish Rodriguez denies acute suicidal/self-harm ideation/intent/plan upon direct inquiry at this time. Satish Rodriguez remains behaviorally appropriate, no agitation or aggression noted on exam or in report. Satish Rodriguez also denies HI/AH/VH, and does not appear overtly manic. No overt delusions or paranoia are verbalized. Impulse control is intact. Satish Rodriguez remains adherent to her current psychotropic medication regimen and denies any side effects from medications, as well as none noted on exam.    Vitals:  Vitals:    07/01/23 0727   BP: 109/57   Pulse: 71   Resp: 16   Temp: 98.4 °F (36.9 °C)   SpO2: 96%       Laboratory Results:  I have personally reviewed all pertinent laboratory/tests results. Psychiatric Review of Systems:  Behavior over the last 24 hours:  unchanged.    Sleep: good  Appetite: good   Medication side effects: none  ROS: no complaints, denies shortness of breath or chest pain and all other systems are negative for acute changes    Mental Status Evaluation:  Appearance:  adequate grooming   Behavior:  cooperative, calm   Speech:  normal rate and volume   Mood:  less depressed   Affect:  brighter   Thought Process:  goal directed   Thought Content:  no overt delusions   Perceptual Disturbances: none   Risk Potential: Suicidal ideation - None  Homicidal ideation - None  Potential for aggression - No   Memory:  recent and remote memory grossly intact   Sensorium  person, place and time/date      Consciousness:  alert and awake   Attention: attention span and concentration are age appropriate   Insight:  improving   Judgment: improving   Gait/Station: normal gait/station   Motor Activity: no abnormal movements   Progress Toward Goals:   Stephanie Arora is progressing towards goals of inpatient psychiatric treatment by continued medication compliance and is attending therapeutic modalities on the milieu. However, the patient continues to require inpatient psychiatric hospitalization for continued medication management and titration to optimize symptom reduction, improve sleep hygiene, and demonstrate adequate self-care. Assessment/Plan   Principal Problem:    Severe episode of recurrent major depressive disorder, with psychotic features ([address])  Active Problems:    Epilepsy with altered consciousness with intractable epilepsy ([address])    UTI (urinary tract infection)    Acute renal failure superimposed on chronic kidney disease (HCC)    Tobacco abuse    Essential hypertension    Medical clearance for psychiatric admission    Obesity (BMI 30-39. 9)    Falls frequently    Major neurocognitive disorder (HCC)    Sensation of fullness in both ears      Recommended Treatment: Treatment plan and medication changes discussed and per the attending physician the plan is: 1. Continue with group therapy, milieu therapy and occupational therapy  2. Behavioral Health checks every 7 minutes  3. Continue frequent safety checks and vitals per unit protocol  4. Continue with SLIM medical management as indicated  5. Continue with current medication regimen  6. Will review labs in the a.m. 7.Disposition Planning: Discharge planning and efforts remain ongoing    Behavioral Health Medications: all current active meds have been reviewed and continue current psychiatric medications.   Current Facility-Administered Medications   Medication Dose Route Frequency Provider Last Rate   • acetaminophen  650 mg Oral Q4H PRN DRU Luna     • acetaminophen  650 mg Oral Q4H PRN DRU Luna     • acetaminophen  975 mg Oral Q6H PRN DRU Luna     • calcium carbonate  2 tablet Oral Daily With Breakfast Gabino Hamman, PA-C     • carBAMazepine  200 mg Oral TID DRU Luna     • carbamide peroxide  10 drop Both Ears BID Gabino Hamman, PA-C     • cholecalciferol  2,000 Units Oral Daily Hezzie Au [place], ROWDYNP     • escitalopram  10 mg Oral Daily Misty Mars MD     • fluticasone  1 spray Each Nare Daily Gabino Hamman, PA-C     • haloperidol lactate  5 mg Intramuscular Q4H PRN Max 4/day DRU Luna     • hydrOXYzine HCL  25 mg Oral Q6H PRN Max 4/day DRU Luna     • hydrOXYzine HCL  50 mg Oral Q6H PRN Max 4/day DRU Luna     • levETIRAcetam  1,500 mg Oral BID Hezzie Au [place], DRU     • LORazepam  1 mg Intramuscular Q6H PRN Max 3/day DRU Luna     • LORazepam  1 mg Oral Q6H PRN Max 3/day DRU Luna     • losartan  25 mg Oral Daily DRU Wagoner     • melatonin  6 mg Oral HS HeDRU Holder     • metoprolol succinate  50 mg Oral Daily DRU Wagoner     • nicotine  1 patch Transdermal Daily DRU Wagoner     • pramipexole  1.5 mg Oral HS DRU Wagoner     • QUEtiapine  12.5 mg Oral HS Misty Mars MD     • risperiDONE  0.25 mg Oral Q4H PRN Max 6/day DRU Luna     • risperiDONE  0.5 mg Oral Q4H PRN Max 3/day DRU Luna     • risperiDONE  1 mg Oral Q2H PRN Max 3/day DRU Luna     • traZODone  50 mg Oral HS PRN ROWDY LunaNP         Risks / Benefits of Treatment:  Risks, benefits, and possible side effects of medications explained to patient and patient verbalizes understanding and agreement for treatment.     Counseling / Coordination of Care:  Patient's progress reviewed with nursing staff. Medications, treatment progress and treatment plan reviewed with patient. Supportive counseling provided to the patient. Total floor/unit time spent today 25 minutes. Greater than 50% of total time was spent with the patient and / or family counseling and / or coordination of care. A description of the counseling / coordination of care: medication education, treatment plan, supportive therapy. This note was completed in part utilizing Dragon dictation Software. Grammatical, translation, syntax errors, random word insertions, spelling mistakes, and incomplete sentences may be an occasional consequence of this system secondary to software limitations with voice recognition, ambient noise, and hardware issues.  If you have any questions or concerns about the content, text, or information contained within the body of this dictation, please contact the provider for clarification

## 2023-07-01 NOTE — NURSING NOTE
Pt is ready for dc today. Pt will return to [facility] with [facility]. Called Lory at [facility] to coordinate dc time. RN to call report to [phone]. [facility] is scheduled to  pt at 5:00pm today. PCS form completed by DC RN. The patient was in and out of her room this evening. She was calm and cooperative with staff/peers. Denies depression and anxiety. Denies SI/HI/AVH. She was med compliant. Patient is excited about discharge on Monday. Safety checks ongoing.

## 2023-07-02 PROCEDURE — 99232 SBSQ HOSP IP/OBS MODERATE 35: CPT

## 2023-07-02 RX ADMIN — LEVETIRACETAM 1500 MG: 500 TABLET, FILM COATED ORAL at 21:16

## 2023-07-02 RX ADMIN — PRAMIPEXOLE DIHYDROCHLORIDE 1.5 MG: 1 TABLET ORAL at 21:16

## 2023-07-02 RX ADMIN — CARBAMAZEPINE 200 MG: 200 TABLET ORAL at 16:24

## 2023-07-02 RX ADMIN — NICOTINE 1 PATCH: 21 PATCH, EXTENDED RELEASE TRANSDERMAL at 08:19

## 2023-07-02 RX ADMIN — LOSARTAN POTASSIUM 25 MG: 25 TABLET, FILM COATED ORAL at 08:18

## 2023-07-02 RX ADMIN — CARBAMAZEPINE 200 MG: 200 TABLET ORAL at 21:16

## 2023-07-02 RX ADMIN — METOPROLOL SUCCINATE 50 MG: 50 TABLET, EXTENDED RELEASE ORAL at 08:18

## 2023-07-02 RX ADMIN — QUETIAPINE FUMARATE 12.5 MG: 25 TABLET ORAL at 21:16

## 2023-07-02 RX ADMIN — FLUTICASONE PROPIONATE 1 SPRAY: 50 SPRAY, METERED NASAL at 08:19

## 2023-07-02 RX ADMIN — CHOLECALCIFEROL TAB 25 MCG (1000 UNIT) 2000 UNITS: 25 TAB at 08:18

## 2023-07-02 RX ADMIN — CARBAMIDE PEROXIDE 6.5% 10 DROP: 6.5 LIQUID AURICULAR (OTIC) at 05:31

## 2023-07-02 RX ADMIN — CALCIUM 2 TABLET: 500 TABLET ORAL at 08:17

## 2023-07-02 RX ADMIN — LEVETIRACETAM 1500 MG: 500 TABLET, FILM COATED ORAL at 08:18

## 2023-07-02 RX ADMIN — CARBAMAZEPINE 200 MG: 200 TABLET ORAL at 08:18

## 2023-07-02 RX ADMIN — Medication 6 MG: at 21:16

## 2023-07-02 RX ADMIN — ESCITALOPRAM OXALATE 10 MG: 10 TABLET ORAL at 08:18

## 2023-07-02 NOTE — PROGRESS NOTES
Progress Note - [address] Miguel Angel Talbert 57 y.o. female MRN: [id number]  Unit/Bed#: Waqas Arvizu 978-36 Encounter: [id number]    Patient was seen today for continuation of care, records reviewed and patient was discussed with the morning case review team.    Tunde Koch was seen today for psychiatric follow-up. On assessment today, Tunde Koch was brighter on approach. Currently sitting with peers, she denies any current depression or anxiety. Anticipated discharge tomorrow, she is in agreement with visiting nursing as well as assistance from her brother post discharge. Tunde Koch is also advised to follow-up with ENT physician for hearing issues as she has stated lack of improvement with eardrops. Medications reviewed, we will not make any changes at this time as Tunde Koch is at her baseline. Tunde Koch denies acute suicidal/self-harm ideation/intent/plan upon direct inquiry at this time. Tunde Koch remains behaviorally appropriate, no agitation or aggression noted on exam or in report. Tunde Koch also denies HI/AH/VH, and does not appear overtly manic. No overt delusions or paranoia are verbalized. Impulse control is intact. Tunde Koch remains adherent to her current psychotropic medication regimen and denies any side effects from medications, as well as none noted on exam.    Vitals:  Vitals:    07/02/23 0755   BP: 136/64   Pulse: 75   Resp: 17   Temp: 97.6 °F (36.4 °C)   SpO2: 94%       Laboratory Results:  I have personally reviewed all pertinent laboratory/tests results. Psychiatric Review of Systems:  Behavior over the last 24 hours:  unchanged.    Sleep: good  Appetite: good   Medication side effects: none  ROS: no complaints, denies shortness of breath or chest pain and all other systems are negative for acute changes    Mental Status Evaluation:  Appearance:  dressed appropriately   Behavior:  cooperative, calm   Speech:  normal rate and volume   Mood:  euthymic   Affect:  brighter   Thought Process:  goal directed, linear   Thought Content:  no overt delusions   Perceptual Disturbances: no auditory hallucinations, no visual hallucinations   Risk Potential: Suicidal ideation - None  Homicidal ideation - None  Potential for aggression - No   Memory:  recent and remote memory grossly intact   Sensorium  person, place and time/date      Consciousness:  alert and awake   Attention: attention span and concentration are age appropriate   Insight:  improving   Judgment: improving   Gait/Station: normal gait/station   Motor Activity: no abnormal movements   Progress Toward Goals:   Nomi Craven is progressing towards goals of inpatient psychiatric treatment by continued medication compliance and is attending therapeutic modalities on the milieu. However, the patient continues to require inpatient psychiatric hospitalization for continued medication management and titration to optimize symptom reduction, improve sleep hygiene, and demonstrate adequate self-care. Assessment/Plan   Principal Problem:    Severe episode of recurrent major depressive disorder, with psychotic features ([address])  Active Problems:    Epilepsy with altered consciousness with intractable epilepsy ([address])    UTI (urinary tract infection)    Acute renal failure superimposed on chronic kidney disease (HCC)    Tobacco abuse    Essential hypertension    Medical clearance for psychiatric admission    Obesity (BMI 30-39. 9)    Falls frequently    Major neurocognitive disorder (HCC)    Sensation of fullness in both ears      Recommended Treatment: Treatment plan and medication changes discussed and per the attending physician the plan is: 1. Continue with group therapy, milieu therapy and occupational therapy  2. Behavioral Health checks every 7 minutes  3. Continue frequent safety checks and vitals per unit protocol  4. Continue with SLIM medical management as indicated  5. Continue with current medication regimen  6. Will review labs in the a.m.   7.Disposition Planning: Discharge planning and efforts remain ongoing    Behavioral Health Medications: all current active meds have been reviewed and continue current psychiatric medications.   Current Facility-Administered Medications   Medication Dose Route Frequency Provider Last Rate   • acetaminophen  650 mg Oral Q4H PRN DRU Siddiqui     • acetaminophen  650 mg Oral Q4H PRN DRU Siddiqui     • acetaminophen  975 mg Oral Q6H PRN DRU Siddiqui     • calcium carbonate  2 tablet Oral Daily With Breakfast Dana Douglas PA-C     • carBAMazepine  200 mg Oral TID DRU Siddiqui     • cholecalciferol  2,000 Units Oral Daily [place] DRU Jovel     • escitalopram  10 mg Oral Daily Tiarra Griffin MD     • fluticasone  1 spray Each Nare Daily Dana Douglas PA-C     • haloperidol lactate  5 mg Intramuscular Q4H PRN Max 4/day DRU Siddiqui     • hydrOXYzine HCL  25 mg Oral Q6H PRN Max 4/day DRU Siddiqui     • hydrOXYzine HCL  50 mg Oral Q6H PRN Max 4/day DRU Siddiqui     • levETIRAcetam  1,500 mg Oral BID DRU Palomino     • LORazepam  1 mg Intramuscular Q6H PRN Max 3/day DRU Siddiqui     • LORazepam  1 mg Oral Q6H PRN Max 3/day DRU Siddiqui     • losartan  25 mg Oral Daily DRU Wagoner     • melatonin  6 mg Oral HS DRU Bearden     • metoprolol succinate  50 mg Oral Daily DRU Wagoner     • nicotine  1 patch Transdermal Daily DRU Wagoner     • pramipexole  1.5 mg Oral HS DRU Wagoner     • QUEtiapine  12.5 mg Oral HS Tiarra Griffin MD     • risperiDONE  0.25 mg Oral Q4H PRN Max 6/day DRU Siddiqui     • risperiDONE  0.5 mg Oral Q4H PRN Max 3/day DRU Siddiqui     • risperiDONE  1 mg Oral Q2H PRN Max 3/day DRU Siddiqui     • traZODone  50 mg Oral HS PRN DRU Siddiqui         Risks / Benefits of Treatment:  Risks, benefits, and possible side effects of medications explained to patient and patient verbalizes understanding and agreement for treatment. Counseling / Coordination of Care:  Patient's progress reviewed with nursing staff. Medications, treatment progress and treatment plan reviewed with patient. Supportive counseling provided to the patient. Total floor/unit time spent today 25 minutes. Greater than 50% of total time was spent with the patient and / or family counseling and / or coordination of care. A description of the counseling / coordination of care: medication education, treatment plan, supportive therapy. This note was completed in part utilizing Dragon dictation Software. Grammatical, translation, syntax errors, random word insertions, spelling mistakes, and incomplete sentences may be an occasional consequence of this system secondary to software limitations with voice recognition, ambient noise, and hardware issues.  If you have any questions or concerns about the content, text, or information contained within the body of this dictation, please contact the provider for clarification

## 2023-07-02 NOTE — NURSING NOTE
Pt is calm and cooperative brightens on approach. Pt is visible in milieu and social with certain peers. Pt is cooperative with care and med compliant. Will continue to monitor.

## 2023-07-03 ENCOUNTER — TELEPHONE (OUTPATIENT)
Dept: FAMILY MEDICINE CLINIC | Facility: CLINIC | Age: 63
End: 2023-07-03

## 2023-07-03 VITALS
SYSTOLIC BLOOD PRESSURE: 118 MMHG | DIASTOLIC BLOOD PRESSURE: 59 MMHG | HEIGHT: 63 IN | RESPIRATION RATE: 15 BRPM | BODY MASS INDEX: 40.13 KG/M2 | OXYGEN SATURATION: 97 % | HEART RATE: 73 BPM | WEIGHT: 226.5 LBS | TEMPERATURE: 97.6 F

## 2023-07-03 DIAGNOSIS — D63.1 ANEMIA DUE TO STAGE 3A CHRONIC KIDNEY DISEASE (HCC): ICD-10-CM

## 2023-07-03 DIAGNOSIS — Z76.0 MEDICATION REFILL: ICD-10-CM

## 2023-07-03 DIAGNOSIS — N18.9 ACUTE RENAL FAILURE SUPERIMPOSED ON CHRONIC KIDNEY DISEASE, UNSPECIFIED CKD STAGE, UNSPECIFIED ACUTE RENAL FAILURE TYPE (HCC): ICD-10-CM

## 2023-07-03 DIAGNOSIS — Z72.0 TOBACCO ABUSE: ICD-10-CM

## 2023-07-03 DIAGNOSIS — H93.8X3 SENSATION OF FULLNESS IN BOTH EARS: ICD-10-CM

## 2023-07-03 DIAGNOSIS — N17.0 ACUTE RENAL FAILURE WITH ACUTE TUBULAR NECROSIS SUPERIMPOSED ON STAGE 3A CHRONIC KIDNEY DISEASE (HCC): ICD-10-CM

## 2023-07-03 DIAGNOSIS — N17.9 ACUTE RENAL FAILURE SUPERIMPOSED ON CHRONIC KIDNEY DISEASE, UNSPECIFIED CKD STAGE, UNSPECIFIED ACUTE RENAL FAILURE TYPE (HCC): ICD-10-CM

## 2023-07-03 DIAGNOSIS — N18.31 ANEMIA DUE TO STAGE 3A CHRONIC KIDNEY DISEASE (HCC): ICD-10-CM

## 2023-07-03 DIAGNOSIS — R60.9 WATER RETENTION: ICD-10-CM

## 2023-07-03 DIAGNOSIS — N18.31 ACUTE RENAL FAILURE WITH ACUTE TUBULAR NECROSIS SUPERIMPOSED ON STAGE 3A CHRONIC KIDNEY DISEASE (HCC): ICD-10-CM

## 2023-07-03 DIAGNOSIS — E87.6 HYPOKALEMIA: ICD-10-CM

## 2023-07-03 DIAGNOSIS — F33.3 SEVERE EPISODE OF RECURRENT MAJOR DEPRESSIVE DISORDER, WITH PSYCHOTIC FEATURES (HCC): ICD-10-CM

## 2023-07-03 DIAGNOSIS — Z00.8 MEDICAL CLEARANCE FOR PSYCHIATRIC ADMISSION: ICD-10-CM

## 2023-07-03 DIAGNOSIS — I12.9 PARENCHYMAL RENAL HYPERTENSION, STAGE 1 THROUGH STAGE 4 OR UNSPECIFIED CHRONIC KIDNEY DISEASE: ICD-10-CM

## 2023-07-03 DIAGNOSIS — N18.31 STAGE 3A CHRONIC KIDNEY DISEASE (HCC): ICD-10-CM

## 2023-07-03 DIAGNOSIS — S43.401A SPRAIN OF RIGHT SHOULDER, UNSPECIFIED SHOULDER SPRAIN TYPE, INITIAL ENCOUNTER: ICD-10-CM

## 2023-07-03 DIAGNOSIS — G40.919 EPILEPSY WITH ALTERED CONSCIOUSNESS WITH INTRACTABLE EPILEPSY (HCC): ICD-10-CM

## 2023-07-03 DIAGNOSIS — G25.81 RESTLESS LEG SYNDROME: ICD-10-CM

## 2023-07-03 DIAGNOSIS — I10 ESSENTIAL HYPERTENSION: ICD-10-CM

## 2023-07-03 PROCEDURE — 99239 HOSP IP/OBS DSCHRG MGMT >30: CPT | Performed by: STUDENT IN AN ORGANIZED HEALTH CARE EDUCATION/TRAINING PROGRAM

## 2023-07-03 RX ORDER — LOSARTAN POTASSIUM 25 MG/1
25 TABLET ORAL DAILY
Qty: 30 TABLET | Refills: 1 | Status: SHIPPED | OUTPATIENT
Start: 2023-07-03 | End: 2023-09-01

## 2023-07-03 RX ORDER — FLUTICASONE PROPIONATE 50 MCG
1 SPRAY, SUSPENSION (ML) NASAL DAILY
Qty: 11.1 ML | Refills: 0 | Status: SHIPPED | OUTPATIENT
Start: 2023-07-03

## 2023-07-03 RX ORDER — NICOTINE 21 MG/24HR
1 PATCH, TRANSDERMAL 24 HOURS TRANSDERMAL DAILY
Qty: 28 PATCH | Refills: 0 | Status: SHIPPED | OUTPATIENT
Start: 2023-07-03

## 2023-07-03 RX ORDER — CARBAMAZEPINE 200 MG/1
200 TABLET ORAL 3 TIMES DAILY
Qty: 90 TABLET | Refills: 1 | Status: SHIPPED | OUTPATIENT
Start: 2023-07-03 | End: 2023-09-01

## 2023-07-03 RX ORDER — MELATONIN
2000 DAILY
Qty: 60 TABLET | Refills: 1 | Status: SHIPPED | OUTPATIENT
Start: 2023-07-03 | End: 2023-09-01

## 2023-07-03 RX ORDER — LEVETIRACETAM 750 MG/1
1500 TABLET ORAL 2 TIMES DAILY
Qty: 120 TABLET | Refills: 1 | Status: SHIPPED | OUTPATIENT
Start: 2023-07-03 | End: 2023-09-01

## 2023-07-03 RX ORDER — PRAMIPEXOLE DIHYDROCHLORIDE 1.5 MG/1
1.5 TABLET ORAL
Qty: 30 TABLET | Refills: 1 | Status: SHIPPED | OUTPATIENT
Start: 2023-07-03 | End: 2023-09-01

## 2023-07-03 RX ORDER — QUETIAPINE FUMARATE 25 MG/1
12.5 TABLET, FILM COATED ORAL
Qty: 15 TABLET | Refills: 1 | Status: SHIPPED | OUTPATIENT
Start: 2023-07-03 | End: 2023-09-01

## 2023-07-03 RX ORDER — METOPROLOL SUCCINATE 50 MG/1
50 TABLET, EXTENDED RELEASE ORAL DAILY
Qty: 30 TABLET | Refills: 1 | Status: SHIPPED | OUTPATIENT
Start: 2023-07-03 | End: 2023-09-01

## 2023-07-03 RX ORDER — LANOLIN ALCOHOL/MO/W.PET/CERES
6 CREAM (GRAM) TOPICAL
Qty: 60 TABLET | Refills: 1 | Status: SHIPPED | OUTPATIENT
Start: 2023-07-03 | End: 2023-09-01

## 2023-07-03 RX ORDER — ESCITALOPRAM OXALATE 10 MG/1
10 TABLET ORAL DAILY
Qty: 30 TABLET | Refills: 1 | Status: SHIPPED | OUTPATIENT
Start: 2023-07-03 | End: 2023-07-06 | Stop reason: SDUPTHER

## 2023-07-03 RX ORDER — CALCIUM CARBONATE 500(1250)
2 TABLET ORAL
Qty: 60 TABLET | Refills: 1 | Status: SHIPPED | OUTPATIENT
Start: 2023-07-03 | End: 2023-09-01

## 2023-07-03 RX ADMIN — NICOTINE 1 PATCH: 21 PATCH, EXTENDED RELEASE TRANSDERMAL at 08:43

## 2023-07-03 RX ADMIN — FLUTICASONE PROPIONATE 1 SPRAY: 50 SPRAY, METERED NASAL at 08:44

## 2023-07-03 RX ADMIN — LEVETIRACETAM 1500 MG: 500 TABLET, FILM COATED ORAL at 08:39

## 2023-07-03 RX ADMIN — LOSARTAN POTASSIUM 25 MG: 25 TABLET, FILM COATED ORAL at 08:40

## 2023-07-03 RX ADMIN — CHOLECALCIFEROL TAB 25 MCG (1000 UNIT) 2000 UNITS: 25 TAB at 08:40

## 2023-07-03 RX ADMIN — CARBAMAZEPINE 200 MG: 200 TABLET ORAL at 08:40

## 2023-07-03 RX ADMIN — METOPROLOL SUCCINATE 50 MG: 50 TABLET, EXTENDED RELEASE ORAL at 08:40

## 2023-07-03 RX ADMIN — CALCIUM 2 TABLET: 500 TABLET ORAL at 07:39

## 2023-07-03 RX ADMIN — ESCITALOPRAM OXALATE 10 MG: 10 TABLET ORAL at 08:40

## 2023-07-03 NOTE — PROGRESS NOTES
07/03/23 0802   Team Meeting   Meeting Type Daily Rounds   Initial Conference Date 07/03/23   Next Conference Date 07/05/23   Team Members Present   Team Members Present Physician;Nurse;;; Occupational Therapist   Physician Team Member Dr Talia Johnson Team Member [facility] Management Team Member [address] [place] [address] Work Team Member Thor   OT Team Member    Patient/Family Present   Patient Present No   Patient's Family Present No     Discharge 11 am, calm, cooperative.

## 2023-07-03 NOTE — DISCHARGE INSTR - APPOINTMENTS
[address] [place] [address]. [address] [facility]  [phone]    Appointment Date: 8/29/23 @ 11 am with Dr Allison Valdez
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
## 2023-07-03 NOTE — PROGRESS NOTES
07/03/23 1025   Means of Transportation   [place] Application Mailed (Indicate date sent)  (6/29/23 mailed)

## 2023-07-03 NOTE — PLAN OF CARE
Problem: SELF HARM/SUICIDALITY  Goal: Will have no self-injury during hospital stay  Description: INTERVENTIONS:  - Q 15 MINUTES: Routine safety checks  - Q WAKING SHIFT & PRN: Assess risk to determine if routine checks are adequate to maintain patient safety  - Encourage patient to participate actively in care by formulating a plan to combat response to suicidal ideation, identify supports and resources  Outcome: Adequate for Discharge     Problem: DEPRESSION  Goal: Will be euthymic at discharge  Description: INTERVENTIONS:  - Administer medication as ordered  - Provide emotional support via 1:1 interaction with staff  - Encourage involvement in milieu/groups/activities  - Monitor for social isolation  Outcome: Adequate for Discharge     Problem: BEHAVIOR  Goal: Pt/Family maintain appropriate behavior and adhere to behavioral management agreement, if implemented  Description: INTERVENTIONS:  - Assess the family dynamic   - Encourage verbalization of thoughts and concerns in a socially appropriate manner  - Assess patient/family's coping skills and non-compliant behavior (including use of illegal substances). - Utilize positive, consistent limit setting strategies supporting safety of patient, staff and others  - Initiate consult with Case Management, Spiritual Care or other ancillary services as appropriate  - If a patient's/visitor's behavior jeopardizes the safety of the patient, staff, or others, refer to organization procedure.    - Notify Security of behavior or suspected illegal substances which indicate the need for search of the patient and/or belongings  - Encourage participation in the decision making process about a behavioral management agreement; implement if patient meets criteria  Outcome: Adequate for Discharge     Problem: ANXIETY  Goal: Will report anxiety at manageable levels  Description: INTERVENTIONS:  - Administer medication as ordered  - Teach and encourage coping skills  - Provide emotional support  - Assess patient/family for anxiety and ability to cope  Outcome: Adequate for Discharge  Goal: By discharge: Patient will verbalize 2 strategies to deal with anxiety  Description: Interventions:  - Identify any obvious source/trigger to anxiety  - Staff will assist patient in applying identified coping technique/skills  - Encourage attendance of scheduled groups and activities  Outcome: Adequate for Discharge     Problem: SELF CARE DEFICIT  Goal: Return ADL status to a safe level of function  Description: INTERVENTIONS:  - Administer medication as ordered  - Assess ADL deficits and provide assistive devices as needed  - Obtain PT/OT consults as needed  - Assist and instruct patient to increase activity and self care as tolerated  Outcome: Adequate for Discharge     Problem: DISCHARGE PLANNING - CARE MANAGEMENT  Goal: Discharge to post-acute care or home with appropriate resources  Description: INTERVENTIONS:  - Conduct assessment to determine patient/family and health care team treatment goals, and need for post-acute services based on payer coverage, community resources, and patient preferences, and barriers to discharge  - Address psychosocial, clinical, and financial barriers to discharge as identified in assessment in conjunction with the patient/family and health care team  - Arrange appropriate level of post-acute services according to patient’s   needs and preference and payer coverage in collaboration with the physician and health care team  - Communicate with and update the patient/family, physician, and health care team regarding progress on the discharge plan  - Arrange appropriate transportation to post-acute venues  Outcome: Adequate for Discharge     Problem: Alteration in Thoughts and Perception  Goal: Attend and participate in unit activities, including therapeutic, recreational, and educational groups  Description: Interventions:  -Encourage Visitation and family involvement in care  Outcome: Adequate for Discharge

## 2023-07-03 NOTE — NURSING NOTE
Pt pleasant and cooperative with discharge process. Pt expressed understanding of d/c meds and f/u instructions. Pt left unit with all her belongings and escorted by staff to meet  in lobby for transport home at 1100. Monitored for safety and support.

## 2023-07-03 NOTE — TELEPHONE ENCOUNTER
Pt. Called in requested a refill for     pramipexole (MIRAPEX) 1.5 MG tablet    levETIRAcetam (Keppra) 750 mg tablet    Also Patient stated that Pharmacist  requested to speak to our Nurse to please call her [phone]. Pt. Stated she didn't know why the Pharmacist wanted to speak to nurse and she also didn't know the her name. ..

## 2023-07-03 NOTE — BH TRANSITION RECORD
Contact Information: If you have any questions, concerns, pended studies, tests and/or procedures, or emergencies regarding your inpatient behavioral health visit. Please contact [facility] older adult behavioral health unit 6B [phone] and ask to speak to a , nurse or physician. A contact is available 24 hours/ 7 days a week at this number. Summary of Procedures Performed During your Stay:  Below is a list of major procedures performed during your hospital stay and a summary of results:  - Cardiac Procedures/Studies: ECG-NSR.  - Major Imaging Studies: CT Head-negative. CXR-negative    Pending Studies (From admission, onward)    None        Please follow up on the above pending studies with your PCP and/or referring provider.

## 2023-07-03 NOTE — TELEPHONE ENCOUNTER
30 day supply with one refill of all patient's medications were resent to the patient's preferred pharmacy [facility] at [address] [place] [address]

## 2023-07-03 NOTE — CASE MANAGEMENT
Scripts were called into [facility] instead of [facility] so then they were not able to be delivered to the patient in time. So Dr Sallie Torres called them in to [facility] just now and I will go pick them up and drop them off to the patient at her house. Called the patient to let he know that I would be there by 7 pm.     Called the pharmacy, they said that whatever they could not fill they will deliver to her on Wednesday.

## 2023-07-03 NOTE — PROGRESS NOTES
07/03/23 1020   Discharge Planning   Living Arrangements Lives Alone   Support Systems Spouse/significant other;Family members   Type of Current Residence Private residence   [id number] No   Other Referral/Resources/Interventions Provided:   Financial Resources Provided Indigent Transportation   Referrals Provided: Crisis Hotline;Psychiatrist;Other (Specify)  (unskilled home health resources)   Discharge Communications   Discharge planning discussed with: Patient and her brother Ahsan Hunt of Choice Yes   IMM Given (Date): 07/03/23   IMM Given to: Patient   Transportation at Discharge? Yes  (Lyft)   Transport at Discharge  Other (Comment)  [phone])   Contacts   Patient Contacts Marlon Ayon   Relationship to Patient: Family   Contact Method Phone   Phone Number [phone]   Reason/Outcome Continuity of Care;Emergency Contact; Discharge Planning   [facility]   Would you like to participate in our [id number] service program?   No - Declined

## 2023-07-06 ENCOUNTER — OFFICE VISIT (OUTPATIENT)
Dept: FAMILY MEDICINE CLINIC | Facility: CLINIC | Age: 63
End: 2023-07-06

## 2023-07-06 VITALS
HEIGHT: 63 IN | DIASTOLIC BLOOD PRESSURE: 80 MMHG | HEART RATE: 80 BPM | TEMPERATURE: 98.6 F | RESPIRATION RATE: 16 BRPM | WEIGHT: 244.4 LBS | BODY MASS INDEX: 43.3 KG/M2 | OXYGEN SATURATION: 98 % | SYSTOLIC BLOOD PRESSURE: 150 MMHG

## 2023-07-06 DIAGNOSIS — H93.231 HYPERACUSIS OF RIGHT EAR: ICD-10-CM

## 2023-07-06 DIAGNOSIS — G40.919 EPILEPSY WITH ALTERED CONSCIOUSNESS WITH INTRACTABLE EPILEPSY (HCC): Primary | ICD-10-CM

## 2023-07-06 DIAGNOSIS — Z12.11 COLON CANCER SCREENING: ICD-10-CM

## 2023-07-06 DIAGNOSIS — E66.01 MORBID (SEVERE) OBESITY DUE TO EXCESS CALORIES (HCC): ICD-10-CM

## 2023-07-06 DIAGNOSIS — Z12.31 BREAST CANCER SCREENING BY MAMMOGRAM: ICD-10-CM

## 2023-07-06 DIAGNOSIS — F33.3 SEVERE EPISODE OF RECURRENT MAJOR DEPRESSIVE DISORDER, WITH PSYCHOTIC FEATURES (HCC): ICD-10-CM

## 2023-07-06 DIAGNOSIS — H61.21 EXCESSIVE CERUMEN IN RIGHT EAR CANAL: ICD-10-CM

## 2023-07-06 DIAGNOSIS — E53.8 VITAMIN B12 DEFICIENCY: ICD-10-CM

## 2023-07-06 DIAGNOSIS — Z00.00 MEDICARE ANNUAL WELLNESS VISIT, SUBSEQUENT: ICD-10-CM

## 2023-07-06 PROBLEM — E46 PROTEIN-CALORIE MALNUTRITION (HCC): Status: RESOLVED | Noted: 2021-01-19 | Resolved: 2023-07-06

## 2023-07-06 RX ORDER — ESCITALOPRAM OXALATE 10 MG/1
10 TABLET ORAL DAILY
Qty: 30 TABLET | Refills: 1 | Status: SHIPPED | OUTPATIENT
Start: 2023-07-06 | End: 2023-09-04

## 2023-07-06 RX ORDER — CYANOCOBALAMIN 1000 UG/ML
1000 INJECTION, SOLUTION INTRAMUSCULAR; SUBCUTANEOUS
Status: SHIPPED | OUTPATIENT
Start: 2023-07-06

## 2023-07-06 RX ADMIN — CYANOCOBALAMIN 1000 MCG: 1000 INJECTION, SOLUTION INTRAMUSCULAR; SUBCUTANEOUS at 16:59
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
## 2023-07-13 DIAGNOSIS — N17.9 ACUTE RENAL FAILURE SUPERIMPOSED ON CHRONIC KIDNEY DISEASE, UNSPECIFIED CKD STAGE, UNSPECIFIED ACUTE RENAL FAILURE TYPE (HCC): ICD-10-CM

## 2023-07-13 DIAGNOSIS — N18.31 STAGE 3A CHRONIC KIDNEY DISEASE (HCC): ICD-10-CM

## 2023-07-13 DIAGNOSIS — N18.31 ACUTE RENAL FAILURE WITH ACUTE TUBULAR NECROSIS SUPERIMPOSED ON STAGE 3A CHRONIC KIDNEY DISEASE (HCC): ICD-10-CM

## 2023-07-13 DIAGNOSIS — I10 ESSENTIAL HYPERTENSION: ICD-10-CM

## 2023-07-13 DIAGNOSIS — N18.9 ACUTE RENAL FAILURE SUPERIMPOSED ON CHRONIC KIDNEY DISEASE, UNSPECIFIED CKD STAGE, UNSPECIFIED ACUTE RENAL FAILURE TYPE (HCC): ICD-10-CM

## 2023-07-13 DIAGNOSIS — E87.6 HYPOKALEMIA: ICD-10-CM

## 2023-07-13 DIAGNOSIS — N18.31 ANEMIA DUE TO STAGE 3A CHRONIC KIDNEY DISEASE (HCC): ICD-10-CM

## 2023-07-13 DIAGNOSIS — R60.9 WATER RETENTION: ICD-10-CM

## 2023-07-13 DIAGNOSIS — I12.9 PARENCHYMAL RENAL HYPERTENSION, STAGE 1 THROUGH STAGE 4 OR UNSPECIFIED CHRONIC KIDNEY DISEASE: ICD-10-CM

## 2023-07-13 DIAGNOSIS — N17.0 ACUTE RENAL FAILURE WITH ACUTE TUBULAR NECROSIS SUPERIMPOSED ON STAGE 3A CHRONIC KIDNEY DISEASE (HCC): ICD-10-CM

## 2023-07-13 DIAGNOSIS — G25.81 RESTLESS LEG SYNDROME: ICD-10-CM

## 2023-07-13 DIAGNOSIS — D63.1 ANEMIA DUE TO STAGE 3A CHRONIC KIDNEY DISEASE (HCC): ICD-10-CM

## 2023-07-13 RX ORDER — PRAMIPEXOLE DIHYDROCHLORIDE 1.5 MG/1
1.5 TABLET ORAL
Qty: 90 TABLET | Refills: 1 | Status: CANCELLED | OUTPATIENT
Start: 2023-08-03 | End: 2023-10-02

## 2023-07-13 RX ORDER — METOPROLOL SUCCINATE 50 MG/1
50 TABLET, EXTENDED RELEASE ORAL DAILY
Qty: 90 TABLET | Refills: 1 | Status: CANCELLED | OUTPATIENT
Start: 2023-08-03 | End: 2023-10-02

## 2023-07-16 ENCOUNTER — APPOINTMENT (EMERGENCY)
Dept: RADIOLOGY | Facility: HOSPITAL | Age: 63
End: 2023-07-16
Payer: MEDICARE

## 2023-07-16 ENCOUNTER — HOSPITAL ENCOUNTER (EMERGENCY)
Facility: HOSPITAL | Age: 63
Discharge: HOME | End: 2023-07-16
Attending: EMERGENCY MEDICINE | Admitting: EMERGENCY MEDICINE
Payer: MEDICARE

## 2023-07-16 VITALS
SYSTOLIC BLOOD PRESSURE: 143 MMHG | OXYGEN SATURATION: 96 % | DIASTOLIC BLOOD PRESSURE: 77 MMHG | HEART RATE: 81 BPM | TEMPERATURE: 97.7 F | RESPIRATION RATE: 18 BRPM

## 2023-07-16 DIAGNOSIS — R42 LIGHTHEADEDNESS: Primary | ICD-10-CM

## 2023-07-16 LAB
ALBUMIN SERPL BCP-MCNC: 3.5 G/DL (ref 3.5–5)
ALP SERPL-CCNC: 95 U/L (ref 34–104)
ALT SERPL W P-5'-P-CCNC: 10 U/L (ref 7–52)
ANION GAP SERPL CALCULATED.3IONS-SCNC: 8 MMOL/L
AST SERPL W P-5'-P-CCNC: 12 U/L (ref 13–39)
ATRIAL RATE: 94 BPM
BASOPHILS # BLD AUTO: 0.06 THOUSANDS/ÂΜL (ref 0–0.1)
BASOPHILS NFR BLD AUTO: 1 % (ref 0–1)
BILIRUB SERPL-MCNC: 0.2 MG/DL (ref 0.2–1)
BUN SERPL-MCNC: 22 MG/DL (ref 5–25)
CALCIUM SERPL-MCNC: 8.8 MG/DL (ref 8.4–10.2)
CARDIAC TROPONIN I PNL SERPL HS: 2 NG/L
CHLORIDE SERPL-SCNC: 107 MMOL/L (ref 96–108)
CO2 SERPL-SCNC: 25 MMOL/L (ref 21–32)
CREAT SERPL-MCNC: 0.94 MG/DL (ref 0.6–1.3)
EOSINOPHIL # BLD AUTO: 0.3 THOUSAND/ÂΜL (ref 0–0.61)
EOSINOPHIL NFR BLD AUTO: 3 % (ref 0–6)
ERYTHROCYTE [DISTWIDTH] IN BLOOD BY AUTOMATED COUNT: 13.7 % (ref 11.6–15.1)
GFR SERPL CREATININE-BSD FRML MDRD: 64 ML/MIN/1.73SQ M
GLUCOSE SERPL-MCNC: 95 MG/DL (ref 65–140)
HCT VFR BLD AUTO: 34.1 % (ref 34.8–46.1)
HGB BLD-MCNC: 11 G/DL (ref 11.5–15.4)
IMM GRANULOCYTES # BLD AUTO: 0.06 THOUSAND/UL (ref 0–0.2)
IMM GRANULOCYTES NFR BLD AUTO: 1 % (ref 0–2)
LYMPHOCYTES # BLD AUTO: 2.16 THOUSANDS/ÂΜL (ref 0.6–4.47)
LYMPHOCYTES NFR BLD AUTO: 20 % (ref 14–44)
MCH RBC QN AUTO: 30.6 PG (ref 26.8–34.3)
MCHC RBC AUTO-ENTMCNC: 32.3 G/DL (ref 31.4–37.4)
MCV RBC AUTO: 95 FL (ref 82–98)
MONOCYTES # BLD AUTO: 0.59 THOUSAND/ÂΜL (ref 0.17–1.22)
MONOCYTES NFR BLD AUTO: 5 % (ref 4–12)
NEUTROPHILS # BLD AUTO: 7.91 THOUSANDS/ÂΜL (ref 1.85–7.62)
NEUTS SEG NFR BLD AUTO: 70 % (ref 43–75)
NRBC BLD AUTO-RTO: 0 /100 WBCS
P AXIS: 41 DEGREES
PLATELET # BLD AUTO: 359 THOUSANDS/UL (ref 149–390)
PMV BLD AUTO: 9.9 FL (ref 8.9–12.7)
POTASSIUM SERPL-SCNC: 3.9 MMOL/L (ref 3.5–5.3)
PR INTERVAL: 142 MS
PROT SERPL-MCNC: 7.4 G/DL (ref 6.4–8.4)
QRS AXIS: 10 DEGREES
QRSD INTERVAL: 97 MS
QT INTERVAL: 343 MS
QTC INTERVAL: 431 MS
RBC # BLD AUTO: 3.59 MILLION/UL (ref 3.81–5.12)
SODIUM SERPL-SCNC: 140 MMOL/L (ref 135–147)
T WAVE AXIS: 29 DEGREES
VENTRICULAR RATE: 95 BPM
WBC # BLD AUTO: 11.08 THOUSAND/UL (ref 4.31–10.16)

## 2023-07-16 PROCEDURE — 80053 COMPREHEN METABOLIC PANEL: CPT | Performed by: EMERGENCY MEDICINE

## 2023-07-16 PROCEDURE — 99284 EMERGENCY DEPT VISIT MOD MDM: CPT

## 2023-07-16 PROCEDURE — 71045 X-RAY EXAM CHEST 1 VIEW: CPT

## 2023-07-16 PROCEDURE — 85025 COMPLETE CBC W/AUTO DIFF WBC: CPT | Performed by: EMERGENCY MEDICINE

## 2023-07-16 PROCEDURE — 93010 ELECTROCARDIOGRAM REPORT: CPT | Performed by: INTERNAL MEDICINE

## 2023-07-16 PROCEDURE — 84484 ASSAY OF TROPONIN QUANT: CPT | Performed by: EMERGENCY MEDICINE

## 2023-07-16 PROCEDURE — 36415 COLL VENOUS BLD VENIPUNCTURE: CPT | Performed by: EMERGENCY MEDICINE

## 2023-07-16 PROCEDURE — 93005 ELECTROCARDIOGRAM TRACING: CPT

## 2023-07-16 RX ORDER — SPIRONOLACTONE 25 MG/1
25 TABLET ORAL DAILY
COMMUNITY

## 2023-07-16 NOTE — DISCHARGE INSTRUCTIONS
Follow up with your primary care physician and psychiatry. Please return to the emergency department if you develop worsening symptoms, chest pain, loss of consciousness, or anything else concerning to you.
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
## 2023-07-16 NOTE — ED NOTES
DEE landaverde called for pt. Pt ambulated to [address] with steady gait.       Froylan Lim RN  07/16/23 9075

## 2023-07-17 ENCOUNTER — VBI (OUTPATIENT)
Dept: FAMILY MEDICINE CLINIC | Facility: CLINIC | Age: 63
End: 2023-07-17

## 2023-07-17 NOTE — TELEPHONE ENCOUNTER
07/17/23 2:29 PM    Patient contacted post ED visit, first outreach attempt made. Message was left for patient to return a call to the VBI Department at [facility]: Phone [phone]. Thank you.   Anne Dinh  [facility]

## 2023-07-20 DIAGNOSIS — F33.3 SEVERE EPISODE OF RECURRENT MAJOR DEPRESSIVE DISORDER, WITH PSYCHOTIC FEATURES (HCC): ICD-10-CM

## 2023-07-20 DIAGNOSIS — G25.81 RESTLESS LEG SYNDROME: ICD-10-CM

## 2023-07-20 DIAGNOSIS — Z72.0 TOBACCO ABUSE: ICD-10-CM

## 2023-07-20 DIAGNOSIS — N18.9 ACUTE RENAL FAILURE SUPERIMPOSED ON CHRONIC KIDNEY DISEASE, UNSPECIFIED CKD STAGE, UNSPECIFIED ACUTE RENAL FAILURE TYPE (HCC): ICD-10-CM

## 2023-07-20 DIAGNOSIS — N17.9 ACUTE RENAL FAILURE SUPERIMPOSED ON CHRONIC KIDNEY DISEASE, UNSPECIFIED CKD STAGE, UNSPECIFIED ACUTE RENAL FAILURE TYPE (HCC): ICD-10-CM

## 2023-07-20 DIAGNOSIS — N18.31 ACUTE RENAL FAILURE WITH ACUTE TUBULAR NECROSIS SUPERIMPOSED ON STAGE 3A CHRONIC KIDNEY DISEASE (HCC): ICD-10-CM

## 2023-07-20 DIAGNOSIS — G40.919 EPILEPSY WITH ALTERED CONSCIOUSNESS WITH INTRACTABLE EPILEPSY (HCC): ICD-10-CM

## 2023-07-20 DIAGNOSIS — N17.0 ACUTE RENAL FAILURE WITH ACUTE TUBULAR NECROSIS SUPERIMPOSED ON STAGE 3A CHRONIC KIDNEY DISEASE (HCC): ICD-10-CM

## 2023-07-20 DIAGNOSIS — I10 ESSENTIAL HYPERTENSION: ICD-10-CM

## 2023-07-20 DIAGNOSIS — N18.31 STAGE 3A CHRONIC KIDNEY DISEASE (HCC): ICD-10-CM

## 2023-07-20 DIAGNOSIS — D63.1 ANEMIA DUE TO STAGE 3A CHRONIC KIDNEY DISEASE (HCC): ICD-10-CM

## 2023-07-20 DIAGNOSIS — N18.31 ANEMIA DUE TO STAGE 3A CHRONIC KIDNEY DISEASE (HCC): ICD-10-CM

## 2023-07-20 DIAGNOSIS — Z00.8 MEDICAL CLEARANCE FOR PSYCHIATRIC ADMISSION: ICD-10-CM

## 2023-07-20 DIAGNOSIS — E87.6 HYPOKALEMIA: ICD-10-CM

## 2023-07-20 DIAGNOSIS — Z76.0 MEDICATION REFILL: ICD-10-CM

## 2023-07-20 DIAGNOSIS — R60.9 WATER RETENTION: ICD-10-CM

## 2023-07-20 DIAGNOSIS — I12.9 PARENCHYMAL RENAL HYPERTENSION, STAGE 1 THROUGH STAGE 4 OR UNSPECIFIED CHRONIC KIDNEY DISEASE: ICD-10-CM

## 2023-07-20 RX ORDER — LOSARTAN POTASSIUM 25 MG/1
25 TABLET ORAL DAILY
Qty: 90 TABLET | Refills: 1 | Status: SHIPPED | OUTPATIENT
Start: 2023-07-20 | End: 2023-09-18

## 2023-07-20 RX ORDER — PRAMIPEXOLE DIHYDROCHLORIDE 1.5 MG/1
1.5 TABLET ORAL
Qty: 90 TABLET | Refills: 0 | Status: SHIPPED | OUTPATIENT
Start: 2023-07-20

## 2023-07-20 RX ORDER — QUETIAPINE FUMARATE 25 MG/1
12.5 TABLET, FILM COATED ORAL
Qty: 45 TABLET | Refills: 1 | Status: SHIPPED | OUTPATIENT
Start: 2023-07-20 | End: 2023-09-18

## 2023-07-20 RX ORDER — CALCIUM CARBONATE 500(1250)
2 TABLET ORAL
Qty: 180 TABLET | Refills: 1 | Status: SHIPPED | OUTPATIENT
Start: 2023-07-20 | End: 2023-09-18

## 2023-07-20 RX ORDER — LEVETIRACETAM 750 MG/1
1500 TABLET ORAL 2 TIMES DAILY
Qty: 120 TABLET | Refills: 1 | Status: SHIPPED | OUTPATIENT
Start: 2023-07-20 | End: 2023-09-18

## 2023-07-20 RX ORDER — CARBAMAZEPINE 200 MG/1
200 TABLET ORAL 3 TIMES DAILY
Qty: 90 TABLET | Refills: 0 | Status: SHIPPED | OUTPATIENT
Start: 2023-07-20 | End: 2023-09-18

## 2023-07-20 RX ORDER — NICOTINE 21 MG/24HR
1 PATCH, TRANSDERMAL 24 HOURS TRANSDERMAL DAILY
Qty: 28 PATCH | Refills: 1 | Status: SHIPPED | OUTPATIENT
Start: 2023-07-20

## 2023-07-20 RX ORDER — METOPROLOL SUCCINATE 50 MG/1
50 TABLET, EXTENDED RELEASE ORAL DAILY
Qty: 90 TABLET | Refills: 1 | Status: SHIPPED | OUTPATIENT
Start: 2023-07-20 | End: 2023-09-18

## 2023-07-20 NOTE — TELEPHONE ENCOUNTER
07/20/23 11:51 AM    Patient contacted post ED visit, outreach attempt made but message could not be left. Additional outreach attempt will be made. Thank you.   Anne Dinh  [facility]

## 2023-07-21 DIAGNOSIS — H93.8X3 SENSATION OF FULLNESS IN BOTH EARS: ICD-10-CM

## 2023-07-21 DIAGNOSIS — F33.3 SEVERE EPISODE OF RECURRENT MAJOR DEPRESSIVE DISORDER, WITH PSYCHOTIC FEATURES (HCC): ICD-10-CM

## 2023-07-21 RX ORDER — LANOLIN ALCOHOL/MO/W.PET/CERES
6 CREAM (GRAM) TOPICAL
Qty: 60 TABLET | Refills: 1 | Status: SHIPPED | OUTPATIENT
Start: 2023-07-21 | End: 2023-09-19

## 2023-07-21 NOTE — TELEPHONE ENCOUNTER
[facility] LM in regards for patient requesting a fill  on Debrox ear solution and melatonin 3 mg. Can this be filled ?

## 2023-07-24 NOTE — TELEPHONE ENCOUNTER
07/24/23 2:00 PM    Patient contacted post ED visit, third outreach attempt made. Message was left for patient to return a call to either the VBI Department at [facility]: Phone [phone] or the PCP office. Thank you.   Anne Dinh  [facility]

## 2023-08-03 DIAGNOSIS — G40.919 EPILEPSY WITH ALTERED CONSCIOUSNESS WITH INTRACTABLE EPILEPSY (HCC): ICD-10-CM

## 2023-08-03 RX ORDER — CARBAMAZEPINE 200 MG/1
200 TABLET ORAL 3 TIMES DAILY
Qty: 90 TABLET | Refills: 11 | OUTPATIENT
Start: 2023-08-03

## 2023-08-10 ENCOUNTER — OFFICE VISIT (OUTPATIENT)
Dept: FAMILY MEDICINE CLINIC | Facility: CLINIC | Age: 63
End: 2023-08-10
Payer: MEDICARE

## 2023-08-10 VITALS
WEIGHT: 246 LBS | SYSTOLIC BLOOD PRESSURE: 140 MMHG | TEMPERATURE: 98.6 F | HEART RATE: 82 BPM | OXYGEN SATURATION: 98 % | HEIGHT: 63 IN | RESPIRATION RATE: 16 BRPM | BODY MASS INDEX: 43.59 KG/M2 | DIASTOLIC BLOOD PRESSURE: 80 MMHG

## 2023-08-10 DIAGNOSIS — Z76.0 MEDICATION REFILL: ICD-10-CM

## 2023-08-10 DIAGNOSIS — F33.3 SEVERE EPISODE OF RECURRENT MAJOR DEPRESSIVE DISORDER, WITH PSYCHOTIC FEATURES (HCC): ICD-10-CM

## 2023-08-10 DIAGNOSIS — G40.909 RECURRENT SEIZURES (HCC): ICD-10-CM

## 2023-08-10 DIAGNOSIS — I10 ESSENTIAL HYPERTENSION: Primary | ICD-10-CM

## 2023-08-10 DIAGNOSIS — Z79.899 HIGH RISK MEDICATION USE: ICD-10-CM

## 2023-08-10 DIAGNOSIS — G40.919 EPILEPSY WITH ALTERED CONSCIOUSNESS WITH INTRACTABLE EPILEPSY (HCC): ICD-10-CM

## 2023-08-10 DIAGNOSIS — F03.90 MAJOR NEUROCOGNITIVE DISORDER (HCC): ICD-10-CM

## 2023-08-10 DIAGNOSIS — N18.31 STAGE 3A CHRONIC KIDNEY DISEASE (HCC): ICD-10-CM

## 2023-08-10 PROCEDURE — 99214 OFFICE O/P EST MOD 30 MIN: CPT | Performed by: NURSE PRACTITIONER

## 2023-08-10 RX ORDER — ESCITALOPRAM OXALATE 10 MG/1
10 TABLET ORAL DAILY
Qty: 90 TABLET | Refills: 1 | Status: SHIPPED | OUTPATIENT
Start: 2023-08-10 | End: 2023-10-09

## 2023-08-10 RX ORDER — LEVETIRACETAM 750 MG/1
1500 TABLET ORAL 2 TIMES DAILY
Qty: 360 TABLET | Refills: 0 | Status: SHIPPED | OUTPATIENT
Start: 2023-08-10 | End: 2023-10-09

## 2023-08-10 RX ORDER — CARBAMAZEPINE 200 MG/1
200 TABLET ORAL 3 TIMES DAILY
Qty: 90 TABLET | Refills: 0 | Status: SHIPPED | OUTPATIENT
Start: 2023-08-10 | End: 2023-10-09
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
## 2023-08-16 ENCOUNTER — TELEPHONE (OUTPATIENT)
Dept: NEUROLOGY | Facility: CLINIC | Age: 63
End: 2023-08-16

## 2023-08-16 NOTE — TELEPHONE ENCOUNTER
Patient called back to schedule appointment. Patient was reminded of dismissal from practice. Patient verbalized understanding.

## 2023-08-19 PROBLEM — N39.0 UTI (URINARY TRACT INFECTION): Status: RESOLVED | Noted: 2020-09-09 | Resolved: 2023-08-19

## 2023-08-25 NOTE — TELEPHONE ENCOUNTER
Received VM transcription:    Hi my name is Shruthi Blackwood calling on behalf of my sister patient Yudy Doyle. Date of birth October 9, 1960. I'm calling regarding a recent phone call where she mentioned that there's a dismissal from practice. I'm trying to understand what that means exactly. So call me back at [phone]. Thank you.  ------------------------------------------------    Most recent consent form in pt's chart indicates "No one" so unable to provide information to pt's brother. Spoke with pt's brother and advised him of same. He verbalizes understanding but would like to know under what terms, in general, are pt's dismissed from the practice. Advised him that his question would be routed to our manager to discuss further. Christianne Jaffe - Can you look into this and advise? Thank you!

## 2023-08-29 ENCOUNTER — TELEPHONE (OUTPATIENT)
Dept: PSYCHIATRY | Facility: CLINIC | Age: 63
End: 2023-08-29

## 2023-08-29 NOTE — TELEPHONE ENCOUNTER
Contacted patient in regards to r/s NP appointment. Unable to lvm due voicemail box has no been set up yet.

## 2023-08-29 NOTE — TELEPHONE ENCOUNTER
Diya Clarence requested a call back to discuss rescheduling her new patient appt with Dr Nikole Rojas for today 8/29/2023  At 11 am     They can be reached at P# [phone]      Thank you.

## 2023-09-22 ENCOUNTER — HOSPITAL ENCOUNTER (EMERGENCY)
Facility: HOSPITAL | Age: 63
Discharge: HOME | End: 2023-09-22
Attending: EMERGENCY MEDICINE
Payer: MEDICARE

## 2023-09-22 VITALS
HEART RATE: 76 BPM | SYSTOLIC BLOOD PRESSURE: 125 MMHG | RESPIRATION RATE: 19 BRPM | DIASTOLIC BLOOD PRESSURE: 63 MMHG | TEMPERATURE: 98.4 F | OXYGEN SATURATION: 98 %

## 2023-09-22 DIAGNOSIS — R56.9 SEIZURE (HCC): Primary | ICD-10-CM

## 2023-09-22 LAB
ANION GAP SERPL CALCULATED.3IONS-SCNC: 5 MMOL/L
ATRIAL RATE: 79 BPM
BASOPHILS # BLD AUTO: 0.05 THOUSANDS/ÂΜL (ref 0–0.1)
BASOPHILS NFR BLD AUTO: 1 % (ref 0–1)
BUN SERPL-MCNC: 29 MG/DL (ref 5–25)
CALCIUM SERPL-MCNC: 8.6 MG/DL (ref 8.4–10.2)
CARBAMAZEPINE SERPL-MCNC: 5.6 UG/ML (ref 4–12)
CHLORIDE SERPL-SCNC: 105 MMOL/L (ref 96–108)
CO2 SERPL-SCNC: 28 MMOL/L (ref 21–32)
CREAT SERPL-MCNC: 1.15 MG/DL (ref 0.6–1.3)
EOSINOPHIL # BLD AUTO: 0.22 THOUSAND/ÂΜL (ref 0–0.61)
EOSINOPHIL NFR BLD AUTO: 3 % (ref 0–6)
ERYTHROCYTE [DISTWIDTH] IN BLOOD BY AUTOMATED COUNT: 13.2 % (ref 11.6–15.1)
GFR SERPL CREATININE-BSD FRML MDRD: 50 ML/MIN/1.73SQ M
GLUCOSE SERPL-MCNC: 95 MG/DL (ref 65–140)
HCT VFR BLD AUTO: 36.6 % (ref 34.8–46.1)
HGB BLD-MCNC: 11.3 G/DL (ref 11.5–15.4)
IMM GRANULOCYTES # BLD AUTO: 0.02 THOUSAND/UL (ref 0–0.2)
IMM GRANULOCYTES NFR BLD AUTO: 0 % (ref 0–2)
LYMPHOCYTES # BLD AUTO: 2.56 THOUSANDS/ÂΜL (ref 0.6–4.47)
LYMPHOCYTES NFR BLD AUTO: 29 % (ref 14–44)
MCH RBC QN AUTO: 29.6 PG (ref 26.8–34.3)
MCHC RBC AUTO-ENTMCNC: 30.9 G/DL (ref 31.4–37.4)
MCV RBC AUTO: 96 FL (ref 82–98)
MONOCYTES # BLD AUTO: 0.51 THOUSAND/ÂΜL (ref 0.17–1.22)
MONOCYTES NFR BLD AUTO: 6 % (ref 4–12)
NEUTROPHILS # BLD AUTO: 5.36 THOUSANDS/ÂΜL (ref 1.85–7.62)
NEUTS SEG NFR BLD AUTO: 61 % (ref 43–75)
NRBC BLD AUTO-RTO: 0 /100 WBCS
P AXIS: 37 DEGREES
PLATELET # BLD AUTO: 316 THOUSANDS/UL (ref 149–390)
PMV BLD AUTO: 10.2 FL (ref 8.9–12.7)
POTASSIUM SERPL-SCNC: 4.4 MMOL/L (ref 3.5–5.3)
PR INTERVAL: 152 MS
QRS AXIS: 7 DEGREES
QRSD INTERVAL: 70 MS
QT INTERVAL: 370 MS
QTC INTERVAL: 424 MS
RBC # BLD AUTO: 3.82 MILLION/UL (ref 3.81–5.12)
SODIUM SERPL-SCNC: 138 MMOL/L (ref 135–147)
T WAVE AXIS: 36 DEGREES
VENTRICULAR RATE: 79 BPM
WBC # BLD AUTO: 8.72 THOUSAND/UL (ref 4.31–10.16)

## 2023-09-22 PROCEDURE — 93010 ELECTROCARDIOGRAM REPORT: CPT | Performed by: INTERNAL MEDICINE

## 2023-09-22 PROCEDURE — 80177 DRUG SCRN QUAN LEVETIRACETAM: CPT | Performed by: EMERGENCY MEDICINE

## 2023-09-22 PROCEDURE — 99284 EMERGENCY DEPT VISIT MOD MDM: CPT

## 2023-09-22 PROCEDURE — 80048 BASIC METABOLIC PNL TOTAL CA: CPT | Performed by: EMERGENCY MEDICINE

## 2023-09-22 PROCEDURE — 85025 COMPLETE CBC W/AUTO DIFF WBC: CPT | Performed by: EMERGENCY MEDICINE

## 2023-09-22 PROCEDURE — 96374 THER/PROPH/DIAG INJ IV PUSH: CPT

## 2023-09-22 PROCEDURE — 93005 ELECTROCARDIOGRAM TRACING: CPT

## 2023-09-22 PROCEDURE — 80156 ASSAY CARBAMAZEPINE TOTAL: CPT | Performed by: EMERGENCY MEDICINE

## 2023-09-22 PROCEDURE — 99285 EMERGENCY DEPT VISIT HI MDM: CPT | Performed by: EMERGENCY MEDICINE

## 2023-09-22 PROCEDURE — 36415 COLL VENOUS BLD VENIPUNCTURE: CPT | Performed by: EMERGENCY MEDICINE

## 2023-09-22 RX ORDER — LEVETIRACETAM 10 MG/ML
1000 INJECTION INTRAVASCULAR ONCE
Status: DISCONTINUED | OUTPATIENT
Start: 2023-09-22 | End: 2023-09-22

## 2023-09-22 RX ADMIN — LEVETIRACETAM 1500 MG: 100 INJECTION, SOLUTION INTRAVENOUS at 13:39

## 2023-09-22 NOTE — DISCHARGE INSTRUCTIONS
Follow-up with neurology as soon as possible. Come back to the emergency department for new or worsening symptoms including but not limited to chest pain, worsening shortness of breath, syncope.

## 2023-09-22 NOTE — ED PROVIDER NOTES
History  Chief Complaint   Patient presents with   • Seizure - Prior Hx Of     Pt presents to the ed via ems after [place] a [place] [employer], ems reports patient was staring into space and no responding to any questions, Pt is A/O during triage, pt has hx of Epilepsy, per ems Blood sugar is 80    25-year-old female previous history of petit mall seizures presents with a petit mall seizure-like episode. Patient is on antiepileptics of Tegretol and Keppra. She has not adjusted her medication recently. Does not see a neurologist.    Patient was walking in the [facility]. Someone noted that she was nonresponsive standing. She does not recall this event. She had postural tone the whole time. Did not fall to the ground. After a little bit she came back to her normal state health. She does not recall this event. Denies chest pain, shortness of breath beyond her normal.  She states that this episode was consistent with her petit mall seizure history. Last episode of seizures like this was 2 weeks ago. The of records reveals the patient has been referred to neurology previously but failed to follow-up. Seizure - Prior Hx Of  Seizure type:  Petit mal  Initial focality:  None  Episode characteristics: disorientation        Prior to Admission Medications   Prescriptions Last Dose Informant Patient Reported? Taking?    QUEtiapine (SEROquel) 25 mg tablet   No No   Sig: Take 0.5 tablets (12.5 mg total) by mouth daily at bedtime   Patient not taking: Reported on 8/10/2023   calcium carbonate (OYSTER SHELL,OSCAL) 500 mg   No No   Sig: Take 2 tablets by mouth daily with breakfast   carBAMazepine (TEGretol) 200 mg tablet   No No   Sig: Take 1 tablet (200 mg total) by mouth 3 (three) times a day   carbamide peroxide (DEBROX) 6.5 % otic solution   No No   Sig: Administer 5 drops into both ears 2 (two) times a day for 5 doses   cholecalciferol (VITAMIN D3) 1,000 units tablet   No No   Sig: Take 2 tablets (2,000 Units total) by mouth daily   escitalopram (LEXAPRO) 10 mg tablet   No No   Sig: Take 1 tablet (10 mg total) by mouth daily   fluticasone (FLONASE) 50 mcg/act nasal spray   No No   Si spray into each nostril daily   levETIRAcetam (Keppra) 750 mg tablet   No No   Sig: Take 2 tablets (1,500 mg total) by mouth 2 (two) times a day   losartan (COZAAR) 25 mg tablet   No No   Sig: Take 1 tablet (25 mg total) by mouth daily   metoprolol succinate (TOPROL-XL) 50 mg 24 hr tablet   No No   Sig: Take 1 tablet (50 mg total) by mouth daily   nicotine (NICODERM CQ) 21 mg/24 hr TD 24 hr patch   No No   Sig: Place 1 patch on the skin over 24 hours daily   pramipexole (MIRAPEX) 1.5 MG tablet   No No   Sig: Take 1 tablet (1.5 mg total) by mouth daily at bedtime   spironolactone (ALDACTONE) 25 mg tablet   Yes No   Sig: Take 25 mg by mouth daily   Patient not taking: Reported on 8/10/2023      Facility-Administered Medications Last Administration Doses Remaining   cyanocobalamin injection 1,000 mcg 2023  4:59 PM           Past Medical History:   Diagnosis Date   • Depression    • EP (epilepsy) ([address])    • Epilepsy ([address])    • Obesity    • Restless leg        Past Surgical History:   Procedure Laterality Date   • DENTAL SURGERY      all teeth removed   • LASER ABLATION OF THE CERVIX     • MAMMO (HISTORICAL)  2017       Family History   Problem Relation Age of Onset   • Kidney disease Mother    • Liver disease Mother    • Heart attack Mother    • Heart attack Father    • No Known Problems Brother    • No Known Problems Son      I have reviewed and agree with the history as documented.     E-Cigarette/Vaping   • E-Cigarette Use Current Every Day User      E-Cigarette/Vaping Substances   • Nicotine Yes    • THC No    • CBD No    • Flavoring No    • Other No    • Unknown No      Social History     Tobacco Use   • Smoking status: Every Day     Packs/day: 0.50     Years: 23.00     Total pack years: 11.50     Types: Cigarettes   • Smokeless tobacco: Never   Vaping Use   • Vaping Use: Every day   • Substances: Nicotine   Substance Use Topics   • Alcohol use: Never     Comment: pt denies alcohol use   • Drug use: Never       Review of Systems   Neurological: Positive for seizures. All other systems reviewed and are negative. Physical Exam  Physical Exam  Vitals and nursing note reviewed. Constitutional:       General: She is not in acute distress. Appearance: Normal appearance. She is not ill-appearing. HENT:      Head: Normocephalic and atraumatic. Right Ear: External ear normal.      Left Ear: External ear normal.      Nose: Nose normal.      Mouth/Throat:      Mouth: Mucous membranes are moist.   Eyes:      General:         Right eye: No discharge. Left eye: No discharge. Conjunctiva/sclera: Conjunctivae normal.   Cardiovascular:      Rate and Rhythm: Normal rate and regular rhythm. Pulses: Normal pulses. Heart sounds: No murmur heard. Pulmonary:      Effort: Pulmonary effort is normal.      Breath sounds: Normal breath sounds. Abdominal:      General: Abdomen is flat. There is no distension. Tenderness: There is no abdominal tenderness. Musculoskeletal:         General: Normal range of motion. Cervical back: Normal range of motion. Skin:     General: Skin is warm. Capillary Refill: Capillary refill takes less than 2 seconds. Findings: No rash. Neurological:      General: No focal deficit present. Mental Status: She is alert. Mental status is at baseline. Cranial Nerves: No cranial nerve deficit. Sensory: No sensory deficit. Motor: No weakness. Comments: Normal point-to-point. Normal pronator drift.    Psychiatric:         Mood and Affect: Mood normal.         Behavior: Behavior normal.         Vital Signs  ED Triage Vitals   Temperature Pulse Respirations Blood Pressure SpO2   09/22/23 1314 09/22/23 1312 09/22/23 1316 09/22/23 1313 09/22/23 1312   98.4 °F (36.9 °C) 80 18 125/63 95 %      Temp Source Heart Rate Source Patient Position - Orthostatic VS BP Location FiO2 (%)   09/22/23 1314 -- -- -- --   Oral          Pain Score       --                  Vitals:    09/22/23 1312 09/22/23 1313 09/22/23 1415   BP:  125/63    Pulse: 80  76         Visual Acuity      ED Medications  Medications   levETIRAcetam (KEPPRA) 1,500 mg in sodium chloride 0.9 % 100 mL IVPB (0 mg Intravenous Stopped 9/22/23 1354)       Diagnostic Studies  Results Reviewed     Procedure Component Value Units Date/Time    Carbamazepine level, total [[id number]]  (Normal) Collected: 09/22/23 1338    Lab Status: Final result Specimen: Blood from Arm, Right Updated: 09/22/23 1752     Carbamazepine Lvl 5.6 ug/mL     Levetiracetam level [[id number]] Collected: 09/22/23 1338    Lab Status:  In process Specimen: Blood from Arm, Right Updated: 09/22/23 4236    Basic metabolic panel [[id number]]  (Abnormal) Collected: 09/22/23 1338    Lab Status: Final result Specimen: Blood from Arm, Right Updated: 09/22/23 1357     Sodium 138 mmol/L      Potassium 4.4 mmol/L      Chloride 105 mmol/L      CO2 28 mmol/L      ANION GAP 5 mmol/L      BUN 29 mg/dL      Creatinine 1.15 mg/dL      Glucose 95 mg/dL      Calcium 8.6 mg/dL      eGFR 50 ml/min/1.73sq m     Narrative:      [place] guidelines for Chronic Kidney Disease (CKD):   •  Stage 1 with normal or high GFR (GFR > 90 mL/min/1.73 square meters)  •  Stage 2 Mild CKD (GFR = 60-89 mL/min/1.73 square meters)  •  Stage 3A Moderate CKD (GFR = 45-59 mL/min/1.73 square meters)  •  Stage 3B Moderate CKD (GFR = 30-44 mL/min/1.73 square meters)  •  Stage 4 Severe CKD (GFR = 15-29 mL/min/1.73 square meters)  •  Stage 5 End Stage CKD (GFR <15 mL/min/1.73 square meters)  Note: GFR calculation is accurate only with a steady state creatinine    CBC and differential [[id number]]  (Abnormal) Collected: 09/22/23 1002    Lab Status: Final result Specimen: Blood from Arm, Right Updated: 09/22/23 1344     WBC 8.72 Thousand/uL      RBC 3.82 Million/uL      Hemoglobin 11.3 g/dL      Hematocrit 36.6 %      MCV 96 fL      MCH 29.6 pg      MCHC 30.9 g/dL      RDW 13.2 %      MPV 10.2 fL      Platelets 861 Thousands/uL      nRBC 0 /100 WBCs      Neutrophils Relative 61 %      Immat GRANS % 0 %      Lymphocytes Relative 29 %      Monocytes Relative 6 %      Eosinophils Relative 3 %      Basophils Relative 1 %      Neutrophils Absolute 5.36 Thousands/µL      Immature Grans Absolute 0.02 Thousand/uL      Lymphocytes Absolute 2.56 Thousands/µL      Monocytes Absolute 0.51 Thousand/µL      Eosinophils Absolute 0.22 Thousand/µL      Basophils Absolute 0.05 Thousands/µL                  No orders to display              Procedures  Procedures         ED Course  ED Course as of 09/22/23 1800   Fri Sep 22, 2023   1331 Procedure Note: EKG  Date/Time: 09/22/23 1:31 PM   Interpreted by: Caprice St  Indications / Diagnosis: Seizure  ECG reviewed by me, the ED Provider: yes   The EKG demonstrates:  Rhythm: normal sinus  Intervals: normal intervals  Axis: normal axis  QRS/Blocks: normal QRS  ST Changes: No acute ST Changes, no STD/VINOD. SBIRT 22yo+    Flowsheet Row Most Recent Value   Initial Alcohol Screen: US AUDIT-C     1. How often do you have a drink containing alcohol? 0 Filed at: 09/22/2023 1315   2. How many drinks containing alcohol do you have on a typical day you are drinking? 0 Filed at: 09/22/2023 1315   3a. Male UNDER 65: How often do you have five or more drinks on one occasion? 0 Filed at: 09/22/2023 1315   3b. FEMALE Any Age, or MALE 65+: How often do you have 4 or more drinks on one occassion? 0 Filed at: 09/22/2023 1315   Audit-C Score 0 Filed at: 09/22/2023 1315   JOHN: How many times in the past year have you. .. Used an illegal drug or used a prescription medication for non-medical reasons?  Never Filed at: 09/22/2023 1315                    Medical Decision Making  Patient with a history of petit mall seizures with history consistent with PT small seizure. We will also consider arrhythmia, electrolyte abnormality, BETH. No arrhythmia. Gave dosage of Keppra. Keppra and Tegretol level sent. Minimal BUN increased similar to previous. Will discharge home. Follow-up with neurology. [facility]): chronic illness or injury  Amount and/or Complexity of Data Reviewed  Labs: ordered. Disposition  Final diagnoses:   Seizure [facility])     Time reflects when diagnosis was documented in both MDM as applicable and the Disposition within this note     Time User Action Codes Description Comment    9/22/2023  2:14 PM Rosalia Escamilla [R56.9] [facility])       ED Disposition     ED Disposition   Discharge    Condition   Stable    Date/Time   Fri Sep 22, 2023  2:14 PM    [address] [facility] [address] discharge to home/self care.                Follow-up Information     Follow up With Specialties Details Why Contact Info Additional [address] [facility] Schedule an appointment as soon as possible for a visit  For re-evaluation as soon as possible [address] [facility] [address]  [facility] [id number]  [address] [facility] [address] [place], [address] [facility] [address], [facility] [address]    [address] [facility] Emergency Medicine  If symptoms worsen [address] [id number] [facility] Emergency Department, [address] [place] [address] [address], [address] [facility] [address]          Discharge Medication List as of 9/22/2023  2:16 PM      CONTINUE these medications which have NOT CHANGED    Details   calcium carbonate (OYSTER SHELL,OSCAL) 500 mg Take 2 tablets by mouth daily with breakfast, Starting Thu 7/20/2023, Until Mon 9/18/2023, Normal      carBAMazepine (TEGretol) 200 mg tablet Take 1 tablet (200 mg total) by mouth 3 (three) times a day, Starting Thu 8/10/2023, Until Mon 10/9/2023, Normal      carbamide peroxide (DEBROX) 6.5 % otic solution Administer 5 drops into both ears 2 (two) times a day for 5 doses, Starting Fri 7/21/2023, Until Thu 8/10/2023, Normal      cholecalciferol (VITAMIN D3) 1,000 units tablet Take 2 tablets (2,000 Units total) by mouth daily, Starting Mon 7/3/2023, Until Fri 9/1/2023, Normal      escitalopram (LEXAPRO) 10 mg tablet Take 1 tablet (10 mg total) by mouth daily, Starting Thu 8/10/2023, Until Mon 10/9/2023, Normal      fluticasone (FLONASE) 50 mcg/act nasal spray 1 spray into each nostril daily, Starting Mon 7/3/2023, Normal      levETIRAcetam (Keppra) 750 mg tablet Take 2 tablets (1,500 mg total) by mouth 2 (two) times a day, Starting Thu 8/10/2023, Until Mon 10/9/2023, Normal      losartan (COZAAR) 25 mg tablet Take 1 tablet (25 mg total) by mouth daily, Starting Thu 7/20/2023, Until Mon 9/18/2023, Normal      metoprolol succinate (TOPROL-XL) 50 mg 24 hr tablet Take 1 tablet (50 mg total) by mouth daily, Starting Thu 7/20/2023, Until Mon 9/18/2023, Normal      nicotine (NICODERM CQ) 21 mg/24 hr TD 24 hr patch Place 1 patch on the skin over 24 hours daily, Starting Thu 7/20/2023, Normal      pramipexole (MIRAPEX) 1.5 MG tablet Take 1 tablet (1.5 mg total) by mouth daily at bedtime, Starting Thu 7/20/2023, Normal      QUEtiapine (SEROquel) 25 mg tablet Take 0.5 tablets (12.5 mg total) by mouth daily at bedtime, Starting Thu 7/20/2023, Until Mon 9/18/2023, Normal      spironolactone (ALDACTONE) 25 mg tablet Take 25 mg by mouth daily, Historical Med                 PDMP Review       Value Time User    PDMP Reviewed  Yes 7/23/2021 10:52 AM Peyton Dukes MD          ED Provider  Electronically Signed by           Dawn Ordonez DO  09/22/23 9315

## 2023-09-25 ENCOUNTER — VBI (OUTPATIENT)
Dept: FAMILY MEDICINE CLINIC | Facility: CLINIC | Age: 63
End: 2023-09-25

## 2023-09-25 LAB — LEVETIRACETAM SERPL-MCNC: 37.7 UG/ML (ref 10–40)

## 2023-09-25 NOTE — TELEPHONE ENCOUNTER
09/25/23 11:17 AM    Patient contacted post ED visit, [facility] department spoke with patient/caregiver and outreach was successful. Thank you.   Radha Julio MA  [facility]

## 2023-10-02 ENCOUNTER — HOSPITAL ENCOUNTER (EMERGENCY)
Facility: HOSPITAL | Age: 63
Discharge: HOME | End: 2023-10-02
Attending: EMERGENCY MEDICINE
Payer: MEDICARE

## 2023-10-02 ENCOUNTER — APPOINTMENT (EMERGENCY)
Dept: RADIOLOGY | Facility: HOSPITAL | Age: 63
End: 2023-10-02
Payer: MEDICARE

## 2023-10-02 VITALS
SYSTOLIC BLOOD PRESSURE: 146 MMHG | BODY MASS INDEX: 43.41 KG/M2 | RESPIRATION RATE: 20 BRPM | WEIGHT: 245 LBS | DIASTOLIC BLOOD PRESSURE: 75 MMHG | HEART RATE: 87 BPM | TEMPERATURE: 98.4 F | OXYGEN SATURATION: 96 % | HEIGHT: 63 IN

## 2023-10-02 DIAGNOSIS — I95.1 ORTHOSTATIC SYNCOPE: Primary | ICD-10-CM

## 2023-10-02 LAB
ALBUMIN SERPL BCP-MCNC: 3.4 G/DL (ref 3.5–5)
ALP SERPL-CCNC: 90 U/L (ref 34–104)
ALT SERPL W P-5'-P-CCNC: 9 U/L (ref 7–52)
ANION GAP SERPL CALCULATED.3IONS-SCNC: 6 MMOL/L
AST SERPL W P-5'-P-CCNC: 12 U/L (ref 13–39)
BASOPHILS # BLD AUTO: 0.05 THOUSANDS/ÂΜL (ref 0–0.1)
BASOPHILS NFR BLD AUTO: 0 % (ref 0–1)
BILIRUB SERPL-MCNC: 0.2 MG/DL (ref 0.2–1)
BUN SERPL-MCNC: 21 MG/DL (ref 5–25)
CALCIUM ALBUM COR SERPL-MCNC: 9.2 MG/DL (ref 8.3–10.1)
CALCIUM SERPL-MCNC: 8.7 MG/DL (ref 8.4–10.2)
CARBAMAZEPINE SERPL-MCNC: 4.1 UG/ML (ref 4–12)
CARDIAC TROPONIN I PNL SERPL HS: 3 NG/L
CHLORIDE SERPL-SCNC: 106 MMOL/L (ref 96–108)
CO2 SERPL-SCNC: 27 MMOL/L (ref 21–32)
CREAT SERPL-MCNC: 0.98 MG/DL (ref 0.6–1.3)
EOSINOPHIL # BLD AUTO: 0.18 THOUSAND/ÂΜL (ref 0–0.61)
EOSINOPHIL NFR BLD AUTO: 2 % (ref 0–6)
ERYTHROCYTE [DISTWIDTH] IN BLOOD BY AUTOMATED COUNT: 13.4 % (ref 11.6–15.1)
GFR SERPL CREATININE-BSD FRML MDRD: 61 ML/MIN/1.73SQ M
GLUCOSE SERPL-MCNC: 96 MG/DL (ref 65–140)
HCT VFR BLD AUTO: 33.9 % (ref 34.8–46.1)
HGB BLD-MCNC: 11 G/DL (ref 11.5–15.4)
IMM GRANULOCYTES # BLD AUTO: 0.03 THOUSAND/UL (ref 0–0.2)
IMM GRANULOCYTES NFR BLD AUTO: 0 % (ref 0–2)
LYMPHOCYTES # BLD AUTO: 1.6 THOUSANDS/ÂΜL (ref 0.6–4.47)
LYMPHOCYTES NFR BLD AUTO: 14 % (ref 14–44)
MAGNESIUM SERPL-MCNC: 1.8 MG/DL (ref 1.9–2.7)
MCH RBC QN AUTO: 30.6 PG (ref 26.8–34.3)
MCHC RBC AUTO-ENTMCNC: 32.4 G/DL (ref 31.4–37.4)
MCV RBC AUTO: 94 FL (ref 82–98)
MONOCYTES # BLD AUTO: 0.7 THOUSAND/ÂΜL (ref 0.17–1.22)
MONOCYTES NFR BLD AUTO: 6 % (ref 4–12)
NEUTROPHILS # BLD AUTO: 8.69 THOUSANDS/ÂΜL (ref 1.85–7.62)
NEUTS SEG NFR BLD AUTO: 78 % (ref 43–75)
NRBC BLD AUTO-RTO: 0 /100 WBCS
PLATELET # BLD AUTO: 289 THOUSANDS/UL (ref 149–390)
PMV BLD AUTO: 10.7 FL (ref 8.9–12.7)
POTASSIUM SERPL-SCNC: 3.5 MMOL/L (ref 3.5–5.3)
PROT SERPL-MCNC: 6.8 G/DL (ref 6.4–8.4)
RBC # BLD AUTO: 3.59 MILLION/UL (ref 3.81–5.12)
SODIUM SERPL-SCNC: 139 MMOL/L (ref 135–147)
WBC # BLD AUTO: 11.25 THOUSAND/UL (ref 4.31–10.16)

## 2023-10-02 PROCEDURE — 83735 ASSAY OF MAGNESIUM: CPT | Performed by: EMERGENCY MEDICINE

## 2023-10-02 PROCEDURE — 99285 EMERGENCY DEPT VISIT HI MDM: CPT | Performed by: EMERGENCY MEDICINE

## 2023-10-02 PROCEDURE — 85025 COMPLETE CBC W/AUTO DIFF WBC: CPT | Performed by: EMERGENCY MEDICINE

## 2023-10-02 PROCEDURE — 93005 ELECTROCARDIOGRAM TRACING: CPT

## 2023-10-02 PROCEDURE — 84484 ASSAY OF TROPONIN QUANT: CPT | Performed by: EMERGENCY MEDICINE

## 2023-10-02 PROCEDURE — 36415 COLL VENOUS BLD VENIPUNCTURE: CPT | Performed by: EMERGENCY MEDICINE

## 2023-10-02 PROCEDURE — 80156 ASSAY CARBAMAZEPINE TOTAL: CPT | Performed by: EMERGENCY MEDICINE

## 2023-10-02 PROCEDURE — 80053 COMPREHEN METABOLIC PANEL: CPT | Performed by: EMERGENCY MEDICINE

## 2023-10-02 PROCEDURE — 71046 X-RAY EXAM CHEST 2 VIEWS: CPT

## 2023-10-02 PROCEDURE — 99284 EMERGENCY DEPT VISIT MOD MDM: CPT

## 2023-10-02 NOTE — ED PROVIDER NOTES
History  Chief Complaint   Patient presents with   • Syncope     Reports dizziness at [facility], (+) syncopal episode lasting 1-2 minutes, slid down wall, no head strike, BG 6     69-year-old female presents after she became very lightheaded at [employer], reports that she becomes very short of breath after any significant physical exertion, she reports she tried to wait it out but she ended up losing consciousness, she was told by bystanders that she did not hit her head, she did fall, she at this time has no complaints, says that when she has these periods she has petit mal seizures, she currently is on Tegretol and Keppra for seizures, has been compliant with her medications, is not on any blood thinners, has not had any fever, headache, chest pain, cough, nausea, vomiting, constipation, she does endorse some diarrhea but reports it is from drinking milk when she knows that she is supposed to drink milk. She has had no dysuria, hematuria, no other complaints. The patient notably had a similar episode 1 week ago. Prior to Admission Medications   Prescriptions Last Dose Informant Patient Reported? Taking?    QUEtiapine (SEROquel) 25 mg tablet   No No   Sig: Take 0.5 tablets (12.5 mg total) by mouth daily at bedtime   Patient not taking: Reported on 8/10/2023   calcium carbonate (OYSTER SHELL,OSCAL) 500 mg   No No   Sig: Take 2 tablets by mouth daily with breakfast   carBAMazepine (TEGretol) 200 mg tablet   No No   Sig: Take 1 tablet (200 mg total) by mouth 3 (three) times a day   carbamide peroxide (DEBROX) 6.5 % otic solution   No No   Sig: Administer 5 drops into both ears 2 (two) times a day for 5 doses   cholecalciferol (VITAMIN D3) 1,000 units tablet   No No   Sig: Take 2 tablets (2,000 Units total) by mouth daily   escitalopram (LEXAPRO) 10 mg tablet   No No   Sig: Take 1 tablet (10 mg total) by mouth daily   fluticasone (FLONASE) 50 mcg/act nasal spray   No No   Si spray into each nostril daily   levETIRAcetam (Keppra) 750 mg tablet   No No   Sig: Take 2 tablets (1,500 mg total) by mouth 2 (two) times a day   losartan (COZAAR) 25 mg tablet   No No   Sig: Take 1 tablet (25 mg total) by mouth daily   metoprolol succinate (TOPROL-XL) 50 mg 24 hr tablet   No No   Sig: Take 1 tablet (50 mg total) by mouth daily   nicotine (NICODERM CQ) 21 mg/24 hr TD 24 hr patch   No No   Sig: Place 1 patch on the skin over 24 hours daily   pramipexole (MIRAPEX) 1.5 MG tablet   No No   Sig: Take 1 tablet (1.5 mg total) by mouth daily at bedtime   spironolactone (ALDACTONE) 25 mg tablet   Yes No   Sig: Take 25 mg by mouth daily   Patient not taking: Reported on 8/10/2023      Facility-Administered Medications Last Administration Doses Remaining   cyanocobalamin injection 1,000 mcg 7/6/2023  4:59 PM           Past Medical History:   Diagnosis Date   • Depression    • EP (epilepsy) ([address])    • Epilepsy ([address])    • Obesity    • Restless leg        Past Surgical History:   Procedure Laterality Date   • DENTAL SURGERY      all teeth removed   • LASER ABLATION OF THE CERVIX     • MAMMO (HISTORICAL)  05/01/2017       Family History   Problem Relation Age of Onset   • Kidney disease Mother    • Liver disease Mother    • Heart attack Mother    • Heart attack Father    • No Known Problems Brother    • No Known Problems Son      I have reviewed and agree with the history as documented.     E-Cigarette/Vaping   • E-Cigarette Use Never User      E-Cigarette/Vaping Substances   • Nicotine Yes    • THC No    • CBD No    • Flavoring No    • Other No    • Unknown No      Social History     Tobacco Use   • Smoking status: Every Day     Packs/day: 0.50     Years: 23.00     Total pack years: 11.50     Types: Cigarettes   • Smokeless tobacco: Never   Vaping Use   • Vaping Use: Never used   Substance Use Topics   • Alcohol use: Never     Comment: pt denies alcohol use   • Drug use: Never       Review of Systems   Constitutional: Negative for fever. HENT: Negative for congestion. Eyes: Negative for visual disturbance. Respiratory: Positive for shortness of breath. Negative for cough. Cardiovascular: Negative for chest pain. Gastrointestinal: Negative for abdominal pain, constipation, diarrhea, nausea and vomiting. Endocrine: Negative for polyuria. Genitourinary: Negative for dysuria and hematuria. Musculoskeletal: Negative for myalgias. Neurological: Positive for syncope and light-headedness. Negative for headaches. Physical Exam  Physical Exam  Vitals and nursing note reviewed. Constitutional:       General: She is not in acute distress. Appearance: Normal appearance. She is well-developed. HENT:      Head: Normocephalic and atraumatic. Eyes:      Extraocular Movements: Extraocular movements intact. Conjunctiva/sclera: Conjunctivae normal.   Cardiovascular:      Rate and Rhythm: Normal rate and regular rhythm. Pulmonary:      Effort: Pulmonary effort is normal. No respiratory distress. Breath sounds: Normal breath sounds. Abdominal:      General: There is no distension. Palpations: Abdomen is soft. Tenderness: There is no abdominal tenderness. Musculoskeletal:         General: No swelling. Cervical back: Neck supple. Skin:     General: Skin is warm and dry. Capillary Refill: Capillary refill takes less than 2 seconds. Neurological:      General: No focal deficit present. Mental Status: She is alert and oriented to person, place, and time. Cranial Nerves: No cranial nerve deficit. Sensory: No sensory deficit. Motor: No weakness.       Coordination: Coordination normal.      Gait: Gait normal.   Psychiatric:         Mood and Affect: Mood normal.         Vital Signs  ED Triage Vitals [10/02/23 1619]   Temperature Pulse Respirations Blood Pressure SpO2   98.4 °F (36.9 °C) 92 20 123/74 98 %      Temp Source Heart Rate Source Patient Position - Orthostatic VS BP Location FiO2 (%)   Oral Monitor Lying Right arm --      Pain Score       5           Vitals:    10/02/23 1619 10/02/23 1845   BP: 123/74 146/75   Pulse: 92 87   Patient Position - Orthostatic VS: Lying Lying         Visual Acuity      ED Medications  Medications - No data to display    Diagnostic Studies  Results Reviewed     Procedure Component Value Units Date/Time    Carbamazepine level, total [[id number]]  (Normal) Collected: 10/02/23 1725    Lab Status: Final result Specimen: Blood from Arm, Right Updated: 10/02/23 2249     Carbamazepine Lvl 4.1 ug/mL     HS Troponin 0hr (reflex protocol) [[id number]]  (Normal) Collected: 10/02/23 1725    Lab Status: Final result Specimen: Blood from Arm, Right Updated: 10/02/23 1759     hs TnI 0hr 3 ng/L     Comprehensive metabolic panel [[id number]]  (Abnormal) Collected: 10/02/23 1725    Lab Status: Final result Specimen: Blood from Arm, Right Updated: 10/02/23 1754     Sodium 139 mmol/L      Potassium 3.5 mmol/L      Chloride 106 mmol/L      CO2 27 mmol/L      ANION GAP 6 mmol/L      BUN 21 mg/dL      Creatinine 0.98 mg/dL      Glucose 96 mg/dL      Calcium 8.7 mg/dL      Corrected Calcium 9.2 mg/dL      AST 12 U/L      ALT 9 U/L      Alkaline Phosphatase 90 U/L      Total Protein 6.8 g/dL      Albumin 3.4 g/dL      Total Bilirubin 0.20 mg/dL      eGFR 61 ml/min/1.73sq m     Narrative:      [facility] guidelines for Chronic Kidney Disease (CKD):   •  Stage 1 with normal or high GFR (GFR > 90 mL/min/1.73 square meters)  •  Stage 2 Mild CKD (GFR = 60-89 mL/min/1.73 square meters)  •  Stage 3A Moderate CKD (GFR = 45-59 mL/min/1.73 square meters)  •  Stage 3B Moderate CKD (GFR = 30-44 mL/min/1.73 square meters)  •  Stage 4 Severe CKD (GFR = 15-29 mL/min/1.73 square meters)  •  Stage 5 End Stage CKD (GFR <15 mL/min/1.73 square meters)  Note: GFR calculation is accurate only with a steady state creatinine    Magnesium [[id number]]  (Abnormal) Collected: 10/02/23 1725    Lab Status: Final result Specimen: Blood from Arm, Right Updated: 10/02/23 1754     Magnesium 1.8 mg/dL     CBC and differential [[id number]]  (Abnormal) Collected: 10/02/23 1725    Lab Status: Final result Specimen: Blood from Arm, Right Updated: 10/02/23 1736     WBC 11.25 Thousand/uL      RBC 3.59 Million/uL      Hemoglobin 11.0 g/dL      Hematocrit 33.9 %      MCV 94 fL      MCH 30.6 pg      MCHC 32.4 g/dL      RDW 13.4 %      MPV 10.7 fL      Platelets 570 Thousands/uL      nRBC 0 /100 WBCs      Neutrophils Relative 78 %      Immat GRANS % 0 %      Lymphocytes Relative 14 %      Monocytes Relative 6 %      Eosinophils Relative 2 %      Basophils Relative 0 %      Neutrophils Absolute 8.69 Thousands/µL      Immature Grans Absolute 0.03 Thousand/uL      Lymphocytes Absolute 1.60 Thousands/µL      Monocytes Absolute 0.70 Thousand/µL      Eosinophils Absolute 0.18 Thousand/µL      Basophils Absolute 0.05 Thousands/µL                  XR chest 2 views   Final Result by Vimal Colin MD (10/03 0725)      No acute cardiopulmonary disease.                   Workstation performed: [id number]                    Procedures  ECG 12 Lead Documentation Only    Date/Time: 10/2/2023 5:14 PM    Performed by: Delvis Armenta MD  Authorized by: Delvis Armenta MD    ECG reviewed by me, the ED Provider: yes    Patient location:  ED  Previous ECG:     Previous ECG:  Compared to current    Similarity:  No change  Interpretation:     Interpretation: normal    Rate:     ECG rate:  87    ECG rate assessment: normal    Rhythm:     Rhythm: sinus rhythm    Ectopy:     Ectopy: none    QRS:     QRS axis:  Normal    QRS intervals:  Normal  Conduction:     Conduction: normal    ST segments:     ST segments:  Normal  T waves:     T waves: normal               ED Course           Medical Decision Making  64-year-old female with shortness of breath and syncope, will be evaluated for electrolyte abnormalities, ACS, pneumonia, or other potential causes of her symptoms. As she reports she did not hit her head, and she has no head trauma on exam and no complaints of pain in her head a CT at this time is not warranted. If her work-up is unremarkable she will be discharged and be encouraged to follow-up with cardiology for a syncope work-up. Patient's work-up was unremarkable, she is safe for discharge with follow-up instructions and return indications given. Amount and/or Complexity of Data Reviewed  Labs: ordered. Radiology: ordered. Disposition  Final diagnoses:   Orthostatic syncope     Time reflects when diagnosis was documented in both MDM as applicable and the Disposition within this note     Time User Action Codes Description Comment    10/2/2023  6:40 PM [facility] [I[facility] Orthostatic syncope       ED Disposition     ED Disposition   Discharge    Condition   Stable    Date/Time   Mon Oct 2, 2023  6:40 PM    Comment   Bart Vinson discharge to home/self care.                Follow-up Information     Follow up With Specialties Details Why Contact Info Additional [address] [place], 2408 [facility], Nurse Practitioner Schedule an appointment as soon as possible for a visit in 3 days For follow-up [address] [facility] [place] [facility] [address]  [phone]       [address] [facility] Emergency Medicine Go to  As needed [address] [place] [address] [phone]  2700 [facility] Emergency Department, [address], 400 [facility] [place] ([place]) Neurology Schedule an appointment as soon as possible for a visit in 3 days For follow-up [address] 8400 [facility] 1001 [facility] Neurology [address] [place], 701 [facility] Skylar Hernandez 6902 [facility] ([place]), [place], 1001 [facility]    1100 [facility] ([place]) Cardiology Schedule an appointment as soon as possible for a visit in 3 days For follow-up [address] [facility] [place] [address] [facility] Cardiology [address] [place], 701 [place], [place], [address]          Discharge Medication List as of 10/2/2023  6:44 PM      CONTINUE these medications which have NOT CHANGED    Details   calcium carbonate (OYSTER SHELL,OSCAL) 500 mg Take 2 tablets by mouth daily with breakfast, Starting Thu 7/20/2023, Until Mon 9/18/2023, Normal      carBAMazepine (TEGretol) 200 mg tablet Take 1 tablet (200 mg total) by mouth 3 (three) times a day, Starting Thu 8/10/2023, Until Mon 10/9/2023, Normal      carbamide peroxide (DEBROX) 6.5 % otic solution Administer 5 drops into both ears 2 (two) times a day for 5 doses, Starting Fri 7/21/2023, Until Thu 8/10/2023, Normal      cholecalciferol (VITAMIN D3) 1,000 units tablet Take 2 tablets (2,000 Units total) by mouth daily, Starting Mon 7/3/2023, Until Fri 9/1/2023, Normal      escitalopram (LEXAPRO) 10 mg tablet Take 1 tablet (10 mg total) by mouth daily, Starting Thu 8/10/2023, Until Mon 10/9/2023, Normal      fluticasone (FLONASE) 50 mcg/act nasal spray 1 spray into each nostril daily, Starting Mon 7/3/2023, Normal      levETIRAcetam (Keppra) 750 mg tablet Take 2 tablets (1,500 mg total) by mouth 2 (two) times a day, Starting Thu 8/10/2023, Until Mon 10/9/2023, Normal      losartan (COZAAR) 25 mg tablet Take 1 tablet (25 mg total) by mouth daily, Starting Thu 7/20/2023, Until Mon 9/18/2023, Normal      metoprolol succinate (TOPROL-XL) 50 mg 24 hr tablet Take 1 tablet (50 mg total) by mouth daily, Starting Thu 7/20/2023, Until Mon 9/18/2023, Normal      nicotine (NICODERM CQ) 21 mg/24 hr TD 24 hr patch Place 1 patch on the skin over 24 hours daily, Starting Thu 7/20/2023, Normal      pramipexole (MIRAPEX) 1.5 MG tablet Take 1 tablet (1.5 mg total) by mouth daily at bedtime, Starting Thu 7/20/2023, Normal      QUEtiapine (SEROquel) 25 mg tablet Take 0.5 tablets (12.5 mg total) by mouth daily at bedtime, Starting Thu 7/20/2023, Until Mon 9/18/2023, Normal      spironolactone (ALDACTONE) 25 mg tablet Take 25 mg by mouth daily, Historical Med                 PDMP Review       Value Time User    PDMP Reviewed  Yes 7/23/2021 10:52 Preethi Jackson MD          ED Provider  Electronically Signed by           Delvis Armenta MD  10/04/23 3774

## 2023-10-02 NOTE — ED NOTES
Patient reports unable to provide urine sample at this time, aware sample ordered and would be needed.       Laura Sanz RN  10/02/23 8840

## 2023-10-03 ENCOUNTER — VBI (OUTPATIENT)
Dept: FAMILY MEDICINE CLINIC | Facility: CLINIC | Age: 63
End: 2023-10-03

## 2023-10-03 NOTE — TELEPHONE ENCOUNTER
10/03/23 9:58 AM    Patient contacted post ED visit, [facility] department spoke with patient/caregiver and outreach was successful. Thank you.   Josef Coon MA  [facility]

## 2023-10-06 LAB
ATRIAL RATE: 87 BPM
P AXIS: 16 DEGREES
PR INTERVAL: 132 MS
QRS AXIS: 2 DEGREES
QRSD INTERVAL: 70 MS
QT INTERVAL: 354 MS
QTC INTERVAL: 425 MS
T WAVE AXIS: 34 DEGREES
VENTRICULAR RATE: 87 BPM

## 2023-10-06 PROCEDURE — 93010 ELECTROCARDIOGRAM REPORT: CPT | Performed by: INTERNAL MEDICINE

## 2023-10-12 ENCOUNTER — APPOINTMENT (EMERGENCY)
Dept: RADIOLOGY | Facility: HOSPITAL | Age: 63
End: 2023-10-12
Payer: MEDICARE

## 2023-10-12 ENCOUNTER — APPOINTMENT (EMERGENCY)
Dept: CT IMAGING | Facility: HOSPITAL | Age: 63
End: 2023-10-12
Payer: MEDICARE

## 2023-10-12 ENCOUNTER — HOSPITAL ENCOUNTER (OUTPATIENT)
Facility: HOSPITAL | Age: 63
Setting detail: OBSERVATION
Discharge: HOME | End: 2023-10-12
Attending: EMERGENCY MEDICINE | Admitting: INTERNAL MEDICINE
Payer: MEDICARE

## 2023-10-12 VITALS
TEMPERATURE: 97.5 F | DIASTOLIC BLOOD PRESSURE: 106 MMHG | SYSTOLIC BLOOD PRESSURE: 167 MMHG | RESPIRATION RATE: 20 BRPM | WEIGHT: 260.58 LBS | OXYGEN SATURATION: 93 % | HEART RATE: 103 BPM | BODY MASS INDEX: 46.17 KG/M2 | HEIGHT: 63 IN

## 2023-10-12 DIAGNOSIS — G40.909 SEIZURE DISORDER (HCC): ICD-10-CM

## 2023-10-12 DIAGNOSIS — R55 SYNCOPE: Primary | ICD-10-CM

## 2023-10-12 DIAGNOSIS — G40.919 EPILEPSY WITH ALTERED CONSCIOUSNESS WITH INTRACTABLE EPILEPSY (HCC): ICD-10-CM

## 2023-10-12 LAB
2HR DELTA HS TROPONIN: 0 NG/L
4HR DELTA HS TROPONIN: -1 NG/L
ALBUMIN SERPL BCP-MCNC: 3.4 G/DL (ref 3.5–5)
ALP SERPL-CCNC: 95 U/L (ref 34–104)
ALT SERPL W P-5'-P-CCNC: 11 U/L (ref 7–52)
AMPHETAMINES SERPL QL SCN: NEGATIVE
ANION GAP SERPL CALCULATED.3IONS-SCNC: 8 MMOL/L
AST SERPL W P-5'-P-CCNC: 13 U/L (ref 13–39)
BACTERIA UR QL AUTO: ABNORMAL /HPF
BARBITURATES UR QL: NEGATIVE
BASOPHILS # BLD AUTO: 0.06 THOUSANDS/ÂΜL (ref 0–0.1)
BASOPHILS NFR BLD AUTO: 1 % (ref 0–1)
BENZODIAZ UR QL: NEGATIVE
BILIRUB SERPL-MCNC: 0.2 MG/DL (ref 0.2–1)
BILIRUB UR QL STRIP: NEGATIVE
BNP SERPL-MCNC: 66 PG/ML (ref 0–100)
BUN SERPL-MCNC: 17 MG/DL (ref 5–25)
CALCIUM ALBUM COR SERPL-MCNC: 9.2 MG/DL (ref 8.3–10.1)
CALCIUM SERPL-MCNC: 8.7 MG/DL (ref 8.4–10.2)
CARBAMAZEPINE SERPL-MCNC: 5.7 UG/ML (ref 4–12)
CARDIAC TROPONIN I PNL SERPL HS: 2 NG/L
CARDIAC TROPONIN I PNL SERPL HS: 3 NG/L
CARDIAC TROPONIN I PNL SERPL HS: 3 NG/L
CHLORIDE SERPL-SCNC: 105 MMOL/L (ref 96–108)
CLARITY UR: CLEAR
CO2 SERPL-SCNC: 25 MMOL/L (ref 21–32)
COCAINE UR QL: NEGATIVE
COLOR UR: ABNORMAL
CREAT SERPL-MCNC: 0.98 MG/DL (ref 0.6–1.3)
EOSINOPHIL # BLD AUTO: 0.32 THOUSAND/ÂΜL (ref 0–0.61)
EOSINOPHIL NFR BLD AUTO: 3 % (ref 0–6)
ERYTHROCYTE [DISTWIDTH] IN BLOOD BY AUTOMATED COUNT: 13.6 % (ref 11.6–15.1)
GFR SERPL CREATININE-BSD FRML MDRD: 60 ML/MIN/1.73SQ M
GLUCOSE SERPL-MCNC: 126 MG/DL (ref 65–140)
GLUCOSE UR STRIP-MCNC: NEGATIVE MG/DL
HCT VFR BLD AUTO: 35.9 % (ref 34.8–46.1)
HGB BLD-MCNC: 11.5 G/DL (ref 11.5–15.4)
HGB UR QL STRIP.AUTO: ABNORMAL
IMM GRANULOCYTES # BLD AUTO: 0.03 THOUSAND/UL (ref 0–0.2)
IMM GRANULOCYTES NFR BLD AUTO: 0 % (ref 0–2)
KETONES UR STRIP-MCNC: NEGATIVE MG/DL
LACTATE SERPL-SCNC: 1.4 MMOL/L (ref 0.5–2)
LEUKOCYTE ESTERASE UR QL STRIP: NEGATIVE
LYMPHOCYTES # BLD AUTO: 2.72 THOUSANDS/ÂΜL (ref 0.6–4.47)
LYMPHOCYTES NFR BLD AUTO: 27 % (ref 14–44)
MAGNESIUM SERPL-MCNC: 1.9 MG/DL (ref 1.9–2.7)
MCH RBC QN AUTO: 30.4 PG (ref 26.8–34.3)
MCHC RBC AUTO-ENTMCNC: 32 G/DL (ref 31.4–37.4)
MCV RBC AUTO: 95 FL (ref 82–98)
MONOCYTES # BLD AUTO: 0.66 THOUSAND/ÂΜL (ref 0.17–1.22)
MONOCYTES NFR BLD AUTO: 7 % (ref 4–12)
NEUTROPHILS # BLD AUTO: 6.44 THOUSANDS/ÂΜL (ref 1.85–7.62)
NEUTS SEG NFR BLD AUTO: 62 % (ref 43–75)
NITRITE UR QL STRIP: POSITIVE
NON-SQ EPI CELLS URNS QL MICRO: ABNORMAL /HPF
NRBC BLD AUTO-RTO: 0 /100 WBCS
OPIATES UR QL SCN: NEGATIVE
OXYCODONE+OXYMORPHONE UR QL SCN: NEGATIVE
PCP UR QL: NEGATIVE
PH UR STRIP.AUTO: 6 [PH]
PLATELET # BLD AUTO: 307 THOUSANDS/UL (ref 149–390)
PMV BLD AUTO: 10.7 FL (ref 8.9–12.7)
POTASSIUM SERPL-SCNC: 3.3 MMOL/L (ref 3.5–5.3)
PROT SERPL-MCNC: 7 G/DL (ref 6.4–8.4)
PROT UR STRIP-MCNC: ABNORMAL MG/DL
RBC # BLD AUTO: 3.78 MILLION/UL (ref 3.81–5.12)
RBC #/AREA URNS AUTO: ABNORMAL /HPF
SODIUM SERPL-SCNC: 138 MMOL/L (ref 135–147)
SP GR UR STRIP.AUTO: 1.01 (ref 1–1.03)
THC UR QL: NEGATIVE
TSH SERPL DL<=0.05 MIU/L-ACNC: 2.18 UIU/ML (ref 0.45–4.5)
UROBILINOGEN UR QL STRIP.AUTO: 0.2 E.U./DL
WBC # BLD AUTO: 10.23 THOUSAND/UL (ref 4.31–10.16)
WBC #/AREA URNS AUTO: ABNORMAL /HPF

## 2023-10-12 PROCEDURE — 99285 EMERGENCY DEPT VISIT HI MDM: CPT

## 2023-10-12 PROCEDURE — 93005 ELECTROCARDIOGRAM TRACING: CPT

## 2023-10-12 PROCEDURE — 83605 ASSAY OF LACTIC ACID: CPT | Performed by: PHYSICIAN ASSISTANT

## 2023-10-12 PROCEDURE — 99222 1ST HOSP IP/OBS MODERATE 55: CPT | Performed by: INTERNAL MEDICINE

## 2023-10-12 PROCEDURE — 70450 CT HEAD/BRAIN W/O DYE: CPT

## 2023-10-12 PROCEDURE — 99284 EMERGENCY DEPT VISIT MOD MDM: CPT | Performed by: PHYSICIAN ASSISTANT

## 2023-10-12 PROCEDURE — 84443 ASSAY THYROID STIM HORMONE: CPT | Performed by: INTERNAL MEDICINE

## 2023-10-12 PROCEDURE — 80177 DRUG SCRN QUAN LEVETIRACETAM: CPT | Performed by: PHYSICIAN ASSISTANT

## 2023-10-12 PROCEDURE — 71045 X-RAY EXAM CHEST 1 VIEW: CPT

## 2023-10-12 PROCEDURE — 80053 COMPREHEN METABOLIC PANEL: CPT | Performed by: PHYSICIAN ASSISTANT

## 2023-10-12 PROCEDURE — 36415 COLL VENOUS BLD VENIPUNCTURE: CPT | Performed by: PHYSICIAN ASSISTANT

## 2023-10-12 PROCEDURE — 83880 ASSAY OF NATRIURETIC PEPTIDE: CPT | Performed by: PHYSICIAN ASSISTANT

## 2023-10-12 PROCEDURE — 85025 COMPLETE CBC W/AUTO DIFF WBC: CPT | Performed by: PHYSICIAN ASSISTANT

## 2023-10-12 PROCEDURE — 80307 DRUG TEST PRSMV CHEM ANLYZR: CPT | Performed by: INTERNAL MEDICINE

## 2023-10-12 PROCEDURE — 81001 URINALYSIS AUTO W/SCOPE: CPT | Performed by: INTERNAL MEDICINE

## 2023-10-12 PROCEDURE — 83735 ASSAY OF MAGNESIUM: CPT | Performed by: PHYSICIAN ASSISTANT

## 2023-10-12 PROCEDURE — 80156 ASSAY CARBAMAZEPINE TOTAL: CPT | Performed by: PHYSICIAN ASSISTANT

## 2023-10-12 PROCEDURE — 96360 HYDRATION IV INFUSION INIT: CPT

## 2023-10-12 PROCEDURE — 84484 ASSAY OF TROPONIN QUANT: CPT | Performed by: PHYSICIAN ASSISTANT

## 2023-10-12 PROCEDURE — G1004 CDSM NDSC: HCPCS

## 2023-10-12 RX ORDER — POTASSIUM CHLORIDE 20 MEQ/1
40 TABLET, EXTENDED RELEASE ORAL ONCE
Status: COMPLETED | OUTPATIENT
Start: 2023-10-12 | End: 2023-10-12

## 2023-10-12 RX ORDER — MELATONIN
2000 DAILY
Status: DISCONTINUED | OUTPATIENT
Start: 2023-10-13 | End: 2023-10-12 | Stop reason: HOSPADM

## 2023-10-12 RX ORDER — METOPROLOL SUCCINATE 50 MG/1
50 TABLET, EXTENDED RELEASE ORAL DAILY
Status: DISCONTINUED | OUTPATIENT
Start: 2023-10-13 | End: 2023-10-12 | Stop reason: HOSPADM

## 2023-10-12 RX ORDER — IPRATROPIUM BROMIDE AND ALBUTEROL SULFATE 2.5; .5 MG/3ML; MG/3ML
3 SOLUTION RESPIRATORY (INHALATION)
Status: DISCONTINUED | OUTPATIENT
Start: 2023-10-12 | End: 2023-10-12

## 2023-10-12 RX ORDER — ENOXAPARIN SODIUM 100 MG/ML
40 INJECTION SUBCUTANEOUS DAILY
Status: DISCONTINUED | OUTPATIENT
Start: 2023-10-13 | End: 2023-10-12 | Stop reason: HOSPADM

## 2023-10-12 RX ORDER — PREDNISONE 20 MG/1
60 TABLET ORAL ONCE
Status: COMPLETED | OUTPATIENT
Start: 2023-10-12 | End: 2023-10-12

## 2023-10-12 RX ORDER — NICOTINE 21 MG/24HR
1 PATCH, TRANSDERMAL 24 HOURS TRANSDERMAL DAILY
Status: DISCONTINUED | OUTPATIENT
Start: 2023-10-13 | End: 2023-10-12 | Stop reason: HOSPADM

## 2023-10-12 RX ORDER — LEVETIRACETAM 500 MG/1
1500 TABLET ORAL 2 TIMES DAILY
Status: DISCONTINUED | OUTPATIENT
Start: 2023-10-12 | End: 2023-10-12 | Stop reason: HOSPADM

## 2023-10-12 RX ORDER — LOSARTAN POTASSIUM 25 MG/1
25 TABLET ORAL DAILY
Status: DISCONTINUED | OUTPATIENT
Start: 2023-10-13 | End: 2023-10-12 | Stop reason: HOSPADM

## 2023-10-12 RX ORDER — PRAMIPEXOLE DIHYDROCHLORIDE 0.5 MG/1
1.5 TABLET ORAL
Status: DISCONTINUED | OUTPATIENT
Start: 2023-10-12 | End: 2023-10-12 | Stop reason: HOSPADM

## 2023-10-12 RX ORDER — ESCITALOPRAM OXALATE 20 MG/1
10 TABLET ORAL DAILY
Status: DISCONTINUED | OUTPATIENT
Start: 2023-10-13 | End: 2023-10-12 | Stop reason: HOSPADM

## 2023-10-12 RX ORDER — ONDANSETRON 2 MG/ML
4 INJECTION INTRAMUSCULAR; INTRAVENOUS EVERY 6 HOURS PRN
Status: DISCONTINUED | OUTPATIENT
Start: 2023-10-12 | End: 2023-10-12 | Stop reason: HOSPADM

## 2023-10-12 RX ORDER — ALBUTEROL SULFATE 2.5 MG/3ML
2.5 SOLUTION RESPIRATORY (INHALATION) EVERY 6 HOURS PRN
Status: DISCONTINUED | OUTPATIENT
Start: 2023-10-12 | End: 2023-10-12 | Stop reason: HOSPADM

## 2023-10-12 RX ORDER — FLUTICASONE PROPIONATE 50 MCG
1 SPRAY, SUSPENSION (ML) NASAL DAILY
Status: DISCONTINUED | OUTPATIENT
Start: 2023-10-13 | End: 2023-10-12 | Stop reason: HOSPADM

## 2023-10-12 RX ORDER — HYDRALAZINE HYDROCHLORIDE 20 MG/ML
5 INJECTION INTRAMUSCULAR; INTRAVENOUS EVERY 6 HOURS PRN
Status: DISCONTINUED | OUTPATIENT
Start: 2023-10-12 | End: 2023-10-12 | Stop reason: HOSPADM

## 2023-10-12 RX ORDER — ACETAMINOPHEN 325 MG/1
650 TABLET ORAL EVERY 6 HOURS PRN
Status: DISCONTINUED | OUTPATIENT
Start: 2023-10-12 | End: 2023-10-12 | Stop reason: HOSPADM

## 2023-10-12 RX ORDER — CARBAMAZEPINE 200 MG/1
200 TABLET ORAL 3 TIMES DAILY
Status: DISCONTINUED | OUTPATIENT
Start: 2023-10-12 | End: 2023-10-12 | Stop reason: HOSPADM

## 2023-10-12 RX ORDER — LEVALBUTEROL INHALATION SOLUTION 1.25 MG/3ML
1.25 SOLUTION RESPIRATORY (INHALATION)
Status: DISCONTINUED | OUTPATIENT
Start: 2023-10-12 | End: 2023-10-12

## 2023-10-12 RX ADMIN — PREDNISONE 60 MG: 20 TABLET ORAL at 13:50

## 2023-10-12 RX ADMIN — CARBAMAZEPINE 200 MG: 200 TABLET ORAL at 17:45

## 2023-10-12 RX ADMIN — IPRATROPIUM BROMIDE AND ALBUTEROL SULFATE 3 ML: 2.5; .5 SOLUTION RESPIRATORY (INHALATION) at 13:52

## 2023-10-12 RX ADMIN — POTASSIUM CHLORIDE 40 MEQ: 1500 TABLET, EXTENDED RELEASE ORAL at 14:36

## 2023-10-12 RX ADMIN — LEVETIRACETAM 1500 MG: 500 TABLET, FILM COATED ORAL at 17:45

## 2023-10-12 RX ADMIN — SODIUM CHLORIDE 1000 ML: 0.9 INJECTION, SOLUTION INTRAVENOUS at 14:06

## 2023-10-12 NOTE — RESPIRATORY THERAPY NOTE
RT Protocol Note  Lara Alatorre 70 y.o. female MRN: [id number]  Unit/Bed#: -01 Encounter: [id number]    Assessment    Principal Problem:    Syncope  Active Problems: Morbidly obese (HCC)    Restless leg syndrome    Hypokalemia    Recurrent seizures (HCC)    Essential hypertension      Home Pulmonary Medications:  None       Past Medical History:   Diagnosis Date    Depression     EP (epilepsy) ([address] )     Epilepsy ([address] [facility])     Obesity     Restless leg      Social History     Socioeconomic History    Marital status: /Civil Union     Spouse name: None    Number of children: None    Years of education: None    Highest education level: None   Occupational History    Occupation:  disable   Tobacco Use    Smoking status: Every Day     Packs/day: 0.50     Years: 23.00     Total pack years: 11.50     Types: Cigarettes    Smokeless tobacco: Never   Vaping Use    Vaping Use: Never used   Substance and Sexual Activity    Alcohol use: Never     Comment: pt denies alcohol use    Drug use: Never    Sexual activity: None   Other Topics Concern    None   Social History Narrative    · Most recent tobacco use screenin2019      · Do you currently or have you served in the [address] [place] [address]:   No      · Were you activated, into active duty, as a member of the [employer] or as a Reservist:   No      · Sexual orientation:   Heterosexual      · Exercise level:   None      · Diet:   Regular      · General stress level:   Low      · Has smoked since age:   38      · Caffeine intake: Moderate      · Guns present in home:   No      · Seat belts used routinely:   Yes      · Smoke alarm in home:    Yes      · Advance directive:   No      Social Determinants of Health     Financial Resource Strain: Medium Risk (2023)    Overall Financial Resource Strain (CARDIA)     Difficulty of Paying Living Expenses: Somewhat hard   Food Insecurity: Not on file   Transportation Needs: Unmet Transportation Needs (2023) PRAPARE - Transportation     Lack of Transportation (Medical): Yes     Lack of Transportation (Non-Medical): Yes   Physical Activity: Not on file   Stress: Not on file   Social Connections: Not on file   Intimate Partner Violence: Not on file   Housing Stability: Not on file       Subjective         Objective    Physical Exam:   Assessment Type: Assess only  General Appearance: Alert, Awake  Respiratory Pattern: Normal  Chest Assessment: Chest expansion symmetrical  Bilateral Breath Sounds: Diminished    Vitals:  Blood pressure 164/95, pulse 92, temperature 97.8 °F (36.6 °C), temperature source Tympanic, resp. rate 19, weight 111 kg (245 lb), SpO2 94 %, not currently breastfeeding. Imaging and other studies: I have personally reviewed pertinent reports. Plan    Respiratory Plan: No distress/Pulmonary history        Resp Comments: (P) Patient assessed per Respiratory Protocol. Patient does not have a pulmonary history noted in chart. Current lungs are decreased and she is saturating 94% on room air. No S/S of distress noted at this time. Prn treatment ordered and Respiratoy to follow.

## 2023-10-12 NOTE — H&P
[id number]  H&P  Name: Anthony Nuñez 70 y.o. female I MRN: [id number]  Unit/Bed#: -01 I Date of Admission: 10/12/2023   Date of Service: 10/12/2023 I Hospital Day: 0      Assessment/Plan   * Syncope  Assessment & Plan  Patient had a syncopal episode while waiting for pulse, no prodromal symptoms, no obvious seizure-like activity, syncopal episode lasted less than 30 seconds and no clear postictal state, no bowel or bladder incontinence, no tongue biting noted,  Patient states she is compliant with her antiseizure medication with Keppra and Tegretol however has not seen a neurologist for a while,  CT head is negative, EKG shows sinus rhythm, initial set of troponin negative,  Will place on seizure precautions and on telemetry monitoring, will check 2D echocardiogram,  Patient had recent ED visits with seizure like episode and orthostatic hypotension, will get neurology input, will check orthostatic vitals, check urine drug screen,       Essential hypertension  Assessment & Plan  Evaded blood pressure noted, will resume antihypertensives with Toprol-XL and losartan. Will give as needed hydralazine. Recurrent seizures (HCC)  Assessment & Plan  Continue Keppra 1500 mg twice a day and Tegretol 200 mg 3 times a day,  Consult neurologist patient has not followed up with neurology for a while,  Patient apparently is compliant with medications, recently Keppra and Tegretol levels were checked    Hypokalemia  Assessment & Plan  Potassium replaced, will monitor electrolytes    Restless leg syndrome  Assessment & Plan  Continue pramipexole 0    Morbidly obese ([address])  Assessment & Plan  BMI of 43.4, advised about weight reduction and lifestyle modification. VTE Pharmacologic Prophylaxis:   Moderate Risk (Score 3-4) - Pharmacological DVT Prophylaxis Ordered: enoxaparin (Lovenox). Code Status: Level 1 - Full Code   Discussion with family: Patient declined call to . Anticipated Length of Stay: Patient will be admitted on an observation basis with an anticipated length of stay of less than 2 midnights secondary to syncope ?seizure like activity . Total Time Spent on Date of Encounter in care of patient: 45 mins. This time was spent on one or more of the following: performing physical exam; counseling and coordination of care; obtaining or reviewing history; documenting in the medical record; reviewing/ordering tests, medications or procedures; communicating with other healthcare professionals and discussing with patient's family/caregivers. Chief Complaint: syncope     History of Present Illness:  Anthony Nuñez is a 70 y.o. female with a PMH of show disorder, CAD, hypertension, anemia, active tobacco use disorder, lymphedema of lower extremity, depression, presents after syncopal episode. Patient was waiting for the bus at the station after she was little less than a block from her home to the bus station. Patient was standing for at least 10 minutes and she had as episode of passing out. Patient does not remember if she fell and hit her head. Patient however of had no obvious seizure-like activity, syncopal episode lasted less than 30 seconds and no clear postictal state, no bladder or bowel incontinence, no tongue biting noted, patient had similar complaints and had 2 ED visits in the last 20 days. Patient denies of any nausea vomiting. Denies of any headache or blurry vision. Denies of fever chills or cough. Denies of chest pain or shortness of breath. Denies of any prolonged standing. Patient had a blood sugar of 126. Admits to have been compliant with her antiseizure medications. Review of Systems:  Review of Systems   Neurological:  Positive for dizziness and syncope. All other systems reviewed and are negative.       Past Medical and Surgical History:   Past Medical History:   Diagnosis Date    Depression     EP (epilepsy) ([address] [facility])     Epilepsy ([address])     Obesity     Restless leg        Past Surgical History:   Procedure Laterality Date    DENTAL SURGERY      all teeth removed    LASER ABLATION OF THE CERVIX      MAMMO (HISTORICAL)  05/01/2017       Meds/Allergies:  Prior to Admission medications    Medication Sig Start Date End Date Taking?  Authorizing Provider   calcium carbonate (OYSTER SHELL,OSCAL) 500 mg Take 2 tablets by mouth daily with breakfast 7/20/23 9/18/23  DRU [place]   carBAMazepine (TEGretol) 200 mg tablet Take 1 tablet (200 mg total) by mouth 3 (three) times a day 8/10/23 10/9/23  DRU [place]   carbamide peroxide (DEBROX) 6.5 % otic solution Administer 5 drops into both ears 2 (two) times a day for 5 doses 7/21/23 8/10/23  DRU [place]   cholecalciferol (VITAMIN D3) 1,000 units tablet Take 2 tablets (2,000 Units total) by mouth daily 7/3/23 9/1/23  Jared Narvaez MD   escitalopram (LEXAPRO) 10 mg tablet Take 1 tablet (10 mg total) by mouth daily 8/10/23 10/9/23  DRU [place]   fluticasone (FLONASE) 50 mcg/act nasal spray 1 spray into each nostril daily 7/3/23   Jared Narvaez MD   levETIRAcetam (Keppra) 750 mg tablet Take 2 tablets (1,500 mg total) by mouth 2 (two) times a day 8/10/23 10/9/23  DRU [place]   losartan (COZAAR) 25 mg tablet Take 1 tablet (25 mg total) by mouth daily 7/20/23 9/18/23  DRU [place]   metoprolol succinate (TOPROL-XL) 50 mg 24 hr tablet Take 1 tablet (50 mg total) by mouth daily 7/20/23 9/18/23  DRU [place]   nicotine (NICODERM CQ) 21 mg/24 hr TD 24 hr patch Place 1 patch on the skin over 24 hours daily 7/20/23   DRU [place]   pramipexole (MIRAPEX) 1.5 MG tablet Take 1 tablet (1.5 mg total) by mouth daily at bedtime 7/20/23   DRU [place]   QUEtiapine (SEROquel) 25 mg tablet Take 0.5 tablets (12.5 mg total) by mouth daily at bedtime  Patient not taking: Reported on 8/10/2023 7/20/23 9/18/23  DRU [place]   spironolactone (ALDACTONE) 25 mg tablet Take 25 mg by mouth daily  Patient not taking: Reported on 8/10/2023    Historical Provider, MD     I have reviewed home medications with patient personally. Allergies: No Known Allergies    Social History:  Marital Status: /Civil Union   Occupation: retired  Patient Pre-hospital Living Situation: Home  Patient Pre-hospital Level of Mobility: walks  Patient Pre-hospital Diet Restrictions: nil  Substance Use History:   Social History     Substance and Sexual Activity   Alcohol Use Never    Comment: pt denies alcohol use     Social History     Tobacco Use   Smoking Status Every Day    Packs/day: 0.50    Years: 23.00    Total pack years: 11.50    Types: Cigarettes   Smokeless Tobacco Never     Social History     Substance and Sexual Activity   Drug Use Never       Family History:  Family History   Problem Relation Age of Onset    Kidney disease Mother     Liver disease Mother     Heart attack Mother     Heart attack Father     No Known Problems Brother     No Known Problems Son        Physical Exam:     Vitals:   Blood Pressure: (!) 184/86 (10/12/23 1400)  Pulse: 80 (10/12/23 1400)  Temperature: 97.9 °F (36.6 °C) (10/12/23 1438)  Temp Source: Axillary (10/12/23 1438)  Respirations: 20 (10/12/23 1400)  Weight - Scale: 111 kg (245 lb) (10/12/23 1327)  SpO2: 100 % (10/12/23 1400)    Physical Exam   HEENT-PERRLA, moist oral mucosa  Neck-supple, no JVD elevation   Respiratory-equal air entry bilaterally, no rales or rhonchi  Cardiovascular system-S1, S2 heard, no murmur or gallops or rubs  Abdomen-soft, nontender, no guarding or rigidity, bowel sounds heard  Extremities-no pedal edema  Peripheral pulses palpable  Musculoskeletal-no contractures  Central nervous system-no acute focal neurological deficit ,no sensory or motor deficit noted.   Skin-no rash noted       Additional Data:     Lab Results:  Results from last 7 days   Lab Units 10/12/23  1341   WBC Thousand/uL 10.23*   HEMOGLOBIN g/dL 11.5   HEMATOCRIT % 35.9   PLATELETS Thousands/uL 307   NEUTROS PCT % 62   LYMPHS PCT % 27   MONOS PCT % 7   EOS PCT % 3     Results from last 7 days   Lab Units 10/12/23  1341   SODIUM mmol/L 138   POTASSIUM mmol/L 3.3*   CHLORIDE mmol/L 105   CO2 mmol/L 25   BUN mg/dL 17   CREATININE mg/dL 0.98   ANION GAP mmol/L 8   CALCIUM mg/dL 8.7   ALBUMIN g/dL 3.4*   TOTAL BILIRUBIN mg/dL 0.20   ALK PHOS U/L 95   ALT U/L 11   AST U/L 13   GLUCOSE RANDOM mg/dL 126                 Results from last 7 days   Lab Units 10/12/23  1341   LACTIC ACID mmol/L 1.4       Lines/Drains:  Invasive Devices       Peripheral Intravenous Line  Duration             Peripheral IV 10/12/23 Right Forearm <1 day                        Imaging: Personally reviewed the following imaging: CT head  CT head without contrast   Final Result by Maria Elena Chavez MD (10/12 2744)      No acute intracranial abnormality. Workstation performed: [id number]         XR chest 1 view portable   Final Result by Brooke Dorantes MD (10/12 5934)      No acute cardiopulmonary disease. Workstation performed: [id number]             EKG and Other Studies Reviewed on Admission:   EKG: NSR. HR no acute ST-T changes noted. ** Please Note: This note has been constructed using a voice recognition system.  **

## 2023-10-12 NOTE — ASSESSMENT & PLAN NOTE
Patient had a syncopal episode while waiting for bus , no prodromal symptoms, no obvious seizure-like activity, syncopal episode lasted less than 30 seconds and no clear postictal state, no bowel or bladder incontinence, no tongue biting noted,  Patient states she is compliant with her antiseizure medication with Keppra and Tegretol however has not seen a neurologist for a while,  CT head is negative, EKG shows sinus rhythm, initial set of troponin negative,  Will place on seizure precautions and on telemetry monitoring, will check 2D echocardiogram,  Patient had recent ED visits with seizure like episode and orthostatic hypotension, will get neurology input, will check orthostatic vitals, check urine drug screen,

## 2023-10-12 NOTE — PLAN OF CARE
Problem: MOBILITY - ADULT  Goal: Maintain or return to baseline ADL function  Description: INTERVENTIONS:  -  Assess patient's ability to carry out ADLs; assess patient's baseline for ADL function and identify physical deficits which impact ability to perform ADLs (bathing, care of mouth/teeth, toileting, grooming, dressing, etc.)  - Assess/evaluate cause of self-care deficits   - Assess range of motion  - Assess patient's mobility; develop plan if impaired  - Assess patient's need for assistive devices and provide as appropriate  - Encourage maximum independence but intervene and supervise when necessary  - Involve family in performance of ADLs  - Assess for home care needs following discharge   - Consider OT consult to assist with ADL evaluation and planning for discharge  - Provide patient education as appropriate  Outcome: Progressing  Goal: Maintains/Returns to pre admission functional level  Description: INTERVENTIONS:  - Perform BMAT or MOVE assessment daily.   - Set and communicate daily mobility goal to care team and patient/family/caregiver. - Collaborate with rehabilitation services on mobility goals if consulted  - Perform Range of Motion  times a day. - Reposition patient every  hours.   - Dangle patient  times a day  - Stand patient  times a day  - Ambulate patient  times a day  - Out of bed to chair  times a day   - Out of bed for meals  times a day  - Out of bed for toileting  - Record patient progress and toleration of activity level   Outcome: Progressing     Problem: PAIN - ADULT  Goal: Verbalizes/displays adequate comfort level or baseline comfort level  Description: Interventions:  - Encourage patient to monitor pain and request assistance  - Assess pain using appropriate pain scale  - Administer analgesics based on type and severity of pain and evaluate response  - Implement non-pharmacological measures as appropriate and evaluate response  - Consider cultural and social influences on pain and pain management  - Notify physician/advanced practitioner if interventions unsuccessful or patient reports new pain  Outcome: Progressing     Problem: INFECTION - ADULT  Goal: Absence or prevention of progression during hospitalization  Description: INTERVENTIONS:  - Assess and monitor for signs and symptoms of infection  - Monitor lab/diagnostic results  - Monitor all insertion sites, i.e. indwelling lines, tubes, and drains  - Monitor endotracheal if appropriate and nasal secretions for changes in amount and color  - [place] appropriate cooling/warming therapies per order  - Administer medications as ordered  - Instruct and encourage patient and family to use good hand hygiene technique  - Identify and instruct in appropriate isolation precautions for identified infection/condition  Outcome: Progressing  Goal: Absence of fever/infection during neutropenic period  Description: INTERVENTIONS:  - Monitor WBC    Outcome: Progressing     Problem: SAFETY ADULT  Goal: Maintain or return to baseline ADL function  Description: INTERVENTIONS:  -  Assess patient's ability to carry out ADLs; assess patient's baseline for ADL function and identify physical deficits which impact ability to perform ADLs (bathing, care of mouth/teeth, toileting, grooming, dressing, etc.)  - Assess/evaluate cause of self-care deficits   - Assess range of motion  - Assess patient's mobility; develop plan if impaired  - Assess patient's need for assistive devices and provide as appropriate  - Encourage maximum independence but intervene and supervise when necessary  - Involve family in performance of ADLs  - Assess for home care needs following discharge   - Consider OT consult to assist with ADL evaluation and planning for discharge  - Provide patient education as appropriate  Outcome: Progressing  Goal: Maintains/Returns to pre admission functional level  Description: INTERVENTIONS:  - Perform BMAT or MOVE assessment daily.   - Set and communicate daily mobility goal to care team and patient/family/caregiver. - Collaborate with rehabilitation services on mobility goals if consulted  - Perform Range of Motion  times a day. - Reposition patient every  hours.   - Dangle patient  times a day  - Stand patient  times a day  - Ambulate patient  times a day  - Out of bed to chair  times a day   - Out of bed for meals  times a day  - Out of bed for toileting  - Record patient progress and toleration of activity level   Outcome: Progressing  Goal: Patient will remain free of falls  Description: INTERVENTIONS:  - Educate patient/family on patient safety including physical limitations  - Instruct patient to call for assistance with activity   - Consult OT/PT to assist with strengthening/mobility   - Keep Call bell within reach  - Keep bed low and locked with side rails adjusted as appropriate  - Keep care items and personal belongings within reach  - Initiate and maintain comfort rounds  - Make Fall Risk Sign visible to staff  - Offer Toileting every  Hours, in advance of need  - Initiate/Maintain alarm  - Obtain necessary fall risk management equipment:  - Apply yellow socks and bracelet for high fall risk patients  - Consider moving patient to room near nurses station  Outcome: Progressing     Problem: DISCHARGE PLANNING  Goal: Discharge to home or other facility with appropriate resources  Description: INTERVENTIONS:  - Identify barriers to discharge w/patient and caregiver  - Arrange for needed discharge resources and transportation as appropriate  - Identify discharge learning needs (meds, wound care, etc.)  - Arrange for interpretive services to assist at discharge as needed  - Refer to Case Management Department for coordinating discharge planning if the patient needs post-hospital services based on physician/advanced practitioner order or complex needs related to functional status, cognitive ability, or social support system  Outcome: Progressing Problem: CARDIOVASCULAR - ADULT  Goal: Maintains optimal cardiac output and hemodynamic stability  Description: INTERVENTIONS:  - Monitor I/O, vital signs and rhythm  - Monitor for S/S and trends of decreased cardiac output  - Administer and titrate ordered vasoactive medications to optimize hemodynamic stability  - Assess quality of pulses, skin color and temperature  - Assess for signs of decreased coronary artery perfusion  - Instruct patient to report change in severity of symptoms  Outcome: Progressing  Goal: Absence of cardiac dysrhythmias or at baseline rhythm  Description: INTERVENTIONS:  - Continuous cardiac monitoring, vital signs, obtain 12 lead EKG if ordered  - Administer antiarrhythmic and heart rate control medications as ordered  - Monitor electrolytes and administer replacement therapy as ordered  Outcome: Progressing     Problem: MUSCULOSKELETAL - ADULT  Goal: Maintain or return mobility to safest level of function  Description: INTERVENTIONS:  - Assess patient's ability to carry out ADLs; assess patient's baseline for ADL function and identify physical deficits which impact ability to perform ADLs (bathing, care of mouth/teeth, toileting, grooming, dressing, etc.)  - Assess/evaluate cause of self-care deficits   - Assess range of motion  - Assess patient's mobility  - Assess patient's need for assistive devices and provide as appropriate  - Encourage maximum independence but intervene and supervise when necessary  - Involve family in performance of ADLs  - Assess for home care needs following discharge   - Consider OT consult to assist with ADL evaluation and planning for discharge  - Provide patient education as appropriate  Outcome: Progressing  Goal: Maintain proper alignment of affected body part  Description: INTERVENTIONS:  - Support, maintain and protect limb and body alignment  - Provide patient/ family with appropriate education  Outcome: Progressing

## 2023-10-12 NOTE — ED PROVIDER NOTES
History  Chief Complaint   Patient presents with    Syncope     Syncopal event while waiting for the bus. Not witnessed. Remembers getting lightheaded. Initially confused when first awake then became oriented     Past Medical History: Depression, Epilepsy, Obesity, Restless leg, Dysthymia, Morbidly obese, syncope, acute renal failure superimposed on chronic kidney disease, Lymphedema, Sciatica, Essential hypertension, Major neurocognitive disorder,  Past Surgical History: DENTAL SURGERY-  all teeth removed, LASER ABLATION OF THE CERVIX, MAMMO     Pt presents to ED after witnessed syncopal episode immed PTA when she was waiting for a bus, pt states she thinks "it's just because she felt hot"; denies any prodromal sx of HA, cp, has chronic SOB, appears slightly tachypneic/wheezing, focal weakness, no neck pain. Patient states remembers waiting for the bus and then passed out, awoke with police around her. EMS states event was witnessed,  no reports of seizure activity, patient was not incontinent of urine, denies biting tongue, denies any physical complaints at present. EMS states patient was slightly confused initially upon their evaluation, now AAO x 3.  + tobacco, denies ETOH/drugs. Pt eating and drinking without difficulty        Prior to Admission Medications   Prescriptions Last Dose Informant Patient Reported? Taking?    QUEtiapine (SEROquel) 25 mg tablet   No No   Sig: Take 0.5 tablets (12.5 mg total) by mouth daily at bedtime   Patient not taking: Reported on 8/10/2023   calcium carbonate (OYSTER SHELL,OSCAL) 500 mg   No No   Sig: Take 2 tablets by mouth daily with breakfast   carBAMazepine (TEGretol) 200 mg tablet   No No   Sig: Take 1 tablet (200 mg total) by mouth 3 (three) times a day   carbamide peroxide (DEBROX) 6.5 % otic solution   No No   Sig: Administer 5 drops into both ears 2 (two) times a day for 5 doses   cholecalciferol (VITAMIN D3) 1,000 units tablet   No No   Sig: Take 2 tablets (2,000 Units total) by mouth daily   escitalopram (LEXAPRO) 10 mg tablet   No No   Sig: Take 1 tablet (10 mg total) by mouth daily   fluticasone (FLONASE) 50 mcg/act nasal spray   No No   Si spray into each nostril daily   levETIRAcetam (Keppra) 750 mg tablet   No No   Sig: Take 2 tablets (1,500 mg total) by mouth 2 (two) times a day   losartan (COZAAR) 25 mg tablet   No No   Sig: Take 1 tablet (25 mg total) by mouth daily   metoprolol succinate (TOPROL-XL) 50 mg 24 hr tablet   No No   Sig: Take 1 tablet (50 mg total) by mouth daily   nicotine (NICODERM CQ) 21 mg/24 hr TD 24 hr patch   No No   Sig: Place 1 patch on the skin over 24 hours daily   pramipexole (MIRAPEX) 1.5 MG tablet   No No   Sig: Take 1 tablet (1.5 mg total) by mouth daily at bedtime   spironolactone (ALDACTONE) 25 mg tablet   Yes No   Sig: Take 25 mg by mouth daily   Patient not taking: Reported on 8/10/2023      Facility-Administered Medications Last Administration Doses Remaining   cyanocobalamin injection 1,000 mcg 2023  4:59 PM           Past Medical History:   Diagnosis Date    Depression     EP (epilepsy) ([address])     Epilepsy ([address])     Obesity     Restless leg        Past Surgical History:   Procedure Laterality Date    DENTAL SURGERY      all teeth removed    LASER ABLATION OF THE CERVIX      MAMMO (HISTORICAL)  2017       Family History   Problem Relation Age of Onset    Kidney disease Mother     Liver disease Mother     Heart attack Mother     Heart attack Father     No Known Problems Brother     No Known Problems Son      I have reviewed and agree with the history as documented.     E-Cigarette/Vaping    E-Cigarette Use Never User      E-Cigarette/Vaping Substances    Nicotine Yes     THC No     CBD No     Flavoring No     Other No     Unknown No      Social History     Tobacco Use    Smoking status: Every Day     Packs/day: 0.50     Years: 23.00     Total pack years: 11.50     Types: Cigarettes    Smokeless tobacco: Never   Vaping Use Vaping Use: Never used   Substance Use Topics    Alcohol use: Never     Comment: pt denies alcohol use    Drug use: Never       Review of Systems   Constitutional:  Positive for diaphoresis. Negative for fever. HENT:  Negative for hearing loss and sore throat. Eyes:  Negative for visual disturbance. Respiratory:  Positive for shortness of breath and wheezing. Negative for cough. Cardiovascular:  Negative for chest pain and leg swelling. Gastrointestinal:  Negative for abdominal pain and vomiting. Genitourinary:  Negative for dysuria and frequency. Musculoskeletal:  Negative for arthralgias and myalgias. Skin:  Negative for color change, pallor and wound. Neurological:  Positive for syncope. Negative for dizziness and weakness. Psychiatric/Behavioral:  Negative for behavioral problems. All other systems reviewed and are negative. Physical Exam  Physical Exam  Vitals and nursing note reviewed. Constitutional:       General: She is in acute distress (mild). Appearance: She is well-developed. She is obese. HENT:      Head: Normocephalic and atraumatic. Right Ear: External ear normal.      Left Ear: External ear normal.      Nose: Nose normal.      Mouth/Throat:      Mouth: Mucous membranes are moist.      Pharynx: Oropharynx is clear. Eyes:      Conjunctiva/sclera: Conjunctivae normal.   Cardiovascular:      Rate and Rhythm: Normal rate and regular rhythm. Pulmonary:      Effort: Respiratory distress (mild) present. Breath sounds: Wheezing (late exp wheese) present. Abdominal:      General: Bowel sounds are normal.      Palpations: Abdomen is soft. Musculoskeletal:         General: Normal range of motion. Cervical back: Normal range of motion. Skin:     General: Skin is warm and dry. Neurological:      General: No focal deficit present. Mental Status: She is alert and oriented to person, place, and time. Mental status is at baseline.       Cranial Nerves: No cranial nerve deficit. Sensory: No sensory deficit.    Psychiatric:         Mood and Affect: Mood normal.         Behavior: Behavior normal.         Vital Signs  ED Triage Vitals   Temperature Pulse Respirations Blood Pressure SpO2   10/12/23 1438 10/12/23 1327 10/12/23 1327 10/12/23 1327 10/12/23 1327   97.9 °F (36.6 °C) 84 16 (!) 175/118 98 %      Temp Source Heart Rate Source Patient Position - Orthostatic VS BP Location FiO2 (%)   10/12/23 1438 10/12/23 1327 10/12/23 1327 10/12/23 1327 --   Axillary Monitor Lying Left arm       Pain Score       10/12/23 1327       No Pain           Vitals:    10/12/23 1327 10/12/23 1333 10/12/23 1400   BP: (!) 175/118 165/84 (!) 184/86   Pulse: 84  80   Patient Position - Orthostatic VS: Lying  Lying         Visual Acuity      ED Medications  Medications   ipratropium-albuterol (DUO-NEB) 0.5-2.5 mg/3 mL inhalation solution 3 mL (3 mL Nebulization Given 10/12/23 1352)   sodium chloride 0.9 % bolus 1,000 mL (1,000 mL Intravenous New Bag 10/12/23 1406)   predniSONE tablet 60 mg (60 mg Oral Given 10/12/23 1350)   potassium chloride (K-DUR,KLOR-CON) CR tablet 40 mEq (40 mEq Oral Given 10/12/23 1436)       Diagnostic Studies  Results Reviewed       Procedure Component Value Units Date/Time    B-Type Natriuretic Peptide(BNP) [[id number]]  (Normal) Collected: 10/12/23 1341    Lab Status: Final result Specimen: Blood from Arm, Left Updated: 10/12/23 1416     BNP 66 pg/mL     HS Troponin 0hr (reflex protocol) [[id number]]  (Normal) Collected: 10/12/23 1341    Lab Status: Final result Specimen: Blood from Arm, Left Updated: 10/12/23 1412     hs TnI 0hr 3 ng/L     HS Troponin I 2hr [[id number]]     Lab Status: No result Specimen: Blood     Lactic acid, plasma (w/reflex if result > 2.0) [[id number]]  (Normal) Collected: 10/12/23 1341    Lab Status: Final result Specimen: Blood from Arm, Left Updated: 10/12/23 1406     LACTIC ACID 1.4 mmol/L     Narrative:      Result may be elevated if tourniquet was used during collection.     Comprehensive metabolic panel [[id number]]  (Abnormal) Collected: 10/12/23 1341    Lab Status: Final result Specimen: Blood from Arm, Left Updated: 10/12/23 1406     Sodium 138 mmol/L      Potassium 3.3 mmol/L      Chloride 105 mmol/L      CO2 25 mmol/L      ANION GAP 8 mmol/L      BUN 17 mg/dL      Creatinine 0.98 mg/dL      Glucose 126 mg/dL      Calcium 8.7 mg/dL      Corrected Calcium 9.2 mg/dL      AST 13 U/L      ALT 11 U/L      Alkaline Phosphatase 95 U/L      Total Protein 7.0 g/dL      Albumin 3.4 g/dL      Total Bilirubin 0.20 mg/dL      eGFR 60 ml/min/1.73sq m     Narrative:      National Kid[employer] Foundation guidelines for Chronic Kidney Disease (CKD):     Stage 1 with normal or high GFR (GFR > 90 mL/min/1.73 square meters)    Stage 2 Mild CKD (GFR = 60-89 mL/min/1.73 square meters)    Stage 3A Moderate CKD (GFR = 45-59 mL/min/1.73 square meters)    Stage 3B Moderate CKD (GFR = 30-44 mL/min/1.73 square meters)    Stage 4 Severe CKD (GFR = 15-29 mL/min/1.73 square meters)    Stage 5 End Stage CKD (GFR <15 mL/min/1.73 square meters)  Note: GFR calculation is accurate only with a steady state creatinine    Magnesium [[id number]]  (Normal) Collected: 10/12/23 1341    Lab Status: Final result Specimen: Blood from Arm, Left Updated: 10/12/23 1406     Magnesium 1.9 mg/dL     CBC and differential [[id number]]  (Abnormal) Collected: 10/12/23 1341    Lab Status: Final result Specimen: Blood from Arm, Left Updated: 10/12/23 1348     WBC 10.23 Thousand/uL      RBC 3.78 Million/uL      Hemoglobin 11.5 g/dL      Hematocrit 35.9 %      MCV 95 fL      MCH 30.4 pg      MCHC 32.0 g/dL      RDW 13.6 %      MPV 10.7 fL      Platelets 886 Thousands/uL      nRBC 0 /100 WBCs      Neutrophils Relative 62 %      Immat GRANS % 0 %      Lymphocytes Relative 27 %      Monocytes Relative 7 %      Eosinophils Relative 3 %      Basophils Relative 1 %      Neutrophils Absolute 6.44 Thousands/µL      Immature Grans Absolute 0.03 Thousand/uL      Lymphocytes Absolute 2.72 Thousands/µL      Monocytes Absolute 0.66 Thousand/µL      Eosinophils Absolute 0.32 Thousand/µL      Basophils Absolute 0.06 Thousands/µL     Carbamazepine level, total [[id number]] Collected: 10/12/23 1341    Lab Status: In process Specimen: Blood from Arm, Left Updated: 10/12/23 1345    Levetiracetam level [[id number]] Collected: 10/12/23 1341    Lab Status: In process Specimen: Blood from Arm, Left Updated: 10/12/23 1345    UA w Reflex to Microscopic w Reflex to Culture [[id number]]     Lab Status: No result Specimen: Urine, Clean Catch                    CT head without contrast   Final Result by Sara Garcia MD (10/12 1427)      No acute intracranial abnormality. Workstation performed: [id number]         XR chest 1 view portable   Final Result by Gwenetta Klinefelter, MD (10/12 1353)      No acute cardiopulmonary disease. Workstation performed: [id number]                    Procedures  Procedures         ED Course  ED Course as of 10/12/23 1530   Thu Oct 12, 2023   1427 CBC unremarkable   1428 Potassium(!): 3.3  Mild HypoK, will give oral replacement   1428 BNP: 66  normal   1428 Magnesium: 1.9  normal   1428 hs TnI 0hr: 3  Normal, but sx less then 3 hrs so will get 2H repeat   1428 LACTIC ACID: 1.4  normal   1429 CXR NAD                               SBIRT 20yo+      Flowsheet Row Most Recent Value   Initial Alcohol Screen: US AUDIT-C     1. How often do you have a drink containing alcohol? 0 Filed at: 10/12/2023 1330   2. How many drinks containing alcohol do you have on a typical day you are drinking? 0 Filed at: 10/12/2023 1330   3a. Male UNDER 65: How often do you have five or more drinks on one occasion? 0 Filed at: 10/12/2023 1330   3b. FEMALE Any Age, or MALE 65+: How often do you have 4 or more drinks on one occassion?  0 Filed at: 10/12/2023 1330   Audit-C Score 0 Filed at: 10/12/2023 1330   JOHN: How many times in the past year have you. .. Used an illegal drug or used a prescription medication for non-medical reasons? Never Filed at: 10/12/2023 1330                      Medical Decision Making  Pt here with syncope, shortness of breath. DDX: electrolyte abnormalities, ACS, pneumonia, TIA, seizure, arrhythmia, other potential causes of her symptoms. work-up is reassuring, pending trop 2H, Pt feeling better. Would recommend admission at this time, for further evaluation, monitoring, possible cardio/neuro eval      Amount and/or Complexity of Data Reviewed  External Data Reviewed: labs and notes. Labs: ordered. Decision-making details documented in ED Course. Radiology: ordered. Decision-making details documented in ED Course. ECG/medicine tests: ordered and independent interpretation performed. Decision-making details documented in ED Course. Details: EKG interpreted by ED physician shows normal sinus rhythm at 82, no acute ischemic changes`    Risk  Prescription drug management. Decision regarding hospitalization. Disposition  Final diagnoses:   Syncope     Time reflects when diagnosis was documented in both MDM as applicable and the Disposition within this note       Time User Action Codes Description Comment    10/12/2023  3:17 PM Unk Vesna Add [R55] Syncope           ED Disposition       ED Disposition   Admit    Condition   Stable    Date/Time   Thu Oct 12, 2023 1516    Comment   Case was discussed with Rell Mays and the patient's admission status was agreed to be Admission Status: observation status to the service of Dr. Rell Mays . Follow-up Information    None         Patient's Medications   Discharge Prescriptions    No medications on file       No discharge procedures on file.     PDMP Review         Value Time User    PDMP Reviewed  Yes 7/23/2021 10:52 Marichuy Gonzalez MD            ED Provider  Electronically Signed by             Jennifer Flannery PA-C  10/12/23 0251

## 2023-10-12 NOTE — ASSESSMENT & PLAN NOTE
Continue Keppra 1500 mg twice a day and Tegretol 200 mg 3 times a day,  Consult neurologist patient has not followed up with neurology for a while,  Patient apparently is compliant with medications, recently Keppra and Tegretol levels were checked

## 2023-10-13 ENCOUNTER — TELEPHONE (OUTPATIENT)
Age: 63
End: 2023-10-13

## 2023-10-13 LAB
ATRIAL RATE: 90 BPM
P AXIS: 51 DEGREES
PR INTERVAL: 144 MS
QRS AXIS: 10 DEGREES
QRSD INTERVAL: 68 MS
QT INTERVAL: 356 MS
QTC INTERVAL: 435 MS
T WAVE AXIS: 45 DEGREES
VENTRICULAR RATE: 90 BPM

## 2023-10-13 PROCEDURE — 93010 ELECTROCARDIOGRAM REPORT: CPT | Performed by: INTERNAL MEDICINE

## 2023-10-13 NOTE — TELEPHONE ENCOUNTER
Gabriele Anglin brother Fernando Sy called - he's aware he's not listed on pt communication form, but he wants to reach try and help her. Says she's a frequent flyer to the ER because she can't get into a Neurologist. Her previous one dismissed her due frequent "No Show". Fernando Sy states at the time she had a lot going on. Fernando Sy is asking if there's something that can be done to get her in with that Neurologist? I mentioned if she can go to another Neurologist and he said yes, but doesn't know if there are others in the area [place]).     Says Quynh Ramirez is monitoring her neuro meds, but she needs to be seen by a Neurologist.    Fernando Sy says to please call him and let him know, even if  there's nothing that can be done he will like a callback    Fernando Sy # [phone]

## 2023-10-13 NOTE — NURSING NOTE
Patient leaving AMA-  provide and nursing staff educated patient about incomplete work-up and ongoing care including recurrent seizures/ incomplete work-up/Echo, neurology consult. Patient escorted to transport vehicle. Patient denied any dizziness/lightheadedness and pain.

## 2023-10-13 NOTE — PLAN OF CARE
Problem: NEUROSENSORY - ADULT  Goal: Remains free of injury related to seizures activity  Description: INTERVENTIONS  - Maintain airway, patient safety  and administer oxygen as ordered  - Monitor patient for seizure activity, document and report duration and description of seizure to physician/advanced practitioner  - If seizure occurs,  ensure patient safety during seizure  - Reorient patient post seizure  - Seizure pads on all 4 side rails  - Instruct patient/family to notify RN of any seizure activity including if an aura is experienced  - Instruct patient/family to call for assistance with activity based on nursing assessment  - Administer anti-seizure medications if ordered    Outcome: Progressing     Problem: NEUROSENSORY - ADULT  Goal: Achieves maximal functionality and self care  Description: INTERVENTIONS  - Monitor swallowing and airway patency with patient fatigue and changes in neurological status  - Encourage and assist patient to increase activity and self care.    - Encourage visually impaired, hearing impaired and aphasic patients to use assistive/communication devices  Outcome: Progressing     Problem: CARDIOVASCULAR - ADULT  Goal: Maintains optimal cardiac output and hemodynamic stability  Description: INTERVENTIONS:  - Monitor I/O, vital signs and rhythm  - Monitor for S/S and trends of decreased cardiac output  - Administer and titrate ordered vasoactive medications to optimize hemodynamic stability  - Assess quality of pulses, skin color and temperature  - Assess for signs of decreased coronary artery perfusion  - Instruct patient to report change in severity of symptoms  10/12/2023 2033 by Tong Yin RN  Outcome: Progressing  10/12/2023 2032 by Tong Yin RN  Outcome: Progressing     Problem: CARDIOVASCULAR - ADULT  Goal: Absence of cardiac dysrhythmias or at baseline rhythm  Description: INTERVENTIONS:  - Continuous cardiac monitoring, vital signs, obtain 12 lead EKG if ordered  - Administer antiarrhythmic and heart rate control medications as ordered  - Monitor electrolytes and administer replacement therapy as ordered  10/12/2023 2033 by Dmitriy Chen RN  Outcome: Progressing  10/12/2023 2032 by Dmitriy Chen RN  Outcome: Progressing     Problem: SAFETY ADULT  Goal: Patient will remain free of falls  Description: INTERVENTIONS:  -  Assess patient's ability to carry out ADLs; assess patient's baseline for ADL function and identify physical deficits which impact ability to perform ADLs (bathing, care of mouth/teeth, toileting, grooming, dressing, etc.)  - Assess/evaluate cause of self-care deficits   - Assess range of motion  - Assess patient's mobility; develop plan if impaired  - Assess patient's need for assistive devices and provide as appropriate  - Encourage maximum independence but intervene and supervise when necessary  - Involve family in performance of ADLs  - Assess for home care needs following discharge   - Consider OT consult to assist with ADL evaluation and planning for discharge  - Provide patient education as appropriate  10/12/2023 2033 by Dmitriy Chen RN  Outcome: Progressing  10/12/2023 2032 by Dmitriy Chen RN  Outcome: Progressing

## 2023-10-13 NOTE — DISCHARGE SUMMARY
[address]  Discharge- Kaley Chaudhari 1960, 70 y.o. female MRN: [id number]  Unit/Bed#: -Jeffrey Encounter: [id number]  Primary Care Provider: Shabbir Muhammad [address] [place] [address]   Date and time admitted to hospital: 10/12/2023  1:23 PM    **Patient leaving AMA**    Admitting Provider:  Ana Olivares MD  Discharge Provider:  Monty Chavez DO  Admission Date: 10/12/2023       Discharge Date: 10/12/23   LOS: 0  Primary Care Physician at Discharge: Shabbir Muhammad, [facility]    DISCHARGE DIAGNOSES  Principal Problem:    Syncope  Active Problems: Morbidly obese (HCC)    Restless leg syndrome    Hypokalemia    Recurrent seizures (HCC)    Essential hypertension  Resolved Problems:    * No resolved hospital problems. *    REASON FOR ADMISSION  Syncope (Syncopal event while waiting for the bus. Not witnessed. Remembers getting lightheaded. Initially confused when first awake then became oriented)    HOSPITAL COURSE:  Kaley Chaudhari is a 70 y.o. female with a history of primary hypertension RLS obesity seizure disorder and depression who presented to the hospital with syncopal episode while waiting for the bus. This is the patient's third occurrence last ED visit this month. The patient was admitted for observation which included neurology consult telemetry monitoring and echocardiogram.    Briefly in the hospitalization the patient requested to leave the hospital because she had to lock her apartment. She does not want any further work-up. Nursing supervisor did offer to call nonemergent police to lock. Doors but she did not want this citing they cannot get into the apartment complex to her unlock door. With nursing at bedside the risk of leaving AMA were discussed which includes but not limited to recurrent seizure/syncope/fall, bodily injury, and/or death as work-up is incomplete. She wishes to leave the hospital understanding these risks.   The patient appears to have decision-making capacity on my personal examination today to leave the hospital understanding the risks. The bus schedule was checked which does not appear to have a running bus at this time. She was provided a LYFT for her personal safety. Attempt was made to call this . The number is not working; patient reports he is in need dementia nursing home at this time. RADIOLOGY RESULTS  CT head without contrast    Result Date: 10/12/2023  Impression: No acute intracranial abnormality. Workstation performed: [id number]     XR chest 1 view portable    Result Date: 10/12/2023  Impression: No acute cardiopulmonary disease. Workstation performed: [email or web address] [email or web address]  Results from last 7 days   Lab Units 10/12/23  1341   WBC Thousand/uL 10.23*   HEMOGLOBIN g/dL 11.5   HEMATOCRIT % 35.9   MCV fL 95   PLATELETS Thousands/uL 307     Results from last 7 days   Lab Units 10/12/23  1341   SODIUM mmol/L 138   POTASSIUM mmol/L 3.3*   CHLORIDE mmol/L 105   CO2 mmol/L 25   BUN mg/dL 17   CREATININE mg/dL 0.98   CALCIUM mg/dL 8.7   ALBUMIN g/dL 3.4*   TOTAL BILIRUBIN mg/dL 0.20   ALK PHOS U/L 95   ALT U/L 11   AST U/L 13   EGFR ml/min/1.73sq m 60   GLUCOSE RANDOM mg/dL 126         Results from last 7 days   Lab Units 10/12/23  1803 10/12/23  1604 10/12/23  1341   HS TNI 0HR ng/L  --   --  3   HS TNI 2HR ng/L  --  3  --    HS TNI 4HR ng/L 2  --   --            Results from last 7 days   Lab Units 10/12/23  1341   TSH 3RD GENERATON uIU/mL 2. 181     Results from last 7 days   Lab Units 10/12/23  1341   LACTIC ACID mmol/L 1.4       Planned Re-admission:   Discharge Disposition: AMA    Test Results Pending at Discharge:   Pending Labs       Order Current Status    Carbamazepine level, total In process    Levetiracetam level In process          Code Status: Level 1 - Full Code    ** Please Note: This note has been constructed using a voice recognition system.  **

## 2023-10-13 NOTE — TELEPHONE ENCOUNTER
I reviewed the communication consent. She wrote "no one" 8/10/23  I must respect the patient's wishes. I advised monthly visits at 8/10/23 visit with me which she did not agree to. Sh is due to see me for TCM visit. I will address her communication consent and contact with her brother at that visit. As far as Neurology. She was discharged from [facility] for too many no shows. She may have to establish at [facility].      Her brother can seek temporary guardianship and competency eval if through the court system

## 2023-10-14 LAB — LEVETIRACETAM SERPL-MCNC: 29.8 UG/ML (ref 10–40)

## 2023-10-18 DIAGNOSIS — G25.81 RESTLESS LEG SYNDROME: ICD-10-CM

## 2023-10-18 RX ORDER — PRAMIPEXOLE DIHYDROCHLORIDE 1.5 MG/1
1.5 TABLET ORAL
Qty: 90 TABLET | Refills: 0 | Status: SHIPPED | OUTPATIENT
Start: 2023-10-18

## 2023-10-28 ENCOUNTER — HOSPITAL ENCOUNTER (EMERGENCY)
Facility: HOSPITAL | Age: 63
Discharge: HOME | End: 2023-10-28
Attending: INTERNAL MEDICINE
Payer: MEDICARE

## 2023-10-28 VITALS — OXYGEN SATURATION: 97 % | HEART RATE: 98 BPM | RESPIRATION RATE: 20 BRPM | TEMPERATURE: 97.8 F

## 2023-10-28 DIAGNOSIS — R42 DIZZINESS: Primary | ICD-10-CM

## 2023-10-28 DIAGNOSIS — G40.919 EPILEPSY WITH ALTERED CONSCIOUSNESS WITH INTRACTABLE EPILEPSY (HCC): ICD-10-CM

## 2023-10-28 LAB
ALBUMIN SERPL BCP-MCNC: 3.4 G/DL (ref 3.5–5)
ALP SERPL-CCNC: 89 U/L (ref 34–104)
ALT SERPL W P-5'-P-CCNC: 11 U/L (ref 7–52)
ANION GAP SERPL CALCULATED.3IONS-SCNC: 7 MMOL/L
AST SERPL W P-5'-P-CCNC: 12 U/L (ref 13–39)
BASOPHILS # BLD AUTO: 0.05 THOUSANDS/ÂΜL (ref 0–0.1)
BASOPHILS NFR BLD AUTO: 0 % (ref 0–1)
BILIRUB SERPL-MCNC: 0.25 MG/DL (ref 0.2–1)
BUN SERPL-MCNC: 22 MG/DL (ref 5–25)
CALCIUM ALBUM COR SERPL-MCNC: 9 MG/DL (ref 8.3–10.1)
CALCIUM SERPL-MCNC: 8.5 MG/DL (ref 8.4–10.2)
CARBAMAZEPINE SERPL-MCNC: 2.1 UG/ML (ref 4–12)
CARDIAC TROPONIN I PNL SERPL HS: 3 NG/L (ref 8–18)
CHLORIDE SERPL-SCNC: 106 MMOL/L (ref 96–108)
CO2 SERPL-SCNC: 25 MMOL/L (ref 21–32)
CREAT SERPL-MCNC: 1.13 MG/DL (ref 0.6–1.3)
EOSINOPHIL # BLD AUTO: 0.18 THOUSAND/ÂΜL (ref 0–0.61)
EOSINOPHIL NFR BLD AUTO: 2 % (ref 0–6)
ERYTHROCYTE [DISTWIDTH] IN BLOOD BY AUTOMATED COUNT: 13.5 % (ref 11.6–15.1)
GFR SERPL CREATININE-BSD FRML MDRD: 51 ML/MIN/1.73SQ M
GLUCOSE SERPL-MCNC: 104 MG/DL (ref 65–140)
HCT VFR BLD AUTO: 34.6 % (ref 34.8–46.1)
HGB BLD-MCNC: 11.2 G/DL (ref 11.5–15.4)
IMM GRANULOCYTES # BLD AUTO: 0.04 THOUSAND/UL (ref 0–0.2)
IMM GRANULOCYTES NFR BLD AUTO: 0 % (ref 0–2)
LYMPHOCYTES # BLD AUTO: 1.79 THOUSANDS/ÂΜL (ref 0.6–4.47)
LYMPHOCYTES NFR BLD AUTO: 15 % (ref 14–44)
MCH RBC QN AUTO: 30.1 PG (ref 26.8–34.3)
MCHC RBC AUTO-ENTMCNC: 32.4 G/DL (ref 31.4–37.4)
MCV RBC AUTO: 93 FL (ref 82–98)
MONOCYTES # BLD AUTO: 0.78 THOUSAND/ÂΜL (ref 0.17–1.22)
MONOCYTES NFR BLD AUTO: 6 % (ref 4–12)
NEUTROPHILS # BLD AUTO: 9.49 THOUSANDS/ÂΜL (ref 1.85–7.62)
NEUTS SEG NFR BLD AUTO: 77 % (ref 43–75)
NRBC BLD AUTO-RTO: 0 /100 WBCS
PLATELET # BLD AUTO: 367 THOUSANDS/UL (ref 149–390)
PMV BLD AUTO: 9.7 FL (ref 8.9–12.7)
POTASSIUM SERPL-SCNC: 3.9 MMOL/L (ref 3.5–5.3)
PROT SERPL-MCNC: 6.9 G/DL (ref 6.4–8.4)
RBC # BLD AUTO: 3.72 MILLION/UL (ref 3.81–5.12)
SODIUM SERPL-SCNC: 138 MMOL/L (ref 135–147)
WBC # BLD AUTO: 12.33 THOUSAND/UL (ref 4.31–10.16)

## 2023-10-28 PROCEDURE — 96361 HYDRATE IV INFUSION ADD-ON: CPT

## 2023-10-28 PROCEDURE — 84484 ASSAY OF TROPONIN QUANT: CPT | Performed by: INTERNAL MEDICINE

## 2023-10-28 PROCEDURE — 80156 ASSAY CARBAMAZEPINE TOTAL: CPT | Performed by: INTERNAL MEDICINE

## 2023-10-28 PROCEDURE — 99284 EMERGENCY DEPT VISIT MOD MDM: CPT

## 2023-10-28 PROCEDURE — 85025 COMPLETE CBC W/AUTO DIFF WBC: CPT | Performed by: INTERNAL MEDICINE

## 2023-10-28 PROCEDURE — 96360 HYDRATION IV INFUSION INIT: CPT

## 2023-10-28 PROCEDURE — 93005 ELECTROCARDIOGRAM TRACING: CPT

## 2023-10-28 PROCEDURE — 80053 COMPREHEN METABOLIC PANEL: CPT | Performed by: INTERNAL MEDICINE

## 2023-10-28 PROCEDURE — 99284 EMERGENCY DEPT VISIT MOD MDM: CPT | Performed by: INTERNAL MEDICINE

## 2023-10-28 PROCEDURE — 36415 COLL VENOUS BLD VENIPUNCTURE: CPT | Performed by: INTERNAL MEDICINE

## 2023-10-28 RX ORDER — SODIUM CHLORIDE 9 MG/ML
125 INJECTION, SOLUTION INTRAVENOUS CONTINUOUS
Status: DISCONTINUED | OUTPATIENT
Start: 2023-10-28 | End: 2023-10-28 | Stop reason: HOSPADM

## 2023-10-28 RX ORDER — CARBAMAZEPINE 200 MG/1
200 TABLET ORAL ONCE
Status: COMPLETED | OUTPATIENT
Start: 2023-10-28 | End: 2023-10-28

## 2023-10-28 RX ORDER — CARBAMAZEPINE 200 MG/1
200 TABLET ORAL 3 TIMES DAILY
Qty: 90 TABLET | Refills: 0 | Status: SHIPPED | OUTPATIENT
Start: 2023-10-28 | End: 2023-11-02 | Stop reason: SDUPTHER

## 2023-10-28 RX ADMIN — SODIUM CHLORIDE 125 ML/HR: 0.9 INJECTION, SOLUTION INTRAVENOUS at 16:18

## 2023-10-28 RX ADMIN — CARBAMAZEPINE 200 MG: 200 TABLET ORAL at 17:51

## 2023-10-28 NOTE — ED PROVIDER NOTES
History  Chief Complaint   Patient presents with    Dizziness     Pt reports dizziness x 2 days, has been out of her tegretol for the past 2 days as well. Pt reports HA, denies n/v, denies changes in vision. Pt states she usually becomes dizzy when she is out of her medication     This is 68y old came for having dizziness. Pt stated she felt room is spinning . Pt has this dizziness on and off , and even happens while she is sitting . Pt has no HA, numbness or tingling of extremities. Pt denies change of her speech. Pt has no syncopal attacks. Pt was admitted to the hospital about 2 weeks ago for syncope for 30 seconds. Pt had CT head and EKG blood work, then discharged home . Pt stated she ran out of her tegratol , and last time she took it 2 days ago. Pt stated she has seizure yesterday and when asked how it happens , pt stated her hands were shaking, only. Pt denies chest pain, sob. Pt in no distress. Prior to Admission Medications   Prescriptions Last Dose Informant Patient Reported? Taking?    QUEtiapine (SEROquel) 25 mg tablet   No No   Sig: Take 0.5 tablets (12.5 mg total) by mouth daily at bedtime   Patient not taking: Reported on 8/10/2023   calcium carbonate (OYSTER SHELL,OSCAL) 500 mg   No No   Sig: Take 2 tablets by mouth daily with breakfast   carBAMazepine (TEGretol) 200 mg tablet   No No   Sig: Take 1 tablet (200 mg total) by mouth 3 (three) times a day   carBAMazepine (TEGretol) 200 mg tablet   No Yes   Sig: Take 1 tablet (200 mg total) by mouth 3 (three) times a day   carbamide peroxide (DEBROX) 6.5 % otic solution   No No   Sig: Administer 5 drops into both ears 2 (two) times a day for 5 doses   cholecalciferol (VITAMIN D3) 1,000 units tablet   No No   Sig: Take 2 tablets (2,000 Units total) by mouth daily   escitalopram (LEXAPRO) 10 mg tablet   No No   Sig: Take 1 tablet (10 mg total) by mouth daily   fluticasone (FLONASE) 50 mcg/act nasal spray   No No   Si spray into each nostril daily levETIRAcetam (Keppra) 750 mg tablet   No No   Sig: Take 2 tablets (1,500 mg total) by mouth 2 (two) times a day   losartan (COZAAR) 25 mg tablet   No No   Sig: Take 1 tablet (25 mg total) by mouth daily   metoprolol succinate (TOPROL-XL) 50 mg 24 hr tablet   No No   Sig: Take 1 tablet (50 mg total) by mouth daily   nicotine (NICODERM CQ) 21 mg/24 hr TD 24 hr patch   No No   Sig: Place 1 patch on the skin over 24 hours daily   pramipexole (MIRAPEX) 1.5 MG tablet   No No   Sig: TAKE 1 TABLET DAILY AT BEDTIME   spironolactone (ALDACTONE) 25 mg tablet   Yes No   Sig: Take 25 mg by mouth daily   Patient not taking: Reported on 8/10/2023      Facility-Administered Medications Last Administration Doses Remaining   cyanocobalamin injection 1,000 mcg 7/6/2023  4:59 PM           Past Medical History:   Diagnosis Date    Depression     EP (epilepsy) ([address])     Epilepsy ([address])     Obesity     Restless leg        Past Surgical History:   Procedure Laterality Date    DENTAL SURGERY      all teeth removed    LASER ABLATION OF THE CERVIX      MAMMO (HISTORICAL)  05/01/2017       Family History   Problem Relation Age of Onset    Kidney disease Mother     Liver disease Mother     Heart attack Mother     Heart attack Father     No Known Problems Brother     No Known Problems Son      I have reviewed and agree with the history as documented. E-Cigarette/Vaping    E-Cigarette Use Never User      E-Cigarette/Vaping Substances    Nicotine Yes     THC No     CBD No     Flavoring No     Other No     Unknown No      Social History     Tobacco Use    Smoking status: Every Day     Packs/day: 0.50     Years: 23.00     Total pack years: 11.50     Types: Cigarettes    Smokeless tobacco: Never   Vaping Use    Vaping Use: Never used   Substance Use Topics    Alcohol use: Never     Comment: pt denies alcohol use    Drug use: Never       Review of Systems   Constitutional:  Negative for fatigue and fever.    HENT:  Negative for sinus pressure, sinus pain, sneezing and sore throat. Respiratory:  Negative for cough and shortness of breath. Cardiovascular:  Negative for chest pain and palpitations. Gastrointestinal:  Negative for abdominal pain, diarrhea, nausea and vomiting. Genitourinary:  Negative for difficulty urinating, dysuria, flank pain and frequency. Musculoskeletal:  Negative for back pain, myalgias, neck pain and neck stiffness. Skin:  Negative for color change, pallor and rash. Neurological:  Positive for dizziness. Negative for light-headedness and headaches. Psychiatric/Behavioral:  Negative for agitation and behavioral problems. Physical Exam  Physical Exam  Vitals and nursing note reviewed. Constitutional:       General: She is not in acute distress. Appearance: She is well-developed. She is not ill-appearing, toxic-appearing or diaphoretic. HENT:      Head: Normocephalic and atraumatic. Right Ear: Ear canal normal.      Left Ear: Ear canal normal.      Nose: Nose normal. No congestion or rhinorrhea. Mouth/Throat:      Pharynx: No oropharyngeal exudate or posterior oropharyngeal erythema. Eyes:      Extraocular Movements: Extraocular movements intact. Pupils: Pupils are equal, round, and reactive to light. Neck:      Vascular: No carotid bruit. Cardiovascular:      Rate and Rhythm: Normal rate and regular rhythm. Heart sounds: Normal heart sounds. No murmur heard. No friction rub. No gallop. Pulmonary:      Effort: Pulmonary effort is normal. No respiratory distress. Breath sounds: Normal breath sounds. No wheezing or rhonchi. Chest:      Chest wall: No tenderness. Abdominal:      General: Bowel sounds are normal. There is no distension. Palpations: Abdomen is soft. There is no mass. Tenderness: There is no abdominal tenderness. There is no right CVA tenderness, left CVA tenderness, guarding or rebound. Hernia: No hernia is present.    Musculoskeletal: General: No swelling, tenderness, deformity or signs of injury. Normal range of motion. Cervical back: Normal range of motion and neck supple. No rigidity or tenderness. Right lower leg: No edema. Left lower leg: No edema. Lymphadenopathy:      Cervical: No cervical adenopathy. Skin:     General: Skin is warm and dry. Capillary Refill: Capillary refill takes less than 2 seconds. Coloration: Skin is not jaundiced or pale. Findings: No bruising, erythema, lesion or rash. Neurological:      Mental Status: She is alert and oriented to person, place, and time.    Psychiatric:         Behavior: Behavior normal.         Vital Signs  ED Triage Vitals [10/28/23 1559]   Temperature Pulse Respirations BP SpO2   97.8 °F (36.6 °C) 98 20 -- 97 %      Temp Source Heart Rate Source Patient Position - Orthostatic VS BP Location FiO2 (%)   Oral -- -- -- --      Pain Score       --           Vitals:    10/28/23 1559   Pulse: 98         Visual Acuity  Visual Acuity      Flowsheet Row Most Recent Value   L Pupil Size (mm) 3   R Pupil Size (mm) 3            ED Medications  Medications   carBAMazepine (TEGretol) tablet 200 mg (200 mg Oral Given 10/28/23 1751)       Diagnostic Studies  Results Reviewed       Procedure Component Value Units Date/Time    Carbamazepine level, total [[id number]]  (Abnormal) Collected: 10/28/23 1618    Lab Status: Final result Specimen: Blood from Arm, Right Updated: 10/28/23 2321     Carbamazepine Lvl 2.1 ug/mL     High Sensitivity Troponin I Random [[id number]]  (Abnormal) Collected: 10/28/23 1618    Lab Status: Final result Specimen: Blood from Arm, Right Updated: 10/28/23 1649     HS TnI random 3 ng/L     Comprehensive metabolic panel [[id number]]  (Abnormal) Collected: 10/28/23 1618    Lab Status: Final result Specimen: Blood from Arm, Right Updated: 10/28/23 1641     Sodium 138 mmol/L      Potassium 3.9 mmol/L      Chloride 106 mmol/L      CO2 25 mmol/L      ANION GAP 7 mmol/L      BUN 22 mg/dL      Creatinine 1.13 mg/dL      Glucose 104 mg/dL      Calcium 8.5 mg/dL      Corrected Calcium 9.0 mg/dL      AST 12 U/L      ALT 11 U/L      Alkaline Phosphatase 89 U/L      Total Protein 6.9 g/dL      Albumin 3.4 g/dL      Total Bilirubin 0.25 mg/dL      eGFR 51 ml/min/1.73sq m     Narrative:      [facility] guidelines for Chronic Kidney Disease (CKD):     Stage 1 with normal or high GFR (GFR > 90 mL/min/1.73 square meters)    Stage 2 Mild CKD (GFR = 60-89 mL/min/1.73 square meters)    Stage 3A Moderate CKD (GFR = 45-59 mL/min/1.73 square meters)    Stage 3B Moderate CKD (GFR = 30-44 mL/min/1.73 square meters)    Stage 4 Severe CKD (GFR = 15-29 mL/min/1.73 square meters)    Stage 5 End Stage CKD (GFR <15 mL/min/1.73 square meters)  Note: GFR calculation is accurate only with a steady state creatinine    CBC and differential [[id number]]  (Abnormal) Collected: 10/28/23 1618    Lab Status: Final result Specimen: Blood from Arm, Right Updated: 10/28/23 1625     WBC 12.33 Thousand/uL      RBC 3.72 Million/uL      Hemoglobin 11.2 g/dL      Hematocrit 34.6 %      MCV 93 fL      MCH 30.1 pg      MCHC 32.4 g/dL      RDW 13.5 %      MPV 9.7 fL      Platelets 602 Thousands/uL      nRBC 0 /100 WBCs      Neutrophils Relative 77 %      Immat GRANS % 0 %      Lymphocytes Relative 15 %      Monocytes Relative 6 %      Eosinophils Relative 2 %      Basophils Relative 0 %      Neutrophils Absolute 9.49 Thousands/µL      Immature Grans Absolute 0.04 Thousand/uL      Lymphocytes Absolute 1.79 Thousands/µL      Monocytes Absolute 0.78 Thousand/µL      Eosinophils Absolute 0.18 Thousand/µL      Basophils Absolute 0.05 Thousands/µL                    No orders to display              Procedures  ECG 12 Lead Documentation Only    Date/Time: 10/28/2023 5:09 PM    Performed by: Sully Mclaughlin MD  Authorized by: Sully Mclaughlin MD    Indications / Diagnosis:  Dizziness  ECG reviewed by me, the ED Provider: yes    Patient location:  ED and bedside  Previous ECG:     Previous ECG:  Compared to current    Similarity:  No change  Interpretation:     Interpretation: normal    Rate:     ECG rate:  96    ECG rate assessment: normal    Rhythm:     Rhythm: sinus rhythm    Ectopy:     Ectopy: none    QRS:     QRS axis:  Normal    QRS intervals:  Normal  Conduction:     Conduction: normal    ST segments:     ST segments:  Normal  T waves:     T waves: normal             ED Course                                             Medical Decision Making  This is a 70years old came for having dizziness. Patient stated that she has this on and off for few days. Patient felt that the room was spinning. Patient has no headache. Patient denies any neurological deficit. Patient was admitted 2 weeks ago for having syncope for 30 seconds. Labs were done at that time CT head EKG all came back acceptable. Patient stated that she ran out of headache her Tegretol and the last time she took it was 2 days ago. Patient stated comfortable at the ER. Patient denies CP, SOB, abdominal pain, nausea vomiting diarrhea. Physical exam shows no pertinent positive findings. Labs reviewed which came back within normal limits. EKG normal sinus rhythm no ischemic changes. Pt remain asymptomatic at er. Patient received 1 dose of Tegretol at the ER and was sent the prescription for her. And instructed to follow-up with her PMD and her neurologist.  Pt is feeling better and would like to go home. I rechecked the pt and undated on the results of the ED findings, including physical exam,diagnosis, and all abnormal test results. I discussed the plan of discharge with the pt and advised to follow up with PCP or to return to the ED for any new or worsening symptoms. pt understands and agrees with the plan of care. All questions and concerns have been addressed at this time.       Amount and/or Complexity of Data Reviewed  Labs: ordered. Details: CBC CMP are WNL  ECG/medicine tests: ordered and independent interpretation performed. Details: EKG; nsr, no ischemic changes    Risk  Prescription drug management. Disposition  Final diagnoses:   Dizziness     Time reflects when diagnosis was documented in both MDM as applicable and the Disposition within this note       Time User Action Codes Description Comment    10/28/2023  5:41 PM Nam Casas Add [R42] Dizziness     10/28/2023  5:41 PM Nam Casas Add [G40.919] Epilepsy with altered consciousness with intractable epilepsy [facility])           ED Disposition       ED Disposition   Discharge    Condition   Stable    Date/Time   Sat Oct 28, 2023  5:41 PM    Comment   Sneha Newman discharge to home/self care.                    Follow-up Information       Follow up With Specialties Details Why [facility], [facility], Nurse Practitioner In 3 days  [facility]  [address]  [phone]              Discharge Medication List as of 10/28/2023  5:43 PM        CONTINUE these medications which have CHANGED    Details   carBAMazepine (TEGretol) 200 mg tablet Take 1 tablet (200 mg total) by mouth 3 (three) times a day, Starting Sat 10/28/2023, Until Wed 12/27/2023, Normal           CONTINUE these medications which have NOT CHANGED    Details   calcium carbonate (OYSTER SHELL,OSCAL) 500 mg Take 2 tablets by mouth daily with breakfast, Starting Thu 7/20/2023, Until Mon 9/18/2023, Normal      carbamide peroxide (DEBROX) 6.5 % otic solution Administer 5 drops into both ears 2 (two) times a day for 5 doses, Starting Fri 7/21/2023, Until Thu 8/10/2023, Normal      cholecalciferol (VITAMIN D3) 1,000 units tablet Take 2 tablets (2,000 Units total) by mouth daily, Starting Mon 7/3/2023, Until Fri 9/1/2023, Normal      escitalopram (LEXAPRO) 10 mg tablet Take 1 tablet (10 mg total) by mouth daily, Starting Thu 8/10/2023, Until Mon 10/9/2023, Normal      fluticasone (FLONASE) 50 mcg/act nasal spray 1 spray into each nostril daily, Starting Mon 7/3/2023, Normal      levETIRAcetam (Keppra) 750 mg tablet Take 2 tablets (1,500 mg total) by mouth 2 (two) times a day, Starting Thu 8/10/2023, Until Mon 10/9/2023, Normal      losartan (COZAAR) 25 mg tablet Take 1 tablet (25 mg total) by mouth daily, Starting Thu 7/20/2023, Until Mon 9/18/2023, Normal      metoprolol succinate (TOPROL-XL) 50 mg 24 hr tablet Take 1 tablet (50 mg total) by mouth daily, Starting Thu 7/20/2023, Until Mon 9/18/2023, Normal      nicotine (NICODERM CQ) 21 mg/24 hr TD 24 hr patch Place 1 patch on the skin over 24 hours daily, Starting Thu 7/20/2023, Normal      pramipexole (MIRAPEX) 1.5 MG tablet TAKE 1 TABLET DAILY AT BEDTIME, Starting Wed 10/18/2023, Normal      QUEtiapine (SEROquel) 25 mg tablet Take 0.5 tablets (12.5 mg total) by mouth daily at bedtime, Starting Thu 7/20/2023, Until Mon 9/18/2023, Normal      spironolactone (ALDACTONE) 25 mg tablet Take 25 mg by mouth daily, Historical Med             No discharge procedures on file.     PDMP Review         Value Time User    PDMP Reviewed  Yes 7/23/2021 10:52 AM Maria Ines Woodruff MD            ED Provider  Electronically Signed by             Harlan Arias MD  10/29/23 8621

## 2023-10-28 NOTE — DISCHARGE INSTRUCTIONS
Follow up with your primary provider. Take medications as instructed. Labs Reviewed   CBC AND DIFFERENTIAL - Abnormal       Result Value Ref Range Status    WBC 12.33 (*) 4.31 - 10.16 Thousand/uL Final    RBC 3.72 (*) 3.81 - 5.12 Million/uL Final    Hemoglobin 11.2 (*) 11.5 - 15.4 g/dL Final    Hematocrit 34.6 (*) 34.8 - 46.1 % Final    MCV 93  82 - 98 fL Final    MCH 30.1  26.8 - 34.3 pg Final    MCHC 32.4  31.4 - 37.4 g/dL Final    RDW 13.5  11.6 - 15.1 % Final    MPV 9.7  8.9 - 12.7 fL Final    Platelets 602  235 - 390 Thousands/uL Final    nRBC 0  /100 WBCs Final    Neutrophils Relative 77 (*) 43 - 75 % Final    Immat GRANS % 0  0 - 2 % Final    Lymphocytes Relative 15  14 - 44 % Final    Monocytes Relative 6  4 - 12 % Final    Eosinophils Relative 2  0 - 6 % Final    Basophils Relative 0  0 - 1 % Final    Neutrophils Absolute 9.49 (*) 1.85 - 7.62 Thousands/µL Final    Immature Grans Absolute 0.04  0.00 - 0.20 Thousand/uL Final    Lymphocytes Absolute 1.79  0.60 - 4.47 Thousands/µL Final    Monocytes Absolute 0.78  0.17 - 1.22 Thousand/µL Final    Eosinophils Absolute 0.18  0.00 - 0.61 Thousand/µL Final    Basophils Absolute 0.05  0.00 - 0.10 Thousands/µL Final   COMPREHENSIVE METABOLIC PANEL - Abnormal    Sodium 138  135 - 147 mmol/L Final    Potassium 3.9  3.5 - 5.3 mmol/L Final    Chloride 106  96 - 108 mmol/L Final    CO2 25  21 - 32 mmol/L Final    ANION GAP 7  mmol/L Final    BUN 22  5 - 25 mg/dL Final    Creatinine 1.13  0.60 - 1.30 mg/dL Final    Comment: Standardized to IDMS reference method    Glucose 104  65 - 140 mg/dL Final    Comment: If the patient is fasting, the ADA then defines impaired fasting glucose as > 100 mg/dL and diabetes as > or equal to 123 mg/dL.     Calcium 8.5  8.4 - 10.2 mg/dL Final    Corrected Calcium 9.0  8.3 - 10.1 mg/dL Final    AST 12 (*) 13 - 39 U/L Final    ALT 11  7 - 52 U/L Final    Comment: Specimen collection should occur prior to Sulfasalazine administration due to the potential for falsely depressed results. Alkaline Phosphatase 89  34 - 104 U/L Final    Total Protein 6.9  6.4 - 8.4 g/dL Final    Albumin 3.4 (*) 3.5 - 5.0 g/dL Final    Total Bilirubin 0.25  0.20 - 1.00 mg/dL Final    Comment: Use of this assay is not recommended for patients undergoing treatment with eltrombopag due to the potential for falsely elevated results. N-acetyl-p-benzoquinone imine (metabolite of Acetaminophen) will generate erroneously low results in samples for patients that have taken an overdose of Acetaminophen. eGFR 51  ml/min/1.73sq m Final    Narrative:     [facility] guidelines for [facility] Kid[facility] Disease (CKD):     Stage 1 with normal or high GFR (GFR > 90 mL/min/1.73 square meters)    Stage 2 Mild CKD (GFR = 60-89 mL/min/1.73 square meters)    Stage 3A Moderate CKD (GFR = 45-59 mL/min/1.73 square meters)    Stage 3B Moderate CKD (GFR = 30-44 mL/min/1.73 square meters)    Stage 4 Severe CKD (GFR = 15-29 mL/min/1.73 square meters)    Stage 5 End Stage CKD (GFR <15 mL/min/1.73 square meters)  Note: GFR calculation is accurate only with a steady state creatinine   HIGH SENSITIVITY TROPONIN I RANDOM - Abnormal    HS TnI random 3 (*) 8 - 18 ng/L Final    Comment:                                              Initial (time 0) result  If >=50 ng/L, Myocardial injury suggested ;  Type of myocardial injury and treatment strategy  to be determined. If 5-49 ng/L, a delta result at 2 hours and or 4 hours will be needed to further evaluate. If <4 ng/L, and chest pain has been >3 hours since onset, patient may qualify for discharge based on the HEART score in the ED. If <5 ng/L and <3hours since onset of chest pain, a delta result at 2 hours will be needed to further evaluate. HS Troponin 99th Percentile URL of a Health Population=12 ng/L with a 95% Confidence Interval of 8-18 ng/L.     Second Troponin (time 2 hours)  If calculated delta >= 20 ng/L,  Myocardial injury suggested ; Type of myocardial injury and treatment strategy to be determined. If 5-49 ng/L and the calculated delta is 5-19 ng/L, consult medical service for evaluation. Continue evaluation for ischemia on ecg and other possible etiology and repeat hs troponin at 4 hours. If delta is <5 ng/L at 2 hours, consider discharge based on risk stratification via the HEART score (if in ED), or MER risk score in IP/Observation.     HS Troponin 99th Percentile URL of a Health Population=12 ng/L with a 95% Confidence Interval of 8-18 ng/L.   CARBAMAZEPINE LEVEL, TOTAL

## 2023-10-29 LAB
ATRIAL RATE: 96 BPM
P AXIS: 5 DEGREES
PR INTERVAL: 128 MS
QRS AXIS: 22 DEGREES
QRSD INTERVAL: 66 MS
QT INTERVAL: 358 MS
QTC INTERVAL: 452 MS
T WAVE AXIS: 67 DEGREES
VENTRICULAR RATE: 96 BPM

## 2023-10-29 PROCEDURE — 93010 ELECTROCARDIOGRAM REPORT: CPT | Performed by: INTERNAL MEDICINE

## 2023-10-30 ENCOUNTER — VBI (OUTPATIENT)
Dept: FAMILY MEDICINE CLINIC | Facility: CLINIC | Age: 63
End: 2023-10-30

## 2023-10-30 NOTE — TELEPHONE ENCOUNTER
10/30/23 11:04 AM    Patient contacted post ED visit, first outreach attempt made. Message was left for patient to return a call to the VBI Department at [facility]: Phone [phone]. Thank you.   Zia Aguilar MA  [facility] VALUE BASED VIR

## 2023-10-30 NOTE — LETTER
[facility] [address]  [address]  [facility] [id number] [phone]    Date: 11/01/23  Trey Taylor  [address]    Dear Naomi Vaughn: Thank you for choosing [facility] emergency department for care. Your primary care provider wants to make sure that your ongoing medical care is being addressed. If you require follow up care as a result of your emergency department visit, there are a few things the practice would like you to know. As part of the network's continuing commitment to caring for our patients, we have added more same day appointments and have extended office hours to meet your medical needs. After hours, on-call physicians are available via your primary care provider's main office line. We encourage you to contact our office prior to seeking treatment to discuss your symptoms with the medical staff. Together, we can determine the correct course of action. A majority of non-emergent conditions such as: common cold, flu-like symptoms, fevers, strains/sprains, dislocations, minor burns, cuts and animal bites can be treated at [facility] facilities. Diagnostic testing is available at some sites. Of course, if you are experiencing a life threatening medical emergency call 911 or proceed directly to the nearest emergency room.     Your nearest [facility] facility is conveniently located at:    [address]), [address] [place] [address]  [phone]  SKIP THE WAIT  Conveniently offered at most [facility] Now locations  [facility] your spot online at [email or web address] or on the [address] [place] [address] Drive    Sincerely,    [address]  Dept: [phone]

## 2023-10-31 NOTE — TELEPHONE ENCOUNTER
10/31/23 9:15 AM    Patient contacted post ED visit, second outreach attempt made. Message was left for patient to return a call to the VBI Department at [facility]: Phone [phone]. Thank you.   Cassy Benjamin MA  [facility] VALUE BASED VIR

## 2023-11-01 NOTE — TELEPHONE ENCOUNTER
11/01/23 9:33 AM    Patient contacted post ED visit, phone outreaches were unsuccessful and a MyChart letter has been sent to the patient as follow-up. Thank you.   Leeann Barrett MA  [facility] VALUE BASED VIR

## 2023-11-02 DIAGNOSIS — G40.919 EPILEPSY WITH ALTERED CONSCIOUSNESS WITH INTRACTABLE EPILEPSY (HCC): ICD-10-CM

## 2023-11-02 RX ORDER — CARBAMAZEPINE 200 MG/1
200 TABLET ORAL 3 TIMES DAILY
Qty: 90 TABLET | Refills: 0 | Status: SHIPPED | OUTPATIENT
Start: 2023-11-02 | End: 2024-01-01

## 2023-11-06 ENCOUNTER — RA CDI HCC (OUTPATIENT)
Dept: OTHER | Facility: HOSPITAL | Age: 63
End: 2023-11-06

## 2023-11-10 ENCOUNTER — OFFICE VISIT (OUTPATIENT)
Dept: FAMILY MEDICINE CLINIC | Facility: CLINIC | Age: 63
End: 2023-11-10
Payer: MEDICARE

## 2023-11-10 VITALS
OXYGEN SATURATION: 98 % | SYSTOLIC BLOOD PRESSURE: 136 MMHG | HEIGHT: 63 IN | TEMPERATURE: 97 F | BODY MASS INDEX: 45.18 KG/M2 | DIASTOLIC BLOOD PRESSURE: 70 MMHG | HEART RATE: 86 BPM | RESPIRATION RATE: 17 BRPM | WEIGHT: 255 LBS

## 2023-11-10 DIAGNOSIS — I89.0 LYMPHEDEMA: ICD-10-CM

## 2023-11-10 DIAGNOSIS — F03.90 MAJOR NEUROCOGNITIVE DISORDER (HCC): ICD-10-CM

## 2023-11-10 DIAGNOSIS — Z23 ENCOUNTER FOR IMMUNIZATION: ICD-10-CM

## 2023-11-10 DIAGNOSIS — G40.909 RECURRENT SEIZURES (HCC): ICD-10-CM

## 2023-11-10 DIAGNOSIS — F33.3 SEVERE EPISODE OF RECURRENT MAJOR DEPRESSIVE DISORDER, WITH PSYCHOTIC FEATURES (HCC): ICD-10-CM

## 2023-11-10 DIAGNOSIS — I10 ESSENTIAL HYPERTENSION: ICD-10-CM

## 2023-11-10 DIAGNOSIS — Z91.199 NONCOMPLIANCE: ICD-10-CM

## 2023-11-10 DIAGNOSIS — G40.919 EPILEPSY WITH ALTERED CONSCIOUSNESS WITH INTRACTABLE EPILEPSY (HCC): Primary | ICD-10-CM

## 2023-11-10 PROCEDURE — 99214 OFFICE O/P EST MOD 30 MIN: CPT | Performed by: NURSE PRACTITIONER

## 2023-11-10 PROCEDURE — G0008 ADMIN INFLUENZA VIRUS VAC: HCPCS | Performed by: NURSE PRACTITIONER

## 2023-11-10 PROCEDURE — 90662 IIV NO PRSV INCREASED AG IM: CPT | Performed by: NURSE PRACTITIONER
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
## 2023-11-23 DIAGNOSIS — Z76.0 MEDICATION REFILL: ICD-10-CM

## 2023-11-24 RX ORDER — LEVETIRACETAM 750 MG/1
1500 TABLET ORAL 2 TIMES DAILY
Qty: 360 TABLET | Refills: 1 | Status: SHIPPED | OUTPATIENT
Start: 2023-11-24

## 2023-11-25 ENCOUNTER — HOSPITAL ENCOUNTER (EMERGENCY)
Facility: HOSPITAL | Age: 63
Discharge: HOME | End: 2023-11-25
Attending: EMERGENCY MEDICINE | Admitting: EMERGENCY MEDICINE
Payer: MEDICARE

## 2023-11-25 ENCOUNTER — APPOINTMENT (EMERGENCY)
Dept: CT IMAGING | Facility: HOSPITAL | Age: 63
End: 2023-11-25
Payer: MEDICARE

## 2023-11-25 VITALS
TEMPERATURE: 97.4 F | HEART RATE: 130 BPM | DIASTOLIC BLOOD PRESSURE: 91 MMHG | RESPIRATION RATE: 18 BRPM | OXYGEN SATURATION: 99 % | SYSTOLIC BLOOD PRESSURE: 164 MMHG

## 2023-11-25 DIAGNOSIS — R55 SYNCOPE: Primary | ICD-10-CM

## 2023-11-25 DIAGNOSIS — R00.0 SINUS TACHYCARDIA: ICD-10-CM

## 2023-11-25 DIAGNOSIS — E87.6 HYPOKALEMIA: ICD-10-CM

## 2023-11-25 LAB
2HR DELTA HS TROPONIN: 2 NG/L
ALBUMIN SERPL BCP-MCNC: 3.5 G/DL (ref 3.5–5)
ALP SERPL-CCNC: 101 U/L (ref 34–104)
ALT SERPL W P-5'-P-CCNC: 11 U/L (ref 7–52)
ANION GAP SERPL CALCULATED.3IONS-SCNC: 7 MMOL/L
AST SERPL W P-5'-P-CCNC: 9 U/L (ref 13–39)
ATRIAL RATE: 132 BPM
BASOPHILS # BLD AUTO: 0.06 THOUSANDS/ÂΜL (ref 0–0.1)
BASOPHILS NFR BLD AUTO: 1 % (ref 0–1)
BILIRUB SERPL-MCNC: 0.2 MG/DL (ref 0.2–1)
BUN SERPL-MCNC: 19 MG/DL (ref 5–25)
CALCIUM SERPL-MCNC: 8.8 MG/DL (ref 8.4–10.2)
CARDIAC TROPONIN I PNL SERPL HS: 4 NG/L
CARDIAC TROPONIN I PNL SERPL HS: 6 NG/L
CHLORIDE SERPL-SCNC: 105 MMOL/L (ref 96–108)
CK SERPL-CCNC: 94 U/L (ref 26–192)
CO2 SERPL-SCNC: 27 MMOL/L (ref 21–32)
CREAT SERPL-MCNC: 0.97 MG/DL (ref 0.6–1.3)
D DIMER PPP FEU-MCNC: 0.9 UG/ML FEU
EOSINOPHIL # BLD AUTO: 0.27 THOUSAND/ÂΜL (ref 0–0.61)
EOSINOPHIL NFR BLD AUTO: 2 % (ref 0–6)
ERYTHROCYTE [DISTWIDTH] IN BLOOD BY AUTOMATED COUNT: 13.5 % (ref 11.6–15.1)
GFR SERPL CREATININE-BSD FRML MDRD: 61 ML/MIN/1.73SQ M
GLUCOSE SERPL-MCNC: 100 MG/DL (ref 65–140)
HCT VFR BLD AUTO: 37.3 % (ref 34.8–46.1)
HGB BLD-MCNC: 11.9 G/DL (ref 11.5–15.4)
IMM GRANULOCYTES # BLD AUTO: 0.08 THOUSAND/UL (ref 0–0.2)
IMM GRANULOCYTES NFR BLD AUTO: 1 % (ref 0–2)
LYMPHOCYTES # BLD AUTO: 2.38 THOUSANDS/ÂΜL (ref 0.6–4.47)
LYMPHOCYTES NFR BLD AUTO: 20 % (ref 14–44)
MCH RBC QN AUTO: 30.1 PG (ref 26.8–34.3)
MCHC RBC AUTO-ENTMCNC: 31.9 G/DL (ref 31.4–37.4)
MCV RBC AUTO: 94 FL (ref 82–98)
MONOCYTES # BLD AUTO: 0.79 THOUSAND/ÂΜL (ref 0.17–1.22)
MONOCYTES NFR BLD AUTO: 7 % (ref 4–12)
NEUTROPHILS # BLD AUTO: 8.23 THOUSANDS/ÂΜL (ref 1.85–7.62)
NEUTS SEG NFR BLD AUTO: 69 % (ref 43–75)
NRBC BLD AUTO-RTO: 0 /100 WBCS
P AXIS: 57 DEGREES
PLATELET # BLD AUTO: 339 THOUSANDS/UL (ref 149–390)
PMV BLD AUTO: 10.2 FL (ref 8.9–12.7)
POTASSIUM SERPL-SCNC: 3.4 MMOL/L (ref 3.5–5.3)
PR INTERVAL: 158 MS
PROT SERPL-MCNC: 7 G/DL (ref 6.4–8.4)
QRS AXIS: 20 DEGREES
QRSD INTERVAL: 64 MS
QT INTERVAL: 276 MS
QTC INTERVAL: 408 MS
RBC # BLD AUTO: 3.96 MILLION/UL (ref 3.81–5.12)
SODIUM SERPL-SCNC: 139 MMOL/L (ref 135–147)
T WAVE AXIS: 47 DEGREES
VENTRICULAR RATE: 132 BPM
WBC # BLD AUTO: 11.81 THOUSAND/UL (ref 4.31–10.16)

## 2023-11-25 PROCEDURE — 82550 ASSAY OF CK (CPK): CPT | Performed by: PHYSICIAN ASSISTANT

## 2023-11-25 PROCEDURE — 71275 CT ANGIOGRAPHY CHEST: CPT

## 2023-11-25 PROCEDURE — 80053 COMPREHEN METABOLIC PANEL: CPT | Performed by: PHYSICIAN ASSISTANT

## 2023-11-25 PROCEDURE — G1004 CDSM NDSC: HCPCS

## 2023-11-25 PROCEDURE — 96361 HYDRATE IV INFUSION ADD-ON: CPT

## 2023-11-25 PROCEDURE — 80177 DRUG SCRN QUAN LEVETIRACETAM: CPT | Performed by: PHYSICIAN ASSISTANT

## 2023-11-25 PROCEDURE — 93005 ELECTROCARDIOGRAM TRACING: CPT

## 2023-11-25 PROCEDURE — 99285 EMERGENCY DEPT VISIT HI MDM: CPT

## 2023-11-25 PROCEDURE — 85379 FIBRIN DEGRADATION QUANT: CPT | Performed by: PHYSICIAN ASSISTANT

## 2023-11-25 PROCEDURE — 80156 ASSAY CARBAMAZEPINE TOTAL: CPT | Performed by: PHYSICIAN ASSISTANT

## 2023-11-25 PROCEDURE — 84484 ASSAY OF TROPONIN QUANT: CPT | Performed by: PHYSICIAN ASSISTANT

## 2023-11-25 PROCEDURE — 93010 ELECTROCARDIOGRAM REPORT: CPT | Performed by: INTERNAL MEDICINE

## 2023-11-25 PROCEDURE — 99285 EMERGENCY DEPT VISIT HI MDM: CPT | Performed by: PHYSICIAN ASSISTANT

## 2023-11-25 PROCEDURE — 96360 HYDRATION IV INFUSION INIT: CPT

## 2023-11-25 PROCEDURE — 70450 CT HEAD/BRAIN W/O DYE: CPT

## 2023-11-25 PROCEDURE — 36415 COLL VENOUS BLD VENIPUNCTURE: CPT | Performed by: PHYSICIAN ASSISTANT

## 2023-11-25 PROCEDURE — 85025 COMPLETE CBC W/AUTO DIFF WBC: CPT | Performed by: PHYSICIAN ASSISTANT

## 2023-11-25 RX ORDER — POTASSIUM CHLORIDE 20 MEQ/1
20 TABLET, EXTENDED RELEASE ORAL ONCE
Status: COMPLETED | OUTPATIENT
Start: 2023-11-25 | End: 2023-11-25

## 2023-11-25 RX ADMIN — IOHEXOL 85 ML: 350 INJECTION, SOLUTION INTRAVENOUS at 16:49

## 2023-11-25 RX ADMIN — SODIUM CHLORIDE 1000 ML: 0.9 INJECTION, SOLUTION INTRAVENOUS at 14:48

## 2023-11-25 RX ADMIN — POTASSIUM CHLORIDE 20 MEQ: 1500 TABLET, EXTENDED RELEASE ORAL at 14:52

## 2023-11-25 NOTE — ED NOTES
Pt returns from CT at this time. Pt wanting to know "How long do I have to wait now?" Pt informed of the approximate wait time. Pt upset, wants to [facility]. Pt encouraged to stay related to vital signs, SOB and blood work results. Pt continues to state variations of "No. I'm leaving. Fuck that. How fucking long does this have to take? It's already been a few hours." Provider made aware of patient's desire to [facility]. IV removed. Pt dressed.      Shaneka Poole, RN  11/25/23 4417

## 2023-11-26 LAB — CARBAMAZEPINE SERPL-MCNC: 5.2 UG/ML (ref 4–12)

## 2023-11-27 NOTE — ED PROVIDER NOTES
History  Chief Complaint   Patient presents with    Seizure - Prior Hx Of     Brought in by EMS, pt found after possible seizure, pt stated she felt dizzy and does not remember events after. Pt denies hitting her head, currently taking keppra and tegretol, does not see a neurologist     57-year-old female with past medical history significant for epilepsy on Keppra and Tegretol presenting to the emergency department today with suspected seizure-like activity. The patient was brought in by EMS. The patient was found sitting on a curb. She notes she began feeling lightheaded and the next thing she knew she was sitting upright on the curb. She had no loss of bowel or bladder in the field. The patient denies any current chest pain or shortness of breath. She does not believe she struck her head. She has not followed up recently with her neurologist.  She denies any current dizziness, lightheadedness, or visual disturbances. She has no nausea, vomiting, diarrhea, or constipation. She notes she has been taking her medication like she is supposed to. No fevers or chills. No upper respiratory symptoms. The patient denies other complaints at this time. History provided by:  Patient   used: No    Seizure - Prior Hx Of  Seizure activity on arrival: no    Initial focality:  None  Postictal symptoms: no confusion, no memory loss and no somnolence    Return to baseline: yes    Severity:  Unable to specify  Timing:  Once  Progression:  Unchanged  Recent head injury:  No recent head injuries  PTA treatment:  None  History of seizures: yes    Similar to previous episodes: yes    Severity:  Mild  Seizure control level:  Poorly controlled  Current therapy:  None  Compliance with current therapy:  Good      Prior to Admission Medications   Prescriptions Last Dose Informant Patient Reported? Taking?    QUEtiapine (SEROquel) 25 mg tablet   No No   Sig: Take 0.5 tablets (12.5 mg total) by mouth daily at bedtime   Patient not taking: Reported on 8/10/2023   calcium carbonate (OYSTER SHELL,OSCAL) 500 mg   No No   Sig: Take 2 tablets by mouth daily with breakfast   carBAMazepine (TEGretol) 200 mg tablet 2023  No Yes   Sig: Take 1 tablet (200 mg total) by mouth 3 (three) times a day   carbamide peroxide (DEBROX) 6.5 % otic solution   No No   Sig: Administer 5 drops into both ears 2 (two) times a day for 5 doses   cholecalciferol (VITAMIN D3) 1,000 units tablet   No No   Sig: Take 2 tablets (2,000 Units total) by mouth daily   escitalopram (LEXAPRO) 10 mg tablet   No No   Sig: Take 1 tablet (10 mg total) by mouth daily   fluticasone (FLONASE) 50 mcg/act nasal spray   No No   Si spray into each nostril daily   levETIRAcetam (KEPPRA) 750 mg tablet 2023  No Yes   Sig: TAKE 2 TABLETS TWICE A DAY   losartan (COZAAR) 25 mg tablet   No No   Sig: Take 1 tablet (25 mg total) by mouth daily   metoprolol succinate (TOPROL-XL) 50 mg 24 hr tablet   No No   Sig: Take 1 tablet (50 mg total) by mouth daily   nicotine (NICODERM CQ) 21 mg/24 hr TD 24 hr patch   No No   Sig: Place 1 patch on the skin over 24 hours daily   pramipexole (MIRAPEX) 1.5 MG tablet   No No   Sig: TAKE 1 TABLET DAILY AT BEDTIME   spironolactone (ALDACTONE) 25 mg tablet   Yes No   Sig: Take 25 mg by mouth daily   Patient not taking: Reported on 8/10/2023      Facility-Administered Medications Last Administration Doses Remaining   cyanocobalamin injection 1,000 mcg 2023  4:59 PM           Past Medical History:   Diagnosis Date    Depression     EP (epilepsy) ([address])     Epilepsy ([address])     Obesity     Restless leg        Past Surgical History:   Procedure Laterality Date    DENTAL SURGERY      all teeth removed    LASER ABLATION OF THE CERVIX      MAMMO (HISTORICAL)  2017       Family History   Problem Relation Age of Onset    Kidney disease Mother     Liver disease Mother     Heart attack Mother     Heart attack Father     No Known Problems Brother     No Known Problems Son      I have reviewed and agree with the history as documented. E-Cigarette/Vaping    E-Cigarette Use Never User      E-Cigarette/Vaping Substances    Nicotine Yes     THC No     CBD No     Flavoring No     Other No     Unknown No      Social History     Tobacco Use    Smoking status: Every Day     Packs/day: 0.50     Years: 23.00     Total pack years: 11.50     Types: Cigarettes    Smokeless tobacco: Never   Vaping Use    Vaping Use: Never used   Substance Use Topics    Alcohol use: Never     Comment: pt denies alcohol use    Drug use: Never       Review of Systems   Constitutional:  Negative for appetite change, chills, diaphoresis, fatigue and fever. Eyes:  Negative for visual disturbance. Respiratory:  Negative for cough, chest tightness, shortness of breath and wheezing. Cardiovascular:  Negative for chest pain, palpitations and leg swelling. Gastrointestinal:  Negative for abdominal pain, constipation, diarrhea, nausea and vomiting. Musculoskeletal:  Negative for neck pain and neck stiffness. Skin:  Negative for rash and wound. Neurological:  Positive for seizures. Negative for dizziness, syncope, weakness, light-headedness and headaches. Psychiatric/Behavioral:  Negative for confusion. All other systems reviewed and are negative. Physical Exam  Physical Exam  Vitals and nursing note reviewed. Constitutional:       General: She is not in acute distress. Appearance: Normal appearance. She is obese. She is not ill-appearing, toxic-appearing or diaphoretic. HENT:      Head: Normocephalic and atraumatic. Nose: Nose normal. No congestion or rhinorrhea. Mouth/Throat:      Mouth: Mucous membranes are moist.      Pharynx: No oropharyngeal exudate or posterior oropharyngeal erythema. Eyes:      General: No scleral icterus. Right eye: No discharge. Left eye: No discharge.       Extraocular Movements: Extraocular movements intact. Pupils: Pupils are equal, round, and reactive to light. Cardiovascular:      Rate and Rhythm: Regular rhythm. Tachycardia present. Pulses: Normal pulses. Heart sounds: Normal heart sounds. No murmur heard. No friction rub. No gallop. Pulmonary:      Effort: Pulmonary effort is normal. No respiratory distress. Breath sounds: Normal breath sounds. No stridor. No wheezing, rhonchi or rales. Chest:      Chest wall: No tenderness. Abdominal:      General: Abdomen is flat. There is no distension. Palpations: Abdomen is soft. Tenderness: There is no abdominal tenderness. There is no right CVA tenderness, left CVA tenderness, guarding or rebound. Musculoskeletal:         General: Normal range of motion. Cervical back: Normal range of motion. No tenderness. Right lower leg: No edema. Left lower leg: No edema. Skin:     General: Skin is warm and dry. Capillary Refill: Capillary refill takes less than 2 seconds. Coloration: Skin is not jaundiced or pale. Neurological:      General: No focal deficit present. Mental Status: She is alert and oriented to person, place, and time. Mental status is at baseline.       Comments: 5/5 strength in bilateral upper and lower extremities  Normal sensation of bilateral upper and lower extremities  The patient is able to smile, frown, puff out cheeks, and raise eyebrows bilaterally symmetrically without difficulty  Normal finger-to-nose examination  No cerebellar signs are dysdiadochokinesia  Overall, a normal neurologic assessment   Psychiatric:         Mood and Affect: Mood normal.         Behavior: Behavior normal.         Vital Signs  ED Triage Vitals   Temperature Pulse Respirations Blood Pressure SpO2   11/25/23 1328 11/25/23 1327 11/25/23 1327 11/25/23 1327 11/25/23 1327   (!) 97.4 °F (36.3 °C) (!) 130 18 164/91 99 %      Temp Source Heart Rate Source Patient Position - Orthostatic VS BP Location FiO2 (%)   11/25/23 1328 -- -- -- --   Oral          Pain Score       --                  Vitals:    11/25/23 1327   BP: 164/91   Pulse: (!) 130         Visual Acuity  Visual Acuity      Flowsheet Row Most Recent Value   R Pupil Size (mm) 3            ED Medications  Medications   sodium chloride 0.9 % bolus 1,000 mL (0 mL Intravenous Stopped 11/25/23 1653)   potassium chloride (K-DUR,KLOR-CON) CR tablet 20 mEq (20 mEq Oral Given 11/25/23 1452)   iohexol (OMNIPAQUE) 350 MG/ML injection (SINGLE-DOSE) 100 mL (85 mL Intravenous Given 11/25/23 1649)       Diagnostic Studies  Results Reviewed       Procedure Component Value Units Date/Time    Carbamazepine level, total [[id number]]  (Normal) Collected: 11/25/23 1339    Lab Status: Final result Specimen: Blood from Arm, Right Updated: 11/26/23 0243     Carbamazepine Lvl 5.2 ug/mL     HS Troponin I 2hr [[id number]]  (Normal) Collected: 11/25/23 1542    Lab Status: Final result Specimen: Blood from Arm, Right Updated: 11/25/23 1609     hs TnI 2hr 6 ng/L      Delta 2hr hsTnI 2 ng/L     HS Troponin 0hr (reflex protocol) [[id number]]  (Normal) Collected: 11/25/23 1339    Lab Status: Final result Specimen: Blood from Arm, Right Updated: 11/25/23 1414     hs TnI 0hr 4 ng/L     Comprehensive metabolic panel [[id number]]  (Abnormal) Collected: 11/25/23 1339    Lab Status: Final result Specimen: Blood from Arm, Right Updated: 11/25/23 1410     Sodium 139 mmol/L      Potassium 3.4 mmol/L      Chloride 105 mmol/L      CO2 27 mmol/L      ANION GAP 7 mmol/L      BUN 19 mg/dL      Creatinine 0.97 mg/dL      Glucose 100 mg/dL      Calcium 8.8 mg/dL      AST 9 U/L      ALT 11 U/L      Alkaline Phosphatase 101 U/L      Total Protein 7.0 g/dL      Albumin 3.5 g/dL      Total Bilirubin 0.20 mg/dL      eGFR 61 ml/min/1.73sq m     Narrative:      [place] guidelines for Chronic Kidney Disease (CKD):     Stage 1 with normal or high GFR (GFR > 90 mL/min/1.73 square meters) Stage 2 Mild CKD (GFR = 60-89 mL/min/1.73 square meters)    Stage 3A Moderate CKD (GFR = 45-59 mL/min/1.73 square meters)    Stage 3B Moderate CKD (GFR = 30-44 mL/min/1.73 square meters)    Stage 4 Severe CKD (GFR = 15-29 mL/min/1.73 square meters)    Stage 5 End Stage CKD (GFR <15 mL/min/1.73 square meters)  Note: GFR calculation is accurate only with a steady state creatinine    CK [[id number]]  (Normal) Collected: 11/25/23 1339    Lab Status: Final result Specimen: Blood from Arm, Right Updated: 11/25/23 1410     Total CK 94 U/L     D-dimer, quantitative [[id number]]  (Abnormal) Collected: 11/25/23 1339    Lab Status: Final result Specimen: Blood from Arm, Right Updated: 11/25/23 1401     D-Dimer, Quant 0.90 ug/ml FEU     Narrative: In the evaluation for possible pulmonary embolism, in the appropriate (Well's Score of 4 or less) patient, the age adjusted d-dimer cutoff for this patient can be calculated as:    Age x 0.01 (in ug/mL) for Age-adjusted D-dimer exclusion threshold for a patient over 50 years. CBC and differential [[id number]]  (Abnormal) Collected: 11/25/23 1339    Lab Status: Final result Specimen: Blood from Arm, Right Updated: 11/25/23 1358     WBC 11.81 Thousand/uL      RBC 3.96 Million/uL      Hemoglobin 11.9 g/dL      Hematocrit 37.3 %      MCV 94 fL      MCH 30.1 pg      MCHC 31.9 g/dL      RDW 13.5 %      MPV 10.2 fL      Platelets 699 Thousands/uL      nRBC 0 /100 WBCs      Neutrophils Relative 69 %      Immat GRANS % 1 %      Lymphocytes Relative 20 %      Monocytes Relative 7 %      Eosinophils Relative 2 %      Basophils Relative 1 %      Neutrophils Absolute 8.23 Thousands/µL      Immature Grans Absolute 0.08 Thousand/uL      Lymphocytes Absolute 2.38 Thousands/µL      Monocytes Absolute 0.79 Thousand/µL      Eosinophils Absolute 0.27 Thousand/µL      Basophils Absolute 0.06 Thousands/µL     Levetiracetam level [[id number]] Collected: 11/25/23 1339    Lab Status:  In process Specimen: Blood from Arm, Right Updated: 11/25/23 1345                   CTA ED chest PE Study   Final Result by Mary Ann Barron MD (11/25 8735)      No emboli in the main, right, and left pulmonary arteries. Segmental and subsegmental emboli cannot be excluded due to extensive respiratory motion artifact. Evaluation of the lungs compromised by motion but with no acute disease. Moderate hiatal hernia. Workstation performed: [id number]         CT head without contrast   Final Result by Brian Joseph MD (11/25 7627)      No acute intracranial abnormality. Workstation performed: [id number]                    Procedures  ECG 12 Lead Documentation Only    Date/Time: 11/25/2023 1:33 PM    Performed by: Patric Berry PA-C  Authorized by: Patric Berry PA-C    Indications / Diagnosis:  Syncope; Potential Seizure  Patient location:  ED  Previous ECG:     Previous ECG:  Compared to current    Comparison ECG info:  10/28/2023    Similarity:  Changes noted  Interpretation:     Interpretation: non-specific    Rate:     ECG rate:  132    ECG rate assessment: tachycardic    Rhythm:     Rhythm: sinus tachycardia    Ectopy:     Ectopy: none    QRS:     QRS axis:  Normal  Conduction:     Conduction: normal    ST segments:     ST segments:  Normal  T waves:     T waves: normal    Comments:      Sinus tachycardia with a rate of 132. Normal axis. No ectopy. No STEMI. ED Course  ED Course as of 11/26/23 2015   Sat Nov 25, 2023 1659 The patient eloped from the emergency department. SBIRT 22yo+      Flowsheet Row Most Recent Value   Initial Alcohol Screen: US AUDIT-C     1. How often do you have a drink containing alcohol? 0 Filed at: 11/25/2023 1329   2. How many drinks containing alcohol do you have on a typical day you are drinking? 0 Filed at: 11/25/2023 1329   3a. Male UNDER 65:  How often do you have five or more drinks on one occasion? 0 Filed at: 11/25/2023 1329   3b. FEMALE Any Age, or MALE 65+: How often do you have 4 or more drinks on one occassion? 0 Filed at: 11/25/2023 1329   Audit-C Score 0 Filed at: 11/25/2023 1329   JOHN: How many times in the past year have you. .. Used an illegal drug or used a prescription medication for non-medical reasons? Never Filed at: 11/25/2023 1329                      Medical Decision Making  75-year-old female presenting to the emergency department today with a suspected syncopal episode. Patient may have had a seizure. She subsequently woke up and was sitting on the curb. On arrival, the patient denies any complaints. Vital signs show tachycardia. On physical examination, the patient has a reassuring neurologic assessment and has no evidence of trauma to the head. EKG shows sinus tachycardia with a rate of 132. No STEMI. D-dimer 0.90. Mild leukocytosis at 11.81. Hypokalemia at 3.4 which was repleted while here in the emergency department. The patient was given intravenous fluids while here in the emergency department. She had a negative troponin x 1. CT head negative for any acute intracranial abnormalities. While waiting for results of CT PE study, the patient eloped from the emergency department. Strict return precautions were given. Recommend PCP follow-up as soon as possible. The patient and/or patient's proxy verify their understanding and agree to the plan at this time. All questions answered to the patient and/or their proxy's satisfaction. All labs reviewed and utilized in the medical decision making process (if labs were ordered). Portions of the record may have been created with voice recognition software. Occasional wrong word or "sound a like" substitutions may have occurred due to the inherent limitations of voice recognition software. Read the chart carefully and recognize, using context, where substitutions have occurred.     Case discussed with EMS when they brought the patient to the emergency department. I reviewed prior EKG. I reviewed prior notes. Problems Addressed:  Hypokalemia: undiagnosed new problem with uncertain prognosis  Sinus tachycardia: undiagnosed new problem with uncertain prognosis  Syncope: undiagnosed new problem with uncertain prognosis    Amount and/or Complexity of Data Reviewed  Independent Historian: EMS  External Data Reviewed: ECG and notes. Labs: ordered. Decision-making details documented in ED Course. Radiology: ordered. Decision-making details documented in ED Course. ECG/medicine tests: ordered and independent interpretation performed. Decision-making details documented in ED Course. Risk  Prescription drug management. Disposition  Final diagnoses:   Syncope   Sinus tachycardia   Hypokalemia     Time reflects when diagnosis was documented in both MDM as applicable and the Disposition within this note       Time User Action Codes Description Comment    11/25/2023  4:48 PM Barrie Narvaez Mast Add [R55] Syncopal episodes     11/25/2023  4:50 PM Dianne Narvaez [R55] Syncopal episodes     11/25/2023  4:50 PM Celi Narvaez [R55] Syncope     11/25/2023  4:51 PM Barrie Narvaez Add [R00.0] Sinus tachycardia     11/25/2023  4:51 PM Barrie Narvaez Add [E87.6] Hypokalemia           ED Disposition       ED Disposition   Left from Room after Provider Exam    Condition   --    Date/Time   Sat Nov 25, 2023 1650    Comment   The patient left after provider examination.              Follow-up Information    None         Discharge Medication List as of 11/25/2023  5:09 PM        CONTINUE these medications which have NOT CHANGED    Details   carBAMazepine (TEGretol) 200 mg tablet Take 1 tablet (200 mg total) by mouth 3 (three) times a day, Starting Thu 11/2/2023, Until Mon 1/1/2024, Normal      levETIRAcetam (KEPPRA) 750 mg tablet TAKE 2 TABLETS TWICE A DAY, Starting Fri 11/24/2023, Normal calcium carbonate (OYSTER SHELL,OSCAL) 500 mg Take 2 tablets by mouth daily with breakfast, Starting Thu 7/20/2023, Until Fri 11/10/2023, Normal      carbamide peroxide (DEBROX) 6.5 % otic solution Administer 5 drops into both ears 2 (two) times a day for 5 doses, Starting Fri 7/21/2023, Until Fri 11/10/2023, Normal      cholecalciferol (VITAMIN D3) 1,000 units tablet Take 2 tablets (2,000 Units total) by mouth daily, Starting Mon 7/3/2023, Until Fri 11/10/2023, Normal      escitalopram (LEXAPRO) 10 mg tablet Take 1 tablet (10 mg total) by mouth daily, Starting Thu 8/10/2023, Until Fri 11/10/2023, Normal      fluticasone (FLONASE) 50 mcg/act nasal spray 1 spray into each nostril daily, Starting Mon 7/3/2023, Normal      losartan (COZAAR) 25 mg tablet Take 1 tablet (25 mg total) by mouth daily, Starting Thu 7/20/2023, Until Fri 11/10/2023, Normal      metoprolol succinate (TOPROL-XL) 50 mg 24 hr tablet Take 1 tablet (50 mg total) by mouth daily, Starting Thu 7/20/2023, Until Fri 11/10/2023, Normal      nicotine (NICODERM CQ) 21 mg/24 hr TD 24 hr patch Place 1 patch on the skin over 24 hours daily, Starting Thu 7/20/2023, Normal      pramipexole (MIRAPEX) 1.5 MG tablet TAKE 1 TABLET DAILY AT BEDTIME, Starting Wed 10/18/2023, Normal      QUEtiapine (SEROquel) 25 mg tablet Take 0.5 tablets (12.5 mg total) by mouth daily at bedtime, Starting Thu 7/20/2023, Until Mon 9/18/2023, Normal      spironolactone (ALDACTONE) 25 mg tablet Take 25 mg by mouth daily, Historical Med             No discharge procedures on file.     PDMP Review         Value Time User    PDMP Reviewed  Yes 7/23/2021 10:52 AM Blanca Vizcarra MD            ED Provider  Electronically Signed by             Philipp Champagne [id number] , PAJuanC  11/26/23 2025

## 2023-11-29 LAB — LEVETIRACETAM SERPL-MCNC: 21.1 UG/ML (ref 10–40)

## 2023-11-30 ENCOUNTER — HOSPITAL ENCOUNTER (EMERGENCY)
Facility: HOSPITAL | Age: 63
Discharge: HOME | End: 2023-11-30
Attending: EMERGENCY MEDICINE
Payer: MEDICARE

## 2023-11-30 ENCOUNTER — APPOINTMENT (EMERGENCY)
Dept: RADIOLOGY | Facility: HOSPITAL | Age: 63
End: 2023-11-30
Payer: MEDICARE

## 2023-11-30 VITALS
OXYGEN SATURATION: 94 % | DIASTOLIC BLOOD PRESSURE: 59 MMHG | SYSTOLIC BLOOD PRESSURE: 119 MMHG | BODY MASS INDEX: 44.19 KG/M2 | HEIGHT: 64 IN | RESPIRATION RATE: 29 BRPM | WEIGHT: 258.82 LBS | TEMPERATURE: 97.9 F | HEART RATE: 94 BPM

## 2023-11-30 DIAGNOSIS — Z00.8 MEDICAL CLEARANCE FOR PSYCHIATRIC ADMISSION: ICD-10-CM

## 2023-11-30 DIAGNOSIS — N18.31 ANEMIA DUE TO STAGE 3A CHRONIC KIDNEY DISEASE: ICD-10-CM

## 2023-11-30 DIAGNOSIS — G40.919 EPILEPSY WITH ALTERED CONSCIOUSNESS WITH INTRACTABLE EPILEPSY (HCC): ICD-10-CM

## 2023-11-30 DIAGNOSIS — Z76.0 ENCOUNTER FOR MEDICATION REFILL: ICD-10-CM

## 2023-11-30 DIAGNOSIS — R06.00 DYSPNEA: Primary | ICD-10-CM

## 2023-11-30 DIAGNOSIS — H93.8X3 SENSATION OF FULLNESS IN BOTH EARS: ICD-10-CM

## 2023-11-30 DIAGNOSIS — N18.31 STAGE 3A CHRONIC KIDNEY DISEASE (HCC): ICD-10-CM

## 2023-11-30 DIAGNOSIS — N18.31 ACUTE RENAL FAILURE WITH ACUTE TUBULAR NECROSIS SUPERIMPOSED ON STAGE 3A CHRONIC KIDNEY DISEASE (HCC): ICD-10-CM

## 2023-11-30 DIAGNOSIS — N18.9 ACUTE RENAL FAILURE SUPERIMPOSED ON CHRONIC KIDNEY DISEASE: ICD-10-CM

## 2023-11-30 DIAGNOSIS — N17.9 ACUTE RENAL FAILURE SUPERIMPOSED ON CHRONIC KIDNEY DISEASE: ICD-10-CM

## 2023-11-30 DIAGNOSIS — R60.9 WATER RETENTION: ICD-10-CM

## 2023-11-30 DIAGNOSIS — G25.81 RESTLESS LEG SYNDROME: ICD-10-CM

## 2023-11-30 DIAGNOSIS — N17.0 ACUTE RENAL FAILURE WITH ACUTE TUBULAR NECROSIS SUPERIMPOSED ON STAGE 3A CHRONIC KIDNEY DISEASE (HCC): ICD-10-CM

## 2023-11-30 DIAGNOSIS — D63.1 ANEMIA DUE TO STAGE 3A CHRONIC KIDNEY DISEASE: ICD-10-CM

## 2023-11-30 DIAGNOSIS — G40.909 EPILEPSY (HCC): ICD-10-CM

## 2023-11-30 DIAGNOSIS — I12.9 PARENCHYMAL RENAL HYPERTENSION, STAGE 1 THROUGH STAGE 4 OR UNSPECIFIED CHRONIC KIDNEY DISEASE: ICD-10-CM

## 2023-11-30 DIAGNOSIS — I10 ESSENTIAL HYPERTENSION: ICD-10-CM

## 2023-11-30 DIAGNOSIS — I10 HTN (HYPERTENSION): ICD-10-CM

## 2023-11-30 DIAGNOSIS — E87.6 HYPOKALEMIA: ICD-10-CM

## 2023-11-30 LAB
ALBUMIN SERPL BCP-MCNC: 3.4 G/DL (ref 3.5–5)
ALP SERPL-CCNC: 96 U/L (ref 34–104)
ALT SERPL W P-5'-P-CCNC: 13 U/L (ref 7–52)
ANION GAP SERPL CALCULATED.3IONS-SCNC: 9 MMOL/L
AST SERPL W P-5'-P-CCNC: 15 U/L (ref 13–39)
BASOPHILS # BLD AUTO: 0.06 THOUSANDS/ÂΜL (ref 0–0.1)
BASOPHILS NFR BLD AUTO: 1 % (ref 0–1)
BILIRUB SERPL-MCNC: 0.21 MG/DL (ref 0.2–1)
BNP SERPL-MCNC: 76 PG/ML (ref 0–100)
BUN SERPL-MCNC: 21 MG/DL (ref 5–25)
CALCIUM ALBUM COR SERPL-MCNC: 9 MG/DL (ref 8.3–10.1)
CALCIUM SERPL-MCNC: 8.5 MG/DL (ref 8.4–10.2)
CARDIAC TROPONIN I PNL SERPL HS: 3 NG/L
CHLORIDE SERPL-SCNC: 105 MMOL/L (ref 96–108)
CO2 SERPL-SCNC: 25 MMOL/L (ref 21–32)
CREAT SERPL-MCNC: 1.14 MG/DL (ref 0.6–1.3)
EOSINOPHIL # BLD AUTO: 0.23 THOUSAND/ÂΜL (ref 0–0.61)
EOSINOPHIL NFR BLD AUTO: 2 % (ref 0–6)
ERYTHROCYTE [DISTWIDTH] IN BLOOD BY AUTOMATED COUNT: 13.5 % (ref 11.6–15.1)
GFR SERPL CREATININE-BSD FRML MDRD: 50 ML/MIN/1.73SQ M
GLUCOSE SERPL-MCNC: 94 MG/DL (ref 65–140)
HCT VFR BLD AUTO: 36.5 % (ref 34.8–46.1)
HGB BLD-MCNC: 11.8 G/DL (ref 11.5–15.4)
IMM GRANULOCYTES # BLD AUTO: 0.04 THOUSAND/UL (ref 0–0.2)
IMM GRANULOCYTES NFR BLD AUTO: 0 % (ref 0–2)
LYMPHOCYTES # BLD AUTO: 2.9 THOUSANDS/ÂΜL (ref 0.6–4.47)
LYMPHOCYTES NFR BLD AUTO: 23 % (ref 14–44)
MCH RBC QN AUTO: 29.8 PG (ref 26.8–34.3)
MCHC RBC AUTO-ENTMCNC: 32.3 G/DL (ref 31.4–37.4)
MCV RBC AUTO: 92 FL (ref 82–98)
MONOCYTES # BLD AUTO: 0.71 THOUSAND/ÂΜL (ref 0.17–1.22)
MONOCYTES NFR BLD AUTO: 6 % (ref 4–12)
NEUTROPHILS # BLD AUTO: 8.75 THOUSANDS/ÂΜL (ref 1.85–7.62)
NEUTS SEG NFR BLD AUTO: 68 % (ref 43–75)
NRBC BLD AUTO-RTO: 0 /100 WBCS
PLATELET # BLD AUTO: 353 THOUSANDS/UL (ref 149–390)
PMV BLD AUTO: 10.1 FL (ref 8.9–12.7)
POTASSIUM SERPL-SCNC: 3.4 MMOL/L (ref 3.5–5.3)
PROT SERPL-MCNC: 7.3 G/DL (ref 6.4–8.4)
RBC # BLD AUTO: 3.96 MILLION/UL (ref 3.81–5.12)
SODIUM SERPL-SCNC: 139 MMOL/L (ref 135–147)
WBC # BLD AUTO: 12.69 THOUSAND/UL (ref 4.31–10.16)

## 2023-11-30 PROCEDURE — 80053 COMPREHEN METABOLIC PANEL: CPT | Performed by: EMERGENCY MEDICINE

## 2023-11-30 PROCEDURE — 94640 AIRWAY INHALATION TREATMENT: CPT

## 2023-11-30 PROCEDURE — 83880 ASSAY OF NATRIURETIC PEPTIDE: CPT | Performed by: EMERGENCY MEDICINE

## 2023-11-30 PROCEDURE — 71045 X-RAY EXAM CHEST 1 VIEW: CPT

## 2023-11-30 PROCEDURE — 84484 ASSAY OF TROPONIN QUANT: CPT | Performed by: EMERGENCY MEDICINE

## 2023-11-30 PROCEDURE — 93005 ELECTROCARDIOGRAM TRACING: CPT

## 2023-11-30 PROCEDURE — 99285 EMERGENCY DEPT VISIT HI MDM: CPT | Performed by: EMERGENCY MEDICINE

## 2023-11-30 PROCEDURE — 85025 COMPLETE CBC W/AUTO DIFF WBC: CPT | Performed by: EMERGENCY MEDICINE

## 2023-11-30 PROCEDURE — 36415 COLL VENOUS BLD VENIPUNCTURE: CPT | Performed by: EMERGENCY MEDICINE

## 2023-11-30 PROCEDURE — 99285 EMERGENCY DEPT VISIT HI MDM: CPT

## 2023-11-30 RX ORDER — FLUTICASONE PROPIONATE 50 MCG
1 SPRAY, SUSPENSION (ML) NASAL DAILY
Qty: 11.1 ML | Refills: 0 | Status: SHIPPED | OUTPATIENT
Start: 2023-11-30

## 2023-11-30 RX ORDER — POTASSIUM CHLORIDE 20 MEQ/1
20 TABLET, EXTENDED RELEASE ORAL ONCE
Status: COMPLETED | OUTPATIENT
Start: 2023-11-30 | End: 2023-11-30

## 2023-11-30 RX ORDER — PRAMIPEXOLE DIHYDROCHLORIDE 1.5 MG/1
1.5 TABLET ORAL
Qty: 90 TABLET | Refills: 0 | Status: SHIPPED | OUTPATIENT
Start: 2023-11-30 | End: 2024-02-28

## 2023-11-30 RX ORDER — LOSARTAN POTASSIUM 25 MG/1
25 TABLET ORAL DAILY
Qty: 90 TABLET | Refills: 0 | Status: SHIPPED | OUTPATIENT
Start: 2023-11-30 | End: 2024-01-29

## 2023-11-30 RX ORDER — CARBAMAZEPINE 200 MG/1
200 TABLET ORAL 3 TIMES DAILY
Qty: 90 TABLET | Refills: 0 | Status: SHIPPED | OUTPATIENT
Start: 2023-11-30 | End: 2023-12-30

## 2023-11-30 RX ORDER — LEVETIRACETAM 500 MG/1
1500 TABLET ORAL ONCE
Status: COMPLETED | OUTPATIENT
Start: 2023-11-30 | End: 2023-11-30

## 2023-11-30 RX ORDER — LOSARTAN POTASSIUM 25 MG/1
25 TABLET ORAL ONCE
Status: DISCONTINUED | OUTPATIENT
Start: 2023-11-30 | End: 2023-11-30

## 2023-11-30 RX ORDER — IPRATROPIUM BROMIDE AND ALBUTEROL SULFATE 2.5; .5 MG/3ML; MG/3ML
3 SOLUTION RESPIRATORY (INHALATION) ONCE
Status: COMPLETED | OUTPATIENT
Start: 2023-11-30 | End: 2023-11-30

## 2023-11-30 RX ORDER — ALBUTEROL SULFATE 2.5 MG/3ML
2.5 SOLUTION RESPIRATORY (INHALATION) EVERY 6 HOURS PRN
Qty: 75 ML | Refills: 0 | Status: SHIPPED | OUTPATIENT
Start: 2023-11-30

## 2023-11-30 RX ORDER — CARBAMAZEPINE 200 MG/1
200 TABLET ORAL ONCE
Status: COMPLETED | OUTPATIENT
Start: 2023-11-30 | End: 2023-11-30

## 2023-11-30 RX ORDER — PRAMIPEXOLE DIHYDROCHLORIDE 0.5 MG/1
1.5 TABLET ORAL ONCE
Status: DISCONTINUED | OUTPATIENT
Start: 2023-11-30 | End: 2023-11-30

## 2023-11-30 RX ADMIN — LEVETIRACETAM 1500 MG: 500 TABLET, FILM COATED ORAL at 20:35

## 2023-11-30 RX ADMIN — IPRATROPIUM BROMIDE AND ALBUTEROL SULFATE 3 ML: .5; 3 SOLUTION RESPIRATORY (INHALATION) at 19:07

## 2023-11-30 RX ADMIN — CARBAMAZEPINE 200 MG: 200 TABLET ORAL at 20:35

## 2023-11-30 RX ADMIN — POTASSIUM CHLORIDE 20 MEQ: 1500 TABLET, EXTENDED RELEASE ORAL at 19:05

## 2023-11-30 NOTE — ED PROVIDER NOTES
History  Chief Complaint   Patient presents with    Shortness of Breath     Pt presents to ed via walk in with complaint of being SOB for a few years with increase in SOB today pt states she ran out of her medications this AM      58-year-old female with past medical history of epilepsy, hypertension, MDD, CKD 3, chronic lymphedema, current smoker with 15 pack history presents to the ED with worsening shortness of breath at rest and exertion ongoing for the past couple years. She endorses heavy breathing with exertion, orthopnea, PND and lower extremity swelling. She is no longer using compression stockings for lymphedema. She denies any fever, chills, cough, chest pain, nausea, vomiting, abdominal pain. She would also like refill on some of her medications. She reports taking metoprolol this morning, but not losartan since she ran out of it. Shortness of Breath  Associated symptoms: no abdominal pain, no chest pain, no cough, no fever, no headaches, no sore throat and no vomiting        Prior to Admission Medications   Prescriptions Last Dose Informant Patient Reported? Taking?    QUEtiapine (SEROquel) 25 mg tablet   No No   Sig: Take 0.5 tablets (12.5 mg total) by mouth daily at bedtime   Patient not taking: Reported on 8/10/2023   calcium carbonate (OYSTER SHELL,OSCAL) 500 mg   No No   Sig: Take 2 tablets by mouth daily with breakfast   carBAMazepine (TEGretol) 200 mg tablet   No No   Sig: Take 1 tablet (200 mg total) by mouth 3 (three) times a day   carBAMazepine (TEGretol) 200 mg tablet   No Yes   Sig: Take 1 tablet (200 mg total) by mouth 3 (three) times a day   carbamide peroxide (DEBROX) 6.5 % otic solution   No No   Sig: Administer 5 drops into both ears 2 (two) times a day for 5 doses   carbamide peroxide (DEBROX) 6.5 % otic solution   No Yes   Sig: Administer 5 drops into both ears 2 (two) times a day for 5 doses   cholecalciferol (VITAMIN D3) 1,000 units tablet   No No   Sig: Take 2 tablets (2,000 Units total) by mouth daily   escitalopram (LEXAPRO) 10 mg tablet   No No   Sig: Take 1 tablet (10 mg total) by mouth daily   fluticasone (FLONASE) 50 mcg/act nasal spray   No No   Si spray into each nostril daily   fluticasone (FLONASE) 50 mcg/act nasal spray   No Yes   Si spray into each nostril daily   levETIRAcetam (KEPPRA) 750 mg tablet   No No   Sig: TAKE 2 TABLETS TWICE A DAY   losartan (COZAAR) 25 mg tablet   No No   Sig: Take 1 tablet (25 mg total) by mouth daily   losartan (COZAAR) 25 mg tablet   No Yes   Sig: Take 1 tablet (25 mg total) by mouth daily   metoprolol succinate (TOPROL-XL) 50 mg 24 hr tablet   No No   Sig: Take 1 tablet (50 mg total) by mouth daily   nicotine (NICODERM CQ) 21 mg/24 hr TD 24 hr patch   No No   Sig: Place 1 patch on the skin over 24 hours daily   pramipexole (MIRAPEX) 1.5 MG tablet   No No   Sig: TAKE 1 TABLET DAILY AT BEDTIME   pramipexole (MIRAPEX) 1.5 MG tablet   No Yes   Sig: Take 1 tablet (1.5 mg total) by mouth daily at bedtime   spironolactone (ALDACTONE) 25 mg tablet   Yes No   Sig: Take 25 mg by mouth daily   Patient not taking: Reported on 8/10/2023      Facility-Administered Medications Last Administration Doses Remaining   cyanocobalamin injection 1,000 mcg 2023  4:59 PM           Past Medical History:   Diagnosis Date    Depression     EP (epilepsy) ([address])     Epilepsy ([address])     Obesity     Restless leg        Past Surgical History:   Procedure Laterality Date    DENTAL SURGERY      all teeth removed    LASER ABLATION OF THE CERVIX      MAMMO (HISTORICAL)  2017       Family History   Problem Relation Age of Onset    Kidney disease Mother     Liver disease Mother     Heart attack Mother     Heart attack Father     No Known Problems Brother     No Known Problems Son      I have reviewed and agree with the history as documented.     E-Cigarette/Vaping    E-Cigarette Use Never User      E-Cigarette/Vaping Substances    Nicotine Yes     THC No     CBD No Flavoring No     Other No     Unknown No      Social History     Tobacco Use    Smoking status: Every Day     Packs/day: 0.50     Years: 23.00     Total pack years: 11.50     Types: Cigarettes    Smokeless tobacco: Never   Vaping Use    Vaping Use: Never used   Substance Use Topics    Alcohol use: Never     Comment: pt denies alcohol use    Drug use: Never        Review of Systems   Constitutional:  Negative for chills and fever. HENT:  Negative for sore throat. Respiratory:  Positive for shortness of breath. Negative for cough. Cardiovascular:  Positive for leg swelling. Negative for chest pain. Gastrointestinal:  Negative for abdominal pain, constipation, diarrhea, nausea and vomiting. Genitourinary:  Negative for dysuria and hematuria. Neurological:  Negative for headaches. Physical Exam  ED Triage Vitals [11/30/23 1740]   Temperature Pulse Respirations Blood Pressure SpO2   97.9 °F (36.6 °C) 91 (!) 24 (!) 204/81 97 %      Temp Source Heart Rate Source Patient Position - Orthostatic VS BP Location FiO2 (%)   Oral Monitor Sitting Left arm --      Pain Score       7             Orthostatic Vital Signs  Vitals:    11/30/23 1740 11/30/23 1749 11/30/23 1834 11/30/23 1930   BP: (!) 204/81 165/77 146/78 128/68   Pulse: 91  88 92   Patient Position - Orthostatic VS: Sitting  Lying Lying       Physical Exam  Constitutional:       General: She is not in acute distress. Appearance: Normal appearance. She is obese. She is not ill-appearing or diaphoretic. HENT:      Head: Normocephalic and atraumatic. Mouth/Throat:      Mouth: Mucous membranes are moist.      Pharynx: Oropharynx is clear. Eyes:      Extraocular Movements: Extraocular movements intact. Pupils: Pupils are equal, round, and reactive to light. Neck:      Comments: Did not notice significant JVD  Cardiovascular:      Rate and Rhythm: Normal rate and regular rhythm.    Pulmonary:      Effort: Pulmonary effort is normal. Breath sounds: Examination of the right-upper field reveals wheezing. Examination of the left-upper field reveals wheezing. Examination of the right-middle field reveals wheezing. Examination of the left-middle field reveals wheezing. Decreased breath sounds and wheezing present. Abdominal:      General: Abdomen is flat. Palpations: Abdomen is soft. Musculoskeletal:      Right lower leg: Edema present. Left lower leg: Edema present. Comments: 1-2+ pitting edema (chronic lymphedema)   Skin:     General: Skin is warm and dry. Capillary Refill: Capillary refill takes 2 to 3 seconds. Neurological:      General: No focal deficit present. Mental Status: She is alert and oriented to person, place, and time.          ED Medications  Medications   carBAMazepine (TEGretol) tablet 200 mg (has no administration in time range)   levETIRAcetam (KEPPRA) tablet 1,500 mg (has no administration in time range)   pramipexole (MIRAPEX) tablet 1.5 mg (has no administration in time range)   ipratropium-albuterol (DUO-NEB) 0.5-2.5 mg/3 mL inhalation solution 3 mL (3 mL Nebulization Given 11/30/23 1907)   potassium chloride (K-DUR,KLOR-CON) CR tablet 20 mEq (20 mEq Oral Given 11/30/23 1905)       Diagnostic Studies  Results Reviewed       Procedure Component Value Units Date/Time    HS Troponin I 4hr [[id number]]     Lab Status: No result Specimen: Blood     B-Type Natriuretic Peptide(BNP) [[id number]]  (Normal) Collected: 11/30/23 1755    Lab Status: Final result Specimen: Blood from Arm, Right Updated: 11/30/23 1833     BNP 76 pg/mL     HS Troponin 0hr (reflex protocol) [[id number]]  (Normal) Collected: 11/30/23 1755    Lab Status: Final result Specimen: Blood from Arm, Right Updated: 11/30/23 1827     hs TnI 0hr 3 ng/L     HS Troponin I 2hr [[id number]]     Lab Status: No result Specimen: Blood     Comprehensive metabolic panel [[id number]]  (Abnormal) Collected: 11/30/23 1755    Lab Status: Final result Specimen: Blood from Arm, Right Updated: 11/30/23 1822     Sodium 139 mmol/L      Potassium 3.4 mmol/L      Chloride 105 mmol/L      CO2 25 mmol/L      ANION GAP 9 mmol/L      BUN 21 mg/dL      Creatinine 1.14 mg/dL      Glucose 94 mg/dL      Calcium 8.5 mg/dL      Corrected Calcium 9.0 mg/dL      AST 15 U/L      ALT 13 U/L      Alkaline Phosphatase 96 U/L      Total Protein 7.3 g/dL      Albumin 3.4 g/dL      Total Bilirubin 0.21 mg/dL      eGFR 50 ml/min/1.73sq m     Narrative:      National Kidney Disease Foundation guidelines for Chronic Kidney Disease (CKD):     Stage 1 with normal or high GFR (GFR > 90 mL/min/1.73 square meters)    Stage 2 Mild CKD (GFR = 60-89 mL/min/1.73 square meters)    Stage 3A Moderate CKD (GFR = 45-59 mL/min/1.73 square meters)    Stage 3B Moderate CKD (GFR = 30-44 mL/min/1.73 square meters)    Stage 4 Severe CKD (GFR = 15-29 mL/min/1.73 square meters)    Stage 5 End Stage CKD (GFR <15 mL/min/1.73 square meters)  Note: GFR calculation is accurate only with a steady state creatinine    CBC and differential [[id number]]  (Abnormal) Collected: 11/30/23 1755    Lab Status: Final result Specimen: Blood from Arm, Right Updated: 11/30/23 1803     WBC 12.69 Thousand/uL      RBC 3.96 Million/uL      Hemoglobin 11.8 g/dL      Hematocrit 36.5 %      MCV 92 fL      MCH 29.8 pg      MCHC 32.3 g/dL      RDW 13.5 %      MPV 10.1 fL      Platelets 416 Thousands/uL      nRBC 0 /100 WBCs      Neutrophils Relative 68 %      Immat GRANS % 0 %      Lymphocytes Relative 23 %      Monocytes Relative 6 %      Eosinophils Relative 2 %      Basophils Relative 1 %      Neutrophils Absolute 8.75 Thousands/µL      Immature Grans Absolute 0.04 Thousand/uL      Lymphocytes Absolute 2.90 Thousands/µL      Monocytes Absolute 0.71 Thousand/µL      Eosinophils Absolute 0.23 Thousand/µL      Basophils Absolute 0.06 Thousands/µL                    XR chest 1 view portable    (Results Pending) Procedures  Procedures      ED Course  ED Course as of 11/30/23 2025   u Nov 30, 2023   1818 CBC and differential(!)  Mild leukocytosis, very similar to labs from 5 days ago. 1840 Comprehensive metabolic panel(!)  Mild hypokalemia 3.4, repleted   1842 B-Type Natriuretic Peptide(BNP)  Similar to baseline, low suspicion for CHF exacerbation   1843 ECG 12 lead  No acute ST segment changes. Negative troponin x1.    1844 XR chest 1 view portable  No acute cardiopulmonary process when compared to prior CXR per my independent interpretation. SBIRT 22yo+      Flowsheet Row Most Recent Value   Initial Alcohol Screen: US AUDIT-C     1. How often do you have a drink containing alcohol? 0 Filed at: 11/30/2023 1742   2. How many drinks containing alcohol do you have on a typical day you are drinking? 0 Filed at: 11/30/2023 1742   3a. Male UNDER 65: How often do you have five or more drinks on one occasion? 0 Filed at: 11/30/2023 1742   3b. FEMALE Any Age, or MALE 65+: How often do you have 4 or more drinks on one occassion? 0 Filed at: 11/30/2023 1742   Audit-C Score 0 Filed at: 11/30/2023 1742   JOHN: How many times in the past year have you. .. Used an illegal drug or used a prescription medication for non-medical reasons? Never Filed at: 11/30/2023 1742                  Medical Decision Making  64-year-old female with a past medical history epilepsy, hypertension, chronic lymphedema, 15 pack smoking history, multiple ED visits within the past 6 months presents to ED with worsening shortness of breath at rest and with exertion ongoing for the past couple years. Vitals notable for elevated blood pressure that improved without intervention. Patient was given DuoNeb due to wheezing on physical examination, improved with treatment. Labs notable for mild leukocytosis and mild hypokalemia. CXR with no acute cardiopulmonary process when compared to prior CXR.  Pre ambulation pulse ox 95 at rest. 2 laps of ambulation at bed length resulted in pulse ox of 97% while standing. Etiology likely related to undiagnosed COPD in the setting of tobacco smoking, patient was prescribed albuterol and rest of her prescriptions were refilled. She was advised to follow-up with her PCP, was given outpatient referral for pulmonologist to obtain baseline PFTs. Return precautions discussed. Amount and/or Complexity of Data Reviewed  Labs: ordered. Decision-making details documented in ED Course. Radiology: ordered. Decision-making details documented in ED Course. ECG/medicine tests:  Decision-making details documented in ED Course. Risk  OTC drugs. Prescription drug management.           Disposition  Final diagnoses:   Dyspnea   HTN (hypertension)   Epilepsy ([address])   Encounter for medication refill     Time reflects when diagnosis was documented in both MDM as applicable and the Disposition within this note       Time User Action Codes Description Comment    11/30/2023  6:46 PM Davon, Brittany Rickers Add [J44.1] COPD exacerbation ([address])     11/30/2023  6:47 PM Davon Brittany Rickers Add [I10] HTN (hypertension)     11/30/2023  6:47 PM [facility] [W15.905] Epilepsy ([address])     11/30/2023  6:48 PM Davon, Rbittany Rickers Add [G40.919] Epilepsy with altered consciousness with intractable epilepsy ([address])     11/30/2023  6:49 PM Peewee Plum Add [D37.1E8] Sensation of fullness in both ears     11/30/2023  6:50 PM Davon, Basanta Add [Z00.8] Medical clearance for psychiatric admission     11/30/2023  6:51 PM Davon, Basanta Add [E87.6] Hypokalemia     11/30/2023  6:51 PM Davon, Brittany Rickers Add [I10] Essential hypertension     11/30/2023  6:51 PM Davon, Brittany Rickers Add [R60.9] Water retention     11/30/2023  6:51 PM Davon, Brittany Rickers Add [N18.31] Stage 3a chronic kidney disease ([address])     11/30/2023  6:51 PM Davon, Brittany Rickers Add [N17.9,  N18.9] Acute renal failure superimposed on chronic kidney disease      11/30/2023  6:51 PM Brittany Mays Add [I12.9] Parenchymal renal hypertension, stage 1 through stage 4 or unspecified chronic kidney disease     11/30/2023  6:51 PM Davon, Basanta Add [N18.31,  D63.1] Anemia due to stage 3a chronic kidney disease      11/30/2023  6:51 PM Davon, Jacqulin Kim Add [N17.0,  N18.31] Acute renal failure with acute tubular necrosis superimposed on stage 3a chronic kidney disease ([address])     11/30/2023  6:55 PM Davon, Jacqulin Kim Add [G25.81] Restless leg syndrome     11/30/2023  7:01 PM Davon, Basanta Add [R06.00] Dyspnea     11/30/2023  7:01 PM Davon, Basanta Modify [J44.1] COPD exacerbation ([address])     11/30/2023  7:01 PM Davon, Basanta Modify [R06.00] Dyspnea     11/30/2023  7:02 PM Davon, Basanta Modify [G25.81] Restless leg syndrome     11/30/2023  7:02 PM Davon, Basanta Modify [R06.00] Dyspnea     11/30/2023  7:02 PM Davon, Basanta Remove [L63.501] Epilepsy ([address])     11/30/2023  7:02 PM Davon, Basanta Modify [G25.81] Restless leg syndrome     11/30/2023  7:02 PM Davon, Basanta Modify [R06.00] Dyspnea     11/30/2023  7:02 PM Davon, Jacqulin Kim Remove [I10] HTN (hypertension)     11/30/2023  7:02 PM Davon, Basanta Modify [G25.81] Restless leg syndrome     11/30/2023  7:02 PM Davon, Basanta Modify [R06.00] Dyspnea     11/30/2023  7:02 PM Davon, Jacqulin Kim Remove [J44.1] COPD exacerbation ([address])     11/30/2023  7:02 PM Davon, Jacqulin Kim Add [I10] HTN (hypertension)     11/30/2023  7:02 PM [facility] [Q15.624] Epilepsy ([address])     11/30/2023  7:02 PM Yin Milling Add [Z76.0] Encounter for medication refill           ED Disposition       ED Disposition   Discharge    Condition   Stable    Date/Time   Thu Nov 30, 2023 2021    [facility] discharge to home/self care.                    Follow-up Information       Follow up With Specialties Details Why Contact Info Additional [address] [facility],[address] Emergency Department Emergency Medicine Go to  If symptoms worsen [address] [place] [address] [facility] Emergency Department, [facility] [address], [address] [facility]    Cassie Duvall, [address] [facility], Nurse Practitioner Schedule an appointment as soon as possible for a visit in 1 week For re-evaluation, If symptoms worsen 1 [address] in 1 week For evaluation of possible COPD and obtain baseline PFTs. [facility]              Patient's Medications   Discharge Prescriptions    ALBUTEROL (2.5 MG/3 ML) 0.083 % NEBULIZER SOLUTION    Take 3 mL (2.5 mg total) by nebulization every 6 (six) hours as needed for wheezing or shortness of breath       Start Date: 11/30/2023End Date: --       Order Dose: 2.5 mg       Quantity: 75 mL    Refills: 0         PDMP Review         Value Time User    PDMP Reviewed  Yes 7/23/2021 10:52 AM Gaby Orellana MD             ED Provider  Attending physically available and evaluated Charmayne Cabot. I managed the patient along with the ED Attending.     Electronically Signed by           Ammon Singh DO  11/30/23 2025

## 2023-11-30 NOTE — DISCHARGE INSTRUCTIONS
Dear Breann Blue,     It was our pleasure to care for you here at [facility]. It is our hope that we were always able to exceed the expected standards for your care during your stay. For follow up as well as any medication refills, we recommend that you follow up with your primary care physician. However, at this time we provide for you here, the most important instructions / recommendations at discharge:     Notable Medication Adjustments -   Please start using albuterol, every 6 hours as needed for wheezing and shortness of breath. Testing Required after Discharge -   Baseline pulmonary function test.  Important follow up information -   Please follow-up with your PCP in 1 week. Please consider following up with a pulmonology within a week. Recommendation for pulmonology clinic is attached to this AVS summary below. Other Instructions -   Should your symptoms return/worsen, please contact your PCP or return to the ED for further evaluation. Please review this entire after visit summary as additional general instructions including medication list, appointments, activity, diet, any pertinent wound care, and other additional recommendations from your care team that may be provided for you.     Sincerely,

## 2023-12-01 ENCOUNTER — VBI (OUTPATIENT)
Dept: FAMILY MEDICINE CLINIC | Facility: CLINIC | Age: 63
End: 2023-12-01

## 2023-12-01 ENCOUNTER — HOSPITAL ENCOUNTER (EMERGENCY)
Facility: HOSPITAL | Age: 63
Discharge: HOME | End: 2023-12-01
Attending: EMERGENCY MEDICINE
Payer: MEDICARE

## 2023-12-01 VITALS
OXYGEN SATURATION: 95 % | TEMPERATURE: 97.9 F | RESPIRATION RATE: 18 BRPM | SYSTOLIC BLOOD PRESSURE: 125 MMHG | DIASTOLIC BLOOD PRESSURE: 76 MMHG | HEART RATE: 63 BPM

## 2023-12-01 DIAGNOSIS — G25.81 RESTLESS LEGS: Primary | ICD-10-CM

## 2023-12-01 PROCEDURE — 99284 EMERGENCY DEPT VISIT MOD MDM: CPT | Performed by: EMERGENCY MEDICINE

## 2023-12-01 PROCEDURE — 99283 EMERGENCY DEPT VISIT LOW MDM: CPT

## 2023-12-01 RX ORDER — PRAMIPEXOLE DIHYDROCHLORIDE 0.5 MG/1
1.5 TABLET ORAL ONCE
Status: COMPLETED | OUTPATIENT
Start: 2023-12-01 | End: 2023-12-01

## 2023-12-01 RX ADMIN — PRAMIPEXOLE DIHYDROCHLORIDE 1.5 MG: 0.5 TABLET ORAL at 04:23

## 2023-12-01 NOTE — ED NOTES
Per Provider request. Pt was ambulated with pulse ox. Pre ambulation pulse ox 95 at rest. 2 laps of ambulation at bed length resulted in pulse ox of 97% while standing. Upon returning to the stretcher pt. SpO2 94%. Repositioned pt to a more upright position in the stretcher resulted in Spo2 95%. All activities completed with Room air.       Colton Brown RN  11/30/23 8898

## 2023-12-01 NOTE — ED NOTES
Pt was aggrivated upon  RN entering room. Pt expresses that she has had an argument with her brother in law and wants to leave now. RN explained that one of her medications isn't available and will take a few minutes to get to the unit. Explained that pt can wait here for the medication dose or can take the medication at home when she picks up her refill. Pt opted to take available medications here and unavailable medications at home. Situation discussed with and approved by provider. Unavailable medication ( MIRAPEX) order Discontinued and pt agrees to take medication at home. IV removed, available medications administered and hospital provided lift arranged to get patient home safely.       Haylie Carpenter RN  11/30/23 2048

## 2023-12-01 NOTE — TELEPHONE ENCOUNTER
12/01/23 2:46 PM    Patient contacted post ED visit, outreach attempt made but message could not be left. Additional outreach attempt will be made. Thank you.   Maren Lazo MA  [facility]

## 2023-12-01 NOTE — LETTER
[facility] [place]  [address]  [address]  [facility] [id number] [phone]    Date: 12/05/23  [address] [facility] [address] [facility] [address] [place] [id number]    Dear Ramandeep Hare: Thank you for choosing [facility] emergency department for care. Your primary care provider wants to make sure that your ongoing medical care is being addressed. If you require follow up care as a result of your emergency department visit, there are a few things the practice would like you to know. As part of the network's continuing commitment to caring for our patients, we have added more same day appointments and have extended office hours to meet your medical needs. After hours, on-call physicians are available via your primary care provider's main office line. We encourage you to contact our office prior to seeking treatment to discuss your symptoms with the medical staff. Together, we can determine the correct course of action. A majority of non-emergent conditions such as: common cold, flu-like symptoms, fevers, strains/sprains, dislocations, minor burns, cuts and animal bites can be treated at [facility] facilities. Diagnostic testing is available at some sites. Of course, if you are experiencing a life threatening medical emergency call 911 or proceed directly to the nearest emergency room.     Your nearest [facility] facility is conveniently located at:    [facility]  [address] [facility] [facility]  [phone]  [address]  offered at [address] your spot online at [email or web address] or on the [address] [place] [address] [address]    Sincerely,    [address]  Dept: [phone]

## 2023-12-01 NOTE — DISCHARGE INSTRUCTIONS
Continue taking your medications as prescribed. Please return to the emergency department as needed for worsening symptoms.

## 2023-12-01 NOTE — ED PROVIDER NOTES
History  Chief Complaint   Patient presents with    Leg Pain     Pt arrived via ems with c/o of bilateral leg pain "restless leg."  That started 0230. Was seen 2 days ago for the same complaint. No meds pta. 40-year-old female with history of restless leg syndrome, epilepsy, depression who presents for evaluation of bilateral leg pain. Patient reports onset of symptoms approximately 2 hours prior to arrival.  She states that her legs are "jumping" and painful. This feels like her restless leg syndrome. She typically takes Mirapex, however, she is out of her prescriptions. She was seen in the emergency department last night at which time her medications were refilled. She states that she was unable to go to the pharmacy, however, and so did not have any medication to take for her symptoms. She otherwise feels at baseline at this time and denies chest pain, shortness of breath, abdominal pain, fever. She denies any injury to her legs. Labs at her previous visit were notable for mild hypokalemia which was orally repleted, otherwise unremarkable. Prior to Admission Medications   Prescriptions Last Dose Informant Patient Reported? Taking?    QUEtiapine (SEROquel) 25 mg tablet   No No   Sig: Take 0.5 tablets (12.5 mg total) by mouth daily at bedtime   Patient not taking: Reported on 8/10/2023   albuterol (2.5 mg/3 mL) 0.083 % nebulizer solution 11/30/2023  No Yes   Sig: Take 3 mL (2.5 mg total) by nebulization every 6 (six) hours as needed for wheezing or shortness of breath   calcium carbonate (OYSTER SHELL,OSCAL) 500 mg   No No   Sig: Take 2 tablets by mouth daily with breakfast   carBAMazepine (TEGretol) 200 mg tablet 11/30/2023  No Yes   Sig: Take 1 tablet (200 mg total) by mouth 3 (three) times a day   carbamide peroxide (DEBROX) 6.5 % otic solution 11/30/2023  No Yes   Sig: Administer 5 drops into both ears 2 (two) times a day for 5 doses   cholecalciferol (VITAMIN D3) 1,000 units tablet   No No Sig: Take 2 tablets (2,000 Units total) by mouth daily   escitalopram (LEXAPRO) 10 mg tablet   No No   Sig: Take 1 tablet (10 mg total) by mouth daily   fluticasone (FLONASE) 50 mcg/act nasal spray 2023  No Yes   Si spray into each nostril daily   levETIRAcetam (KEPPRA) 750 mg tablet 2023  No Yes   Sig: TAKE 2 TABLETS TWICE A DAY   losartan (COZAAR) 25 mg tablet 2023  No Yes   Sig: Take 1 tablet (25 mg total) by mouth daily   metoprolol succinate (TOPROL-XL) 50 mg 24 hr tablet   No No   Sig: Take 1 tablet (50 mg total) by mouth daily   nicotine (NICODERM CQ) 21 mg/24 hr TD 24 hr patch   No No   Sig: Place 1 patch on the skin over 24 hours daily   pramipexole (MIRAPEX) 1.5 MG tablet 2023  No Yes   Sig: Take 1 tablet (1.5 mg total) by mouth daily at bedtime   spironolactone (ALDACTONE) 25 mg tablet   Yes No   Sig: Take 25 mg by mouth daily   Patient not taking: Reported on 8/10/2023      Facility-Administered Medications Last Administration Doses Remaining   cyanocobalamin injection 1,000 mcg 2023  4:59 PM           Past Medical History:   Diagnosis Date    Depression     EP (epilepsy) ([address])     Epilepsy ([address])     Obesity     Restless leg        Past Surgical History:   Procedure Laterality Date    DENTAL SURGERY      all teeth removed    LASER ABLATION OF THE CERVIX      MAMMO (HISTORICAL)  2017       Family History   Problem Relation Age of Onset    Kidney disease Mother     Liver disease Mother     Heart attack Mother     Heart attack Father     No Known Problems Brother     No Known Problems Son      I have reviewed and agree with the history as documented.     E-Cigarette/Vaping    E-Cigarette Use Never User      E-Cigarette/Vaping Substances    Nicotine Yes     THC No     CBD No     Flavoring No     Other No     Unknown No      Social History     Tobacco Use    Smoking status: Every Day     Packs/day: 0.50     Years: 23.00     Total pack years: 11.50     Types: Cigarettes Smokeless tobacco: Never   Vaping Use    Vaping Use: Never used   Substance Use Topics    Alcohol use: Never     Comment: pt denies alcohol use    Drug use: Never       Review of Systems   Constitutional:  Negative for chills and fever. Respiratory:  Negative for shortness of breath. Cardiovascular:  Negative for chest pain. Gastrointestinal:  Negative for abdominal pain, nausea and vomiting. Genitourinary:  Negative for flank pain. Musculoskeletal:  Positive for myalgias. Skin:  Negative for rash. Neurological:  Negative for weakness and numbness. All other systems reviewed and are negative. Physical Exam  Physical Exam  Vitals reviewed. Constitutional:       General: She is not in acute distress. Appearance: She is not ill-appearing. HENT:      Head: Normocephalic and atraumatic. Nose: Nose normal.      Mouth/Throat:      Mouth: Mucous membranes are moist.   Eyes:      Conjunctiva/sclera: Conjunctivae normal.   Cardiovascular:      Rate and Rhythm: Normal rate and regular rhythm. Heart sounds: No murmur heard. Comments: 2+ DP pulses bilaterally. Pulmonary:      Effort: Pulmonary effort is normal.      Breath sounds: No wheezing, rhonchi or rales. Abdominal:      General: There is no distension. Palpations: Abdomen is soft. Tenderness: There is no abdominal tenderness. Musculoskeletal:         General: No tenderness. Normal range of motion. Cervical back: Normal range of motion and neck supple. Comments: Trace pitting edema to bilateral lower extremities consistent with chronic lymphedema. Skin:     General: Skin is warm and dry. Findings: No rash. Neurological:      General: No focal deficit present. Mental Status: She is alert and oriented to person, place, and time. Comments: Motor and sensation is intact of bilateral lower extremities.          Vital Signs  ED Triage Vitals   Temperature Pulse Respirations Blood Pressure SpO2   12/01/23 0346 12/01/23 0346 12/01/23 0346 12/01/23 0348 12/01/23 0346   97.9 °F (36.6 °C) 63 18 125/76 95 %      Temp Source Heart Rate Source Patient Position - Orthostatic VS BP Location FiO2 (%)   12/01/23 0346 12/01/23 0346 12/01/23 0346 12/01/23 0346 --   Oral Monitor Lying Right arm       Pain Score       --                  Vitals:    12/01/23 0346 12/01/23 0348   BP:  125/76   Pulse: 63    Patient Position - Orthostatic VS: Lying          Visual Acuity      ED Medications  Medications   pramipexole (MIRAPEX) tablet 1.5 mg (1.5 mg Oral Given 12/1/23 0423)       Diagnostic Studies  Results Reviewed       None                   No orders to display              Procedures  Procedures         ED Course  ED Course as of 12/01/23 0538   [facility] Dec 01, 2023   0502 Patient re-evaluated. Reports feeling improved after mirapex. Medical Decision Making  40-year-old female presenting for evaluation of bilateral leg pain. Vital signs stable on arrival.  Patient with history and exam consistent with her chronic restless leg syndrome. Patient unable to take her prescribed medication as she does not have any at home. Patient without any other complaints. Patient dosed with Mirapex with resolution of her symptoms. Patient otherwise stable for discharge at this time. Advised follow-up with primary care physician. Return precautions discussed. Problems Addressed:  Restless legs: acute illness or injury    Risk  Prescription drug management.              Disposition  Final diagnoses:   Restless legs     Time reflects when diagnosis was documented in both MDM as applicable and the Disposition within this note       Time User Action Codes Description Comment    12/1/2023  5:03 AM Emely Seymour Add [G25.81] Restless legs           ED Disposition       ED Disposition   Discharge    Condition   Stable    Date/Time   Fri Dec 1, 2023  5:03 AM    Comment   Katherine Pugh discharge to home/self care.                    Follow-up Information       Follow up With Specialties Details Why [address] [facility], [address] [facility], Nurse Practitioner Schedule an appointment as soon as possible for a visit   [address]  [address]  [phone]              Discharge Medication List as of 12/1/2023  5:03 AM        CONTINUE these medications which have NOT CHANGED    Details   albuterol (2.5 mg/3 mL) 0.083 % nebulizer solution Take 3 mL (2.5 mg total) by nebulization every 6 (six) hours as needed for wheezing or shortness of breath, Starting Thu 11/30/2023, Normal      carBAMazepine (TEGretol) 200 mg tablet Take 1 tablet (200 mg total) by mouth 3 (three) times a day, Starting Thu 11/30/2023, Until Sat 12/30/2023, Normal      carbamide peroxide (DEBROX) 6.5 % otic solution Administer 5 drops into both ears 2 (two) times a day for 5 doses, Starting Thu 11/30/2023, Until Sun 12/3/2023, Normal      fluticasone (FLONASE) 50 mcg/act nasal spray 1 spray into each nostril daily, Starting Thu 11/30/2023, Normal      levETIRAcetam (KEPPRA) 750 mg tablet TAKE 2 TABLETS TWICE A DAY, Starting Fri 11/24/2023, Normal      losartan (COZAAR) 25 mg tablet Take 1 tablet (25 mg total) by mouth daily, Starting Thu 11/30/2023, Until Mon 1/29/2024, Normal      pramipexole (MIRAPEX) 1.5 MG tablet Take 1 tablet (1.5 mg total) by mouth daily at bedtime, Starting Thu 11/30/2023, Until Wed 2/28/2024, Normal      calcium carbonate (OYSTER SHELL,OSCAL) 500 mg Take 2 tablets by mouth daily with breakfast, Starting Thu 7/20/2023, Until Fri 11/10/2023, Normal      cholecalciferol (VITAMIN D3) 1,000 units tablet Take 2 tablets (2,000 Units total) by mouth daily, Starting Mon 7/3/2023, Until Fri 11/10/2023, Normal      escitalopram (LEXAPRO) 10 mg tablet Take 1 tablet (10 mg total) by mouth daily, Starting Thu 8/10/2023, Until Fri 11/10/2023, Normal      metoprolol succinate (TOPROL-XL) 50 mg 24 hr tablet Take 1 tablet (50 mg total) by mouth daily, Starting Thu 7/20/2023, Until Fri 11/10/2023, Normal      nicotine (NICODERM CQ) 21 mg/24 hr TD 24 hr patch Place 1 patch on the skin over 24 hours daily, Starting Thu 7/20/2023, Normal      QUEtiapine (SEROquel) 25 mg tablet Take 0.5 tablets (12.5 mg total) by mouth daily at bedtime, Starting Thu 7/20/2023, Until Mon 9/18/2023, Normal      spironolactone (ALDACTONE) 25 mg tablet Take 25 mg by mouth daily, Historical Med             No discharge procedures on file.     PDMP Review         Value Time User    PDMP Reviewed  Yes 7/23/2021 10:52 AM Cuate Stephens MD            ED Provider  Electronically Signed by             Valdemar Hernandez MD  12/01/23 3682

## 2023-12-02 LAB
ATRIAL RATE: 87 BPM
P AXIS: 5 DEGREES
PR INTERVAL: 132 MS
QRS AXIS: 70 DEGREES
QRSD INTERVAL: 64 MS
QT INTERVAL: 362 MS
QTC INTERVAL: 435 MS
T WAVE AXIS: 72 DEGREES
VENTRICULAR RATE: 87 BPM

## 2023-12-04 NOTE — TELEPHONE ENCOUNTER
12/04/23 3:00 PM    Patient contacted post ED visit, second outreach attempt made. Message was left for patient to return a call to the [facility] Department at [facility]: Phone [phone]. Thank you.   Nehemiah Holt MA  [facility]

## 2023-12-05 NOTE — TELEPHONE ENCOUNTER
12/05/23 3:03 PM    Patient contacted post ED visit, outreach attempt made but message could not be left. Additional outreach attempt will be made. Thank you. Hallie Thurman MA  [facility]    12/05/23 3:05 PM    Patient contacted post ED visit, phone outreaches were unsuccessful and a MyChart letter has been sent to the patient as follow-up. Thank you.   Hallie Thurman MA  [facility]

## 2023-12-22 ENCOUNTER — OFFICE VISIT (OUTPATIENT)
Dept: FAMILY MEDICINE CLINIC | Facility: CLINIC | Age: 63
End: 2023-12-22
Payer: MEDICARE

## 2023-12-22 VITALS
DIASTOLIC BLOOD PRESSURE: 86 MMHG | HEIGHT: 64 IN | OXYGEN SATURATION: 98 % | WEIGHT: 251.4 LBS | SYSTOLIC BLOOD PRESSURE: 128 MMHG | BODY MASS INDEX: 42.92 KG/M2 | HEART RATE: 81 BPM | TEMPERATURE: 97.2 F

## 2023-12-22 DIAGNOSIS — I10 ESSENTIAL HYPERTENSION: ICD-10-CM

## 2023-12-22 DIAGNOSIS — F03.90 MAJOR NEUROCOGNITIVE DISORDER (HCC): Primary | ICD-10-CM

## 2023-12-22 DIAGNOSIS — Z01.89 ENCOUNTER FOR COMPETENCY EVALUATION: ICD-10-CM

## 2023-12-22 DIAGNOSIS — F22 DELUSION (HCC): ICD-10-CM

## 2023-12-22 DIAGNOSIS — J44.9 CHRONIC OBSTRUCTIVE PULMONARY DISEASE, UNSPECIFIED COPD TYPE (HCC): ICD-10-CM

## 2023-12-22 DIAGNOSIS — I89.0 LYMPHEDEMA: ICD-10-CM

## 2023-12-22 DIAGNOSIS — G25.81 RESTLESS LEG SYNDROME: ICD-10-CM

## 2023-12-22 DIAGNOSIS — G40.909 RECURRENT SEIZURES (HCC): ICD-10-CM

## 2023-12-22 PROCEDURE — 99214 OFFICE O/P EST MOD 30 MIN: CPT | Performed by: NURSE PRACTITIONER

## 2023-12-22 RX ORDER — PRAMIPEXOLE DIHYDROCHLORIDE 1.5 MG/1
1.5 TABLET ORAL
Qty: 90 TABLET | Refills: 0 | Status: SHIPPED | OUTPATIENT
Start: 2023-12-22 | End: 2024-03-21

## 2023-12-22 RX ORDER — SPIRONOLACTONE 25 MG/1
25 TABLET ORAL DAILY
Qty: 90 TABLET | Refills: 1 | Status: SHIPPED | OUTPATIENT
Start: 2023-12-22

## 2023-12-22 RX ORDER — ALBUTEROL SULFATE 90 UG/1
2 AEROSOL, METERED RESPIRATORY (INHALATION) EVERY 6 HOURS PRN
Qty: 6.7 G | Refills: 5 | Status: SHIPPED | OUTPATIENT
Start: 2023-12-22
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
## 2024-01-02 ENCOUNTER — TELEPHONE (OUTPATIENT)
Dept: PULMONOLOGY | Facility: CLINIC | Age: 64
End: 2024-01-02

## 2024-01-08 ENCOUNTER — APPOINTMENT (EMERGENCY)
Dept: CT IMAGING | Facility: HOSPITAL | Age: 64
End: 2024-01-08
Payer: MEDICARE

## 2024-01-08 ENCOUNTER — APPOINTMENT (EMERGENCY)
Dept: RADIOLOGY | Facility: HOSPITAL | Age: 64
End: 2024-01-08
Payer: MEDICARE

## 2024-01-08 ENCOUNTER — HOSPITAL ENCOUNTER (EMERGENCY)
Facility: HOSPITAL | Age: 64
Discharge: LEFT BEFORE DISCHARGE | End: 2024-01-08
Attending: EMERGENCY MEDICINE
Payer: MEDICARE

## 2024-01-08 VITALS
DIASTOLIC BLOOD PRESSURE: 82 MMHG | BODY MASS INDEX: 45.98 KG/M2 | SYSTOLIC BLOOD PRESSURE: 148 MMHG | RESPIRATION RATE: 17 BRPM | OXYGEN SATURATION: 95 % | TEMPERATURE: 97.9 F | WEIGHT: 267.86 LBS | HEART RATE: 65 BPM

## 2024-01-08 DIAGNOSIS — R55 SYNCOPE: Primary | ICD-10-CM

## 2024-01-08 DIAGNOSIS — R07.89 MUSCULOSKELETAL CHEST PAIN: ICD-10-CM

## 2024-01-08 LAB
ALBUMIN SERPL BCP-MCNC: 3.5 G/DL (ref 3.5–5)
ALP SERPL-CCNC: 100 U/L (ref 34–104)
ALT SERPL W P-5'-P-CCNC: 10 U/L (ref 7–52)
ANION GAP SERPL CALCULATED.3IONS-SCNC: 7 MMOL/L
AST SERPL W P-5'-P-CCNC: 12 U/L (ref 13–39)
BASOPHILS # BLD AUTO: 0.07 THOUSANDS/ÂΜL (ref 0–0.1)
BASOPHILS NFR BLD AUTO: 1 % (ref 0–1)
BILIRUB SERPL-MCNC: 0.22 MG/DL (ref 0.2–1)
BUN SERPL-MCNC: 25 MG/DL (ref 5–25)
CALCIUM SERPL-MCNC: 9 MG/DL (ref 8.4–10.2)
CARDIAC TROPONIN I PNL SERPL HS: 3 NG/L
CHLORIDE SERPL-SCNC: 106 MMOL/L (ref 96–108)
CO2 SERPL-SCNC: 26 MMOL/L (ref 21–32)
CREAT SERPL-MCNC: 1.18 MG/DL (ref 0.6–1.3)
EOSINOPHIL # BLD AUTO: 0.22 THOUSAND/ÂΜL (ref 0–0.61)
EOSINOPHIL NFR BLD AUTO: 2 % (ref 0–6)
ERYTHROCYTE [DISTWIDTH] IN BLOOD BY AUTOMATED COUNT: 13.8 % (ref 11.6–15.1)
GFR SERPL CREATININE-BSD FRML MDRD: 48 ML/MIN/1.73SQ M
GLUCOSE SERPL-MCNC: 101 MG/DL (ref 65–140)
HCT VFR BLD AUTO: 35.1 % (ref 34.8–46.1)
HGB BLD-MCNC: 11 G/DL (ref 11.5–15.4)
IMM GRANULOCYTES # BLD AUTO: 0.07 THOUSAND/UL (ref 0–0.2)
IMM GRANULOCYTES NFR BLD AUTO: 1 % (ref 0–2)
LYMPHOCYTES # BLD AUTO: 2.06 THOUSANDS/ÂΜL (ref 0.6–4.47)
LYMPHOCYTES NFR BLD AUTO: 14 % (ref 14–44)
MCH RBC QN AUTO: 30.2 PG (ref 26.8–34.3)
MCHC RBC AUTO-ENTMCNC: 31.3 G/DL (ref 31.4–37.4)
MCV RBC AUTO: 96 FL (ref 82–98)
MONOCYTES # BLD AUTO: 0.79 THOUSAND/ÂΜL (ref 0.17–1.22)
MONOCYTES NFR BLD AUTO: 5 % (ref 4–12)
NEUTROPHILS # BLD AUTO: 11.29 THOUSANDS/ÂΜL (ref 1.85–7.62)
NEUTS SEG NFR BLD AUTO: 77 % (ref 43–75)
NRBC BLD AUTO-RTO: 0 /100 WBCS
PLATELET # BLD AUTO: 344 THOUSANDS/UL (ref 149–390)
PMV BLD AUTO: 10.3 FL (ref 8.9–12.7)
POTASSIUM SERPL-SCNC: 3.8 MMOL/L (ref 3.5–5.3)
PROT SERPL-MCNC: 7 G/DL (ref 6.4–8.4)
RBC # BLD AUTO: 3.64 MILLION/UL (ref 3.81–5.12)
SODIUM SERPL-SCNC: 139 MMOL/L (ref 135–147)
WBC # BLD AUTO: 14.5 THOUSAND/UL (ref 4.31–10.16)

## 2024-01-08 PROCEDURE — 99285 EMERGENCY DEPT VISIT HI MDM: CPT | Performed by: EMERGENCY MEDICINE

## 2024-01-08 PROCEDURE — 99285 EMERGENCY DEPT VISIT HI MDM: CPT

## 2024-01-08 PROCEDURE — 71101 X-RAY EXAM UNILAT RIBS/CHEST: CPT

## 2024-01-08 PROCEDURE — 85025 COMPLETE CBC W/AUTO DIFF WBC: CPT | Performed by: EMERGENCY MEDICINE

## 2024-01-08 PROCEDURE — G1004 CDSM NDSC: HCPCS

## 2024-01-08 PROCEDURE — 80053 COMPREHEN METABOLIC PANEL: CPT | Performed by: EMERGENCY MEDICINE

## 2024-01-08 PROCEDURE — 36415 COLL VENOUS BLD VENIPUNCTURE: CPT | Performed by: EMERGENCY MEDICINE

## 2024-01-08 PROCEDURE — 84484 ASSAY OF TROPONIN QUANT: CPT | Performed by: EMERGENCY MEDICINE

## 2024-01-08 PROCEDURE — 93005 ELECTROCARDIOGRAM TRACING: CPT

## 2024-01-08 PROCEDURE — 70450 CT HEAD/BRAIN W/O DYE: CPT

## 2024-01-08 RX ORDER — ACETAMINOPHEN 325 MG/1
650 TABLET ORAL ONCE
Status: DISCONTINUED | OUTPATIENT
Start: 2024-01-08 | End: 2024-01-08 | Stop reason: HOSPADM

## 2024-01-08 NOTE — DISCHARGE INSTRUCTIONS
The patient has been advised of the risks, in layman terms, of leaving AMA which include, but are not limited to death, coma, permanent disability, loss of current lifestyle, delay in diagnosis.    Alternatives have been offered - the patient remains steadfast in their wish to leave.    The patient has been advised that should they change their mind they are welcome to return to this hospital, or any other, at any time.    The patient understands that in no way does an AMA discharge mean that I do not want them to have the best medical care available. To this end, I have provided appropriate prescriptions if needed, referrals, and discharge instructions.

## 2024-01-08 NOTE — ED NOTES
Patient refusing to stay wants to leave AMA, Pt instructed that she can always come back to ED w/ any concerns or worsening symptoms.       Benita Leyva RN  01/08/24 [id number]

## 2024-01-09 NOTE — ED PROVIDER NOTES
"History  Chief Complaint   Patient presents with    Medical Problem     Pt comes by EMS with seizure history, was found by a bystander \"incapacitated at bus stop\". EMS denies fall or headstrike for pt. Pt states she remembers waiting for the bus \"but then just blacked out\". Pt c/o R rib cage pain, denies headache/visual changes/CP/SOB.     This is a 63-year-old female who presents to the emergency department with EMS after being found on the ground.  As per EMS, bystanders found the patient unresponsive on the ground.  They state that she was immediately alert on their evaluation.  The patient states that she remembers standing and waiting for the bus, but then she passed out.  She denies biting her tongue.  She denies urinary incontinence.  She complains of right-sided rib pain.  She denies chest pain or trouble breathing.  She denies head or neck pain.        Prior to Admission Medications   Prescriptions Last Dose Informant Patient Reported? Taking?   QUEtiapine (SEROquel) 25 mg tablet   No No   Sig: Take 0.5 tablets (12.5 mg total) by mouth daily at bedtime   Patient not taking: Reported on 8/10/2023   albuterol (2.5 mg/3 mL) 0.083 % nebulizer solution  Self No No   Sig: Take 3 mL (2.5 mg total) by nebulization every 6 (six) hours as needed for wheezing or shortness of breath   albuterol (Proventil HFA) 90 mcg/act inhaler   No No   Sig: Inhale 2 puffs every 6 (six) hours as needed for wheezing   calcium carbonate (OYSTER SHELL,OSCAL) 500 mg   No No   Sig: Take 2 tablets by mouth daily with breakfast   carBAMazepine (TEGretol) 200 mg tablet  Self No No   Sig: Take 1 tablet (200 mg total) by mouth 3 (three) times a day   carbamide peroxide (DEBROX) 6.5 % otic solution   No No   Sig: Administer 5 drops into both ears 2 (two) times a day for 5 doses   Patient not taking: Reported on 12/22/2023   cholecalciferol (VITAMIN D3) 1,000 units tablet   No No   Sig: Take 2 tablets (2,000 Units total) by mouth daily "   escitalopram (LEXAPRO) 10 mg tablet   No No   Sig: Take 1 tablet (10 mg total) by mouth daily   fluticasone (FLONASE) 50 mcg/act nasal spray  Self No No   Si spray into each nostril daily   levETIRAcetam (KEPPRA) 750 mg tablet  Self No No   Sig: TAKE 2 TABLETS TWICE A DAY   losartan (COZAAR) 25 mg tablet  Self No No   Sig: Take 1 tablet (25 mg total) by mouth daily   metoprolol succinate (TOPROL-XL) 50 mg 24 hr tablet   No No   Sig: Take 1 tablet (50 mg total) by mouth daily   nicotine (NICODERM CQ) 21 mg/24 hr TD 24 hr patch  Self No No   Sig: Place 1 patch on the skin over 24 hours daily   pramipexole (MIRAPEX) 1.5 MG tablet   No No   Sig: Take 1 tablet (1.5 mg total) by mouth daily at bedtime   spironolactone (ALDACTONE) 25 mg tablet   No No   Sig: Take 1 tablet (25 mg total) by mouth daily      Facility-Administered Medications Last Administration Doses Remaining   cyanocobalamin injection 1,000 mcg 2023  4:59 PM           Past Medical History:   Diagnosis Date    Depression     EP (epilepsy) (HCC)     Epilepsy (HCC)     Obesity     Restless leg        Past Surgical History:   Procedure Laterality Date    DENTAL SURGERY      all teeth removed    LASER ABLATION OF THE CERVIX      MAMMO (HISTORICAL)  2017       Family History   Problem Relation Age of Onset    Kidney disease Mother     Liver disease Mother     Heart attack Mother     Heart attack Father     No Known Problems Brother     No Known Problems Son      I have reviewed and agree with the history as documented.    E-Cigarette/Vaping    E-Cigarette Use Never User      E-Cigarette/Vaping Substances    Nicotine Yes     THC No     CBD No     Flavoring No     Other No     Unknown No      Social History     Tobacco Use    Smoking status: Every Day     Current packs/day: 0.50     Average packs/day: 0.5 packs/day for 23.0 years (11.5 ttl pk-yrs)     Types: Cigarettes    Smokeless tobacco: Never   Vaping Use    Vaping status: Never Used   Substance  Use Topics    Alcohol use: Never     Comment: pt denies alcohol use    Drug use: Never       Review of Systems   All other systems reviewed and are negative.      Physical Exam  Physical Exam  Constitutional:  Vital signs reviewed, patient appears nontoxic, no acute distress  Eyes: Pupils equal round reactive to light and accommodation, extraocular muscles intact  HEENT: trachea midline, no JVD, moist mucous membranes  Respiratory: lung sounds clear throughout, no rhonchi, no rales  Cardiovascular: regular rate rhythm, no murmurs or rubs, right-sided chest pain that is tender to palpation  Abdomen: soft, nontender, nondistended, no rebound or guarding  Back: no CVA tenderness, normal inspection  Extremities: no edema, pulses equal in all 4 extremities  Neuro: awake, alert, oriented, no focal weakness  Skin: warm, dry, intact, no rashes noted    Vital Signs  ED Triage Vitals [01/08/24 1554]   Temperature Pulse Respirations Blood Pressure SpO2   97.9 °F (36.6 °C) 93 18 153/76 97 %      Temp Source Heart Rate Source Patient Position - Orthostatic VS BP Location FiO2 (%)   Oral Monitor Sitting Left arm --      Pain Score       5           Vitals:    01/08/24 1554 01/08/24 1818 01/08/24 1856   BP: 153/76 154/72 148/82   Pulse: 93 88 65   Patient Position - Orthostatic VS: Sitting Sitting Lying         Visual Acuity  Visual Acuity      Flowsheet Row Most Recent Value   L Pupil Size (mm) 3   R Pupil Size (mm) 3            ED Medications  Medications   acetaminophen (TYLENOL) tablet 650 mg (has no administration in time range)       Diagnostic Studies  Results Reviewed       Procedure Component Value Units Date/Time    HS Troponin I 4hr [[id number]]     Lab Status: No result Specimen: Blood     HS Troponin I 2hr [[id number]]     Lab Status: No result Specimen: Blood     HS Troponin 0hr (reflex protocol) [[id number]]  (Normal) Collected: 01/08/24 1640    Lab Status: Final result Specimen: Blood from Arm, Right Updated: 01/08/24  1710     hs TnI 0hr 3 ng/L     Comprehensive metabolic panel [[id number]]  (Abnormal) Collected: 01/08/24 1640    Lab Status: Final result Specimen: Blood from Arm, Right Updated: 01/08/24 1703     Sodium 139 mmol/L      Potassium 3.8 mmol/L      Chloride 106 mmol/L      CO2 26 mmol/L      ANION GAP 7 mmol/L      BUN 25 mg/dL      Creatinine 1.18 mg/dL      Glucose 101 mg/dL      Calcium 9.0 mg/dL      AST 12 U/L      ALT 10 U/L      Alkaline Phosphatase 100 U/L      Total Protein 7.0 g/dL      Albumin 3.5 g/dL      Total Bilirubin 0.22 mg/dL      eGFR 48 ml/min/1.73sq m     Narrative:      National Kid[employer] guidelines for Chronic Kidney Disease (CKD):     Stage 1 with normal or high GFR (GFR > 90 mL/min/1.73 square meters)    Stage 2 Mild CKD (GFR = 60-89 mL/min/1.73 square meters)    Stage 3A Moderate CKD (GFR = 45-59 mL/min/1.73 square meters)    Stage 3B Moderate CKD (GFR = 30-44 mL/min/1.73 square meters)    Stage 4 Severe CKD (GFR = 15-29 mL/min/1.73 square meters)    Stage 5 End Stage CKD (GFR <15 mL/min/1.73 square meters)  Note: GFR calculation is accurate only with a steady state creatinine    CBC and differential [[id number]]  (Abnormal) Collected: 01/08/24 1640    Lab Status: Final result Specimen: Blood from Arm, Right Updated: 01/08/24 1648     WBC 14.50 Thousand/uL      RBC 3.64 Million/uL      Hemoglobin 11.0 g/dL      Hematocrit 35.1 %      MCV 96 fL      MCH 30.2 pg      MCHC 31.3 g/dL      RDW 13.8 %      MPV 10.3 fL      Platelets 344 Thousands/uL      nRBC 0 /100 WBCs      Neutrophils Relative 77 %      Immat GRANS % 1 %      Lymphocytes Relative 14 %      Monocytes Relative 5 %      Eosinophils Relative 2 %      Basophils Relative 1 %      Neutrophils Absolute 11.29 Thousands/µL      Immature Grans Absolute 0.07 Thousand/uL      Lymphocytes Absolute 2.06 Thousands/µL      Monocytes Absolute 0.79 Thousand/µL      Eosinophils Absolute 0.22 Thousand/µL      Basophils Absolute 0.07  Thousands/µL                    XR ribs with pa chest min 3 views RIGHT   ED Interpretation by Eran Vanegas DO (01/08 1800)   No pneumonia or pneumothorax, no rib fracture      CT head without contrast   Final Result by Kay Guerrero MD (01/08 1747)      No acute intracranial pathology. In particular, no intracranial hemorrhage or calvarial fracture.                     Workstation performed: [id number]                    Procedures  ECG 12 Lead Documentation Only    Date/Time: 1/8/2024 8:19 PM    Performed by: Eran Vanegas DO  Authorized by: Eran Vanegas DO    ECG reviewed by me, the ED Provider: yes    Patient location:  ED  Comments:      Normal sinus rhythm, rate of 95, normal NM, normal QTc, no STEMI, no acute change compared to EKG dated 11/30/2023, EKG independently interpreted by me           ED Course  ED Course as of 01/08/24 2020 Mon Jan 08, 2024 1800 The patient had a chest x-ray that was independently interpreted by me that shows no acute rib fracture, no pneumonia, no pneumothorax.  The patient had lab work that was significant for a white count of 14.5 and an initial troponin of 3.  The patient was advised that she required a second troponin.  She stated that she did not want to stay in the emergency department.    The patient has been advised of the risks, in layman terms, of leaving AMA which include, but are not limited to death, coma, permanent disability, loss of current lifestyle, delay in diagnosis.    Alternatives have been offered - the patient remains steadfast in their wish to leave.    The patient has been advised that should they change their mind they are welcome to return to this hospital, or any other, at any time.    The patient understands that in no way does an AMA discharge mean that I do not want them to have the best medical care available. To this end, I have provided appropriate prescriptions if needed, referrals, and discharge instructions.                  HEART Risk Score      Flowsheet Row Most Recent Value   Heart Score Risk Calculator    History 0 Filed at: 01/08/2024 1846   ECG 1 Filed at: 01/08/2024 1846   Age 2 Filed at: 01/08/2024 1846   Risk Factors 1 Filed at: 01/08/2024 1846   Troponin 0 Filed at: 01/08/2024 1846   HEART Score 4 Filed at: 01/08/2024 1846                                        Medical Decision Making  This is a 63-year-old female who presented to the emergency department complaining of a syncopal episode and right-sided chest wall pain.  I considered pneumothorax, rib fracture, syncope, arrhythmia, ACS. These and other diagnoses were considered.         Amount and/or Complexity of Data Reviewed  Labs: ordered. Decision-making details documented in ED Course.  Radiology: ordered and independent interpretation performed. Decision-making details documented in ED Course.    Risk  OTC drugs.             Disposition  Final diagnoses:   Syncope   Musculoskeletal chest pain     Time reflects when diagnosis was documented in both MDM as applicable and the Disposition within this note       Time User Action Codes Description Comment    1/8/2024  6:45 PM Eran Vanegas Add [R55] Syncope     1/8/2024  6:46 PM Eran Vanegas Add [R07.9] Chest pain     1/8/2024  6:46 PM Eran Vanegas Remove [R07.9] Chest pain     1/8/2024  6:46 PM Eran Vanegas Add [R07.89] Musculoskeletal chest pain           ED Disposition       ED Disposition   AMA    Condition   --    Date/Time   Mon Jan 8, 2024 1852    Comment   Date: 1/8/2024  Patient: Cydney ELI Lim  Admitted: 1/8/2024  3:51 PM  Attending Provider: DO Cydney Pacheco or her authorized caregiver has made the decision for the patient to leave the emergency department against the advice o f her attending physician. She or her authorized caregiver has been informed and understands the inherent risks, including death.  She or her authorized caregiver has decided to accept the  responsibility for this decision. Cydney Lim and all [facility] essary parties have been advised that she may return for further evaluation or treatment. Her condition at time of discharge was fair.  Cydney Lim had current vital signs as follows:  /72 (BP Location: Left arm)   Pulse 88   Temp 97.9 °F  (36.6 °C) (Oral)   Resp 16   Wt 121 kg (267 lb 13.7 oz)                Follow-up Information       Follow up With Specialties Details Why Contact Info Additional Information    [facility], Nurse Practitioner In 2 days  [address]  [address]  [facility] [address] [phone]       [facility] Emergency Department Emergency Medicine  If symptoms worsen [address] [address] [phone] [facility] Emergency Department, [address] [address]            Discharge Medication List as of 1/8/2024  6:52 PM        CONTINUE these medications which have NOT CHANGED    Details   albuterol (2.5 mg/3 mL) 0.083 % nebulizer solution Take 3 mL (2.5 mg total) by nebulization every 6 (six) hours as needed for wheezing or shortness of breath, Starting Thu 11/30/2023, Normal      albuterol (Proventil HFA) 90 mcg/act inhaler Inhale 2 puffs every 6 (six) hours as needed for wheezing, Starting Fri 12/22/2023, Normal      calcium carbonate (OYSTER SHELL,OSCAL) 500 mg Take 2 tablets by mouth daily with breakfast, Starting Thu 7/20/2023, Until Fri 12/22/2023, Normal      carBAMazepine (TEGretol) 200 mg tablet Take 1 tablet (200 mg total) by mouth 3 (three) times a day, Starting Thu 11/30/2023, Until Sat 12/30/2023, Normal      carbamide peroxide (DEBROX) 6.5 % otic solution Administer 5 drops into both ears 2 (two) times a day for 5 doses, Starting Thu 11/30/2023, Until Sun 12/3/2023, Normal      cholecalciferol (VITAMIN D3) 1,000 units tablet Take 2 tablets (2,000 Units total) by mouth daily, Starting Mon 7/3/2023, Until Fri  12/22/2023, Normal      escitalopram (LEXAPRO) 10 mg tablet Take 1 tablet (10 mg total) by mouth daily, Starting Thu 8/10/2023, Until Fri 12/22/2023, Normal      fluticasone (FLONASE) 50 mcg/act nasal spray 1 spray into each nostril daily, Starting Thu 11/30/2023, Normal      levETIRAcetam (KEPPRA) 750 mg tablet TAKE 2 TABLETS TWICE A DAY, Starting Fri 11/24/2023, Normal      losartan (COZAAR) 25 mg tablet Take 1 tablet (25 mg total) by mouth daily, Starting Thu 11/30/2023, Until Mon 1/29/2024, Normal      metoprolol succinate (TOPROL-XL) 50 mg 24 hr tablet Take 1 tablet (50 mg total) by mouth daily, Starting Thu 7/20/2023, Until Fri 12/22/2023, Normal      nicotine (NICODERM CQ) 21 mg/24 hr TD 24 hr patch Place 1 patch on the skin over 24 hours daily, Starting Thu 7/20/2023, Normal      pramipexole (MIRAPEX) 1.5 MG tablet Take 1 tablet (1.5 mg total) by mouth daily at bedtime, Starting Fri 12/22/2023, Until Thu 3/21/2024, Normal      QUEtiapine (SEROquel) 25 mg tablet Take 0.5 tablets (12.5 mg total) by mouth daily at bedtime, Starting Thu 7/20/2023, Until Mon 9/18/2023, Normal      spironolactone (ALDACTONE) 25 mg tablet Take 1 tablet (25 mg total) by mouth daily, Starting Fri 12/22/2023, Normal             No discharge procedures on file.    PDMP Review         Value Time User    PDMP Reviewed  Yes 7/23/2021 10:52 AM Montana Schmidt MD            ED Provider  Electronically Signed by             Eran Vanegas,   01/08/24 2017       Eran Vanegas,   01/08/24 2020

## 2024-01-12 ENCOUNTER — RA CDI HCC (OUTPATIENT)
Dept: OTHER | Facility: HOSPITAL | Age: 64
End: 2024-01-12

## 2024-01-12 LAB
ATRIAL RATE: 95 BPM
P AXIS: 42 DEGREES
PR INTERVAL: 144 MS
QRS AXIS: 67 DEGREES
QRSD INTERVAL: 68 MS
QT INTERVAL: 340 MS
QTC INTERVAL: 427 MS
T WAVE AXIS: 64 DEGREES
VENTRICULAR RATE: 95 BPM

## 2024-02-01 ENCOUNTER — TELEPHONE (OUTPATIENT)
Age: 64
End: 2024-02-01

## 2024-02-01 ENCOUNTER — HOSPITAL ENCOUNTER (EMERGENCY)
Facility: HOSPITAL | Age: 64
Discharge: HOME | End: 2024-02-01
Attending: EMERGENCY MEDICINE
Payer: MEDICARE

## 2024-02-01 VITALS
HEART RATE: 88 BPM | TEMPERATURE: 98.8 F | RESPIRATION RATE: 16 BRPM | SYSTOLIC BLOOD PRESSURE: 193 MMHG | OXYGEN SATURATION: 99 % | DIASTOLIC BLOOD PRESSURE: 79 MMHG

## 2024-02-01 DIAGNOSIS — R55 VASOVAGAL SYNCOPE: Primary | ICD-10-CM

## 2024-02-01 DIAGNOSIS — G40.909 SEIZURE DISORDER (HCC): ICD-10-CM

## 2024-02-01 DIAGNOSIS — N18.4 CKD (CHRONIC KIDNEY DISEASE) STAGE 4, GFR 15-29 ML/MIN (HCC): ICD-10-CM

## 2024-02-01 DIAGNOSIS — R31.9 HEMATURIA: ICD-10-CM

## 2024-02-01 LAB
2HR DELTA HS TROPONIN: 0 NG/L
ALBUMIN SERPL BCP-MCNC: 3.7 G/DL (ref 3.5–5)
ALP SERPL-CCNC: 105 U/L (ref 34–104)
ALT SERPL W P-5'-P-CCNC: 12 U/L (ref 7–52)
ANION GAP SERPL CALCULATED.3IONS-SCNC: 8 MMOL/L
AST SERPL W P-5'-P-CCNC: 12 U/L (ref 13–39)
BACTERIA UR QL AUTO: ABNORMAL /HPF
BASOPHILS # BLD AUTO: 0.07 THOUSANDS/ÂΜL (ref 0–0.1)
BASOPHILS NFR BLD AUTO: 1 % (ref 0–1)
BILIRUB SERPL-MCNC: 0.27 MG/DL (ref 0.2–1)
BILIRUB UR QL STRIP: NEGATIVE
BUN SERPL-MCNC: 26 MG/DL (ref 5–25)
CALCIUM SERPL-MCNC: 9 MG/DL (ref 8.4–10.2)
CARBAMAZEPINE SERPL-MCNC: 4 UG/ML (ref 4–12)
CARDIAC TROPONIN I PNL SERPL HS: 5 NG/L
CARDIAC TROPONIN I PNL SERPL HS: 5 NG/L
CHLORIDE SERPL-SCNC: 106 MMOL/L (ref 96–108)
CLARITY UR: CLEAR
CO2 SERPL-SCNC: 25 MMOL/L (ref 21–32)
COLOR UR: YELLOW
CREAT SERPL-MCNC: 1.14 MG/DL (ref 0.6–1.3)
EOSINOPHIL # BLD AUTO: 0.23 THOUSAND/ÂΜL (ref 0–0.61)
EOSINOPHIL NFR BLD AUTO: 2 % (ref 0–6)
ERYTHROCYTE [DISTWIDTH] IN BLOOD BY AUTOMATED COUNT: 14 % (ref 11.6–15.1)
GFR SERPL CREATININE-BSD FRML MDRD: 50 ML/MIN/1.73SQ M
GLUCOSE SERPL-MCNC: 98 MG/DL (ref 65–140)
GLUCOSE UR STRIP-MCNC: NEGATIVE MG/DL
HCT VFR BLD AUTO: 36.4 % (ref 34.8–46.1)
HGB BLD-MCNC: 11.3 G/DL (ref 11.5–15.4)
HGB UR QL STRIP.AUTO: ABNORMAL
IMM GRANULOCYTES # BLD AUTO: 0.04 THOUSAND/UL (ref 0–0.2)
IMM GRANULOCYTES NFR BLD AUTO: 0 % (ref 0–2)
KETONES UR STRIP-MCNC: NEGATIVE MG/DL
LEUKOCYTE ESTERASE UR QL STRIP: NEGATIVE
LYMPHOCYTES # BLD AUTO: 2.41 THOUSANDS/ÂΜL (ref 0.6–4.47)
LYMPHOCYTES NFR BLD AUTO: 21 % (ref 14–44)
MAGNESIUM SERPL-MCNC: 2.1 MG/DL (ref 1.9–2.7)
MCH RBC QN AUTO: 30.3 PG (ref 26.8–34.3)
MCHC RBC AUTO-ENTMCNC: 31 G/DL (ref 31.4–37.4)
MCV RBC AUTO: 98 FL (ref 82–98)
MONOCYTES # BLD AUTO: 0.75 THOUSAND/ÂΜL (ref 0.17–1.22)
MONOCYTES NFR BLD AUTO: 7 % (ref 4–12)
NEUTROPHILS # BLD AUTO: 7.85 THOUSANDS/ÂΜL (ref 1.85–7.62)
NEUTS SEG NFR BLD AUTO: 69 % (ref 43–75)
NITRITE UR QL STRIP: POSITIVE
NON-SQ EPI CELLS URNS QL MICRO: ABNORMAL /HPF
NRBC BLD AUTO-RTO: 0 /100 WBCS
PH UR STRIP.AUTO: 6 [PH]
PLATELET # BLD AUTO: 355 THOUSANDS/UL (ref 149–390)
PMV BLD AUTO: 10 FL (ref 8.9–12.7)
POTASSIUM SERPL-SCNC: 4.2 MMOL/L (ref 3.5–5.3)
PROT SERPL-MCNC: 7.3 G/DL (ref 6.4–8.4)
PROT UR STRIP-MCNC: ABNORMAL MG/DL
RBC # BLD AUTO: 3.73 MILLION/UL (ref 3.81–5.12)
RBC #/AREA URNS AUTO: ABNORMAL /HPF
SODIUM SERPL-SCNC: 139 MMOL/L (ref 135–147)
SP GR UR STRIP.AUTO: 1.02 (ref 1–1.03)
UROBILINOGEN UR QL STRIP.AUTO: 0.2 E.U./DL
WBC # BLD AUTO: 11.35 THOUSAND/UL (ref 4.31–10.16)
WBC #/AREA URNS AUTO: ABNORMAL /HPF

## 2024-02-01 PROCEDURE — 85025 COMPLETE CBC W/AUTO DIFF WBC: CPT | Performed by: EMERGENCY MEDICINE

## 2024-02-01 PROCEDURE — 36415 COLL VENOUS BLD VENIPUNCTURE: CPT | Performed by: EMERGENCY MEDICINE

## 2024-02-01 PROCEDURE — 80177 DRUG SCRN QUAN LEVETIRACETAM: CPT | Performed by: EMERGENCY MEDICINE

## 2024-02-01 PROCEDURE — 81001 URINALYSIS AUTO W/SCOPE: CPT | Performed by: EMERGENCY MEDICINE

## 2024-02-01 PROCEDURE — 93005 ELECTROCARDIOGRAM TRACING: CPT

## 2024-02-01 PROCEDURE — 80156 ASSAY CARBAMAZEPINE TOTAL: CPT | Performed by: EMERGENCY MEDICINE

## 2024-02-01 PROCEDURE — 99285 EMERGENCY DEPT VISIT HI MDM: CPT | Performed by: EMERGENCY MEDICINE

## 2024-02-01 PROCEDURE — 83735 ASSAY OF MAGNESIUM: CPT | Performed by: EMERGENCY MEDICINE

## 2024-02-01 PROCEDURE — 84484 ASSAY OF TROPONIN QUANT: CPT | Performed by: EMERGENCY MEDICINE

## 2024-02-01 PROCEDURE — 80053 COMPREHEN METABOLIC PANEL: CPT | Performed by: EMERGENCY MEDICINE

## 2024-02-01 PROCEDURE — 99284 EMERGENCY DEPT VISIT MOD MDM: CPT

## 2024-02-01 NOTE — ED PROVIDER NOTES
"History  Chief Complaint   Patient presents with   • Seizure - Prior Hx Of     Pt arrives via EMS after having an unwitnessed seizure. Pt estimates she \"blacked out\" for 5-10 minutes. Pt has a hx of seizures and has been unable to see a neurologist. Reports 1-2 seizures daily with compliance in current medication of keppra 1500mg BID.      63-year-old female presents after she was walking on the street and began feeling lightheaded, then lost consciousness, which was witnessed by bystander, she does not know whether she had any seizure-like activity, the patient did not have any preceding visual or olfactory phenomena, reports that when she fell she did not suffer any injuries, and has had no headache, dizziness, nausea, vomiting, chest pain, shortness of breath, cough, fevers, no recent trauma, no abdominal pain, GI or urinary or other complaints.  The patient has been seen multiple times for syncopal events and has a seizure disorder.      Prior to Admission Medications   Prescriptions Last Dose Informant Patient Reported? Taking?   QUEtiapine (SEROquel) 25 mg tablet   No No   Sig: Take 0.5 tablets (12.5 mg total) by mouth daily at bedtime   Patient not taking: Reported on 8/10/2023   albuterol (2.5 mg/3 mL) 0.083 % nebulizer solution  Self No No   Sig: Take 3 mL (2.5 mg total) by nebulization every 6 (six) hours as needed for wheezing or shortness of breath   albuterol (Proventil HFA) 90 mcg/act inhaler   No No   Sig: Inhale 2 puffs every 6 (six) hours as needed for wheezing   calcium carbonate (OYSTER SHELL,OSCAL) 500 mg   No No   Sig: Take 2 tablets by mouth daily with breakfast   carBAMazepine (TEGretol) 200 mg tablet  Self No No   Sig: Take 1 tablet (200 mg total) by mouth 3 (three) times a day   carbamide peroxide (DEBROX) 6.5 % otic solution   No No   Sig: Administer 5 drops into both ears 2 (two) times a day for 5 doses   Patient not taking: Reported on 12/22/2023   cholecalciferol (VITAMIN D3) 1,000 units " tablet   No No   Sig: Take 2 tablets (2,000 Units total) by mouth daily   escitalopram (LEXAPRO) 10 mg tablet   No No   Sig: Take 1 tablet (10 mg total) by mouth daily   fluticasone (FLONASE) 50 mcg/act nasal spray  Self No No   Si spray into each nostril daily   levETIRAcetam (KEPPRA) 750 mg tablet  Self No No   Sig: TAKE 2 TABLETS TWICE A DAY   losartan (COZAAR) 25 mg tablet  Self No No   Sig: Take 1 tablet (25 mg total) by mouth daily   metoprolol succinate (TOPROL-XL) 50 mg 24 hr tablet   No No   Sig: Take 1 tablet (50 mg total) by mouth daily   nicotine (NICODERM CQ) 21 mg/24 hr TD 24 hr patch  Self No No   Sig: Place 1 patch on the skin over 24 hours daily   pramipexole (MIRAPEX) 1.5 MG tablet   No No   Sig: Take 1 tablet (1.5 mg total) by mouth daily at bedtime   spironolactone (ALDACTONE) 25 mg tablet   No No   Sig: Take 1 tablet (25 mg total) by mouth daily      Facility-Administered Medications Last Administration Doses Remaining   cyanocobalamin injection 1,000 mcg 2023  4:59 PM           Past Medical History:   Diagnosis Date   • Depression    • EP (epilepsy) (HCC)    • Epilepsy (HCC)    • Obesity    • Restless leg        Past Surgical History:   Procedure Laterality Date   • DENTAL SURGERY      all teeth removed   • LASER ABLATION OF THE CERVIX     • MAMMO (HISTORICAL)  2017       Family History   Problem Relation Age of Onset   • Kidney disease Mother    • Liver disease Mother    • Heart attack Mother    • Heart attack Father    • No Known Problems Brother    • No Known Problems Son      I have reviewed and agree with the history as documented.    E-Cigarette/Vaping   • E-Cigarette Use Never User      E-Cigarette/Vaping Substances   • Nicotine Yes    • THC No    • CBD No    • Flavoring No    • Other No    • Unknown No      Social History     Tobacco Use   • Smoking status: Every Day     Current packs/day: 0.50     Average packs/day: 0.5 packs/day for 23.0 years (11.5 ttl pk-yrs)      Types: Cigarettes   • Smokeless tobacco: Never   Vaping Use   • Vaping status: Never Used   Substance Use Topics   • Alcohol use: Never     Comment: pt denies alcohol use   • Drug use: Never       Review of Systems    Physical Exam  Physical Exam    Vital Signs  ED Triage Vitals   Temperature Pulse Respirations Blood Pressure SpO2   02/01/24 1649 02/01/24 1648 02/01/24 1648 02/01/24 1648 02/01/24 1648   98.8 °F (37.1 °C) 88 16 (!) 193/79 99 %      Temp Source Heart Rate Source Patient Position - Orthostatic VS BP Location FiO2 (%)   02/01/24 1649 02/01/24 1648 02/01/24 1648 02/01/24 1648 --   Oral Monitor Lying Left arm       Pain Score       02/01/24 1648       2           Vitals:    02/01/24 1648   BP: (!) 193/79   Pulse: 88   Patient Position - Orthostatic VS: Lying         Visual Acuity      ED Medications  Medications - No data to display    Diagnostic Studies  Results Reviewed       None                   No orders to display              Procedures  Procedures         ED Course                                             Medical Decision Making  Amount and/or Complexity of Data Reviewed  Labs: ordered.             Disposition  Final diagnoses:   None     ED Disposition       None          Follow-up Information    None         Patient's Medications   Discharge Prescriptions    No medications on file       No discharge procedures on file.    PDMP Review         Value Time User    PDMP Reviewed  Yes 7/23/2021 10:52 AM Montana Schmidt MD            ED Provider  Electronically Signed by           02/01/24 1648 02/01/24 1648 02/01/24 1648   98.8 °F (37.1 °C) 88 16 (!) 193/79 99 %      Temp Source Heart Rate Source Patient Position - Orthostatic VS BP Location FiO2 (%)   02/01/24 1649 02/01/24 1648 02/01/24 1648 02/01/24 1648 --   Oral Monitor Lying Left arm       Pain Score       02/01/24 1648       2           Vitals:    02/01/24 1648   BP: (!) 193/79   Pulse: 88   Patient Position - Orthostatic VS: Lying         Visual Acuity      ED Medications  Medications - No data to display    Diagnostic Studies  Results Reviewed       Procedure Component Value Units Date/Time    Levetiracetam level [[id number]]  (Abnormal) Collected: 02/01/24 1730    Lab Status: Final result Specimen: Blood from Arm, Left Updated: 02/05/24 0705     Levetiracetam Lvl 40.4 ug/mL     Narrative:      Performed at:  [address] [place] [address]  [id number]  : Kris Jovel MD, Phone:  [phone]    Carbamazepine level, total [[id number]]  (Normal) Collected: 02/01/24 1730    Lab Status: Final result Specimen: Blood from Arm, Left Updated: 02/01/24 2353     Carbamazepine Lvl 4.0 ug/mL     Urine Microscopic [[id number]]  (Abnormal) Collected: 02/01/24 1926    Lab Status: Final result Specimen: Urine, Clean Catch Updated: 02/01/24 2011     RBC, UA 2-4 /hpf      WBC, UA 4-10 /hpf      Epithelial Cells Innumerable /hpf      Bacteria, UA Innumerable /hpf     UA (URINE) with reflex to Scope [[id number]]  (Abnormal) Collected: 02/01/24 1926    Lab Status: Final result Specimen: Urine, Clean Catch Updated: 02/01/24 1951     Color, UA Yellow     Clarity, UA Clear     Specific Gravity, UA 1.020     pH, UA 6.0     Leukocytes, UA Negative     Nitrite, UA Positive     Protein, UA 2+ mg/dl      Glucose, UA Negative mg/dl      Ketones, UA Negative mg/dl      Urobilinogen, UA 0.2 E.U./dl      Bilirubin, UA Negative     Occult Blood, UA 2+    HS Troponin I 2hr [[id number]]  (Normal) Collected: 02/01/24 1917    Lab Status:  Final result Specimen: Blood from Arm, Left Updated: 02/01/24 1944     hs TnI 2hr 5 ng/L      Delta 2hr hsTnI 0 ng/L     HS Troponin 0hr (reflex protocol) [[id number]]  (Normal) Collected: 02/01/24 1730    Lab Status: Final result Specimen: Blood from Arm, Left Updated: 02/01/24 1803     hs TnI 0hr 5 ng/L     Comprehensive metabolic panel [[id number]]  (Abnormal) Collected: 02/01/24 1730    Lab Status: Final result Specimen: Blood from Arm, Left Updated: 02/01/24 1755     Sodium 139 mmol/L      Potassium 4.2 mmol/L      Chloride 106 mmol/L      CO2 25 mmol/L      ANION GAP 8 mmol/L      BUN 26 mg/dL      Creatinine 1.14 mg/dL      Glucose 98 mg/dL      Calcium 9.0 mg/dL      AST 12 U/L      ALT 12 U/L      Alkaline Phosphatase 105 U/L      Total Protein 7.3 g/dL      Albumin 3.7 g/dL      Total Bilirubin 0.27 mg/dL      eGFR 50 ml/min/1.73sq m     Narrative:      National Kid[employer] Foundation guidelines for Chronic Kidney Disease (CKD):     Stage 1 with normal or high GFR (GFR > 90 mL/min/1.73 square meters)    Stage 2 Mild CKD (GFR = 60-89 mL/min/1.73 square meters)    Stage 3A Moderate CKD (GFR = 45-59 mL/min/1.73 square meters)    Stage 3B Moderate CKD (GFR = 30-44 mL/min/1.73 square meters)    Stage 4 Severe CKD (GFR = 15-29 mL/min/1.73 square meters)    Stage 5 End Stage CKD (GFR <15 mL/min/1.73 square meters)  Note: GFR calculation is accurate only with a steady state creatinine    Magnesium [[id number]]  (Normal) Collected: 02/01/24 1730    Lab Status: Final result Specimen: Blood from Arm, Left Updated: 02/01/24 1755     Magnesium 2.1 mg/dL     CBC and differential [[id number]]  (Abnormal) Collected: 02/01/24 1730    Lab Status: Final result Specimen: Blood from Arm, Left Updated: 02/01/24 1739     WBC 11.35 Thousand/uL      RBC 3.73 Million/uL      Hemoglobin 11.3 g/dL      Hematocrit 36.4 %      MCV 98 fL      MCH 30.3 pg      MCHC 31.0 g/dL      RDW 14.0 %      MPV 10.0 fL      Platelets 355 Thousands/uL       nRBC 0 /100 WBCs      Neutrophils Relative 69 %      Immat GRANS % 0 %      Lymphocytes Relative 21 %      Monocytes Relative 7 %      Eosinophils Relative 2 %      Basophils Relative 1 %      Neutrophils Absolute 7.85 Thousands/µL      Immature Grans Absolute 0.04 Thousand/uL      Lymphocytes Absolute 2.41 Thousands/µL      Monocytes Absolute 0.75 Thousand/µL      Eosinophils Absolute 0.23 Thousand/µL      Basophils Absolute 0.07 Thousands/µL                    No orders to display              Procedures  ECG 12 Lead Documentation Only    Date/Time: 2/1/2024 2:20 PM    Performed by: Jayden Donnelly MD  Authorized by: Jayden Donnelly MD    ECG reviewed by me, the ED Provider: yes    Patient location:  ED  Previous ECG:     Previous ECG:  Unavailable  Interpretation:     Interpretation: normal    Quality:     Tracing quality:  Limited by artifact  Rate:     ECG rate:  85    ECG rate assessment: normal    Rhythm:     Rhythm: sinus rhythm    Ectopy:     Ectopy: none    QRS:     QRS axis:  Normal    QRS intervals:  Normal  Conduction:     Conduction: normal    ST segments:     ST segments:  Normal  T waves:     T waves: normal             ED Course               Medical Decision Making  63-year-old female with a history of multiple syncopal episodes, presents today originally reported to have had a seizure, however the patient does not report any seizure-like activity, and no incontinence of urine and no oral trauma, with preceding lightheadedness prior to the event, is more likely to be a vasovagal type syncope, she will be worked up to ensure no arrhythmias collect related abnormalities, ACS, or other causes of syncope.    Patient's workup is unremarkable, the patient is safe for discharge home be encouraged to follow-up with neurology and cardiology as an outpatient for further evaluation, with return indications given.    Amount and/or Complexity of Data Reviewed  Labs: ordered.              Disposition  Final diagnoses:   Vasovagal syncope   Seizure disorder (HCC)   Hematuria   CKD (chronic kidney disease) stage 4, GFR 15-29 ml/min (HCC)     Time reflects when diagnosis was documented in both MDM as applicable and the Disposition within this note       Time User Action Codes Description Comment    2/1/2024  8:05 PM DonnellyGermain frencher Add [R55] Vasovagal syncope     2/1/2024  8:06 PM Germain Donnellyer Add [G40.909] Seizure disorder (HCC)     2/1/2024  8:06 PM Germain Donnellyer Add [R31.9] Hematuria     2/1/2024  8:12 PM Donnelly, Christopher Add [N18.4] CKD (chronic kidney disease) stage 4, GFR 15-29 ml/min (HCC)           ED Disposition       ED Disposition   Discharge    Condition   Stable    Date/Time   Thu Feb 1, 2024  8:04 PM    Comment   Cydney Lim discharge to home/self care.                   Follow-up Information       Follow up With Specialties Details Why Contact Info Additional Information    [facility], Nurse Practitioner Schedule an appointment as soon as possible for a visit in 3 days For follow-up [address] [facility]  [address]  [facility] [address]  [phone]       [facility] Emergency Department Emergency Medicine Go to  As needed [address] [address]  [phone] [facility] Emergency Department, [address] [address]    [facility] Schedule an appointment as soon as possible for a visit in 3 days For follow-up [address]  [address]  [facility] [phone] [facility] [place], [address], [place], [place], [id number] [phone]    [facility] Schedule an appointment as soon as possible for a visit in 3 days For follow-up [address]  [address]  [facility] [phone] [facility] [place], [address]   [facility] [address], [place], [place], [address]   [phone]    [facility] Schedule an appointment as soon as possible for a visit in 3 days For follow-up [address] [facility] [address]  [facility] [phone] [facility], [address] [facility] [address], [place], [place], [phone]            Discharge Medication List as of 2/1/2024  8:18 PM        CONTINUE these medications which have NOT CHANGED    Details   albuterol (2.5 mg/3 mL) 0.083 % nebulizer solution Take 3 mL (2.5 mg total) by nebulization every 6 (six) hours as needed for wheezing or shortness of breath, Starting Thu 11/30/2023, Normal      albuterol (Proventil HFA) 90 mcg/act inhaler Inhale 2 puffs every 6 (six) hours as needed for wheezing, Starting Fri 12/22/2023, Normal      calcium carbonate (OYSTER SHELL,OSCAL) 500 mg Take 2 tablets by mouth daily with breakfast, Starting Thu 7/20/2023, Until Fri 12/22/2023, Normal      carBAMazepine (TEGretol) 200 mg tablet Take 1 tablet (200 mg total) by mouth 3 (three) times a day, Starting Thu 11/30/2023, Until Sat 12/30/2023, Normal      carbamide peroxide (DEBROX) 6.5 % otic solution Administer 5 drops into both ears 2 (two) times a day for 5 doses, Starting Thu 11/30/2023, Until Sun 12/3/2023, Normal      cholecalciferol (VITAMIN D3) 1,000 units tablet Take 2 tablets (2,000 Units total) by mouth daily, Starting Mon 7/3/2023, Until Fri 12/22/2023, Normal      escitalopram (LEXAPRO) 10 mg tablet Take 1 tablet (10 mg total) by mouth daily, Starting Thu 8/10/2023, Until Fri 12/22/2023, Normal      fluticasone (FLONASE) 50 mcg/act nasal spray 1 spray into each nostril daily, Starting Thu 11/30/2023, Normal      levETIRAcetam (KEPPRA) 750 mg tablet TAKE 2 TABLETS TWICE A DAY, Starting Fri 11/24/2023, Normal      losartan (COZAAR) 25 mg tablet Take 1 tablet (25 mg total) by mouth daily, Starting Thu 11/30/2023,  Until Mon 1/29/2024, Normal      metoprolol succinate (TOPROL-XL) 50 mg 24 hr tablet Take 1 tablet (50 mg total) by mouth daily, Starting Thu 7/20/2023, Until Fri 12/22/2023, Normal      nicotine (NICODERM CQ) 21 mg/24 hr TD 24 hr patch Place 1 patch on the skin over 24 hours daily, Starting Thu 7/20/2023, Normal      pramipexole (MIRAPEX) 1.5 MG tablet Take 1 tablet (1.5 mg total) by mouth daily at bedtime, Starting Fri 12/22/2023, Until Thu 3/21/2024, Normal      QUEtiapine (SEROquel) 25 mg tablet Take 0.5 tablets (12.5 mg total) by mouth daily at bedtime, Starting Thu 7/20/2023, Until Mon 9/18/2023, Normal      spironolactone (ALDACTONE) 25 mg tablet Take 1 tablet (25 mg total) by mouth daily, Starting Fri 12/22/2023, Normal                 PDMP Review         Value Time User    PDMP Reviewed  Yes 7/23/2021 10:52 AM Montana Schmidt MD            ED Provider  Electronically Signed by             Jayden Donnelly MD  02/06/24 6809

## 2024-02-02 ENCOUNTER — VBI (OUTPATIENT)
Dept: FAMILY MEDICINE CLINIC | Facility: CLINIC | Age: 64
End: 2024-02-02

## 2024-02-02 LAB
ATRIAL RATE: 85 BPM
P AXIS: 43 DEGREES
PR INTERVAL: 138 MS
QRS AXIS: 32 DEGREES
QRSD INTERVAL: 72 MS
QT INTERVAL: 376 MS
QTC INTERVAL: 447 MS
T WAVE AXIS: 65 DEGREES
VENTRICULAR RATE: 85 BPM

## 2024-02-02 PROCEDURE — 93010 ELECTROCARDIOGRAM REPORT: CPT | Performed by: INTERNAL MEDICINE

## 2024-02-02 NOTE — PROGRESS NOTES
Patient called to request [facility] Pharmacy already reflects that change in patients chart.   DISPLAY PLAN FREE TEXT

## 2024-02-02 NOTE — TELEPHONE ENCOUNTER
02/02/24 9:23 AM    Patient contacted post ED visit, [facility] department spoke with patient/caregiver and outreach was successful.    Thank you.  Baljti Mina MA  [facility]

## 2024-02-05 LAB — LEVETIRACETAM SERPL-MCNC: 40.4 UG/ML (ref 10–40)

## 2024-02-09 ENCOUNTER — TELEPHONE (OUTPATIENT)
Age: 64
End: 2024-02-09

## 2024-02-09 NOTE — TELEPHONE ENCOUNTER
NP- ED Ref for hematuria      Patient states she was having gross blood in urine but is not seeing it now. She did have abnormal UA and Micro. Patient is now scheduled on 3/21/24 with [facility] in [place]. Please review wether this is appropriate time frame. If it is patient is all scheduled and on waitlist. If not patient will need to be called to reschedule and move apt date up.     CB if needed: [phone]

## 2024-02-09 NOTE — TELEPHONE ENCOUNTER
Urine testing would suggest hematuria is from infection although urine culture was not completed to confirm this. Hematuria has since resolved. Appropriate timeframe for appointment.

## 2024-02-10 ENCOUNTER — HOSPITAL ENCOUNTER (EMERGENCY)
Facility: HOSPITAL | Age: 64
Discharge: HOME | End: 2024-02-10
Attending: EMERGENCY MEDICINE
Payer: MEDICARE

## 2024-02-10 VITALS
HEART RATE: 76 BPM | RESPIRATION RATE: 18 BRPM | TEMPERATURE: 97.7 F | DIASTOLIC BLOOD PRESSURE: 89 MMHG | SYSTOLIC BLOOD PRESSURE: 131 MMHG | OXYGEN SATURATION: 96 %

## 2024-02-10 DIAGNOSIS — J45.909 ASTHMA: ICD-10-CM

## 2024-02-10 DIAGNOSIS — Z76.0 MEDICATION REFILL: ICD-10-CM

## 2024-02-10 DIAGNOSIS — G25.81 RESTLESS LEG SYNDROME: ICD-10-CM

## 2024-02-10 DIAGNOSIS — G40.909 EPILEPSY (HCC): Primary | ICD-10-CM

## 2024-02-10 DIAGNOSIS — R06.00 DYSPNEA: ICD-10-CM

## 2024-02-10 DIAGNOSIS — G40.919 EPILEPSY WITH ALTERED CONSCIOUSNESS WITH INTRACTABLE EPILEPSY (HCC): ICD-10-CM

## 2024-02-10 PROCEDURE — 99281 EMR DPT VST MAYX REQ PHY/QHP: CPT

## 2024-02-10 PROCEDURE — 99284 EMERGENCY DEPT VISIT MOD MDM: CPT | Performed by: EMERGENCY MEDICINE

## 2024-02-10 RX ORDER — LEVETIRACETAM 750 MG/1
1500 TABLET ORAL 2 TIMES DAILY
Qty: 360 TABLET | Refills: 0 | Status: SHIPPED | OUTPATIENT
Start: 2024-02-10 | End: 2024-02-11

## 2024-02-10 RX ORDER — PRAMIPEXOLE DIHYDROCHLORIDE 1.5 MG/1
1.5 TABLET ORAL
Qty: 90 TABLET | Refills: 0 | Status: SHIPPED | OUTPATIENT
Start: 2024-02-10 | End: 2024-02-11

## 2024-02-10 RX ORDER — CARBAMAZEPINE 200 MG/1
200 TABLET ORAL 3 TIMES DAILY
Qty: 90 TABLET | Refills: 0 | Status: SHIPPED | OUTPATIENT
Start: 2024-02-10 | End: 2024-02-11

## 2024-02-10 RX ORDER — ALBUTEROL SULFATE 2.5 MG/3ML
2.5 SOLUTION RESPIRATORY (INHALATION) EVERY 6 HOURS PRN
Qty: 75 ML | Refills: 0 | Status: SHIPPED | OUTPATIENT
Start: 2024-02-10 | End: 2024-02-11

## 2024-02-10 NOTE — ED PROVIDER NOTES
History  Chief Complaint   Patient presents with    Medication Refill     Brought in by EMS, pt reports needing keppra, tegretol, albuterol, pramiprexole refilled. Last doses of all these medications last night. Pt denies any symptoms. Pt supposed to follow with neurology but has not gotten in yet     This is a 63-year-old female who presents to the emergency department requesting refills on her prescriptions.  Patient states she needs a refill for her Tegretol, Keppra, albuterol.  She is out of these medications and requires a dose tonight.  She denies any current symptoms.  She denies recent seizures.  She denies fevers or chills.  She denies nausea or vomiting.  She denies chest pain or trouble breathing.        Prior to Admission Medications   Prescriptions Last Dose Informant Patient Reported? Taking?   QUEtiapine (SEROquel) 25 mg tablet   No No   Sig: Take 0.5 tablets (12.5 mg total) by mouth daily at bedtime   Patient not taking: Reported on 8/10/2023   albuterol (2.5 mg/3 mL) 0.083 % nebulizer solution  Self No No   Sig: Take 3 mL (2.5 mg total) by nebulization every 6 (six) hours as needed for wheezing or shortness of breath   albuterol (2.5 mg/3 mL) 0.083 % nebulizer solution   No Yes   Sig: Take 3 mL (2.5 mg total) by nebulization every 6 (six) hours as needed for wheezing or shortness of breath   albuterol (Proventil HFA) 90 mcg/act inhaler   No No   Sig: Inhale 2 puffs every 6 (six) hours as needed for wheezing   calcium carbonate (OYSTER SHELL,OSCAL) 500 mg   No No   Sig: Take 2 tablets by mouth daily with breakfast   carBAMazepine (TEGretol) 200 mg tablet  Self No No   Sig: Take 1 tablet (200 mg total) by mouth 3 (three) times a day   carbamide peroxide (DEBROX) 6.5 % otic solution   No No   Sig: Administer 5 drops into both ears 2 (two) times a day for 5 doses   Patient not taking: Reported on 12/22/2023   cholecalciferol (VITAMIN D3) 1,000 units tablet   No No   Sig: Take 2 tablets (2,000 Units  total) by mouth daily   escitalopram (LEXAPRO) 10 mg tablet   No No   Sig: Take 1 tablet (10 mg total) by mouth daily   fluticasone (FLONASE) 50 mcg/act nasal spray  Self No No   Si spray into each nostril daily   levETIRAcetam (KEPPRA) 750 mg tablet  Self No No   Sig: TAKE 2 TABLETS TWICE A DAY   levETIRAcetam (KEPPRA) 750 mg tablet   No Yes   Sig: Take 2 tablets (1,500 mg total) by mouth 2 (two) times a day   losartan (COZAAR) 25 mg tablet  Self No No   Sig: Take 1 tablet (25 mg total) by mouth daily   metoprolol succinate (TOPROL-XL) 50 mg 24 hr tablet   No No   Sig: Take 1 tablet (50 mg total) by mouth daily   nicotine (NICODERM CQ) 21 mg/24 hr TD 24 hr patch  Self No No   Sig: Place 1 patch on the skin over 24 hours daily   pramipexole (MIRAPEX) 1.5 MG tablet   No No   Sig: Take 1 tablet (1.5 mg total) by mouth daily at bedtime   pramipexole (MIRAPEX) 1.5 MG tablet   No Yes   Sig: Take 1 tablet (1.5 mg total) by mouth daily at bedtime   spironolactone (ALDACTONE) 25 mg tablet   No No   Sig: Take 1 tablet (25 mg total) by mouth daily      Facility-Administered Medications Last Administration Doses Remaining   cyanocobalamin injection 1,000 mcg 2023  4:59 PM           Past Medical History:   Diagnosis Date    Depression     EP (epilepsy) (HCC)     Epilepsy (HCC)     Obesity     Restless leg        Past Surgical History:   Procedure Laterality Date    DENTAL SURGERY      all teeth removed    LASER ABLATION OF THE CERVIX      MAMMO (HISTORICAL)  2017       Family History   Problem Relation Age of Onset    Kidney disease Mother     Liver disease Mother     Heart attack Mother     Heart attack Father     No Known Problems Brother     No Known Problems Son      I have reviewed and agree with the history as documented.    E-Cigarette/Vaping    E-Cigarette Use Never User      E-Cigarette/Vaping Substances    Nicotine Yes     THC No     CBD No     Flavoring No     Other No     Unknown No      Social History      Tobacco Use    Smoking status: Every Day     Current packs/day: 0.50     Average packs/day: 0.5 packs/day for 23.0 years (11.5 ttl pk-yrs)     Types: Cigarettes    Smokeless tobacco: Never   Vaping Use    Vaping status: Never Used   Substance Use Topics    Alcohol use: Never     Comment: pt denies alcohol use    Drug use: Never       Review of Systems   All other systems reviewed and are negative.      Physical Exam  Physical Exam  Constitutional: Vital signs reviewed, patient well-appearing, nontoxic  Eyes: Extraocular movements intact   HEENT: Trachea midline, no JVD, moist mucous membranes   Respiratory: Equal chest expansion   Cardiovascular: Well perfused   Abdomen: No distension   Extremities: No edema   Neuro: awake, alert, oriented, no focal weakness   Skin: warm, dry, intact, no rashes noted     Vital Signs  ED Triage Vitals   Temperature Pulse Respirations Blood Pressure SpO2   02/10/24 1254 02/10/24 1254 02/10/24 1252 02/10/24 1254 02/10/24 1254   97.7 °F (36.5 °C) 76 18 131/89 96 %      Temp Source Heart Rate Source Patient Position - Orthostatic VS BP Location FiO2 (%)   02/10/24 1254 -- -- -- --   Oral          Pain Score       --                  Vitals:    02/10/24 1254   BP: 131/89   Pulse: 76         Visual Acuity      ED Medications  Medications - No data to display    Diagnostic Studies  Results Reviewed       None                   No orders to display              Procedures  Procedures         ED Course                                             Medical Decision Making  This is a 63-year-old female who presents to the emergency department requesting prescription refills.  I considered refill for prescriptions, seizures, asthma. These and other diagnoses were considered.         Amount and/or Complexity of Data Reviewed  External Data Reviewed: notes.     Details: Prior prescription doses    Risk  Prescription drug management.             Disposition  Final diagnoses:   Medication refill    Epilepsy (HCC)   Asthma     Time reflects when diagnosis was documented in both MDM as applicable and the Disposition within this note       Time User Action Codes Description Comment    2/10/2024  1:03 PM Eran Vanegas Add [G25.81] Restless leg syndrome     2/10/2024  1:04 PM Eran Vanegas Add [R06.00] Dyspnea     2/10/2024  1:04 PM Eran Vanegas Add [G40.919] Epilepsy with altered consciousness with intractable epilepsy (HCC)     2/10/2024  1:04 PM Eran Vanegas Add [Z76.0] Medication refill     2/10/2024  1:06 PM DawnjaneenEran Modify [Z76.0] Medication refill     2/10/2024  1:07 PM DawnjaneenEran Add [G40.909] Epilepsy ([facility])     2/10/2024  1:07 PM Eran Vanegas Add [J45.909] Asthma           ED Disposition       ED Disposition   Discharge    Condition   Stable    Date/Time   Sat Feb 10, 2024  1:06 PM    Comment   Cydney Lim discharge to home/self care.                   Follow-up Information       Follow up With Specialties Details Why Contact Info Additional Information    [facility], Nurse Practitioner In 2 days  [address] [facility]  [address] [place] [address] [phone]       [facility] Emergency Department Emergency Medicine  If symptoms worsen [address] [address]  [phone] [facility] Emergency Department, [address] [address]            Discharge Medication List as of 2/10/2024  1:07 PM        CONTINUE these medications which have CHANGED    Details   albuterol (2.5 mg/3 mL) 0.083 % nebulizer solution Take 3 mL (2.5 mg total) by nebulization every 6 (six) hours as needed for wheezing or shortness of breath, Starting Sat 2/10/2024, Normal      carBAMazepine (TEGretol) 200 mg tablet Take 1 tablet (200 mg total) by mouth 3 (three) times a day, Starting Sat 2/10/2024, Until Mon 3/11/2024, Normal      levETIRAcetam (KEPPRA) 750 mg tablet Take 2 tablets (1,500 mg total) by  mouth 2 (two) times a day, Starting Sat 2/10/2024, Normal      pramipexole (MIRAPEX) 1.5 MG tablet Take 1 tablet (1.5 mg total) by mouth daily at bedtime, Starting Sat 2/10/2024, Until Fri 5/10/2024, Normal           CONTINUE these medications which have NOT CHANGED    Details   albuterol (Proventil HFA) 90 mcg/act inhaler Inhale 2 puffs every 6 (six) hours as needed for wheezing, Starting Fri 12/22/2023, Normal      calcium carbonate (OYSTER SHELL,OSCAL) 500 mg Take 2 tablets by mouth daily with breakfast, Starting Thu 7/20/2023, Until Fri 12/22/2023, Normal      carbamide peroxide (DEBROX) 6.5 % otic solution Administer 5 drops into both ears 2 (two) times a day for 5 doses, Starting Thu 11/30/2023, Until Sun 12/3/2023, Normal      cholecalciferol (VITAMIN D3) 1,000 units tablet Take 2 tablets (2,000 Units total) by mouth daily, Starting Mon 7/3/2023, Until Fri 12/22/2023, Normal      escitalopram (LEXAPRO) 10 mg tablet Take 1 tablet (10 mg total) by mouth daily, Starting Thu 8/10/2023, Until Fri 12/22/2023, Normal      fluticasone (FLONASE) 50 mcg/act nasal spray 1 spray into each nostril daily, Starting Thu 11/30/2023, Normal      losartan (COZAAR) 25 mg tablet Take 1 tablet (25 mg total) by mouth daily, Starting Thu 11/30/2023, Until Mon 1/29/2024, Normal      metoprolol succinate (TOPROL-XL) 50 mg 24 hr tablet Take 1 tablet (50 mg total) by mouth daily, Starting Thu 7/20/2023, Until Fri 12/22/2023, Normal      nicotine (NICODERM CQ) 21 mg/24 hr TD 24 hr patch Place 1 patch on the skin over 24 hours daily, Starting Thu 7/20/2023, Normal      QUEtiapine (SEROquel) 25 mg tablet Take 0.5 tablets (12.5 mg total) by mouth daily at bedtime, Starting Thu 7/20/2023, Until Mon 9/18/2023, Normal      spironolactone (ALDACTONE) 25 mg tablet Take 1 tablet (25 mg total) by mouth daily, Starting Fri 12/22/2023, Normal             No discharge procedures on file.    PDMP Review         Value Time User    PDMP Reviewed  Yes  7/23/2021 10:52 AM Montana Schmidt MD            ED Provider  Electronically Signed by             Eran Vanegas DO  02/10/24 6333

## 2024-02-11 ENCOUNTER — HOSPITAL ENCOUNTER (EMERGENCY)
Facility: HOSPITAL | Age: 64
Discharge: HOME | End: 2024-02-11
Attending: EMERGENCY MEDICINE
Payer: COMMERCIAL

## 2024-02-11 VITALS
DIASTOLIC BLOOD PRESSURE: 72 MMHG | RESPIRATION RATE: 16 BRPM | TEMPERATURE: 98.3 F | SYSTOLIC BLOOD PRESSURE: 156 MMHG | HEART RATE: 81 BPM | OXYGEN SATURATION: 96 %

## 2024-02-11 DIAGNOSIS — R06.00 DYSPNEA: ICD-10-CM

## 2024-02-11 DIAGNOSIS — G40.909 EPILEPSY (HCC): ICD-10-CM

## 2024-02-11 DIAGNOSIS — Z59.89 INSURANCE COVERAGE PROBLEMS: ICD-10-CM

## 2024-02-11 DIAGNOSIS — Z76.0 ENCOUNTER FOR MEDICATION REFILL: Primary | ICD-10-CM

## 2024-02-11 DIAGNOSIS — G40.919 EPILEPSY WITH ALTERED CONSCIOUSNESS WITH INTRACTABLE EPILEPSY (HCC): ICD-10-CM

## 2024-02-11 DIAGNOSIS — J44.9 CHRONIC OBSTRUCTIVE PULMONARY DISEASE, UNSPECIFIED COPD TYPE (HCC): ICD-10-CM

## 2024-02-11 DIAGNOSIS — G25.81 RESTLESS LEG SYNDROME: ICD-10-CM

## 2024-02-11 DIAGNOSIS — Z76.0 MEDICATION REFILL: ICD-10-CM

## 2024-02-11 PROCEDURE — 99284 EMERGENCY DEPT VISIT MOD MDM: CPT | Performed by: EMERGENCY MEDICINE

## 2024-02-11 PROCEDURE — 99282 EMERGENCY DEPT VISIT SF MDM: CPT

## 2024-02-11 RX ORDER — PRAMIPEXOLE DIHYDROCHLORIDE 1.5 MG/1
1.5 TABLET ORAL
Qty: 90 TABLET | Refills: 0 | Status: SHIPPED | OUTPATIENT
Start: 2024-02-11 | End: 2024-03-06 | Stop reason: SDUPTHER

## 2024-02-11 RX ORDER — LEVETIRACETAM 750 MG/1
1500 TABLET ORAL 2 TIMES DAILY
Qty: 360 TABLET | Refills: 0 | Status: SHIPPED | OUTPATIENT
Start: 2024-02-11 | End: 2024-02-14

## 2024-02-11 RX ORDER — LEVETIRACETAM 500 MG/1
1500 TABLET ORAL ONCE
Status: COMPLETED | OUTPATIENT
Start: 2024-02-11 | End: 2024-02-11

## 2024-02-11 RX ORDER — ALBUTEROL SULFATE 90 UG/1
2 AEROSOL, METERED RESPIRATORY (INHALATION) EVERY 6 HOURS PRN
Qty: 6.7 G | Refills: 0 | Status: SHIPPED | OUTPATIENT
Start: 2024-02-11

## 2024-02-11 RX ORDER — CARBAMAZEPINE 200 MG/1
200 TABLET ORAL 3 TIMES DAILY
Qty: 90 TABLET | Refills: 0 | Status: SHIPPED | OUTPATIENT
Start: 2024-02-11 | End: 2024-02-14

## 2024-02-11 RX ORDER — ALBUTEROL SULFATE 2.5 MG/3ML
2.5 SOLUTION RESPIRATORY (INHALATION) EVERY 6 HOURS PRN
Qty: 75 ML | Refills: 0 | Status: SHIPPED | OUTPATIENT
Start: 2024-02-11

## 2024-02-11 RX ADMIN — LEVETIRACETAM 1500 MG: 500 TABLET, FILM COATED ORAL at 19:37

## 2024-02-11 SDOH — ECONOMIC STABILITY - INCOME SECURITY: OTHER PROBLEMS RELATED TO HOUSING AND ECONOMIC CIRCUMSTANCES: Z59.89

## 2024-02-12 NOTE — ED PROVIDER NOTES
History  Chief Complaint   Patient presents with    Medication Problem     Pt presents to the ED after her pharmacy told her she could not refill her seizure medications      62 y/o female with hx of HTN, epilepsy, COPD, restless leg syndrome, and neurocognitive disorder presents to the ED via EMS for medication refill issues.        Prior to Admission Medications   Prescriptions Last Dose Informant Patient Reported? Taking?   QUEtiapine (SEROquel) 25 mg tablet   No No   Sig: Take 0.5 tablets (12.5 mg total) by mouth daily at bedtime   Patient not taking: Reported on 8/10/2023   albuterol (2.5 mg/3 mL) 0.083 % nebulizer solution   No No   Sig: Take 3 mL (2.5 mg total) by nebulization every 6 (six) hours as needed for wheezing or shortness of breath   albuterol (2.5 mg/3 mL) 0.083 % nebulizer solution   No Yes   Sig: Take 3 mL (2.5 mg total) by nebulization every 6 (six) hours as needed for wheezing or shortness of breath   albuterol (Proventil HFA) 90 mcg/act inhaler   No No   Sig: Inhale 2 puffs every 6 (six) hours as needed for wheezing   albuterol (Proventil HFA) 90 mcg/act inhaler   No Yes   Sig: Inhale 2 puffs every 6 (six) hours as needed for wheezing   calcium carbonate (OYSTER SHELL,OSCAL) 500 mg   No No   Sig: Take 2 tablets by mouth daily with breakfast   carBAMazepine (TEGretol) 200 mg tablet   No No   Sig: Take 1 tablet (200 mg total) by mouth 3 (three) times a day   carBAMazepine (TEGretol) 200 mg tablet   No Yes   Sig: Take 1 tablet (200 mg total) by mouth 3 (three) times a day   carbamide peroxide (DEBROX) 6.5 % otic solution   No No   Sig: Administer 5 drops into both ears 2 (two) times a day for 5 doses   Patient not taking: Reported on 12/22/2023   cholecalciferol (VITAMIN D3) 1,000 units tablet   No No   Sig: Take 2 tablets (2,000 Units total) by mouth daily   escitalopram (LEXAPRO) 10 mg tablet   No No   Sig: Take 1 tablet (10 mg total) by mouth daily   fluticasone (FLONASE) 50 mcg/act nasal  spray  Self No No   Si spray into each nostril daily   levETIRAcetam (KEPPRA) 750 mg tablet   No No   Sig: Take 2 tablets (1,500 mg total) by mouth 2 (two) times a day   levETIRAcetam (KEPPRA) 750 mg tablet   No Yes   Sig: Take 2 tablets (1,500 mg total) by mouth 2 (two) times a day   losartan (COZAAR) 25 mg tablet  Self No No   Sig: Take 1 tablet (25 mg total) by mouth daily   metoprolol succinate (TOPROL-XL) 50 mg 24 hr tablet   No No   Sig: Take 1 tablet (50 mg total) by mouth daily   nicotine (NICODERM CQ) 21 mg/24 hr TD 24 hr patch  Self No No   Sig: Place 1 patch on the skin over 24 hours daily   pramipexole (MIRAPEX) 1.5 MG tablet   No No   Sig: Take 1 tablet (1.5 mg total) by mouth daily at bedtime   pramipexole (MIRAPEX) 1.5 MG tablet   No Yes   Sig: Take 1 tablet (1.5 mg total) by mouth daily at bedtime   spironolactone (ALDACTONE) 25 mg tablet   No No   Sig: Take 1 tablet (25 mg total) by mouth daily      Facility-Administered Medications Last Administration Doses Remaining   cyanocobalamin injection 1,000 mcg 2023  4:59 PM           Past Medical History:   Diagnosis Date    Depression     EP (epilepsy) (HCC)     Epilepsy (HCC)     Obesity     Restless leg        Past Surgical History:   Procedure Laterality Date    DENTAL SURGERY      all teeth removed    LASER ABLATION OF THE CERVIX      MAMMO (HISTORICAL)  2017       Family History   Problem Relation Age of Onset    Kidney disease Mother     Liver disease Mother     Heart attack Mother     Heart attack Father     No Known Problems Brother     No Known Problems Son      I have reviewed and agree with the history as documented.    E-Cigarette/Vaping    E-Cigarette Use Never User      E-Cigarette/Vaping Substances    Nicotine Yes     THC No     CBD No     Flavoring No     Other No     Unknown No      Social History     Tobacco Use    Smoking status: Every Day     Current packs/day: 0.50     Average packs/day: 0.5 packs/day for 23.0 years  (11.5 ttl pk-yrs)     Types: Cigarettes    Smokeless tobacco: Never   Vaping Use    Vaping status: Never Used   Substance Use Topics    Alcohol use: Never     Comment: pt denies alcohol use    Drug use: Never       Review of Systems   Constitutional:  Negative for chills and fever.   HENT:  Negative for congestion, rhinorrhea and sore throat.    Respiratory:  Negative for cough and shortness of breath.    Cardiovascular:  Negative for chest pain and palpitations.   Gastrointestinal:  Negative for abdominal pain, diarrhea, nausea and vomiting.   Genitourinary:  Negative for dysuria and hematuria.   Musculoskeletal:  Negative for back pain and neck pain.   Neurological:  Negative for dizziness, weakness, light-headedness, numbness and headaches.   All other systems reviewed and are negative.      Physical Exam  Physical Exam  Vitals and nursing note reviewed.   Constitutional:       General: She is not in acute distress.     Appearance: Normal appearance. She is normal weight. She is not ill-appearing.   HENT:      Head: Normocephalic and atraumatic.      Right Ear: External ear normal.      Left Ear: External ear normal.      Nose: Nose normal. No congestion or rhinorrhea.      Mouth/Throat:      Mouth: Mucous membranes are moist.      Pharynx: Oropharynx is clear. No oropharyngeal exudate or posterior oropharyngeal erythema.   Eyes:      Extraocular Movements: Extraocular movements intact.      Conjunctiva/sclera: Conjunctivae normal.      Pupils: Pupils are equal, round, and reactive to light.   Cardiovascular:      Rate and Rhythm: Normal rate and regular rhythm.      Pulses: Normal pulses.      Heart sounds: Normal heart sounds. No murmur heard.  Pulmonary:      Effort: Pulmonary effort is normal. No respiratory distress.      Breath sounds: Normal breath sounds. No wheezing or rales.   Abdominal:      General: Abdomen is flat. Bowel sounds are normal. There is no distension.      Palpations: Abdomen is soft.       Tenderness: There is no abdominal tenderness. There is no right CVA tenderness, left CVA tenderness or guarding.   Musculoskeletal:         General: No swelling or tenderness. Normal range of motion.      Cervical back: Normal range of motion and neck supple. No tenderness.   Skin:     General: Skin is warm and dry.      Capillary Refill: Capillary refill takes less than 2 seconds.   Neurological:      General: No focal deficit present.      Mental Status: She is alert and oriented to person, place, and time.         Vital Signs  ED Triage Vitals [02/11/24 1910]   Temperature Pulse Respirations Blood Pressure SpO2   98.3 °F (36.8 °C) 81 16 156/72 96 %      Temp Source Heart Rate Source Patient Position - Orthostatic VS BP Location FiO2 (%)   Oral Monitor Sitting Left arm --      Pain Score       No Pain           Vitals:    02/11/24 1910   BP: 156/72   Pulse: 81   Patient Position - Orthostatic VS: Sitting         Visual Acuity      ED Medications  Medications   levETIRAcetam (KEPPRA) tablet 1,500 mg (1,500 mg Oral Given 2/11/24 1937)       Diagnostic Studies  Results Reviewed       None                   No orders to display              Procedures  Procedures         ED Course                                             Medical Decision Making           Disposition  Final diagnoses:   Encounter for medication refill   Insurance coverage problems   Epilepsy (HCC)   Restless leg syndrome     Time reflects when diagnosis was documented in both MDM as applicable and the Disposition within this note       Time User Action Codes Description Comment    2/11/2024  7:17 PM Andrzej Kaba [Z76.0] Encounter for medication refill     2/11/2024  7:17 PM Andrzej Kaba [Z59.89] Insurance coverage problems     2/11/2024  7:17 PM Andrzej Kaba [G40.909] Epilepsy (HCC)     2/11/2024  7:17 PM Andrzej Kaba [G25.81] Restless leg syndrome     2/11/2024  7:21 PM Andrzej Kaba [R06.00] Dyspnea     2/11/2024   7:21 PM Andrzej Kaba [J44.9] Chronic obstructive pulmonary disease, unspecified COPD type (HCC)     2/11/2024  7:23 PM Andrzej Kaba [G40.919] Epilepsy with altered consciousness with intractable epilepsy (HCC)     2/11/2024  7:23 PM Andrzej Kaba [Z76.0] Medication refill           ED Disposition       ED Disposition   Discharge    Condition   Stable    Date/Time   Sun Feb 11, 2024  7:25 PM    Comment   Cydney Lim discharge to home/self care.                   Follow-up Information       Follow up With Specialties Details Why Contact Info Additional Information    [facility], Nurse Practitioner Call in 1 day For follow-up [address] [facility]  [address]  [facility] [address] [phone]       [facility] Emergency Department Emergency Medicine Go to  If symptoms worsen [address] [address] [phone] [facility] Emergency Department, [address] [place] [address]            Discharge Medication List as of 2/11/2024  7:26 PM        CONTINUE these medications which have CHANGED    Details   albuterol (2.5 mg/3 mL) 0.083 % nebulizer solution Take 3 mL (2.5 mg total) by nebulization every 6 (six) hours as needed for wheezing or shortness of breath, Starting Sun 2/11/2024, Print      albuterol (Proventil HFA) 90 mcg/act inhaler Inhale 2 puffs every 6 (six) hours as needed for wheezing, Starting Sun 2/11/2024, Print      carBAMazepine (TEGretol) 200 mg tablet Take 1 tablet (200 mg total) by mouth 3 (three) times a day, Starting Sun 2/11/2024, Until Tue 3/12/2024, Print      levETIRAcetam (KEPPRA) 750 mg tablet Take 2 tablets (1,500 mg total) by mouth 2 (two) times a day, Starting Sun 2/11/2024, Print      pramipexole (MIRAPEX) 1.5 MG tablet Take 1 tablet (1.5 mg total) by mouth daily at bedtime, Starting Sun 2/11/2024, Until Sat 5/11/2024, Print           CONTINUE these medications which have NOT CHANGED     Details   calcium carbonate (OYSTER SHELL,OSCAL) 500 mg Take 2 tablets by mouth daily with breakfast, Starting Thu 7/20/2023, Until Fri 12/22/2023, Normal      carbamide peroxide (DEBROX) 6.5 % otic solution Administer 5 drops into both ears 2 (two) times a day for 5 doses, Starting Thu 11/30/2023, Until Sun 12/3/2023, Normal      cholecalciferol (VITAMIN D3) 1,000 units tablet Take 2 tablets (2,000 Units total) by mouth daily, Starting Mon 7/3/2023, Until Fri 12/22/2023, Normal      escitalopram (LEXAPRO) 10 mg tablet Take 1 tablet (10 mg total) by mouth daily, Starting Thu 8/10/2023, Until Fri 12/22/2023, Normal      fluticasone (FLONASE) 50 mcg/act nasal spray 1 spray into each nostril daily, Starting Thu 11/30/2023, Normal      losartan (COZAAR) 25 mg tablet Take 1 tablet (25 mg total) by mouth daily, Starting Thu 11/30/2023, Until Mon 1/29/2024, Normal      metoprolol succinate (TOPROL-XL) 50 mg 24 hr tablet Take 1 tablet (50 mg total) by mouth daily, Starting Thu 7/20/2023, Until Fri 12/22/2023, Normal      nicotine (NICODERM CQ) 21 mg/24 hr TD 24 hr patch Place 1 patch on the skin over 24 hours daily, Starting Thu 7/20/2023, Normal      QUEtiapine (SEROquel) 25 mg tablet Take 0.5 tablets (12.5 mg total) by mouth daily at bedtime, Starting Thu 7/20/2023, Until Mon 9/18/2023, Normal      spironolactone (ALDACTONE) 25 mg tablet Take 1 tablet (25 mg total) by mouth daily, Starting Fri 12/22/2023, Normal             No discharge procedures on file.    PDMP Review         Value Time User    PDMP Reviewed  Yes 7/23/2021 10:52 AM Montana Schmidt MD            ED Provider  Electronically Signed by           Mon 7/3/2023, Until Fri 12/22/2023, Normal      escitalopram (LEXAPRO) 10 mg tablet Take 1 tablet (10 mg total) by mouth daily, Starting Thu 8/10/2023, Until Fri 12/22/2023, Normal      fluticasone (FLONASE) 50 mcg/act nasal spray 1 spray into each nostril daily, Starting Thu 11/30/2023, Normal      losartan (COZAAR) 25 mg tablet Take 1 tablet (25 mg total) by mouth daily, Starting Thu 11/30/2023, Until Mon 1/29/2024, Normal      metoprolol succinate (TOPROL-XL) 50 mg 24 hr tablet Take 1 tablet (50 mg total) by mouth daily, Starting Thu 7/20/2023, Until Fri 12/22/2023, Normal      nicotine (NICODERM CQ) 21 mg/24 hr TD 24 hr patch Place 1 patch on the skin over 24 hours daily, Starting Thu 7/20/2023, Normal      QUEtiapine (SEROquel) 25 mg tablet Take 0.5 tablets (12.5 mg total) by mouth daily at bedtime, Starting Thu 7/20/2023, Until Mon 9/18/2023, Normal      spironolactone (ALDACTONE) 25 mg tablet Take 1 tablet (25 mg total) by mouth daily, Starting Fri 12/22/2023, Normal             No discharge procedures on file.    PDMP Review         Value Time User    PDMP Reviewed  Yes 7/23/2021 10:52 AM Montana Schmidt MD            ED Provider  Electronically Signed by             Andrzej Kaba MD  03/01/24 0754

## 2024-02-14 ENCOUNTER — HOSPITAL ENCOUNTER (EMERGENCY)
Facility: HOSPITAL | Age: 64
Discharge: HOME | End: 2024-02-14
Attending: EMERGENCY MEDICINE
Payer: MEDICARE

## 2024-02-14 ENCOUNTER — VBI (OUTPATIENT)
Dept: FAMILY MEDICINE CLINIC | Facility: CLINIC | Age: 64
End: 2024-02-14

## 2024-02-14 VITALS
TEMPERATURE: 97.9 F | DIASTOLIC BLOOD PRESSURE: 93 MMHG | WEIGHT: 266.76 LBS | HEART RATE: 76 BPM | HEIGHT: 64 IN | OXYGEN SATURATION: 97 % | BODY MASS INDEX: 45.54 KG/M2 | RESPIRATION RATE: 16 BRPM | SYSTOLIC BLOOD PRESSURE: 196 MMHG

## 2024-02-14 DIAGNOSIS — G40.909 SEIZURE DISORDER (HCC): Primary | ICD-10-CM

## 2024-02-14 DIAGNOSIS — G40.919 EPILEPSY WITH ALTERED CONSCIOUSNESS WITH INTRACTABLE EPILEPSY (HCC): ICD-10-CM

## 2024-02-14 DIAGNOSIS — Z76.0 MEDICATION REFILL: ICD-10-CM

## 2024-02-14 LAB
BACTERIA UR QL AUTO: ABNORMAL /HPF
BILIRUB UR QL STRIP: NEGATIVE
CLARITY UR: CLEAR
COLOR UR: YELLOW
GLUCOSE SERPL-MCNC: 97 MG/DL (ref 65–140)
GLUCOSE UR STRIP-MCNC: NEGATIVE MG/DL
HGB UR QL STRIP.AUTO: ABNORMAL
KETONES UR STRIP-MCNC: NEGATIVE MG/DL
LEUKOCYTE ESTERASE UR QL STRIP: NEGATIVE
NITRITE UR QL STRIP: NEGATIVE
NON-SQ EPI CELLS URNS QL MICRO: ABNORMAL /HPF
PH UR STRIP.AUTO: 6 [PH]
PROT UR STRIP-MCNC: ABNORMAL MG/DL
RBC #/AREA URNS AUTO: ABNORMAL /HPF
SP GR UR STRIP.AUTO: 1.02 (ref 1–1.03)
UROBILINOGEN UR QL STRIP.AUTO: 0.2 E.U./DL
WBC #/AREA URNS AUTO: ABNORMAL /HPF

## 2024-02-14 PROCEDURE — 80156 ASSAY CARBAMAZEPINE TOTAL: CPT | Performed by: PHYSICIAN ASSISTANT

## 2024-02-14 PROCEDURE — 80177 DRUG SCRN QUAN LEVETIRACETAM: CPT | Performed by: PHYSICIAN ASSISTANT

## 2024-02-14 PROCEDURE — 36415 COLL VENOUS BLD VENIPUNCTURE: CPT | Performed by: PHYSICIAN ASSISTANT

## 2024-02-14 PROCEDURE — 99284 EMERGENCY DEPT VISIT MOD MDM: CPT | Performed by: PHYSICIAN ASSISTANT

## 2024-02-14 PROCEDURE — 82948 REAGENT STRIP/BLOOD GLUCOSE: CPT

## 2024-02-14 PROCEDURE — 81001 URINALYSIS AUTO W/SCOPE: CPT | Performed by: PHYSICIAN ASSISTANT

## 2024-02-14 PROCEDURE — 99284 EMERGENCY DEPT VISIT MOD MDM: CPT

## 2024-02-14 PROCEDURE — 81003 URINALYSIS AUTO W/O SCOPE: CPT | Performed by: PHYSICIAN ASSISTANT

## 2024-02-14 RX ORDER — LEVETIRACETAM 750 MG/1
1500 TABLET ORAL 2 TIMES DAILY
Qty: 120 TABLET | Refills: 0 | Status: SHIPPED | OUTPATIENT
Start: 2024-02-14 | End: 2024-03-15

## 2024-02-14 RX ORDER — CARBAMAZEPINE 200 MG/1
200 TABLET ORAL 3 TIMES DAILY
Qty: 90 TABLET | Refills: 0 | Status: SHIPPED | OUTPATIENT
Start: 2024-02-14 | End: 2024-03-15

## 2024-02-14 RX ORDER — LEVETIRACETAM 500 MG/1
1500 TABLET ORAL ONCE
Status: COMPLETED | OUTPATIENT
Start: 2024-02-14 | End: 2024-02-14

## 2024-02-14 RX ORDER — CARBAMAZEPINE 200 MG/1
200 TABLET ORAL ONCE
Status: COMPLETED | OUTPATIENT
Start: 2024-02-14 | End: 2024-02-14

## 2024-02-14 RX ADMIN — LEVETIRACETAM 1500 MG: 500 TABLET, FILM COATED ORAL at 20:00

## 2024-02-14 RX ADMIN — CARBAMAZEPINE 200 MG: 200 TABLET ORAL at 20:00

## 2024-02-14 NOTE — ED PROVIDER NOTES
"History  Chief Complaint   Patient presents with    Seizure - Prior Hx Of     Arrives via EMS with report of a seizure this morning around 1100, pt reports it lasted approx 10 mins. Pt reports a seizure history and states she is out of her medications.     Past Medical History: Depression, Epilepsy, Obesity, Restless leg, HTN, COPD, neurocognitive disorder   Past Surgical History: DENTAL SURGERY -all teeth removed, LASER ABLATION OF THE CERVIX,     Pt presents to ED via EMS c/o seizure-activity this morning around 1100 (about 7 hours PTA) lasting approx 10 mins, states she hasn't had her medication in about 4 days because of some mixup at the pharmacy with her brother who \"messed everything up\".  Pt was seen here recently and was given paper scripts but states did not get them filled yet.  Pt has no other physical complaints.    This is patient's 4th visit to this ED this month for seizure/mediation related issues.  Pt has had no headache, dizziness, nausea, vomiting, chest pain, shortness of breath, cough, fevers, no recent trauma, no abdominal pain, GI or urinary or other complaints.    The patient has been seen multiple times for syncopal events and has a seizure disorder-medication issues.                Prior to Admission Medications   Prescriptions Last Dose Informant Patient Reported? Taking?   QUEtiapine (SEROquel) 25 mg tablet   No No   Sig: Take 0.5 tablets (12.5 mg total) by mouth daily at bedtime   Patient not taking: Reported on 8/10/2023   albuterol (2.5 mg/3 mL) 0.083 % nebulizer solution   No No   Sig: Take 3 mL (2.5 mg total) by nebulization every 6 (six) hours as needed for wheezing or shortness of breath   albuterol (Proventil HFA) 90 mcg/act inhaler   No No   Sig: Inhale 2 puffs every 6 (six) hours as needed for wheezing   calcium carbonate (OYSTER SHELL,OSCAL) 500 mg   No No   Sig: Take 2 tablets by mouth daily with breakfast   carBAMazepine (TEGretol) 200 mg tablet   No No   Sig: Take 1 tablet " (200 mg total) by mouth 3 (three) times a day   carBAMazepine (TEGretol) 200 mg tablet   No Yes   Sig: Take 1 tablet (200 mg total) by mouth 3 (three) times a day   carbamide peroxide (DEBROX) 6.5 % otic solution   No No   Sig: Administer 5 drops into both ears 2 (two) times a day for 5 doses   Patient not taking: Reported on 2023   cholecalciferol (VITAMIN D3) 1,000 units tablet   No No   Sig: Take 2 tablets (2,000 Units total) by mouth daily   escitalopram (LEXAPRO) 10 mg tablet   No No   Sig: Take 1 tablet (10 mg total) by mouth daily   fluticasone (FLONASE) 50 mcg/act nasal spray  Self No No   Si spray into each nostril daily   levETIRAcetam (KEPPRA) 750 mg tablet   No No   Sig: Take 2 tablets (1,500 mg total) by mouth 2 (two) times a day   levETIRAcetam (KEPPRA) 750 mg tablet   No Yes   Sig: Take 2 tablets (1,500 mg total) by mouth 2 (two) times a day   losartan (COZAAR) 25 mg tablet  Self No No   Sig: Take 1 tablet (25 mg total) by mouth daily   metoprolol succinate (TOPROL-XL) 50 mg 24 hr tablet   No No   Sig: Take 1 tablet (50 mg total) by mouth daily   nicotine (NICODERM CQ) 21 mg/24 hr TD 24 hr patch  Self No No   Sig: Place 1 patch on the skin over 24 hours daily   pramipexole (MIRAPEX) 1.5 MG tablet   No No   Sig: Take 1 tablet (1.5 mg total) by mouth daily at bedtime   spironolactone (ALDACTONE) 25 mg tablet   No No   Sig: Take 1 tablet (25 mg total) by mouth daily      Facility-Administered Medications Last Administration Doses Remaining   cyanocobalamin injection 1,000 mcg 2023  4:59 PM           Past Medical History:   Diagnosis Date    Depression     EP (epilepsy) (HCC)     Epilepsy (HCC)     Obesity     Restless leg        Past Surgical History:   Procedure Laterality Date    DENTAL SURGERY      all teeth removed    LASER ABLATION OF THE CERVIX      MAMMO (HISTORICAL)  2017       Family History   Problem Relation Age of Onset    Kidney disease Mother     Liver disease Mother      Heart attack Mother     Heart attack Father     No Known Problems Brother     No Known Problems Son      I have reviewed and agree with the history as documented.    E-Cigarette/Vaping    E-Cigarette Use Never User      E-Cigarette/Vaping Substances    Nicotine Yes     THC No     CBD No     Flavoring No     Other No     Unknown No      Social History     Tobacco Use    Smoking status: Every Day     Current packs/day: 0.50     Average packs/day: 0.5 packs/day for 23.0 years (11.5 ttl pk-yrs)     Types: Cigarettes    Smokeless tobacco: Never   Vaping Use    Vaping status: Never Used   Substance Use Topics    Alcohol use: Never     Comment: pt denies alcohol use    Drug use: Never       Review of Systems   Constitutional:  Negative for fever.   HENT:  Negative for sore throat.    Respiratory:  Negative for cough and shortness of breath.    Cardiovascular:  Negative for chest pain.   Gastrointestinal:  Negative for abdominal pain and vomiting.   Musculoskeletal:  Negative for myalgias.   Skin:  Negative for rash.   Neurological:  Positive for seizures. Negative for dizziness and weakness.   Psychiatric/Behavioral:  Positive for agitation. Negative for behavioral problems.    All other systems reviewed and are negative.      Physical Exam  Physical Exam  Vitals and nursing note reviewed.   Constitutional:       General: She is not in acute distress.     Appearance: She is well-developed. She is obese.   HENT:      Head: Normocephalic and atraumatic.      Nose: Nose normal.      Mouth/Throat:      Mouth: Mucous membranes are moist.      Pharynx: Oropharynx is clear.   Eyes:      Conjunctiva/sclera: Conjunctivae normal.   Cardiovascular:      Rate and Rhythm: Normal rate and regular rhythm.   Pulmonary:      Effort: Pulmonary effort is normal. No respiratory distress.      Breath sounds: Normal breath sounds.   Musculoskeletal:         General: No tenderness. Normal range of motion.      Cervical back: Normal range of  motion and neck supple. No tenderness.      Right lower leg: No edema.      Left lower leg: No edema.   Skin:     General: Skin is warm and dry.      Findings: No rash.   Neurological:      General: No focal deficit present.      Mental Status: She is alert. Mental status is at baseline.      Cranial Nerves: No cranial nerve deficit.      Motor: No weakness.      Coordination: Coordination normal.      Comments: No further seizure like activity in ED, ambulates without difficulty   Psychiatric:         Behavior: Behavior normal.         Vital Signs  ED Triage Vitals [02/14/24 1835]   Temperature Pulse Respirations Blood Pressure SpO2   97.9 °F (36.6 °C) 80 18 (!) 194/90 98 %      Temp Source Heart Rate Source Patient Position - Orthostatic VS BP Location FiO2 (%)   Oral Monitor Lying Right arm --      Pain Score       No Pain           Vitals:    02/14/24 1835   BP: (!) 194/90   Pulse: 80   Patient Position - Orthostatic VS: Lying         Visual Acuity      ED Medications  Medications   carBAMazepine (TEGretol) tablet 200 mg (200 mg Oral Given 2/14/24 2000)   levETIRAcetam (KEPPRA) tablet 1,500 mg (1,500 mg Oral Given 2/14/24 2000)       Diagnostic Studies  Results Reviewed       Procedure Component Value Units Date/Time    Carbamazepine level, total [[id number]] Collected: 02/14/24 1959    Lab Status: In process Specimen: Blood from Arm, Right Updated: 02/14/24 2006    Levetiracetam level [[id number]] Collected: 02/14/24 1959    Lab Status: In process Specimen: Blood from Arm, Right Updated: 02/14/24 2006    UA w Reflex to Microscopic w Reflex to Culture [[id number]]  (Abnormal) Collected: 02/14/24 1946    Lab Status: Final result Specimen: Urine, Clean Catch Updated: 02/14/24 1959     Color, UA Yellow     Clarity, UA Clear     Specific Gravity, UA 1.020     pH, UA 6.0     Leukocytes, UA Negative     Nitrite, UA Negative     Protein, UA 2+ mg/dl      Glucose, UA Negative mg/dl      Ketones, UA Negative mg/dl       Urobilinogen, UA 0.2 E.U./dl      Bilirubin, UA Negative     Occult Blood, UA 2+    Urine Microscopic [[id number]] Collected: 02/14/24 1946    Lab Status: In process Specimen: Urine, Clean Catch Updated: 02/14/24 1959    Fingerstick Glucose (POCT) [[id number]]  (Normal) Collected: 02/14/24 1831    Lab Status: Final result Updated: 02/14/24 1833     POC Glucose 97 mg/dl                    No orders to display              Procedures  Procedures         ED Course  ED Course as of 02/14/24 2008 Wed Feb 14, 2024 2003 UA does not show any UTI here in ED and pt asx                               SBIRT 22yo+      Flowsheet Row Most Recent Value   Initial Alcohol Screen: US AUDIT-C     1. How often do you have a drink containing alcohol? 0 Filed at: 02/14/2024 1838   Audit-C Score 0 Filed at: 02/14/2024 1838   JOHN: How many times in the past year have you...    Used an illegal drug or used a prescription medication for non-medical reasons? Never Filed at: 02/14/2024 1838                      Medical Decision Making  PT uses [facility], mail order, but can sometimes go to [facility]  I called [facility] to see about rx prices and they checked with [employer] insurance and it was covered, Tegretol $6.50, Keppra $13.40, so both for 1 month supply, under $20.  I discussed this with patient and she was accepting of this and good option to get her medications filled  Will check levels here and give pt her dose of medications.  Checked urine, no Uti here.  Pt given dose of meds here, rx will be ready at [facility] and pt can get filled tomorrow, she said she can go tomorrow and get it, will also given Good Rx card for patient    Amount and/or Complexity of Data Reviewed  External Data Reviewed: labs and notes.     Details: PT has been low in the past.  Recent UA 2/1 + for UTI and pt was not treated, will repeat in ED  Labs: ordered. Decision-making details documented in ED Course.    Risk  Prescription drug management.              Disposition  Final diagnoses:   Seizure disorder (HCC) - Medication non-compliance/medication refill     Time reflects when diagnosis was documented in both MDM as applicable and the Disposition within this note       Time User Action Codes Description Comment    2/14/2024  7:46 PM Marli Lugo Add [G40.909] Seizure disorder (HCC)     2/14/2024  7:46 PM Marli Lugo Modify [G40.909] Seizure disorder (HCC) Medication non-compliance    2/14/2024  7:46 PM Maril Lugo Add [G40.919] Epilepsy with altered consciousness with intractable epilepsy (HCC)     2/14/2024  7:46 PM Marli Lugo Add [Z76.0] Medication refill     2/14/2024  8:07 PM Marli Lugo Modify [G40.909] Seizure disorder (HCC) Medication non-compliance/medication refill          ED Disposition       ED Disposition   Discharge    Condition   Stable    Date/Time   Wed Feb 14, 2024 2007    Comment   Cydney Lim discharge to home/self care.                   Follow-up Information       Follow up With Specialties Details Why Contact Info    [facility], Nurse Practitioner   [address] [place] [address]  [address]  [facility] [address] [phone]              Current Discharge Medication List        CONTINUE these medications which have CHANGED    Details   carBAMazepine (TEGretol) 200 mg tablet Take 1 tablet (200 mg total) by mouth 3 (three) times a day  Qty: 90 tablet, Refills: 0    Associated Diagnoses: Epilepsy with altered consciousness with intractable epilepsy (HCC); Medication refill      levETIRAcetam (KEPPRA) 750 mg tablet Take 2 tablets (1,500 mg total) by mouth 2 (two) times a day  Qty: 120 tablet, Refills: 0    Associated Diagnoses: Medication refill           CONTINUE these medications which have NOT CHANGED    Details   albuterol (2.5 mg/3 mL) 0.083 % nebulizer solution Take 3 mL (2.5 mg total) by nebulization every 6 (six) hours as needed for wheezing or shortness of breath  Qty: 75 mL, Refills: 0    Associated  Diagnoses: Dyspnea      albuterol (Proventil HFA) 90 mcg/act inhaler Inhale 2 puffs every 6 (six) hours as needed for wheezing  Qty: 6.7 g, Refills: 0    Comments: Substitution to a formulary equivalent within the same pharmaceutical class is authorized.  Associated Diagnoses: Chronic obstructive pulmonary disease, unspecified COPD type (HCC)      calcium carbonate (OYSTER SHELL,OSCAL) 500 mg Take 2 tablets by mouth daily with breakfast  Qty: 180 tablet, Refills: 1    Associated Diagnoses: Medical clearance for psychiatric admission      carbamide peroxide (DEBROX) 6.5 % otic solution Administer 5 drops into both ears 2 (two) times a day for 5 doses  Qty: 1.25 mL, Refills: 0    Associated Diagnoses: Sensation of fullness in both ears      cholecalciferol (VITAMIN D3) 1,000 units tablet Take 2 tablets (2,000 Units total) by mouth daily  Qty: 60 tablet, Refills: 1    Associated Diagnoses: Severe episode of recurrent major depressive disorder, with psychotic features (HCC)      escitalopram (LEXAPRO) 10 mg tablet Take 1 tablet (10 mg total) by mouth daily  Qty: 90 tablet, Refills: 1    Associated Diagnoses: Severe episode of recurrent major depressive disorder, with psychotic features (HCC)      fluticasone (FLONASE) 50 mcg/act nasal spray 1 spray into each nostril daily  Qty: 11.1 mL, Refills: 0    Associated Diagnoses: Medical clearance for psychiatric admission      losartan (COZAAR) 25 mg tablet Take 1 tablet (25 mg total) by mouth daily  Qty: 90 tablet, Refills: 0    Associated Diagnoses: Hypokalemia; Essential hypertension; Water retention; Stage 3a chronic kidney disease ([facility]); Acute renal failure superimposed on chronic kidney disease ; Parenchymal renal hypertension, stage 1 through stage 4 or unspecified chronic kidney disease; Anemia due to stage 3a chronic kidney disease ; Acute renal failure with acute tubular necrosis superimposed on stage 3a chronic kidney disease ([facility])      metoprolol succinate  (TOPROL-XL) 50 mg 24 hr tablet Take 1 tablet (50 mg total) by mouth daily  Qty: 90 tablet, Refills: 1    Associated Diagnoses: Essential hypertension      nicotine (NICODERM CQ) 21 mg/24 hr TD 24 hr patch Place 1 patch on the skin over 24 hours daily  Qty: 28 patch, Refills: 1    Associated Diagnoses: Tobacco abuse      pramipexole (MIRAPEX) 1.5 MG tablet Take 1 tablet (1.5 mg total) by mouth daily at bedtime  Qty: 90 tablet, Refills: 0    Associated Diagnoses: Restless leg syndrome      QUEtiapine (SEROquel) 25 mg tablet Take 0.5 tablets (12.5 mg total) by mouth daily at bedtime  Qty: 45 tablet, Refills: 1    Associated Diagnoses: Severe episode of recurrent major depressive disorder, with psychotic features (HCC)      spironolactone (ALDACTONE) 25 mg tablet Take 1 tablet (25 mg total) by mouth daily  Qty: 90 tablet, Refills: 1    Associated Diagnoses: Lymphedema; Essential hypertension             No discharge procedures on file.    PDMP Review         Value Time User    PDMP Reviewed  Yes 7/23/2021 10:52 AM Montana Schmidt MD            ED Provider  Electronically Signed by             Marli Lugo PA-C  02/14/24 2008

## 2024-02-14 NOTE — TELEPHONE ENCOUNTER
02/14/24 9:54 AM    Patient contacted post ED visit, [facility] department spoke with patient/caregiver and outreach was successful.    Thank you.  Anne Dinh  [facility]

## 2024-02-15 ENCOUNTER — VBI (OUTPATIENT)
Dept: FAMILY MEDICINE CLINIC | Facility: CLINIC | Age: 64
End: 2024-02-15

## 2024-02-15 LAB — CARBAMAZEPINE SERPL-MCNC: 6.3 UG/ML (ref 4–12)

## 2024-02-15 NOTE — TELEPHONE ENCOUNTER
02/15/24 3:45 PM    Patient contacted post ED visit, [facility] department spoke with patient/caregiver and outreach was successful.    Thank you.  Deangelo Randhawa  [facility]

## 2024-02-15 NOTE — DISCHARGE INSTRUCTIONS
Your medications were sent to [facility] pharmacy and can be picked up there, both will cost under $20.  You will also be given a Good Rx card to help with prescriptions as needed.    Follow-up with your PCP who can help manage your medications

## 2024-02-17 LAB — LEVETIRACETAM SERPL-MCNC: <2 UG/ML (ref 10–40)

## 2024-03-06 DIAGNOSIS — Z76.0 MEDICATION REFILL: ICD-10-CM

## 2024-03-06 DIAGNOSIS — G25.81 RESTLESS LEG SYNDROME: ICD-10-CM

## 2024-03-06 RX ORDER — PRAMIPEXOLE DIHYDROCHLORIDE 1.5 MG/1
1.5 TABLET ORAL
Qty: 90 TABLET | Refills: 0 | Status: SHIPPED | OUTPATIENT
Start: 2024-03-06 | End: 2024-06-04

## 2024-03-06 RX ORDER — LEVETIRACETAM 750 MG/1
1500 TABLET ORAL 2 TIMES DAILY
Qty: 120 TABLET | Refills: 0 | Status: SHIPPED | OUTPATIENT
Start: 2024-03-06 | End: 2024-04-05

## 2024-03-06 NOTE — TELEPHONE ENCOUNTER
Pt is calling to ask for a refill of her Kepra 750 mg and Mirapex 1.5mg. She got these in the hospital and wants to have her PCP refill them.

## 2024-03-13 ENCOUNTER — APPOINTMENT (EMERGENCY)
Dept: RADIOLOGY | Facility: HOSPITAL | Age: 64
End: 2024-03-13
Payer: MEDICARE

## 2024-03-13 ENCOUNTER — HOSPITAL ENCOUNTER (OUTPATIENT)
Facility: HOSPITAL | Age: 64
Setting detail: OBSERVATION
Discharge: LEFT BEFORE DISCHARGE | End: 2024-03-14
Attending: EMERGENCY MEDICINE | Admitting: INTERNAL MEDICINE
Payer: MEDICARE

## 2024-03-13 ENCOUNTER — APPOINTMENT (EMERGENCY)
Dept: CT IMAGING | Facility: HOSPITAL | Age: 64
End: 2024-03-13
Payer: MEDICARE

## 2024-03-13 DIAGNOSIS — R55 SYNCOPE: ICD-10-CM

## 2024-03-13 DIAGNOSIS — M79.89 LEG SWELLING: Primary | ICD-10-CM

## 2024-03-13 PROBLEM — R06.02 SOB (SHORTNESS OF BREATH): Status: ACTIVE | Noted: 2024-03-13

## 2024-03-13 PROBLEM — I13.0 BENIGN HYPERTENSIVE HEART AND CKD, STAGE 3 (GFR 30-59), W CHF (HCC): Status: ACTIVE | Noted: 2020-10-31

## 2024-03-13 PROBLEM — N18.30 BENIGN HYPERTENSIVE HEART AND CKD, STAGE 3 (GFR 30-59), W CHF (HCC): Status: ACTIVE | Noted: 2020-10-31

## 2024-03-13 LAB
2HR DELTA HS TROPONIN: -1 NG/L
ALBUMIN SERPL BCP-MCNC: 3.7 G/DL (ref 3.5–5)
ALP SERPL-CCNC: 103 U/L (ref 34–104)
ALT SERPL W P-5'-P-CCNC: 11 U/L (ref 7–52)
ANION GAP SERPL CALCULATED.3IONS-SCNC: 8 MMOL/L (ref 4–13)
AST SERPL W P-5'-P-CCNC: 16 U/L (ref 13–39)
ATRIAL RATE: 96 BPM
BASOPHILS # BLD AUTO: 0.05 THOUSANDS/ÂΜL (ref 0–0.1)
BASOPHILS NFR BLD AUTO: 0 % (ref 0–1)
BILIRUB SERPL-MCNC: 0.23 MG/DL (ref 0.2–1)
BNP SERPL-MCNC: 21 PG/ML (ref 0–100)
BUN SERPL-MCNC: 32 MG/DL (ref 5–25)
CALCIUM SERPL-MCNC: 8.8 MG/DL (ref 8.4–10.2)
CARBAMAZEPINE SERPL-MCNC: 4.2 UG/ML (ref 4–12)
CARDIAC TROPONIN I PNL SERPL HS: 3 NG/L
CARDIAC TROPONIN I PNL SERPL HS: 4 NG/L
CHLORIDE SERPL-SCNC: 108 MMOL/L (ref 96–108)
CK SERPL-CCNC: 109 U/L (ref 26–192)
CO2 SERPL-SCNC: 21 MMOL/L (ref 21–32)
CREAT SERPL-MCNC: 1.08 MG/DL (ref 0.6–1.3)
D DIMER PPP FEU-MCNC: 1.38 UG/ML FEU
EOSINOPHIL # BLD AUTO: 0.18 THOUSAND/ÂΜL (ref 0–0.61)
EOSINOPHIL NFR BLD AUTO: 2 % (ref 0–6)
ERYTHROCYTE [DISTWIDTH] IN BLOOD BY AUTOMATED COUNT: 13.9 % (ref 11.6–15.1)
FLUAV RNA RESP QL NAA+PROBE: NEGATIVE
FLUBV RNA RESP QL NAA+PROBE: NEGATIVE
GFR SERPL CREATININE-BSD FRML MDRD: 53 ML/MIN/1.73SQ M
GLUCOSE SERPL-MCNC: 95 MG/DL (ref 65–140)
HCT VFR BLD AUTO: 39.5 % (ref 34.8–46.1)
HGB BLD-MCNC: 12.2 G/DL (ref 11.5–15.4)
IMM GRANULOCYTES # BLD AUTO: 0.05 THOUSAND/UL (ref 0–0.2)
IMM GRANULOCYTES NFR BLD AUTO: 0 % (ref 0–2)
LYMPHOCYTES # BLD AUTO: 2.64 THOUSANDS/ÂΜL (ref 0.6–4.47)
LYMPHOCYTES NFR BLD AUTO: 24 % (ref 14–44)
MCH RBC QN AUTO: 30.3 PG (ref 26.8–34.3)
MCHC RBC AUTO-ENTMCNC: 30.9 G/DL (ref 31.4–37.4)
MCV RBC AUTO: 98 FL (ref 82–98)
MONOCYTES # BLD AUTO: 0.72 THOUSAND/ÂΜL (ref 0.17–1.22)
MONOCYTES NFR BLD AUTO: 7 % (ref 4–12)
NEUTROPHILS # BLD AUTO: 7.5 THOUSANDS/ÂΜL (ref 1.85–7.62)
NEUTS SEG NFR BLD AUTO: 67 % (ref 43–75)
NRBC BLD AUTO-RTO: 0 /100 WBCS
P AXIS: 13 DEGREES
PLATELET # BLD AUTO: 329 THOUSANDS/UL (ref 149–390)
PMV BLD AUTO: 10.7 FL (ref 8.9–12.7)
POTASSIUM SERPL-SCNC: 4.4 MMOL/L (ref 3.5–5.3)
PR INTERVAL: 120 MS
PROT SERPL-MCNC: 7.5 G/DL (ref 6.4–8.4)
QRS AXIS: 38 DEGREES
QRSD INTERVAL: 70 MS
QT INTERVAL: 340 MS
QTC INTERVAL: 429 MS
RBC # BLD AUTO: 4.03 MILLION/UL (ref 3.81–5.12)
RSV RNA RESP QL NAA+PROBE: NEGATIVE
SARS-COV-2 RNA RESP QL NAA+PROBE: NEGATIVE
SODIUM SERPL-SCNC: 137 MMOL/L (ref 135–147)
T WAVE AXIS: 25 DEGREES
VENTRICULAR RATE: 96 BPM
WBC # BLD AUTO: 11.14 THOUSAND/UL (ref 4.31–10.16)

## 2024-03-13 PROCEDURE — 71275 CT ANGIOGRAPHY CHEST: CPT

## 2024-03-13 PROCEDURE — 80053 COMPREHEN METABOLIC PANEL: CPT | Performed by: EMERGENCY MEDICINE

## 2024-03-13 PROCEDURE — 99222 1ST HOSP IP/OBS MODERATE 55: CPT | Performed by: INTERNAL MEDICINE

## 2024-03-13 PROCEDURE — 99285 EMERGENCY DEPT VISIT HI MDM: CPT | Performed by: EMERGENCY MEDICINE

## 2024-03-13 PROCEDURE — 85379 FIBRIN DEGRADATION QUANT: CPT | Performed by: EMERGENCY MEDICINE

## 2024-03-13 PROCEDURE — 85025 COMPLETE CBC W/AUTO DIFF WBC: CPT | Performed by: EMERGENCY MEDICINE

## 2024-03-13 PROCEDURE — 99284 EMERGENCY DEPT VISIT MOD MDM: CPT

## 2024-03-13 PROCEDURE — 36415 COLL VENOUS BLD VENIPUNCTURE: CPT

## 2024-03-13 PROCEDURE — 83880 ASSAY OF NATRIURETIC PEPTIDE: CPT | Performed by: EMERGENCY MEDICINE

## 2024-03-13 PROCEDURE — 93005 ELECTROCARDIOGRAM TRACING: CPT

## 2024-03-13 PROCEDURE — 0241U HB NFCT DS VIR RESP RNA 4 TRGT: CPT

## 2024-03-13 PROCEDURE — 82550 ASSAY OF CK (CPK): CPT | Performed by: EMERGENCY MEDICINE

## 2024-03-13 PROCEDURE — 80156 ASSAY CARBAMAZEPINE TOTAL: CPT | Performed by: EMERGENCY MEDICINE

## 2024-03-13 PROCEDURE — 84484 ASSAY OF TROPONIN QUANT: CPT | Performed by: EMERGENCY MEDICINE

## 2024-03-13 PROCEDURE — 93010 ELECTROCARDIOGRAM REPORT: CPT | Performed by: INTERNAL MEDICINE

## 2024-03-13 PROCEDURE — 80177 DRUG SCRN QUAN LEVETIRACETAM: CPT | Performed by: EMERGENCY MEDICINE

## 2024-03-13 PROCEDURE — 71046 X-RAY EXAM CHEST 2 VIEWS: CPT

## 2024-03-13 RX ORDER — ONDANSETRON 2 MG/ML
4 INJECTION INTRAMUSCULAR; INTRAVENOUS EVERY 6 HOURS PRN
Status: DISCONTINUED | OUTPATIENT
Start: 2024-03-13 | End: 2024-03-14 | Stop reason: HOSPADM

## 2024-03-13 RX ORDER — FUROSEMIDE 10 MG/ML
40 INJECTION INTRAMUSCULAR; INTRAVENOUS ONCE
Status: COMPLETED | OUTPATIENT
Start: 2024-03-13 | End: 2024-03-13

## 2024-03-13 RX ORDER — LEVETIRACETAM 500 MG/1
1500 TABLET ORAL 2 TIMES DAILY
Status: DISCONTINUED | OUTPATIENT
Start: 2024-03-13 | End: 2024-03-14 | Stop reason: HOSPADM

## 2024-03-13 RX ORDER — ALBUTEROL SULFATE 2.5 MG/3ML
2.5 SOLUTION RESPIRATORY (INHALATION) EVERY 6 HOURS PRN
Status: DISCONTINUED | OUTPATIENT
Start: 2024-03-13 | End: 2024-03-14 | Stop reason: HOSPADM

## 2024-03-13 RX ORDER — NICOTINE 21 MG/24HR
1 PATCH, TRANSDERMAL 24 HOURS TRANSDERMAL DAILY
Status: DISCONTINUED | OUTPATIENT
Start: 2024-03-14 | End: 2024-03-14 | Stop reason: HOSPADM

## 2024-03-13 RX ORDER — SPIRONOLACTONE 25 MG/1
25 TABLET ORAL DAILY
Status: DISCONTINUED | OUTPATIENT
Start: 2024-03-14 | End: 2024-03-14 | Stop reason: HOSPADM

## 2024-03-13 RX ORDER — CARBAMAZEPINE 200 MG/1
200 TABLET ORAL 3 TIMES DAILY
Status: DISCONTINUED | OUTPATIENT
Start: 2024-03-13 | End: 2024-03-14 | Stop reason: HOSPADM

## 2024-03-13 RX ORDER — METOPROLOL SUCCINATE 50 MG/1
50 TABLET, EXTENDED RELEASE ORAL DAILY
Status: DISCONTINUED | OUTPATIENT
Start: 2024-03-14 | End: 2024-03-14 | Stop reason: HOSPADM

## 2024-03-13 RX ORDER — ENOXAPARIN SODIUM 100 MG/ML
40 INJECTION SUBCUTANEOUS DAILY
Status: DISCONTINUED | OUTPATIENT
Start: 2024-03-14 | End: 2024-03-14 | Stop reason: HOSPADM

## 2024-03-13 RX ORDER — NICOTINE 21 MG/24HR
1 PATCH, TRANSDERMAL 24 HOURS TRANSDERMAL DAILY
Status: DISCONTINUED | OUTPATIENT
Start: 2024-03-14 | End: 2024-03-13

## 2024-03-13 RX ORDER — LOSARTAN POTASSIUM 25 MG/1
25 TABLET ORAL DAILY
Status: DISCONTINUED | OUTPATIENT
Start: 2024-03-14 | End: 2024-03-14 | Stop reason: HOSPADM

## 2024-03-13 RX ORDER — PRAMIPEXOLE DIHYDROCHLORIDE 0.5 MG/1
1.5 TABLET ORAL
Status: DISCONTINUED | OUTPATIENT
Start: 2024-03-13 | End: 2024-03-14 | Stop reason: HOSPADM

## 2024-03-13 RX ORDER — ESCITALOPRAM OXALATE 10 MG/1
10 TABLET ORAL DAILY
Status: DISCONTINUED | OUTPATIENT
Start: 2024-03-14 | End: 2024-03-14 | Stop reason: HOSPADM

## 2024-03-13 RX ORDER — ACETAMINOPHEN 325 MG/1
650 TABLET ORAL EVERY 6 HOURS PRN
Status: DISCONTINUED | OUTPATIENT
Start: 2024-03-13 | End: 2024-03-14 | Stop reason: HOSPADM

## 2024-03-13 RX ADMIN — CARBAMAZEPINE 200 MG: 200 TABLET ORAL at 21:56

## 2024-03-13 RX ADMIN — FUROSEMIDE 40 MG: 10 INJECTION, SOLUTION INTRAMUSCULAR; INTRAVENOUS at 19:40

## 2024-03-13 RX ADMIN — LEVETIRACETAM 1500 MG: 500 TABLET, FILM COATED ORAL at 21:56

## 2024-03-13 RX ADMIN — IOHEXOL 120 ML: 350 INJECTION, SOLUTION INTRAVENOUS at 15:16

## 2024-03-13 RX ADMIN — PRAMIPEXOLE DIHYDROCHLORIDE 1.5 MG: 0.5 TABLET ORAL at 21:56

## 2024-03-13 NOTE — ED PROVIDER NOTES
History  Chief Complaint   Patient presents with    Leg Swelling     Pt presents with bilateral leg swelling x2 days. States she was seen yesterday and admitted when she left AMA. Denies CP. +SOB     This is a 63-year-old female with a relevant past medical history of seizure disorder, CKD stage III, presenting to the ED today for complaint of leg swelling.  Her legs have been swollen for the past few weeks, and have been progressively worsening.  She has associated shortness of breath.  She has not had any chest pain pressure or discomfort.  She does complain of some recent bouts of loss of consciousness.  She states that over the past couple of weeks she has had almost daily if not multiple daily episodes of syncope.  She will get dizzy prior to that, but otherwise does not have a prodrome.  She denies any chest pain pressure discomfort, cough or congestion, fever chills or sweats, or any other significantly associated symptoms.  She was recently hospitalized at [facility], out of concern for potential poorly controlled epilepsy, causing her episodes where she loses consciousness.  Patient left AMA.  Since her stay at [facility], patient has had multiple episodes yesterday, however she has not had any syncopal episodes today.        Prior to Admission Medications   Prescriptions Last Dose Informant Patient Reported? Taking?   QUEtiapine (SEROquel) 25 mg tablet   No No   Sig: Take 0.5 tablets (12.5 mg total) by mouth daily at bedtime   Patient not taking: Reported on 8/10/2023   albuterol (2.5 mg/3 mL) 0.083 % nebulizer solution   No No   Sig: Take 3 mL (2.5 mg total) by nebulization every 6 (six) hours as needed for wheezing or shortness of breath   albuterol (Proventil HFA) 90 mcg/act inhaler   No No   Sig: Inhale 2 puffs every 6 (six) hours as needed for wheezing   calcium carbonate (OYSTER SHELL,OSCAL) 500 mg   No No   Sig: Take 2 tablets by mouth daily with breakfast   carBAMazepine (TEGretol)  200 mg tablet   No No   Sig: Take 1 tablet (200 mg total) by mouth 3 (three) times a day   carbamide peroxide (DEBROX) 6.5 % otic solution   No No   Sig: Administer 5 drops into both ears 2 (two) times a day for 5 doses   Patient not taking: Reported on 2023   cholecalciferol (VITAMIN D3) 1,000 units tablet   No No   Sig: Take 2 tablets (2,000 Units total) by mouth daily   escitalopram (LEXAPRO) 10 mg tablet   No No   Sig: Take 1 tablet (10 mg total) by mouth daily   fluticasone (FLONASE) 50 mcg/act nasal spray  Self No No   Si spray into each nostril daily   levETIRAcetam (KEPPRA) 750 mg tablet   No No   Sig: Take 2 tablets (1,500 mg total) by mouth 2 (two) times a day   losartan (COZAAR) 25 mg tablet  Self No No   Sig: Take 1 tablet (25 mg total) by mouth daily   metoprolol succinate (TOPROL-XL) 50 mg 24 hr tablet   No No   Sig: Take 1 tablet (50 mg total) by mouth daily   nicotine (NICODERM CQ) 21 mg/24 hr TD 24 hr patch  Self No No   Sig: Place 1 patch on the skin over 24 hours daily   pramipexole (MIRAPEX) 1.5 MG tablet   No No   Sig: Take 1 tablet (1.5 mg total) by mouth daily at bedtime   spironolactone (ALDACTONE) 25 mg tablet   No No   Sig: Take 1 tablet (25 mg total) by mouth daily      Facility-Administered Medications Last Administration Doses Remaining   cyanocobalamin injection 1,000 mcg 2023  4:59 PM           Past Medical History:   Diagnosis Date    Depression     EP (epilepsy) (HCC)     Epilepsy (HCC)     Obesity     Restless leg        Past Surgical History:   Procedure Laterality Date    DENTAL SURGERY      all teeth removed    LASER ABLATION OF THE CERVIX      MAMMO (HISTORICAL)  2017       Family History   Problem Relation Age of Onset    Kidney disease Mother     Liver disease Mother     Heart attack Mother     Heart attack Father     No Known Problems Brother     No Known Problems Son      I have reviewed and agree with the history as documented.    E-Cigarette/Vaping     E-Cigarette Use Never User      E-Cigarette/Vaping Substances    Nicotine Yes     THC No     CBD No     Flavoring No     Other No     Unknown No      Social History     Tobacco Use    Smoking status: Every Day     Current packs/day: 0.50     Average packs/day: 0.5 packs/day for 23.0 years (11.5 ttl pk-yrs)     Types: Cigarettes    Smokeless tobacco: Never   Vaping Use    Vaping status: Never Used   Substance Use Topics    Alcohol use: Never     Comment: pt denies alcohol use    Drug use: Never       Review of Systems   Constitutional:  Negative for chills and fever.   HENT:  Negative for ear pain and sore throat.    Eyes:  Negative for pain and visual disturbance.   Respiratory:  Positive for shortness of breath. Negative for cough.    Cardiovascular:  Positive for leg swelling. Negative for chest pain and palpitations.   Gastrointestinal:  Negative for abdominal pain and vomiting.   Genitourinary:  Negative for dysuria and hematuria.   Musculoskeletal:  Negative for arthralgias and back pain.   Skin:  Negative for color change and rash.   Neurological:  Positive for syncope. Negative for seizures.   All other systems reviewed and are negative.      Physical Exam  Physical Exam  Vitals and nursing note reviewed.   Constitutional:       General: She is not in acute distress.     Appearance: Normal appearance. She is obese. She is not ill-appearing or toxic-appearing.   HENT:      Head: Normocephalic and atraumatic.      Right Ear: External ear normal.      Left Ear: External ear normal.      Nose: Nose normal. No congestion or rhinorrhea.      Mouth/Throat:      Mouth: Mucous membranes are moist.      Pharynx: Oropharynx is clear. No oropharyngeal exudate.   Eyes:      General: No scleral icterus.     Conjunctiva/sclera: Conjunctivae normal.   Cardiovascular:      Rate and Rhythm: Normal rate and regular rhythm.      Pulses: Normal pulses.      Heart sounds: Normal heart sounds. No murmur heard.     No gallop.    Pulmonary:      Effort: Pulmonary effort is normal. No respiratory distress.      Breath sounds: Normal breath sounds. No stridor. No wheezing or rhonchi.   Abdominal:      General: Abdomen is flat. Bowel sounds are normal. There is no distension.      Palpations: Abdomen is soft. There is no mass.      Tenderness: There is no abdominal tenderness.   Musculoskeletal:         General: No swelling or tenderness. Normal range of motion.      Cervical back: Normal range of motion and neck supple. No tenderness.      Right lower leg: Edema present.      Left lower leg: Edema (2+ nonpitting bilateral lower extremities pretibial) present.   Skin:     General: Skin is warm and dry.      Capillary Refill: Capillary refill takes less than 2 seconds.      Coloration: Skin is not jaundiced.      Findings: No bruising.   Neurological:      General: No focal deficit present.      Mental Status: She is alert and oriented to person, place, and time. Mental status is at baseline.      Sensory: No sensory deficit.      Motor: No weakness.   Psychiatric:         Mood and Affect: Mood normal.         Behavior: Behavior normal.         Thought Content: Thought content normal.         Judgment: Judgment normal.         Vital Signs  ED Triage Vitals [03/13/24 1258]   Temperature Pulse Respirations Blood Pressure SpO2   97.5 °F (36.4 °C) 94 18 (!) 172/80 95 %      Temp src Heart Rate Source Patient Position - Orthostatic VS BP Location FiO2 (%)   -- Monitor Sitting Left arm --      Pain Score       --           Vitals:    03/13/24 1258 03/13/24 1600   BP: (!) 172/80    Pulse: 94 82   Patient Position - Orthostatic VS: Sitting          Visual Acuity      ED Medications  Medications   iohexol (OMNIPAQUE) 350 MG/ML injection (MULTI-DOSE) 60 mL (120 mL Intravenous Given 3/13/24 1516)       Diagnostic Studies  Results Reviewed       Procedure Component Value Units Date/Time    HS Troponin I 2hr [[id number]]  (Normal) Collected: 03/13/24 1430     Lab Status: Final result Specimen: Blood from Arm, Left Updated: 03/13/24 1541     hs TnI 2hr 3 ng/L      Delta 2hr hsTnI -1 ng/L     B-Type Natriuretic Peptide(BNP) [[id number]]  (Normal) Collected: 03/13/24 1430    Lab Status: Final result Specimen: Blood from Arm, Left Updated: 03/13/24 1540     BNP 21 pg/mL     Carbamazepine level, total [[id number]]  (Normal) Collected: 03/13/24 1430    Lab Status: Final result Specimen: Blood from Arm, Left Updated: 03/13/24 1516     Carbamazepine Lvl 4.2 ug/mL     CK [[id number]]  (Normal) Collected: 03/13/24 1430    Lab Status: Final result Specimen: Blood from Arm, Left Updated: 03/13/24 1512     Total  U/L     HS Troponin I 4hr [[id number]]     Lab Status: No result Specimen: Blood     D-Dimer [[id number]]  (Abnormal) Collected: 03/13/24 1430    Lab Status: Final result Specimen: Blood from Arm, Left Updated: 03/13/24 1459     D-Dimer, Quant 1.38 ug/ml FEU     Narrative:      In the evaluation for possible pulmonary embolism, in the appropriate (Well's Score of 4 or less) patient, the age adjusted d-dimer cutoff for this patient can be calculated as:    Age x 0.01 (in ug/mL) for Age-adjusted D-dimer exclusion threshold for a patient over 50 years.    Levetiracetam level [[id number]] Collected: 03/13/24 1430    Lab Status: In process Specimen: Blood from Arm, Left Updated: 03/13/24 1436    Comprehensive metabolic panel [[id number]]  (Abnormal) Collected: 03/13/24 1305    Lab Status: Final result Specimen: Blood from Arm, Right Updated: 03/13/24 1346     Sodium 137 mmol/L      Potassium 4.4 mmol/L      Chloride 108 mmol/L      CO2 21 mmol/L      ANION GAP 8 mmol/L      BUN 32 mg/dL      Creatinine 1.08 mg/dL      Glucose 95 mg/dL      Calcium 8.8 mg/dL      AST 16 U/L      ALT 11 U/L      Alkaline Phosphatase 103 U/L      Total Protein 7.5 g/dL      Albumin 3.7 g/dL      Total Bilirubin 0.23 mg/dL      eGFR 53 ml/min/1.73sq m     Narrative:      National Kidney Disease  Foundation guidelines for Chronic Kidney Disease (CKD):     Stage 1 with normal or high GFR (GFR > 90 mL/min/1.73 square meters)    Stage 2 Mild CKD (GFR = 60-89 mL/min/1.73 square meters)    Stage 3A Moderate CKD (GFR = 45-59 mL/min/1.73 square meters)    Stage 3B Moderate CKD (GFR = 30-44 mL/min/1.73 square meters)    Stage 4 Severe CKD (GFR = 15-29 mL/min/1.73 square meters)    Stage 5 End Stage CKD (GFR <15 mL/min/1.73 square meters)  Note: GFR calculation is accurate only with a steady state creatinine    HS Troponin 0hr (reflex protocol) [[id number]]  (Normal) Collected: 03/13/24 1305    Lab Status: Final result Specimen: Blood from Arm, Right Updated: 03/13/24 1346     hs TnI 0hr 4 ng/L     CBC and differential [[id number]]  (Abnormal) Collected: 03/13/24 1305    Lab Status: Final result Specimen: Blood from Arm, Right Updated: 03/13/24 1317     WBC 11.14 Thousand/uL      RBC 4.03 Million/uL      Hemoglobin 12.2 g/dL      Hematocrit 39.5 %      MCV 98 fL      MCH 30.3 pg      MCHC 30.9 g/dL      RDW 13.9 %      MPV 10.7 fL      Platelets 329 Thousands/uL      nRBC 0 /100 WBCs      Neutrophils Relative 67 %      Immature Grans % 0 %      Lymphocytes Relative 24 %      Monocytes Relative 7 %      Eosinophils Relative 2 %      Basophils Relative 0 %      Neutrophils Absolute 7.50 Thousands/µL      Absolute Immature Grans 0.05 Thousand/uL      Absolute Lymphocytes 2.64 Thousands/µL      Absolute Monocytes 0.72 Thousand/µL      Eosinophils Absolute 0.18 Thousand/µL      Basophils Absolute 0.05 Thousands/µL                    CTA ED chest PE study   Final Result by Yue Madrigal MD (03/13 1630)      No evidence for pulmonary embolism.      Heterogeneity of the spleen with at least one defined nodule at the splenic dome measuring 1.6 cm. Recommend correlation with abdominal MRI with gadolinium on a nonemergent basis.      1.6 cm indeterminate hypoattenuating lesion in the included left kidney, new from 2021. This can  also be evaluated on abdominal MRI.      Small hiatal hernia.      The study was marked in EPIC for significant notification.                  Workstation performed: [id number]         XR chest 2 views   ED Interpretation by Margaret Carty MD (03/13 1414)   Increased interstitial markings, primarily in the right lower area, consistent with potential for fluid overload                 Procedures  Procedures         ED Course                                             Medical Decision Making  This is a 63-year-old female presenting to the ED today for complaint of leg swelling.  Her symptoms have been present for the past 2 weeks.  She also has had multiple syncopal episodes over that time period as well.  She was being investigated for refractory seizures, when she decided to leave [place] from [facility].  Upon arrival to the ED here she complains of the bilateral lower extremity swelling and states that she has had multiple episodes of syncope since her departing [facility].  Patient otherwise denies any significantly associated symptoms aside from some shortness of breath which has been present during the same time period she has had the peripheral edema.  Her exam is remarkable for bilateral peripheral edema, 2+ pretibial, nonpitting.  Her differential diagnosis includes: Cardiac syncope versus neurogenic syncope versus electrolyte abnormality versus new onset CHF versus other.  Patient had a CBC, metabolic panel, troponin which was for the most part unremarkable, similar to her baseline aside from a slight leukocytosis.  Her D-dimer was elevated, and for that reason CTA was ordered.  CTA was negative for any acute PE, however she did have some incidental findings which I spoke with her about.  Due to her multiple syncopal episodes, of uncertain etiology, I did request for hospitalist to hospitalize her on their service and they will excepted without any further orders requested.    Amount  and/or Complexity of Data Reviewed  Labs: ordered.  Radiology: ordered and independent interpretation performed.    Risk  Prescription drug management.  Decision regarding hospitalization.             Disposition  Final diagnoses:   Leg swelling   Syncope     Time reflects when diagnosis was documented in both MDM as applicable and the Disposition within this note       Time User Action Codes Description Comment    3/13/2024  4:47 PM Margaret Carty [M79.89] Leg swelling     3/13/2024  4:47 PM Maragret Carty [[id number]] Syncope           ED Disposition       ED Disposition   Admit    Condition   Stable    Date/Time   Wed Mar 13, 2024  3:03 PM    Comment                  Follow-up Information    None         Patient's Medications   Discharge Prescriptions    No medications on file       No discharge procedures on file.    PDMP Review         Value Time User    PDMP Reviewed  Yes 7/23/2021 10:52 AM Montana Schmidt MD            ED Provider  Electronically Signed by             Margaret Carty MD  03/13/24 7932

## 2024-03-13 NOTE — ASSESSMENT & PLAN NOTE
Presents with increased shortness of breath and lower extremity edema      Cardiac history: HPN .   No prev echo  Lab Results   Component Value Date    NTBNP 157 (H) 12/02/2018         Recent Labs     03/11/24  2110 03/12/24  0159 03/13/24  1305   K 4.2 4.5 4.4   MG  --  2.0  --        CT abdomen pelvis unremarkable, no PE.  X-ray clear.  BNP negative.  No signs and symptoms of infection  Could possibly be in the deconditioning. Rule out covid. Will get echo for possible CHF. Possibly has undiagnosed COPD. No previous PFTs, in setting of long time smoker    Plan:  Continue Losartan 25 mg od, Aldos. Blocker: Spironolactone 25 mg od  Continue to monitor  PT OT  Dose of lasix iv 40 mg, reassess tomorrow  Urinary retension protocol  I and o, daily weight  Covid flu RSV  Fluid restrict  echo

## 2024-03-13 NOTE — ASSESSMENT & PLAN NOTE
Lab Results   Component Value Date    EGFR 53 03/13/2024    EGFR 46 (L) 03/12/2024    EGFR 40 (L) 03/11/2024    CREATININE 1.08 03/13/2024    CREATININE 1.28 (H) 03/12/2024    CREATININE 1.45 (H) 03/11/2024   Continue to monitor  Baseline 1-1.2

## 2024-03-13 NOTE — ASSESSMENT & PLAN NOTE
Presents with increased shortness of breath and lower extremity edema      Cardiac history: HPN .   No prev echo  Lab Results   Component Value Date    NTBNP 157 (H) 12/02/2018         Recent Labs     03/11/24  2110 03/12/24  0159 03/13/24  1305   K 4.2 4.5 4.4   MG  --  2.0  --      CT abdomen pelvis unremarkable, no PE.  X-ray clear.  BNP negative.  No signs and symptoms of infection  Could possibly be in the deconditioning    Plan:  Continue Losartan 25 mg od, Aldos. Blocker: Spironolactone 25 mg od  Continue to monitor  PT OT  Dose of lasix iv 40 mg, reassess tomorrow  Urinary retension protocol  I and o, daily weight  Covid flu RSV  Fluid restrict  echo

## 2024-03-13 NOTE — ASSESSMENT & PLAN NOTE
Patient has recent history of increased syncopal events.  Noted to have had 5 and was recently admitted on 3/11/2024 in [facility] and left AMA.  During that time it was suspected that patient might have had seizures.  Fortunately she had left AMA because she was not happy with the care she was getting over there.  She denies any jerking movements and bowel or bladder incontinence.  Friend was able to observe her symptoms  No particular precipitating movements  Need to rule out cardiogenic causes versus seizures versus orthostatic hypotension  Plan:  Telemetry  Orthostatic blood pressure  Echo  Neurology consult for possible seizure, may need spot eeg

## 2024-03-13 NOTE — ASSESSMENT & PLAN NOTE
Patient was recently admitted on 3/11/2024 and left AMA.  Patient presented during that time for 5 syncopal events with no known trigger.  There was consideration that patient was having breakthrough seizures given noncompliance to antiepileptics.   had epilepsy since she was a toddler after having a bout of measles.  Home meds of Keppra 1500 mg twice daily  On carbamazepine 200 mg 3 times daily

## 2024-03-13 NOTE — ASSESSMENT & PLAN NOTE
Patient has recent history of increased syncopal events.  Noted to have had 5 and was recently admitted on 3/11/2024 in [facility] and left AMA.  During that time it was suspected that patient might have had seizures.  Fortunately she had left AMA because she was not happy with the care she was getting over there.  She denies any jerking movements and bowel or bladder incontinence.  Friend was able to observe her symptoms  No particular precipitating movements  Need to rule out cardiogenic causes versus seizures versus orthostatic hypotension  Plan:  Telemetry  Orthostatic blood pressure  Echo  Neurology consult for possible seizure, may need spot eeg

## 2024-03-13 NOTE — H&P
formerly [facility]  H&P  Name: Cydney Lim 63 y.o. female I MRN: [id number]  Unit/Bed#: ED-25 I Date of Admission: 3/13/2024   Date of Service: 3/13/2024 I Hospital Day: 0      Assessment/Plan   * SOB (shortness of breath)  Assessment & Plan  Presents with increased shortness of breath and lower extremity edema      Cardiac history: HPN .   No prev echo  Lab Results   Component Value Date    NTBNP 157 (H) 12/02/2018         Recent Labs     03/11/24  2110 03/12/24  0159 03/13/24  1305   K 4.2 4.5 4.4   MG  --  2.0  --        CT abdomen pelvis unremarkable, no PE.  X-ray clear.  BNP negative.  No signs and symptoms of infection  Could possibly be in the deconditioning. Rule out covid. Will get echo for possible CHF. Possibly has undiagnosed COPD. No previous PFTs, in setting of long time smoker    Plan:  Continue Losartan 25 mg od, Aldos. Blocker: Spironolactone 25 mg od  Continue to monitor  PT OT  Dose of lasix iv 40 mg, reassess tomorrow  Urinary retension protocol  I and o, daily weight  Covid flu RSV  Fluid restrict  echo      Syncope  Assessment & Plan  Patient has recent history of increased syncopal events.  Noted to have had 5 and was recently admitted on 3/11/2024 in [facility] and left [place].  During that time it was suspected that patient might have had seizures.  Fortunately she had left AMA because she was not happy with the care she was getting over there.  She denies any jerking movements and bowel or bladder incontinence.  Friend was able to observe her symptoms  No particular precipitating movements  Need to rule out cardiogenic causes versus seizures versus orthostatic hypotension  Plan:  Telemetry  Orthostatic blood pressure  Echo  Neurology consult for possible seizure, may need spot eeg      Severe episode of recurrent major depressive disorder, with psychotic features (HCC)  Assessment & Plan  Continue escitalopram    Essential hypertension  Assessment &  Plan  Metoprolol and losartan and spironolactone    Benign hypertensive heart and CKD, stage 3 (GFR 30-59), w CHF ([facility])  Assessment & Plan  Lab Results   Component Value Date    EGFR 53 03/13/2024    EGFR 46 (L) 03/12/2024    EGFR 40 (L) 03/11/2024    CREATININE 1.08 03/13/2024    CREATININE 1.28 (H) 03/12/2024    CREATININE 1.45 (H) 03/11/2024   Continue to monitor  Baseline 1-1.2      Morbidly obese ([facility])  Assessment & Plan  BMI 45      Epilepsy with altered consciousness with intractable epilepsy ([facility])  Assessment & Plan  Patient was recently admitted on 3/11/2024 and left AMA.  Patient presented during that time for 5 syncopal events with no known trigger.  There was consideration that patient was having breakthrough seizures given noncompliance to antiepileptics.   had epilepsy since she was a toddler after having a bout of measles.  Home meds of Keppra 1500 mg twice daily  On carbamazepine 200 mg 3 times daily           VTE Pharmacologic Prophylaxis: VTE Score: 6 High Risk (Score >/= 5) - Pharmacological DVT Prophylaxis Ordered: enoxaparin (Lovenox). Sequential Compression Devices Ordered.  Code Status: Level 1 full code  Discussion with family: Patient declined call to .     Anticipated Length of Stay: Patient will be admitted on an observation basis with an anticipated length of stay of less than 2 midnights secondary to SOB.    Chief Complaint: None    History of Present Illness:  Cydney Lim is aPatient is a 63-year-old female with past medical history of restless leg syndrome, COPD, lymphedema on spironolactone, hypertension, MDD, CKD stage III, hypertension coming in for bilateral leg swelling.    Patient was recently admitted on 3/11/2024 and left AMA.  Patient presented during that time for 5 syncopal events with no known trigger.  There was consideration that patient was having breakthrough seizures given noncompliance to antiepileptics.  Was deemed to have medical capacity.  At that  time patient also had an BETH with creatinine of 1.45.  Was treated with IV fluids.  During that time notes say that the friend witnessed the event and denies any jerking or loss of bowel or bladder incontinence.    Of note patient has had epilepsy since she was a toddler after having a bout of measles.  Home meds of Keppra 1500 mg twice daily.  Patient was previously on 2000 mg twice a day and was decreased to 1 year prior according to her.    She presents today for worsening lower leg edema and shortness of breath the past 2 weeks.  Patient denies any fevers, flulike symptoms.  Appetite has been good.  She is a smoker currently is smoking 1/2 pack/day.  Previously smoked around 1 pack/day.  Has 31-year smoking history  WBC marginally elevated at 11.14, D-dimer elevated 1.38 troponins negative.  CMP and CBC largely unremarkable otherwise    Ct scan shows: Heterogeneity of the spleen with at least one defined nodule at the splenic dome measuring 1.6 cm. Recommend correlation with abdominal MRI with gadolinium on a nonemergent basis.     1.6 cm indeterminate hypoattenuating lesion in the included left kidney, new from 2021. This can also be evaluated on abdominal MRI.    Medication was reviewed with the patient  Review of Systems:  Review of Systems   Constitutional:  Negative for chills and fever.   HENT:  Negative for ear pain and sore throat.    Eyes:  Negative for pain and visual disturbance.   Respiratory:  Positive for cough and shortness of breath.    Cardiovascular:  Negative for chest pain and palpitations.   Gastrointestinal:  Negative for abdominal pain and vomiting.   Genitourinary:  Negative for dysuria and hematuria.   Musculoskeletal:  Negative for arthralgias and back pain.   Skin:  Negative for color change and rash.   Neurological:  Positive for syncope. Negative for seizures.   All other systems reviewed and are negative.      Past Medical and Surgical History:   Past Medical History:   Diagnosis  Date    Depression     EP (epilepsy) (HCC)     Epilepsy (HCC)     Obesity     Restless leg        Past Surgical History:   Procedure Laterality Date    DENTAL SURGERY      all teeth removed    LASER ABLATION OF THE CERVIX      MAMMO (HISTORICAL)  05/01/2017       Meds/Allergies:  Prior to Admission medications    Medication Sig Start Date End Date Taking? Authorizing Provider   albuterol (2.5 mg/3 mL) 0.083 % nebulizer solution Take 3 mL (2.5 mg total) by nebulization every 6 (six) hours as needed for wheezing or shortness of breath 2/11/24   Andrzej Kaba MD   albuterol (Proventil HFA) 90 mcg/act inhaler Inhale 2 puffs every 6 (six) hours as needed for wheezing 2/11/24   Andrzej Kaba MD   calcium carbonate (OYSTER SHELL,OSCAL) 500 mg Take 2 tablets by mouth daily with breakfast 7/20/23 12/22/23  DRU Stapleton   carBAMazepine (TEGretol) 200 mg tablet Take 1 tablet (200 mg total) by mouth 3 (three) times a day 2/14/24 3/15/24  Marli Lugo PA-C   carbamide peroxide (DEBROX) 6.5 % otic solution Administer 5 drops into both ears 2 (two) times a day for 5 doses  Patient not taking: Reported on 12/22/2023 11/30/23 12/3/23  Chela Mays DO   cholecalciferol (VITAMIN D3) 1,000 units tablet Take 2 tablets (2,000 Units total) by mouth daily 7/3/23 12/22/23  Isabel Hutchison MD   escitalopram (LEXAPRO) 10 mg tablet Take 1 tablet (10 mg total) by mouth daily 8/10/23 12/22/23  DRU Stapleton   fluticasone (FLONASE) 50 mcg/act nasal spray 1 spray into each nostril daily 11/30/23   Chela Mays DO   levETIRAcetam (KEPPRA) 750 mg tablet Take 2 tablets (1,500 mg total) by mouth 2 (two) times a day 3/6/24 4/5/24  DRU Stapleton   losartan (COZAAR) 25 mg tablet Take 1 tablet (25 mg total) by mouth daily 11/30/23 1/29/24  Basanta Davon, DO   metoprolol succinate (TOPROL-XL) 50 mg 24 hr tablet Take 1 tablet (50 mg total) by mouth daily 7/20/23 12/22/23  DRU Stapleton   nicotine (NICODERM CQ) 21  mg/24 hr TD 24 hr patch Place 1 patch on the skin over 24 hours daily 7/20/23   DRU Stapleton   pramipexole (MIRAPEX) 1.5 MG tablet Take 1 tablet (1.5 mg total) by mouth daily at bedtime 3/6/24 6/4/24  DRU Stapleton   QUEtiapine (SEROquel) 25 mg tablet Take 0.5 tablets (12.5 mg total) by mouth daily at bedtime  Patient not taking: Reported on 8/10/2023 7/20/23 9/18/23  DRU Stapleton   spironolactone (ALDACTONE) 25 mg tablet Take 1 tablet (25 mg total) by mouth daily 12/22/23   DRU Stapleton     I have reviewed home medications with patient personally.    Allergies: No Known Allergies    Social History:  Marital Status: /Civil Union   Occupation: Retired  Patient Pre-hospital Living Situation: Home  Patient Pre-hospital Level of Mobility: walks  Patient Pre-hospital Diet Restrictions: None  Substance Use History:   Social History     Substance and Sexual Activity   Alcohol Use Never    Comment: pt denies alcohol use     Social History     Tobacco Use   Smoking Status Every Day    Current packs/day: 0.50    Average packs/day: 0.5 packs/day for 23.0 years (11.5 ttl pk-yrs)    Types: Cigarettes   Smokeless Tobacco Never     Social History     Substance and Sexual Activity   Drug Use Never       Family History:  Family History   Problem Relation Age of Onset    Kidney disease Mother     Liver disease Mother     Heart attack Mother     Heart attack Father     No Known Problems Brother     No Known Problems Son        Physical Exam:     Vitals:   Blood Pressure: (!) 172/80 (03/13/24 1258)  Pulse: 82 (03/13/24 1600)  Temperature: 97.5 °F (36.4 °C) (03/13/24 1258)  Respirations: 18 (03/13/24 1258)  SpO2: 96 % (03/13/24 1600)    Physical Exam  Vitals and nursing note reviewed.   Constitutional:       General: She is not in acute distress.     Appearance: She is well-developed. She is not ill-appearing.   HENT:      Head: Normocephalic and atraumatic.      Nose: Nose normal.       Mouth/Throat:      Mouth: Mucous membranes are moist.   Eyes:      Conjunctiva/sclera: Conjunctivae normal.   Cardiovascular:      Rate and Rhythm: Normal rate and regular rhythm.      Heart sounds: No murmur heard.  Pulmonary:      Effort: Pulmonary effort is normal. No respiratory distress.      Breath sounds: Rhonchi present.   Abdominal:      Palpations: Abdomen is soft.      Tenderness: There is no abdominal tenderness.   Musculoskeletal:      Cervical back: Neck supple.      Right lower leg: Edema (Mild nonpitting) present.      Left lower leg: Edema present.   Skin:     General: Skin is warm and dry.      Capillary Refill: Capillary refill takes less than 2 seconds.   Neurological:      General: No focal deficit present.      Mental Status: She is alert and oriented to person, place, and time.   Psychiatric:         Mood and Affect: Mood normal.          Additional Data:     Lab Results:  Results from last 7 days   Lab Units 03/13/24  1305   WBC Thousand/uL 11.14*   HEMOGLOBIN g/dL 12.2   HEMATOCRIT % 39.5   PLATELETS Thousands/uL 329   NEUTROS PCT % 67   LYMPHS PCT % 24   MONOS PCT % 7   EOS PCT % 2     Results from last 7 days   Lab Units 03/13/24  1305   SODIUM mmol/L 137   POTASSIUM mmol/L 4.4   CHLORIDE mmol/L 108   CO2 mmol/L 21   BUN mg/dL 32*   CREATININE mg/dL 1.08   ANION GAP mmol/L 8   CALCIUM mg/dL 8.8   ALBUMIN g/dL 3.7   TOTAL BILIRUBIN mg/dL 0.23   ALK PHOS U/L 103   ALT U/L 11   AST U/L 16   GLUCOSE RANDOM mg/dL 95                       Lines/Drains:  Invasive Devices       Peripheral Intravenous Line  Duration             Peripheral IV 03/13/24 Right Antecubital <1 day                        Imaging: Reviewed radiology reports from this admission including: abdominal/pelvic CT  CTA ED chest PE study   Final Result by Yue Madrigal MD (03/13 2520)      No evidence for pulmonary embolism.      Heterogeneity of the spleen with at least one defined nodule at the splenic dome measuring 1.6 cm.  Recommend correlation with abdominal MRI with gadolinium on a nonemergent basis.      1.6 cm indeterminate hypoattenuating lesion in the included left kidney, new from 2021. This can also be evaluated on abdominal MRI.      Small hiatal hernia.      The study was marked in EPIC for significant notification.                  Workstation performed: [id number]         XR chest 2 views   ED Interpretation by Margaret Carty MD (03/13 4021)   Increased interstitial markings, primarily in the right lower area, consistent with potential for fluid overload      Final Result by Piyush Anderson MD (03/13 3218)      No acute cardiopulmonary disease.            Workstation performed: [id number]             EKG and Other Studies Reviewed on Admission:   EKG: NSR. HR 80.    ** Please Note: This note has been constructed using a voice recognition system. **

## 2024-03-13 NOTE — ED PROCEDURE NOTE
Procedure  POC Cardiac US    Date/Time: 3/13/2024 2:43 PM    Performed by: Shannon Gillespie DO  Authorized by: Shannon Gillespie DO    Patient location:  ED  Procedure details:     Exam Type:  Diagnostic    Indications: suspected volume depletion and dyspnea      Assessment / Evaluation for: cardiac function, pericardial effusion, intravascular volume status and right heart strain (suspected pulmonary embolism)      Exam Type: initial exam      Image quality: limited diagnostic      Image availability:  Images available in PACS  Patient Details:     Cardiac Rhythm:  Regular    Medications comment:  None    Mechanical ventilation: No    Cardiac findings:     Echo technique: limited 2D      Views obtained: parasternal long axis, parasternal short axis, subcostal and apical      Pericardial effusion: absent      Tamponade physiology: absent      LV systolic function: normal      RV dilation: none                     Shannon Gillespie DO  03/13/24 1444

## 2024-03-14 VITALS
RESPIRATION RATE: 22 BRPM | OXYGEN SATURATION: 95 % | SYSTOLIC BLOOD PRESSURE: 125 MMHG | TEMPERATURE: 97.5 F | DIASTOLIC BLOOD PRESSURE: 60 MMHG | HEART RATE: 86 BPM

## 2024-03-14 PROCEDURE — 99239 HOSP IP/OBS DSCHRG MGMT >30: CPT | Performed by: INTERNAL MEDICINE

## 2024-03-14 NOTE — ED NOTES
[facility] booked. AMA form signed, discharge paperwork provided and explained.     Candace Amezquita RN  03/14/24 2443

## 2024-03-14 NOTE — INCIDENTAL FINDINGS
The following findings require follow up:  Radiographic finding   Finding: Heterogeneity of the spleen with at least one defined nodule at the splenic dome measuring 1.6 cm. 1.6 cm indeterminate hypoattenuating lesion in the included left kidney, new from 2021.    Follow up required: Abdominal MRI   Follow up should be done within 3 month(s)    Please notify the following clinician to assist with the follow up:   Dr. Patel

## 2024-03-14 NOTE — DISCHARGE INSTR - AVS FIRST PAGE
Dear Cydney Lim,     It was our pleasure to care for you here at [facility].  It is our hope that we were always able to exceed your expectations for your care during your stay.  You were hospitalized due to shortness of breath and cared for on the ED floor by Kyle Brunner, MD under the service of Jazmine Fields MD with the [facility] who covers for your primary care physician (PCP), DRU Knott. If you have any questions or concerns related to this hospitalization, you may contact us at [phone]. A nurse will call you within a few days to answer any additional questions that may arise after your discharge. We recommend that you follow up with your PCP for medication refills. Please note the following instructions / recommendations:       STOP taking -   No medication adjustments    START taking -  No adjustments    Testing Required after Discharge -   BMP + CBC 1 week  Please follow up with your primary care provider to order these tests    Important follow up information -   Please schedule an appointment with your primary care provider as soon as possible    Other Instructions -   Please contact your PCP as soon as possible for further care    Please review your entire after visit summary including medication list, appointments, activity, diet, pertinent wound care, and any additional recommendations from your care team.    We wish you well.    Sincerely,     Kyle Brunner, MD

## 2024-03-14 NOTE — QUICK NOTE
RN notified provider that patient was requesting to leave AMA.    This patient has elected to leave against medical advice. The patient is clinically sober, free from distracting injury, appears to have intact insight and judgment and reason, and in my opinion has capacity to make decisions. I explained to the patient that his symptoms may represent a more concerning underlying process and the patient verbalized understanding of my concerns.    I had a discussion with the patient about their workup and results, and that they may still have undiagnosed conditions despite workup at this stage. I informed the patient that the next step in diagnosis and treatment would be to remain hospitalized inpatient for further workup by the day team.  I explained the risks of leaving without further workup or treatment, which included reasonably foreseeable complications such as death, serious injury, permanent disability, circulatory collapse and syncope.     The patient is refusing any further care and is leaving against medical advice. I am unable to convince the patient to stay. I have asked them to return as soon as possible to complete their evaluation, and also explained that they were welcome to return to the ER for further evaluation whenever they choose. I have asked the patient to follow up with their primary doctor as soon as possible. I have answered all their questions.

## 2024-03-14 NOTE — DISCHARGE SUMMARY
[facility]  Discharge- Cydney Lim 1960, 63 y.o. female MRN: [id number]  Unit/Bed#: ED-25 Encounter: [id number]  Primary Care Provider: DRU Knott   Date and time admitted to hospital: 3/13/2024  1:24 PM    Severe episode of recurrent major depressive disorder, with psychotic features (formerly [facility])  Assessment & Plan  Continue escitalopram    Essential hypertension  Assessment & Plan  Metoprolol and losartan and spironolactone    Benign hypertensive heart and CKD, stage 3 (GFR 30-59), w CHF (formerly [facility])  Assessment & Plan  Lab Results   Component Value Date    EGFR 53 03/13/2024    EGFR 46 (L) 03/12/2024    EGFR 40 (L) 03/11/2024    CREATININE 1.08 03/13/2024    CREATININE 1.28 (H) 03/12/2024    CREATININE 1.45 (H) 03/11/2024   Continue to monitor  Baseline 1-1.2      Syncope  Assessment & Plan  Patient has recent history of increased syncopal events.  Noted to have had 5 and was recently admitted on 3/11/2024 in [facility] and left AMA.  During that time it was suspected that patient might have had seizures.  Fortunately she had left AMA because she was not happy with the care she was getting over there.  She denies any jerking movements and bowel or bladder incontinence.  Friend was able to observe her symptoms  No particular precipitating movements  Need to rule out cardiogenic causes versus seizures versus orthostatic hypotension  Plan:  Telemetry  Orthostatic blood pressure  Echo  Neurology consult for possible seizure, may need spot eeg      Morbidly obese (formerly [facility])  Assessment & Plan  BMI 45      Epilepsy with altered consciousness with intractable epilepsy (formerly [facility])  Assessment & Plan  Patient was recently admitted on 3/11/2024 and left AMA.  Patient presented during that time for 5 syncopal events with no known trigger.  There was consideration that patient was having breakthrough seizures given noncompliance to antiepileptics.   had epilepsy since she was a toddler  after having a bout of measles.  Home meds of Keppra 1500 mg twice daily  On carbamazepine 200 mg 3 times daily    * SOB (shortness of breath)  Assessment & Plan  Presents with increased shortness of breath and lower extremity edema      Cardiac history: HPN .   No prev echo  Lab Results   Component Value Date    NTBNP 157 (H) 12/02/2018         Recent Labs     03/11/24  2110 03/12/24  0159 03/13/24  1305   K 4.2 4.5 4.4   MG  --  2.0  --        CT abdomen pelvis unremarkable, no PE.  X-ray clear.  BNP negative.  No signs and symptoms of infection  Could possibly be in the deconditioning. Rule out covid. Will get echo for possible CHF. Possibly has undiagnosed COPD. No previous PFTs, in setting of long time smoker    Plan:  Continue Losartan 25 mg od, Aldos. Blocker: Spironolactone 25 mg od  Continue to monitor  PT OT  Dose of lasix iv 40 mg, reassess tomorrow  Urinary retension protocol  I and o, daily weight  Covid flu RSV  Fluid restrict  echo                Medical Problems       Resolved Problems  Date Reviewed: 3/13/2024   None         Admission Date:   Admission Orders (From admission, onward)       Ordered        03/13/24 1647  Place in Observation  Once                            Admitting Diagnosis: No admission diagnoses are documented for this encounter.    HPI: 63-year-old female with past medical history of restless leg syndrome, COPD, lymphedema on spironolactone, hypertension, MDD, CKD stage III, hypertension coming in for bilateral leg swelling.        Patient was recently admitted on 3/11/2024 and left AMA. Patient presented during that time for 5 syncopal events with no known trigger. There was consideration that patient was having breakthrough seizures given noncompliance to antiepileptics. Was deemed to have medical capacity. At that time patient also had an BETH with creatinine of 1.45. Was treated with IV fluids. During that time notes say that the friend witnessed the event and denies any  jerking or loss of bowel or bladder incontinence.     Procedures Performed:   Orders Placed This Encounter   Procedures    POC Cardiac US       Summary of Hospital Course: Patient underwent imaging that showed heterogeneity of the spleen and did not reveal any pulmonary embolus.  Patient was presumed to have COPD although not in acute exacerbation.  Suspect that there may have been syncope with potential breakthrough seizures.  Plan was to obtain echocardiography and gave patient 1 dose of IV Lasix.  Imaging did not reveal any PE in the setting of elevated D-dimer.  Plan to order duplex ultrasound to rule out DVT was planning to repeat [facility] neurology eval for potential seizures.  Patient decided to leave AGAINST MEDICAL ADVICE and was informed of possible complications.  Patient signed AGAINST MEDICAL ADVICE paperwork and was discharged early in the a.m.    Significant Findings, Care, Treatment and Services Provided: None    Complications: none    Condition at Discharge: fair         Discharge instructions/Information to patient and family:   See after visit summary for information provided to patient and family.      Provisions for Follow-Up Care:  See after visit summary for information related to follow-up care and any pertinent home health orders.      PCP: DRU Knott    Disposition: Left against medical advice    Planned Readmission: No    Discharge Statement   I spent 30 minutes discharging the patient. This time was spent on the day of discharge. I had direct contact with the patient on the day of discharge. Additional documentation is required if more than 30 minutes were spent on discharge.     Discharge Medications:  See after visit summary for reconciled discharge medications provided to patient and family.

## 2024-03-15 LAB — LEVETIRACETAM SERPL-MCNC: 11.6 UG/ML (ref 10–40)

## 2024-04-05 ENCOUNTER — APPOINTMENT (EMERGENCY)
Dept: CT IMAGING | Facility: HOSPITAL | Age: 64
DRG: 872 | End: 2024-04-05
Payer: MEDICARE

## 2024-04-05 ENCOUNTER — APPOINTMENT (EMERGENCY)
Dept: RADIOLOGY | Facility: HOSPITAL | Age: 64
DRG: 872 | End: 2024-04-05
Payer: MEDICARE

## 2024-04-05 ENCOUNTER — HOSPITAL ENCOUNTER (INPATIENT)
Facility: HOSPITAL | Age: 64
LOS: 4 days | Discharge: HOME | DRG: 872 | End: 2024-04-09
Attending: EMERGENCY MEDICINE | Admitting: INTERNAL MEDICINE
Payer: MEDICARE

## 2024-04-05 DIAGNOSIS — Z76.0 MEDICATION REFILL: ICD-10-CM

## 2024-04-05 DIAGNOSIS — I89.0 LYMPHEDEMA: ICD-10-CM

## 2024-04-05 DIAGNOSIS — N18.31 STAGE 3A CHRONIC KIDNEY DISEASE (HCC): ICD-10-CM

## 2024-04-05 DIAGNOSIS — Z72.0 TOBACCO ABUSE: ICD-10-CM

## 2024-04-05 DIAGNOSIS — N18.31 ACUTE RENAL FAILURE WITH ACUTE TUBULAR NECROSIS SUPERIMPOSED ON STAGE 3A CHRONIC KIDNEY DISEASE (HCC): ICD-10-CM

## 2024-04-05 DIAGNOSIS — J44.9 CHRONIC OBSTRUCTIVE PULMONARY DISEASE, UNSPECIFIED COPD TYPE (HCC): ICD-10-CM

## 2024-04-05 DIAGNOSIS — R06.00 DYSPNEA: ICD-10-CM

## 2024-04-05 DIAGNOSIS — I12.9 PARENCHYMAL RENAL HYPERTENSION, STAGE 1 THROUGH STAGE 4 OR UNSPECIFIED CHRONIC KIDNEY DISEASE: ICD-10-CM

## 2024-04-05 DIAGNOSIS — E87.6 HYPOKALEMIA: ICD-10-CM

## 2024-04-05 DIAGNOSIS — N18.9 ACUTE RENAL FAILURE SUPERIMPOSED ON CHRONIC KIDNEY DISEASE  (HCC): ICD-10-CM

## 2024-04-05 DIAGNOSIS — G25.81 RESTLESS LEG SYNDROME: ICD-10-CM

## 2024-04-05 DIAGNOSIS — N17.0 ACUTE RENAL FAILURE WITH ACUTE TUBULAR NECROSIS SUPERIMPOSED ON STAGE 3A CHRONIC KIDNEY DISEASE (HCC): ICD-10-CM

## 2024-04-05 DIAGNOSIS — G40.919 EPILEPSY WITH ALTERED CONSCIOUSNESS WITH INTRACTABLE EPILEPSY (HCC): ICD-10-CM

## 2024-04-05 DIAGNOSIS — I10 ESSENTIAL HYPERTENSION: ICD-10-CM

## 2024-04-05 DIAGNOSIS — E53.8 VITAMIN B12 DEFICIENCY: ICD-10-CM

## 2024-04-05 DIAGNOSIS — F33.3 SEVERE EPISODE OF RECURRENT MAJOR DEPRESSIVE DISORDER, WITH PSYCHOTIC FEATURES (HCC): ICD-10-CM

## 2024-04-05 DIAGNOSIS — E83.42 HYPOMAGNESEMIA: ICD-10-CM

## 2024-04-05 DIAGNOSIS — R00.0 TACHYCARDIA: ICD-10-CM

## 2024-04-05 DIAGNOSIS — N39.0 ACUTE UTI: ICD-10-CM

## 2024-04-05 DIAGNOSIS — N17.9 ACUTE RENAL FAILURE SUPERIMPOSED ON CHRONIC KIDNEY DISEASE  (HCC): ICD-10-CM

## 2024-04-05 DIAGNOSIS — N18.31 ANEMIA DUE TO STAGE 3A CHRONIC KIDNEY DISEASE  (HCC): ICD-10-CM

## 2024-04-05 DIAGNOSIS — N39.0 UTI (URINARY TRACT INFECTION): Primary | ICD-10-CM

## 2024-04-05 DIAGNOSIS — R60.9 WATER RETENTION: ICD-10-CM

## 2024-04-05 DIAGNOSIS — D63.1 ANEMIA DUE TO STAGE 3A CHRONIC KIDNEY DISEASE  (HCC): ICD-10-CM

## 2024-04-05 PROBLEM — A41.9 SEPSIS WITHOUT ACUTE ORGAN DYSFUNCTION (HCC): Status: ACTIVE | Noted: 2024-04-05

## 2024-04-05 PROBLEM — N30.00 ACUTE CYSTITIS WITHOUT HEMATURIA: Status: ACTIVE | Noted: 2024-04-05

## 2024-04-05 LAB
2HR DELTA HS TROPONIN: 0 NG/L
4HR DELTA HS TROPONIN: 0 NG/L
ALBUMIN SERPL BCP-MCNC: 3.7 G/DL (ref 3.5–5)
ALP SERPL-CCNC: 101 U/L (ref 34–104)
ALT SERPL W P-5'-P-CCNC: 12 U/L (ref 7–52)
ANION GAP SERPL CALCULATED.3IONS-SCNC: 10 MMOL/L (ref 4–13)
AST SERPL W P-5'-P-CCNC: 12 U/L (ref 13–39)
ATRIAL RATE: 142 BPM
BACTERIA UR QL AUTO: ABNORMAL /HPF
BASOPHILS # BLD AUTO: 0.04 THOUSANDS/ÂΜL (ref 0–0.1)
BASOPHILS NFR BLD AUTO: 0 % (ref 0–1)
BILIRUB SERPL-MCNC: 0.2 MG/DL (ref 0.2–1)
BILIRUB UR QL STRIP: NEGATIVE
BNP SERPL-MCNC: 98 PG/ML (ref 0–100)
BUN SERPL-MCNC: 28 MG/DL (ref 5–25)
CALCIUM SERPL-MCNC: 8.8 MG/DL (ref 8.4–10.2)
CARDIAC TROPONIN I PNL SERPL HS: 4 NG/L
CHLORIDE SERPL-SCNC: 107 MMOL/L (ref 96–108)
CLARITY UR: CLEAR
CO2 SERPL-SCNC: 22 MMOL/L (ref 21–32)
COLOR UR: YELLOW
CREAT SERPL-MCNC: 1.14 MG/DL (ref 0.6–1.3)
EOSINOPHIL # BLD AUTO: 0.14 THOUSAND/ÂΜL (ref 0–0.61)
EOSINOPHIL NFR BLD AUTO: 1 % (ref 0–6)
ERYTHROCYTE [DISTWIDTH] IN BLOOD BY AUTOMATED COUNT: 13.9 % (ref 11.6–15.1)
GFR SERPL CREATININE-BSD FRML MDRD: 50 ML/MIN/1.73SQ M
GLUCOSE SERPL-MCNC: 111 MG/DL (ref 65–140)
GLUCOSE UR STRIP-MCNC: NEGATIVE MG/DL
HCT VFR BLD AUTO: 36.3 % (ref 34.8–46.1)
HGB BLD-MCNC: 11.9 G/DL (ref 11.5–15.4)
HGB UR QL STRIP.AUTO: ABNORMAL
IMM GRANULOCYTES # BLD AUTO: 0.08 THOUSAND/UL (ref 0–0.2)
IMM GRANULOCYTES NFR BLD AUTO: 1 % (ref 0–2)
KETONES UR STRIP-MCNC: NEGATIVE MG/DL
LACTATE SERPL-SCNC: 0.5 MMOL/L (ref 0.5–2)
LEUKOCYTE ESTERASE UR QL STRIP: NEGATIVE
LYMPHOCYTES # BLD AUTO: 2.32 THOUSANDS/ÂΜL (ref 0.6–4.47)
LYMPHOCYTES NFR BLD AUTO: 13 % (ref 14–44)
MAGNESIUM SERPL-MCNC: 1.8 MG/DL (ref 1.9–2.7)
MCH RBC QN AUTO: 31 PG (ref 26.8–34.3)
MCHC RBC AUTO-ENTMCNC: 32.8 G/DL (ref 31.4–37.4)
MCV RBC AUTO: 95 FL (ref 82–98)
MONOCYTES # BLD AUTO: 1 THOUSAND/ÂΜL (ref 0.17–1.22)
MONOCYTES NFR BLD AUTO: 6 % (ref 4–12)
NEUTROPHILS # BLD AUTO: 13.9 THOUSANDS/ÂΜL (ref 1.85–7.62)
NEUTS SEG NFR BLD AUTO: 79 % (ref 43–75)
NITRITE UR QL STRIP: POSITIVE
NON-SQ EPI CELLS URNS QL MICRO: ABNORMAL /HPF
NRBC BLD AUTO-RTO: 0 /100 WBCS
P AXIS: 36 DEGREES
PH UR STRIP.AUTO: 6 [PH]
PLATELET # BLD AUTO: 356 THOUSANDS/UL (ref 149–390)
PMV BLD AUTO: 10.1 FL (ref 8.9–12.7)
POTASSIUM SERPL-SCNC: 3.6 MMOL/L (ref 3.5–5.3)
PR INTERVAL: 156 MS
PROCALCITONIN SERPL-MCNC: 0.05 NG/ML
PROT SERPL-MCNC: 7.3 G/DL (ref 6.4–8.4)
PROT UR STRIP-MCNC: ABNORMAL MG/DL
QRS AXIS: 56 DEGREES
QRSD INTERVAL: 70 MS
QT INTERVAL: 264 MS
QTC INTERVAL: 406 MS
RBC # BLD AUTO: 3.84 MILLION/UL (ref 3.81–5.12)
RBC #/AREA URNS AUTO: ABNORMAL /HPF
SODIUM SERPL-SCNC: 139 MMOL/L (ref 135–147)
SP GR UR STRIP.AUTO: 1.01 (ref 1–1.03)
T WAVE AXIS: 62 DEGREES
TSH SERPL DL<=0.05 MIU/L-ACNC: 1.68 UIU/ML (ref 0.45–4.5)
UROBILINOGEN UR QL STRIP.AUTO: 0.2 E.U./DL
VENTRICULAR RATE: 142 BPM
WBC # BLD AUTO: 17.48 THOUSAND/UL (ref 4.31–10.16)
WBC #/AREA URNS AUTO: ABNORMAL /HPF

## 2024-04-05 PROCEDURE — 85025 COMPLETE CBC W/AUTO DIFF WBC: CPT | Performed by: EMERGENCY MEDICINE

## 2024-04-05 PROCEDURE — 80053 COMPREHEN METABOLIC PANEL: CPT | Performed by: EMERGENCY MEDICINE

## 2024-04-05 PROCEDURE — 93005 ELECTROCARDIOGRAM TRACING: CPT

## 2024-04-05 PROCEDURE — 87186 SC STD MICRODIL/AGAR DIL: CPT | Performed by: EMERGENCY MEDICINE

## 2024-04-05 PROCEDURE — 36415 COLL VENOUS BLD VENIPUNCTURE: CPT | Performed by: EMERGENCY MEDICINE

## 2024-04-05 PROCEDURE — 96365 THER/PROPH/DIAG IV INF INIT: CPT

## 2024-04-05 PROCEDURE — 87154 CUL TYP ID BLD PTHGN 6+ TRGT: CPT | Performed by: EMERGENCY MEDICINE

## 2024-04-05 PROCEDURE — 87077 CULTURE AEROBIC IDENTIFY: CPT | Performed by: EMERGENCY MEDICINE

## 2024-04-05 PROCEDURE — 84443 ASSAY THYROID STIM HORMONE: CPT | Performed by: EMERGENCY MEDICINE

## 2024-04-05 PROCEDURE — 84484 ASSAY OF TROPONIN QUANT: CPT | Performed by: EMERGENCY MEDICINE

## 2024-04-05 PROCEDURE — 83735 ASSAY OF MAGNESIUM: CPT | Performed by: EMERGENCY MEDICINE

## 2024-04-05 PROCEDURE — 99284 EMERGENCY DEPT VISIT MOD MDM: CPT

## 2024-04-05 PROCEDURE — 84145 PROCALCITONIN (PCT): CPT | Performed by: EMERGENCY MEDICINE

## 2024-04-05 PROCEDURE — 99223 1ST HOSP IP/OBS HIGH 75: CPT | Performed by: INTERNAL MEDICINE

## 2024-04-05 PROCEDURE — 71046 X-RAY EXAM CHEST 2 VIEWS: CPT

## 2024-04-05 PROCEDURE — 87040 BLOOD CULTURE FOR BACTERIA: CPT | Performed by: EMERGENCY MEDICINE

## 2024-04-05 PROCEDURE — 96361 HYDRATE IV INFUSION ADD-ON: CPT

## 2024-04-05 PROCEDURE — 83605 ASSAY OF LACTIC ACID: CPT | Performed by: EMERGENCY MEDICINE

## 2024-04-05 PROCEDURE — 81001 URINALYSIS AUTO W/SCOPE: CPT | Performed by: EMERGENCY MEDICINE

## 2024-04-05 PROCEDURE — 71275 CT ANGIOGRAPHY CHEST: CPT

## 2024-04-05 PROCEDURE — 83880 ASSAY OF NATRIURETIC PEPTIDE: CPT | Performed by: EMERGENCY MEDICINE

## 2024-04-05 PROCEDURE — 87086 URINE CULTURE/COLONY COUNT: CPT | Performed by: EMERGENCY MEDICINE

## 2024-04-05 RX ORDER — CEFTRIAXONE 1 G/50ML
1000 INJECTION, SOLUTION INTRAVENOUS ONCE
Status: COMPLETED | OUTPATIENT
Start: 2024-04-05 | End: 2024-04-05

## 2024-04-05 RX ORDER — SPIRONOLACTONE 25 MG/1
25 TABLET ORAL DAILY
Status: DISCONTINUED | OUTPATIENT
Start: 2024-04-06 | End: 2024-04-09 | Stop reason: HOSPADM

## 2024-04-05 RX ORDER — MAGNESIUM SULFATE 1 G/100ML
1 INJECTION INTRAVENOUS ONCE
Status: COMPLETED | OUTPATIENT
Start: 2024-04-05 | End: 2024-04-06

## 2024-04-05 RX ORDER — PRAMIPEXOLE DIHYDROCHLORIDE 0.5 MG/1
1.5 TABLET ORAL
Status: DISCONTINUED | OUTPATIENT
Start: 2024-04-05 | End: 2024-04-09 | Stop reason: HOSPADM

## 2024-04-05 RX ORDER — ALBUTEROL SULFATE 2.5 MG/3ML
2.5 SOLUTION RESPIRATORY (INHALATION) EVERY 6 HOURS PRN
Status: DISCONTINUED | OUTPATIENT
Start: 2024-04-05 | End: 2024-04-05

## 2024-04-05 RX ORDER — ENOXAPARIN SODIUM 100 MG/ML
40 INJECTION SUBCUTANEOUS DAILY
Status: DISCONTINUED | OUTPATIENT
Start: 2024-04-06 | End: 2024-04-09 | Stop reason: HOSPADM

## 2024-04-05 RX ORDER — LEVETIRACETAM 500 MG/1
1500 TABLET ORAL ONCE
Status: COMPLETED | OUTPATIENT
Start: 2024-04-05 | End: 2024-04-05

## 2024-04-05 RX ORDER — LANOLIN ALCOHOL/MO/W.PET/CERES
800 CREAM (GRAM) TOPICAL ONCE
Status: COMPLETED | OUTPATIENT
Start: 2024-04-05 | End: 2024-04-05

## 2024-04-05 RX ORDER — ACETAMINOPHEN 325 MG/1
650 TABLET ORAL EVERY 6 HOURS PRN
Status: DISCONTINUED | OUTPATIENT
Start: 2024-04-05 | End: 2024-04-09 | Stop reason: HOSPADM

## 2024-04-05 RX ORDER — CARBAMAZEPINE 200 MG/1
200 TABLET ORAL 3 TIMES DAILY
Status: DISCONTINUED | OUTPATIENT
Start: 2024-04-05 | End: 2024-04-09 | Stop reason: HOSPADM

## 2024-04-05 RX ORDER — LOSARTAN POTASSIUM 25 MG/1
25 TABLET ORAL DAILY
Status: DISCONTINUED | OUTPATIENT
Start: 2024-04-06 | End: 2024-04-09 | Stop reason: HOSPADM

## 2024-04-05 RX ORDER — ESCITALOPRAM OXALATE 10 MG/1
10 TABLET ORAL DAILY
Status: DISCONTINUED | OUTPATIENT
Start: 2024-04-06 | End: 2024-04-09 | Stop reason: HOSPADM

## 2024-04-05 RX ORDER — METOPROLOL SUCCINATE 50 MG/1
50 TABLET, EXTENDED RELEASE ORAL DAILY
Status: DISCONTINUED | OUTPATIENT
Start: 2024-04-05 | End: 2024-04-09 | Stop reason: HOSPADM

## 2024-04-05 RX ORDER — ONDANSETRON 2 MG/ML
4 INJECTION INTRAMUSCULAR; INTRAVENOUS EVERY 6 HOURS PRN
Status: DISCONTINUED | OUTPATIENT
Start: 2024-04-05 | End: 2024-04-09 | Stop reason: HOSPADM

## 2024-04-05 RX ORDER — ALBUTEROL SULFATE 90 UG/1
2 AEROSOL, METERED RESPIRATORY (INHALATION) EVERY 6 HOURS PRN
Status: DISCONTINUED | OUTPATIENT
Start: 2024-04-05 | End: 2024-04-09 | Stop reason: HOSPADM

## 2024-04-05 RX ORDER — NICOTINE 21 MG/24HR
1 PATCH, TRANSDERMAL 24 HOURS TRANSDERMAL DAILY
Status: DISCONTINUED | OUTPATIENT
Start: 2024-04-06 | End: 2024-04-09 | Stop reason: HOSPADM

## 2024-04-05 RX ORDER — LEVETIRACETAM 500 MG/1
1500 TABLET ORAL 2 TIMES DAILY
Status: DISCONTINUED | OUTPATIENT
Start: 2024-04-06 | End: 2024-04-09 | Stop reason: HOSPADM

## 2024-04-05 RX ORDER — CEFTRIAXONE 2 G/50ML
2000 INJECTION, SOLUTION INTRAVENOUS EVERY 24 HOURS
Status: DISCONTINUED | OUTPATIENT
Start: 2024-04-06 | End: 2024-04-09 | Stop reason: HOSPADM

## 2024-04-05 RX ORDER — FUROSEMIDE 10 MG/ML
40 INJECTION INTRAMUSCULAR; INTRAVENOUS ONCE
Status: DISCONTINUED | OUTPATIENT
Start: 2024-04-05 | End: 2024-04-05

## 2024-04-05 RX ORDER — FLUTICASONE PROPIONATE 50 MCG
1 SPRAY, SUSPENSION (ML) NASAL DAILY
Status: DISCONTINUED | OUTPATIENT
Start: 2024-04-06 | End: 2024-04-09 | Stop reason: HOSPADM

## 2024-04-05 RX ADMIN — Medication 800 MG: at 22:45

## 2024-04-05 RX ADMIN — SODIUM CHLORIDE 1000 ML: 0.9 INJECTION, SOLUTION INTRAVENOUS at 18:57

## 2024-04-05 RX ADMIN — CEFTRIAXONE 1000 MG: 1 INJECTION, SOLUTION INTRAVENOUS at 20:54

## 2024-04-05 RX ADMIN — IOHEXOL 70 ML: 350 INJECTION, SOLUTION INTRAVENOUS at 22:05

## 2024-04-05 RX ADMIN — LEVETIRACETAM 1500 MG: 500 TABLET, FILM COATED ORAL at 19:48

## 2024-04-05 RX ADMIN — SODIUM CHLORIDE 1000 ML: 0.9 INJECTION, SOLUTION INTRAVENOUS at 22:44

## 2024-04-05 NOTE — ED PROVIDER NOTES
History  Chief Complaint   Patient presents with    Medication Refill     Pt arrives to the ED for medication refill on multiple medicines. No other complaints to report     63-year-old female with history of of epilepsy, depression and restless leg syndrome presents to the emergency department requesting a medication refill.  The patient reports running out of multiple medications 3 days ago including her Keppra.  States that she has been having difficulty with getting her prescriptions refilled so came to the emergency department.  She reports that she has otherwise been feeling well.  Denies fever, chills, nausea, vomiting, diarrhea, chest pain, shortness of breath and localized numbness, tingling or weakness.  As the patient was being triaged she was noted to have a heart rate around 150.  The patient reports being asymptomatic at this time but is agreeable to a further workup.        Prior to Admission Medications   Prescriptions Last Dose Informant Patient Reported? Taking?   albuterol (2.5 mg/3 mL) 0.083 % nebulizer solution   No No   Sig: Take 3 mL (2.5 mg total) by nebulization every 6 (six) hours as needed for wheezing or shortness of breath   albuterol (Proventil HFA) 90 mcg/act inhaler   No No   Sig: Inhale 2 puffs every 6 (six) hours as needed for wheezing   calcium carbonate (OYSTER SHELL,OSCAL) 500 mg   No No   Sig: Take 2 tablets by mouth daily with breakfast   carBAMazepine (TEGretol) 200 mg tablet   No No   Sig: Take 1 tablet (200 mg total) by mouth 3 (three) times a day   cholecalciferol (VITAMIN D3) 1,000 units tablet   No No   Sig: Take 2 tablets (2,000 Units total) by mouth daily   escitalopram (LEXAPRO) 10 mg tablet   No No   Sig: Take 1 tablet (10 mg total) by mouth daily   fluticasone (FLONASE) 50 mcg/act nasal spray  Self No No   Si spray into each nostril daily   levETIRAcetam (KEPPRA) 750 mg tablet   No No   Sig: Take 2 tablets (1,500 mg total) by mouth 2 (two) times a day   losartan  (COZAAR) 25 mg tablet  Self No No   Sig: Take 1 tablet (25 mg total) by mouth daily   metoprolol succinate (TOPROL-XL) 50 mg 24 hr tablet   No No   Sig: Take 1 tablet (50 mg total) by mouth daily   nicotine (NICODERM CQ) 21 mg/24 hr TD 24 hr patch  Self No No   Sig: Place 1 patch on the skin over 24 hours daily   pramipexole (MIRAPEX) 1.5 MG tablet   No No   Sig: Take 1 tablet (1.5 mg total) by mouth daily at bedtime   spironolactone (ALDACTONE) 25 mg tablet   No No   Sig: Take 1 tablet (25 mg total) by mouth daily      Facility-Administered Medications Last Administration Doses Remaining   cyanocobalamin injection 1,000 mcg 7/6/2023  4:59 PM           Past Medical History:   Diagnosis Date    Depression     EP (epilepsy) (HCC)     Epilepsy (HCC)     Obesity     Restless leg        Past Surgical History:   Procedure Laterality Date    DENTAL SURGERY      all teeth removed    LASER ABLATION OF THE CERVIX      MAMMO (HISTORICAL)  05/01/2017       Family History   Problem Relation Age of Onset    Kidney disease Mother     Liver disease Mother     Heart attack Mother     Heart attack Father     No Known Problems Brother     No Known Problems Son      I have reviewed and agree with the history as documented.    E-Cigarette/Vaping    E-Cigarette Use Never User      E-Cigarette/Vaping Substances    Nicotine Yes     THC No     CBD No     Flavoring No     Other No     Unknown No      Social History     Tobacco Use    Smoking status: Every Day     Current packs/day: 0.50     Average packs/day: 0.5 packs/day for 23.0 years (11.5 ttl pk-yrs)     Types: Cigarettes    Smokeless tobacco: Never   Vaping Use    Vaping status: Never Used   Substance Use Topics    Alcohol use: Never     Comment: pt denies alcohol use    Drug use: Never       Review of Systems   Constitutional:  Negative for chills and fever.   HENT:  Negative for ear pain and sore throat.    Eyes:  Negative for pain and visual disturbance.   Respiratory:  Negative for  cough and shortness of breath.    Cardiovascular:  Negative for chest pain and palpitations.   Gastrointestinal:  Negative for abdominal pain and vomiting.   Genitourinary:  Negative for dysuria and hematuria.   Musculoskeletal:  Negative for arthralgias and back pain.   Skin:  Negative for color change and rash.   Neurological:  Negative for seizures and syncope.   All other systems reviewed and are negative.      Physical Exam  Physical Exam  Vitals and nursing note reviewed.   Constitutional:       Appearance: She is well-developed. She is obese.   HENT:      Head: Normocephalic and atraumatic.   Eyes:      Conjunctiva/sclera: Conjunctivae normal.   Cardiovascular:      Rate and Rhythm: Regular rhythm. Tachycardia present.      Pulses:           Radial pulses are 2+ on the right side and 2+ on the left side.   Pulmonary:      Effort: Pulmonary effort is normal. No respiratory distress.      Breath sounds: Normal breath sounds.   Abdominal:      Palpations: Abdomen is soft.      Tenderness: There is no abdominal tenderness.   Musculoskeletal:         General: No swelling.      Cervical back: Neck supple.   Skin:     General: Skin is warm and dry.      Capillary Refill: Capillary refill takes less than 2 seconds.   Neurological:      Mental Status: She is alert.   Psychiatric:         Mood and Affect: Mood normal.         Vital Signs  ED Triage Vitals   Temperature Pulse Respirations Blood Pressure SpO2   04/05/24 1840 04/05/24 1840 04/05/24 1840 04/05/24 1840 04/05/24 1840   98.6 °F (37 °C) (!) 149 16 118/68 96 %      Temp Source Heart Rate Source Patient Position - Orthostatic VS BP Location FiO2 (%)   04/05/24 1840 04/05/24 1840 04/05/24 1840 04/05/24 1840 --   Oral Monitor Sitting Left arm       Pain Score       04/05/24 2347       No Pain           Vitals:    04/05/24 2347 04/06/24 0341 04/06/24 0725 04/06/24 1107   BP: 155/73 150/68 160/82 140/75   Pulse: 85 74 80 72   Patient Position - Orthostatic VS: Lying  Lying Lying Lying         Visual Acuity      ED Medications  Medications   albuterol (PROVENTIL HFA,VENTOLIN HFA) inhaler 2 puff (has no administration in time range)   fluticasone (FLONASE) 50 mcg/act nasal spray 1 spray (1 spray Nasal Given 4/6/24 0919)   levETIRAcetam (KEPPRA) tablet 1,500 mg (1,500 mg Oral Given 4/6/24 0914)   nicotine (NICODERM CQ) 21 mg/24 hr TD 24 hr patch 1 patch (1 patch Transdermal Medication Applied 4/6/24 0914)   pramipexole (MIRAPEX) tablet 1.5 mg (1.5 mg Oral Given 4/6/24 0006)   spironolactone (ALDACTONE) tablet 25 mg (25 mg Oral Given 4/6/24 0915)   carBAMazepine (TEGretol) tablet 200 mg (200 mg Oral Given 4/6/24 0915)   escitalopram (LEXAPRO) tablet 10 mg (10 mg Oral Given 4/6/24 0914)   losartan (COZAAR) tablet 25 mg (25 mg Oral Given 4/6/24 0914)   metoprolol succinate (TOPROL-XL) 24 hr tablet 50 mg (50 mg Oral Given 4/6/24 0915)   acetaminophen (TYLENOL) tablet 650 mg (has no administration in time range)   ondansetron (ZOFRAN) injection 4 mg (has no administration in time range)   enoxaparin (LOVENOX) subcutaneous injection 40 mg (40 mg Subcutaneous Given 4/6/24 0914)   cefTRIAXone (ROCEPHIN) IVPB (premix in dextrose) 2,000 mg 50 mL (has no administration in time range)   magnesium sulfate 2 g/50 mL IVPB (premix) 2 g (2 g Intravenous New Bag 4/6/24 1200)   sodium chloride 0.9 % bolus 1,000 mL (0 mL Intravenous Stopped 4/5/24 2025)   levETIRAcetam (KEPPRA) tablet 1,500 mg (1,500 mg Oral Given 4/5/24 1948)   cefTRIAXone (ROCEPHIN) IVPB (premix in dextrose) 1,000 mg 50 mL (0 mg Intravenous Stopped 4/5/24 2130)   sodium chloride 0.9 % bolus 1,000 mL (1,000 mL Intravenous New Bag 4/5/24 2244)   magnesium Oxide (MAG-OX) tablet 800 mg (800 mg Oral Given 4/5/24 2245)   iohexol (OMNIPAQUE) 350 MG/ML injection (SINGLE-DOSE) 70 mL (70 mL Intravenous Given 4/5/24 2205)   magnesium sulfate IVPB (premix) SOLN 1 g (1 g Intravenous New Bag 4/6/24 0005)   ipratropium-albuterol (DUO-NEB) 0.5-2.5  mg/3 mL inhalation solution 3 mL (3 mL Nebulization Given 4/6/24 1147)       Diagnostic Studies  Results Reviewed       Procedure Component Value Units Date/Time    Basic metabolic panel [[id number]]  (Abnormal) Collected: 04/06/24 0513    Lab Status: Final result Specimen: Blood from Arm, Left Updated: 04/06/24 0601     Sodium 140 mmol/L      Potassium 3.8 mmol/L      Chloride 109 mmol/L      CO2 24 mmol/L      ANION GAP 7 mmol/L      BUN 20 mg/dL      Creatinine 0.90 mg/dL      Glucose 92 mg/dL      Calcium 8.1 mg/dL      eGFR 67 ml/min/1.73sq m     Narrative:      National Kidney Disease Foundation guidelines for Chronic Kidney Disease (CKD):     Stage 1 with normal or high GFR (GFR > 90 mL/min/1.73 square meters)    Stage 2 Mild CKD (GFR = 60-89 mL/min/1.73 square meters)    Stage 3A Moderate CKD (GFR = 45-59 mL/min/1.73 square meters)    Stage 3B Moderate CKD (GFR = 30-44 mL/min/1.73 square meters)    Stage 4 Severe CKD (GFR = 15-29 mL/min/1.73 square meters)    Stage 5 End Stage CKD (GFR <15 mL/min/1.73 square meters)  Note: GFR calculation is accurate only with a steady state creatinine    Magnesium [[id number]]  (Normal) Collected: 04/06/24 0513    Lab Status: Final result Specimen: Blood from Arm, Left Updated: 04/06/24 0601     Magnesium 2.2 mg/dL     CBC and differential [[id number]]  (Abnormal) Collected: 04/06/24 0513    Lab Status: Final result Specimen: Blood from Arm, Left Updated: 04/06/24 0541     WBC 13.09 Thousand/uL      RBC 3.43 Million/uL      Hemoglobin 10.4 g/dL      Hematocrit 32.9 %      MCV 96 fL      MCH 30.3 pg      MCHC 31.6 g/dL      RDW 13.8 %      MPV 10.2 fL      Platelets 315 Thousands/uL      nRBC 0 /100 WBCs      Neutrophils Relative 71 %      Immature Grans % 0 %      Lymphocytes Relative 20 %      Monocytes Relative 6 %      Eosinophils Relative 2 %      Basophils Relative 1 %      Neutrophils Absolute 9.33 Thousands/µL      Absolute Immature Grans 0.05 Thousand/uL      Absolute  Lymphocytes 2.57 Thousands/µL      Absolute Monocytes 0.84 Thousand/µL      Eosinophils Absolute 0.24 Thousand/µL      Basophils Absolute 0.06 Thousands/µL     HS Troponin I 4hr [[id number]]  (Normal) Collected: 04/05/24 2258    Lab Status: Final result Specimen: Blood from Arm, Right Updated: 04/05/24 2328     hs TnI 4hr 4 ng/L      Delta 4hr hsTnI 0 ng/L     Blood culture #1 [[id number]] Collected: 04/05/24 2251    Lab Status: In process Specimen: Blood from Arm, Left Updated: 04/05/24 2256    Blood culture #2 [[id number]] Collected: 04/05/24 2251    Lab Status: In process Specimen: Blood from Arm, Right Updated: 04/05/24 2255    HS Troponin I 2hr [[id number]]  (Normal) Collected: 04/05/24 2052    Lab Status: Final result Specimen: Blood from Arm, Right Updated: 04/05/24 2217     hs TnI 2hr 4 ng/L      Delta 2hr hsTnI 0 ng/L     Lactic acid, plasma (w/reflex if result > 2.0) [[id number]]  (Normal) Collected: 04/05/24 1953    Lab Status: Final result Specimen: Blood from Arm, Right Updated: 04/05/24 2130     LACTIC ACID 0.5 mmol/L     Narrative:      Result may be elevated if tourniquet was used during collection.    Urine culture [[id number]] Collected: 04/05/24 2025    Lab Status: In process Specimen: Urine, Clean Catch Updated: 04/05/24 2050    HS Troponin 0hr (reflex protocol) [[id number]]  (Normal) Collected: 04/05/24 1857    Lab Status: Final result Specimen: Blood from Arm, Right Updated: 04/05/24 2042     hs TnI 0hr 4 ng/L     Urine Microscopic [[id number]]  (Abnormal) Collected: 04/05/24 2025    Lab Status: Final result Specimen: Urine, Clean Catch Updated: 04/05/24 2040     RBC, UA 4-10 /hpf      WBC, UA 2-4 /hpf      Epithelial Cells Moderate /hpf      Bacteria, UA Innumerable /hpf     UA (URINE) with reflex to Scope [[id number]]  (Abnormal) Collected: 04/05/24 2025    Lab Status: Final result Specimen: Urine, Clean Catch Updated: 04/05/24 2032     Color, UA Yellow     Clarity, UA Clear     Specific [place], UA  1.015     pH, UA 6.0     Leukocytes, UA Negative     Nitrite, UA Positive     Protein, UA 2+ mg/dl      Glucose, UA Negative mg/dl      Ketones, UA Negative mg/dl      Urobilinogen, UA 0.2 E.U./dl      Bilirubin, UA Negative     Occult Blood, UA 2+    B-Type Natriuretic Peptide(BNP) [[id number]]  (Normal) Collected: 04/05/24 1857    Lab Status: Final result Specimen: Blood from Arm, Right Updated: 04/05/24 2011     BNP 98 pg/mL     TSH, 3rd generation with Free T4 reflex [[id number]]  (Normal) Collected: 04/05/24 1857    Lab Status: Final result Specimen: Blood from Arm, Right Updated: 04/05/24 1956     TSH 3RD GENERATON 1.678 uIU/mL     Procalcitonin [[id number]]  (Normal) Collected: 04/05/24 1857    Lab Status: Final result Specimen: Blood from Arm, Right Updated: 04/05/24 1956     Procalcitonin 0.05 ng/ml     Comprehensive metabolic panel [[id number]]  (Abnormal) Collected: 04/05/24 1857    Lab Status: Final result Specimen: Blood from Arm, Right Updated: 04/05/24 1919     Sodium 139 mmol/L      Potassium 3.6 mmol/L      Chloride 107 mmol/L      CO2 22 mmol/L      ANION GAP 10 mmol/L      BUN 28 mg/dL      Creatinine 1.14 mg/dL      Glucose 111 mg/dL      Calcium 8.8 mg/dL      AST 12 U/L      ALT 12 U/L      Alkaline Phosphatase 101 U/L      Total Protein 7.3 g/dL      Albumin 3.7 g/dL      Total Bilirubin 0.20 mg/dL      eGFR 50 ml/min/1.73sq m     Narrative:      National Kidney Disease Foundation guidelines for Chronic Kidney Disease (CKD):     Stage 1 with normal or high GFR (GFR > 90 mL/min/1.73 square meters)    Stage 2 Mild CKD (GFR = 60-89 mL/min/1.73 square meters)    Stage 3A Moderate CKD (GFR = 45-59 mL/min/1.73 square meters)    Stage 3B Moderate CKD (GFR = 30-44 mL/min/1.73 square meters)    Stage 4 Severe CKD (GFR = 15-29 mL/min/1.73 square meters)    Stage 5 End Stage CKD (GFR <15 mL/min/1.73 square meters)  Note: GFR calculation is accurate only with a steady state creatinine    Magnesium [[id number]]   (Abnormal) Collected: 04/05/24 1857    Lab Status: Final result Specimen: Blood from Arm, Right Updated: 04/05/24 1919     Magnesium 1.8 mg/dL     CBC and differential [[id number]]  (Abnormal) Collected: 04/05/24 1857    Lab Status: Final result Specimen: Blood from Arm, Right Updated: 04/05/24 1904     WBC 17.48 Thousand/uL      RBC 3.84 Million/uL      Hemoglobin 11.9 g/dL      Hematocrit 36.3 %      MCV 95 fL      MCH 31.0 pg      MCHC 32.8 g/dL      RDW 13.9 %      MPV 10.1 fL      Platelets 356 Thousands/uL      nRBC 0 /100 WBCs      Neutrophils Relative 79 %      Immature Grans % 1 %      Lymphocytes Relative 13 %      Monocytes Relative 6 %      Eosinophils Relative 1 %      Basophils Relative 0 %      Neutrophils Absolute 13.90 Thousands/µL      Absolute Immature Grans 0.08 Thousand/uL      Absolute Lymphocytes 2.32 Thousands/µL      Absolute Monocytes 1.00 Thousand/µL      Eosinophils Absolute 0.14 Thousand/µL      Basophils Absolute 0.04 Thousands/µL                    CTA ED chest PE study   Final Result by Elmer Garza MD (04/05 2241)      Limited evaluation due to contrast bolus. No main or lobar pulmonary embolism.      Workstation performed: [id number]         XR chest 2 views   ED Interpretation by Santiago Sue MD (04/05 2038)   No acute cardiopulmonary process when compared to chest x-ray from 3/13/2024      Final Result by Jayden Gross MD (04/06 1024)      No acute cardiopulmonary disease.            Workstation performed: [id number]                    Procedures  ECG 12 Lead Documentation Only    Date/Time: 4/5/2024 7:26 PM    Performed by: Santiago Sue MD  Authorized by: Santiago Sue MD    ECG reviewed by me, the ED Provider: yes    Patient location:  ED  Previous ECG:     Previous ECG:  Compared to current    Similarity:  Changes noted    Comparison to cardiac monitor: Yes    Interpretation:     Interpretation: non-specific    Rate:     ECG rate assessment:  tachycardic    Rhythm:     Rhythm: sinus tachycardia    Ectopy:     Ectopy: none    QRS:     QRS axis:  Normal  Conduction:     Conduction: normal    ST segments:     ST segments:  Normal  T waves:     T waves: normal    CriticalCare Time    Date/Time: 4/5/2024 9:00 PM    Performed by: Santiago Sue MD  Authorized by: Santiago Sue MD    Critical care provider statement:     Critical care time (minutes):  32    Critical care start time:  4/5/2024 8:02 PM    Critical care end time:  4/5/2024 8:34 PM    Critical care was necessary to treat or prevent imminent or life-threatening deterioration of the following conditions:  Sepsis    Critical care was time spent personally by me on the following activities:  Obtaining history from patient or surrogate, development of treatment plan with patient or surrogate, review of old charts, re-evaluation of patient's condition, ordering and review of radiographic studies, ordering and review of laboratory studies, evaluation of patient's response to treatment and examination of patient    I assumed direction of critical care for this patient from another provider in my specialty: no             ED Course  ED Course as of 04/06/24 1205   Fri Apr 05, 2024   1908 WBC(!): 17.48   1958 Procalcitonin: 0.05   2015 BNP: 98   2036 Nitrite, UA(!): Positive   2041 Bacteria, UA(!): Innumerable   2043 hs TnI 0hr: 4                            Initial Sepsis Screening       Row Name 04/05/24 2056                Is the patient's history suggestive of a new or worsening infection? Yes (Proceed)  -KF        Suspected source of infection urinary tract infection  -KF        Indicate SIRS criteria Tachycardia > 90 bpm;Leukocytosis (WBC > 81141 IJL) OR Leukopenia (WBC <4000 IJL) OR Bandemia (WBC >10% bands)  -KF        Are two or more of the above signs & symptoms of infection both present and new to the patient? Yes (Proceed)  -KF        Assess for evidence of organ dysfunction: Are any  of the below criteria present within 6 hours of suspected infection and SIRS criteria that are NOT considered to be chronic conditions? --                  User Key  (r) = Recorded By, (t) = Taken By, (c) = Cosigned By      Initials Name Provider Type    LUDY Sue MD Physician                    SBIRT 20yo+      Flowsheet Row Most Recent Value   Initial Alcohol Screen: US AUDIT-C     1. How often do you have a drink containing alcohol? 0 Filed at: 04/05/2024 2247   2. How many drinks containing alcohol do you have on a typical day you are drinking?  0 Filed at: 04/05/2024 2247   3b. FEMALE Any Age, or MALE 65+: How often do you have 4 or more drinks on one occassion? 0 Filed at: 04/05/2024 2247   Audit-C Score 0 Filed at: 04/05/2024 2247   JOHN: How many times in the past year have you...    Used an illegal drug or used a prescription medication for non-medical reasons? Never Filed at: 04/05/2024 2247                      Medical Decision Making  63-year-old female presented to the emergency department for a medication refill.  On arrival the patient was noted to be significantly tachycardic with a heart rate of 150.  EKG was obtained to evaluate for acute ischemia/arrhythmia.  On my interpretation EKG with a sinus tachycardic rhythm with no acute ischemic changes noted.  Chest x-ray ordered to evaluate for acute cardiopulmonary process including but not limited to pneumonia, pneumothorax, edema, effusion.  On my interpretation chest x-ray with no acute cardiopulmonary process.  Blood work done in the emergency department showed the patient had a leukocytosis.  Urinalysis was concerning for a urinary tract infection.  Patient meeting sepsis criteria.  The patient was treated with a fluid bolus and a dose of IV Rocephin.  Patient was closely monitored throughout her entire time here in the emergency department as she was at significant risk for decompensation based on her abnormal vital signs.  Patient  did have some improvement after the initial fluid bolus bringing her heart rate down to the 130s.  CT PE study was ordered to rule out a pulmonary embolism.  The patient was signed out at change of shift to a different provider pending results of the PE study.  Plan is for admission and patient is in agreement with that plan.    Amount and/or Complexity of Data Reviewed  External Data Reviewed: labs, radiology and ECG.     Details: Prior labs, imaging and EKG reviewed for comparison  Labs: ordered. Decision-making details documented in ED Course.  Radiology: ordered and independent interpretation performed.  ECG/medicine tests: ordered and independent interpretation performed.    Risk  OTC drugs.  Prescription drug management.  Decision regarding hospitalization.             Disposition  Final diagnoses:   Acute UTI   Tachycardia   Hypomagnesemia     Time reflects when diagnosis was documented in both MDM as applicable and the Disposition within this note       Time User Action Codes Description Comment    4/5/2024  8:54 PM Eran Whiting Add [N39.0] Acute UTI     4/5/2024  8:54 PM Eran Whiting Add [R00.0] Tachycardia     4/5/2024  9:06 PM Eran Whiting Add [E83.42] Hypomagnesemia     4/5/2024 10:59 PM Eran Whiting Modify [N39.0] Acute UTI           ED Disposition       ED Disposition   Admit    Condition   Stable    Date/Time   Fri Apr 5, 2024 2300    Comment   Case was reviewed with medicine team and the patient's admission status was agreed to be Admission Status: inpatient status to the service of .               Follow-up Information    None         Current Discharge Medication List        CONTINUE these medications which have NOT CHANGED    Details   albuterol (2.5 mg/3 mL) 0.083 % nebulizer solution Take 3 mL (2.5 mg total) by nebulization every 6 (six) hours as needed for wheezing or shortness of breath  Qty: 75 mL, Refills: 0    Associated Diagnoses: Dyspnea      albuterol (Proventil HFA) 90  mcg/act inhaler Inhale 2 puffs every 6 (six) hours as needed for wheezing  Qty: 6.7 g, Refills: 0    Comments: Substitution to a formulary equivalent within the same pharmaceutical class is authorized.  Associated Diagnoses: Chronic obstructive pulmonary disease, unspecified COPD type ([facility])      calcium carbonate (OYSTER SHELL,OSCAL) 500 mg Take 2 tablets by mouth daily with breakfast  Qty: 180 tablet, Refills: 1    Associated Diagnoses: Medical clearance for psychiatric admission      carBAMazepine (TEGretol) 200 mg tablet Take 1 tablet (200 mg total) by mouth 3 (three) times a day  Qty: 90 tablet, Refills: 0    Associated Diagnoses: Epilepsy with altered consciousness with intractable epilepsy ([facility]); Medication refill      cholecalciferol (VITAMIN D3) 1,000 units tablet Take 2 tablets (2,000 Units total) by mouth daily  Qty: 60 tablet, Refills: 1    Associated Diagnoses: Severe episode of recurrent major depressive disorder, with psychotic features ([facility])      escitalopram (LEXAPRO) 10 mg tablet Take 1 tablet (10 mg total) by mouth daily  Qty: 90 tablet, Refills: 1    Associated Diagnoses: Severe episode of recurrent major depressive disorder, with psychotic features ([facility])      fluticasone (FLONASE) 50 mcg/act nasal spray 1 spray into each nostril daily  Qty: 11.1 mL, Refills: 0    Associated Diagnoses: Medical clearance for psychiatric admission      levETIRAcetam (KEPPRA) 750 mg tablet Take 2 tablets (1,500 mg total) by mouth 2 (two) times a day  Qty: 120 tablet, Refills: 0    Associated Diagnoses: Medication refill      losartan (COZAAR) 25 mg tablet Take 1 tablet (25 mg total) by mouth daily  Qty: 90 tablet, Refills: 0    Associated Diagnoses: Hypokalemia; Essential hypertension; Water retention; Stage 3a chronic kidney disease ([facility]); Acute renal failure superimposed on chronic kidney disease  ([facility]); Parenchymal renal hypertension, stage 1 through stage 4 or unspecified chronic kidney disease; Anemia due to  stage 3a chronic kidney disease  ([facility]); Acute renal failure with acute tubular necrosis superimposed on stage 3a chronic kidney disease ([facility])      metoprolol succinate (TOPROL-XL) 50 mg 24 hr tablet Take 1 tablet (50 mg total) by mouth daily  Qty: 90 tablet, Refills: 1    Associated Diagnoses: Essential hypertension      nicotine (NICODERM CQ) 21 mg/24 hr TD 24 hr patch Place 1 patch on the skin over 24 hours daily  Qty: 28 patch, Refills: 1    Associated Diagnoses: Tobacco abuse      pramipexole (MIRAPEX) 1.5 MG tablet Take 1 tablet (1.5 mg total) by mouth daily at bedtime  Qty: 90 tablet, Refills: 0    Associated Diagnoses: Restless leg syndrome      spironolactone (ALDACTONE) 25 mg tablet Take 1 tablet (25 mg total) by mouth daily  Qty: 90 tablet, Refills: 1    Associated Diagnoses: Lymphedema; Essential hypertension             No discharge procedures on file.    PDMP Review         Value Time User    PDMP Reviewed  Yes 7/23/2021 10:52 AM Montana Schmidt MD            ED Provider  Electronically Signed by             Santiago Sue MD  04/06/24 [id number]

## 2024-04-06 LAB
ANION GAP SERPL CALCULATED.3IONS-SCNC: 7 MMOL/L (ref 4–13)
BASOPHILS # BLD AUTO: 0.06 THOUSANDS/ÂΜL (ref 0–0.1)
BASOPHILS NFR BLD AUTO: 1 % (ref 0–1)
BUN SERPL-MCNC: 20 MG/DL (ref 5–25)
CALCIUM SERPL-MCNC: 8.1 MG/DL (ref 8.4–10.2)
CHLORIDE SERPL-SCNC: 109 MMOL/L (ref 96–108)
CO2 SERPL-SCNC: 24 MMOL/L (ref 21–32)
CREAT SERPL-MCNC: 0.9 MG/DL (ref 0.6–1.3)
EOSINOPHIL # BLD AUTO: 0.24 THOUSAND/ÂΜL (ref 0–0.61)
EOSINOPHIL NFR BLD AUTO: 2 % (ref 0–6)
ERYTHROCYTE [DISTWIDTH] IN BLOOD BY AUTOMATED COUNT: 13.8 % (ref 11.6–15.1)
GFR SERPL CREATININE-BSD FRML MDRD: 67 ML/MIN/1.73SQ M
GLUCOSE SERPL-MCNC: 92 MG/DL (ref 65–140)
HCT VFR BLD AUTO: 32.9 % (ref 34.8–46.1)
HGB BLD-MCNC: 10.4 G/DL (ref 11.5–15.4)
IMM GRANULOCYTES # BLD AUTO: 0.05 THOUSAND/UL (ref 0–0.2)
IMM GRANULOCYTES NFR BLD AUTO: 0 % (ref 0–2)
LYMPHOCYTES # BLD AUTO: 2.57 THOUSANDS/ÂΜL (ref 0.6–4.47)
LYMPHOCYTES NFR BLD AUTO: 20 % (ref 14–44)
MAGNESIUM SERPL-MCNC: 2.2 MG/DL (ref 1.9–2.7)
MCH RBC QN AUTO: 30.3 PG (ref 26.8–34.3)
MCHC RBC AUTO-ENTMCNC: 31.6 G/DL (ref 31.4–37.4)
MCV RBC AUTO: 96 FL (ref 82–98)
MONOCYTES # BLD AUTO: 0.84 THOUSAND/ÂΜL (ref 0.17–1.22)
MONOCYTES NFR BLD AUTO: 6 % (ref 4–12)
NEUTROPHILS # BLD AUTO: 9.33 THOUSANDS/ÂΜL (ref 1.85–7.62)
NEUTS SEG NFR BLD AUTO: 71 % (ref 43–75)
NRBC BLD AUTO-RTO: 0 /100 WBCS
PLATELET # BLD AUTO: 315 THOUSANDS/UL (ref 149–390)
PMV BLD AUTO: 10.2 FL (ref 8.9–12.7)
POTASSIUM SERPL-SCNC: 3.8 MMOL/L (ref 3.5–5.3)
RBC # BLD AUTO: 3.43 MILLION/UL (ref 3.81–5.12)
SODIUM SERPL-SCNC: 140 MMOL/L (ref 135–147)
WBC # BLD AUTO: 13.09 THOUSAND/UL (ref 4.31–10.16)

## 2024-04-06 PROCEDURE — 80048 BASIC METABOLIC PNL TOTAL CA: CPT | Performed by: INTERNAL MEDICINE

## 2024-04-06 PROCEDURE — 83735 ASSAY OF MAGNESIUM: CPT | Performed by: INTERNAL MEDICINE

## 2024-04-06 PROCEDURE — 99232 SBSQ HOSP IP/OBS MODERATE 35: CPT | Performed by: INTERNAL MEDICINE

## 2024-04-06 PROCEDURE — 94760 N-INVAS EAR/PLS OXIMETRY 1: CPT

## 2024-04-06 PROCEDURE — 94640 AIRWAY INHALATION TREATMENT: CPT

## 2024-04-06 PROCEDURE — 85025 COMPLETE CBC W/AUTO DIFF WBC: CPT | Performed by: INTERNAL MEDICINE

## 2024-04-06 RX ORDER — MAGNESIUM SULFATE HEPTAHYDRATE 40 MG/ML
2 INJECTION, SOLUTION INTRAVENOUS ONCE
Status: COMPLETED | OUTPATIENT
Start: 2024-04-06 | End: 2024-04-06

## 2024-04-06 RX ORDER — MONTELUKAST SODIUM 10 MG/1
10 TABLET ORAL
Status: DISCONTINUED | OUTPATIENT
Start: 2024-04-06 | End: 2024-04-09 | Stop reason: HOSPADM

## 2024-04-06 RX ORDER — IPRATROPIUM BROMIDE AND ALBUTEROL SULFATE 2.5; .5 MG/3ML; MG/3ML
3 SOLUTION RESPIRATORY (INHALATION) ONCE
Status: COMPLETED | OUTPATIENT
Start: 2024-04-06 | End: 2024-04-06

## 2024-04-06 RX ORDER — FLUTICASONE FUROATE AND VILANTEROL 100; 25 UG/1; UG/1
1 POWDER RESPIRATORY (INHALATION)
Status: DISCONTINUED | OUTPATIENT
Start: 2024-04-07 | End: 2024-04-09 | Stop reason: HOSPADM

## 2024-04-06 RX ADMIN — CARBAMAZEPINE 200 MG: 200 TABLET ORAL at 20:07

## 2024-04-06 RX ADMIN — PRAMIPEXOLE DIHYDROCHLORIDE 1.5 MG: 0.5 TABLET ORAL at 00:06

## 2024-04-06 RX ADMIN — PRAMIPEXOLE DIHYDROCHLORIDE 1.5 MG: 0.5 TABLET ORAL at 21:17

## 2024-04-06 RX ADMIN — METOPROLOL SUCCINATE 50 MG: 50 TABLET, EXTENDED RELEASE ORAL at 00:06

## 2024-04-06 RX ADMIN — MONTELUKAST 10 MG: 10 TABLET, FILM COATED ORAL at 21:16

## 2024-04-06 RX ADMIN — CARBAMAZEPINE 200 MG: 200 TABLET ORAL at 00:06

## 2024-04-06 RX ADMIN — ESCITALOPRAM OXALATE 10 MG: 10 TABLET ORAL at 09:14

## 2024-04-06 RX ADMIN — MAGNESIUM SULFATE HEPTAHYDRATE 1 G: 1 INJECTION, SOLUTION INTRAVENOUS at 00:05

## 2024-04-06 RX ADMIN — SPIRONOLACTONE 25 MG: 25 TABLET ORAL at 09:15

## 2024-04-06 RX ADMIN — CARBAMAZEPINE 200 MG: 200 TABLET ORAL at 17:49

## 2024-04-06 RX ADMIN — CEFTRIAXONE 2000 MG: 2 INJECTION, SOLUTION INTRAVENOUS at 20:07

## 2024-04-06 RX ADMIN — LOSARTAN POTASSIUM 25 MG: 25 TABLET, FILM COATED ORAL at 09:14

## 2024-04-06 RX ADMIN — FLUTICASONE PROPIONATE 1 SPRAY: 50 SPRAY, METERED NASAL at 09:19

## 2024-04-06 RX ADMIN — LEVETIRACETAM 1500 MG: 500 TABLET, FILM COATED ORAL at 09:14

## 2024-04-06 RX ADMIN — MAGNESIUM SULFATE HEPTAHYDRATE 2 G: 40 INJECTION, SOLUTION INTRAVENOUS at 12:00

## 2024-04-06 RX ADMIN — IPRATROPIUM BROMIDE AND ALBUTEROL SULFATE 3 ML: .5; 3 SOLUTION RESPIRATORY (INHALATION) at 11:47

## 2024-04-06 RX ADMIN — NICOTINE 1 PATCH: 21 PATCH, EXTENDED RELEASE TRANSDERMAL at 09:14

## 2024-04-06 RX ADMIN — METOPROLOL SUCCINATE 50 MG: 50 TABLET, EXTENDED RELEASE ORAL at 09:15

## 2024-04-06 RX ADMIN — CARBAMAZEPINE 200 MG: 200 TABLET ORAL at 09:15

## 2024-04-06 RX ADMIN — ENOXAPARIN SODIUM 40 MG: 40 INJECTION SUBCUTANEOUS at 09:14

## 2024-04-06 RX ADMIN — LEVETIRACETAM 1500 MG: 500 TABLET, FILM COATED ORAL at 17:49

## 2024-04-06 NOTE — NURSING NOTE
Patient arrived to floor, oriented to room. Ambulatory, steady gait. Rash noted to skin folds. Patient SOB with exertion, audible wheeze auscultated. Denies pain. IV infusing well. Call bell in reach. Will continue to monitor closely.

## 2024-04-06 NOTE — ASSESSMENT & PLAN NOTE
Patient came after running out of Lexapro and not taking it since last 5 days.  History of noncompliance with seizure medication.  Restart Lexapro 1500 mg twice daily.  Continue carbamazepine.  Follow-up with neurology outpatient.

## 2024-04-06 NOTE — ED CARE HANDOFF
Emergency Department Sign Out Note        Signout and transfer of care from my colleague, Dr. Snachez. See Separate Emergency Department note.     The patient, Cydney Lim, was evaluated by the previous provider for medication refill.    Labs Reviewed   CBC AND DIFFERENTIAL - Abnormal       Result Value Ref Range Status    WBC 17.48 (*) 4.31 - 10.16 Thousand/uL Final    RBC 3.84  3.81 - 5.12 Million/uL Final    Hemoglobin 11.9  11.5 - 15.4 g/dL Final    Hematocrit 36.3  34.8 - 46.1 % Final    MCV 95  82 - 98 fL Final    MCH 31.0  26.8 - 34.3 pg Final    MCHC 32.8  31.4 - 37.4 g/dL Final    RDW 13.9  11.6 - 15.1 % Final    MPV 10.1  8.9 - 12.7 fL Final    Platelets 356  149 - 390 Thousands/uL Final    nRBC 0  /100 WBCs Final    Neutrophils Relative 79 (*) 43 - 75 % Final    Immature Grans % 1  0 - 2 % Final    Lymphocytes Relative 13 (*) 14 - 44 % Final    Monocytes Relative 6  4 - 12 % Final    Eosinophils Relative 1  0 - 6 % Final    Basophils Relative 0  0 - 1 % Final    Neutrophils Absolute 13.90 (*) 1.85 - 7.62 Thousands/µL Final    Absolute Immature Grans 0.08  0.00 - 0.20 Thousand/uL Final    Absolute Lymphocytes 2.32  0.60 - 4.47 Thousands/µL Final    Absolute Monocytes 1.00  0.17 - 1.22 Thousand/µL Final    Eosinophils Absolute 0.14  0.00 - 0.61 Thousand/µL Final    Basophils Absolute 0.04  0.00 - 0.10 Thousands/µL Final   COMPREHENSIVE METABOLIC PANEL - Abnormal    Sodium 139  135 - 147 mmol/L Final    Potassium 3.6  3.5 - 5.3 mmol/L Final    Chloride 107  96 - 108 mmol/L Final    CO2 22  21 - 32 mmol/L Final    ANION GAP 10  4 - 13 mmol/L Final    BUN 28 (*) 5 - 25 mg/dL Final    Creatinine 1.14  0.60 - 1.30 mg/dL Final    Comment: Standardized to IDMS reference method    Glucose 111  65 - 140 mg/dL Final    Comment: If the patient is fasting, the ADA then defines impaired fasting glucose as > 100 mg/dL and diabetes as > or equal to 123 mg/dL.    Calcium 8.8  8.4 - 10.2 mg/dL Final    AST 12 (*) 13  - 39 U/L Final    ALT 12  7 - 52 U/L Final    Comment: Specimen collection should occur prior to Sulfasalazine administration due to the potential for falsely depressed results.     Alkaline Phosphatase 101  34 - 104 U/L Final    Total Protein 7.3  6.4 - 8.4 g/dL Final    Albumin 3.7  3.5 - 5.0 g/dL Final    Total Bilirubin 0.20  0.20 - 1.00 mg/dL Final    Comment: Use of this assay is not recommended for patients undergoing treatment with eltrombopag due to the potential for falsely elevated results.  N-acetyl-p-benzoquinone imine (metabolite of Acetaminophen) will generate erroneously low results in samples for patients that have taken an overdose of Acetaminophen.    eGFR 50  ml/min/1.73sq m Final    Narrative:     [employer] Kid[employer] guidelines for Chronic Kidney Disease (CKD):     Stage 1 with normal or high GFR (GFR > 90 mL/min/1.73 square meters)    Stage 2 Mild CKD (GFR = 60-89 mL/min/1.73 square meters)    Stage 3A Moderate CKD (GFR = 45-59 mL/min/1.73 square meters)    Stage 3B Moderate CKD (GFR = 30-44 mL/min/1.73 square meters)    Stage 4 Severe CKD (GFR = 15-29 mL/min/1.73 square meters)    Stage 5 End Stage CKD (GFR <15 mL/min/1.73 square meters)  Note: GFR calculation is accurate only with a steady state creatinine   MAGNESIUM - Abnormal    Magnesium 1.8 (*) 1.9 - 2.7 mg/dL Final   URINALYSIS WITH REFLEX TO SCOPE - Abnormal    Color, UA Yellow  Yellow Final    Clarity, UA Clear  Clear Final    Specific Gravity, UA 1.015  1.001 - 1.030 Final    pH, UA 6.0  5.0, 5.5, 6.0, 6.5, 7.0, 7.5, 8.0 Final    Leukocytes, UA Negative  Negative Final    Nitrite, UA Positive (*) Negative Final    Protein, UA 2+ (*) Negative, Interference- unable to analyze mg/dl Final    Glucose, UA Negative  Negative mg/dl Final    Ketones, UA Negative  Negative mg/dl Final    Urobilinogen, UA 0.2  0.2, 1.0 E.U./dl E.U./dl Final    Bilirubin, UA Negative  Negative Final    Occult Blood, UA 2+ (*) Negative Final  "  URINE MICROSCOPIC - Abnormal    RBC, UA 4-10 (*) None Seen, 0-1, 1-2, 2-4, 0-5 /hpf Final    WBC, UA 2-4  None Seen, 0-1, 1-2, 0-5, 2-4 /hpf Final    Epithelial Cells Moderate (*) None Seen, Occasional /hpf Final    Bacteria, UA Innumerable (*) None Seen, Occasional /hpf Final   TSH, 3RD GENERATION WITH FREE T4 REFLEX - Normal    TSH 3RD GENERATON 1.678  0.450 - 4.500 uIU/mL Final    Comment: The recommended reference ranges for TSH during pregnancy are as follows:   First trimester 0.100 to 2.500 uIU/mL   Second trimester  0.200 to 3.000 uIU/mL   Third trimester 0.300 to 3.000 uIU/m    Note: Normal ranges may not apply to patients who are transgender, non-binary, or whose legal sex, sex at birth, and gender identity differ.  Adult TSH (3rd generation) reference range follows the recommended guidelines of the [employer], January, 2020.   HS TROPONIN I 0HR - Normal    hs TnI 0hr 4  \"Refer to ACS Flowchart\"- see link ng/L Final    Comment:                                              Initial (time 0) result  If >=50 ng/L, Myocardial injury suggested ;  Type of myocardial injury and treatment strategy  to be determined.  If 5-49 ng/L, a delta result at 2 hours and or 4 hours will be needed to further evaluate.  If <4 ng/L, and chest pain has been >3 hours since onset, patient may qualify for discharge based on the HEART score in the ED.  If <5 ng/L and <3hours since onset of chest pain, a delta result at 2 hours will be needed to further evaluate.    HS Troponin 99th Percentile URL of a Health Population=12 ng/L with a 95% Confidence Interval of 8-18 ng/L.    Second Troponin (time 2 hours)  If calculated delta >= 20 ng/L,  Myocardial injury suggested ; Type of myocardial injury and treatment strategy to be determined.  If 5-49 ng/L and the calculated delta is 5-19 ng/L, consult medical service for evaluation.  Continue evaluation for ischemia on ecg and other possible etiology and repeat hs troponin " at 4 hours.  If delta is <5 ng/L at 2 hours, consider discharge based on risk stratification via the HEART score (if in ED), or MER risk score in IP/Observation.    HS Troponin 99th Percentile URL of a Health Population=12 ng/L with a 95% Confidence Interval of 8-18 ng/L.   LACTIC ACID, PLASMA (W/REFLEX IF RESULT > 2.0) - Normal    LACTIC ACID 0.5  0.5 - 2.0 mmol/L Final    Narrative:     Result may be elevated if tourniquet was used during collection.   PROCALCITONIN TEST - Normal    Procalcitonin 0.05  <=0.25 ng/ml Final    Comment: Suspected Lower Respiratory Tract Infection (LRTI):  - LESS than or EQUAL to 0.25 ng/mL:   low likelihood for bacterial LRTI; antibiotics DISCOURAGED.  - GREATER than 0.25 ng/mL:   increased likelihood for bacterial LRTI; antibiotics ENCOURAGED.    Suspected Sepsis:  - Strongly consider initiating antibiotics in ALL UNSTABLE patients.  - LESS than or EQUAL to 0.5 ng/mL:   low likelihood for bacterial sepsis; antibiotics DISCOURAGED.  - GREATER than 0.5 ng/mL:   increased likelihood for bacterial sepsis; antibiotics ENCOURAGED.  - GREATER than 2 ng/mL:   high risk for severe sepsis / septic shock; antibiotics strongly ENCOURAGED.    Decisions on antibiotic use should not be based solely on Procalcitonin (PCT) levels. If PCT is low but uncertainty exists with stopping antibiotics, repeat PCT in 6-24 hours to confirm the low level. If antibiotics are administered (regardless if initial PCT was high or low), repeat PCT every 1-2 days to consider early antibiotic cessation (when GREATER than 80% decrease from the peak OR when PCT drops below designated cutoffs, whichever comes first), so long as the infection is NOT one that typically requires prolonged treatment durations (e.g., bone/joint infections, endocarditis, Staph. aureus bacteremia).    Situations of FALSE-POSITIVE Procalcitonin values:  1) Newborns < 72 hours old  2) Massive stress from severe trauma / burns, major surgery, acute  "pancreatitis, cardiogenic / hemorrhagic shock, sickle cell crisis, or other organ perfusion abnormalities  3) Malaria and some Candidal infections  4) Treatment with agents that stimulate cytokines (e.g., OKT3, anti-lymphocyte globulins, alemtuzumab, IL-2, granulocyte transfusion [NOT GCSFs])  5) Chronic renal disease causes elevated baseline levels (consider GREATER than 0.75 ng/mL as an abnormal cut-off); initiating HD/CRRT may cause transient decreases  6) Paraneoplastic syndromes from medullary thyroid or SCLC, some forms of vasculitis, and acute agzhz-by-dcqb disease    Situations of FALSE-NEGATIVE Procalcitonin values:  1) Too early in clinical course for PCT to have reached its peak (may repeat in 6-24 hours to confirm low level)  2) Localized infection WITHOUT systemic (SIRS / sepsis) response (e.g., an abscess, osteomyelitis, cystitis)  3) Mycobacteria (e.g., Tuberculosis, MAC)  4) Cystic fibrosis exacerbations     B-TYPE NATRIURETIC PEPTIDE (BNP) - Normal    BNP 98  0 - 100 pg/mL Final   HS TROPONIN I 2HR - Normal    hs TnI 2hr 4  \"Refer to ACS Flowchart\"- see link ng/L Final    Comment:                                              Initial (time 0) result  If >=50 ng/L, Myocardial injury suggested ;  Type of myocardial injury and treatment strategy  to be determined.  If 5-49 ng/L, a delta result at 2 hours and or 4 hours will be needed to further evaluate.  If <4 ng/L, and chest pain has been >3 hours since onset, patient may qualify for discharge based on the HEART score in the ED.  If <5 ng/L and <3hours since onset of chest pain, a delta result at 2 hours will be needed to further evaluate.    HS Troponin 99th Percentile URL of a Health Population=12 ng/L with a 95% Confidence Interval of 8-18 ng/L.    Second Troponin (time 2 hours)  If calculated delta >= 20 ng/L,  Myocardial injury suggested ; Type of myocardial injury and treatment strategy to be determined.  If 5-49 ng/L and the calculated delta is " 5-19 ng/L, consult medical service for evaluation.  Continue evaluation for ischemia on ecg and other possible etiology and repeat hs troponin at 4 hours.  If delta is <5 ng/L at 2 hours, consider discharge based on risk stratification via the HEART score (if in ED), or MER risk score in IP/Observation.    HS Troponin 99th Percentile URL of a Health Population=12 ng/L with a 95% Confidence Interval of 8-18 ng/L.    Delta 2hr hsTnI 0  <20 ng/L Final   URINE CULTURE   BLOOD CULTURE   BLOOD CULTURE   HS TROPONIN I 4HR       Patient is being treated for UTI.  CTA chest is pending.  Plan for admission for further evaluation and treatment.  CTA chest was obtained and noted.  Plan to admit patient for further evaluation and treatment, with concern for UTI, tachycardia, hypomagnesemia.  Case was reviewed with medicine team.  Plan of care was reviewed with patient.                            ED Course as of 04/05/24 2305   Fri Apr 05, 2024   2250 CTA chest-    IMPRESSION:     Limited evaluation due to contrast bolus. No main or lobar pulmonary embolism.       Procedures  Medical Decision Making  Amount and/or Complexity of Data Reviewed  Labs: ordered.  Radiology: ordered and independent interpretation performed.    Risk  OTC drugs.  Prescription drug management.  Decision regarding hospitalization.            Disposition  Final diagnoses:   Acute UTI   Tachycardia   Hypomagnesemia     Time reflects when diagnosis was documented in both MDM as applicable and the Disposition within this note       Time User Action Codes Description Comment    4/5/2024  8:54 PM Eran Whiting Add [N39.0] Acute UTI     4/5/2024  8:54 PM Eran Whiting Add [R00.0] Tachycardia     4/5/2024  9:06 PM Eran Whiting Add [E83.42] Hypomagnesemia     4/5/2024 10:59 PM Eran Whiting Modify [N39.0] Acute UTI           ED Disposition       ED Disposition   Admit    Condition   Stable    Date/Time   Fri Apr 5, 2024 11:00 PM    Comment   Case was reviewed  with medicine team and the patient's admission status was agreed to be Admission Status: inpatient status to the service of .               Follow-up Information    None       Patient's Medications   Discharge Prescriptions    No medications on file     No discharge procedures on file.       ED Provider  Electronically Signed by     Eran Whiting MD  04/05/24 4350       Eran Whiting MD  04/05/24 0508

## 2024-04-06 NOTE — H&P
ADMISSION NOTE   [facility]       Name: Cydney Lim   Age & Sex: 63 y.o. female   MRN: [id number]  Unit/Bed#: QING   Encounter: [id number]  Primary Care Provider: DRU Knott      * SIRS (systemic inflammatory response syndrome) ([facility])  Assessment & Plan  Presented for medication refill found to have tachycardia with heart rate 150 on presentation.  Following 2 sepsis criteria with tachycardia, leukocytosis and suspicion for UTI.  Complaining of urinary frequency and urgency but denies any dysuria.  No fever.  No complaints otherwise.  No palpitations, dizziness, chest pain.  TSH WNL.  Prior urine culture showing pansensitive Klebsiella in 2020.  Normal lactic acid.  CTA showing no PE.  Unclear whether tachycardia is directly related to infection given patient has very mild symptoms.    Continue ceftriaxone daily  Follow-up urine and blood culture  IV fluid bolus 1 L then monitor overnight with oral hydration given suspicion for mild volume overload.  Trend fever/leukocyte count      Sinus tachycardia seen on cardiac monitor  Assessment & Plan  Presented with heart rate of 150.  EKG showing sinus tachycardia.  No events on telemetry monitoring.  Suspicion for sepsis.  Even after IV fluid heart rate still persistently elevated in 130s.  Patient denies any palpitations.  No chest pain, shortness of breath or dizziness.  Had normal heart rate during recent hospitalization.  CTA PE study was negative.  Chest x-ray was unremarkable.  Unclear whether infection induced versus cardiogenic versus medication noncompliance.    Telemetry monitoring  Check echocardiogram given patient complaining of bilateral lower extremity edema    Acute cystitis without hematuria  Assessment & Plan  See plan as above    Essential hypertension  Assessment & Plan  Continue metoprolol, losartan, spironolactone.    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  History of noncompliance with seizure medication.  Restart Lexapro 1500 mg twice daily.  Continue carbamazepine.  Follow-up with neurology outpatient.        VTE Prophylaxis: Enoxaparin (Lovenox)  / sequential compression device and foot pump applied   Code Status: Level 1 full code  POLST: Unknown  Discussion with family: None    Anticipated Length of Stay:  Patient will be admitted on an Inpatient basis with an anticipated length of stay of more than 2 midnights.   Justification for Hospital Stay: Sinus tachycardia, SIRS criteria, suspicion for UTI    Total Time for Visit, including Counseling / Coordination of Care: 45 minutes.  Greater than 50% of this total time spent on direct patient counseling and coordination of care.    Chief Complaint:   Medication refill complaining of urgency and frequency of urine.    History of Present Illness:    Cydney Lim is a 63 y.o. female with past medical history of hypertension, recurrent seizure, depression who presents  to get medication refill after running out of Her medication since last 5 days.  Does have history of noncompliance with medication.  Has been indicated to hospitalized many times but has left AMA within short.  Of time.  Had recent hospitalization for syncope evaluation for which she wears left AMA as well.  Presented again today for medication refill found to have sinus tachycardia with heart rate running high at 150 on presentation.  Denies any complaints but does complain of urinary urgency and frequency.  No dysuria.  No fever.  No flank pain.  No hematuria.  Denies any chest pain or palpitations.  No shortness of breath.  Continues to smoke.  Denies any alcoholism or recreational drug use..    Review of Systems:    Review of Systems   Constitutional:  Negative for activity change, appetite change, chills,  diaphoresis, fatigue, fever and unexpected weight change.   HENT:  Negative for rhinorrhea and sore throat.    Eyes:  Negative for visual disturbance.   Respiratory:  Negative for cough, chest tightness and shortness of breath.    Cardiovascular:  Negative for chest pain, palpitations and leg swelling.   Gastrointestinal:  Negative for abdominal distention, abdominal pain, blood in stool, constipation, diarrhea, nausea and vomiting.   Genitourinary:  Positive for frequency and urgency. Negative for decreased urine volume, difficulty urinating, dysuria, flank pain, genital sores, hematuria, menstrual problem and vaginal bleeding.   Musculoskeletal:  Negative for arthralgias, back pain and gait problem.   Neurological:  Negative for headaches.   Psychiatric/Behavioral:  Negative for behavioral problems and sleep disturbance.        Past Medical and Surgical History:     Past Medical History:   Diagnosis Date    Depression     EP (epilepsy) (HCC)     Epilepsy (HCC)     Obesity     Restless leg        Past Surgical History:   Procedure Laterality Date    DENTAL SURGERY      all teeth removed    LASER ABLATION OF THE CERVIX      MAMMO (HISTORICAL)  05/01/2017       Meds/Allergies:    Prior to Admission medications    Medication Sig Start Date End Date Taking? Authorizing Provider   albuterol (2.5 mg/3 mL) 0.083 % nebulizer solution Take 3 mL (2.5 mg total) by nebulization every 6 (six) hours as needed for wheezing or shortness of breath 2/11/24   Andrzej Kaba MD   albuterol (Proventil HFA) 90 mcg/act inhaler Inhale 2 puffs every 6 (six) hours as needed for wheezing 2/11/24   Andrzej Kaba MD   calcium carbonate (OYSTER SHELL,OSCAL) 500 mg Take 2 tablets by mouth daily with breakfast 7/20/23 12/22/23  DRU Stapleton   carBAMazepine (TEGretol) 200 mg tablet Take 1 tablet (200 mg total) by mouth 3 (three) times a day 2/14/24 3/15/24  Marli Lugo PA-C   cholecalciferol (VITAMIN D3) 1,000 units tablet Take 2  tablets (2,000 Units total) by mouth daily 7/3/23 12/22/23  Isabel Hutchison MD   escitalopram (LEXAPRO) 10 mg tablet Take 1 tablet (10 mg total) by mouth daily 8/10/23 12/22/23  DRU Stapleton   fluticasone (FLONASE) 50 mcg/act nasal spray 1 spray into each nostril daily 11/30/23   Chela Mays DO   levETIRAcetam (KEPPRA) 750 mg tablet Take 2 tablets (1,500 mg total) by mouth 2 (two) times a day 3/6/24 4/5/24  RDU Stapleton   losartan (COZAAR) 25 mg tablet Take 1 tablet (25 mg total) by mouth daily 11/30/23 1/29/24  Chela Mays DO   metoprolol succinate (TOPROL-XL) 50 mg 24 hr tablet Take 1 tablet (50 mg total) by mouth daily 7/20/23 12/22/23  DRU Stapleton   nicotine (NICODERM CQ) 21 mg/24 hr TD 24 hr patch Place 1 patch on the skin over 24 hours daily 7/20/23   DRU Stapleton   pramipexole (MIRAPEX) 1.5 MG tablet Take 1 tablet (1.5 mg total) by mouth daily at bedtime 3/6/24 6/4/24  DRU Stapleton   spironolactone (ALDACTONE) 25 mg tablet Take 1 tablet (25 mg total) by mouth daily 12/22/23   DRU Stapleton   Pramipexole Dihydrochloride ER 1.5 MG TB24 TAKE 1 TABLET BY MOUTH DAILY FOR RESTLESS LEG SYNDROME 3/6/24 4/5/24  Historical Provider, MD LECHUGA have reviewed home medications with patient personally.    Allergies: No Known Allergies    Social History:     Marital Status: /Civil Union   Substance Use History:   Social History     Substance and Sexual Activity   Alcohol Use Never    Comment: pt denies alcohol use     Social History     Tobacco Use   Smoking Status Every Day    Current packs/day: 0.50    Average packs/day: 0.5 packs/day for 23.0 years (11.5 ttl pk-yrs)    Types: Cigarettes   Smokeless Tobacco Never     Social History     Substance and Sexual Activity   Drug Use Never       Family History:    Family History   Problem Relation Age of Onset    Kidney disease Mother     Liver disease Mother     Heart attack Mother     Heart attack Father     No  Known Problems Brother     No Known Problems Son         Physical Exam:     Vitals:   Blood Pressure: 123/91 (04/05/24 2233)  Pulse: (!) 128 (04/05/24 2316)  Temperature: 98.6 °F (37 °C) (04/05/24 1840)  Temp Source: Oral (04/05/24 1840)  Respirations: 18 (04/05/24 2233)  SpO2: 97 % (04/05/24 2316)    Physical Exam  Vitals reviewed.   Constitutional:       General: She is not in acute distress.     Appearance: Normal appearance. She is obese.   HENT:      Head: Normocephalic and atraumatic.   Eyes:      General: No scleral icterus.        Right eye: No discharge.         Left eye: No discharge.   Cardiovascular:      Rate and Rhythm: Regular rhythm. Tachycardia present.      Pulses: Normal pulses.      Heart sounds: Normal heart sounds.   Pulmonary:      Effort: Pulmonary effort is normal. No respiratory distress.      Breath sounds: Normal breath sounds. No wheezing or rales.   Chest:      Chest wall: No tenderness.   Abdominal:      General: Abdomen is flat. Bowel sounds are normal. There is no distension.      Palpations: Abdomen is soft.      Tenderness: There is no abdominal tenderness.   Musculoskeletal:         General: No deformity. Normal range of motion.      Cervical back: Normal range of motion and neck supple.      Right lower leg: No edema.      Left lower leg: No edema.   Skin:     General: Skin is warm.      Coloration: Skin is not jaundiced or pale.      Findings: No rash.   Neurological:      General: No focal deficit present.      Mental Status: She is alert and oriented to person, place, and time. Mental status is at baseline.      Cranial Nerves: No cranial nerve deficit.      Motor: No weakness.   Psychiatric:         Mood and Affect: Mood normal.         Behavior: Behavior normal.         Additional Data:     Lab Results: I have personally reviewed pertinent reports.      Results from last 7 days   Lab Units 04/05/24  1857   WBC Thousand/uL 17.48*   HEMOGLOBIN g/dL 11.9   HEMATOCRIT % 36.3    PLATELETS Thousands/uL 356   NEUTROS PCT % 79*   LYMPHS PCT % 13*   MONOS PCT % 6   EOS PCT % 1     Results from last 7 days   Lab Units 04/05/24  1857   SODIUM mmol/L 139   POTASSIUM mmol/L 3.6   CHLORIDE mmol/L 107   CO2 mmol/L 22   BUN mg/dL 28*   CREATININE mg/dL 1.14   ANION GAP mmol/L 10   CALCIUM mg/dL 8.8   ALBUMIN g/dL 3.7   TOTAL BILIRUBIN mg/dL 0.20   ALK PHOS U/L 101   ALT U/L 12   AST U/L 12*   GLUCOSE RANDOM mg/dL 111                 Results from last 7 days   Lab Units 04/05/24 1953 04/05/24  1857   LACTIC ACID mmol/L 0.5  --    PROCALCITONIN ng/ml  --  0.05       Imaging: I have personally reviewed pertinent reports.      CTA ED chest PE study   Final Result by Elmer Garza MD (04/05 2241)      Limited evaluation due to contrast bolus. No main or lobar pulmonary embolism.      Workstation performed: [id number]         XR chest 2 views   ED Interpretation by Santiago Sue MD (04/05 2038)   No acute cardiopulmonary process when compared to chest x-ray from 3/13/2024          EKG, Pathology, and Other Studies Reviewed on Admission:   EKG: Sinus tachycardia    Allscripts / Epic Records Reviewed: Yes     ** Please Note: This note has been constructed using a voice recognition system. **

## 2024-04-06 NOTE — ASSESSMENT & PLAN NOTE
Presented for medication refill found to have tachycardia with heart rate 150 on presentation.  Following 2 sepsis criteria with tachycardia, leukocytosis and suspicion for UTI.  Complaining of urinary frequency and urgency but denies any dysuria.  No fever.  No complaints otherwise.  No palpitations, dizziness, chest pain.  TSH WNL.  Prior urine culture showing pansensitive Klebsiella in 2020.  Normal lactic acid.  CTA showing no PE.  Unclear whether tachycardia is directly related to infection given patient has very mild symptoms.    Continue ceftriaxone daily  Follow-up urine and blood culture

## 2024-04-06 NOTE — PLAN OF CARE
Problem: Potential for Falls  Goal: Patient will remain free of falls  Description: INTERVENTIONS:  - Educate patient/family on patient safety including physical limitations  - Instruct patient to call for assistance with activity   - Consult OT/PT to assist with strengthening/mobility   - Keep Call bell within reach  - Keep bed low and locked with side rails adjusted as appropriate  - Keep care items and personal belongings within reach  - Initiate and maintain comfort rounds  - Make Fall Risk Sign visible to staff  - Apply yellow socks and bracelet for high fall risk patients  - Consider moving patient to room near nurses station  4/6/2024 0040 by Aly Siddiqui RN  Outcome: Progressing  4/6/2024 0039 by Aly Siddiqui RN  Outcome: Progressing     Problem: Knowledge Deficit  Goal: Patient/family/caregiver demonstrates understanding of disease process, treatment plan, medications, and discharge instructions  Description: Complete learning assessment and assess knowledge base.  Interventions:  - Provide teaching at level of understanding  - Provide teaching via preferred learning methods  Outcome: Progressing

## 2024-04-06 NOTE — RESPIRATORY THERAPY NOTE
RT Protocol Note  Cydney Lim 63 y.o. female MRN: [id number]  Unit/Bed#: -01 Encounter: [id number]    Assessment    Principal Problem:    SIRS (systemic inflammatory response syndrome) (HCC)  Active Problems:    Epilepsy with altered consciousness with intractable epilepsy (HCC)    Depression, recurrent (HCC)    Essential hypertension    Acute cystitis without hematuria    Sinus tachycardia seen on cardiac monitor      Home Pulmonary Medications:  Albuterol       Past Medical History:   Diagnosis Date    Depression     EP (epilepsy) (HCC)     Epilepsy (HCC)     Obesity     Restless leg      Social History     Socioeconomic History    Marital status: /Civil Union     Spouse name: None    Number of children: None    Years of education: None    Highest education level: None   Occupational History    Occupation:  disable   Tobacco Use    Smoking status: Every Day     Current packs/day: 0.50     Average packs/day: 0.5 packs/day for 23.0 years (11.5 ttl pk-yrs)     Types: Cigarettes    Smokeless tobacco: Never   Vaping Use    Vaping status: Never Used   Substance and Sexual Activity    Alcohol use: Never     Comment: pt denies alcohol use    Drug use: Never    Sexual activity: None   Other Topics Concern    None   Social History Narrative    · Most recent tobacco use screenin2019      · Do you currently or have you served in the [employer]:   No      · Were you activated, into active duty, as a member of the [employer] or as a Reservist:   No      · Sexual orientation:   Heterosexual      · Exercise level:   None      · Diet:   Regular      · General stress level:   Low      · Has smoked since age:   35      · Caffeine intake:   Moderate      · Guns present in home:   No      · Seat belts used routinely:   Yes      · Smoke alarm in home:   Yes      · Advance directive:   No      Social Determinants of Health     Financial Resource Strain: Low Risk  (3/12/2024)    Received from [facility]    Overall Financial Resource Strain (CARDIA)     Difficulty of Paying Living Expenses: Not hard at all   Food Insecurity: No Food Insecurity (3/12/2024)    Received from [facility]    Hunger Vital Sign     Worried About Running Out of Food in the Last Year: Never true     Ran Out of Food in the Last Year: Never true   Transportation Needs: No Transportation Needs (3/12/2024)    Received from [facility]    PRAPARE - Transportation     Lack of Transportation (Medical): No     Lack of Transportation (Non-Medical): No   Physical Activity: Not on file   Stress: Not on file   Social Connections: Not on file   Intimate Partner Violence: Not At Risk (3/12/2024)    Received from [facility]    Humiliation, Afraid, Rape, and Kick questionnaire     Fear of Current or Ex-Partner: No     Emotionally Abused: No     Physically Abused: No     Sexually Abused: No   Housing Stability: Low Risk  (3/12/2024)    Received from [facility]    Housing Stability Vital Sign     Unable to Pay for Housing in the Last Year: No     Number of Places Lived in the Last Year: 1     Unstable Housing in the Last Year: No       Subjective         Objective    Physical Exam:   Assessment Type: Pre-treatment  General Appearance: Alert, Awake  Respiratory Pattern: Normal  Chest Assessment: Chest expansion symmetrical  Cough: Non-productive, Dry, Strong    Vitals:  Blood pressure 140/75, pulse 72, temperature 98.2 °F (36.8 °C), temperature source Oral, resp. rate 17, height 5' 3\" (1.6 m), weight 118 kg (260 lb 2.3 oz), SpO2 99%, not currently breastfeeding.          Imaging and other studies: I have personally reviewed pertinent reports.            Plan    Respiratory Plan: Home Bronchodilator Patient pathway        Resp Comments: Patient assessed per Protocol.  Patient has a diagnoses of COPD, but no formal testing found.  Patient is currently on room air and lung " sounds are diminished with faint wheezes present.  Patient is a current smoker, 1/2 ppd.  Home medication reviewed and a PRN treatment ordered.  Respiratory to follow.

## 2024-04-06 NOTE — PLAN OF CARE
Problem: Potential for Falls  Goal: Patient will remain free of falls  Description: INTERVENTIONS:  - Educate patient/family on patient safety including physical limitations  - Instruct patient to call for assistance with activity   - Consult OT/PT to assist with strengthening/mobility   - Keep Call bell within reach  - Keep bed low and locked with side rails adjusted as appropriate  - Keep care items and personal belongings within reach  - Initiate and maintain comfort rounds  - Make Fall Risk Sign visible to staff  - Offer Toileting every  Hours, in advance of need  - Initiate/Maintain alarm  - Obtain necessary fall risk management equipment:   - Apply yellow socks and bracelet for high fall risk patients  - Consider moving patient to room near nurses station  Outcome: Progressing     Problem: PAIN - ADULT  Goal: Verbalizes/displays adequate comfort level or baseline comfort level  Description: Interventions:  - Encourage patient to monitor pain and request assistance  - Assess pain using appropriate pain scale  - Administer analgesics based on type and severity of pain and evaluate response  - Implement non-pharmacological measures as appropriate and evaluate response  - Consider cultural and social influences on pain and pain management  - Notify physician/advanced practitioner if interventions unsuccessful or patient reports new pain  Outcome: Progressing     Problem: INFECTION - ADULT  Goal: Absence or prevention of progression during hospitalization  Description: INTERVENTIONS:  - Assess and monitor for signs and symptoms of infection  - Monitor lab/diagnostic results  - Monitor all insertion sites, i.e. indwelling lines, tubes, and drains  - Monitor endotracheal if appropriate and nasal secretions for changes in amount and color  - Bennett appropriate cooling/warming therapies per order  - Administer medications as ordered  - Instruct and encourage patient and family to use good hand hygiene technique  -  Identify and instruct in appropriate isolation precautions for identified infection/condition  Outcome: Progressing     Problem: SAFETY ADULT  Goal: Patient will remain free of falls  Description: INTERVENTIONS:  - Educate patient/family on patient safety including physical limitations  - Instruct patient to call for assistance with activity   - Consult OT/PT to assist with strengthening/mobility   - Keep Call bell within reach  - Keep bed low and locked with side rails adjusted as appropriate  - Keep care items and personal belongings within reach  - Initiate and maintain comfort rounds  - Make Fall Risk Sign visible to staff  - Offer Toileting every  Hours, in advance of need  - Initiate/Maintain alarm  - Obtain necessary fall risk management equipment:   - Apply yellow socks and bracelet for high fall risk patients  - Consider moving patient to room near nurses station  Outcome: Progressing

## 2024-04-06 NOTE — PROGRESS NOTES
[facility]  Progress Note  Name: Cydney Lim I  MRN: [id number]  Unit/Bed#: -01 I Date of Admission: 4/5/2024   Date of Service: 4/6/2024 I Hospital Day: 1    Assessment/Plan   Sinus tachycardia seen on cardiac monitor  Assessment & Plan  Presented with heart rate of 150.  EKG showing sinus tachycardia.  No events on telemetry monitoring.  Suspicion for sepsis.  Even after IV fluid heart rate still persistently elevated in 130s.  Patient denies any palpitations.  No chest pain, shortness of breath or dizziness.  Had normal heart rate during recent hospitalization.  CTA PE study was negative.  Chest x-ray was unremarkable.  Unclear whether infection induced versus cardiogenic versus medication noncompliance.    Telemetry monitoring  Check echocardiogram given patient complaining of bilateral lower extremity edema    Acute cystitis without hematuria  Assessment & Plan  See plan as above    Essential hypertension  Assessment & Plan  Continue metoprolol, losartan, spironolactone.    Depression, recurrent (HCC)  Assessment & Plan  Continue Lexapro    Epilepsy with altered consciousness with intractable epilepsy ([facility])  Assessment & Plan  Patient came after running out of Lexapro and not taking it since last 5 days.  History of noncompliance with seizure medication.  Restart Lexapro 1500 mg twice daily.  Continue carbamazepine.  Follow-up with neurology outpatient.    * SIRS (systemic inflammatory response syndrome) ([facility])  Assessment & Plan  Presented for medication refill found to have tachycardia with heart rate 150 on presentation.  Following 2 sepsis criteria with tachycardia, leukocytosis and suspicion for UTI.  Complaining of urinary frequency and urgency but denies any dysuria.  No fever.  No complaints otherwise.  No palpitations, dizziness, chest pain.  TSH WNL.  Prior urine culture showing pansensitive Klebsiella in 2020.  Normal lactic acid.  CTA showing no PE.  Unclear whether  tachycardia is directly related to infection given patient has very mild symptoms.    Continue ceftriaxone daily  Follow-up urine and blood culture                   VTE Pharmacologic Prophylaxis:   Moderate Risk (Score 3-4) - Pharmacological DVT Prophylaxis Ordered: enoxaparin (Lovenox).    Mobility:   Basic Mobility Inpatient Raw Score: 24  JH-HLM Goal: 8: Walk 250 feet or more  JH-HLM Achieved: 7: Walk 25 feet or more  JH-HLM Goal achieved. Continue to encourage appropriate mobility.    Patient Centered Rounds: I performed bedside rounds with nursing staff today.   Discussions with Specialists or Other Care Team Provider:     Education and Discussions with Family / Patient: Patient declined call to .     Total Time Spent on Date of Encounter in care of patient:  mins. This time was spent on one or more of the following: performing physical exam; counseling and coordination of care; obtaining or reviewing history; documenting in the medical record; reviewing/ordering tests, medications or procedures; communicating with other healthcare professionals and discussing with patient's family/caregivers.    Current Length of Stay: 1 day(s)  Current Patient Status: Inpatient   Certification Statement: The patient will continue to require additional inpatient hospital stay due to iv abx  Discharge Plan: Anticipate discharge tomorrow to home.    Code Status: Level 1 - Full Code    Subjective:   Patient denies any acute complaints today    Objective:     Vitals:   Temp (24hrs), Av °F (36.7 °C), Min:97.4 °F (36.3 °C), Max:98.6 °F (37 °C)    Temp:  [97.4 °F (36.3 °C)-98.6 °F (37 °C)] 97.8 °F (36.6 °C)  HR:  [] 74  Resp:  [16-20] 16  BP: (117-160)/(67-91) 117/67  SpO2:  [96 %-99 %] 96 %  Body mass index is 46.08 kg/m².     Input and Output Summary (last 24 hours):     Intake/Output Summary (Last 24 hours) at 2024 1824  Last data filed at 2024 0101  Gross per 24 hour   Intake 1390 ml   Output 700  ml   Net 690 ml       Physical Exam:   Physical Exam  Vitals and nursing note reviewed.   Constitutional:       General: She is not in acute distress.     Appearance: She is well-developed. She is not toxic-appearing or diaphoretic.   HENT:      Head: Normocephalic and atraumatic.   Eyes:      General: No scleral icterus.     Conjunctiva/sclera: Conjunctivae normal.   Cardiovascular:      Rate and Rhythm: Normal rate and regular rhythm.      Heart sounds: No murmur heard.     No friction rub. No gallop.   Pulmonary:      Effort: Pulmonary effort is normal. No respiratory distress.      Breath sounds: Normal breath sounds. No stridor. No wheezing, rhonchi or rales.   Chest:      Chest wall: No tenderness.   Abdominal:      General: There is no distension.      Palpations: Abdomen is soft. There is no mass.      Tenderness: There is no abdominal tenderness. There is no guarding or rebound.      Hernia: No hernia is present.   Musculoskeletal:         General: No swelling or tenderness.      Cervical back: Neck supple.   Skin:     General: Skin is warm and dry.      Capillary Refill: Capillary refill takes less than 2 seconds.   Neurological:      Mental Status: She is alert and oriented to person, place, and time.   Psychiatric:         Mood and Affect: Mood normal.          Additional Data:     Labs:  Results from last 7 days   Lab Units 04/06/24  0513   WBC Thousand/uL 13.09*   HEMOGLOBIN g/dL 10.4*   HEMATOCRIT % 32.9*   PLATELETS Thousands/uL 315   NEUTROS PCT % 71   LYMPHS PCT % 20   MONOS PCT % 6   EOS PCT % 2     Results from last 7 days   Lab Units 04/06/24  0513 04/05/24  1857   SODIUM mmol/L 140 139   POTASSIUM mmol/L 3.8 3.6   CHLORIDE mmol/L 109* 107   CO2 mmol/L 24 22   BUN mg/dL 20 28*   CREATININE mg/dL 0.90 1.14   ANION GAP mmol/L 7 10   CALCIUM mg/dL 8.1* 8.8   ALBUMIN g/dL  --  3.7   TOTAL BILIRUBIN mg/dL  --  0.20   ALK PHOS U/L  --  101   ALT U/L  --  12   AST U/L  --  12*   GLUCOSE RANDOM mg/dL 92  111                 Results from last 7 days   Lab Units 04/05/24 1953 04/05/24  1857   LACTIC ACID mmol/L 0.5  --    PROCALCITONIN ng/ml  --  0.05       Lines/Drains:  Invasive Devices       Peripheral Intravenous Line  Duration             Peripheral IV 04/05/24 Right Antecubital <1 day                      Telemetry:  Telemetry Orders (From admission, onward)               24 Hour Telemetry Monitoring  Continuous x 24 Hours (Telem)        Expiring   Question:  Reason for 24 Hour Telemetry  Answer:  Alcohol withdrawal and CIWA >7, electrolyte abnormalities, abnormal ECG and/or heart disease                                Imaging: No pertinent imaging reviewed.    Recent Cultures (last 7 days):   Results from last 7 days   Lab Units 04/05/24 2251 04/05/24 2025   BLOOD CULTURE  Received in Microbiology Lab. Culture in Progress.  Received in Microbiology Lab. Culture in Progress.  --    URINE CULTURE   --  80,000-89,000 cfu/ml Gram Negative Julian Enteric Like*       Last 24 Hours Medication List:   Current Facility-Administered Medications   Medication Dose Route Frequency Provider Last Rate    acetaminophen  650 mg Oral Q6H PRN Zakia Patel MD      albuterol  2 puff Inhalation Q6H PRN Zakia Patel MD      carBAMazepine  200 mg Oral TID Zakia Patel MD      cefTRIAXone  2,000 mg Intravenous Q24H Zakia Patel MD      enoxaparin  40 mg Subcutaneous Daily Zakia Patel MD      escitalopram  10 mg Oral Daily Zakia Patel MD      fluticasone  1 spray Nasal Daily Zakia Patel MD      [START ON 4/7/2024] Fluticasone Furoate-Vilanterol  1 puff Inhalation Daily Ritchie Alberto DO      levETIRAcetam  1,500 mg Oral BID Zakia Patel MD      losartan  25 mg Oral Daily Zakia Patel MD      metoprolol succinate  50 mg Oral Daily Zakia Patel MD      montelukast  10 mg Oral HS Ritchie Alberto DO      nicotine  1 patch Transdermal Daily Zakia Patel  MD      ondansetron  4 mg Intravenous Q6H PRN Zakia Patel MD      pramipexole  1.5 mg Oral HS Zakia Patel MD      spironolactone  25 mg Oral Daily Zakia Patel MD      [START ON 4/7/2024] umeclidinium  1 puff Inhalation Daily Ritchie Alberto DO          Today, Patient Was Seen By: Ritchie Alberto DO    **Please Note: This note may have been constructed using a voice recognition system.**

## 2024-04-06 NOTE — ASSESSMENT & PLAN NOTE
Presented for medication refill found to have tachycardia with heart rate 150 on presentation.  Following 2 sepsis criteria with tachycardia, leukocytosis and suspicion for UTI.  Complaining of urinary frequency and urgency but denies any dysuria.  No fever.  No complaints otherwise.  No palpitations, dizziness, chest pain.  TSH WNL.  Prior urine culture showing pansensitive Klebsiella in 2020.  Normal lactic acid.  CTA showing no PE.    Continue ceftriaxone daily  Follow-up urine and blood culture  IV fluid bolus 1 L then monitor overnight with oral hydration given suspicion for mild volume overload.  Trend fever/leukocyte count

## 2024-04-06 NOTE — SEPSIS NOTE
Sepsis Note   Cydney Lim 63 y.o. female MRN: [id number]  Unit/Bed#: ED 12 Encounter: [id number]       Initial Sepsis Screening       Row Name 04/05/24 2056                Is the patient's history suggestive of a new or worsening infection? Yes (Proceed)  -KF        Suspected source of infection urinary tract infection  -KF        Indicate SIRS criteria Tachycardia > 90 bpm;Leukocytosis (WBC > 85616 IJL) OR Leukopenia (WBC <4000 IJL) OR Bandemia (WBC >10% bands)  -KF        Are two or more of the above signs & symptoms of infection both present and new to the patient? Yes (Proceed)  -KF        Assess for evidence of organ dysfunction: Are any of the below criteria present within 6 hours of suspected infection and SIRS criteria that are NOT considered to be chronic conditions? --                  User Key  (r) = Recorded By, (t) = Taken By, (c) = Cosigned By      Initials Name Provider Type    KF Santiago Sue MD Physician                        There is no height or weight on file to calculate BMI.  Wt Readings from Last 1 Encounters:   02/14/24 121 kg (266 lb 12.1 oz)        Ideal body weight: 54.7 kg (120 lb 9.5 oz)  Adjusted ideal body weight: 81.2 kg (179 lb 0.9 oz)

## 2024-04-06 NOTE — ASSESSMENT & PLAN NOTE
Presented with heart rate of 150.  EKG showing sinus tachycardia.  No events on telemetry monitoring.  Suspicion for sepsis.  Even after IV fluid heart rate still persistently elevated in 130s.  Patient denies any palpitations.  No chest pain, shortness of breath or dizziness.  Had normal heart rate during recent hospitalization.  CTA PE study was negative.  Chest x-ray was unremarkable.  Unclear whether infection induced versus cardiogenic versus medication noncompliance.    Telemetry monitoring  Check echocardiogram given patient complaining of bilateral lower extremity edema

## 2024-04-07 LAB
ANION GAP SERPL CALCULATED.3IONS-SCNC: 9 MMOL/L (ref 4–13)
BACTERIA UR CULT: ABNORMAL
BACTERIA UR CULT: ABNORMAL
BASOPHILS # BLD AUTO: 0.03 THOUSANDS/ÂΜL (ref 0–0.1)
BASOPHILS NFR BLD AUTO: 0 % (ref 0–1)
BUN SERPL-MCNC: 18 MG/DL (ref 5–25)
CALCIUM SERPL-MCNC: 8.4 MG/DL (ref 8.4–10.2)
CHLORIDE SERPL-SCNC: 108 MMOL/L (ref 96–108)
CO2 SERPL-SCNC: 23 MMOL/L (ref 21–32)
CREAT SERPL-MCNC: 0.94 MG/DL (ref 0.6–1.3)
EOSINOPHIL # BLD AUTO: 0.25 THOUSAND/ÂΜL (ref 0–0.61)
EOSINOPHIL NFR BLD AUTO: 2 % (ref 0–6)
ERYTHROCYTE [DISTWIDTH] IN BLOOD BY AUTOMATED COUNT: 13.8 % (ref 11.6–15.1)
GFR SERPL CREATININE-BSD FRML MDRD: 63 ML/MIN/1.73SQ M
GLUCOSE SERPL-MCNC: 86 MG/DL (ref 65–140)
HCT VFR BLD AUTO: 31.5 % (ref 34.8–46.1)
HGB BLD-MCNC: 10 G/DL (ref 11.5–15.4)
IMM GRANULOCYTES # BLD AUTO: 0.05 THOUSAND/UL (ref 0–0.2)
IMM GRANULOCYTES NFR BLD AUTO: 0 % (ref 0–2)
LYMPHOCYTES # BLD AUTO: 2.11 THOUSANDS/ÂΜL (ref 0.6–4.47)
LYMPHOCYTES NFR BLD AUTO: 19 % (ref 14–44)
MCH RBC QN AUTO: 30 PG (ref 26.8–34.3)
MCHC RBC AUTO-ENTMCNC: 31.7 G/DL (ref 31.4–37.4)
MCV RBC AUTO: 95 FL (ref 82–98)
MONOCYTES # BLD AUTO: 0.66 THOUSAND/ÂΜL (ref 0.17–1.22)
MONOCYTES NFR BLD AUTO: 6 % (ref 4–12)
NEUTROPHILS # BLD AUTO: 8.27 THOUSANDS/ÂΜL (ref 1.85–7.62)
NEUTS SEG NFR BLD AUTO: 73 % (ref 43–75)
NRBC BLD AUTO-RTO: 0 /100 WBCS
PLATELET # BLD AUTO: 311 THOUSANDS/UL (ref 149–390)
PMV BLD AUTO: 10.4 FL (ref 8.9–12.7)
POTASSIUM SERPL-SCNC: 3.7 MMOL/L (ref 3.5–5.3)
RBC # BLD AUTO: 3.33 MILLION/UL (ref 3.81–5.12)
SODIUM SERPL-SCNC: 140 MMOL/L (ref 135–147)
WBC # BLD AUTO: 11.37 THOUSAND/UL (ref 4.31–10.16)

## 2024-04-07 PROCEDURE — 85025 COMPLETE CBC W/AUTO DIFF WBC: CPT | Performed by: INTERNAL MEDICINE

## 2024-04-07 PROCEDURE — 80048 BASIC METABOLIC PNL TOTAL CA: CPT | Performed by: INTERNAL MEDICINE

## 2024-04-07 PROCEDURE — 87040 BLOOD CULTURE FOR BACTERIA: CPT | Performed by: INTERNAL MEDICINE

## 2024-04-07 PROCEDURE — 94664 DEMO&/EVAL PT USE INHALER: CPT

## 2024-04-07 PROCEDURE — 94640 AIRWAY INHALATION TREATMENT: CPT

## 2024-04-07 PROCEDURE — 99232 SBSQ HOSP IP/OBS MODERATE 35: CPT | Performed by: INTERNAL MEDICINE

## 2024-04-07 PROCEDURE — 94760 N-INVAS EAR/PLS OXIMETRY 1: CPT

## 2024-04-07 RX ADMIN — LOSARTAN POTASSIUM 25 MG: 25 TABLET, FILM COATED ORAL at 09:07

## 2024-04-07 RX ADMIN — UMECLIDINIUM 1 PUFF: 62.5 AEROSOL, POWDER ORAL at 08:59

## 2024-04-07 RX ADMIN — CARBAMAZEPINE 200 MG: 200 TABLET ORAL at 20:01

## 2024-04-07 RX ADMIN — ESCITALOPRAM OXALATE 10 MG: 10 TABLET ORAL at 09:07

## 2024-04-07 RX ADMIN — CARBAMAZEPINE 200 MG: 200 TABLET ORAL at 09:07

## 2024-04-07 RX ADMIN — MONTELUKAST 10 MG: 10 TABLET, FILM COATED ORAL at 21:05

## 2024-04-07 RX ADMIN — CARBAMAZEPINE 200 MG: 200 TABLET ORAL at 15:23

## 2024-04-07 RX ADMIN — FLUTICASONE FUROATE AND VILANTEROL TRIFENATATE 1 PUFF: 100; 25 POWDER RESPIRATORY (INHALATION) at 08:59

## 2024-04-07 RX ADMIN — METOPROLOL SUCCINATE 50 MG: 50 TABLET, EXTENDED RELEASE ORAL at 09:07

## 2024-04-07 RX ADMIN — CEFTRIAXONE 2000 MG: 2 INJECTION, SOLUTION INTRAVENOUS at 19:47

## 2024-04-07 RX ADMIN — ENOXAPARIN SODIUM 40 MG: 40 INJECTION SUBCUTANEOUS at 09:07

## 2024-04-07 RX ADMIN — FLUTICASONE PROPIONATE 1 SPRAY: 50 SPRAY, METERED NASAL at 09:11

## 2024-04-07 RX ADMIN — SPIRONOLACTONE 25 MG: 25 TABLET ORAL at 09:07

## 2024-04-07 RX ADMIN — LEVETIRACETAM 1500 MG: 500 TABLET, FILM COATED ORAL at 17:12

## 2024-04-07 RX ADMIN — LEVETIRACETAM 1500 MG: 500 TABLET, FILM COATED ORAL at 09:07

## 2024-04-07 RX ADMIN — PRAMIPEXOLE DIHYDROCHLORIDE 1.5 MG: 0.5 TABLET ORAL at 21:06

## 2024-04-07 RX ADMIN — ALBUTEROL SULFATE 2 PUFF: 90 AEROSOL, METERED RESPIRATORY (INHALATION) at 05:32

## 2024-04-07 RX ADMIN — NICOTINE 1 PATCH: 21 PATCH, EXTENDED RELEASE TRANSDERMAL at 09:08

## 2024-04-07 NOTE — ASSESSMENT & PLAN NOTE
Presented for medication refill found to have tachycardia with heart rate 150 on presentation.  Following 2 sepsis criteria with tachycardia, leukocytosis and suspicion for UTI.  Complaining of urinary frequency and urgency but denies any dysuria.  No fever.  No complaints otherwise.  No palpitations, dizziness, chest pain.  TSH WNL.  Prior urine culture showing pansensitive Klebsiella in 2020.  Normal lactic acid.  CTA showing no PE.  Unclear whether tachycardia is directly related to infection given patient has very mild symptoms.    Continue ceftriaxone daily  Follow-up urine and blood culture-urine culture growing Klebsiella, 1 out of 2 blood cultures drawn on admission currently growing gram-negative rods,  Repeat blood cultures were ordered by the overnight team currently pending  Would continue on ceftriaxone as she appears to be clinically responding, will await sensitivities  Anticipate 10-day treatment course partially treated with IV antibiotics and on discharge can be placed on oral for gram-negative bacteremia

## 2024-04-07 NOTE — ASSESSMENT & PLAN NOTE
Presented with heart rate of 150.  EKG showing sinus tachycardia.  No events on telemetry monitoring.  Suspicion for sepsis.  Even after IV fluid heart rate still persistently elevated in 130s.  Patient denies any palpitations.  No chest pain, shortness of breath or dizziness.  Had normal heart rate during recent hospitalization.  CTA PE study was negative.  Chest x-ray was unremarkable.  Unclear whether infection induced versus cardiogenic versus medication noncompliance.    Telemetry monitoring  Check echocardiogram given patient complaining of bilateral lower extremity edema-echo currently pending

## 2024-04-07 NOTE — PLAN OF CARE
Problem: Potential for Falls  Goal: Patient will remain free of falls  Description: INTERVENTIONS:  - Educate patient/family on patient safety including physical limitations  - Instruct patient to call for assistance with activity   - Consult OT/PT to assist with strengthening/mobility   - Keep Call bell within reach  - Keep bed low and locked with side rails adjusted as appropriate  - Keep care items and personal belongings within reach  - Initiate and maintain comfort rounds  - Make Fall Risk Sign visible to staff  - Offer Toileting every  Hours, in advance of need  - Initiate/Maintain alarm  - Obtain necessary fall risk management equipment:   - Apply yellow socks and bracelet for high fall risk patients  - Consider moving patient to room near nurses station  Outcome: Progressing     Problem: PAIN - ADULT  Goal: Verbalizes/displays adequate comfort level or baseline comfort level  Description: Interventions:  - Encourage patient to monitor pain and request assistance  - Assess pain using appropriate pain scale  - Administer analgesics based on type and severity of pain and evaluate response  - Implement non-pharmacological measures as appropriate and evaluate response  - Consider cultural and social influences on pain and pain management  - Notify physician/advanced practitioner if interventions unsuccessful or patient reports new pain  Outcome: Progressing     Problem: INFECTION - ADULT  Goal: Absence or prevention of progression during hospitalization  Description: INTERVENTIONS:  - Assess and monitor for signs and symptoms of infection  - Monitor lab/diagnostic results  - Monitor all insertion sites, i.e. indwelling lines, tubes, and drains  - Monitor endotracheal if appropriate and nasal secretions for changes in amount and color  - [place] appropriate cooling/warming therapies per order  - Administer medications as ordered  - Instruct and encourage patient and family to use good hand hygiene technique  -  Identify and instruct in appropriate isolation precautions for identified infection/condition  Outcome: Progressing  Goal: Absence of fever/infection during neutropenic period  Description: INTERVENTIONS:  - Monitor WBC    Outcome: Progressing     Problem: SAFETY ADULT  Goal: Patient will remain free of falls  Description: INTERVENTIONS:  - Educate patient/family on patient safety including physical limitations  - Instruct patient to call for assistance with activity   - Consult OT/PT to assist with strengthening/mobility   - Keep Call bell within reach  - Keep bed low and locked with side rails adjusted as appropriate  - Keep care items and personal belongings within reach  - Initiate and maintain comfort rounds  - Make Fall Risk Sign visible to staff  - Offer Toileting every  Hours, in advance of need  - Initiate/Maintain alarm  - Obtain necessary fall risk management equipment:   - Apply yellow socks and bracelet for high fall risk patients  - Consider moving patient to room near nurses station  Outcome: Progressing  Goal: Maintain or return to baseline ADL function  Description: INTERVENTIONS:  -  Assess patient's ability to carry out ADLs; assess patient's baseline for ADL function and identify physical deficits which impact ability to perform ADLs (bathing, care of mouth/teeth, toileting, grooming, dressing, etc.)  - Assess/evaluate cause of self-care deficits   - Assess range of motion  - Assess patient's mobility; develop plan if impaired  - Assess patient's need for assistive devices and provide as appropriate  - Encourage maximum independence but intervene and supervise when necessary  - Involve family in performance of ADLs  - Assess for home care needs following discharge   - Consider OT consult to assist with ADL evaluation and planning for discharge  - Provide patient education as appropriate  Outcome: Progressing  Goal: Maintains/Returns to pre admission functional level  Description:  INTERVENTIONS:  - Perform AM-PAC 6 Click Basic Mobility/ Daily Activity assessment daily.  - Set and communicate daily mobility goal to care team and patient/family/caregiver.   - Collaborate with rehabilitation services on mobility goals if consulted  - Perform Range of Motion times a day.  - Reposition patient every  hours.  - Dangle patient  times a day  - Stand patient  times a day  - Ambulate patient  times a day  - Out of bed to chair  times a day   - Out of bed for meals  times a day  - Out of bed for toileting  - Record patient progress and toleration of activity level   Outcome: Progressing     Problem: DISCHARGE PLANNING  Goal: Discharge to home or other facility with appropriate resources  Description: INTERVENTIONS:  - Identify barriers to discharge w/patient and caregiver  - Arrange for needed discharge resources and transportation as appropriate  - Identify discharge learning needs (meds, wound care, etc.)  - Arrange for interpretive services to assist at discharge as needed  - Refer to Case Management Department for coordinating discharge planning if the patient needs post-hospital services based on physician/advanced practitioner order or complex needs related to functional status, cognitive ability, or social support system  Outcome: Progressing

## 2024-04-07 NOTE — PROGRESS NOTES
[facility]  Progress Note  Name: Cydney Lim I  MRN: [id number]  Unit/Bed#: -01 I Date of Admission: 4/5/2024   Date of Service: 4/7/2024 I Hospital Day: 2    Assessment/Plan   Sinus tachycardia seen on cardiac monitor  Assessment & Plan  Presented with heart rate of 150.  EKG showing sinus tachycardia.  No events on telemetry monitoring.  Suspicion for sepsis.  Even after IV fluid heart rate still persistently elevated in 130s.  Patient denies any palpitations.  No chest pain, shortness of breath or dizziness.  Had normal heart rate during recent hospitalization.  CTA PE study was negative.  Chest x-ray was unremarkable.  Unclear whether infection induced versus cardiogenic versus medication noncompliance.    Telemetry monitoring  Check echocardiogram given patient complaining of bilateral lower extremity edema-echo currently pending    Acute cystitis without hematuria  Assessment & Plan  See plan as above    Essential hypertension  Assessment & Plan  Continue metoprolol, losartan, spironolactone.    Depression, recurrent (HCC)  Assessment & Plan  Continue Lexapro    Epilepsy with altered consciousness with intractable epilepsy (HCC)  Assessment & Plan  Patient came after running out of Lexapro and not taking it since last 5 days.  History of noncompliance with seizure medication.  Restart Lexapro 1500 mg twice daily.  Continue carbamazepine.  Follow-up with neurology outpatient.    * SIRS (systemic inflammatory response syndrome) (HCC)  Assessment & Plan  Presented for medication refill found to have tachycardia with heart rate 150 on presentation.  Following 2 sepsis criteria with tachycardia, leukocytosis and suspicion for UTI.  Complaining of urinary frequency and urgency but denies any dysuria.  No fever.  No complaints otherwise.  No palpitations, dizziness, chest pain.  TSH WNL.  Prior urine culture showing pansensitive Klebsiella in 2020.  Normal lactic acid.  CTA showing  no PE.  Unclear whether tachycardia is directly related to infection given patient has very mild symptoms.    Continue ceftriaxone daily  Follow-up urine and blood culture-urine culture growing Klebsiella, 1 out of 2 blood cultures drawn on admission currently growing gram-negative rods,  Repeat blood cultures were ordered by the overnight team currently pending  Would continue on ceftriaxone as she appears to be clinically responding, will await sensitivities  Anticipate 10-day treatment course partially treated with IV antibiotics and on discharge can be placed on oral for gram-negative bacteremia                   VTE Pharmacologic Prophylaxis:   Moderate Risk (Score 3-4) - Pharmacological DVT Prophylaxis Ordered: enoxaparin (Lovenox).    Mobility:   Basic Mobility Inpatient Raw Score: 24  [facility] Goal: 8: Walk 250 feet or more  [facility]-HLM Achieved: 3: Sit at edge of bed  JH-HLM Goal achieved. Continue to encourage appropriate mobility.    Patient Centered Rounds: I performed bedside rounds with nursing staff today.   Discussions with Specialists or Other Care Team Provider:     Education and Discussions with Family / Patient: Patient declined call to .     Total Time Spent on Date of Encounter in care of patient:  mins. This time was spent on one or more of the following: performing physical exam; counseling and coordination of care; obtaining or reviewing history; documenting in the medical record; reviewing/ordering tests, medications or procedures; communicating with other healthcare professionals and discussing with patient's family/caregivers.    Current Length of Stay: 2 day(s)  Current Patient Status: Inpatient   Certification Statement: The patient will continue to require additional inpatient hospital stay due to pending blood cultures, urine culture  Discharge Plan: Anticipate discharge in 48 hrs to home.    Code Status: Level 1 - Full Code    Subjective:   Patient denies any acute complaints  today states she is feeling much better from admission, reports shortness of breath significant proved I discussed my concerns that she may have COPD and will need to see a pulmonologist and then will have pulmonary function testing    Objective:     Vitals:   Temp (24hrs), Av.7 °F (36.5 °C), Min:97.5 °F (36.4 °C), Max:97.9 °F (36.6 °C)    Temp:  [97.5 °F (36.4 °C)-97.9 °F (36.6 °C)] 97.7 °F (36.5 °C)  HR:  [65-83] 80  Resp:  [16-20] 18  BP: (109-144)/(57-74) 117/74  SpO2:  [94 %-98 %] 96 %  Body mass index is 46.08 kg/m².     Input and Output Summary (last 24 hours):   No intake or output data in the 24 hours ending 24 1252    Physical Exam:   Physical Exam  Vitals and nursing note reviewed.   Constitutional:       General: She is not in acute distress.     Appearance: She is well-developed. She is not toxic-appearing or diaphoretic.   HENT:      Head: Normocephalic and atraumatic.   Eyes:      General: No scleral icterus.     Conjunctiva/sclera: Conjunctivae normal.   Cardiovascular:      Rate and Rhythm: Normal rate and regular rhythm.      Heart sounds: No murmur heard.     No friction rub. No gallop.   Pulmonary:      Effort: Pulmonary effort is normal. No respiratory distress.      Breath sounds: Normal breath sounds. No stridor. No wheezing, rhonchi or rales.   Chest:      Chest wall: No tenderness.   Abdominal:      General: There is no distension.      Palpations: Abdomen is soft. There is no mass.      Tenderness: There is no abdominal tenderness. There is no guarding or rebound.      Hernia: No hernia is present.   Musculoskeletal:         General: No swelling or tenderness.      Cervical back: Neck supple.   Skin:     General: Skin is warm and dry.      Capillary Refill: Capillary refill takes less than 2 seconds.   Neurological:      Mental Status: She is alert and oriented to person, place, and time.   Psychiatric:         Mood and Affect: Mood normal.          Additional Data:      Labs:  Results from last 7 days   Lab Units 04/07/24  0446   WBC Thousand/uL 11.37*   HEMOGLOBIN g/dL 10.0*   HEMATOCRIT % 31.5*   PLATELETS Thousands/uL 311   NEUTROS PCT % 73   LYMPHS PCT % 19   MONOS PCT % 6   EOS PCT % 2     Results from last 7 days   Lab Units 04/07/24  0446 04/06/24  0513 04/05/24  1857   SODIUM mmol/L 140   < > 139   POTASSIUM mmol/L 3.7   < > 3.6   CHLORIDE mmol/L 108   < > 107   CO2 mmol/L 23   < > 22   BUN mg/dL 18   < > 28*   CREATININE mg/dL 0.94   < > 1.14   ANION GAP mmol/L 9   < > 10   CALCIUM mg/dL 8.4   < > 8.8   ALBUMIN g/dL  --   --  3.7   TOTAL BILIRUBIN mg/dL  --   --  0.20   ALK PHOS U/L  --   --  101   ALT U/L  --   --  12   AST U/L  --   --  12*   GLUCOSE RANDOM mg/dL 86   < > 111    < > = values in this interval not displayed.                 Results from last 7 days   Lab Units 04/05/24 1953 04/05/24  1857   LACTIC ACID mmol/L 0.5  --    PROCALCITONIN ng/ml  --  0.05       Lines/Drains:  Invasive Devices       Peripheral Intravenous Line  Duration             Peripheral IV 04/05/24 Right Antecubital 1 day                          Imaging: No pertinent imaging reviewed.    Recent Cultures (last 7 days):   Results from last 7 days   Lab Units 04/05/24 2251 04/05/24 2025   BLOOD CULTURE  Received in Microbiology Lab. Culture in Progress.  --    GRAM STAIN RESULT  Gram negative rods*  --    URINE CULTURE   --  80,000-89,000 cfu/ml Klebsiella pneumoniae*       Last 24 Hours Medication List:   Current Facility-Administered Medications   Medication Dose Route Frequency Provider Last Rate    acetaminophen  650 mg Oral Q6H PRN Zakia Patel MD      albuterol  2 puff Inhalation Q6H PRN Zakia Patel MD      carBAMazepine  200 mg Oral TID Zakia Patel MD      cefTRIAXone  2,000 mg Intravenous Q24H Zakia Patel MD 2,000 mg (04/06/24 2007)    enoxaparin  40 mg Subcutaneous Daily Zakia Patel MD      escitalopram  10 mg Oral Daily Zakia  JUSTIN Patel MD      fluticasone  1 spray Nasal Daily Zakia Patel MD      Fluticasone Furoate-Vilanterol  1 puff Inhalation Daily Ritchie Alberto DO      levETIRAcetam  1,500 mg Oral BID Zakia Patel MD      losartan  25 mg Oral Daily Zakia Patel MD      metoprolol succinate  50 mg Oral Daily Zakia Patel MD      montelukast  10 mg Oral HS Ritchie Alberto DO      nicotine  1 patch Transdermal Daily Zakia Patel MD      ondansetron  4 mg Intravenous Q6H PRN Zakia Patel MD      pramipexole  1.5 mg Oral HS Zakia Patel MD      spironolactone  25 mg Oral Daily Zakia Patel MD      umeclidinium  1 puff Inhalation Daily Ritchie Alberto DO          Today, Patient Was Seen By: Ritchie Alberto DO    **Please Note: This note may have been constructed using a voice recognition system.**

## 2024-04-08 ENCOUNTER — APPOINTMENT (INPATIENT)
Dept: NON INVASIVE DIAGNOSTICS | Facility: HOSPITAL | Age: 64
DRG: 872 | End: 2024-04-08
Payer: MEDICARE

## 2024-04-08 LAB
ANION GAP SERPL CALCULATED.3IONS-SCNC: 9 MMOL/L (ref 4–13)
AORTIC ROOT: 2.8 CM
APICAL FOUR CHAMBER EJECTION FRACTION: 55 %
BASOPHILS # BLD AUTO: 0.05 THOUSANDS/ÂΜL (ref 0–0.1)
BASOPHILS NFR BLD AUTO: 1 % (ref 0–1)
BSA FOR ECHO PROCEDURE: 2.14 M2
BUN SERPL-MCNC: 23 MG/DL (ref 5–25)
CALCIUM SERPL-MCNC: 8.5 MG/DL (ref 8.4–10.2)
CHLORIDE SERPL-SCNC: 107 MMOL/L (ref 96–108)
CO2 SERPL-SCNC: 23 MMOL/L (ref 21–32)
CREAT SERPL-MCNC: 1 MG/DL (ref 0.6–1.3)
E WAVE DECELERATION TIME: 180 MS
E/A RATIO: 1.11
EOSINOPHIL # BLD AUTO: 0.3 THOUSAND/ÂΜL (ref 0–0.61)
EOSINOPHIL NFR BLD AUTO: 3 % (ref 0–6)
ERYTHROCYTE [DISTWIDTH] IN BLOOD BY AUTOMATED COUNT: 13.8 % (ref 11.6–15.1)
FRACTIONAL SHORTENING: 37 (ref 28–44)
GFR SERPL CREATININE-BSD FRML MDRD: 59 ML/MIN/1.73SQ M
GLUCOSE SERPL-MCNC: 102 MG/DL (ref 65–140)
HCT VFR BLD AUTO: 33.3 % (ref 34.8–46.1)
HGB BLD-MCNC: 10.5 G/DL (ref 11.5–15.4)
IMM GRANULOCYTES # BLD AUTO: 0.04 THOUSAND/UL (ref 0–0.2)
IMM GRANULOCYTES NFR BLD AUTO: 0 % (ref 0–2)
INTERVENTRICULAR SEPTUM IN DIASTOLE (PARASTERNAL SHORT AXIS VIEW): 1.2 CM
INTERVENTRICULAR SEPTUM: 1.2 CM (ref 0.6–1.1)
LAAS-AP2: 17.3 CM2
LAAS-AP4: 19.6 CM2
LEFT ATRIUM SIZE: 4.1 CM
LEFT ATRIUM VOLUME (MOD BIPLANE): 44 ML
LEFT ATRIUM VOLUME INDEX (MOD BIPLANE): 20.6 ML/M2
LEFT INTERNAL DIMENSION IN SYSTOLE: 3.3 CM (ref 2.1–4)
LEFT VENTRICULAR INTERNAL DIMENSION IN DIASTOLE: 5.2 CM (ref 3.5–6)
LEFT VENTRICULAR POSTERIOR WALL IN END DIASTOLE: 1.1 CM
LEFT VENTRICULAR STROKE VOLUME: 83 ML
LVSV (TEICH): 83 ML
LYMPHOCYTES # BLD AUTO: 2.15 THOUSANDS/ÂΜL (ref 0.6–4.47)
LYMPHOCYTES NFR BLD AUTO: 20 % (ref 14–44)
MCH RBC QN AUTO: 30.2 PG (ref 26.8–34.3)
MCHC RBC AUTO-ENTMCNC: 31.5 G/DL (ref 31.4–37.4)
MCV RBC AUTO: 96 FL (ref 82–98)
MONOCYTES # BLD AUTO: 0.54 THOUSAND/ÂΜL (ref 0.17–1.22)
MONOCYTES NFR BLD AUTO: 5 % (ref 4–12)
MV E'TISSUE VEL-SEP: 9 CM/S
MV PEAK A VEL: 0.65 M/S
MV PEAK E VEL: 72 CM/S
NEUTROPHILS # BLD AUTO: 7.87 THOUSANDS/ÂΜL (ref 1.85–7.62)
NEUTS SEG NFR BLD AUTO: 71 % (ref 43–75)
NRBC BLD AUTO-RTO: 0 /100 WBCS
PLATELET # BLD AUTO: 326 THOUSANDS/UL (ref 149–390)
PMV BLD AUTO: 10.8 FL (ref 8.9–12.7)
POTASSIUM SERPL-SCNC: 3.9 MMOL/L (ref 3.5–5.3)
RA PRESSURE ESTIMATED: 3 MMHG
RBC # BLD AUTO: 3.48 MILLION/UL (ref 3.81–5.12)
RIGHT ATRIAL 2D VOLUME: 39 ML
RIGHT ATRIUM AREA SYSTOLE A4C: 15.5 CM2
RIGHT VENTRICLE ID DIMENSION: 3.2 CM
RV PSP: 32 MMHG
SL CV LEFT ATRIUM LENGTH A2C: 5.2 CM
SL CV LV EF: 55
SL CV PED ECHO LEFT VENTRICLE DIASTOLIC VOLUME (MOD BIPLANE) 2D: 129 ML
SL CV PED ECHO LEFT VENTRICLE SYSTOLIC VOLUME (MOD BIPLANE) 2D: 46 ML
SODIUM SERPL-SCNC: 139 MMOL/L (ref 135–147)
TR MAX PG: 29 MMHG
TR PEAK VELOCITY: 2.7 M/S
TRICUSPID ANNULAR PLANE SYSTOLIC EXCURSION: 2.5 CM
TRICUSPID VALVE PEAK REGURGITATION VELOCITY: 2.67 M/S
WBC # BLD AUTO: 10.95 THOUSAND/UL (ref 4.31–10.16)

## 2024-04-08 PROCEDURE — 94760 N-INVAS EAR/PLS OXIMETRY 1: CPT

## 2024-04-08 PROCEDURE — 94640 AIRWAY INHALATION TREATMENT: CPT

## 2024-04-08 PROCEDURE — 85025 COMPLETE CBC W/AUTO DIFF WBC: CPT | Performed by: INTERNAL MEDICINE

## 2024-04-08 PROCEDURE — 93306 TTE W/DOPPLER COMPLETE: CPT

## 2024-04-08 PROCEDURE — 93306 TTE W/DOPPLER COMPLETE: CPT | Performed by: INTERNAL MEDICINE

## 2024-04-08 PROCEDURE — 99232 SBSQ HOSP IP/OBS MODERATE 35: CPT | Performed by: INTERNAL MEDICINE

## 2024-04-08 PROCEDURE — 80048 BASIC METABOLIC PNL TOTAL CA: CPT | Performed by: INTERNAL MEDICINE

## 2024-04-08 RX ADMIN — METOPROLOL SUCCINATE 50 MG: 50 TABLET, EXTENDED RELEASE ORAL at 08:47

## 2024-04-08 RX ADMIN — MONTELUKAST 10 MG: 10 TABLET, FILM COATED ORAL at 21:27

## 2024-04-08 RX ADMIN — CARBAMAZEPINE 200 MG: 200 TABLET ORAL at 08:47

## 2024-04-08 RX ADMIN — NICOTINE 1 PATCH: 21 PATCH, EXTENDED RELEASE TRANSDERMAL at 08:47

## 2024-04-08 RX ADMIN — PRAMIPEXOLE DIHYDROCHLORIDE 1.5 MG: 0.5 TABLET ORAL at 21:27

## 2024-04-08 RX ADMIN — UMECLIDINIUM 1 PUFF: 62.5 AEROSOL, POWDER ORAL at 08:38

## 2024-04-08 RX ADMIN — LOSARTAN POTASSIUM 25 MG: 25 TABLET, FILM COATED ORAL at 08:47

## 2024-04-08 RX ADMIN — FLUTICASONE PROPIONATE 1 SPRAY: 50 SPRAY, METERED NASAL at 08:54

## 2024-04-08 RX ADMIN — ENOXAPARIN SODIUM 40 MG: 40 INJECTION SUBCUTANEOUS at 08:46

## 2024-04-08 RX ADMIN — LEVETIRACETAM 1500 MG: 500 TABLET, FILM COATED ORAL at 08:47

## 2024-04-08 RX ADMIN — CEFTRIAXONE 2000 MG: 2 INJECTION, SOLUTION INTRAVENOUS at 20:37

## 2024-04-08 RX ADMIN — LEVETIRACETAM 1500 MG: 500 TABLET, FILM COATED ORAL at 17:21

## 2024-04-08 RX ADMIN — CARBAMAZEPINE 200 MG: 200 TABLET ORAL at 21:27

## 2024-04-08 RX ADMIN — SPIRONOLACTONE 25 MG: 25 TABLET ORAL at 08:47

## 2024-04-08 RX ADMIN — CARBAMAZEPINE 200 MG: 200 TABLET ORAL at 17:21

## 2024-04-08 RX ADMIN — FLUTICASONE FUROATE AND VILANTEROL TRIFENATATE 1 PUFF: 100; 25 POWDER RESPIRATORY (INHALATION) at 08:38

## 2024-04-08 RX ADMIN — ESCITALOPRAM OXALATE 10 MG: 10 TABLET ORAL at 08:47

## 2024-04-08 NOTE — ASSESSMENT & PLAN NOTE
Patient came after running out of Lexapro and not taking it since last 5 days.  History of noncompliance with seizure medication.  Restart keppra 1500 mg twice daily.  Continue carbamazepine.  Follow-up with neurology outpatient.

## 2024-04-08 NOTE — ASSESSMENT & PLAN NOTE
Presented with heart rate of 150.  EKG showing sinus tachycardia.  No events on telemetry monitoring.  Suspicion for sepsis.  Even after IV fluid heart rate still persistently elevated in 130s.  Patient denies any palpitations.  No chest pain, shortness of breath or dizziness.  Had normal heart rate during recent hospitalization.  CTA PE study was negative.  Chest x-ray was unremarkable.  Unclear whether infection induced versus cardiogenic versus medication noncompliance.    Telemetry monitoring  Follow-up 2D echocardiogram results.

## 2024-04-08 NOTE — ASSESSMENT & PLAN NOTE
Presented for medication refill found to have tachycardia with heart rate 150 on presentation.  Following 2 sepsis criteria with tachycardia, leukocytosis and suspicion for UTI.  Complaining of urinary frequency and urgency but denies any dysuria.  No fever.  No complaints otherwise.  No palpitations, dizziness, chest pain.  TSH WNL.  Prior urine culture showing pansensitive Klebsiella in 2020.  Normal lactic acid.  CTA showing no PE.  Unclear whether tachycardia is directly related to infection given patient has very mild symptoms.    Continue ceftriaxone daily  Follow-up urine and blood culture-1 of 2 sets shows  acinetobacter ursingii, manuela contaminant,urine cx shows klebsiella penumoniae,   Repeat blood cultures were ordered by the overnight team currently pending  Would continue on ceftriaxone as she appears to be clinically responding, will await sensitivities  Anticipate 10-day treatment course partially treated with IV antibiotics and on discharge can be placed on oral for gram-negative bacteremia

## 2024-04-08 NOTE — PROGRESS NOTES
[facility]  Progress Note  Name: Cydney Lim I  MRN: [id number]  Unit/Bed#: -01 I Date of Admission: 4/5/2024   Date of Service: 4/8/2024 I Hospital Day: 3    Assessment/Plan   Sinus tachycardia seen on cardiac monitor  Assessment & Plan  Presented with heart rate of 150.  EKG showing sinus tachycardia.  No events on telemetry monitoring.  Suspicion for sepsis.  Even after IV fluid heart rate still persistently elevated in 130s.  Patient denies any palpitations.  No chest pain, shortness of breath or dizziness.  Had normal heart rate during recent hospitalization.  CTA PE study was negative.  Chest x-ray was unremarkable.  Unclear whether infection induced versus cardiogenic versus medication noncompliance.    Telemetry monitoring  Follow-up 2D echocardiogram results.    Acute cystitis without hematuria  Assessment & Plan  See plan as above    Essential hypertension  Assessment & Plan  Continue metoprolol, losartan, spironolactone.    Depression, recurrent (HCC)  Assessment & Plan  Continue Lexapro at 10mg daily     Epilepsy with altered consciousness with intractable epilepsy (HCC)  Assessment & Plan  Patient came after running out of Lexapro and not taking it since last 5 days.  History of noncompliance with seizure medication.  Restart keppra 1500 mg twice daily.  Continue carbamazepine.  Follow-up with neurology outpatient.    * SIRS (systemic inflammatory response syndrome) (HCC)  Assessment & Plan  Presented for medication refill found to have tachycardia with heart rate 150 on presentation.  Following 2 sepsis criteria with tachycardia, leukocytosis and suspicion for UTI.  Complaining of urinary frequency and urgency but denies any dysuria.  No fever.  No complaints otherwise.  No palpitations, dizziness, chest pain.  TSH WNL.  Prior urine culture showing pansensitive Klebsiella in 2020.  Normal lactic acid.  CTA showing no PE.  Unclear whether tachycardia is directly related  to infection given patient has very mild symptoms.    Continue ceftriaxone daily  Follow-up urine and blood culture-1 of 2 sets shows  acinetobacter ursingii, likley contaminant,urine cx shows klebsiella penumoniae,   Repeat blood cultures were ordered by the overnight team currently pending  Would continue on ceftriaxone as she appears to be clinically responding, will await sensitivities  Anticipate 10-day treatment course partially treated with IV antibiotics and on discharge can be placed on oral for gram-negative bacteremia               VTE Pharmacologic Prophylaxis:   Moderate Risk (Score 3-4) - Pharmacological DVT Prophylaxis Ordered: enoxaparin (Lovenox).    Mobility:   Basic Mobility Inpatient Raw Score: 24  JH-HLM Goal: 8: Walk 250 feet or more  JH-HLM Achieved: 3: Sit at edge of bed  JH-HLM Goal achieved. Continue to encourage appropriate mobility.    Patient Centered Rounds: I performed bedside rounds with nursing staff today.   Discussions with Specialists or Other Care Team Provider: d/w ID     Education and Discussions with Family / Patient: Patient declined call to .     Total Time Spent on Date of Encounter in care of patient: 45 mins. This time was spent on one or more of the following: performing physical exam; counseling and coordination of care; obtaining or reviewing history; documenting in the medical record; reviewing/ordering tests, medications or procedures; communicating with other healthcare professionals and discussing with patient's family/caregivers.    Current Length of Stay: 3 day(s)  Current Patient Status: Inpatient   Certification Statement: The patient will continue to require additional inpatient hospital stay due to tachycardia , blood cx positive   Discharge Plan: Anticipate discharge in 24-48 hrs to home.    Code Status: Level 1 - Full Code    Subjective:   Feels better.  Patient denies of any burning micturition.  Denies of abdominal pain nausea or vomiting.     Objective:     Vitals:   Temp (24hrs), Av.6 °F (36.4 °C), Min:97.5 °F (36.4 °C), Max:97.8 °F (36.6 °C)    Temp:  [97.5 °F (36.4 °C)-97.8 °F (36.6 °C)] 97.5 °F (36.4 °C)  HR:  [64-85] 64  Resp:  [16] 16  BP: (112-139)/(59-67) 139/59  SpO2:  [94 %-96 %] 96 %  Body mass index is 44.99 kg/m².     Input and Output Summary (last 24 hours):   No intake or output data in the 24 hours ending 24 1510    Physical Exam:   Physical Exam   HEENT-PERRLA, moist oral mucosa  Neck-supple, no JVD elevation   Respiratory-equal air entry bilaterally, no rales or rhonchi  Cardiovascular system-S1, S2 heard, no murmur or gallops or rubs  Abdomen-soft, nontender, no guarding or rigidity, bowel sounds heard  Extremities-no pedal edema  Peripheral pulses palpable  Musculoskeletal-no contractures  Central nervous system-no acute focal neurological deficit ,no sensory or motor deficit noted.  Skin-no rash noted       Additional Data:     Labs:  Results from last 7 days   Lab Units 24  0504   WBC Thousand/uL 10.95*   HEMOGLOBIN g/dL 10.5*   HEMATOCRIT % 33.3*   PLATELETS Thousands/uL 326   NEUTROS PCT % 71   LYMPHS PCT % 20   MONOS PCT % 5   EOS PCT % 3     Results from last 7 days   Lab Units 24  0504 24  0513 24  1857   SODIUM mmol/L 139   < > 139   POTASSIUM mmol/L 3.9   < > 3.6   CHLORIDE mmol/L 107   < > 107   CO2 mmol/L 23   < > 22   BUN mg/dL 23   < > 28*   CREATININE mg/dL 1.00   < > 1.14   ANION GAP mmol/L 9   < > 10   CALCIUM mg/dL 8.5   < > 8.8   ALBUMIN g/dL  --   --  3.7   TOTAL BILIRUBIN mg/dL  --   --  0.20   ALK PHOS U/L  --   --  101   ALT U/L  --   --  12   AST U/L  --   --  12*   GLUCOSE RANDOM mg/dL 102   < > 111    < > = values in this interval not displayed.                 Results from last 7 days   Lab Units 24  1857   LACTIC ACID mmol/L 0.5  --    PROCALCITONIN ng/ml  --  0.05       Lines/Drains:  Invasive Devices       Peripheral Intravenous Line  Duration              Peripheral IV 04/05/24 Right Antecubital 2 days                          Imaging: Personally reviewed the following imaging: chest CT scan    Recent Cultures (last 7 days):   Results from last 7 days   Lab Units 04/07/24  0528 04/05/24 2251 04/05/24 2025   BLOOD CULTURE  Received in Microbiology Lab. Culture in Progress.  Received in Microbiology Lab. Culture in Progress. No Growth at 48 hrs.  Acinetobacter ursingii*  --    GRAM STAIN RESULT   --  Gram negative rods*  --    URINE CULTURE   --   --  80,000-89,000 cfu/ml Klebsiella pneumoniae*  <10,000 cfu/ml       Last 24 Hours Medication List:   Current Facility-Administered Medications   Medication Dose Route Frequency Provider Last Rate    acetaminophen  650 mg Oral Q6H PRN Zakia Patel MD      albuterol  2 puff Inhalation Q6H PRN Zakia Patel MD      carBAMazepine  200 mg Oral TID Zakia Patel MD      cefTRIAXone  2,000 mg Intravenous Q24H Zakia Patel MD 2,000 mg (04/07/24 1947)    enoxaparin  40 mg Subcutaneous Daily Zakia Patel MD      escitalopram  10 mg Oral Daily Zakia Patel MD      fluticasone  1 spray Nasal Daily Zakia Patel MD      Fluticasone Furoate-Vilanterol  1 puff Inhalation Daily Ritchie Alberto, DO      levETIRAcetam  1,500 mg Oral BID Zakia Patel MD      losartan  25 mg Oral Daily Zakia Patel MD      metoprolol succinate  50 mg Oral Daily Zakia Patel MD      montelukast  10 mg Oral HS Ritchie Alberto DO      nicotine  1 patch Transdermal Daily Zakia Patel MD      ondansetron  4 mg Intravenous Q6H PRN Zakia Patel MD      pramipexole  1.5 mg Oral HS Zakia Patel MD      spironolactone  25 mg Oral Daily Zakia Patel MD      umeclidinium  1 puff Inhalation Daily Ritchie Alberto DO          Today, Patient Was Seen By: Keena Freeman MD    **Please Note: This note may have been constructed using a voice recognition  system.**

## 2024-04-09 VITALS
BODY MASS INDEX: 44.69 KG/M2 | WEIGHT: 252.2 LBS | TEMPERATURE: 96.6 F | SYSTOLIC BLOOD PRESSURE: 107 MMHG | OXYGEN SATURATION: 96 % | DIASTOLIC BLOOD PRESSURE: 67 MMHG | HEIGHT: 63 IN | RESPIRATION RATE: 16 BRPM | HEART RATE: 60 BPM

## 2024-04-09 LAB
ALL TARGETS: NOT DETECTED
ANION GAP SERPL CALCULATED.3IONS-SCNC: 11 MMOL/L (ref 4–13)
BACTERIA BLD CULT: ABNORMAL
BUN SERPL-MCNC: 23 MG/DL (ref 5–25)
CALCIUM SERPL-MCNC: 8.6 MG/DL (ref 8.4–10.2)
CHLORIDE SERPL-SCNC: 105 MMOL/L (ref 96–108)
CO2 SERPL-SCNC: 24 MMOL/L (ref 21–32)
CREAT SERPL-MCNC: 1.03 MG/DL (ref 0.6–1.3)
ERYTHROCYTE [DISTWIDTH] IN BLOOD BY AUTOMATED COUNT: 13.9 % (ref 11.6–15.1)
GFR SERPL CREATININE-BSD FRML MDRD: 57 ML/MIN/1.73SQ M
GLUCOSE SERPL-MCNC: 70 MG/DL (ref 65–140)
GRAM STN SPEC: ABNORMAL
HCT VFR BLD AUTO: 32.3 % (ref 34.8–46.1)
HGB BLD-MCNC: 10.3 G/DL (ref 11.5–15.4)
MCH RBC QN AUTO: 30.3 PG (ref 26.8–34.3)
MCHC RBC AUTO-ENTMCNC: 31.9 G/DL (ref 31.4–37.4)
MCV RBC AUTO: 95 FL (ref 82–98)
PLATELET # BLD AUTO: 325 THOUSANDS/UL (ref 149–390)
PMV BLD AUTO: 11 FL (ref 8.9–12.7)
POTASSIUM SERPL-SCNC: 4 MMOL/L (ref 3.5–5.3)
RBC # BLD AUTO: 3.4 MILLION/UL (ref 3.81–5.12)
SODIUM SERPL-SCNC: 140 MMOL/L (ref 135–147)
WBC # BLD AUTO: 9.65 THOUSAND/UL (ref 4.31–10.16)

## 2024-04-09 PROCEDURE — 85027 COMPLETE CBC AUTOMATED: CPT | Performed by: INTERNAL MEDICINE

## 2024-04-09 PROCEDURE — 99239 HOSP IP/OBS DSCHRG MGMT >30: CPT | Performed by: INTERNAL MEDICINE

## 2024-04-09 PROCEDURE — 80048 BASIC METABOLIC PNL TOTAL CA: CPT | Performed by: INTERNAL MEDICINE

## 2024-04-09 PROCEDURE — 94640 AIRWAY INHALATION TREATMENT: CPT

## 2024-04-09 PROCEDURE — 94760 N-INVAS EAR/PLS OXIMETRY 1: CPT

## 2024-04-09 RX ORDER — METOPROLOL SUCCINATE 50 MG/1
50 TABLET, EXTENDED RELEASE ORAL DAILY
Qty: 30 TABLET | Refills: 0 | Status: SHIPPED | OUTPATIENT
Start: 2024-04-09 | End: 2024-05-09

## 2024-04-09 RX ORDER — CYANOCOBALAMIN 1000 UG/ML
1000 INJECTION, SOLUTION INTRAMUSCULAR; SUBCUTANEOUS
Status: SHIPPED | OUTPATIENT
Start: 2024-05-01

## 2024-04-09 RX ORDER — ALBUTEROL SULFATE 2.5 MG/3ML
2.5 SOLUTION RESPIRATORY (INHALATION) EVERY 6 HOURS PRN
Qty: 75 ML | Refills: 0 | Status: SHIPPED | OUTPATIENT
Start: 2024-04-09

## 2024-04-09 RX ORDER — ESCITALOPRAM OXALATE 10 MG/1
10 TABLET ORAL DAILY
Qty: 30 TABLET | Refills: 0 | Status: SHIPPED | OUTPATIENT
Start: 2024-04-09 | End: 2024-05-09

## 2024-04-09 RX ORDER — NICOTINE 21 MG/24HR
1 PATCH, TRANSDERMAL 24 HOURS TRANSDERMAL DAILY
Qty: 28 PATCH | Refills: 0 | Status: SHIPPED | OUTPATIENT
Start: 2024-04-09

## 2024-04-09 RX ORDER — CARBAMAZEPINE 200 MG/1
200 TABLET ORAL 3 TIMES DAILY
Qty: 90 TABLET | Refills: 0 | Status: SHIPPED | OUTPATIENT
Start: 2024-04-09 | End: 2024-05-09

## 2024-04-09 RX ORDER — CEPHALEXIN 500 MG/1
500 CAPSULE ORAL EVERY 8 HOURS SCHEDULED
Qty: 9 CAPSULE | Refills: 0 | Status: SHIPPED | OUTPATIENT
Start: 2024-04-09 | End: 2024-04-12

## 2024-04-09 RX ORDER — SPIRONOLACTONE 25 MG/1
25 TABLET ORAL DAILY
Qty: 90 TABLET | Refills: 0 | Status: SHIPPED | OUTPATIENT
Start: 2024-04-09

## 2024-04-09 RX ORDER — ALBUTEROL SULFATE 90 UG/1
2 AEROSOL, METERED RESPIRATORY (INHALATION) EVERY 6 HOURS PRN
Qty: 6.7 G | Refills: 0 | Status: SHIPPED | OUTPATIENT
Start: 2024-04-09

## 2024-04-09 RX ORDER — LOSARTAN POTASSIUM 25 MG/1
25 TABLET ORAL DAILY
Qty: 30 TABLET | Refills: 0 | Status: SHIPPED | OUTPATIENT
Start: 2024-04-09 | End: 2024-05-09

## 2024-04-09 RX ORDER — LEVETIRACETAM 750 MG/1
1500 TABLET ORAL 2 TIMES DAILY
Qty: 120 TABLET | Refills: 0 | Status: SHIPPED | OUTPATIENT
Start: 2024-04-09 | End: 2024-05-09

## 2024-04-09 RX ORDER — PRAMIPEXOLE DIHYDROCHLORIDE 1.5 MG/1
1.5 TABLET ORAL
Qty: 90 TABLET | Refills: 0 | Status: SHIPPED | OUTPATIENT
Start: 2024-04-09 | End: 2024-07-08

## 2024-04-09 RX ADMIN — LEVETIRACETAM 1500 MG: 500 TABLET, FILM COATED ORAL at 08:32

## 2024-04-09 RX ADMIN — FLUTICASONE PROPIONATE 1 SPRAY: 50 SPRAY, METERED NASAL at 08:32

## 2024-04-09 RX ADMIN — LOSARTAN POTASSIUM 25 MG: 25 TABLET, FILM COATED ORAL at 08:32

## 2024-04-09 RX ADMIN — SPIRONOLACTONE 25 MG: 25 TABLET ORAL at 08:32

## 2024-04-09 RX ADMIN — METOPROLOL SUCCINATE 50 MG: 50 TABLET, EXTENDED RELEASE ORAL at 08:32

## 2024-04-09 RX ADMIN — NICOTINE 1 PATCH: 21 PATCH, EXTENDED RELEASE TRANSDERMAL at 08:33

## 2024-04-09 RX ADMIN — ENOXAPARIN SODIUM 40 MG: 40 INJECTION SUBCUTANEOUS at 08:33

## 2024-04-09 RX ADMIN — ESCITALOPRAM OXALATE 10 MG: 10 TABLET ORAL at 08:32

## 2024-04-09 RX ADMIN — CARBAMAZEPINE 200 MG: 200 TABLET ORAL at 08:32

## 2024-04-09 RX ADMIN — UMECLIDINIUM 1 PUFF: 62.5 AEROSOL, POWDER ORAL at 08:40

## 2024-04-09 RX ADMIN — FLUTICASONE FUROATE AND VILANTEROL TRIFENATATE 1 PUFF: 100; 25 POWDER RESPIRATORY (INHALATION) at 08:40

## 2024-04-09 NOTE — PLAN OF CARE
Problem: INFECTION - ADULT  Goal: Absence or prevention of progression during hospitalization  Description: INTERVENTIONS:  - Assess and monitor for signs and symptoms of infection  - Monitor lab/diagnostic results  - Monitor all insertion sites, i.e. indwelling lines, tubes, and drains  - Monitor endotracheal if appropriate and nasal secretions for changes in amount and color  - [place] appropriate cooling/warming therapies per order  - Administer medications as ordered  - Instruct and encourage patient and family to use good hand hygiene technique  - Identify and instruct in appropriate isolation precautions for identified infection/condition  Outcome: Progressing  Goal: Absence of fever/infection during neutropenic period  Description: INTERVENTIONS:  - Monitor WBC    Outcome: Progressing         Problem: Knowledge Deficit  Goal: Patient/family/caregiver demonstrates understanding of disease process, treatment plan, medications, and discharge instructions  Description: Complete learning assessment and assess knowledge base.  Interventions:  - Provide teaching at level of understanding  - Provide teaching via preferred learning methods  Outcome: Progressing     Problem: DISCHARGE PLANNING  Goal: Discharge to home or other facility with appropriate resources  Description: INTERVENTIONS:  - Identify barriers to discharge w/patient and caregiver  - Arrange for needed discharge resources and transportation as appropriate  - Identify discharge learning needs (meds, wound care, etc.)  - Arrange for interpretive services to assist at discharge as needed  - Refer to Case Management Department for coordinating discharge planning if the patient needs post-hospital services based on physician/advanced practitioner order or complex needs related to functional status, cognitive ability, or social support system  Outcome: Progressing      Problem: SAFETY ADULT  Goal: Maintain or return to baseline ADL function  Description:  INTERVENTIONS:  -  Assess patient's ability to carry out ADLs; assess patient's baseline for ADL function and identify physical deficits which impact ability to perform ADLs (bathing, care of mouth/teeth, toileting, grooming, dressing, etc.)  - Assess/evaluate cause of self-care deficits   - Assess range of motion  - Assess patient's mobility; develop plan if impaired  - Assess patient's need for assistive devices and provide as appropriate  - Encourage maximum independence but intervene and supervise when necessary  - Involve family in performance of ADLs  - Assess for home care needs following discharge   - Consider OT consult to assist with ADL evaluation and planning for discharge  - Provide patient education as appropriate  Outcome: Progressing

## 2024-04-09 NOTE — PLAN OF CARE
Problem: Potential for Falls  Goal: Patient will remain free of falls  Description: INTERVENTIONS:  - Educate patient/family on patient safety including physical limitations  - Instruct patient to call for assistance with activity   - Consult OT/PT to assist with strengthening/mobility   - Keep Call bell within reach  - Keep bed low and locked with side rails adjusted as appropriate  - Keep care items and personal belongings within reach  - Initiate and maintain comfort rounds  - Make Fall Risk Sign visible to staff  - Offer Toileting every  Hours, in advance of need  - Initiate/Maintain alarm  - Obtain necessary fall risk management equipment:   - Apply yellow socks and bracelet for high fall risk patients  - Consider moving patient to room near nurses station  Outcome: Progressing     Problem: PAIN - ADULT  Goal: Verbalizes/displays adequate comfort level or baseline comfort level  Description: Interventions:  - Encourage patient to monitor pain and request assistance  - Assess pain using appropriate pain scale  - Administer analgesics based on type and severity of pain and evaluate response  - Implement non-pharmacological measures as appropriate and evaluate response  - Consider cultural and social influences on pain and pain management  - Notify physician/advanced practitioner if interventions unsuccessful or patient reports new pain  Outcome: Progressing     Problem: INFECTION - ADULT  Goal: Absence or prevention of progression during hospitalization  Description: INTERVENTIONS:  - Assess and monitor for signs and symptoms of infection  - Monitor lab/diagnostic results  - Monitor all insertion sites, i.e. indwelling lines, tubes, and drains  - Monitor endotracheal if appropriate and nasal secretions for changes in amount and color  - [place] appropriate cooling/warming therapies per order  - Administer medications as ordered  - Instruct and encourage patient and family to use good hand hygiene technique  -  Identify and instruct in appropriate isolation precautions for identified infection/condition  Outcome: Progressing     Problem: SAFETY ADULT  Goal: Patient will remain free of falls  Description: INTERVENTIONS:  - Educate patient/family on patient safety including physical limitations  - Instruct patient to call for assistance with activity   - Consult OT/PT to assist with strengthening/mobility   - Keep Call bell within reach  - Keep bed low and locked with side rails adjusted as appropriate  - Keep care items and personal belongings within reach  - Initiate and maintain comfort rounds  - Make Fall Risk Sign visible to staff  - Offer Toileting every  Hours, in advance of need  - Initiate/Maintain alarm  - Obtain necessary fall risk management equipment:   - Apply yellow socks and bracelet for high fall risk patients  - Consider moving patient to room near nurses station  Outcome: Progressing

## 2024-04-09 NOTE — DISCHARGE INSTR - AVS FIRST PAGE
Dear Cydney Lim,     It was our pleasure to care for you here at [facility].  It is our hope that we were always able to exceed the expected standards for your care during your stay.  You were hospitalized due to UTI.  You were cared for on the medsurg floor under the service of Keena Freeman MD with the [facility] who covers for your primary care physician (PCP), DRU Knott, while you were hospitalized.  If you have any questions or concerns related to this hospitalization, you may contact us at .  For follow up as well as medication refills, we recommend that you follow up with your primary care physician.  A registered nurse will reach out to you by phone within a few days after your discharge to answer any additional questions that you may have after going home.  However, at this time we provide for you here, the most important instructions / recommendations at discharge:     Notable Medication Adjustments -   Keflex 500mg tid for 3 days   Testing Required after Discharge -   nil  ** Please contact your PCP to request testing orders for any of the testing recommended here **  Important follow up information -   F/u pcp and psychiatry   Other Instructions -   nil  Please review this entire after visit summary as additional general instructions including medication list, appointments, activity, diet, any pertinent wound care, and other additional recommendations from your care team that may be provided for you.      Sincerely,     Keena Freeman MD

## 2024-04-09 NOTE — NURSING NOTE
Patient discharged at this time with all belongings, AVS reviewed with pt and questions answered. Left via lyft

## 2024-04-09 NOTE — DISCHARGE SUMMARY
[facility]  Discharge- Cydney Lim 1960, 63 y.o. female MRN: [id number]  Unit/Bed#: MS [facility]Jeff[facility] Encounter: [id number]  Primary Care Provider: DRU Knott   Date and time admitted to hospital: 4/5/2024  6:33 PM    Sinus tachycardia seen on cardiac monitor  Assessment & Plan  Presented with heart rate of 150.  EKG showing sinus tachycardia.  No events on telemetry monitoring.  Suspicion for sepsis.  Even after IV fluid heart rate still persistently elevated in 130s.  Patient denies any palpitations.  No chest pain, shortness of breath or dizziness.  Had normal heart rate during recent hospitalization.  CTA PE study was negative.  Chest x-ray was unremarkable.  Unclear whether infection induced versus cardiogenic versus medication noncompliance.    Telemetry monitoring  Follow-up 2D echocardiogram results.    Acute cystitis without hematuria  Assessment & Plan  See plan as above    Essential hypertension  Assessment & Plan  Continue metoprolol, losartan, spironolactone.    Depression, recurrent (HCC)  Assessment & Plan  Continue Lexapro at 10mg daily     Epilepsy with altered consciousness with intractable epilepsy (HCC)  Assessment & Plan  Patient came after running out of Lexapro and not taking it since last 5 days.  History of noncompliance with seizure medication.  Restart keppra 1500 mg twice daily.  Continue carbamazepine.  Follow-up with neurology outpatient.    * SIRS (systemic inflammatory response syndrome) (HCC)  Assessment & Plan  Presented for medication refill found to have tachycardia with heart rate 150 on presentation.  Following 2 sepsis criteria with tachycardia, leukocytosis and suspicion for UTI.  Complaining of urinary frequency and urgency but denies any dysuria.  No fever.  No complaints otherwise.  No palpitations, dizziness, chest pain.  TSH WNL.  Prior urine culture showing pansensitive Klebsiella in 2020.  Normal lactic acid.  CTA showing no PE.   Unclear whether tachycardia is directly related to infection given patient has very mild symptoms.    Continue ceftriaxone daily  Follow-up urine and blood culture-1 of 2 sets shows  acinetobacter ursingii, bethley contaminant,urine cx shows klebsiella penumoniae,   Repeat blood cultures were ordered by the overnight team currently pending  Would continue on ceftriaxone as she appears to be clinically responding, will await sensitivities  Anticipate 10-day treatment course partially treated with IV antibiotics and on discharge can be placed on oral for gram-negative bacteremia          Medical Problems       Resolved Problems  Date Reviewed: 4/9/2024   None       Discharging Physician / Practitioner: Keena Freeman MD  PCP: DRU Knott  Admission Date:   Admission Orders (From admission, onward)       Ordered        04/05/24 2300  INPATIENT ADMISSION  Once                          Discharge Date: 04/09/24    Consultations During Hospital Stay:  nil    Procedures Performed:   Echo    CTA chest    Significant Findings / Test Results:   nil    Incidental Findings:    nil    Test Results Pending at Discharge (will require follow up):   nil     Outpatient Tests Requested:  nil    Complications:  nil    Reason for Admission:     Hospital Course:   Cydney Lim is a 63 y.o. female patient who originally presented to the hospital on 4/5/2024 due to medication refill complaining of urgency and frequency of urine.  Patient was noted to have tachycardic with a heart rate of 142, noted to be sinus tachycardia on EKG and no events on telemetry monitoring.  Met SIRS criteria, however patient was afebrile, CTA PE study was negative.  Given IV antibiotic therapy with Rocephin.  Also given IV fluid hydration.  Tachycardia resolved.  Blood culture 1 of 2 set was positive for Acinetobacter ursingii  gram-negative rods, likely contaminant from the skin, repeat blood cultures x 2 are negative, this was discussed  "curbside with ID specialist and urine culture was reviewed shows 80,[id number] of Klebsiella.  Patient was given IV antibiotic therapy with Rocephin.  Clinically improved.  Will complete 7-day total course of antibiotic with Keflex.  38 cardiogram was reviewed shows EF of 55% mild concentric hypertrophy noted, right ventricular solid pressure of 32 mmHg.  No pericardial effusion noted,      Please see above list of diagnoses and related plan for additional information.     Condition at Discharge: good    Discharge Day Visit / Exam:   Subjective:  pt feels better ,pt has no  CP or sob   Vitals: Blood Pressure: 107/67 (04/09/24 1112)  Pulse: 60 (04/09/24 1112)  Temperature: (!) 96.6 °F (35.9 °C) (04/09/24 1112)  Temp Source: Tympanic (04/09/24 1112)  Respirations: 16 (04/09/24 1112)  Height: 5' 3\" (160 cm) (04/08/24 1155)  Weight - Scale: 114 kg (252 lb 3.2 oz) (04/09/24 0553)  SpO2: 96 % (04/09/24 1112)  Exam:   Physical Exam   HEENT-PERRLA, moist oral mucosa  Neck-supple, no JVD elevation   Respiratory-equal air entry bilaterally, no rales or rhonchi  Cardiovascular system-S1, S2 heard, no murmur or gallops or rubs  Abdomen-soft, nontender, no guarding or rigidity, bowel sounds heard  Extremities-no pedal edema  Peripheral pulses palpable  Musculoskeletal-no contractures  Central nervous system-no acute focal neurological deficit ,no sensory or motor deficit noted.  Skin-no rash noted       Discussion with Family: Attempted to update  (brother) via phone. Unable to contact.    Discharge instructions/Information to patient and family:   See after visit summary for information provided to patient and family.      Provisions for Follow-Up Care:  See after visit summary for information related to follow-up care and any pertinent home health orders.      Mobility at time of Discharge:   Basic Mobility Inpatient Raw Score: 24  JH-HLM Goal: 8: Walk 250 feet or more  JH-HLM Achieved: 7: Walk 25 feet or " more  HLM Goal achieved. Continue to encourage appropriate mobility.     Disposition:   Home with VNA Services (Reminder: Complete face to face encounter)    Planned Readmission: nil     Discharge Statement:  I spent 45 minutes discharging the patient. This time was spent on the day of discharge. I had direct contact with the patient on the day of discharge. Greater than 50% of the total time was spent examining patient, answering all patient questions, arranging and discussing plan of care with patient as well as directly providing post-discharge instructions.  Additional time then spent on discharge activities.    Discharge Medications:  See after visit summary for reconciled discharge medications provided to patient and/or family.      **Please Note: This note may have been constructed using a voice recognition system**
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
## 2024-04-10 ENCOUNTER — PATIENT OUTREACH (OUTPATIENT)
Dept: FAMILY MEDICINE CLINIC | Facility: CLINIC | Age: 64
End: 2024-04-10

## 2024-04-10 DIAGNOSIS — Z71.89 COMPLEX CARE COORDINATION: Primary | ICD-10-CM

## 2024-04-10 LAB
ATRIAL RATE: 142 BPM
P AXIS: 36 DEGREES
PR INTERVAL: 156 MS
QRS AXIS: 56 DEGREES
QRSD INTERVAL: 70 MS
QT INTERVAL: 264 MS
QTC INTERVAL: 406 MS
T WAVE AXIS: 62 DEGREES
VENTRICULAR RATE: 142 BPM

## 2024-04-10 PROCEDURE — 93010 ELECTROCARDIOGRAM REPORT: CPT | Performed by: INTERNAL MEDICINE

## 2024-04-10 NOTE — NURSING NOTE
Patient came in on 4/10/24 to  medications that were left and also she had questions regarding discharge medications. This writer placed a call to her pharmacy to see what was going on. Explained to pt that phasohail told me it was an insurance issue.

## 2024-04-10 NOTE — PROGRESS NOTES
In basket message received with a patient referred from the HRR report. Chart reviewed.  Patient was admitted to [facility] 4/5 with SIRS, epilepsy, depression, acute cystitis and tachycardia.  She discharged home on 4/9.  I called her phone and received a recording it is no longer in service.The other number in demographics is her brothers and he is not on the communication consent.

## 2024-04-11 ENCOUNTER — PATIENT OUTREACH (OUTPATIENT)
Dept: FAMILY MEDICINE CLINIC | Facility: CLINIC | Age: 64
End: 2024-04-11

## 2024-04-11 ENCOUNTER — HOSPITAL ENCOUNTER (EMERGENCY)
Facility: HOSPITAL | Age: 64
Discharge: HOME | End: 2024-04-11
Attending: EMERGENCY MEDICINE
Payer: MEDICARE

## 2024-04-11 VITALS
OXYGEN SATURATION: 95 % | BODY MASS INDEX: 44.64 KG/M2 | TEMPERATURE: 98 F | SYSTOLIC BLOOD PRESSURE: 156 MMHG | RESPIRATION RATE: 18 BRPM | WEIGHT: 252 LBS | HEART RATE: 86 BPM | DIASTOLIC BLOOD PRESSURE: 66 MMHG

## 2024-04-11 DIAGNOSIS — Z91.148 NON COMPLIANCE W MEDICATION REGIMEN: ICD-10-CM

## 2024-04-11 DIAGNOSIS — G40.919 BREAKTHROUGH SEIZURE (HCC): Primary | ICD-10-CM

## 2024-04-11 LAB
ALBUMIN SERPL BCP-MCNC: 3.7 G/DL (ref 3.5–5)
ALP SERPL-CCNC: 89 U/L (ref 34–104)
ALT SERPL W P-5'-P-CCNC: 18 U/L (ref 7–52)
ANION GAP SERPL CALCULATED.3IONS-SCNC: 9 MMOL/L (ref 4–13)
AST SERPL W P-5'-P-CCNC: 18 U/L (ref 13–39)
BACTERIA BLD CULT: NORMAL
BASOPHILS # BLD AUTO: 0.04 THOUSANDS/ÂΜL (ref 0–0.1)
BASOPHILS NFR BLD AUTO: 0 % (ref 0–1)
BILIRUB SERPL-MCNC: 0.24 MG/DL (ref 0.2–1)
BUN SERPL-MCNC: 24 MG/DL (ref 5–25)
CALCIUM SERPL-MCNC: 9 MG/DL (ref 8.4–10.2)
CARBAMAZEPINE SERPL-MCNC: 4.9 UG/ML (ref 4–12)
CHLORIDE SERPL-SCNC: 106 MMOL/L (ref 96–108)
CO2 SERPL-SCNC: 24 MMOL/L (ref 21–32)
CREAT SERPL-MCNC: 1.08 MG/DL (ref 0.6–1.3)
EOSINOPHIL # BLD AUTO: 0.12 THOUSAND/ÂΜL (ref 0–0.61)
EOSINOPHIL NFR BLD AUTO: 1 % (ref 0–6)
ERYTHROCYTE [DISTWIDTH] IN BLOOD BY AUTOMATED COUNT: 14 % (ref 11.6–15.1)
GFR SERPL CREATININE-BSD FRML MDRD: 53 ML/MIN/1.73SQ M
GLUCOSE SERPL-MCNC: 94 MG/DL (ref 65–140)
HCT VFR BLD AUTO: 36.6 % (ref 34.8–46.1)
HGB BLD-MCNC: 11.7 G/DL (ref 11.5–15.4)
IMM GRANULOCYTES # BLD AUTO: 0.05 THOUSAND/UL (ref 0–0.2)
IMM GRANULOCYTES NFR BLD AUTO: 0 % (ref 0–2)
LEVETIRACETAM SERPL-MCNC: 13.5 UG/ML (ref 12–46)
LYMPHOCYTES # BLD AUTO: 1.87 THOUSANDS/ÂΜL (ref 0.6–4.47)
LYMPHOCYTES NFR BLD AUTO: 13 % (ref 14–44)
MCH RBC QN AUTO: 30.4 PG (ref 26.8–34.3)
MCHC RBC AUTO-ENTMCNC: 32 G/DL (ref 31.4–37.4)
MCV RBC AUTO: 95 FL (ref 82–98)
MONOCYTES # BLD AUTO: 0.69 THOUSAND/ÂΜL (ref 0.17–1.22)
MONOCYTES NFR BLD AUTO: 5 % (ref 4–12)
NEUTROPHILS # BLD AUTO: 11.35 THOUSANDS/ÂΜL (ref 1.85–7.62)
NEUTS SEG NFR BLD AUTO: 81 % (ref 43–75)
NRBC BLD AUTO-RTO: 0 /100 WBCS
PLATELET # BLD AUTO: 388 THOUSANDS/UL (ref 149–390)
PMV BLD AUTO: 10.2 FL (ref 8.9–12.7)
POTASSIUM SERPL-SCNC: 3.9 MMOL/L (ref 3.5–5.3)
PROT SERPL-MCNC: 7.4 G/DL (ref 6.4–8.4)
RBC # BLD AUTO: 3.85 MILLION/UL (ref 3.81–5.12)
SODIUM SERPL-SCNC: 139 MMOL/L (ref 135–147)
WBC # BLD AUTO: 14.12 THOUSAND/UL (ref 4.31–10.16)

## 2024-04-11 PROCEDURE — 99284 EMERGENCY DEPT VISIT MOD MDM: CPT

## 2024-04-11 PROCEDURE — 36415 COLL VENOUS BLD VENIPUNCTURE: CPT | Performed by: PHYSICIAN ASSISTANT

## 2024-04-11 PROCEDURE — 80156 ASSAY CARBAMAZEPINE TOTAL: CPT | Performed by: PHYSICIAN ASSISTANT

## 2024-04-11 PROCEDURE — 85025 COMPLETE CBC W/AUTO DIFF WBC: CPT | Performed by: PHYSICIAN ASSISTANT

## 2024-04-11 PROCEDURE — 99284 EMERGENCY DEPT VISIT MOD MDM: CPT | Performed by: PHYSICIAN ASSISTANT

## 2024-04-11 PROCEDURE — 83520 IMMUNOASSAY QUANT NOS NONAB: CPT | Performed by: PHYSICIAN ASSISTANT

## 2024-04-11 PROCEDURE — 80053 COMPREHEN METABOLIC PANEL: CPT | Performed by: PHYSICIAN ASSISTANT

## 2024-04-11 RX ORDER — LEVETIRACETAM 500 MG/1
1500 TABLET ORAL ONCE
Status: COMPLETED | OUTPATIENT
Start: 2024-04-11 | End: 2024-04-11

## 2024-04-11 RX ADMIN — LEVETIRACETAM 1500 MG: 500 TABLET, FILM COATED ORAL at 13:11

## 2024-04-11 NOTE — PROGRESS NOTES
I spoke with Cydney who reports she had 2 seizures since discharge. She was not able to start Keppra due to the cost of medication. She asked her brother to  the Keflex which she has.She thinks she has Tegretol. She lives alone and does not have transportation per inpatient CM note.  I advised her to call 911 and return to the ED.

## 2024-04-11 NOTE — ED PROVIDER NOTES
"History  Chief Complaint   Patient presents with    Seizure - Prior Hx Of     Unwitnessed seizure; pt states was sitting at table and believes had a seizure; awoke at table with both hands shaking, does not think she hit her head. Denies having aura, has not have picked up new Rx Tegretol recently prescribed      Past Medical History: Depression, Epilepsy, Obesity, Restless leg  Past Surgical History: DENTAL SURGERY - all teeth removed, LASER ABLATION OF THE CERVIX, MAMMO   DC 2 days ago    PT lives alone and is concerned because she thinks she may have had a seizure today; states she was sitting at table; \"awoke\" at table with both hands shaking, and this is what happens with her seizures.  Pt denies trauma, no headache, CP, SOB, no NVD  States she was not able to get her prescriptions filled since being discharged 2 days ago due to cost and needs her brother to help her with this          Prior to Admission Medications   Prescriptions Last Dose Informant Patient Reported? Taking?   albuterol (2.5 mg/3 mL) 0.083 % nebulizer solution   No No   Sig: Take 3 mL (2.5 mg total) by nebulization every 6 (six) hours as needed for wheezing or shortness of breath   albuterol (Proventil HFA) 90 mcg/act inhaler   No No   Sig: Inhale 2 puffs every 6 (six) hours as needed for wheezing   calcium carbonate (OYSTER SHELL,OSCAL) 500 mg   No No   Sig: Take 2 tablets by mouth daily with breakfast   carBAMazepine (TEGretol) 200 mg tablet   No No   Sig: Take 1 tablet (200 mg total) by mouth 3 (three) times a day   cephalexin (KEFLEX) 500 mg capsule   No No   Sig: Take 1 capsule (500 mg total) by mouth every 8 (eight) hours for 3 days   cholecalciferol 1,000 units tablet   No No   Sig: Take 2 tablets (2,000 Units total) by mouth daily   escitalopram (LEXAPRO) 10 mg tablet   No No   Sig: Take 1 tablet (10 mg total) by mouth daily   fluticasone (FLONASE) 50 mcg/act nasal spray  Self No No   Si spray into each nostril daily "   levETIRAcetam (KEPPRA) 750 mg tablet   No No   Sig: Take 2 tablets (1,500 mg total) by mouth 2 (two) times a day   losartan (COZAAR) 25 mg tablet   No No   Sig: Take 1 tablet (25 mg total) by mouth daily   metoprolol succinate (TOPROL-XL) 50 mg 24 hr tablet   No No   Sig: Take 1 tablet (50 mg total) by mouth daily   nicotine (NICODERM CQ) 21 mg/24 hr TD 24 hr patch   No No   Sig: Place 1 patch on the skin over 24 hours daily   pramipexole (MIRAPEX) 1.5 MG tablet   No No   Sig: Take 1 tablet (1.5 mg total) by mouth daily at bedtime   spironolactone (ALDACTONE) 25 mg tablet   No No   Sig: Take 1 tablet (25 mg total) by mouth daily      Facility-Administered Medications Last Administration Doses Remaining   cyanocobalamin injection 1,000 mcg None recorded           Past Medical History:   Diagnosis Date    Depression     EP (epilepsy) (HCC)     Epilepsy (HCC)     Obesity     Restless leg        Past Surgical History:   Procedure Laterality Date    DENTAL SURGERY      all teeth removed    LASER ABLATION OF THE CERVIX      MAMMO (HISTORICAL)  05/01/2017       Family History   Problem Relation Age of Onset    Kidney disease Mother     Liver disease Mother     Heart attack Mother     Heart attack Father     No Known Problems Brother     No Known Problems Son      I have reviewed and agree with the history as documented.    E-Cigarette/Vaping    E-Cigarette Use Never User      E-Cigarette/Vaping Substances    Nicotine Yes     THC No     CBD No     Flavoring No     Other No     Unknown No      Social History     Tobacco Use    Smoking status: Every Day     Current packs/day: 0.50     Average packs/day: 0.5 packs/day for 23.0 years (11.5 ttl pk-yrs)     Types: Cigarettes    Smokeless tobacco: Never   Vaping Use    Vaping status: Never Used   Substance Use Topics    Alcohol use: Never     Comment: pt denies alcohol use    Drug use: Never       Review of Systems   Constitutional:  Negative for fever.   Respiratory:  Negative  for shortness of breath.    Cardiovascular:  Negative for chest pain.   Gastrointestinal:  Negative for vomiting.   Skin:  Negative for rash.   Neurological:  Positive for tremors and seizures. Negative for weakness.   All other systems reviewed and are negative.      Physical Exam  Physical Exam  Vitals and nursing note reviewed.   Constitutional:       General: She is not in acute distress.     Appearance: She is well-developed.   HENT:      Head: Normocephalic and atraumatic.      Nose: Nose normal.      Mouth/Throat:      Mouth: Mucous membranes are moist.      Pharynx: Oropharynx is clear.   Eyes:      Conjunctiva/sclera: Conjunctivae normal.   Cardiovascular:      Rate and Rhythm: Normal rate and regular rhythm.   Pulmonary:      Effort: Pulmonary effort is normal.      Breath sounds: Normal breath sounds.   Abdominal:      General: Bowel sounds are normal.      Palpations: Abdomen is soft.   Musculoskeletal:         General: Normal range of motion.      Cervical back: Normal range of motion.      Right lower leg: No edema.      Left lower leg: No edema.   Skin:     General: Skin is warm and dry.      Capillary Refill: Capillary refill takes less than 2 seconds.      Findings: No rash.   Neurological:      General: No focal deficit present.      Mental Status: She is alert.         Vital Signs  ED Triage Vitals [04/11/24 1215]   Temperature Pulse Respirations Blood Pressure SpO2   98 °F (36.7 °C) 77 18 156/66 97 %      Temp Source Heart Rate Source Patient Position - Orthostatic VS BP Location FiO2 (%)   Oral Monitor Lying Left arm --      Pain Score       No Pain           Vitals:    04/11/24 1215 04/11/24 1330 04/11/24 1430   BP: 156/66     Pulse: 77 80 86   Patient Position - Orthostatic VS: Lying           Visual Acuity      ED Medications  Medications   levETIRAcetam (KEPPRA) tablet 1,500 mg (1,500 mg Oral Given 4/11/24 1311)       Diagnostic Studies  Results Reviewed       Procedure Component Value Units  "Date/Time    Levetiracetam level [[id number]]  (Normal) Collected: 04/11/24 1328    Lab Status: Final result Specimen: Blood from Arm, Right Updated: 04/11/24 1813     Levetiracetam Lvl 13.5 ug/mL     Narrative:      \"Brivaracetam (Briviact) interferes with measurements of levetiracetam in the ARK Levetiracetam Assay. Patients undergoing a switch in drug therapy involving Keppra and Briviact should not be monitored for levetiracetam using the ARK assay. Serum levels of levetiracetam and or brivaracetam should be confirmed by a valid chromatographic method if there is a possibility these drugs are co-present in circulation.\"    Carbamazepine level, total [[id number]]  (Normal) Collected: 04/11/24 1328    Lab Status: Final result Specimen: Blood from Arm, Right Updated: 04/11/24 1757     Carbamazepine Lvl 4.9 ug/mL     Comprehensive metabolic panel [[id number]] Collected: 04/11/24 1328    Lab Status: Final result Specimen: Blood from Arm, Right Updated: 04/11/24 1429     Sodium 139 mmol/L      Potassium 3.9 mmol/L      Chloride 106 mmol/L      CO2 24 mmol/L      ANION GAP 9 mmol/L      BUN 24 mg/dL      Creatinine 1.08 mg/dL      Glucose 94 mg/dL      Calcium 9.0 mg/dL      AST 18 U/L      ALT 18 U/L      Alkaline Phosphatase 89 U/L      Total Protein 7.4 g/dL      Albumin 3.7 g/dL      Total Bilirubin 0.24 mg/dL      eGFR 53 ml/min/1.73sq m     Narrative:      National Kidney Disease Foundation guidelines for Chronic Kidney Disease (CKD):     Stage 1 with normal or high GFR (GFR > 90 mL/min/1.73 square meters)    Stage 2 Mild CKD (GFR = 60-89 mL/min/1.73 square meters)    Stage 3A Moderate CKD (GFR = 45-59 mL/min/1.73 square meters)    Stage 3B Moderate CKD (GFR = 30-44 mL/min/1.73 square meters)    Stage 4 Severe CKD (GFR = 15-29 mL/min/1.73 square meters)    Stage 5 End Stage CKD (GFR <15 mL/min/1.73 square meters)  Note: GFR calculation is accurate only with a steady state creatinine    CBC and differential " [[id number]]  (Abnormal) Collected: 04/11/24 1328    Lab Status: Final result Specimen: Blood from Arm, Right Updated: 04/11/24 1341     WBC 14.12 Thousand/uL      RBC 3.85 Million/uL      Hemoglobin 11.7 g/dL      Hematocrit 36.6 %      MCV 95 fL      MCH 30.4 pg      MCHC 32.0 g/dL      RDW 14.0 %      MPV 10.2 fL      Platelets 388 Thousands/uL      nRBC 0 /100 WBCs      Segmented % 81 %      Immature Grans % 0 %      Lymphocytes % 13 %      Monocytes % 5 %      Eosinophils Relative 1 %      Basophils Relative 0 %      Absolute Neutrophils 11.35 Thousands/µL      Absolute Immature Grans 0.05 Thousand/uL      Absolute Lymphocytes 1.87 Thousands/µL      Absolute Monocytes 0.69 Thousand/µL      Eosinophils Absolute 0.12 Thousand/µL      Basophils Absolute 0.04 Thousands/µL                    No orders to display              Procedures  Procedures         ED Course                               SBIRT 20yo+      Flowsheet Row Most Recent Value   Initial Alcohol Screen: US AUDIT-C     1. How often do you have a drink containing alcohol? 0 Filed at: 04/11/2024 1218   2. How many drinks containing alcohol do you have on a typical day you are drinking?  0 Filed at: 04/11/2024 1218   3a. Male UNDER 65: How often do you have five or more drinks on one occasion? 0 Filed at: 04/11/2024 1218   3b. FEMALE Any Age, or MALE 65+: How often do you have 4 or more drinks on one occassion? 0 Filed at: 04/11/2024 1218   Audit-C Score 0 Filed at: 04/11/2024 1218   JOHN: How many times in the past year have you...    Used an illegal drug or used a prescription medication for non-medical reasons? Never Filed at: 04/11/2024 1218                      Medical Decision Making  Good RX card given to patient.  Patient discharged prior to seizure medication levels being resulted but she was awake alert and oriented no acute distress states can get prescriptions filled at least her seizure medications.  After discharge levels reviewed which were  within normal limits.  Patient does have good follow-up with PCP and states she was about to call neurologist for follow-up appointment.      Amount and/or Complexity of Data Reviewed  Labs: ordered. Decision-making details documented in ED Course.    Risk  Prescription drug management.             Disposition  Final diagnoses:   Breakthrough seizure (HCC)   Non compliance w medication regimen     Time reflects when diagnosis was documented in both MDM as applicable and the Disposition within this note       Time User Action Codes Description Comment    4/11/2024  4:00 PM Marli Lugo [G40.919] Breakthrough seizure (HCC)     4/11/2024  4:00 PM Marli Lugo [Z91.148] Non compliance w medication regimen           ED Disposition       ED Disposition   Discharge    Condition   Stable    Date/Time   Thu Apr 11, 2024 1600    Comment   Cydney Lim discharge to home/self care.                   Follow-up Information       Follow up With Specialties Details Why Contact Info    [facility], Nurse Practitioner   [address] [facility]  [address] [place] [address]  [phone]      Your Neurologist    Call as soon as possible            Discharge Medication List as of 4/11/2024  4:01 PM        CONTINUE these medications which have NOT CHANGED    Details   albuterol (2.5 mg/3 mL) 0.083 % nebulizer solution Take 3 mL (2.5 mg total) by nebulization every 6 (six) hours as needed for wheezing or shortness of breath, Starting Tue 4/9/2024, Normal      albuterol (Proventil HFA) 90 mcg/act inhaler Inhale 2 puffs every 6 (six) hours as needed for wheezing, Starting Tue 4/9/2024, Normal      calcium carbonate (OYSTER SHELL,OSCAL) 500 mg Take 2 tablets by mouth daily with breakfast, Starting Thu 7/20/2023, Until Fri 12/22/2023, Normal      carBAMazepine (TEGretol) 200 mg tablet Take 1 tablet (200 mg total) by mouth 3 (three) times a day, Starting Tue 4/9/2024, Until Thu 5/9/2024, Normal       cephalexin (KEFLEX) 500 mg capsule Take 1 capsule (500 mg total) by mouth every 8 (eight) hours for 3 days, Starting Tue 4/9/2024, Until Fri 4/12/2024, Normal      cholecalciferol 1,000 units tablet Take 2 tablets (2,000 Units total) by mouth daily, Starting Tue 4/9/2024, Until Sat 6/8/2024, Normal      escitalopram (LEXAPRO) 10 mg tablet Take 1 tablet (10 mg total) by mouth daily, Starting Tue 4/9/2024, Until Thu 5/9/2024, Normal      fluticasone (FLONASE) 50 mcg/act nasal spray 1 spray into each nostril daily, Starting Thu 11/30/2023, Normal      levETIRAcetam (KEPPRA) 750 mg tablet Take 2 tablets (1,500 mg total) by mouth 2 (two) times a day, Starting Tue 4/9/2024, Until Thu 5/9/2024, Normal      losartan (COZAAR) 25 mg tablet Take 1 tablet (25 mg total) by mouth daily, Starting Tue 4/9/2024, Until Thu 5/9/2024, Normal      metoprolol succinate (TOPROL-XL) 50 mg 24 hr tablet Take 1 tablet (50 mg total) by mouth daily, Starting Tue 4/9/2024, Until Thu 5/9/2024, Normal      nicotine (NICODERM CQ) 21 mg/24 hr TD 24 hr patch Place 1 patch on the skin over 24 hours daily, Starting Tue 4/9/2024, Normal      pramipexole (MIRAPEX) 1.5 MG tablet Take 1 tablet (1.5 mg total) by mouth daily at bedtime, Starting Tue 4/9/2024, Until Mon 7/8/2024, Normal      spironolactone (ALDACTONE) 25 mg tablet Take 1 tablet (25 mg total) by mouth daily, Starting Tue 4/9/2024, Normal             No discharge procedures on file.    PDMP Review         Value Time User    PDMP Reviewed  Yes 7/23/2021 10:52 AM Montana Schmidt MD            ED Provider  Electronically Signed by             Marli Lugo PA-C  04/11/24 1948

## 2024-04-11 NOTE — DISCHARGE INSTRUCTIONS
Make sure you get your medication and take as directed; you were given A Good Rx card to help with this.    Follow-up with your PCP and Neurologist

## 2024-04-12 ENCOUNTER — TELEPHONE (OUTPATIENT)
Dept: FAMILY MEDICINE CLINIC | Facility: CLINIC | Age: 64
End: 2024-04-12

## 2024-04-12 ENCOUNTER — PATIENT OUTREACH (OUTPATIENT)
Dept: CASE MANAGEMENT | Facility: OTHER | Age: 64
End: 2024-04-12

## 2024-04-12 ENCOUNTER — VBI (OUTPATIENT)
Dept: FAMILY MEDICINE CLINIC | Facility: CLINIC | Age: 64
End: 2024-04-12

## 2024-04-12 ENCOUNTER — TRANSITIONAL CARE MANAGEMENT (OUTPATIENT)
Dept: FAMILY MEDICINE CLINIC | Facility: CLINIC | Age: 64
End: 2024-04-12

## 2024-04-12 LAB
BACTERIA BLD CULT: NORMAL
BACTERIA BLD CULT: NORMAL

## 2024-04-12 NOTE — LETTER
[facility] PRIMARY [facility]  [address]  [address]  [place] [id number] [phone]    Date: 04/16/24    Cydney Lim  [address] [address]    Dear Cydney:                                                                                                                                Thank you for choosing [facility] emergency department for care.  Your primary care provider wants to make sure that your ongoing medical care is being addressed. If you require follow up care as a result of your emergency department visit, there are a few things the practice would like you to know.                As part of the network's continuing commitment to caring for our patients, we have added more same day appointments and have extended office hours to meet your medical needs. After hours, on-call physicians are available via your primary care provider's main office line.               We encourage you to contact our office prior to seeking treatment to discuss your symptoms with the medical staff.  Together, we can determine the correct course of action.  A majority of non-emergent conditions such as: common cold, flu-like symptoms, fevers, strains/sprains, dislocations, minor burns, cuts and animal bites can be treated at [facility] facilities. Diagnostic testing is available at some sites.               Of course, if you are experiencing a life threatening medical emergency call 911 or proceed directly to the nearest emergency room.    Your nearest [facility] facility is conveniently located at:    [facility] ([place]  [address] [place] [id number]  [phone]  SKIP THE WAIT  Conveniently offered at most [facility] locations  [place] your spot online at [email or web address] or on the [facility]    Sincerely,    [facility]  Dept: [phone]

## 2024-04-12 NOTE — PROGRESS NOTES
Attempt to contact Cydney but unable to leave message as voice mail is not set up on her mobile number

## 2024-04-12 NOTE — TELEPHONE ENCOUNTER
Attempted to call patient to complete TCM call however unable to leave message as mailbox is not set up.

## 2024-04-16 NOTE — TELEPHONE ENCOUNTER
04/12/24 9:08 AM    Patient contacted post ED visit, outreach attempt made but message could not be left. Additional outreach attempt will be made.     Thank you.  LUIS DE DIOS  [facility]    
04/15/24 9:31 AM    Patient contacted post ED visit, outreach attempt made but message could not be left. Additional outreach attempt will be made.     Thank you.  Radha Julio MA  [facility]      
04/16/24 10:29 AM    Patient contacted post ED visit, phone outreaches were unsuccessful and a MyChart letter has been sent to the patient as follow-up.    Thank you.  Radha Julio MA  [facility]      
2019 16:45

## 2024-04-17 ENCOUNTER — PATIENT OUTREACH (OUTPATIENT)
Dept: FAMILY MEDICINE CLINIC | Facility: CLINIC | Age: 64
End: 2024-04-17

## 2024-04-17 NOTE — PROGRESS NOTES
I spoke with Cydney who is doing well. She denies any seizures since being in the ED. Her brother paid for her medications and she is taking them as directed.  She thanked me for calling but declined further outreach.  I updated the care coordination note.

## 2024-05-05 PROBLEM — N30.00 ACUTE CYSTITIS WITHOUT HEMATURIA: Status: RESOLVED | Noted: 2024-04-05 | Resolved: 2024-05-05

## 2024-05-11 ENCOUNTER — HOSPITAL ENCOUNTER (EMERGENCY)
Facility: HOSPITAL | Age: 64
Discharge: HOME | End: 2024-05-12
Attending: EMERGENCY MEDICINE
Payer: MEDICARE

## 2024-05-11 ENCOUNTER — APPOINTMENT (EMERGENCY)
Dept: RADIOLOGY | Facility: HOSPITAL | Age: 64
End: 2024-05-11
Payer: MEDICARE

## 2024-05-11 DIAGNOSIS — G40.919 BREAKTHROUGH SEIZURE (HCC): Primary | ICD-10-CM

## 2024-05-11 DIAGNOSIS — M25.572 LEFT ANKLE PAIN: ICD-10-CM

## 2024-05-11 LAB
ALBUMIN SERPL BCP-MCNC: 3.8 G/DL (ref 3.5–5)
ALP SERPL-CCNC: 88 U/L (ref 34–104)
ALT SERPL W P-5'-P-CCNC: 11 U/L (ref 7–52)
ANION GAP SERPL CALCULATED.3IONS-SCNC: 10 MMOL/L (ref 4–13)
AST SERPL W P-5'-P-CCNC: 13 U/L (ref 13–39)
ATRIAL RATE: 81 BPM
BASOPHILS # BLD AUTO: 0.08 THOUSANDS/ÂΜL (ref 0–0.1)
BASOPHILS NFR BLD AUTO: 1 % (ref 0–1)
BILIRUB SERPL-MCNC: 0.28 MG/DL (ref 0.2–1)
BUN SERPL-MCNC: 21 MG/DL (ref 5–25)
CALCIUM SERPL-MCNC: 9.2 MG/DL (ref 8.4–10.2)
CHLORIDE SERPL-SCNC: 106 MMOL/L (ref 96–108)
CO2 SERPL-SCNC: 24 MMOL/L (ref 21–32)
CREAT SERPL-MCNC: 0.95 MG/DL (ref 0.6–1.3)
EOSINOPHIL # BLD AUTO: 0.31 THOUSAND/ÂΜL (ref 0–0.61)
EOSINOPHIL NFR BLD AUTO: 2 % (ref 0–6)
ERYTHROCYTE [DISTWIDTH] IN BLOOD BY AUTOMATED COUNT: 13.6 % (ref 11.6–15.1)
GFR SERPL CREATININE-BSD FRML MDRD: 63 ML/MIN/1.73SQ M
GLUCOSE SERPL-MCNC: 92 MG/DL (ref 65–140)
GLUCOSE SERPL-MCNC: 99 MG/DL (ref 65–140)
HCT VFR BLD AUTO: 37.1 % (ref 34.8–46.1)
HGB BLD-MCNC: 12 G/DL (ref 11.5–15.4)
IMM GRANULOCYTES # BLD AUTO: 0.05 THOUSAND/UL (ref 0–0.2)
IMM GRANULOCYTES NFR BLD AUTO: 0 % (ref 0–2)
LYMPHOCYTES # BLD AUTO: 3.49 THOUSANDS/ÂΜL (ref 0.6–4.47)
LYMPHOCYTES NFR BLD AUTO: 25 % (ref 14–44)
MAGNESIUM SERPL-MCNC: 1.9 MG/DL (ref 1.9–2.7)
MCH RBC QN AUTO: 30.5 PG (ref 26.8–34.3)
MCHC RBC AUTO-ENTMCNC: 32.3 G/DL (ref 31.4–37.4)
MCV RBC AUTO: 94 FL (ref 82–98)
MONOCYTES # BLD AUTO: 0.78 THOUSAND/ÂΜL (ref 0.17–1.22)
MONOCYTES NFR BLD AUTO: 6 % (ref 4–12)
NEUTROPHILS # BLD AUTO: 9.54 THOUSANDS/ÂΜL (ref 1.85–7.62)
NEUTS SEG NFR BLD AUTO: 66 % (ref 43–75)
NRBC BLD AUTO-RTO: 0 /100 WBCS
P AXIS: 28 DEGREES
PHOSPHATE SERPL-MCNC: 3.8 MG/DL (ref 2.3–4.1)
PLATELET # BLD AUTO: 362 THOUSANDS/UL (ref 149–390)
PMV BLD AUTO: 10.4 FL (ref 8.9–12.7)
POTASSIUM SERPL-SCNC: 3.7 MMOL/L (ref 3.5–5.3)
PR INTERVAL: 130 MS
PROT SERPL-MCNC: 7.5 G/DL (ref 6.4–8.4)
QRS AXIS: 76 DEGREES
QRSD INTERVAL: 64 MS
QT INTERVAL: 382 MS
QTC INTERVAL: 443 MS
RBC # BLD AUTO: 3.93 MILLION/UL (ref 3.81–5.12)
SODIUM SERPL-SCNC: 140 MMOL/L (ref 135–147)
T WAVE AXIS: 81 DEGREES
VENTRICULAR RATE: 81 BPM
WBC # BLD AUTO: 14.25 THOUSAND/UL (ref 4.31–10.16)

## 2024-05-11 PROCEDURE — 85025 COMPLETE CBC W/AUTO DIFF WBC: CPT | Performed by: EMERGENCY MEDICINE

## 2024-05-11 PROCEDURE — 83520 IMMUNOASSAY QUANT NOS NONAB: CPT | Performed by: EMERGENCY MEDICINE

## 2024-05-11 PROCEDURE — 99284 EMERGENCY DEPT VISIT MOD MDM: CPT

## 2024-05-11 PROCEDURE — 80053 COMPREHEN METABOLIC PANEL: CPT | Performed by: EMERGENCY MEDICINE

## 2024-05-11 PROCEDURE — 84100 ASSAY OF PHOSPHORUS: CPT | Performed by: EMERGENCY MEDICINE

## 2024-05-11 PROCEDURE — 83735 ASSAY OF MAGNESIUM: CPT | Performed by: EMERGENCY MEDICINE

## 2024-05-11 PROCEDURE — 96374 THER/PROPH/DIAG INJ IV PUSH: CPT

## 2024-05-11 PROCEDURE — 36415 COLL VENOUS BLD VENIPUNCTURE: CPT | Performed by: EMERGENCY MEDICINE

## 2024-05-11 PROCEDURE — 99285 EMERGENCY DEPT VISIT HI MDM: CPT | Performed by: EMERGENCY MEDICINE

## 2024-05-11 PROCEDURE — 73610 X-RAY EXAM OF ANKLE: CPT

## 2024-05-11 PROCEDURE — 96361 HYDRATE IV INFUSION ADD-ON: CPT

## 2024-05-11 PROCEDURE — 80156 ASSAY CARBAMAZEPINE TOTAL: CPT | Performed by: EMERGENCY MEDICINE

## 2024-05-11 PROCEDURE — 82948 REAGENT STRIP/BLOOD GLUCOSE: CPT

## 2024-05-11 PROCEDURE — 93005 ELECTROCARDIOGRAM TRACING: CPT

## 2024-05-11 RX ORDER — ACETAMINOPHEN 325 MG/1
975 TABLET ORAL ONCE
Status: COMPLETED | OUTPATIENT
Start: 2024-05-11 | End: 2024-05-11

## 2024-05-11 RX ORDER — LORAZEPAM 2 MG/ML
0.5 INJECTION INTRAMUSCULAR ONCE
Status: COMPLETED | OUTPATIENT
Start: 2024-05-11 | End: 2024-05-11

## 2024-05-11 RX ADMIN — SODIUM CHLORIDE 1000 ML: 0.9 INJECTION, SOLUTION INTRAVENOUS at 22:19

## 2024-05-11 RX ADMIN — LORAZEPAM 0.5 MG: 2 INJECTION INTRAMUSCULAR; INTRAVENOUS at 22:25

## 2024-05-11 RX ADMIN — ACETAMINOPHEN 975 MG: 325 TABLET, FILM COATED ORAL at 22:24

## 2024-05-12 VITALS
RESPIRATION RATE: 18 BRPM | TEMPERATURE: 98 F | OXYGEN SATURATION: 97 % | DIASTOLIC BLOOD PRESSURE: 68 MMHG | HEART RATE: 81 BPM | SYSTOLIC BLOOD PRESSURE: 159 MMHG

## 2024-05-12 LAB
CARBAMAZEPINE SERPL-MCNC: 3.6 UG/ML (ref 4–12)
LEVETIRACETAM SERPL-MCNC: 17.1 UG/ML (ref 12–46)

## 2024-05-12 NOTE — ED PROVIDER NOTES
"History  Chief Complaint   Patient presents with    Seizure - Prior Hx Of     Presented post seizure witnessed by friend. Unsure of duration \" I think my friend said it lasted 15 minutes\". \"I was sitting at the table when it happened\":       Patient is a 63-year-old female seen in the emergency department with concern for apparent breakthrough seizure of unknown duration.  Patient states that she had a seizure episode witnessed by a friend this evening prior to evaluation in the emergency department.  Patient notes no tongue biting or incontinence during the episode.  Patient states that she typically has several seizures per week.  Patient notes no headache, neck pain, chest pain, shortness of breath, abdominal pain, nausea, vomiting, weakness, numbness, tingling.  Patient states that she has been taking Keppra and Tegretol as prescribed.        Prior to Admission Medications   Prescriptions Last Dose Informant Patient Reported? Taking?   albuterol (2.5 mg/3 mL) 0.083 % nebulizer solution 2024  No Yes   Sig: Take 3 mL (2.5 mg total) by nebulization every 6 (six) hours as needed for wheezing or shortness of breath   albuterol (Proventil HFA) 90 mcg/act inhaler 5/10/2024  No Yes   Sig: Inhale 2 puffs every 6 (six) hours as needed for wheezing   calcium carbonate (OYSTER SHELL,OSCAL) 500 mg   No No   Sig: Take 2 tablets by mouth daily with breakfast   carBAMazepine (TEGretol) 200 mg tablet   No No   Sig: Take 1 tablet (200 mg total) by mouth 3 (three) times a day   cholecalciferol 1,000 units tablet 2024  No Yes   Sig: Take 2 tablets (2,000 Units total) by mouth daily   escitalopram (LEXAPRO) 10 mg tablet   No No   Sig: Take 1 tablet (10 mg total) by mouth daily   fluticasone (FLONASE) 50 mcg/act nasal spray  Self No No   Si spray into each nostril daily   levETIRAcetam (KEPPRA) 750 mg tablet 2024  No Yes   Sig: Take 2 tablets (1,500 mg total) by mouth 2 (two) times a day   losartan (COZAAR) 25 mg " tablet   No No   Sig: Take 1 tablet (25 mg total) by mouth daily   metoprolol succinate (TOPROL-XL) 50 mg 24 hr tablet   No No   Sig: Take 1 tablet (50 mg total) by mouth daily   nicotine (NICODERM CQ) 21 mg/24 hr TD 24 hr patch Past Month  No Yes   Sig: Place 1 patch on the skin over 24 hours daily   pramipexole (MIRAPEX) 1.5 MG tablet 5/11/2024  No Yes   Sig: Take 1 tablet (1.5 mg total) by mouth daily at bedtime   spironolactone (ALDACTONE) 25 mg tablet 5/11/2024  No Yes   Sig: Take 1 tablet (25 mg total) by mouth daily      Facility-Administered Medications Last Administration Doses Remaining   cyanocobalamin injection 1,000 mcg None recorded           Past Medical History:   Diagnosis Date    Depression     EP (epilepsy) (HCC)     Epilepsy (HCC)     Obesity     Restless leg        Past Surgical History:   Procedure Laterality Date    DENTAL SURGERY      all teeth removed    LASER ABLATION OF THE CERVIX      MAMMO (HISTORICAL)  05/01/2017       Family History   Problem Relation Age of Onset    Kidney disease Mother     Liver disease Mother     Heart attack Mother     Heart attack Father     No Known Problems Brother     No Known Problems Son      I have reviewed and agree with the history as documented.    E-Cigarette/Vaping    E-Cigarette Use Never User      E-Cigarette/Vaping Substances    Nicotine Yes     THC No     CBD No     Flavoring No     Other No     Unknown No      Social History     Tobacco Use    Smoking status: Every Day     Current packs/day: 0.50     Average packs/day: 0.5 packs/day for 23.0 years (11.5 ttl pk-yrs)     Types: Cigarettes    Smokeless tobacco: Never   Vaping Use    Vaping status: Never Used   Substance Use Topics    Alcohol use: Never     Comment: pt denies alcohol use    Drug use: Never       Review of Systems   Constitutional:  Negative for chills and fever.   HENT:  Negative for ear pain and sore throat.    Eyes:  Negative for pain and visual disturbance.   Respiratory:  Negative  for cough and shortness of breath.    Cardiovascular:  Negative for chest pain and palpitations.   Gastrointestinal:  Negative for abdominal pain and vomiting.   Genitourinary:  Negative for decreased urine volume and difficulty urinating.   Musculoskeletal:  Negative for back pain and neck stiffness.        Left ankle pain   Skin:  Negative for color change and rash.   Neurological:  Positive for seizures. Negative for weakness.   Psychiatric/Behavioral:  Negative for agitation and decreased concentration.    All other systems reviewed and are negative.      Physical Exam  Physical Exam  Vitals and nursing note reviewed.   Constitutional:       General: She is not in acute distress.     Appearance: She is well-developed.   HENT:      Head: Normocephalic and atraumatic.      Right Ear: External ear normal.      Left Ear: External ear normal.      Nose: Nose normal.      Mouth/Throat:      Pharynx: Oropharynx is clear.   Eyes:      General: No scleral icterus.     Conjunctiva/sclera: Conjunctivae normal.   Cardiovascular:      Rate and Rhythm: Normal rate and regular rhythm.      Heart sounds: No murmur heard.  Pulmonary:      Effort: Pulmonary effort is normal. No respiratory distress.      Breath sounds: Normal breath sounds.   Abdominal:      General: There is no distension.      Palpations: Abdomen is soft.      Tenderness: There is no abdominal tenderness.   Musculoskeletal:         General: Swelling and tenderness present. No deformity or signs of injury.      Cervical back: Normal range of motion and neck supple.      Comments: Mild swelling/tenderness to left ankle diffusely   Skin:     General: Skin is warm and dry.   Neurological:      General: No focal deficit present.      Mental Status: She is alert.      Cranial Nerves: No cranial nerve deficit.      Sensory: No sensory deficit.   Psychiatric:         Mood and Affect: Mood normal.         Thought Content: Thought content normal.         Vital Signs  ED  Triage Vitals   Temperature Pulse Respirations Blood Pressure SpO2   05/11/24 2208 05/11/24 2208 05/11/24 2208 05/11/24 2208 05/11/24 2208   98 °F (36.7 °C) 90 18 136/92 98 %      Temp Source Heart Rate Source Patient Position - Orthostatic VS BP Location FiO2 (%)   05/11/24 2208 05/11/24 2208 05/11/24 2208 05/11/24 2208 --   Oral Monitor Sitting Left arm       Pain Score       05/11/24 2224       No Pain           Vitals:    05/11/24 2208 05/11/24 2245 05/11/24 2300   BP: 136/92 (!) 183/78 148/71   Pulse: 90 84 84   Patient Position - Orthostatic VS: Sitting Sitting          Visual Acuity  Visual Acuity      Flowsheet Row Most Recent Value   L Pupil Size (mm) 2   R Pupil Size (mm) 2            ED Medications  Medications   sodium chloride 0.9 % bolus 1,000 mL (1,000 mL Intravenous New Bag 5/11/24 2219)   acetaminophen (TYLENOL) tablet 975 mg (975 mg Oral Given 5/11/24 2224)   LORazepam (ATIVAN) injection 0.5 mg (0.5 mg Intravenous Given 5/11/24 2225)       Diagnostic Studies  Results Reviewed       Procedure Component Value Units Date/Time    Comprehensive metabolic panel [[id number]] Collected: 05/11/24 2225    Lab Status: Final result Specimen: Blood from Arm, Right Updated: 05/11/24 2250     Sodium 140 mmol/L      Potassium 3.7 mmol/L      Chloride 106 mmol/L      CO2 24 mmol/L      ANION GAP 10 mmol/L      BUN 21 mg/dL      Creatinine 0.95 mg/dL      Glucose 99 mg/dL      Calcium 9.2 mg/dL      AST 13 U/L      ALT 11 U/L      Alkaline Phosphatase 88 U/L      Total Protein 7.5 g/dL      Albumin 3.8 g/dL      Total Bilirubin 0.28 mg/dL      eGFR 63 ml/min/1.73sq m     Narrative:      National Kid[employer] Foundation guidelines for Chronic Kidney Disease (CKD):     Stage 1 with normal or high GFR (GFR > 90 mL/min/1.73 square meters)    Stage 2 Mild CKD (GFR = 60-89 mL/min/1.73 square meters)    Stage 3A Moderate CKD (GFR = 45-59 mL/min/1.73 square meters)    Stage 3B Moderate CKD (GFR = 30-44 mL/min/1.73 square  meters)    Stage 4 Severe CKD (GFR = 15-29 mL/min/1.73 square meters)    Stage 5 End Stage CKD (GFR <15 mL/min/1.73 square meters)  Note: GFR calculation is accurate only with a steady state creatinine    Magnesium [[id number]]  (Normal) Collected: 05/11/24 2225    Lab Status: Final result Specimen: Blood from Arm, Right Updated: 05/11/24 2250     Magnesium 1.9 mg/dL     Phosphorus [[id number]]  (Normal) Collected: 05/11/24 2225    Lab Status: Final result Specimen: Blood from Arm, Right Updated: 05/11/24 2250     Phosphorus 3.8 mg/dL     CBC and differential [[id number]]  (Abnormal) Collected: 05/11/24 2225    Lab Status: Final result Specimen: Blood from Arm, Right Updated: 05/11/24 2231     WBC 14.25 Thousand/uL      RBC 3.93 Million/uL      Hemoglobin 12.0 g/dL      Hematocrit 37.1 %      MCV 94 fL      MCH 30.5 pg      MCHC 32.3 g/dL      RDW 13.6 %      MPV 10.4 fL      Platelets 362 Thousands/uL      nRBC 0 /100 WBCs      Segmented % 66 %      Immature Grans % 0 %      Lymphocytes % 25 %      Monocytes % 6 %      Eosinophils Relative 2 %      Basophils Relative 1 %      Absolute Neutrophils 9.54 Thousands/µL      Absolute Immature Grans 0.05 Thousand/uL      Absolute Lymphocytes 3.49 Thousands/µL      Absolute Monocytes 0.78 Thousand/µL      Eosinophils Absolute 0.31 Thousand/µL      Basophils Absolute 0.08 Thousands/µL     Carbamazepine level, total [[id number]] Collected: 05/11/24 2225    Lab Status: In process Specimen: Blood from Arm, Right Updated: 05/11/24 2228    Levetiracetam level [[id number]] Collected: 05/11/24 2225    Lab Status: In process Specimen: Blood from Arm, Right Updated: 05/11/24 2228    Fingerstick Glucose (POCT) [[id number]]  (Normal) Collected: 05/11/24 2226    Lab Status: Final result Specimen: Blood Updated: 05/11/24 2226     POC Glucose 92 mg/dl                    XR ankle 3+ views LEFT   ED Interpretation by Eran Whiting MD (05/11 2237)   Osteoarthritis, no infiltrate or  pneumothorax                 Procedures  ECG 12 Lead Documentation Only    Date/Time: 5/11/2024 10:23 PM    Performed by: Eran Whiting MD  Authorized by: Eran Whiting MD    Indications / Diagnosis:  Seizure  ECG reviewed by me, the ED Provider: yes    Patient location:  ED  Rate:     ECG rate:  81    ECG rate assessment: normal    Rhythm:     Rhythm: sinus rhythm    QRS:     QRS axis:  Normal  ST segments:     ST segments:  Normal  T waves:     T waves: normal    Comments:      Sinus rhythm at 81, normal axis, , QRS 64, QTc 443, no ST-T wave abnormality, no evidence of acute ischemia           ED Course                               SBIRT 22yo+      Flowsheet Row Most Recent Value   Initial Alcohol Screen: US AUDIT-C     1. How often do you have a drink containing alcohol? 0 Filed at: 05/11/2024 2212   2. How many drinks containing alcohol do you have on a typical day you are drinking?  0 Filed at: 05/11/2024 2212   3a. Male UNDER 65: How often do you have five or more drinks on one occasion? 0 Filed at: 05/11/2024 2212   3b. FEMALE Any Age, or MALE 65+: How often do you have 4 or more drinks on one occassion? 0 Filed at: 05/11/2024 2212   Audit-C Score 0 Filed at: 05/11/2024 2212   JOHN: How many times in the past year have you...    Used an illegal drug or used a prescription medication for non-medical reasons? Never Filed at: 05/11/2024 2212                      Medical Decision Making  Patient is a 63-year-old female seen in the emergency department with concern for apparent breakthrough seizure.  Fingerstick blood glucose was obtained and noted at 92.  EKG was obtained and noted.  Patient was treated with medication for symptom control.  X-ray left ankle showed no acute fracture or dislocation.  Patient notes chronic recurrent swelling of her left ankle. Laboratory evaluation remarkable for elevated white blood cell count of 14.25. Evaluation is consistent with breakthrough seizure.  Patient was  at baseline mental status in the emergency department. Plan to have patient follow up with PCP/outpatient providers.  Patient stable for discharge home.  Discharge instructions were reviewed with patient.    Problems Addressed:  Breakthrough seizure (HCC): acute illness or injury    Amount and/or Complexity of Data Reviewed  Labs: ordered. Decision-making details documented in ED Course.  Radiology: ordered and independent interpretation performed. Decision-making details documented in ED Course.  ECG/medicine tests: ordered and independent interpretation performed. Decision-making details documented in ED Course.    Risk  OTC drugs.  Prescription drug management.             Disposition  Final diagnoses:   Breakthrough seizure (HCC)   Left ankle pain     Time reflects when diagnosis was documented in both MDM as applicable and the Disposition within this note       Time User Action Codes Description Comment    5/11/2024 10:07 PM Eran Whiting [G40.919] Breakthrough seizure (HCC)     5/11/2024 10:37 PM Eran Whiting [M25.572] Left ankle pain           ED Disposition       ED Disposition   Discharge    Condition   Stable    Date/Time   Sat May 11, 2024 2303    Comment   yCdney Lim discharge to home/self care.                   Follow-up Information       Follow up With Specialties Details Why Contact Info Additional Information    [facility], Nurse Practitioner Call in 1 day  [address]  [address]  [facility] [id number] [phone]       [facility] Call in 1 day  [address]  [address]  [facility] [address] [phone] [facility], [address] [place] [address], [place] [address], [place], [place], [address] [phone]    [facility] Orthopedic Surgery Call  As needed [address] [facility] [address] [phone] [facility]  [address] [address] [phone]            Patient's Medications   Discharge Prescriptions    No medications on file           PDMP Review         Value Time User    PDMP Reviewed  Yes 7/23/2021 10:52 AM Montana Schmidt MD            ED Provider  Electronically Signed by             Eran Whiting MD  05/11/24 [id number]

## 2024-05-12 NOTE — ED NOTES
Patient given turkey lunch box. Patient stating she doesn't have any food in her house and hasn't eaten anything all day.      Kristi BILLY RN  05/11/24 1369

## 2024-05-13 ENCOUNTER — VBI (OUTPATIENT)
Dept: FAMILY MEDICINE CLINIC | Facility: CLINIC | Age: 64
End: 2024-05-13

## 2024-05-13 NOTE — TELEPHONE ENCOUNTER
05/13/24 2:10 PM    Patient contacted post ED visit, [facility] department spoke with patient/caregiver and outreach was successful.    Thank you.  Rosanna Funez MA  [facility]

## 2024-05-14 LAB
ATRIAL RATE: 81 BPM
P AXIS: 28 DEGREES
PR INTERVAL: 130 MS
QRS AXIS: 76 DEGREES
QRSD INTERVAL: 64 MS
QT INTERVAL: 382 MS
QTC INTERVAL: 443 MS
T WAVE AXIS: 81 DEGREES
VENTRICULAR RATE: 81 BPM

## 2024-05-14 PROCEDURE — 93010 ELECTROCARDIOGRAM REPORT: CPT | Performed by: INTERNAL MEDICINE

## 2024-05-22 ENCOUNTER — OFFICE VISIT (OUTPATIENT)
Dept: FAMILY MEDICINE CLINIC | Facility: CLINIC | Age: 64
End: 2024-05-22
Payer: MEDICARE

## 2024-05-22 VITALS
BODY MASS INDEX: 44.3 KG/M2 | RESPIRATION RATE: 17 BRPM | HEIGHT: 63 IN | WEIGHT: 250 LBS | OXYGEN SATURATION: 95 % | TEMPERATURE: 97.6 F | SYSTOLIC BLOOD PRESSURE: 130 MMHG | DIASTOLIC BLOOD PRESSURE: 80 MMHG | HEART RATE: 99 BPM

## 2024-05-22 DIAGNOSIS — E87.6 HYPOKALEMIA: ICD-10-CM

## 2024-05-22 DIAGNOSIS — N18.9 ACUTE RENAL FAILURE SUPERIMPOSED ON CHRONIC KIDNEY DISEASE  (HCC): ICD-10-CM

## 2024-05-22 DIAGNOSIS — I13.0 BENIGN HYPERTENSIVE HEART AND CKD, STAGE 3 (GFR 30-59), W CHF (HCC): Primary | ICD-10-CM

## 2024-05-22 DIAGNOSIS — D63.1 ANEMIA DUE TO STAGE 3A CHRONIC KIDNEY DISEASE  (HCC): ICD-10-CM

## 2024-05-22 DIAGNOSIS — F03.90 MAJOR NEUROCOGNITIVE DISORDER (HCC): ICD-10-CM

## 2024-05-22 DIAGNOSIS — G25.81 RESTLESS LEG SYNDROME: ICD-10-CM

## 2024-05-22 DIAGNOSIS — Z91.199 NONCOMPLIANCE: ICD-10-CM

## 2024-05-22 DIAGNOSIS — I89.0 LYMPHEDEMA: ICD-10-CM

## 2024-05-22 DIAGNOSIS — N17.9 ACUTE RENAL FAILURE SUPERIMPOSED ON CHRONIC KIDNEY DISEASE  (HCC): ICD-10-CM

## 2024-05-22 DIAGNOSIS — I12.9 PARENCHYMAL RENAL HYPERTENSION, STAGE 1 THROUGH STAGE 4 OR UNSPECIFIED CHRONIC KIDNEY DISEASE: ICD-10-CM

## 2024-05-22 DIAGNOSIS — N18.31 ANEMIA DUE TO STAGE 3A CHRONIC KIDNEY DISEASE  (HCC): ICD-10-CM

## 2024-05-22 DIAGNOSIS — Z76.0 MEDICATION REFILL: ICD-10-CM

## 2024-05-22 DIAGNOSIS — F33.3 SEVERE EPISODE OF RECURRENT MAJOR DEPRESSIVE DISORDER, WITH PSYCHOTIC FEATURES (HCC): ICD-10-CM

## 2024-05-22 DIAGNOSIS — R60.9 WATER RETENTION: ICD-10-CM

## 2024-05-22 DIAGNOSIS — N18.31 STAGE 3A CHRONIC KIDNEY DISEASE (HCC): ICD-10-CM

## 2024-05-22 DIAGNOSIS — J44.9 CHRONIC OBSTRUCTIVE PULMONARY DISEASE, UNSPECIFIED COPD TYPE (HCC): ICD-10-CM

## 2024-05-22 DIAGNOSIS — G40.919 EPILEPSY WITH ALTERED CONSCIOUSNESS WITH INTRACTABLE EPILEPSY (HCC): ICD-10-CM

## 2024-05-22 DIAGNOSIS — N17.0 ACUTE RENAL FAILURE WITH ACUTE TUBULAR NECROSIS SUPERIMPOSED ON STAGE 3A CHRONIC KIDNEY DISEASE (HCC): ICD-10-CM

## 2024-05-22 DIAGNOSIS — N18.31 ACUTE RENAL FAILURE WITH ACUTE TUBULAR NECROSIS SUPERIMPOSED ON STAGE 3A CHRONIC KIDNEY DISEASE (HCC): ICD-10-CM

## 2024-05-22 DIAGNOSIS — N18.30 BENIGN HYPERTENSIVE HEART AND CKD, STAGE 3 (GFR 30-59), W CHF (HCC): Primary | ICD-10-CM

## 2024-05-22 DIAGNOSIS — G40.909 RECURRENT SEIZURES (HCC): ICD-10-CM

## 2024-05-22 DIAGNOSIS — I10 ESSENTIAL HYPERTENSION: ICD-10-CM

## 2024-05-22 PROCEDURE — G2211 COMPLEX E/M VISIT ADD ON: HCPCS | Performed by: NURSE PRACTITIONER

## 2024-05-22 PROCEDURE — 99214 OFFICE O/P EST MOD 30 MIN: CPT | Performed by: NURSE PRACTITIONER

## 2024-05-22 RX ORDER — LEVETIRACETAM 750 MG/1
1500 TABLET ORAL 2 TIMES DAILY
Qty: 120 TABLET | Refills: 2 | Status: SHIPPED | OUTPATIENT
Start: 2024-05-22 | End: 2024-06-21

## 2024-05-22 RX ORDER — ALBUTEROL SULFATE 90 UG/1
2 AEROSOL, METERED RESPIRATORY (INHALATION) EVERY 6 HOURS PRN
Qty: 6.7 G | Refills: 3 | Status: SHIPPED | OUTPATIENT
Start: 2024-05-22

## 2024-05-22 RX ORDER — LOSARTAN POTASSIUM 25 MG/1
25 TABLET ORAL DAILY
Qty: 90 TABLET | Refills: 1 | Status: SHIPPED | OUTPATIENT
Start: 2024-05-22 | End: 2024-06-21

## 2024-05-22 RX ORDER — CARBAMAZEPINE 200 MG/1
200 TABLET ORAL 3 TIMES DAILY
Qty: 90 TABLET | Refills: 2 | Status: SHIPPED | OUTPATIENT
Start: 2024-05-22 | End: 2024-06-21

## 2024-05-22 RX ORDER — SPIRONOLACTONE 25 MG/1
25 TABLET ORAL DAILY
Qty: 90 TABLET | Refills: 1 | Status: SHIPPED | OUTPATIENT
Start: 2024-05-22

## 2024-05-22 RX ORDER — METOPROLOL SUCCINATE 50 MG/1
50 TABLET, EXTENDED RELEASE ORAL DAILY
Qty: 90 TABLET | Refills: 1 | Status: SHIPPED | OUTPATIENT
Start: 2024-05-22 | End: 2024-06-21

## 2024-05-22 RX ORDER — PRAMIPEXOLE DIHYDROCHLORIDE 1.5 MG/1
1.5 TABLET ORAL
Qty: 90 TABLET | Refills: 1 | Status: SHIPPED | OUTPATIENT
Start: 2024-05-22 | End: 2024-08-20

## 2024-05-22 RX ORDER — ESCITALOPRAM OXALATE 10 MG/1
10 TABLET ORAL DAILY
Qty: 90 TABLET | Refills: 1 | Status: SHIPPED | OUTPATIENT
Start: 2024-05-22 | End: 2024-06-21
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
## 2024-05-23 ENCOUNTER — TELEPHONE (OUTPATIENT)
Dept: NEUROLOGY | Facility: CLINIC | Age: 64
End: 2024-05-23

## 2024-05-23 NOTE — TELEPHONE ENCOUNTER
----- Message from Tessy SAMULE sent at 5/23/2024  1:11 PM EDT -----  Regarding: In need of transportation  Patient will be seeing  in June, can you please assist?

## 2024-05-23 NOTE — TELEPHONE ENCOUNTER
SW called patient at [phone].  Patient typically gets to appointments or stores by cab or her brother if he is available.  Patient's brother lives in [place].  Patient will call him this evening to see if he would be able to take patient for apt.      BREN explained that the neurology office does not have a transportation program.  Patient is interested in receiving a [employer] application for transportation services.      Patient does not use DME to walk or O2.  Patient confirmed address, [id number].  SW to follow up with patient.

## 2024-05-24 NOTE — TELEPHONE ENCOUNTER
SW called patient at [phone].  Patient hasn't heard back from her brother and asked that SW call back next week.

## 2024-05-28 ENCOUNTER — HOSPITAL ENCOUNTER (EMERGENCY)
Facility: HOSPITAL | Age: 64
Discharge: HOME | End: 2024-05-28
Attending: EMERGENCY MEDICINE
Payer: MEDICARE

## 2024-05-28 ENCOUNTER — APPOINTMENT (EMERGENCY)
Dept: RADIOLOGY | Facility: HOSPITAL | Age: 64
End: 2024-05-28
Payer: MEDICARE

## 2024-05-28 VITALS
WEIGHT: 253.53 LBS | OXYGEN SATURATION: 95 % | DIASTOLIC BLOOD PRESSURE: 93 MMHG | HEART RATE: 77 BPM | TEMPERATURE: 97.9 F | RESPIRATION RATE: 20 BRPM | BODY MASS INDEX: 44.91 KG/M2 | SYSTOLIC BLOOD PRESSURE: 169 MMHG

## 2024-05-28 DIAGNOSIS — R55 SYNCOPE: Primary | ICD-10-CM

## 2024-05-28 LAB
2HR DELTA HS TROPONIN: 0 NG/L
ALBUMIN SERPL BCP-MCNC: 3.6 G/DL (ref 3.5–5)
ALP SERPL-CCNC: 96 U/L (ref 34–104)
ALT SERPL W P-5'-P-CCNC: 10 U/L (ref 7–52)
ANION GAP SERPL CALCULATED.3IONS-SCNC: 8 MMOL/L (ref 4–13)
AST SERPL W P-5'-P-CCNC: 11 U/L (ref 13–39)
ATRIAL RATE: 81 BPM
BASOPHILS # BLD AUTO: 0.05 THOUSANDS/ÂΜL (ref 0–0.1)
BASOPHILS NFR BLD AUTO: 0 % (ref 0–1)
BILIRUB SERPL-MCNC: 0.28 MG/DL (ref 0.2–1)
BNP SERPL-MCNC: 41 PG/ML (ref 0–100)
BUN SERPL-MCNC: 12 MG/DL (ref 5–25)
CALCIUM SERPL-MCNC: 9.1 MG/DL (ref 8.4–10.2)
CARDIAC TROPONIN I PNL SERPL HS: 3 NG/L
CARDIAC TROPONIN I PNL SERPL HS: 3 NG/L
CHLORIDE SERPL-SCNC: 104 MMOL/L (ref 96–108)
CO2 SERPL-SCNC: 26 MMOL/L (ref 21–32)
CREAT SERPL-MCNC: 0.9 MG/DL (ref 0.6–1.3)
EOSINOPHIL # BLD AUTO: 0.21 THOUSAND/ÂΜL (ref 0–0.61)
EOSINOPHIL NFR BLD AUTO: 2 % (ref 0–6)
ERYTHROCYTE [DISTWIDTH] IN BLOOD BY AUTOMATED COUNT: 13.4 % (ref 11.6–15.1)
GFR SERPL CREATININE-BSD FRML MDRD: 67 ML/MIN/1.73SQ M
GLUCOSE SERPL-MCNC: 114 MG/DL (ref 65–140)
GLUCOSE SERPL-MCNC: 122 MG/DL (ref 65–140)
HCT VFR BLD AUTO: 36.9 % (ref 34.8–46.1)
HGB BLD-MCNC: 11.8 G/DL (ref 11.5–15.4)
IMM GRANULOCYTES # BLD AUTO: 0.06 THOUSAND/UL (ref 0–0.2)
IMM GRANULOCYTES NFR BLD AUTO: 0 % (ref 0–2)
LACTATE SERPL-SCNC: 0.8 MMOL/L (ref 0.5–2)
LYMPHOCYTES # BLD AUTO: 1.34 THOUSANDS/ÂΜL (ref 0.6–4.47)
LYMPHOCYTES NFR BLD AUTO: 10 % (ref 14–44)
MCH RBC QN AUTO: 30.1 PG (ref 26.8–34.3)
MCHC RBC AUTO-ENTMCNC: 32 G/DL (ref 31.4–37.4)
MCV RBC AUTO: 94 FL (ref 82–98)
MONOCYTES # BLD AUTO: 0.69 THOUSAND/ÂΜL (ref 0.17–1.22)
MONOCYTES NFR BLD AUTO: 5 % (ref 4–12)
NEUTROPHILS # BLD AUTO: 11.5 THOUSANDS/ÂΜL (ref 1.85–7.62)
NEUTS SEG NFR BLD AUTO: 83 % (ref 43–75)
NRBC BLD AUTO-RTO: 0 /100 WBCS
P AXIS: 24 DEGREES
PLATELET # BLD AUTO: 358 THOUSANDS/UL (ref 149–390)
PMV BLD AUTO: 10.4 FL (ref 8.9–12.7)
POTASSIUM SERPL-SCNC: 3.7 MMOL/L (ref 3.5–5.3)
PR INTERVAL: 148 MS
PROT SERPL-MCNC: 7.2 G/DL (ref 6.4–8.4)
QRS AXIS: -13 DEGREES
QRSD INTERVAL: 74 MS
QT INTERVAL: 384 MS
QTC INTERVAL: 446 MS
RBC # BLD AUTO: 3.92 MILLION/UL (ref 3.81–5.12)
SODIUM SERPL-SCNC: 138 MMOL/L (ref 135–147)
T WAVE AXIS: -18 DEGREES
VENTRICULAR RATE: 81 BPM
WBC # BLD AUTO: 13.85 THOUSAND/UL (ref 4.31–10.16)

## 2024-05-28 PROCEDURE — 83605 ASSAY OF LACTIC ACID: CPT | Performed by: EMERGENCY MEDICINE

## 2024-05-28 PROCEDURE — 99284 EMERGENCY DEPT VISIT MOD MDM: CPT

## 2024-05-28 PROCEDURE — 93005 ELECTROCARDIOGRAM TRACING: CPT

## 2024-05-28 PROCEDURE — 36415 COLL VENOUS BLD VENIPUNCTURE: CPT | Performed by: EMERGENCY MEDICINE

## 2024-05-28 PROCEDURE — 82948 REAGENT STRIP/BLOOD GLUCOSE: CPT

## 2024-05-28 PROCEDURE — 71045 X-RAY EXAM CHEST 1 VIEW: CPT

## 2024-05-28 PROCEDURE — 84484 ASSAY OF TROPONIN QUANT: CPT | Performed by: EMERGENCY MEDICINE

## 2024-05-28 PROCEDURE — 83880 ASSAY OF NATRIURETIC PEPTIDE: CPT | Performed by: EMERGENCY MEDICINE

## 2024-05-28 PROCEDURE — 99285 EMERGENCY DEPT VISIT HI MDM: CPT | Performed by: EMERGENCY MEDICINE

## 2024-05-28 PROCEDURE — 80053 COMPREHEN METABOLIC PANEL: CPT | Performed by: EMERGENCY MEDICINE

## 2024-05-28 PROCEDURE — 93010 ELECTROCARDIOGRAM REPORT: CPT | Performed by: INTERNAL MEDICINE

## 2024-05-28 PROCEDURE — 85025 COMPLETE CBC W/AUTO DIFF WBC: CPT | Performed by: EMERGENCY MEDICINE

## 2024-05-28 NOTE — ED NOTES
IV access and bloodwork attempted x2, during second attempt pt refusing to continue to allow me to collect bloodwork, requesting someone else. Needle removed per her request, dressing applied.     Raegan Falk RN  05/28/24 [id number]

## 2024-05-28 NOTE — TELEPHONE ENCOUNTER
[employer] for 65+ mailed this date.  Attempted to call patient to check if spoke with brother and to inform of mailed information.  No answer.  Will continue to try.  SW remains available.

## 2024-05-29 NOTE — TELEPHONE ENCOUNTER
Patient's brother, Rodney called this writer.  Rodney stated that he lives about an hour and a half away so unable to transport patient for apts.  Rodney said he helped patient fill out the [employer] application last week and submitted it for processing.  Rodney was informed that Lisa received it.  Rodney will assist in getting bus passes for patient for appointments.  Declined need for help at this time.  If for some reason the [employer] is not approved by the time patient has her apt in June, Rodney will request and pay for a Lyft transport for patient on his own.  SW remains available.

## 2024-05-31 NOTE — ED PROVIDER NOTES
History  Chief Complaint   Patient presents with    Dizziness     Arrives via EMS with report of becoming dizzy and lightheaded today when walking into [facility], ? seizure, pt does have seizure history. Pt was reportedly awake, alert, and oriented upon EMS arrival.     63-year-old female history of epilepsy hypertension presents with complaint of feeling lightheaded and dizzy today which she noticed while she was out at [facility].  She is unsure if she passed out or had a seizure or what exactly happened.  She states that she does not recall.  She is still feeling mildly lightheaded.  No chest pain.  No shortness of breath.        Prior to Admission Medications   Prescriptions Last Dose Informant Patient Reported? Taking?   albuterol (2.5 mg/3 mL) 0.083 % nebulizer solution   No No   Sig: Take 3 mL (2.5 mg total) by nebulization every 6 (six) hours as needed for wheezing or shortness of breath   albuterol (Proventil HFA) 90 mcg/act inhaler   No No   Sig: Inhale 2 puffs every 6 (six) hours as needed for wheezing   carBAMazepine (TEGretol) 200 mg tablet   No No   Sig: Take 1 tablet (200 mg total) by mouth 3 (three) times a day   cholecalciferol 1,000 units tablet   No No   Sig: Take 2 tablets (2,000 Units total) by mouth daily   escitalopram (LEXAPRO) 10 mg tablet   No No   Sig: Take 1 tablet (10 mg total) by mouth daily   levETIRAcetam (KEPPRA) 750 mg tablet   No No   Sig: Take 2 tablets (1,500 mg total) by mouth 2 (two) times a day   losartan (COZAAR) 25 mg tablet   No No   Sig: Take 1 tablet (25 mg total) by mouth daily   metoprolol succinate (TOPROL-XL) 50 mg 24 hr tablet   No No   Sig: Take 1 tablet (50 mg total) by mouth daily   nicotine (NICODERM CQ) 21 mg/24 hr TD 24 hr patch   No No   Sig: Place 1 patch on the skin over 24 hours daily   pramipexole (MIRAPEX) 1.5 MG tablet   No No   Sig: Take 1 tablet (1.5 mg total) by mouth daily at bedtime   spironolactone (ALDACTONE) 25 mg tablet   No No   Sig: Take 1 tablet  (25 mg total) by mouth daily      Facility-Administered Medications Last Administration Doses Remaining   cyanocobalamin injection 1,000 mcg None recorded           Past Medical History:   Diagnosis Date    Depression     EP (epilepsy) (HCC)     Epilepsy (HCC)     Obesity     Restless leg        Past Surgical History:   Procedure Laterality Date    DENTAL SURGERY      all teeth removed    LASER ABLATION OF THE CERVIX      MAMMO (HISTORICAL)  05/01/2017       Family History   Problem Relation Age of Onset    Kidney disease Mother     Liver disease Mother     Heart attack Mother     Heart attack Father     No Known Problems Brother     No Known Problems Son      I have reviewed and agree with the history as documented.    E-Cigarette/Vaping    E-Cigarette Use Never User      E-Cigarette/Vaping Substances    Nicotine Yes     THC No     CBD No     Flavoring No     Other No     Unknown No      Social History     Tobacco Use    Smoking status: Every Day     Current packs/day: 0.50     Average packs/day: 0.5 packs/day for 23.0 years (11.5 ttl pk-yrs)     Types: Cigarettes    Smokeless tobacco: Never   Vaping Use    Vaping status: Never Used   Substance Use Topics    Alcohol use: Never     Comment: pt denies alcohol use    Drug use: Never       Review of Systems   All other systems reviewed and are negative.      Physical Exam  Physical Exam  Constitutional:       General: She is not in acute distress.  HENT:      Head: Normocephalic and atraumatic.      Mouth/Throat:      Mouth: Mucous membranes are moist.   Cardiovascular:      Rate and Rhythm: Normal rate and regular rhythm.      Heart sounds: Normal heart sounds.   Pulmonary:      Effort: Pulmonary effort is normal.      Breath sounds: Normal breath sounds.   Abdominal:      Palpations: Abdomen is soft.      Tenderness: There is no abdominal tenderness.   Musculoskeletal:         General: Normal range of motion.   Skin:     General: Skin is warm and dry.   Neurological:       General: No focal deficit present.      Mental Status: She is alert and oriented to person, place, and time.   Psychiatric:         Mood and Affect: Mood normal.         Behavior: Behavior normal.         Vital Signs  ED Triage Vitals [05/28/24 1347]   Temperature Pulse Respirations Blood Pressure SpO2   97.9 °F (36.6 °C) 86 18 (!) 184/99 95 %      Temp Source Heart Rate Source Patient Position - Orthostatic VS BP Location FiO2 (%)   Oral Monitor Lying Left arm --      Pain Score       No Pain           Vitals:    05/28/24 1347 05/28/24 1621   BP: (!) 184/99 169/93   Pulse: 86 77   Patient Position - Orthostatic VS: Lying Lying         Visual Acuity      ED Medications  Medications - No data to display    Diagnostic Studies  Results Reviewed       Procedure Component Value Units Date/Time    HS Troponin I 2hr [[id number]]  (Normal) Collected: 05/28/24 1642    Lab Status: Final result Specimen: Blood from Arm, Right Updated: 05/28/24 1709     hs TnI 2hr 3 ng/L      Delta 2hr hsTnI 0 ng/L     B-Type Natriuretic Peptide(BNP) [[id number]]  (Normal) Collected: 05/28/24 1454    Lab Status: Final result Specimen: Blood from Arm, Right Updated: 05/28/24 1524     BNP 41 pg/mL     HS Troponin 0hr (reflex protocol) [[id number]]  (Normal) Collected: 05/28/24 1454    Lab Status: Final result Specimen: Blood from Arm, Right Updated: 05/28/24 1524     hs TnI 0hr 3 ng/L     Comprehensive metabolic panel [[id number]]  (Abnormal) Collected: 05/28/24 1454    Lab Status: Final result Specimen: Blood from Arm, Right Updated: 05/28/24 1517     Sodium 138 mmol/L      Potassium 3.7 mmol/L      Chloride 104 mmol/L      CO2 26 mmol/L      ANION GAP 8 mmol/L      BUN 12 mg/dL      Creatinine 0.90 mg/dL      Glucose 114 mg/dL      Calcium 9.1 mg/dL      AST 11 U/L      ALT 10 U/L      Alkaline Phosphatase 96 U/L      Total Protein 7.2 g/dL      Albumin 3.6 g/dL      Total Bilirubin 0.28 mg/dL      eGFR 67 ml/min/1.73sq m     Narrative:       National Kid[employer] Foundation guidelines for Chronic Kidney Disease (CKD):     Stage 1 with normal or high GFR (GFR > 90 mL/min/1.73 square meters)    Stage 2 Mild CKD (GFR = 60-89 mL/min/1.73 square meters)    Stage 3A Moderate CKD (GFR = 45-59 mL/min/1.73 square meters)    Stage 3B Moderate CKD (GFR = 30-44 mL/min/1.73 square meters)    Stage 4 Severe CKD (GFR = 15-29 mL/min/1.73 square meters)    Stage 5 End Stage CKD (GFR <15 mL/min/1.73 square meters)  Note: GFR calculation is accurate only with a steady state creatinine    Lactic acid, plasma (w/reflex if result > 2.0) [[id number]]  (Normal) Collected: 05/28/24 1454    Lab Status: Final result Specimen: Blood from Arm, Right Updated: 05/28/24 1515     LACTIC ACID 0.8 mmol/L     Narrative:      Result may be elevated if tourniquet was used during collection.    CBC and differential [[id number]]  (Abnormal) Collected: 05/28/24 1454    Lab Status: Final result Specimen: Blood from Arm, Right Updated: 05/28/24 1500     WBC 13.85 Thousand/uL      RBC 3.92 Million/uL      Hemoglobin 11.8 g/dL      Hematocrit 36.9 %      MCV 94 fL      MCH 30.1 pg      MCHC 32.0 g/dL      RDW 13.4 %      MPV 10.4 fL      Platelets 358 Thousands/uL      nRBC 0 /100 WBCs      Segmented % 83 %      Immature Grans % 0 %      Lymphocytes % 10 %      Monocytes % 5 %      Eosinophils Relative 2 %      Basophils Relative 0 %      Absolute Neutrophils 11.50 Thousands/µL      Absolute Immature Grans 0.06 Thousand/uL      Absolute Lymphocytes 1.34 Thousands/µL      Absolute Monocytes 0.69 Thousand/µL      Eosinophils Absolute 0.21 Thousand/µL      Basophils Absolute 0.05 Thousands/µL     Fingerstick Glucose (POCT) [[id number]]  (Normal) Collected: 05/28/24 1353    Lab Status: Final result Specimen: Blood Updated: 05/28/24 1354     POC Glucose 122 mg/dl                    XR chest 1 view portable   Final Result by Jarad Morales MD (05/29 0833)      No acute cardiopulmonary disease.             Workstation performed: [id number]                    Procedures  Procedures         ED Course     63-year-old female presenting with vague complaint of dizziness/lightheadedness.  May have had a seizure based on patient's lack of remembrance of the episode however per EMS report patient was awake and alert with normal mental status at the time of their arrival making this less likely.  Patient is neurologically intact on arrival to the ED.  Hypertensive with otherwise normal vitals.  Initial EKG shows normal sinus rhythm 81 bpm with no acute ST or T wave abnormalities and normal intervals per my independent interpretation.  Chest x-ray shows no acute disease per my independent interpretation.  Screening labs notable for normal CBC and BMP negative troponin x 2 negative BNP and a normal lactate which makes recent seizure extremely unlikely.  ACS is adequately ruled out.  Patient was monitored throughout her stay in the ED and had no arrhythmias.  Stable for discharge with PCP follow-up as needed.                          SBIRT 22yo+      Flowsheet Row Most Recent Value   Initial Alcohol Screen: US AUDIT-C     1. How often do you have a drink containing alcohol? 0 Filed at: 05/28/2024 1351   Audit-C Score 0 Filed at: 05/28/2024 1351   JOHN: How many times in the past year have you...    Used an illegal drug or used a prescription medication for non-medical reasons? Never Filed at: 05/28/2024 1351                      Medical Decision Making  Amount and/or Complexity of Data Reviewed  Labs: ordered.  Radiology: ordered.             Disposition  Final diagnoses:   Syncope     Time reflects when diagnosis was documented in both MDM as applicable and the Disposition within this note       Time User Action Codes Description Comment    5/28/2024  5:11 PM Lydia Thomas Add [R55] Syncope           ED Disposition       ED Disposition   Discharge    Condition   Stable    Date/Time   Tue May 28, 2024 1711    Comment    Cydney Lim discharge to home/self care.                   Follow-up Information       Follow up With Specialties Details Why Contact Info    [facility], Nurse Practitioner Schedule an appointment as soon as possible for a visit  As needed [address] [facility]  [address]  [place] [address] [phone]              Discharge Medication List as of 5/28/2024  5:11 PM        CONTINUE these medications which have NOT CHANGED    Details   albuterol (2.5 mg/3 mL) 0.083 % nebulizer solution Take 3 mL (2.5 mg total) by nebulization every 6 (six) hours as needed for wheezing or shortness of breath, Starting Tue 4/9/2024, Normal      albuterol (Proventil HFA) 90 mcg/act inhaler Inhale 2 puffs every 6 (six) hours as needed for wheezing, Starting Wed 5/22/2024, Normal      carBAMazepine (TEGretol) 200 mg tablet Take 1 tablet (200 mg total) by mouth 3 (three) times a day, Starting Wed 5/22/2024, Until Fri 6/21/2024, Normal      cholecalciferol 1,000 units tablet Take 2 tablets (2,000 Units total) by mouth daily, Starting Tue 4/9/2024, Until Sat 6/8/2024, Normal      escitalopram (LEXAPRO) 10 mg tablet Take 1 tablet (10 mg total) by mouth daily, Starting Wed 5/22/2024, Until Fri 6/21/2024, Normal      levETIRAcetam (KEPPRA) 750 mg tablet Take 2 tablets (1,500 mg total) by mouth 2 (two) times a day, Starting Wed 5/22/2024, Until Fri 6/21/2024, Normal      losartan (COZAAR) 25 mg tablet Take 1 tablet (25 mg total) by mouth daily, Starting Wed 5/22/2024, Until Fri 6/21/2024, Normal      metoprolol succinate (TOPROL-XL) 50 mg 24 hr tablet Take 1 tablet (50 mg total) by mouth daily, Starting Wed 5/22/2024, Until Fri 6/21/2024, Normal      nicotine (NICODERM CQ) 21 mg/24 hr TD 24 hr patch Place 1 patch on the skin over 24 hours daily, Starting Tue 4/9/2024, Normal      pramipexole (MIRAPEX) 1.5 MG tablet Take 1 tablet (1.5 mg total) by mouth daily at bedtime, Starting Wed 5/22/2024, Until Tue 8/20/2024,  Normal      spironolactone (ALDACTONE) 25 mg tablet Take 1 tablet (25 mg total) by mouth daily, Starting Wed 5/22/2024, Normal             No discharge procedures on file.    PDMP Review         Value Time User    PDMP Reviewed  Yes 7/23/2021 10:52 AM Montana Schmidt MD            ED Provider  Electronically Signed by             Lydia Thomas MD  05/31/24 7817

## 2024-06-26 ENCOUNTER — OFFICE VISIT (OUTPATIENT)
Dept: NEUROLOGY | Facility: CLINIC | Age: 64
End: 2024-06-26
Payer: MEDICARE

## 2024-06-26 VITALS
HEIGHT: 63 IN | SYSTOLIC BLOOD PRESSURE: 152 MMHG | BODY MASS INDEX: 44.88 KG/M2 | TEMPERATURE: 98.2 F | OXYGEN SATURATION: 98 % | DIASTOLIC BLOOD PRESSURE: 92 MMHG | HEART RATE: 82 BPM | WEIGHT: 253.3 LBS

## 2024-06-26 DIAGNOSIS — G40.919 EPILEPSY WITH ALTERED CONSCIOUSNESS WITH INTRACTABLE EPILEPSY (HCC): Primary | ICD-10-CM

## 2024-06-26 DIAGNOSIS — M83.5 ANTICONVULSANT DRUG-INDUCED OSTEOMALACIA: ICD-10-CM

## 2024-06-26 DIAGNOSIS — G40.919 BREAKTHROUGH SEIZURE (HCC): ICD-10-CM

## 2024-06-26 DIAGNOSIS — Z79.818 LONG TERM (CURRENT) USE OF OTHER AGENTS AFFECTING ESTROGEN RECEPTORS AND ESTROGEN LEVELS: ICD-10-CM

## 2024-06-26 DIAGNOSIS — E53.8 B12 DEFICIENCY: ICD-10-CM

## 2024-06-26 DIAGNOSIS — T42.75XA ANTICONVULSANT DRUG-INDUCED OSTEOMALACIA: ICD-10-CM

## 2024-06-26 DIAGNOSIS — Z76.0 MEDICATION REFILL: ICD-10-CM

## 2024-06-26 PROCEDURE — 99205 OFFICE O/P NEW HI 60 MIN: CPT | Performed by: PSYCHIATRY & NEUROLOGY

## 2024-06-26 RX ORDER — LEVETIRACETAM 750 MG/1
1500 TABLET ORAL 2 TIMES DAILY
Qty: 360 TABLET | Refills: 1 | Status: SHIPPED | OUTPATIENT
Start: 2024-06-26

## 2024-06-26 RX ORDER — CARBAMAZEPINE 200 MG/1
200 TABLET ORAL 3 TIMES DAILY
Qty: 270 TABLET | Refills: 1 | Status: SHIPPED | OUTPATIENT
Start: 2024-06-26

## 2024-06-26 NOTE — PATIENT INSTRUCTIONS
Take levetiracetam 1500 mg twice daily.   Take carbamazepine 200 mg three times daily.   Schedule DEXA (bone scan), EEG, and Brain MRI.   Have blood work done.   Return in about 2-3 months.   Let us know if there are seizures.   Maintain adequate hydration.   Get up slowly and make sure you do not feel lightheaded.   Avoid the heat whenever possible to reduce risk of passing out.
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
## 2024-06-26 NOTE — PROGRESS NOTES
Review of Systems   Constitutional:  Negative for appetite change, fatigue and fever.   HENT: Negative.  Negative for hearing loss, tinnitus, trouble swallowing and voice change.    Eyes: Negative.  Negative for photophobia, pain and visual disturbance.   Respiratory: Negative.  Negative for shortness of breath.    Cardiovascular: Negative.  Negative for palpitations.   Gastrointestinal: Negative.  Negative for nausea and vomiting.   Endocrine: Negative.  Negative for cold intolerance.   Genitourinary: Negative.  Negative for dysuria, frequency and urgency.   Musculoskeletal:  Negative for back pain, gait problem, myalgias, neck pain and neck stiffness.   Skin: Negative.  Negative for rash.   Allergic/Immunologic: Negative.    Neurological:  Positive for dizziness and seizures. Negative for tremors, syncope, facial asymmetry, speech difficulty, weakness, light-headedness, numbness and headaches.   Hematological: Negative.  Does not bruise/bleed easily.   Psychiatric/Behavioral: Negative.  Negative for confusion, hallucinations and sleep disturbance.    All other systems reviewed and are negative.

## 2024-07-19 DIAGNOSIS — N18.31 ANEMIA DUE TO STAGE 3A CHRONIC KIDNEY DISEASE  (HCC): ICD-10-CM

## 2024-07-19 DIAGNOSIS — N17.9 ACUTE RENAL FAILURE SUPERIMPOSED ON CHRONIC KIDNEY DISEASE  (HCC): ICD-10-CM

## 2024-07-19 DIAGNOSIS — R60.9 WATER RETENTION: ICD-10-CM

## 2024-07-19 DIAGNOSIS — I12.9 PARENCHYMAL RENAL HYPERTENSION, STAGE 1 THROUGH STAGE 4 OR UNSPECIFIED CHRONIC KIDNEY DISEASE: ICD-10-CM

## 2024-07-19 DIAGNOSIS — N18.9 ACUTE RENAL FAILURE SUPERIMPOSED ON CHRONIC KIDNEY DISEASE  (HCC): ICD-10-CM

## 2024-07-19 DIAGNOSIS — N18.31 ACUTE RENAL FAILURE WITH ACUTE TUBULAR NECROSIS SUPERIMPOSED ON STAGE 3A CHRONIC KIDNEY DISEASE (HCC): ICD-10-CM

## 2024-07-19 DIAGNOSIS — N18.31 STAGE 3A CHRONIC KIDNEY DISEASE (HCC): ICD-10-CM

## 2024-07-19 DIAGNOSIS — F33.3 SEVERE EPISODE OF RECURRENT MAJOR DEPRESSIVE DISORDER, WITH PSYCHOTIC FEATURES (HCC): ICD-10-CM

## 2024-07-19 DIAGNOSIS — I10 ESSENTIAL HYPERTENSION: ICD-10-CM

## 2024-07-19 DIAGNOSIS — N17.0 ACUTE RENAL FAILURE WITH ACUTE TUBULAR NECROSIS SUPERIMPOSED ON STAGE 3A CHRONIC KIDNEY DISEASE (HCC): ICD-10-CM

## 2024-07-19 DIAGNOSIS — E87.6 HYPOKALEMIA: ICD-10-CM

## 2024-07-19 DIAGNOSIS — D63.1 ANEMIA DUE TO STAGE 3A CHRONIC KIDNEY DISEASE  (HCC): ICD-10-CM

## 2024-07-19 RX ORDER — METOPROLOL SUCCINATE 50 MG/1
50 TABLET, EXTENDED RELEASE ORAL DAILY
Qty: 90 TABLET | Refills: 1 | Status: SHIPPED | OUTPATIENT
Start: 2024-07-19 | End: 2025-01-15

## 2024-07-19 RX ORDER — LOSARTAN POTASSIUM 25 MG/1
25 TABLET ORAL DAILY
Qty: 90 TABLET | Refills: 1 | Status: SHIPPED | OUTPATIENT
Start: 2024-07-19 | End: 2025-01-15

## 2024-07-19 RX ORDER — ESCITALOPRAM OXALATE 10 MG/1
10 TABLET ORAL DAILY
Qty: 90 TABLET | Refills: 1 | Status: SHIPPED | OUTPATIENT
Start: 2024-07-19 | End: 2025-01-15

## 2024-07-26 ENCOUNTER — TELEPHONE (OUTPATIENT)
Age: 64
End: 2024-07-26

## 2024-07-26 NOTE — TELEPHONE ENCOUNTER
Patient called stated she missed a call around 1 pm.  Didn't see any notation in patient's chart unsure whom might have called.

## 2024-08-02 ENCOUNTER — APPOINTMENT (EMERGENCY)
Dept: RADIOLOGY | Facility: HOSPITAL | Age: 64
End: 2024-08-02
Payer: MEDICARE

## 2024-08-02 ENCOUNTER — HOSPITAL ENCOUNTER (EMERGENCY)
Facility: HOSPITAL | Age: 64
Discharge: HOME | End: 2024-08-02
Attending: EMERGENCY MEDICINE
Payer: MEDICARE

## 2024-08-02 VITALS
OXYGEN SATURATION: 95 % | HEART RATE: 87 BPM | SYSTOLIC BLOOD PRESSURE: 112 MMHG | RESPIRATION RATE: 24 BRPM | DIASTOLIC BLOOD PRESSURE: 58 MMHG | TEMPERATURE: 97.8 F

## 2024-08-02 DIAGNOSIS — M25.473 ANKLE SWELLING DETERMINED BY EXAMINATION: Primary | ICD-10-CM

## 2024-08-02 DIAGNOSIS — R06.02 SHORTNESS OF BREATH: ICD-10-CM

## 2024-08-02 LAB
ALBUMIN SERPL BCG-MCNC: 3.6 G/DL (ref 3.5–5)
ALP SERPL-CCNC: 93 U/L (ref 34–104)
ALT SERPL W P-5'-P-CCNC: 10 U/L (ref 7–52)
ANION GAP SERPL CALCULATED.3IONS-SCNC: 10 MMOL/L (ref 4–13)
AST SERPL W P-5'-P-CCNC: 12 U/L (ref 13–39)
ATRIAL RATE: 86 BPM
BASOPHILS # BLD AUTO: 0.08 THOUSANDS/ÂΜL (ref 0–0.1)
BASOPHILS NFR BLD AUTO: 1 % (ref 0–1)
BILIRUB SERPL-MCNC: 0.37 MG/DL (ref 0.2–1)
BNP SERPL-MCNC: 26 PG/ML (ref 0–100)
BUN SERPL-MCNC: 26 MG/DL (ref 5–25)
CALCIUM SERPL-MCNC: 8.9 MG/DL (ref 8.4–10.2)
CARDIAC TROPONIN I PNL SERPL HS: 3 NG/L
CHLORIDE SERPL-SCNC: 103 MMOL/L (ref 96–108)
CO2 SERPL-SCNC: 24 MMOL/L (ref 21–32)
CREAT SERPL-MCNC: 1.28 MG/DL (ref 0.6–1.3)
EOSINOPHIL # BLD AUTO: 0.26 THOUSAND/ÂΜL (ref 0–0.61)
EOSINOPHIL NFR BLD AUTO: 2 % (ref 0–6)
ERYTHROCYTE [DISTWIDTH] IN BLOOD BY AUTOMATED COUNT: 13.6 % (ref 11.6–15.1)
GFR SERPL CREATININE-BSD FRML MDRD: 43 ML/MIN/1.73SQ M
GLUCOSE SERPL-MCNC: 102 MG/DL (ref 65–140)
HCT VFR BLD AUTO: 35.4 % (ref 34.8–46.1)
HGB BLD-MCNC: 11.1 G/DL (ref 11.5–15.4)
IMM GRANULOCYTES # BLD AUTO: 0.04 THOUSAND/UL (ref 0–0.2)
IMM GRANULOCYTES NFR BLD AUTO: 0 % (ref 0–2)
LYMPHOCYTES # BLD AUTO: 2.99 THOUSANDS/ÂΜL (ref 0.6–4.47)
LYMPHOCYTES NFR BLD AUTO: 21 % (ref 14–44)
MCH RBC QN AUTO: 29.4 PG (ref 26.8–34.3)
MCHC RBC AUTO-ENTMCNC: 31.4 G/DL (ref 31.4–37.4)
MCV RBC AUTO: 94 FL (ref 82–98)
MONOCYTES # BLD AUTO: 0.9 THOUSAND/ÂΜL (ref 0.17–1.22)
MONOCYTES NFR BLD AUTO: 6 % (ref 4–12)
NEUTROPHILS # BLD AUTO: 9.82 THOUSANDS/ÂΜL (ref 1.85–7.62)
NEUTS SEG NFR BLD AUTO: 70 % (ref 43–75)
NRBC BLD AUTO-RTO: 0 /100 WBCS
P AXIS: 26 DEGREES
PLATELET # BLD AUTO: 338 THOUSANDS/UL (ref 149–390)
PMV BLD AUTO: 10.5 FL (ref 8.9–12.7)
POTASSIUM SERPL-SCNC: 4 MMOL/L (ref 3.5–5.3)
PR INTERVAL: 128 MS
PROCALCITONIN SERPL-MCNC: 0.07 NG/ML
PROT SERPL-MCNC: 7.3 G/DL (ref 6.4–8.4)
QRS AXIS: 39 DEGREES
QRSD INTERVAL: 60 MS
QT INTERVAL: 362 MS
QTC INTERVAL: 433 MS
RBC # BLD AUTO: 3.78 MILLION/UL (ref 3.81–5.12)
SODIUM SERPL-SCNC: 137 MMOL/L (ref 135–147)
T WAVE AXIS: 82 DEGREES
VENTRICULAR RATE: 86 BPM
WBC # BLD AUTO: 14.09 THOUSAND/UL (ref 4.31–10.16)

## 2024-08-02 PROCEDURE — 36415 COLL VENOUS BLD VENIPUNCTURE: CPT | Performed by: PHYSICIAN ASSISTANT

## 2024-08-02 PROCEDURE — 99285 EMERGENCY DEPT VISIT HI MDM: CPT

## 2024-08-02 PROCEDURE — 83880 ASSAY OF NATRIURETIC PEPTIDE: CPT | Performed by: PHYSICIAN ASSISTANT

## 2024-08-02 PROCEDURE — 84484 ASSAY OF TROPONIN QUANT: CPT | Performed by: PHYSICIAN ASSISTANT

## 2024-08-02 PROCEDURE — 84145 PROCALCITONIN (PCT): CPT | Performed by: PHYSICIAN ASSISTANT

## 2024-08-02 PROCEDURE — 94640 AIRWAY INHALATION TREATMENT: CPT

## 2024-08-02 PROCEDURE — 71045 X-RAY EXAM CHEST 1 VIEW: CPT

## 2024-08-02 PROCEDURE — 85025 COMPLETE CBC W/AUTO DIFF WBC: CPT | Performed by: PHYSICIAN ASSISTANT

## 2024-08-02 PROCEDURE — 99285 EMERGENCY DEPT VISIT HI MDM: CPT | Performed by: PHYSICIAN ASSISTANT

## 2024-08-02 PROCEDURE — 80053 COMPREHEN METABOLIC PANEL: CPT | Performed by: PHYSICIAN ASSISTANT

## 2024-08-02 PROCEDURE — 93010 ELECTROCARDIOGRAM REPORT: CPT | Performed by: INTERNAL MEDICINE

## 2024-08-02 PROCEDURE — 93005 ELECTROCARDIOGRAM TRACING: CPT

## 2024-08-02 RX ORDER — PRAMIPEXOLE DIHYDROCHLORIDE 0.25 MG/1
1.5 TABLET ORAL ONCE
Status: DISCONTINUED | OUTPATIENT
Start: 2024-08-02 | End: 2024-08-02 | Stop reason: HOSPADM

## 2024-08-02 RX ORDER — ALBUTEROL SULFATE 2.5 MG/3ML
2.5 SOLUTION RESPIRATORY (INHALATION) ONCE
Status: COMPLETED | OUTPATIENT
Start: 2024-08-02 | End: 2024-08-02

## 2024-08-02 RX ADMIN — ALBUTEROL SULFATE 2.5 MG: 2.5 SOLUTION RESPIRATORY (INHALATION) at 14:25

## 2024-08-02 NOTE — ED NOTES
"Patient refused her medications states \"this is the wrong medication.\" Patient was assured the medication was the one she normally takes and still did not want to cooperate. Patient threw her phone at the wall and ripped pulse ox off of finger and states \"I am leaving.\" Provider notified.      Micaela Medina RN  08/02/24 [id number]    "

## 2024-08-03 ENCOUNTER — APPOINTMENT (EMERGENCY)
Dept: RADIOLOGY | Facility: HOSPITAL | Age: 64
End: 2024-08-03
Payer: MEDICARE

## 2024-08-03 ENCOUNTER — HOSPITAL ENCOUNTER (EMERGENCY)
Facility: HOSPITAL | Age: 64
Discharge: HOME | End: 2024-08-03
Attending: EMERGENCY MEDICINE | Admitting: EMERGENCY MEDICINE
Payer: MEDICARE

## 2024-08-03 VITALS
RESPIRATION RATE: 16 BRPM | TEMPERATURE: 97.7 F | OXYGEN SATURATION: 95 % | HEART RATE: 85 BPM | SYSTOLIC BLOOD PRESSURE: 141 MMHG | DIASTOLIC BLOOD PRESSURE: 65 MMHG

## 2024-08-03 DIAGNOSIS — Z76.0 MEDICATION REFILL: ICD-10-CM

## 2024-08-03 DIAGNOSIS — Z86.69 HISTORY OF EPILEPSY: ICD-10-CM

## 2024-08-03 DIAGNOSIS — Z76.0 ENCOUNTER FOR MEDICATION REFILL: ICD-10-CM

## 2024-08-03 DIAGNOSIS — R42 LIGHTHEADEDNESS: Primary | ICD-10-CM

## 2024-08-03 DIAGNOSIS — G25.81 RESTLESS LEG SYNDROME: ICD-10-CM

## 2024-08-03 DIAGNOSIS — G40.919 EPILEPSY WITH ALTERED CONSCIOUSNESS WITH INTRACTABLE EPILEPSY (HCC): ICD-10-CM

## 2024-08-03 LAB
ALBUMIN SERPL BCG-MCNC: 3.4 G/DL (ref 3.5–5)
ALP SERPL-CCNC: 89 U/L (ref 34–104)
ALT SERPL W P-5'-P-CCNC: 9 U/L (ref 7–52)
ANION GAP SERPL CALCULATED.3IONS-SCNC: 9 MMOL/L (ref 4–13)
AST SERPL W P-5'-P-CCNC: 14 U/L (ref 13–39)
BASOPHILS # BLD AUTO: 0.07 THOUSANDS/ÂΜL (ref 0–0.1)
BASOPHILS NFR BLD AUTO: 1 % (ref 0–1)
BILIRUB SERPL-MCNC: 0.29 MG/DL (ref 0.2–1)
BNP SERPL-MCNC: 38 PG/ML (ref 0–100)
BUN SERPL-MCNC: 26 MG/DL (ref 5–25)
CALCIUM ALBUM COR SERPL-MCNC: 8.9 MG/DL (ref 8.3–10.1)
CALCIUM SERPL-MCNC: 8.4 MG/DL (ref 8.4–10.2)
CARBAMAZEPINE SERPL-MCNC: 5.6 UG/ML (ref 4–12)
CARDIAC TROPONIN I PNL SERPL HS: <2 NG/L
CHLORIDE SERPL-SCNC: 106 MMOL/L (ref 96–108)
CO2 SERPL-SCNC: 24 MMOL/L (ref 21–32)
CREAT SERPL-MCNC: 1.26 MG/DL (ref 0.6–1.3)
EOSINOPHIL # BLD AUTO: 0.29 THOUSAND/ÂΜL (ref 0–0.61)
EOSINOPHIL NFR BLD AUTO: 2 % (ref 0–6)
ERYTHROCYTE [DISTWIDTH] IN BLOOD BY AUTOMATED COUNT: 13.6 % (ref 11.6–15.1)
GFR SERPL CREATININE-BSD FRML MDRD: 44 ML/MIN/1.73SQ M
GLUCOSE SERPL-MCNC: 98 MG/DL (ref 65–140)
HCT VFR BLD AUTO: 33.5 % (ref 34.8–46.1)
HGB BLD-MCNC: 10.7 G/DL (ref 11.5–15.4)
IMM GRANULOCYTES # BLD AUTO: 0.03 THOUSAND/UL (ref 0–0.2)
IMM GRANULOCYTES NFR BLD AUTO: 0 % (ref 0–2)
LEVETIRACETAM SERPL-MCNC: 69.4 UG/ML (ref 12–46)
LYMPHOCYTES # BLD AUTO: 2.54 THOUSANDS/ÂΜL (ref 0.6–4.47)
LYMPHOCYTES NFR BLD AUTO: 18 % (ref 14–44)
MCH RBC QN AUTO: 30.1 PG (ref 26.8–34.3)
MCHC RBC AUTO-ENTMCNC: 31.9 G/DL (ref 31.4–37.4)
MCV RBC AUTO: 94 FL (ref 82–98)
MONOCYTES # BLD AUTO: 1 THOUSAND/ÂΜL (ref 0.17–1.22)
MONOCYTES NFR BLD AUTO: 7 % (ref 4–12)
NEUTROPHILS # BLD AUTO: 9.98 THOUSANDS/ÂΜL (ref 1.85–7.62)
NEUTS SEG NFR BLD AUTO: 72 % (ref 43–75)
NRBC BLD AUTO-RTO: 0 /100 WBCS
PLATELET # BLD AUTO: 313 THOUSANDS/UL (ref 149–390)
PMV BLD AUTO: 10.6 FL (ref 8.9–12.7)
POTASSIUM SERPL-SCNC: 3.8 MMOL/L (ref 3.5–5.3)
PROT SERPL-MCNC: 7 G/DL (ref 6.4–8.4)
RBC # BLD AUTO: 3.55 MILLION/UL (ref 3.81–5.12)
SODIUM SERPL-SCNC: 139 MMOL/L (ref 135–147)
WBC # BLD AUTO: 13.91 THOUSAND/UL (ref 4.31–10.16)

## 2024-08-03 PROCEDURE — 80053 COMPREHEN METABOLIC PANEL: CPT | Performed by: EMERGENCY MEDICINE

## 2024-08-03 PROCEDURE — 99284 EMERGENCY DEPT VISIT MOD MDM: CPT | Performed by: EMERGENCY MEDICINE

## 2024-08-03 PROCEDURE — 99284 EMERGENCY DEPT VISIT MOD MDM: CPT

## 2024-08-03 PROCEDURE — 84484 ASSAY OF TROPONIN QUANT: CPT | Performed by: EMERGENCY MEDICINE

## 2024-08-03 PROCEDURE — 36415 COLL VENOUS BLD VENIPUNCTURE: CPT | Performed by: EMERGENCY MEDICINE

## 2024-08-03 PROCEDURE — 83880 ASSAY OF NATRIURETIC PEPTIDE: CPT | Performed by: EMERGENCY MEDICINE

## 2024-08-03 PROCEDURE — 85025 COMPLETE CBC W/AUTO DIFF WBC: CPT | Performed by: EMERGENCY MEDICINE

## 2024-08-03 PROCEDURE — 83520 IMMUNOASSAY QUANT NOS NONAB: CPT | Performed by: EMERGENCY MEDICINE

## 2024-08-03 PROCEDURE — 93005 ELECTROCARDIOGRAM TRACING: CPT

## 2024-08-03 PROCEDURE — 80156 ASSAY CARBAMAZEPINE TOTAL: CPT | Performed by: EMERGENCY MEDICINE

## 2024-08-03 PROCEDURE — 71045 X-RAY EXAM CHEST 1 VIEW: CPT

## 2024-08-03 RX ORDER — LEVETIRACETAM 750 MG/1
1500 TABLET ORAL 2 TIMES DAILY
Qty: 360 TABLET | Refills: 0 | Status: SHIPPED | OUTPATIENT
Start: 2024-08-03

## 2024-08-03 RX ORDER — CARBAMAZEPINE 200 MG/1
200 TABLET ORAL ONCE
Status: COMPLETED | OUTPATIENT
Start: 2024-08-03 | End: 2024-08-03

## 2024-08-03 RX ORDER — LEVETIRACETAM 500 MG/1
1500 TABLET ORAL ONCE
Status: COMPLETED | OUTPATIENT
Start: 2024-08-03 | End: 2024-08-03

## 2024-08-03 RX ORDER — PRAMIPEXOLE DIHYDROCHLORIDE 0.25 MG/1
1.5 TABLET ORAL ONCE
Status: COMPLETED | OUTPATIENT
Start: 2024-08-03 | End: 2024-08-03

## 2024-08-03 RX ORDER — PRAMIPEXOLE DIHYDROCHLORIDE 1.5 MG/1
1.5 TABLET ORAL
Qty: 90 TABLET | Refills: 0 | Status: SHIPPED | OUTPATIENT
Start: 2024-08-03 | End: 2024-11-01

## 2024-08-03 RX ORDER — CARBAMAZEPINE 200 MG/1
200 TABLET ORAL 3 TIMES DAILY
Qty: 270 TABLET | Refills: 0 | Status: SHIPPED | OUTPATIENT
Start: 2024-08-03

## 2024-08-03 RX ADMIN — PRAMIPEXOLE DIHYDROCHLORIDE 1.5 MG: 0.25 TABLET ORAL at 03:10

## 2024-08-03 RX ADMIN — LEVETIRACETAM 1500 MG: 500 TABLET, FILM COATED ORAL at 01:53

## 2024-08-03 RX ADMIN — CARBAMAZEPINE 200 MG: 200 TABLET ORAL at 01:53

## 2024-08-03 NOTE — ED PROVIDER NOTES
History  Chief Complaint   Patient presents with    Dizziness     Arrives via EMS with c/o dizziness starting today. Was seen here earlier today and left AMA. Denies n/v     63-year-old female with history of focal epilepsy (on levetiracetam and carbamazepine) presents to the ED via EMS from home for evaluation of dizziness and lightheadedness.  The patient was seen in the ED earlier today for bilateral ankle swelling and dyspnea, however she eloped from the ED prior to discharge as she was frustrated with her care. She states that tonight while trying to go to sleep she had been experiencing intermittent episodes of lightheadedness and dizziness, however she describes no vertiginous symptoms but rather near syncope.  She denies any full loss of consciousness.  She notes that she has been out of her levetiracetam and carbamazepine and last took them 2 days ago.  She denies any known recent seizure activity.  She also reports symptoms of her restless leg syndrome and is out of her pramipexole.  No other symptoms or complaints.        Prior to Admission Medications   Prescriptions Last Dose Informant Patient Reported? Taking?   albuterol (2.5 mg/3 mL) 0.083 % nebulizer solution  Self No No   Sig: Take 3 mL (2.5 mg total) by nebulization every 6 (six) hours as needed for wheezing or shortness of breath   albuterol (Proventil HFA) 90 mcg/act inhaler  Self No No   Sig: Inhale 2 puffs every 6 (six) hours as needed for wheezing   carBAMazepine (TEGretol) 200 mg tablet   No No   Sig: Take 1 tablet (200 mg total) by mouth 3 (three) times a day   cholecalciferol 1,000 units tablet  Self No No   Sig: Take 2 tablets (2,000 Units total) by mouth daily   escitalopram (LEXAPRO) 10 mg tablet   No No   Sig: Take 1 tablet (10 mg total) by mouth daily   levETIRAcetam (KEPPRA) 750 mg tablet   No No   Sig: Take 2 tablets (1,500 mg total) by mouth 2 (two) times a day   losartan (COZAAR) 25 mg tablet   No No   Sig: Take 1 tablet (25 mg  total) by mouth daily   metoprolol succinate (TOPROL-XL) 50 mg 24 hr tablet   No No   Sig: Take 1 tablet (50 mg total) by mouth daily   nicotine (NICODERM CQ) 21 mg/24 hr TD 24 hr patch  Self No No   Sig: Place 1 patch on the skin over 24 hours daily   pramipexole (MIRAPEX) 1.5 MG tablet  Self No No   Sig: Take 1 tablet (1.5 mg total) by mouth daily at bedtime   spironolactone (ALDACTONE) 25 mg tablet  Self No No   Sig: Take 1 tablet (25 mg total) by mouth daily      Facility-Administered Medications Last Administration Doses Remaining   cyanocobalamin injection 1,000 mcg None recorded           Past Medical History:   Diagnosis Date    Depression     EP (epilepsy) (HCC)     Epilepsy (HCC)     Obesity     Restless leg        Past Surgical History:   Procedure Laterality Date    DENTAL SURGERY      all teeth removed    LASER ABLATION OF THE CERVIX      MAMMO (HISTORICAL)  05/01/2017       Family History   Problem Relation Age of Onset    Kidney disease Mother     Liver disease Mother     Heart attack Mother     Heart attack Father     No Known Problems Brother     No Known Problems Son      I have reviewed and agree with the history as documented.    E-Cigarette/Vaping    E-Cigarette Use Never User      E-Cigarette/Vaping Substances    Nicotine Yes     THC No     CBD No     Flavoring No     Other No     Unknown No      Social History     Tobacco Use    Smoking status: Every Day     Current packs/day: 0.50     Average packs/day: 0.5 packs/day for 23.0 years (11.5 ttl pk-yrs)     Types: Cigarettes    Smokeless tobacco: Never   Vaping Use    Vaping status: Never Used   Substance Use Topics    Alcohol use: Never     Comment: pt denies alcohol use    Drug use: Never       Review of Systems   Constitutional:  Negative for chills and fever.   HENT:  Negative for congestion, rhinorrhea and sore throat.    Respiratory:  Negative for cough and shortness of breath.    Cardiovascular:  Negative for chest pain and palpitations.    Gastrointestinal:  Negative for abdominal pain, diarrhea, nausea and vomiting.   Genitourinary:  Negative for dysuria and hematuria.   Musculoskeletal:  Negative for back pain and neck pain.   Neurological:  Positive for light-headedness. Negative for dizziness, weakness, numbness and headaches.        See HPI.   All other systems reviewed and are negative.      Physical Exam  Physical Exam  Vitals and nursing note reviewed.   Constitutional:       General: She is not in acute distress.     Appearance: Normal appearance. She is normal weight. She is not ill-appearing.   HENT:      Head: Normocephalic and atraumatic.      Right Ear: External ear normal.      Left Ear: External ear normal.      Nose: Nose normal. No congestion or rhinorrhea.      Mouth/Throat:      Mouth: Mucous membranes are moist.      Pharynx: Oropharynx is clear. No oropharyngeal exudate or posterior oropharyngeal erythema.   Eyes:      Extraocular Movements: Extraocular movements intact.      Conjunctiva/sclera: Conjunctivae normal.      Pupils: Pupils are equal, round, and reactive to light.   Cardiovascular:      Rate and Rhythm: Normal rate and regular rhythm.      Pulses: Normal pulses.      Heart sounds: Normal heart sounds. No murmur heard.  Pulmonary:      Effort: Pulmonary effort is normal. No respiratory distress.      Breath sounds: Normal breath sounds. No wheezing or rales.   Abdominal:      General: Abdomen is flat. Bowel sounds are normal. There is no distension.      Palpations: Abdomen is soft.      Tenderness: There is no abdominal tenderness. There is no right CVA tenderness, left CVA tenderness or guarding.   Musculoskeletal:         General: No swelling or tenderness. Normal range of motion.      Cervical back: Normal range of motion and neck supple. No tenderness.   Skin:     General: Skin is warm and dry.      Capillary Refill: Capillary refill takes less than 2 seconds.   Neurological:      General: No focal deficit  "present.      Mental Status: She is alert and oriented to person, place, and time.      Cranial Nerves: No cranial nerve deficit.      Sensory: No sensory deficit.      Motor: No weakness.         Vital Signs  ED Triage Vitals [08/03/24 0130]   Temperature Pulse Respirations Blood Pressure SpO2   97.7 °F (36.5 °C) 85 16 141/65 95 %      Temp src Heart Rate Source Patient Position - Orthostatic VS BP Location FiO2 (%)   -- Monitor Lying Left arm --      Pain Score       --           Vitals:    08/03/24 0130   BP: 141/65   Pulse: 85   Patient Position - Orthostatic VS: Lying         Visual Acuity      ED Medications  Medications   carBAMazepine (TEGretol) tablet 200 mg (200 mg Oral Given 8/3/24 0153)   levETIRAcetam (KEPPRA) tablet 1,500 mg (1,500 mg Oral Given 8/3/24 0153)   pramipexole (MIRAPEX) tablet 1.5 mg (1.5 mg Oral Given 8/3/24 0310)       Diagnostic Studies  Results Reviewed       Procedure Component Value Units Date/Time    Carbamazepine level, total [[id number]]  (Normal) Collected: 08/03/24 0158    Lab Status: Final result Specimen: Blood from Arm, Right Updated: 08/03/24 1604     Carbamazepine Lvl 5.6 ug/mL     Levetiracetam level [[id number]]  (Abnormal) Collected: 08/03/24 0158    Lab Status: Final result Specimen: Blood from Arm, Right Updated: 08/03/24 1531     Levetiracetam Lvl 69.4 ug/mL     Narrative:      \"Brivaracetam (Briviact) interferes with measurements of levetiracetam in the ARK Levetiracetam Assay. Patients undergoing a switch in drug therapy involving Keppra and Briviact should not be monitored for levetiracetam using the ARK assay. Serum levels of levetiracetam and or brivaracetam should be confirmed by a valid chromatographic method if there is a possibility these drugs are co-present in circulation.\"    B-Type Natriuretic Peptide(BNP) [[id number]]  (Normal) Collected: 08/03/24 0158    Lab Status: Final result Specimen: Blood from Arm, Right Updated: 08/03/24 0230     BNP 38 pg/mL     HS " Troponin 0hr (reflex protocol) [[id number]]  (Normal) Collected: 08/03/24 0158    Lab Status: Final result Specimen: Blood from Arm, Right Updated: 08/03/24 0227     hs TnI 0hr <2 ng/L     Comprehensive metabolic panel [[id number]]  (Abnormal) Collected: 08/03/24 0158    Lab Status: Final result Specimen: Blood from Arm, Right Updated: 08/03/24 0222     Sodium 139 mmol/L      Potassium 3.8 mmol/L      Chloride 106 mmol/L      CO2 24 mmol/L      ANION GAP 9 mmol/L      BUN 26 mg/dL      Creatinine 1.26 mg/dL      Glucose 98 mg/dL      Calcium 8.4 mg/dL      Corrected Calcium 8.9 mg/dL      AST 14 U/L      ALT 9 U/L      Alkaline Phosphatase 89 U/L      Total Protein 7.0 g/dL      Albumin 3.4 g/dL      Total Bilirubin 0.29 mg/dL      eGFR 44 ml/min/1.73sq m     Narrative:      National Kid[employer] Foundation guidelines for Chronic Kidney Disease (CKD):     Stage 1 with normal or high GFR (GFR > 90 mL/min/1.73 square meters)    Stage 2 Mild CKD (GFR = 60-89 mL/min/1.73 square meters)    Stage 3A Moderate CKD (GFR = 45-59 mL/min/1.73 square meters)    Stage 3B Moderate CKD (GFR = 30-44 mL/min/1.73 square meters)    Stage 4 Severe CKD (GFR = 15-29 mL/min/1.73 square meters)    Stage 5 End Stage CKD (GFR <15 mL/min/1.73 square meters)  Note: GFR calculation is accurate only with a steady state creatinine    CBC and differential [[id number]]  (Abnormal) Collected: 08/03/24 0158    Lab Status: Final result Specimen: Blood from Arm, Right Updated: 08/03/24 0204     WBC 13.91 Thousand/uL      RBC 3.55 Million/uL      Hemoglobin 10.7 g/dL      Hematocrit 33.5 %      MCV 94 fL      MCH 30.1 pg      MCHC 31.9 g/dL      RDW 13.6 %      MPV 10.6 fL      Platelets 313 Thousands/uL      nRBC 0 /100 WBCs      Segmented % 72 %      Immature Grans % 0 %      Lymphocytes % 18 %      Monocytes % 7 %      Eosinophils Relative 2 %      Basophils Relative 1 %      Absolute Neutrophils 9.98 Thousands/µL      Absolute Immature Grans 0.03  Thousand/uL      Absolute Lymphocytes 2.54 Thousands/µL      Absolute Monocytes 1.00 Thousand/µL      Eosinophils Absolute 0.29 Thousand/µL      Basophils Absolute 0.07 Thousands/µL                    XR chest 1 view portable   Final Result by Candelaria Bhatia MD (08/03 0703)      No acute cardiopulmonary disease.            Workstation performed: [id number]                    Procedures  Procedures         ED Course  ED Course as of 08/05/24 2056   Sat Aug 03, 2024   0205 Procedure Note: EKG  Date/Time: 08/03/24 2:04 AM   Interpreted by: Andrzej Kaba MD  Indications / Diagnosis: lightheadedness  ECG reviewed by me, the ED Physician: yes   The EKG demonstrates:  Rhythm: sinus rhythm 80 bpm with occasional PVCs  Intervals: normal intervals  Axis: normal axis  QRS/Blocks: normal QRS  ST Changes: No acute ST-T wave abnormality.  No STEMI.   0300 CBC and differential(!)  Mild leukocytosis 13.91.  Improved from prior.  No infectious symptoms at this time.  Stable chronic anemia hemoglobin 10.7.  Hematocrit 33.5.  Normal platelet count 313.   0301 Comprehensive metabolic panel(!)  BUN slightly elevated 26, albumin slightly low 3.4, remainder of values are all within normal limits.   0301 hs TnI 0hr: <2  WNL   0301 BNP: 38  WNL                                 SBIRT 20yo+      Flowsheet Row Most Recent Value   Initial Alcohol Screen: US AUDIT-C     1. How often do you have a drink containing alcohol? 0 Filed at: 08/03/2024 0129   2. How many drinks containing alcohol do you have on a typical day you are drinking?  0 Filed at: 08/03/2024 0129   3a. Male UNDER 65: How often do you have five or more drinks on one occasion? 0 Filed at: 08/03/2024 0129   3b. FEMALE Any Age, or MALE 65+: How often do you have 4 or more drinks on one occassion? 0 Filed at: 08/03/2024 0129   Audit-C Score 0 Filed at: 08/03/2024 0129   JOHN: How many times in the past year have you...    Used an illegal drug or used a prescription medication  for non-medical reasons? Never Filed at: 08/03/2024 0129                      Medical Decision Making  63-year-old female with history of focal epilepsy (on levetiracetam and carbamazepine) presents to the ED via EMS from home for evaluation of dizziness and lightheadedness.  The patient was seen in the ED earlier today for bilateral ankle swelling and dyspnea, however she eloped from the ED prior to discharge as she was frustrated with her care. She states that tonight while trying to go to sleep she had been experiencing intermittent episodes of lightheadedness and dizziness, however she describes no vertiginous symptoms but rather near syncope.  She denies any full loss of consciousness.  She notes that she has been out of her levetiracetam and carbamazepine and last took them 2 days ago.  She denies any known recent seizure activity.  She also reports symptoms of her restless leg syndrome and is out of her pramipexole.  No other symptoms or complaints.    Vital signs reviewed.  See physical exam documentation for exam findings.  Differential diagnosis includes but is not limited to arrhythmia, anemia, electrolyte disturbance, and/or medication noncompliance requiring medication refill.  Will evaluate with labs, ECG, and chest x-ray.  See ED course for independent interpretation of results.  Patient provided with refills of her medications.  Workup overall negative. I discussed all findings, treatment, red flags/return precautions, and outpatient follow-up and the patient/family understand and agree. Stable for discharge.    Amount and/or Complexity of Data Reviewed  Labs: ordered. Decision-making details documented in ED Course.  Radiology: ordered.    Risk  Prescription drug management.                 Disposition  Final diagnoses:   Lightheadedness   History of epilepsy   Restless leg syndrome   Encounter for medication refill     Time reflects when diagnosis was documented in both MDM as applicable and the  Disposition within this note       Time User Action Codes Description Comment    8/3/2024  3:07 AM Andrzej Kaba [R42] Lightheadedness     8/3/2024  3:07 AM Andrzej Kaba [Z86.69] History of epilepsy     8/3/2024  3:08 AM Andrzej Kaba [G25.81] Restless leg syndrome     8/3/2024  3:08 AM Andrzej Kaba [Z76.0] Encounter for medication refill     8/3/2024  8:35 PM Andrzej Kaba [G40.919] Epilepsy with altered consciousness with intractable epilepsy (HCC)     8/3/2024  8:35 PM nAdrzej Kaba [Z76.0] Medication refill           ED Disposition       ED Disposition   Discharge    Condition   Stable    Date/Time   Sat Aug 3, 2024 0309    Comment   Cydney Lim discharge to home/self care.                   Follow-up Information       Follow up With Specialties Details Why Contact Info Additional Information    [facility], Nurse Practitioner Call in 1 week For follow-up [address] [facility]  [address] [place] [id number]  [phone]       [facility] Emergency Medicine Go to  If symptoms worsen [address] [place] [id number] [phone] [facility] Emergency Department, [address] [place] [id number]            Discharge Medication List as of 8/3/2024  3:09 AM        CONTINUE these medications which have NOT CHANGED    Details   albuterol (2.5 mg/3 mL) 0.083 % nebulizer solution Take 3 mL (2.5 mg total) by nebulization every 6 (six) hours as needed for wheezing or shortness of breath, Starting Tue 4/9/2024, Normal      albuterol (Proventil HFA) 90 mcg/act inhaler Inhale 2 puffs every 6 (six) hours as needed for wheezing, Starting Wed 5/22/2024, Normal      cholecalciferol 1,000 units tablet Take 2 tablets (2,000 Units total) by mouth daily, Starting Tue 4/9/2024, Until Wed 6/26/2024, Normal      escitalopram (LEXAPRO) 10 mg tablet Take 1 tablet (10 mg total) by mouth daily, Starting Fri  7/19/2024, Until Wed 1/15/2025, Normal      losartan (COZAAR) 25 mg tablet Take 1 tablet (25 mg total) by mouth daily, Starting Fri 7/19/2024, Until Wed 1/15/2025, Normal      metoprolol succinate (TOPROL-XL) 50 mg 24 hr tablet Take 1 tablet (50 mg total) by mouth daily, Starting Fri 7/19/2024, Until Wed 1/15/2025, Normal      nicotine (NICODERM CQ) 21 mg/24 hr TD 24 hr patch Place 1 patch on the skin over 24 hours daily, Starting Tue 4/9/2024, Normal      spironolactone (ALDACTONE) 25 mg tablet Take 1 tablet (25 mg total) by mouth daily, Starting Wed 5/22/2024, Normal      carBAMazepine (TEGretol) 200 mg tablet Take 1 tablet (200 mg total) by mouth 3 (three) times a day, Starting Wed 6/26/2024, Normal      levETIRAcetam (KEPPRA) 750 mg tablet Take 2 tablets (1,500 mg total) by mouth 2 (two) times a day, Starting Wed 6/26/2024, Normal      pramipexole (MIRAPEX) 1.5 MG tablet Take 1 tablet (1.5 mg total) by mouth daily at bedtime, Starting Wed 5/22/2024, Until Tue 8/20/2024, Normal             No discharge procedures on file.    PDMP Review         Value Time User    PDMP Reviewed  Yes 7/23/2021 10:52 AM Montana Schmidt MD            ED Provider  Electronically Signed by             Andrzej Kaba MD  08/05/24 5814

## 2024-08-03 NOTE — ED NOTES
"Pt stating \"nothing your doing here is fucking working, \"how much fucking longer\". Reminded pt that medication was given for her restless leg syndrome. Pt requesting to speak to doctor stating \"tell him to get his fucking ass here\". Told pt that she cannot talk to staff like this. MD dillon aware and at bedside now.     Nicolette Montiel RN  08/03/24 0308    "

## 2024-08-04 LAB
ATRIAL RATE: 80 BPM
P AXIS: 54 DEGREES
PR INTERVAL: 150 MS
QRS AXIS: 47 DEGREES
QRSD INTERVAL: 66 MS
QT INTERVAL: 370 MS
QTC INTERVAL: 426 MS
T WAVE AXIS: 61 DEGREES
VENTRICULAR RATE: 80 BPM

## 2024-08-04 PROCEDURE — 93010 ELECTROCARDIOGRAM REPORT: CPT | Performed by: INTERNAL MEDICINE
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
## 2024-08-05 ENCOUNTER — HOSPITAL ENCOUNTER (EMERGENCY)
Facility: HOSPITAL | Age: 64
Discharge: HOME | End: 2024-08-05
Attending: INTERNAL MEDICINE | Admitting: INTERNAL MEDICINE
Payer: MEDICARE

## 2024-08-05 ENCOUNTER — APPOINTMENT (EMERGENCY)
Dept: RADIOLOGY | Facility: HOSPITAL | Age: 64
End: 2024-08-05
Payer: MEDICARE

## 2024-08-05 ENCOUNTER — VBI (OUTPATIENT)
Dept: FAMILY MEDICINE CLINIC | Facility: CLINIC | Age: 64
End: 2024-08-05

## 2024-08-05 VITALS
TEMPERATURE: 97.6 F | HEART RATE: 96 BPM | OXYGEN SATURATION: 97 % | RESPIRATION RATE: 20 BRPM | WEIGHT: 258 LBS | HEIGHT: 64 IN | SYSTOLIC BLOOD PRESSURE: 133 MMHG | DIASTOLIC BLOOD PRESSURE: 73 MMHG | BODY MASS INDEX: 44.05 KG/M2

## 2024-08-05 DIAGNOSIS — R06.02 SOB (SHORTNESS OF BREATH): Primary | ICD-10-CM

## 2024-08-05 PROCEDURE — 93005 ELECTROCARDIOGRAM TRACING: CPT

## 2024-08-05 PROCEDURE — 94640 AIRWAY INHALATION TREATMENT: CPT

## 2024-08-05 PROCEDURE — 71046 X-RAY EXAM CHEST 2 VIEWS: CPT

## 2024-08-05 PROCEDURE — 99284 EMERGENCY DEPT VISIT MOD MDM: CPT | Performed by: PHYSICIAN ASSISTANT

## 2024-08-05 PROCEDURE — 99284 EMERGENCY DEPT VISIT MOD MDM: CPT

## 2024-08-05 RX ORDER — ALBUTEROL SULFATE 2.5 MG/3ML
5 SOLUTION RESPIRATORY (INHALATION) ONCE
Status: COMPLETED | OUTPATIENT
Start: 2024-08-05 | End: 2024-08-05

## 2024-08-05 RX ADMIN — ALBUTEROL SULFATE 5 MG: 2.5 SOLUTION RESPIRATORY (INHALATION) at 18:57

## 2024-08-05 RX ADMIN — IPRATROPIUM BROMIDE 0.5 MG: 0.5 SOLUTION RESPIRATORY (INHALATION) at 18:57
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
## 2024-08-05 NOTE — ED PROVIDER NOTES
"History  Chief Complaint   Patient presents with    Shortness of Breath     \"I cannot breathe in my apartment, it looks like the heat is on\" states A/C is \"a different kind of air conditioning\"  called landlord without response, improving     Past Medical History: Depression, Epilepsy, Obesity, Restless leg   Past Surgical History: DENTAL SURGERY - all teeth removed, LASER ABLATION OF THE CERVIX,     Patient presents to ED complaining that her apartment is so hot that it feels like the air conditioning is blowing out hot air and she was having trouble breathing, so came to emergency department.  Patient states now that she is in the cooler air, she can breathe much better, vital signs stable here, pOx 97% RA, pt in no acute distress.  Pt seen here 8/2 for sob with exertion, eloped from ED and 8/3 for light-headed/dizziness, given meds, eval and dc; pt states did get her medications refilled.  Pt denies any chest pain, no dizziness, lightheadedness, or visual disturbances, no abdominal pain, nausea, vomiting, diarrhea, or constipation.         Prior to Admission Medications   Prescriptions Last Dose Informant Patient Reported? Taking?   albuterol (2.5 mg/3 mL) 0.083 % nebulizer solution  Self No No   Sig: Take 3 mL (2.5 mg total) by nebulization every 6 (six) hours as needed for wheezing or shortness of breath   albuterol (Proventil HFA) 90 mcg/act inhaler  Self No No   Sig: Inhale 2 puffs every 6 (six) hours as needed for wheezing   carBAMazepine (TEGretol) 200 mg tablet   No No   Sig: Take 1 tablet (200 mg total) by mouth 3 (three) times a day   cholecalciferol 1,000 units tablet  Self No No   Sig: Take 2 tablets (2,000 Units total) by mouth daily   escitalopram (LEXAPRO) 10 mg tablet   No No   Sig: Take 1 tablet (10 mg total) by mouth daily   levETIRAcetam (KEPPRA) 750 mg tablet   No No   Sig: Take 2 tablets (1,500 mg total) by mouth 2 (two) times a day   losartan (COZAAR) 25 mg tablet   No No   Sig: Take 1 tablet " (25 mg total) by mouth daily   metoprolol succinate (TOPROL-XL) 50 mg 24 hr tablet   No No   Sig: Take 1 tablet (50 mg total) by mouth daily   nicotine (NICODERM CQ) 21 mg/24 hr TD 24 hr patch  Self No No   Sig: Place 1 patch on the skin over 24 hours daily   pramipexole (MIRAPEX) 1.5 MG tablet   No No   Sig: Take 1 tablet (1.5 mg total) by mouth daily at bedtime   spironolactone (ALDACTONE) 25 mg tablet  Self No No   Sig: Take 1 tablet (25 mg total) by mouth daily      Facility-Administered Medications Last Administration Doses Remaining   cyanocobalamin injection 1,000 mcg None recorded           Past Medical History:   Diagnosis Date    Depression     EP (epilepsy) (HCC)     Epilepsy (HCC)     Obesity     Restless leg        Past Surgical History:   Procedure Laterality Date    DENTAL SURGERY      all teeth removed    LASER ABLATION OF THE CERVIX      MAMMO (HISTORICAL)  05/01/2017       Family History   Problem Relation Age of Onset    Kidney disease Mother     Liver disease Mother     Heart attack Mother     Heart attack Father     No Known Problems Brother     No Known Problems Son      I have reviewed and agree with the history as documented.    E-Cigarette/Vaping    E-Cigarette Use Never User      E-Cigarette/Vaping Substances    Nicotine Yes     THC No     CBD No     Flavoring No     Other No     Unknown No      Social History     Tobacco Use    Smoking status: Every Day     Current packs/day: 0.50     Average packs/day: 0.5 packs/day for 23.0 years (11.5 ttl pk-yrs)     Types: Cigarettes    Smokeless tobacco: Never   Vaping Use    Vaping status: Never Used   Substance Use Topics    Alcohol use: Never     Comment: pt denies alcohol use    Drug use: Never       Review of Systems   Constitutional:  Negative for fever.   Respiratory:  Positive for shortness of breath and wheezing.    Cardiovascular:  Negative for chest pain.   Gastrointestinal:  Negative for vomiting.   Skin:  Negative for wound.   All other  systems reviewed and are negative.      Physical Exam  Physical Exam  Vitals and nursing note reviewed.   Constitutional:       General: She is not in acute distress.     Appearance: She is well-developed. She is obese.   HENT:      Head: Normocephalic and atraumatic.      Right Ear: External ear normal.      Left Ear: External ear normal.      Nose: Nose normal.      Mouth/Throat:      Mouth: Mucous membranes are moist.      Pharynx: Oropharynx is clear.   Eyes:      Conjunctiva/sclera: Conjunctivae normal.   Cardiovascular:      Rate and Rhythm: Normal rate and regular rhythm.   Pulmonary:      Effort: Pulmonary effort is normal. No respiratory distress.      Breath sounds: Wheezing (Exp wheezing B/L base) present.   Abdominal:      General: Bowel sounds are normal.      Palpations: Abdomen is soft.   Musculoskeletal:         General: Normal range of motion.      Cervical back: Normal range of motion.      Right lower leg: No edema.      Left lower leg: No edema.   Skin:     General: Skin is warm and dry.      Findings: No rash.   Neurological:      General: No focal deficit present.      Mental Status: She is alert.   Psychiatric:         Behavior: Behavior normal.         Vital Signs  ED Triage Vitals [08/05/24 1823]   Temperature Pulse Respirations Blood Pressure SpO2   97.6 °F (36.4 °C) 96 20 133/73 97 %      Temp Source Heart Rate Source Patient Position - Orthostatic VS BP Location FiO2 (%)   Oral Monitor Lying Right arm --      Pain Score       5           Vitals:    08/05/24 1823   BP: 133/73   Pulse: 96   Patient Position - Orthostatic VS: Lying         Visual Acuity      ED Medications  Medications   albuterol inhalation solution 5 mg (5 mg Nebulization Given 8/5/24 1857)   ipratropium (ATROVENT) 0.02 % inhalation solution 0.5 mg (0.5 mg Nebulization Given 8/5/24 1857)       Diagnostic Studies  Results Reviewed       None                   XR chest 2 views   ED Interpretation by Marli Lugo PA-C  (08/05 1920)   nad                 Procedures  Procedures         ED Course                                               Medical Decision Making  Pt remained stable throughout ED evaluation, no trouble breathing, drank without difficulty, feels comfortable going home; recommended she write letters and contact landlord again.    Amount and/or Complexity of Data Reviewed  External Data Reviewed: notes.  Radiology: ordered and independent interpretation performed.  ECG/medicine tests: ordered and independent interpretation performed. Decision-making details documented in ED Course.     Details: EKG shows normal sinus rhythm at 90 no acute ischemic changes, no change when compared to old on 8-3-2024    Risk  Prescription drug management.                 Disposition  Final diagnoses:   SOB (shortness of breath)     Time reflects when diagnosis was documented in both MDM as applicable and the Disposition within this note       Time User Action Codes Description Comment    8/5/2024  7:29 PM Marli Lugo Add [R06.02] SOB (shortness of breath)           ED Disposition       ED Disposition   Discharge    Condition   Stable    Date/Time   Mon Aug 5, 2024  7:29 PM    Comment   Cydney Lim discharge to home/self care.                   Follow-up Information       Follow up With Specialties Details Why Contact Info    [facility], Nurse Practitioner   [address]  [address]  [place] [address] [phone]              Patient's Medications   Discharge Prescriptions    No medications on file       No discharge procedures on file.    PDMP Review         Value Time User    PDMP Reviewed  Yes 7/23/2021 10:52 AM Montana Schmidt MD            ED Provider  Electronically Signed by             Marli Lugo PA-C  08/05/24 [id number]

## 2024-08-05 NOTE — TELEPHONE ENCOUNTER
08/05/24 10:18 AM    Patient contacted post ED visit, [facility] department spoke with patient/caregiver and outreach was successful.    Thank you.  LUIS DE DIOS MA  [facility]

## 2024-08-06 ENCOUNTER — VBI (OUTPATIENT)
Dept: FAMILY MEDICINE CLINIC | Facility: CLINIC | Age: 64
End: 2024-08-06

## 2024-08-06 LAB
ATRIAL RATE: 90 BPM
P AXIS: 17 DEGREES
PR INTERVAL: 134 MS
QRS AXIS: 52 DEGREES
QRSD INTERVAL: 60 MS
QT INTERVAL: 346 MS
QTC INTERVAL: 423 MS
T WAVE AXIS: 74 DEGREES
VENTRICULAR RATE: 90 BPM

## 2024-08-06 PROCEDURE — 93010 ELECTROCARDIOGRAM REPORT: CPT | Performed by: INTERNAL MEDICINE

## 2024-08-06 NOTE — TELEPHONE ENCOUNTER
08/06/24 2:04 PM    Patient contacted post ED visit, [facility] department spoke with patient/caregiver and outreach was successful.    Thank you.  Rosanna Funze MA  [facility]

## 2024-08-15 ENCOUNTER — RA CDI HCC (OUTPATIENT)
Dept: OTHER | Facility: HOSPITAL | Age: 64
End: 2024-08-15

## 2024-08-22 ENCOUNTER — OFFICE VISIT (OUTPATIENT)
Dept: FAMILY MEDICINE CLINIC | Facility: CLINIC | Age: 64
End: 2024-08-22
Payer: MEDICARE

## 2024-08-22 VITALS
BODY MASS INDEX: 42.68 KG/M2 | TEMPERATURE: 97.6 F | OXYGEN SATURATION: 98 % | DIASTOLIC BLOOD PRESSURE: 80 MMHG | WEIGHT: 250 LBS | SYSTOLIC BLOOD PRESSURE: 130 MMHG | HEIGHT: 64 IN | RESPIRATION RATE: 19 BRPM | HEART RATE: 97 BPM

## 2024-08-22 DIAGNOSIS — R29.6 FREQUENT FALLS: ICD-10-CM

## 2024-08-22 DIAGNOSIS — F03.90 MAJOR NEUROCOGNITIVE DISORDER (HCC): ICD-10-CM

## 2024-08-22 DIAGNOSIS — F33.3 SEVERE EPISODE OF RECURRENT MAJOR DEPRESSIVE DISORDER, WITH PSYCHOTIC FEATURES (HCC): ICD-10-CM

## 2024-08-22 DIAGNOSIS — M17.0 PRIMARY OSTEOARTHRITIS OF BOTH KNEES: ICD-10-CM

## 2024-08-22 DIAGNOSIS — N18.30 BENIGN HYPERTENSIVE HEART AND CKD, STAGE 3 (GFR 30-59), W CHF (HCC): ICD-10-CM

## 2024-08-22 DIAGNOSIS — R26.81 UNSTEADY GAIT WHEN WALKING: ICD-10-CM

## 2024-08-22 DIAGNOSIS — Z12.31 BREAST CANCER SCREENING BY MAMMOGRAM: ICD-10-CM

## 2024-08-22 DIAGNOSIS — R41.0 CONFUSION: ICD-10-CM

## 2024-08-22 DIAGNOSIS — Z00.00 MEDICARE ANNUAL WELLNESS VISIT, SUBSEQUENT: Primary | ICD-10-CM

## 2024-08-22 DIAGNOSIS — G40.909 RECURRENT SEIZURES (HCC): ICD-10-CM

## 2024-08-22 DIAGNOSIS — I13.0 BENIGN HYPERTENSIVE HEART AND CKD, STAGE 3 (GFR 30-59), W CHF (HCC): ICD-10-CM

## 2024-08-22 DIAGNOSIS — I89.0 LYMPHEDEMA: ICD-10-CM

## 2024-08-22 PROCEDURE — 99213 OFFICE O/P EST LOW 20 MIN: CPT | Performed by: NURSE PRACTITIONER

## 2024-08-22 PROCEDURE — G0439 PPPS, SUBSEQ VISIT: HCPCS | Performed by: NURSE PRACTITIONER

## 2024-08-22 NOTE — PATIENT INSTRUCTIONS
Medicare Preventive Visit Patient Instructions  Thank you for completing your Welcome to Medicare Visit or Medicare Annual Wellness Visit today. Your next wellness visit will be due in one year (8/23/2025).  The screening/preventive services that you may require over the next 5-10 years are detailed below. Some tests may not apply to you based off risk factors and/or age. Screening tests ordered at today's visit but not completed yet may show as past due. Also, please note that scanned in results may not display below.  Preventive Screenings:  Service Recommendations Previous Testing/Comments   Colorectal Cancer Screening  * Colonoscopy    * Fecal Occult Blood Test (FOBT)/Fecal Immunochemical Test (FIT)  * Fecal DNA/Cologuard Test  * Flexible Sigmoidoscopy Age: 45-75 years old   Colonoscopy: every 10 years (may be performed more frequently if at higher risk)  OR  FOBT/FIT: every 1 year  OR  Cologuard: every 3 years  OR  Sigmoidoscopy: every 5 years  Screening may be recommended earlier than age 45 if at higher risk for colorectal cancer. Also, an individualized decision between you and your healthcare provider will decide whether screening between the ages of 76-85 would be appropriate. Colonoscopy: Not on file  FOBT/FIT: Not on file  Cologuard: Not on file  Sigmoidoscopy: Not on file          Breast Cancer Screening Age: 40+ years old  Frequency: every 1-2 years  Not required if history of left and right mastectomy Mammogram: 11/13/2019        Cervical Cancer Screening Between the ages of 21-29, pap smear recommended once every 3 years.   Between the ages of 30-65, can perform pap smear with HPV co-testing every 5 years.   Recommendations may differ for women with a history of total hysterectomy, cervical cancer, or abnormal pap smears in past. Pap Smear: Not on file    Screening Not Indicated   Hepatitis C Screening Once for adults born between 1945 and 1965  More frequently in patients at high risk for Hepatitis  C Hep C Antibody: Not on file        Diabetes Screening 1-2 times per year if you're at risk for diabetes or have pre-diabetes Fasting glucose: 84 mg/dL (6/21/2023)  A1C: No results in last 5 years (No results in last 5 years)  Screening Current   Cholesterol Screening Once every 5 years if you don't have a lipid disorder. May order more often based on risk factors. Lipid panel: 04/19/2021    Screening Current     Other Preventive Screenings Covered by Medicare:  Abdominal Aortic Aneurysm (AAA) Screening: covered once if your at risk. You're considered to be at risk if you have a family history of AAA.  Lung Cancer Screening: covers low dose CT scan once per year if you meet all of the following conditions: (1) Age 55-77; (2) No signs or symptoms of lung cancer; (3) Current smoker or have quit smoking within the last 15 years; (4) You have a tobacco smoking history of at least 20 pack years (packs per day multiplied by number of years you smoked); (5) You get a written order from a healthcare provider.  Glaucoma Screening: covered annually if you're considered high risk: (1) You have diabetes OR (2) Family history of glaucoma OR (3)  aged 50 and older OR (4)  American aged 65 and older  Osteoporosis Screening: covered every 2 years if you meet one of the following conditions: (1) You're estrogen deficient and at risk for osteoporosis based off medical history and other findings; (2) Have a vertebral abnormality; (3) On glucocorticoid therapy for more than 3 months; (4) Have primary hyperparathyroidism; (5) On osteoporosis medications and need to assess response to drug therapy.   Last bone density test (DXA Scan): Not on file.  HIV Screening: covered annually if you're between the age of 15-65. Also covered annually if you are younger than 15 and older than 65 with risk factors for HIV infection. For pregnant patients, it is covered up to 3 times per pregnancy.    Immunizations:  Immunization  Recommendations   Influenza Vaccine Annual influenza vaccination during flu season is recommended for all persons aged >= 6 months who do not have contraindications   Pneumococcal Vaccine   * Pneumococcal conjugate vaccine = PCV13 (Prevnar 13), PCV15 (Vaxneuvance), PCV20 (Prevnar 20)  * Pneumococcal polysaccharide vaccine = PPSV23 (Pneumovax) Adults 19-65 yo with certain risk factors or if 65+ yo  If never received any pneumonia vaccine: recommend Prevnar 20 (PCV20)  Give PCV20 if previously received 1 dose of PCV13 or PPSV23   Hepatitis B Vaccine 3 dose series if at intermediate or high risk (ex: diabetes, end stage renal disease, liver disease)   Respiratory syncytial virus (RSV) Vaccine - COVERED BY MEDICARE PART D  * RSVPreF3 (Arexvy) CDC recommends that adults 60 years of age and older may receive a single dose of RSV vaccine using shared clinical decision-making (SCDM)   Tetanus (Td) Vaccine - COST NOT COVERED BY MEDICARE PART B Following completion of primary series, a booster dose should be given every 10 years to maintain immunity against tetanus. Td may also be given as tetanus wound prophylaxis.   Tdap Vaccine - COST NOT COVERED BY MEDICARE PART B Recommended at least once for all adults. For pregnant patients, recommended with each pregnancy.   Shingles Vaccine (Shingrix) - COST NOT COVERED BY MEDICARE PART B  2 shot series recommended in those 19 years and older who have or will have weakened immune systems or those 50 years and older     Health Maintenance Due:      Topic Date Due   • Hepatitis C Screening  Never done   • Breast Cancer Screening: Mammogram  11/13/2020   • Colorectal Cancer Screening  12/22/2024 (Originally 10/9/2003)   • HIV Screening  Completed     Immunizations Due:      Topic Date Due   • Hepatitis A Vaccine (1 of 2 - Risk 2-dose series) Never done   • Pneumococcal Vaccine: 65+ Years (2 of 2 - PCV) 04/05/2017   • COVID-19 Vaccine (3 - 2023-24 season) 09/01/2023   • Influenza Vaccine  (1) 09/01/2024     Advance Directives   What are advance directives?  Advance directives are legal documents that state your wishes and plans for medical care. These plans are made ahead of time in case you lose your ability to make decisions for yourself. Advance directives can apply to any medical decision, such as the treatments you want, and if you want to donate organs.   What are the types of advance directives?  There are many types of advance directives, and each state has rules about how to use them. You may choose a combination of any of the following:  Living will:  This is a written record of the treatment you want. You can also choose which treatments you do not want, which to limit, and which to stop at a certain time. This includes surgery, medicine, IV fluid, and tube feedings.   Durable power of  for healthcare (DPAHC):  This is a written record that states who you want to make healthcare choices for you when you are unable to make them for yourself. This person, called a proxy, is usually a family member or a friend. You may choose more than 1 proxy.  Do not resuscitate (DNR) order:  A DNR order is used in case your heart stops beating or you stop breathing. It is a request not to have certain forms of treatment, such as CPR. A DNR order may be included in other types of advance directives.  Medical directive:  This covers the care that you want if you are in a coma, near death, or unable to make decisions for yourself. You can list the treatments you want for each condition. Treatment may include pain medicine, surgery, blood transfusions, dialysis, IV or tube feedings, and a ventilator (breathing machine).  Values history:  This document has questions about your views, beliefs, and how you feel and think about life. This information can help others choose the care that you would choose.  Why are advance directives important?  An advance directive helps you control your care. Although  spoken wishes may be used, it is better to have your wishes written down. Spoken wishes can be misunderstood, or not followed. Treatments may be given even if you do not want them. An advance directive may make it easier for your family to make difficult choices about your care.   Fall Prevention    Fall prevention  includes ways to make your home and other areas safer. It also includes ways you can move more carefully to prevent a fall. Health conditions that cause changes in your blood pressure, vision, or muscle strength and coordination may increase your risk for falls. Medicines may also increase your risk for falls if they make you dizzy, weak, or sleepy.   Fall prevention tips:   Stand or sit up slowly.    Use assistive devices as directed.    Wear shoes that fit well and have soles that .    Wear a personal alarm.    Stay active.    Manage your medical conditions.    Home Safety Tips:  Add items to prevent falls in the bathroom.    Keep paths clear.    Install bright lights in your home.    Keep items you use often on shelves within reach.    Paint or place reflective tape on the edges of your stairs.    Cigarette Smoking and Your Health   Risks to your health if you smoke:  Nicotine and other chemicals found in tobacco damage every cell in your body. Even if you are a light smoker, you have an increased risk for cancer, heart disease, and lung disease. If you are pregnant or have diabetes, smoking increases your risk for complications.   Benefits to your health if you stop smoking:   You decrease respiratory symptoms such as coughing, wheezing, and shortness of breath.   You reduce your risk for cancers of the lung, mouth, throat, kidney, bladder, pancreas, stomach, and cervix. If you already have cancer, you increase the benefits of chemotherapy. You also reduce your risk for cancer returning or a second cancer from developing.   You reduce your risk for heart disease, blood clots, heart attack, and  stroke.   You reduce your risk for lung infections, and diseases such as pneumonia, asthma, chronic bronchitis, and emphysema.  Your circulation improves. More oxygen can be delivered to your body. If you have diabetes, you lower your risk for complications, such as kidney, artery, and eye diseases. You also lower your risk for nerve damage. Nerve damage can lead to amputations, poor vision, and blindness.  You improve your body's ability to heal and to fight infections.  For more information and support to stop smoking:   [employer]  Phone: 0- 783 - 516-9556  Web Address: www.[employer]  Weight Management   Why it is important to manage your weight:  Being overweight increases your risk of health conditions such as heart disease, high blood pressure, type 2 diabetes, and certain types of cancer. It can also increase your risk for osteoarthritis, sleep apnea, and other respiratory problems. Aim for a slow, steady weight loss. Even a small amount of weight loss can lower your risk of health problems.  How to lose weight safely:  A safe and healthy way to lose weight is to eat fewer calories and get regular exercise. You can lose up about 1 pound a week by decreasing the number of calories you eat by 500 calories each day.   Healthy meal plan for weight management:  A healthy meal plan includes a variety of foods, contains fewer calories, and helps you stay healthy. A healthy meal plan includes the following:  Eat whole-grain foods more often.  A healthy meal plan should contain fiber. Fiber is the part of grains, fruits, and vegetables that is not broken down by your body. Whole-grain foods are healthy and provide extra fiber in your diet. Some examples of whole-grain foods are whole-wheat breads and pastas, oatmeal, brown rice, and bulgur.  Eat a variety of vegetables every day.  Include dark, leafy greens such as spinach, kale, len greens, and mustard greens. Eat yellow and orange vegetables such as  carrots, sweet potatoes, and winter squash.   Eat a variety of fruits every day.  Choose fresh or canned fruit (canned in its own juice or light syrup) instead of juice. Fruit juice has very little or no fiber.  Eat low-fat dairy foods.  Drink fat-free (skim) milk or 1% milk. Eat fat-free yogurt and low-fat cottage cheese. Try low-fat cheeses such as mozzarella and other reduced-fat cheeses.  Choose meat and other protein foods that are low in fat.  Choose beans or other legumes such as split peas or lentils. Choose fish, skinless poultry (chicken or turkey), or lean cuts of red meat (beef or pork). Before you cook meat or poultry, cut off any visible fat.   Use less fat and oil.  Try baking foods instead of frying them. Add less fat, such as margarine, sour cream, regular salad dressing and mayonnaise to foods. Eat fewer high-fat foods. Some examples of high-fat foods include french fries, doughnuts, ice cream, and cakes.  Eat fewer sweets.  Limit foods and drinks that are high in sugar. This includes candy, cookies, regular soda, and sweetened drinks.  Exercise:  Exercise at least 30 minutes per day on most days of the week. Some examples of exercise include walking, biking, dancing, and swimming. You can also fit in more physical activity by taking the stairs instead of the elevator or parking farther away from stores. Ask your healthcare provider about the best exercise plan for you.      © Copyright [employer] 2018 Information is for End User's use only and may not be sold, redistributed or otherwise used for commercial purposes. All illustrations and images included in CareNotes® are the copyrighted property of [employer] or [employer]

## 2024-08-22 NOTE — PROGRESS NOTES
Ambulatory Visit  Name: Cydney Lim      : 1960      MRN: [id number]  Encounter Provider: DRU Knott  Encounter Date: 2024   Encounter department: [facility] [place]    Assessment & Plan   1. Medicare annual wellness visit, subsequent  2. Breast cancer screening by mammogram  -     Mammo screening bilateral w 3d & cad; Future  3. Unsteady gait when walking  Comments:  Walker and shower chair ordered from [employer]  4. Frequent falls  5. Primary osteoarthritis of both knees  6. Lymphedema  -     Ambulatory Referral to Physical Therapy; Future  7. Benign hypertensive heart and CKD, stage 3 (GFR 30-59), w CHF (HCC)  Comments:  Blood pressure controlled today  8. Recurrent seizures (HCC)  Comments:  Under the care of neurology  9. Major neurocognitive disorder (HCC)  10. Confusion  11. Severe episode of recurrent major depressive disorder, with psychotic features (HCC)  Comments:  Refuses referral to psychiatry     The case discussed with patient using patient centered shared decision making.The patient was counseled regarding instructions for management,-- risk factor reductions,-- prognosis,-- impressions,-- risks and benefits of treatment options,-- importance of compliance with treatment. I have reviewed the instructions with the patient, answering all questions to her satisfaction.    I had lengthy discussion with Cydney  I advised that I ordered equipment to enhance her safety at home-a cane and shower chair-she seemed happy about this  I also put in referral for physical therapy lymphedema clinic-we can help arrange transportation to these appointments.  She is very interested in this therapy.  I strongly encouraged her to stay in contact and keep appointments with neurology as this is a very important relationship to control her seizure disorder.  She verbalized understanding of the same.  She continues to resist the discussion related to reestablishing with  psychiatry.  Blood workup to date.  She is due for multiple studies including MRI of brain, mammogram, DEXA scan.  I offered assistance to schedule this which she declined.  I will see her back in 3 months or sooner.  Earlier this year I tried to get her to come monthly which did not work out today she is agreeable to every 3 months.    Preventive health issues were discussed with patient, and age appropriate screening tests were ordered as noted in patient's After Visit Summary. Personalized health advice and appropriate referrals for health education or preventive services given if needed, as noted in patient's After Visit Summary.    History of Present Illness     Cydney is a 63-year-old female known to me presents for her wellness visit today  She is ongoing struggles with medical noncompliance I believe related to untreated psychiatric disease    She is frequently seen in the emergency room    She was seen at [facility] emergency department 8/2, 8/3, 8/5 for variety of complaints including dizziness, swelling, shortness of breath  She was treated and discharged  Today she endorses that she is in her usual state of health and is feeling okay    We discussed about her ongoing issues with her seizure disorder.  It does appear that she finally established with neurology and had her first appointment in June.  I commended her on this and encouraged to continue seeing the neurologist as it is very important.     She admits that her balance is not great and she has episodes of nearly falling in her apartment.  I ordered her a cane as she was not agreeable to a walker.  I also ordered her a shower chair.    She has ongoing issues with edema.  She admits to dietary indiscretion.  She declines addition of a diuretic and she does not always take her spironolactone.   I recommend possibly trying physical therapy lymphedema clinic which she seems to be very interested in.  I placed an order.  I encouraged her to cut  back on salt in the diet and elevate her legs when she is at home.  She tells me that she wraps her legs with Ace wraps.  I offered to order her compression stockings which she declined.    Apparently she is still estranged from her brother Osorio.  Her  has Alzheimer's and is admitted to nursing home few years ago       Patient Care Team:  DRU Stapleton as PCP - General (Family Medicine)  Eran Escobar MD (Nephrology)    Review of Systems   Constitutional:  Positive for fatigue. Negative for fever.   Respiratory:  Negative for cough and shortness of breath.    Cardiovascular:  Positive for leg swelling. Negative for chest pain and palpitations.   Gastrointestinal:  Negative for abdominal pain and blood in stool.   Musculoskeletal:  Positive for arthralgias and gait problem.   Skin:  Negative for wound.   Neurological:  Positive for dizziness. Negative for weakness.   Psychiatric/Behavioral:  Positive for confusion and dysphoric mood. Negative for suicidal ideas.      Medical History Reviewed by provider this encounter:  Tobacco  Allergies  Meds  Problems  Med Hx  Surg Hx  Fam Hx       Annual Wellness Visit Questionnaire   Cydney is here for her Subsequent Wellness visit. Last Medicare Wellness visit information reviewed, patient interviewed and updates made to the record today.      Health Risk Assessment:   Patient rates overall health as excellent. Patient feels that their physical health rating is same. Patient is satisfied with their life. Eyesight was rated as same. Hearing was rated as slightly worse. Patient feels that their emotional and mental health rating is same. Patients states they are sometimes angry. Patient states they are never, rarely unusually tired/fatigued. Pain experienced in the last 7 days has been a lot. Patient's pain rating has been 7/10. Patient states that she has experienced no weight loss or gain in last 6 months. Knee pain    Depression Screening:   PHQ-9  Score: 2      Fall Risk Screening:   In the past year, patient has experienced: history of falling in past year    Number of falls: 2 or more  Injured during fall?: No    Feels unsteady when standing or walking?: Yes    Worried about falling?: Yes      Urinary Incontinence Screening:   Patient has not leaked urine accidently in the last six months. Falling r/t seizures    Home Safety:  Patient does not have trouble with stairs inside or outside of their home. Patient has working smoke alarms and has no working carbon monoxide detector. Home safety hazards include: none.     Nutrition:   Current diet is Regular.     Medications:   Patient is not currently taking any over-the-counter supplements. Patient is able to manage medications.     Activities of Daily Living (ADLs)/Instrumental Activities of Daily Living (IADLs):   Walk and transfer into and out of bed and chair?: Yes  Dress and groom yourself?: Yes    Bathe or shower yourself?: Yes    Feed yourself? Yes  Do your laundry/housekeeping?: Yes  Manage your money, pay your bills and track your expenses?: Yes  Make your own meals?: Yes    Do your own shopping?: Yes    Previous Hospitalizations:   Any hospitalizations or ED visits within the last 12 months?: Yes    How many hospitalizations have you had in the last year?: 1-2    Hospitalization Comments: Frequent admissions to ED    Advance Care Planning:   Living will: Yes    Durable POA for healthcare: Yes    Advanced directive: Yes    Advanced directive counseling given: Yes    End of Life Decisions reviewed with patient: Yes      Cognitive Screening:   Provider or family/friend/caregiver concerned regarding cognition?: No    PREVENTIVE SCREENINGS      Cardiovascular Screening:    General: Risks and Benefits Discussed    Due for: Lipid Panel      Diabetes Screening:     General: Screening Current      Colorectal Cancer Screening:     General: Risks and Benefits Discussed    Due for: Colonoscopy - Low Risk       Breast Cancer Screening:     General: Risks and Benefits Discussed    Due for: Mammogram        Cervical Cancer Screening:    General: Screening Not Indicated      Osteoporosis Screening:    General: Risks and Benefits Discussed    Due for: DXA Axial      Abdominal Aortic Aneurysm (AAA) Screening:        General: Screening Not Indicated      Lung Cancer Screening:     General: Screening Not Indicated      Hepatitis C Screening:    General: Screening Current    Screening, Brief Intervention, and Referral to Treatment (SBIRT)    Screening  Typical number of drinks in a day: 0  Typical number of drinks in a week: 0  Interpretation: Low risk drinking behavior.    Single Item Drug Screening:  How often have you used an illegal drug (including marijuana) or a prescription medication for non-medical reasons in the past year? never    Single Item Drug Screen Score: 0  Interpretation: Negative screen for possible drug use disorder    Brief Intervention  Alcohol & drug use screenings were reviewed. No concerns regarding substance use disorder identified.     Social Determinants of Health     Financial Resource Strain: Low Risk  (3/12/2024)    [facility]    Overall Financial Resource Strain (CARDIA)     Difficulty of Paying Living Expenses: Not hard at all   Food Insecurity: No Food Insecurity (8/22/2024)    Hunger Vital Sign     Worried About Running Out of Food in the Last Year: Never true     Ran Out of Food in the Last Year: Never true   Transportation Needs: No Transportation Needs (8/22/2024)    PRAPARE - Transportation     Lack of Transportation (Medical): No     Lack of Transportation (Non-Medical): No   Housing Stability: Low Risk  (8/22/2024)    Housing Stability Vital Sign     Unable to Pay for Housing in the Last Year: No     Number of Times Moved in the Last Year: 1     Homeless in the Last Year: No   Utilities: Not At Risk (8/22/2024)    [facility] Utilities  "    Threatened with loss of utilities: No     No results found.    Objective     /80 (BP Location: Left arm, Patient Position: Sitting, Cuff Size: Standard)   Pulse 97   Temp 97.6 °F (36.4 °C) (Temporal)   Resp 19   Ht 5' 4\" (1.626 m)   Wt 113 kg (250 lb)   SpO2 98%   BMI 42.91 kg/m²     Physical Exam  Vitals and nursing note reviewed.   Constitutional:       Appearance: Normal appearance.   Cardiovascular:      Rate and Rhythm: Normal rate and regular rhythm.      Pulses: Normal pulses.   Pulmonary:      Effort: Pulmonary effort is normal.      Breath sounds: Normal breath sounds.   Musculoskeletal:      Right lower leg: 3+ Edema present.      Left lower leg: 3+ Edema present.   Neurological:      General: No focal deficit present.      Mental Status: She is alert. Mental status is at baseline.      Motor: No weakness.      Gait: Gait normal.   Psychiatric:         Mood and Affect: Mood normal. Affect is inappropriate.         Speech: Speech is tangential.         Thought Content: Thought content is delusional.           "

## 2024-08-23 LAB
DME PARACHUTE DELIVERY DATE ACTUAL: NORMAL
DME PARACHUTE DELIVERY DATE REQUESTED: NORMAL
DME PARACHUTE ITEM DESCRIPTION: NORMAL
DME PARACHUTE ORDER STATUS: NORMAL
DME PARACHUTE SUPPLIER NAME: NORMAL
DME PARACHUTE SUPPLIER PHONE: NORMAL

## 2024-08-29 ENCOUNTER — TELEPHONE (OUTPATIENT)
Age: 64
End: 2024-08-29

## 2024-08-29 NOTE — TELEPHONE ENCOUNTER
[facility] will be faxing over physician cert form needs to be signed by DO or MD.

## 2024-09-03 NOTE — TELEPHONE ENCOUNTER
HERBERT:     [place]ia from [employer] called inquiring about a fax form that was sent over on 8/9/2024. She will refax form again.     Please advise.

## 2024-09-26 ENCOUNTER — APPOINTMENT (EMERGENCY)
Dept: RADIOLOGY | Facility: HOSPITAL | Age: 64
End: 2024-09-26
Payer: MEDICARE

## 2024-09-26 ENCOUNTER — APPOINTMENT (EMERGENCY)
Dept: CT IMAGING | Facility: HOSPITAL | Age: 64
End: 2024-09-26
Payer: MEDICARE

## 2024-09-26 ENCOUNTER — HOSPITAL ENCOUNTER (EMERGENCY)
Facility: HOSPITAL | Age: 64
Discharge: HOME | End: 2024-09-26
Attending: EMERGENCY MEDICINE
Payer: MEDICARE

## 2024-09-26 VITALS
HEART RATE: 92 BPM | OXYGEN SATURATION: 97 % | DIASTOLIC BLOOD PRESSURE: 84 MMHG | SYSTOLIC BLOOD PRESSURE: 164 MMHG | TEMPERATURE: 98.6 F | RESPIRATION RATE: 16 BRPM

## 2024-09-26 DIAGNOSIS — R42 DIZZINESS: Primary | ICD-10-CM

## 2024-09-26 DIAGNOSIS — R60.0 PERIPHERAL EDEMA: ICD-10-CM

## 2024-09-26 DIAGNOSIS — Z87.898 HISTORY OF VOMITING: ICD-10-CM

## 2024-09-26 LAB
ALBUMIN SERPL BCG-MCNC: 3.7 G/DL (ref 3.5–5)
ALP SERPL-CCNC: 101 U/L (ref 34–104)
ALT SERPL W P-5'-P-CCNC: 9 U/L (ref 7–52)
ANION GAP SERPL CALCULATED.3IONS-SCNC: 8 MMOL/L (ref 4–13)
APTT PPP: 28 SECONDS (ref 23–34)
AST SERPL W P-5'-P-CCNC: 11 U/L (ref 13–39)
BASOPHILS # BLD AUTO: 0.04 THOUSANDS/ΜL (ref 0–0.1)
BASOPHILS NFR BLD AUTO: 0 % (ref 0–1)
BILIRUB SERPL-MCNC: 0.21 MG/DL (ref 0.2–1)
BNP SERPL-MCNC: 100 PG/ML (ref 0–100)
BUN SERPL-MCNC: 26 MG/DL (ref 5–25)
CALCIUM SERPL-MCNC: 8.9 MG/DL (ref 8.4–10.2)
CARDIAC TROPONIN I PNL SERPL HS: 3 NG/L
CHLORIDE SERPL-SCNC: 107 MMOL/L (ref 96–108)
CO2 SERPL-SCNC: 25 MMOL/L (ref 21–32)
CREAT SERPL-MCNC: 1.17 MG/DL (ref 0.6–1.3)
EOSINOPHIL # BLD AUTO: 0.23 THOUSAND/ΜL (ref 0–0.61)
EOSINOPHIL NFR BLD AUTO: 2 % (ref 0–6)
ERYTHROCYTE [DISTWIDTH] IN BLOOD BY AUTOMATED COUNT: 14.5 % (ref 11.6–15.1)
FLUAV RNA RESP QL NAA+PROBE: NEGATIVE
FLUBV RNA RESP QL NAA+PROBE: NEGATIVE
GFR SERPL CREATININE-BSD FRML MDRD: 49 ML/MIN/1.73SQ M
GLUCOSE SERPL-MCNC: 97 MG/DL (ref 65–140)
HCT VFR BLD AUTO: 35.7 % (ref 34.8–46.1)
HGB BLD-MCNC: 11.3 G/DL (ref 11.5–15.4)
IMM GRANULOCYTES # BLD AUTO: 0.04 THOUSAND/UL (ref 0–0.2)
IMM GRANULOCYTES NFR BLD AUTO: 0 % (ref 0–2)
INR PPP: 1.03 (ref 0.85–1.19)
LYMPHOCYTES # BLD AUTO: 1.72 THOUSANDS/ΜL (ref 0.6–4.47)
LYMPHOCYTES NFR BLD AUTO: 13 % (ref 14–44)
MAGNESIUM SERPL-MCNC: 2 MG/DL (ref 1.9–2.7)
MCH RBC QN AUTO: 29.9 PG (ref 26.8–34.3)
MCHC RBC AUTO-ENTMCNC: 31.7 G/DL (ref 31.4–37.4)
MCV RBC AUTO: 94 FL (ref 82–98)
MONOCYTES # BLD AUTO: 0.7 THOUSAND/ΜL (ref 0.17–1.22)
MONOCYTES NFR BLD AUTO: 5 % (ref 4–12)
NEUTROPHILS # BLD AUTO: 10.52 THOUSANDS/ΜL (ref 1.85–7.62)
NEUTS SEG NFR BLD AUTO: 80 % (ref 43–75)
NRBC BLD AUTO-RTO: 0 /100 WBCS
PLATELET # BLD AUTO: 338 THOUSANDS/UL (ref 149–390)
PMV BLD AUTO: 10.1 FL (ref 8.9–12.7)
POTASSIUM SERPL-SCNC: 4.3 MMOL/L (ref 3.5–5.3)
PROT SERPL-MCNC: 7.6 G/DL (ref 6.4–8.4)
PROTHROMBIN TIME: 13.9 SECONDS (ref 12.3–15)
RBC # BLD AUTO: 3.78 MILLION/UL (ref 3.81–5.12)
RSV RNA RESP QL NAA+PROBE: NEGATIVE
SARS-COV-2 RNA RESP QL NAA+PROBE: NEGATIVE
SODIUM SERPL-SCNC: 140 MMOL/L (ref 135–147)
WBC # BLD AUTO: 13.25 THOUSAND/UL (ref 4.31–10.16)

## 2024-09-26 PROCEDURE — 99285 EMERGENCY DEPT VISIT HI MDM: CPT | Performed by: EMERGENCY MEDICINE

## 2024-09-26 PROCEDURE — 0241U HB NFCT DS VIR RESP RNA 4 TRGT: CPT | Performed by: EMERGENCY MEDICINE

## 2024-09-26 PROCEDURE — 93005 ELECTROCARDIOGRAM TRACING: CPT

## 2024-09-26 PROCEDURE — 96374 THER/PROPH/DIAG INJ IV PUSH: CPT

## 2024-09-26 PROCEDURE — 96375 TX/PRO/DX INJ NEW DRUG ADDON: CPT

## 2024-09-26 PROCEDURE — 99285 EMERGENCY DEPT VISIT HI MDM: CPT

## 2024-09-26 PROCEDURE — 83735 ASSAY OF MAGNESIUM: CPT | Performed by: EMERGENCY MEDICINE

## 2024-09-26 PROCEDURE — 85610 PROTHROMBIN TIME: CPT | Performed by: EMERGENCY MEDICINE

## 2024-09-26 PROCEDURE — 71045 X-RAY EXAM CHEST 1 VIEW: CPT

## 2024-09-26 PROCEDURE — 36415 COLL VENOUS BLD VENIPUNCTURE: CPT | Performed by: EMERGENCY MEDICINE

## 2024-09-26 PROCEDURE — 96361 HYDRATE IV INFUSION ADD-ON: CPT

## 2024-09-26 PROCEDURE — 83880 ASSAY OF NATRIURETIC PEPTIDE: CPT | Performed by: EMERGENCY MEDICINE

## 2024-09-26 PROCEDURE — 85730 THROMBOPLASTIN TIME PARTIAL: CPT | Performed by: EMERGENCY MEDICINE

## 2024-09-26 PROCEDURE — 70450 CT HEAD/BRAIN W/O DYE: CPT

## 2024-09-26 PROCEDURE — 80156 ASSAY CARBAMAZEPINE TOTAL: CPT | Performed by: EMERGENCY MEDICINE

## 2024-09-26 PROCEDURE — 83520 IMMUNOASSAY QUANT NOS NONAB: CPT | Performed by: EMERGENCY MEDICINE

## 2024-09-26 PROCEDURE — 85025 COMPLETE CBC W/AUTO DIFF WBC: CPT | Performed by: EMERGENCY MEDICINE

## 2024-09-26 PROCEDURE — 84484 ASSAY OF TROPONIN QUANT: CPT | Performed by: EMERGENCY MEDICINE

## 2024-09-26 PROCEDURE — 80053 COMPREHEN METABOLIC PANEL: CPT | Performed by: EMERGENCY MEDICINE

## 2024-09-26 RX ORDER — ONDANSETRON 2 MG/ML
4 INJECTION INTRAMUSCULAR; INTRAVENOUS ONCE
Status: COMPLETED | OUTPATIENT
Start: 2024-09-26 | End: 2024-09-26

## 2024-09-26 RX ORDER — LORAZEPAM 2 MG/ML
0.5 INJECTION INTRAMUSCULAR ONCE
Status: COMPLETED | OUTPATIENT
Start: 2024-09-26 | End: 2024-09-26

## 2024-09-26 RX ADMIN — LORAZEPAM 0.5 MG: 2 INJECTION INTRAMUSCULAR; INTRAVENOUS at 20:19

## 2024-09-26 RX ADMIN — ONDANSETRON 4 MG: 2 INJECTION INTRAMUSCULAR; INTRAVENOUS at 20:18

## 2024-09-26 RX ADMIN — SODIUM CHLORIDE 500 ML: 0.9 INJECTION, SOLUTION INTRAVENOUS at 20:15

## 2024-09-27 ENCOUNTER — VBI (OUTPATIENT)
Dept: FAMILY MEDICINE CLINIC | Facility: CLINIC | Age: 64
End: 2024-09-27

## 2024-09-27 LAB
ATRIAL RATE: 87 BPM
CARBAMAZEPINE SERPL-MCNC: 7.2 UG/ML (ref 4–12)
LEVETIRACETAM SERPL-MCNC: 45.2 UG/ML (ref 12–46)
P AXIS: 30 DEGREES
PR INTERVAL: 134 MS
QRS AXIS: 36 DEGREES
QRSD INTERVAL: 70 MS
QT INTERVAL: 364 MS
QTC INTERVAL: 438 MS
T WAVE AXIS: 57 DEGREES
VENTRICULAR RATE: 87 BPM

## 2024-09-27 PROCEDURE — 93010 ELECTROCARDIOGRAM REPORT: CPT | Performed by: INTERNAL MEDICINE

## 2024-09-27 NOTE — ED NOTES
Discharge instructions reviewed with patient.  Patient indicated understanding with all questions answered.     Vida Woods RN  09/26/24 [id number]

## 2024-09-27 NOTE — DISCHARGE INSTRUCTIONS
Follow up with your PCP/outpatient providers, and return to the emergency department for new or worsening symptoms.        CT BRAIN - WITHOUT CONTRAST     INDICATION:   dizziness.     COMPARISON: 1/8/2024.     TECHNIQUE:  CT examination of the brain was performed.  Multiplanar 2D reformatted images were created from the source data.     Radiation dose length product (DLP) for this visit:  799.3 mGy-cm .  This examination, like all CT scans performed in the [facility], was performed utilizing techniques to minimize radiation dose exposure, including the use of iterative   reconstruction and automated exposure control.     IMAGE QUALITY:  Diagnostic.     FINDINGS:     PARENCHYMA:  No intracranial mass, mass effect or midline shift. No CT signs of acute infarction.  No acute parenchymal hemorrhage.     VENTRICLES AND EXTRA-AXIAL SPACES:  Normal for the patient's age.     VISUALIZED ORBITS: Normal visualized orbits.     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 Tiny retention cyst in the left sphenoid sinus.     CALVARIUM AND EXTRACRANIAL SOFT TISSUES:  Normal.     IMPRESSION:     No acute intracranial abnormality.

## 2024-09-27 NOTE — TELEPHONE ENCOUNTER
09/27/24 9:13 AM    Patient contacted post ED visit, outreach attempt made but message could not be left. Additional outreach attempt will be made.     Thank you.  LUIS DE DIOS MA  [facility]

## 2024-09-27 NOTE — ED PROVIDER NOTES
Final diagnoses:   Dizziness   History of vomiting   Peripheral edema     ED Disposition       ED Disposition   Discharge    Condition   Stable    Date/Time   u Sep 26, 2024  9:58 PM    Comment   Cydney Lim discharge to home/self care.                   Assessment & Plan       Medical Decision Making  Patient is a 63-year-old female seen in the emergency department with concern for dizziness/vertigo, recent vomiting.  EKG was obtained and noted, which showed no evidence of arrhythmia or ischemia.  CT head showed no acute intracranial abnormality.  Chest x-ray showed no infiltrate or pneumothorax.  COVID-19/influenza/RSV swab was ordered in the emergency department, and these tests were negative.  Patient was treated with medication for symptom control, with good effect.  Laboratory evaluation remarkable for elevated BUN of 26, elevated white blood cell count of 13.25, 80% segmented neutrophils, low red blood cell count of 3.78, low hemoglobin of 11.3. No definite cause of the patient's symptoms was discovered.  Evaluation is not consistent with ACS, CVA, CHF, acute electrolyte abnormality, or acute anemia.  Patient's symptoms appear to be consistent with peripheral vertigo, possible dependent edema.  Plan to have patient follow up with PCP/outpatient providers.  Patient stable for discharge home.  Discharge instructions were reviewed with patient.    Problems Addressed:  Dizziness: acute illness or injury    Amount and/or Complexity of Data Reviewed  Labs: ordered. Decision-making details documented in ED Course.  Radiology: ordered and independent interpretation performed. Decision-making details documented in ED Course.  ECG/medicine tests: ordered and independent interpretation performed. Decision-making details documented in ED Course.    Risk  Prescription drug management.      Results Reviewed       Procedure Component Value Units Date/Time    COVID19, Influenza A/B, RSV PCR, [facility] [[id number]]  (Normal)  Collected: 09/26/24 2000    Lab Status: Final result Specimen: Nares from Nose Updated: 09/26/24 2053     SARS-CoV-2 Negative     INFLUENZA A PCR Negative     INFLUENZA B PCR Negative     RSV PCR Negative    Narrative:      This test has been performed using the CoV-2/Flu/RSV plus assay on the [employer] platform. This test has been validated by the  and verified by the performing laboratory.     This test is designed to amplify and detect the following: nucleocapsid (N), envelope (E), and RNA-dependent RNA polymerase (RdRP) genes of the SARS-CoV-2 genome; matrix (M), basic polymerase (PB2), and acidic protein (PA) segments of the influenza A genome; matrix (M) and non-structural protein (NS) segments of the influenza B genome, and the nucleocapsid genes of RSV A and RSV B.     Positive results are indicative of the presence of Flu A, Flu B, RSV, and/or SARS-CoV-2 RNA. Positive results for SARS-CoV-2 or suspected novel influenza should be reported to state, local, or federal health departments according to local reporting requirements.      All results should be assessed in conjunction with clinical presentation and other laboratory markers for clinical management.     FOR PEDIATRIC PATIENTS - copy/paste COVID Guidelines URL to browser: [email or web address]       B-Type Natriuretic Peptide(BNP) [[id number]]  (Normal) Collected: 09/26/24 2000    Lab Status: Final result Specimen: Blood from Arm, Right Updated: 09/26/24 2033      pg/mL     HS Troponin 0hr (reflex protocol) [[id number]]  (Normal) Collected: 09/26/24 2000    Lab Status: Final result Specimen: Blood from Arm, Right Updated: 09/26/24 2028     hs TnI 0hr 3 ng/L     Comprehensive metabolic panel [[id number]]  (Abnormal) Collected: 09/26/24 2000    Lab Status: Final result Specimen: Blood from Arm, Right Updated: 09/26/24 2021     Sodium 140 mmol/L      Potassium 4.3 mmol/L       Chloride 107 mmol/L      CO2 25 mmol/L      ANION GAP 8 mmol/L      BUN 26 mg/dL      Creatinine 1.17 mg/dL      Glucose 97 mg/dL      Calcium 8.9 mg/dL      AST 11 U/L      ALT 9 U/L      Alkaline Phosphatase 101 U/L      Total Protein 7.6 g/dL      Albumin 3.7 g/dL      Total Bilirubin 0.21 mg/dL      eGFR 49 ml/min/1.73sq m     Narrative:      National Kidney Disease Foundation guidelines for Chronic Kidney Disease (CKD):     Stage 1 with normal or high GFR (GFR > 90 mL/min/1.73 square meters)    Stage 2 Mild CKD (GFR = 60-89 mL/min/1.73 square meters)    Stage 3A Moderate CKD (GFR = 45-59 mL/min/1.73 square meters)    Stage 3B Moderate CKD (GFR = 30-44 mL/min/1.73 square meters)    Stage 4 Severe CKD (GFR = 15-29 mL/min/1.73 square meters)    Stage 5 End Stage CKD (GFR <15 mL/min/1.73 square meters)  Note: GFR calculation is accurate only with a steady state creatinine    Magnesium [[id number]]  (Normal) Collected: 09/26/24 2000    Lab Status: Final result Specimen: Blood from Arm, Right Updated: 09/26/24 2021     Magnesium 2.0 mg/dL     APTT [[id number]]  (Normal) Collected: 09/26/24 2000    Lab Status: Final result Specimen: Blood from Arm, Right Updated: 09/26/24 2015     PTT 28 seconds     Protime-INR [[id number]]  (Normal) Collected: 09/26/24 2000    Lab Status: Final result Specimen: Blood from Arm, Right Updated: 09/26/24 2015     Protime 13.9 seconds      INR 1.03    Narrative:      INR Therapeutic Range    Indication                                             INR Range      Atrial Fibrillation                                               2.0-3.0  Hypercoagulable State                                    2.0.2.3  Left Ventricular Asist Device                            2.0-3.0  Mechanical Heart Valve                                  -    Aortic(with afib, MI, embolism, HF, LA enlargement,    and/or coagulopathy)                                     2.0-3.0 (2.5-3.5)     Mitral                                                              2.5-3.5  Prosthetic/Bioprosthetic Heart Valve               2.0-3.0  Venous thromboembolism (VTE: VT, PE        2.0-3.0    Carbamazepine level, total [[id number]] Collected: 09/26/24 2004    Lab Status: In process Specimen: Blood from Arm, Right Updated: 09/26/24 2006    Levetiracetam level [[id number]] Collected: 09/26/24 2004    Lab Status: In process Specimen: Blood from Arm, Right Updated: 09/26/24 2006    CBC and differential [[id number]]  (Abnormal) Collected: 09/26/24 2000    Lab Status: Final result Specimen: Blood from Arm, Right Updated: 09/26/24 2006     WBC 13.25 Thousand/uL      RBC 3.78 Million/uL      Hemoglobin 11.3 g/dL      Hematocrit 35.7 %      MCV 94 fL      MCH 29.9 pg      MCHC 31.7 g/dL      RDW 14.5 %      MPV 10.1 fL      Platelets 338 Thousands/uL      nRBC 0 /100 WBCs      Segmented % 80 %      Immature Grans % 0 %      Lymphocytes % 13 %      Monocytes % 5 %      Eosinophils Relative 2 %      Basophils Relative 0 %      Absolute Neutrophils 10.52 Thousands/µL      Absolute Immature Grans 0.04 Thousand/uL      Absolute Lymphocytes 1.72 Thousands/µL      Absolute Monocytes 0.70 Thousand/µL      Eosinophils Absolute 0.23 Thousand/µL      Basophils Absolute 0.04 Thousands/µL               ED Course as of 09/26/24 2203   Thu Sep 26, 2024 2155 CT BRAIN - WITHOUT CONTRAST     INDICATION:   dizziness.     COMPARISON: 1/8/2024.     TECHNIQUE:  CT examination of the brain was performed.  Multiplanar 2D reformatted images were created from the source data.     Radiation dose length product (DLP) for this visit:  799.3 mGy-cm .  This examination, like all CT scans performed in the [facility], was performed utilizing techniques to minimize radiation dose exposure, including the use of iterative   reconstruction and automated exposure control.     IMAGE QUALITY:  Diagnostic.     FINDINGS:     PARENCHYMA:  No intracranial mass, mass effect or midline shift.  No CT signs of acute infarction.  No acute parenchymal hemorrhage.     VENTRICLES AND EXTRA-AXIAL SPACES:  Normal for the patient's age.     VISUALIZED ORBITS: Normal visualized orbits.     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 Tiny retention cyst in the left sphenoid sinus.     CALVARIUM AND EXTRACRANIAL SOFT TISSUES:  Normal.     IMPRESSION:     No acute intracranial abnormality.               Medications   LORazepam (ATIVAN) injection 0.5 mg (0.5 mg Intravenous Given 9/26/24 2019)   sodium chloride 0.9 % bolus 500 mL (500 mL Intravenous New Bag 9/26/24 2015)   ondansetron (ZOFRAN) injection 4 mg (4 mg Intravenous Given 9/26/24 2018)       ED Risk Strat Scores                           SBIRT 22yo+      Flowsheet Row Most Recent Value   Initial Alcohol Screen: US AUDIT-C     1. How often do you have a drink containing alcohol? 0 Filed at: 09/26/2024 1946   2. How many drinks containing alcohol do you have on a typical day you are drinking?  0 Filed at: 09/26/2024 1946   3b. FEMALE Any Age, or MALE 65+: How often do you have 4 or more drinks on one occassion? 0 Filed at: 09/26/2024 1946   Audit-C Score 0 Filed at: 09/26/2024 1946   JOHN: How many times in the past year have you...    Used an illegal drug or used a prescription medication for non-medical reasons? Never Filed at: 09/26/2024 1946                            History of Present Illness       Chief Complaint   Patient presents with    Dizziness     Pt reports dizziness at night for the past 3 weeks. Pt c/o BL ankle swelling has increased        Past Medical History:   Diagnosis Date    Depression     EP (epilepsy) (HCC)     Epilepsy (HCC)     Obesity     Restless leg       Past Surgical History:   Procedure Laterality Date    DENTAL SURGERY      all teeth removed    LASER ABLATION OF THE CERVIX      MAMMO (HISTORICAL)  05/01/2017      Family History   Problem Relation Age of Onset    Kidney  disease Mother     Liver disease Mother     Heart attack Mother     Heart attack Father     No Known Problems Brother     No Known Problems Son       Social History     Tobacco Use    Smoking status: Every Day     Current packs/day: 0.50     Average packs/day: 0.5 packs/day for 23.0 years (11.5 ttl pk-yrs)     Types: Cigarettes    Smokeless tobacco: Never   Vaping Use    Vaping status: Never Used   Substance Use Topics    Alcohol use: Never     Comment: pt denies alcohol use    Drug use: Never      E-Cigarette/Vaping    E-Cigarette Use Never User       E-Cigarette/Vaping Substances    Nicotine Yes     THC No     CBD No     Flavoring No     Other No     Unknown No       I have reviewed and agree with the history as documented.     Patient is a 63-year-old female seen in the emergency department with concern for dizziness/vertigo worse in the evening over approximately the past 3 days.  Patient notes symptoms worse with movement of her head.  Patient notes 3 episodes of vomiting last evening.  Patient notes no new or unusual foods.  Patient notes no definite clear known sick contacts with similar symptoms.  Patient also notes of bilateral lower extremity edema.  Patient notes no fever, chest pain, shortness of breath, abdominal pain, diarrhea, weakness, numbness, tingling.  Patient notes no other definite clear exacerbating or alleviating factors for her symptoms.  Patient states that she has been taking her prescribed medications.        Review of Systems   Constitutional:  Negative for chills and fever.   HENT:  Negative for ear pain and sore throat.    Eyes:  Negative for pain and visual disturbance.   Respiratory:  Negative for cough and shortness of breath.    Cardiovascular:  Negative for chest pain and palpitations.   Gastrointestinal:  Positive for nausea and vomiting. Negative for abdominal pain.   Genitourinary:  Negative for decreased urine volume and difficulty urinating.   Musculoskeletal:  Negative for  arthralgias and neck stiffness.   Skin:  Negative for color change and rash.   Neurological:  Positive for dizziness. Negative for seizures and syncope.   Psychiatric/Behavioral:  Negative for agitation and confusion.    All other systems reviewed and are negative.          Objective       ED Triage Vitals [09/26/24 1943]   Temperature Pulse Blood Pressure Respirations SpO2 Patient Position - Orthostatic VS   98.6 °F (37 °C) 92 164/84 16 97 % Lying      Temp Source Heart Rate Source BP Location FiO2 (%) Pain Score    Oral Monitor Right arm -- --      Vitals      Date and Time Temp Pulse SpO2 Resp BP Pain Score FACES Pain Rating User   09/26/24 1943 98.6 °F (37 °C) 92 97 % 16 164/84 -- -- RN            Physical Exam  Vitals and nursing note reviewed.   Constitutional:       General: She is not in acute distress.     Appearance: She is well-developed.   HENT:      Head: Normocephalic and atraumatic.      Right Ear: External ear normal.      Left Ear: External ear normal.      Nose: Nose normal.      Mouth/Throat:      Pharynx: Oropharynx is clear.   Eyes:      General: No scleral icterus.     Conjunctiva/sclera: Conjunctivae normal.   Cardiovascular:      Rate and Rhythm: Normal rate and regular rhythm.      Heart sounds: No murmur heard.  Pulmonary:      Effort: Pulmonary effort is normal. No respiratory distress.      Comments: Scattered expiratory wheeze  Abdominal:      General: There is no distension.      Palpations: Abdomen is soft.      Tenderness: There is no abdominal tenderness.   Musculoskeletal:         General: Swelling present. No deformity.      Cervical back: Normal range of motion and neck supple.      Comments: Pitting edema to lower extremities bilaterally   Skin:     General: Skin is warm and dry.   Neurological:      General: No focal deficit present.      Mental Status: She is alert.      Cranial Nerves: No cranial nerve deficit.      Sensory: No sensory deficit.   Psychiatric:         Mood and  Affect: Mood normal.         Thought Content: Thought content normal.         Results Reviewed       Procedure Component Value Units Date/Time    COVID[facility], [facility]/B, [facility] [[id number]]  (Normal) Collected: 09/26/24 2000    Lab Status: Final result Specimen: Nares from Nose Updated: 09/26/24 2053     SARS-CoV-2 Negative     INFLUENZA A PCR Negative     INFLUENZA B PCR Negative     RSV PCR Negative    Narrative:      This test has been performed using the CoV-2/Flu/RSV plus assay on the [employer] platform. This test has been validated by the  and verified by the performing laboratory.     This test is designed to amplify and detect the following: nucleocapsid (N), envelope (E), and RNA-dependent RNA polymerase (RdRP) genes of the SARS-CoV-2 genome; matrix (M), basic polymerase (PB2), and acidic protein (PA) segments of the influenza A genome; matrix (M) and non-structural protein (NS) segments of the influenza B genome, and the nucleocapsid genes of RSV A and RSV B.     Positive results are indicative of the presence of Flu A, Flu B, RSV, and/or SARS-CoV-2 RNA. Positive results for SARS-CoV-2 or suspected novel influenza should be reported to state, local, or federal health departments according to local reporting requirements.      All results should be assessed in conjunction with clinical presentation and other laboratory markers for clinical management.     FOR PEDIATRIC PATIENTS - copy/paste COVID Guidelines URL to browser: [email or web address]       B-Type Natriuretic Peptide(BNP) [[id number]]  (Normal) Collected: 09/26/24 2000    Lab Status: Final result Specimen: Blood from Arm, Right Updated: 09/26/24 2033      pg/mL     HS Troponin 0hr (reflex protocol) [[id number]]  (Normal) Collected: 09/26/24 2000    Lab Status: Final result Specimen: Blood from Arm, Right Updated: 09/26/24 2028     hs TnI 0hr 3 ng/L      Comprehensive metabolic panel [[id number]]  (Abnormal) Collected: 09/26/24 2000    Lab Status: Final result Specimen: Blood from Arm, Right Updated: 09/26/24 2021     Sodium 140 mmol/L      Potassium 4.3 mmol/L      Chloride 107 mmol/L      CO2 25 mmol/L      ANION GAP 8 mmol/L      BUN 26 mg/dL      Creatinine 1.17 mg/dL      Glucose 97 mg/dL      Calcium 8.9 mg/dL      AST 11 U/L      ALT 9 U/L      Alkaline Phosphatase 101 U/L      Total Protein 7.6 g/dL      Albumin 3.7 g/dL      Total Bilirubin 0.21 mg/dL      eGFR 49 ml/min/1.73sq m     Narrative:      National Kidney Disease Foundation guidelines for Chronic Kidney Disease (CKD):     Stage 1 with normal or high GFR (GFR > 90 mL/min/1.73 square meters)    Stage 2 Mild CKD (GFR = 60-89 mL/min/1.73 square meters)    Stage 3A Moderate CKD (GFR = 45-59 mL/min/1.73 square meters)    Stage 3B Moderate CKD (GFR = 30-44 mL/min/1.73 square meters)    Stage 4 Severe CKD (GFR = 15-29 mL/min/1.73 square meters)    Stage 5 End Stage CKD (GFR <15 mL/min/1.73 square meters)  Note: GFR calculation is accurate only with a steady state creatinine    Magnesium [[id number]]  (Normal) Collected: 09/26/24 2000    Lab Status: Final result Specimen: Blood from Arm, Right Updated: 09/26/24 2021     Magnesium 2.0 mg/dL     APTT [[id number]]  (Normal) Collected: 09/26/24 2000    Lab Status: Final result Specimen: Blood from Arm, Right Updated: 09/26/24 2015     PTT 28 seconds     Protime-INR [[id number]]  (Normal) Collected: 09/26/24 2000    Lab Status: Final result Specimen: Blood from Arm, Right Updated: 09/26/24 2015     Protime 13.9 seconds      INR 1.03    Narrative:      INR Therapeutic Range    Indication                                             INR Range      Atrial Fibrillation                                               2.0-3.0  Hypercoagulable State                                    2.0.2.3  Left Ventricular Asist Device                            2.0-3.0  Mechanical Heart  Valve                                  -    Aortic(with afib, MI, embolism, HF, LA enlargement,    and/or coagulopathy)                                     2.0-3.0 (2.5-3.5)     Mitral                                                             2.5-3.5  Prosthetic/Bioprosthetic Heart Valve               2.0-3.0  Venous thromboembolism (VTE: VT, PE        2.0-3.0    Carbamazepine level, total [[id number]] Collected: 09/26/24 2004    Lab Status: In process Specimen: Blood from Arm, Right Updated: 09/26/24 2006    Levetiracetam level [[id number]] Collected: 09/26/24 2004    Lab Status: In process Specimen: Blood from Arm, Right Updated: 09/26/24 2006    CBC and differential [[id number]]  (Abnormal) Collected: 09/26/24 2000    Lab Status: Final result Specimen: Blood from Arm, Right Updated: 09/26/24 2006     WBC 13.25 Thousand/uL      RBC 3.78 Million/uL      Hemoglobin 11.3 g/dL      Hematocrit 35.7 %      MCV 94 fL      MCH 29.9 pg      MCHC 31.7 g/dL      RDW 14.5 %      MPV 10.1 fL      Platelets 338 Thousands/uL      nRBC 0 /100 WBCs      Segmented % 80 %      Immature Grans % 0 %      Lymphocytes % 13 %      Monocytes % 5 %      Eosinophils Relative 2 %      Basophils Relative 0 %      Absolute Neutrophils 10.52 Thousands/µL      Absolute Immature Grans 0.04 Thousand/uL      Absolute Lymphocytes 1.72 Thousands/µL      Absolute Monocytes 0.70 Thousand/µL      Eosinophils Absolute 0.23 Thousand/µL      Basophils Absolute 0.04 Thousands/µL             CT head wo contrast   Final Interpretation by Lara Jeffries MD (09/26 2134)      No acute intracranial abnormality.                  Workstation performed: [id number]         XR chest 1 view portable   ED Interpretation by Eran Whiting MD (09/26 2023)   No infiltrate or pneumothorax          ECG 12 Lead Documentation Only    Date/Time: 9/26/2024 8:02 PM    Performed by: Eran Whiting MD  Authorized by: Eran Whiting MD    Indications / Diagnosis:   Dizziness  ECG reviewed by me, the ED Provider: yes    Patient location:  ED  Rate:     ECG rate:  87    ECG rate assessment: normal    Rhythm:     Rhythm: sinus rhythm    QRS:     QRS axis:  Normal  ST segments:     ST segments:  Normal  T waves:     T waves: normal    Comments:      Normal sinus rhythm at 87, normal axis, , QRS 70, QTc 438, no ST-T wave abnormality, no evidence of acute ischemia      ED Medication and Procedure Management   Prior to Admission Medications   Prescriptions Last Dose Informant Patient Reported? Taking?   albuterol (2.5 mg/3 mL) 0.083 % nebulizer solution  Self No No   Sig: Take 3 mL (2.5 mg total) by nebulization every 6 (six) hours as needed for wheezing or shortness of breath   albuterol (Proventil HFA) 90 mcg/act inhaler  Self No No   Sig: Inhale 2 puffs every 6 (six) hours as needed for wheezing   carBAMazepine (TEGretol) 200 mg tablet   No No   Sig: Take 1 tablet (200 mg total) by mouth 3 (three) times a day   cholecalciferol 1,000 units tablet  Self No No   Sig: Take 2 tablets (2,000 Units total) by mouth daily   escitalopram (LEXAPRO) 10 mg tablet   No No   Sig: Take 1 tablet (10 mg total) by mouth daily   levETIRAcetam (KEPPRA) 750 mg tablet   No No   Sig: Take 2 tablets (1,500 mg total) by mouth 2 (two) times a day   losartan (COZAAR) 25 mg tablet   No No   Sig: Take 1 tablet (25 mg total) by mouth daily   metoprolol succinate (TOPROL-XL) 50 mg 24 hr tablet   No No   Sig: Take 1 tablet (50 mg total) by mouth daily   nicotine (NICODERM CQ) 21 mg/24 hr TD 24 hr patch  Self No No   Sig: Place 1 patch on the skin over 24 hours daily   pramipexole (MIRAPEX) 1.5 MG tablet   No No   Sig: Take 1 tablet (1.5 mg total) by mouth daily at bedtime   spironolactone (ALDACTONE) 25 mg tablet  Self No No   Sig: Take 1 tablet (25 mg total) by mouth daily      Facility-Administered Medications Last Administration Doses Remaining   cyanocobalamin injection 1,000 mcg None recorded          Patient's Medications   Discharge Prescriptions    No medications on file       ED SEPSIS DOCUMENTATION   Time reflects when diagnosis was documented in both MDM as applicable and the Disposition within this note       Time User Action Codes Description Comment    9/26/2024  7:56 PM Eran Whiting [R42] Dizziness     9/26/2024  8:00 PM Eran Whiting [Z87.898] History of vomiting     9/26/2024  9:58 PM Eran Whiting [R60.0] Peripheral edema                  Eran Whiting MD  09/26/24 3609

## 2024-09-29 ENCOUNTER — HOSPITAL ENCOUNTER (EMERGENCY)
Facility: HOSPITAL | Age: 64
Discharge: HOME | End: 2024-09-29
Attending: EMERGENCY MEDICINE | Admitting: EMERGENCY MEDICINE
Payer: MEDICARE

## 2024-09-29 VITALS
RESPIRATION RATE: 18 BRPM | OXYGEN SATURATION: 99 % | HEART RATE: 93 BPM | TEMPERATURE: 98.5 F | DIASTOLIC BLOOD PRESSURE: 105 MMHG | BODY MASS INDEX: 42.42 KG/M2 | WEIGHT: 239.42 LBS | HEIGHT: 63 IN | SYSTOLIC BLOOD PRESSURE: 164 MMHG

## 2024-09-29 DIAGNOSIS — H91.90 DECREASED HEARING: ICD-10-CM

## 2024-09-29 DIAGNOSIS — H61.23 CERUMEN DEBRIS ON TYMPANIC MEMBRANE OF BOTH EARS: Primary | ICD-10-CM

## 2024-09-29 PROCEDURE — 99284 EMERGENCY DEPT VISIT MOD MDM: CPT

## 2024-09-29 PROCEDURE — 99283 EMERGENCY DEPT VISIT LOW MDM: CPT | Performed by: EMERGENCY MEDICINE

## 2024-09-29 RX ORDER — PSEUDOEPHEDRINE HCL 30 MG
30 TABLET ORAL ONCE
Status: COMPLETED | OUTPATIENT
Start: 2024-09-29 | End: 2024-09-29

## 2024-09-29 RX ADMIN — PSEUDOEPHEDRINE HCL 30 MG: 30 TABLET, FILM COATED ORAL at 20:26

## 2024-09-29 RX ADMIN — CARBAMIDE PEROXIDE 5 DROP: 65 SOLUTION/ DROPS TOPICAL at 20:26

## 2024-09-30 NOTE — ED PROVIDER NOTES
"Final diagnoses:   Cerumen debris on tympanic membrane of both ears   Decreased hearing     ED Disposition       ED Disposition   Discharge    Condition   Stable    Date/Time   Sun Sep 29, 2024  8:21 PM    Comment   Cydney Lim discharge to home/self care.                   Assessment & Plan       Medical Decision Making  Patient is a 63-year-old female seen in the emergency department with concern for decreased hearing.  Patient was treated with medication for symptom control.  Evaluation is not consistent with otitis media or otitis externa.  Evaluation appears to be consistent with cerumen present in ear canal. Plan to treat patient with course of Debrox, and have patient follow up with PCP/outpatient providers. Patient stable for discharge home.  Discharge instructions were reviewed with patient.    Problems Addressed:  Cerumen debris on tympanic membrane of both ears: acute illness or injury  Decreased hearing: acute illness or injury    Risk  OTC drugs.             Medications   pseudoephedrine (SUDAFED) tablet 30 mg (30 mg Oral Given 9/29/24 2026)   carbamide peroxide (DEBROX) 6.5 % otic solution 5 drop (5 drops Both Ears Given 9/29/24 2026)       ED Risk Strat Scores                                               History of Present Illness       Chief Complaint   Patient presents with    Ear Problem     \"My right and left ears are clogged up and I can't hear anything your saying\" x3 hours, able to answer all verbal questions without difficulty       Past Medical History:   Diagnosis Date    Depression     EP (epilepsy) (HCC)     Epilepsy (HCC)     Obesity     Restless leg       Past Surgical History:   Procedure Laterality Date    DENTAL SURGERY      all teeth removed    LASER ABLATION OF THE CERVIX      MAMMO (HISTORICAL)  05/01/2017      Family History   Problem Relation Age of Onset    Kidney disease Mother     Liver disease Mother     Heart attack Mother     Heart attack Father     No Known Problems " Brother     No Known Problems Son       Social History     Tobacco Use    Smoking status: Every Day     Current packs/day: 0.50     Average packs/day: 0.5 packs/day for 23.0 years (11.5 ttl pk-yrs)     Types: Cigarettes    Smokeless tobacco: Never   Vaping Use    Vaping status: Never Used   Substance Use Topics    Alcohol use: Never     Comment: pt denies alcohol use    Drug use: Never      E-Cigarette/Vaping    E-Cigarette Use Never User       E-Cigarette/Vaping Substances    Nicotine Yes     THC No     CBD No     Flavoring No     Other No     Unknown No       I have reviewed and agree with the history as documented.     Patient is a 63-year-old female seen in the emergency department with concern for decreased hearing from bilateral ears. Patient notes that symptoms began approximately 3 hours ago.  Patient notes no fever, chest pain, shortness of breath, abdominal pain, nausea, vomiting, weakness, numbness, tingling.  Patient notes no definite clear exacerbating or alleviating factors for her symptoms.  Patient notes no current pain.  Patient notes no recent swimming or water exposure to her ears.        Review of Systems   Constitutional:  Negative for chills and fever.   HENT:  Positive for hearing loss. Negative for ear pain and sore throat.    Eyes:  Negative for pain and visual disturbance.   Respiratory:  Negative for cough and shortness of breath.    Cardiovascular:  Negative for chest pain and palpitations.   Gastrointestinal:  Negative for abdominal pain and vomiting.   Musculoskeletal:  Negative for gait problem and neck stiffness.   Skin:  Negative for color change and rash.   Neurological:  Negative for weakness and numbness.   Psychiatric/Behavioral:  Negative for agitation and confusion.            Objective       ED Triage Vitals [09/29/24 1943]   Temperature Pulse Blood Pressure Respirations SpO2 Patient Position - Orthostatic VS   98.5 °F (36.9 °C) 93 (!) 164/105 18 99 % Sitting      Temp Source  Heart Rate Source BP Location FiO2 (%) Pain Score    Oral Monitor Left arm -- --      Vitals      Date and Time Temp Pulse SpO2 Resp BP Pain Score FACES Pain Rating User   09/29/24 1943 98.5 °F (36.9 °C) 93 99 % 18 164/105 -- -- EJN            Physical Exam  Vitals and nursing note reviewed.   Constitutional:       General: She is not in acute distress.     Appearance: She is well-developed.   HENT:      Head: Normocephalic and atraumatic.      Right Ear: External ear normal.      Left Ear: External ear normal.      Ears:      Comments: Cerumen present overlying TMs bilaterally     Nose: Nose normal.      Mouth/Throat:      Pharynx: Oropharynx is clear.   Eyes:      General: No scleral icterus.     Conjunctiva/sclera: Conjunctivae normal.   Cardiovascular:      Rate and Rhythm: Normal rate.      Comments: well-perfused extremities  Pulmonary:      Effort: Pulmonary effort is normal. No respiratory distress.   Abdominal:      General: Abdomen is flat. There is no distension.   Musculoskeletal:         General: No deformity or signs of injury.      Cervical back: Normal range of motion and neck supple.   Skin:     General: Skin is warm and dry.   Neurological:      General: No focal deficit present.      Mental Status: She is alert.      Cranial Nerves: No cranial nerve deficit.      Sensory: No sensory deficit.   Psychiatric:         Mood and Affect: Mood normal.         Thought Content: Thought content normal.         Results Reviewed       None            No orders to display       Procedures    ED Medication and Procedure Management   Prior to Admission Medications   Prescriptions Last Dose Informant Patient Reported? Taking?   albuterol (2.5 mg/3 mL) 0.083 % nebulizer solution  Self No No   Sig: Take 3 mL (2.5 mg total) by nebulization every 6 (six) hours as needed for wheezing or shortness of breath   albuterol (Proventil HFA) 90 mcg/act inhaler  Self No No   Sig: Inhale 2 puffs every 6 (six) hours as needed for  wheezing   carBAMazepine (TEGretol) 200 mg tablet   No No   Sig: Take 1 tablet (200 mg total) by mouth 3 (three) times a day   cholecalciferol 1,000 units tablet  Self No No   Sig: Take 2 tablets (2,000 Units total) by mouth daily   escitalopram (LEXAPRO) 10 mg tablet   No No   Sig: Take 1 tablet (10 mg total) by mouth daily   levETIRAcetam (KEPPRA) 750 mg tablet   No No   Sig: Take 2 tablets (1,500 mg total) by mouth 2 (two) times a day   losartan (COZAAR) 25 mg tablet   No No   Sig: Take 1 tablet (25 mg total) by mouth daily   metoprolol succinate (TOPROL-XL) 50 mg 24 hr tablet   No No   Sig: Take 1 tablet (50 mg total) by mouth daily   nicotine (NICODERM CQ) 21 mg/24 hr TD 24 hr patch  Self No No   Sig: Place 1 patch on the skin over 24 hours daily   pramipexole (MIRAPEX) 1.5 MG tablet   No No   Sig: Take 1 tablet (1.5 mg total) by mouth daily at bedtime   spironolactone (ALDACTONE) 25 mg tablet  Self No No   Sig: Take 1 tablet (25 mg total) by mouth daily      Facility-Administered Medications Last Administration Doses Remaining   cyanocobalamin injection 1,000 mcg None recorded         Patient's Medications   Discharge Prescriptions    CARBAMIDE PEROXIDE (DEBROX) 6.5 % OTIC SOLUTION    Administer 5 drops into both ears 2 (two) times a day for 4 days Do not start before September 30, 2024.       Start Date: 9/30/2024 End Date: 10/4/2024       Order Dose: 5 drops       Quantity: 15 mL    Refills: 0       ED SEPSIS DOCUMENTATION   Time reflects when diagnosis was documented in both MDM as applicable and the Disposition within this note       Time User Action Codes Description Comment    9/29/2024  8:21 PM Eran Whiting [H61.23] Cerumen debris on tympanic membrane of both ears     9/29/2024  8:21 PM Eran Whiting [H91.90] Decreased hearing                  Eran Whiting MD  09/29/24 2033
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
## 2025-01-11 ENCOUNTER — APPOINTMENT (EMERGENCY)
Dept: RADIOLOGY | Facility: HOSPITAL | Age: 65
End: 2025-01-11
Payer: MEDICARE

## 2025-01-11 ENCOUNTER — APPOINTMENT (EMERGENCY)
Dept: CT IMAGING | Facility: HOSPITAL | Age: 65
End: 2025-01-11
Payer: MEDICARE

## 2025-01-11 ENCOUNTER — HOSPITAL ENCOUNTER (EMERGENCY)
Facility: HOSPITAL | Age: 65
Discharge: HOME | End: 2025-01-11
Attending: INTERNAL MEDICINE
Payer: MEDICARE

## 2025-01-11 VITALS
BODY MASS INDEX: 42.41 KG/M2 | HEART RATE: 78 BPM | TEMPERATURE: 98.1 F | OXYGEN SATURATION: 94 % | RESPIRATION RATE: 18 BRPM | HEIGHT: 63 IN | DIASTOLIC BLOOD PRESSURE: 77 MMHG | SYSTOLIC BLOOD PRESSURE: 160 MMHG

## 2025-01-11 DIAGNOSIS — R55 SYNCOPE: Primary | ICD-10-CM

## 2025-01-11 DIAGNOSIS — R56.9 SEIZURE (HCC): ICD-10-CM

## 2025-01-11 LAB
ALBUMIN SERPL BCG-MCNC: 3.4 G/DL (ref 3.5–5)
ALP SERPL-CCNC: 96 U/L (ref 34–104)
ALT SERPL W P-5'-P-CCNC: 11 U/L (ref 7–52)
ANION GAP SERPL CALCULATED.3IONS-SCNC: 8 MMOL/L (ref 4–13)
AST SERPL W P-5'-P-CCNC: 18 U/L (ref 13–39)
BASOPHILS # BLD AUTO: 0.06 THOUSANDS/ΜL (ref 0–0.1)
BASOPHILS NFR BLD AUTO: 1 % (ref 0–1)
BILIRUB SERPL-MCNC: 0.27 MG/DL (ref 0.2–1)
BUN SERPL-MCNC: 17 MG/DL (ref 5–25)
CALCIUM ALBUM COR SERPL-MCNC: 9 MG/DL (ref 8.3–10.1)
CALCIUM SERPL-MCNC: 8.5 MG/DL (ref 8.4–10.2)
CARDIAC TROPONIN I PNL SERPL HS: 4 NG/L (ref 8–18)
CHLORIDE SERPL-SCNC: 105 MMOL/L (ref 96–108)
CO2 SERPL-SCNC: 27 MMOL/L (ref 21–32)
CREAT SERPL-MCNC: 1.1 MG/DL (ref 0.6–1.3)
EOSINOPHIL # BLD AUTO: 0.19 THOUSAND/ΜL (ref 0–0.61)
EOSINOPHIL NFR BLD AUTO: 2 % (ref 0–6)
ERYTHROCYTE [DISTWIDTH] IN BLOOD BY AUTOMATED COUNT: 14 % (ref 11.6–15.1)
GFR SERPL CREATININE-BSD FRML MDRD: 52 ML/MIN/1.73SQ M
GLUCOSE SERPL-MCNC: 157 MG/DL (ref 65–140)
GLUCOSE SERPL-MCNC: 183 MG/DL (ref 65–140)
HCT VFR BLD AUTO: 37 % (ref 34.8–46.1)
HGB BLD-MCNC: 11.8 G/DL (ref 11.5–15.4)
IMM GRANULOCYTES # BLD AUTO: 0.04 THOUSAND/UL (ref 0–0.2)
IMM GRANULOCYTES NFR BLD AUTO: 0 % (ref 0–2)
LYMPHOCYTES # BLD AUTO: 2.33 THOUSANDS/ΜL (ref 0.6–4.47)
LYMPHOCYTES NFR BLD AUTO: 21 % (ref 14–44)
MAGNESIUM SERPL-MCNC: 1.9 MG/DL (ref 1.9–2.7)
MCH RBC QN AUTO: 30.6 PG (ref 26.8–34.3)
MCHC RBC AUTO-ENTMCNC: 31.9 G/DL (ref 31.4–37.4)
MCV RBC AUTO: 96 FL (ref 82–98)
MONOCYTES # BLD AUTO: 0.63 THOUSAND/ΜL (ref 0.17–1.22)
MONOCYTES NFR BLD AUTO: 6 % (ref 4–12)
NEUTROPHILS # BLD AUTO: 7.76 THOUSANDS/ΜL (ref 1.85–7.62)
NEUTS SEG NFR BLD AUTO: 70 % (ref 43–75)
NRBC BLD AUTO-RTO: 0 /100 WBCS
PLATELET # BLD AUTO: 250 THOUSANDS/UL (ref 149–390)
PMV BLD AUTO: 11.8 FL (ref 8.9–12.7)
POTASSIUM SERPL-SCNC: 4.1 MMOL/L (ref 3.5–5.3)
PROT SERPL-MCNC: 7.1 G/DL (ref 6.4–8.4)
RBC # BLD AUTO: 3.86 MILLION/UL (ref 3.81–5.12)
SODIUM SERPL-SCNC: 140 MMOL/L (ref 135–147)
WBC # BLD AUTO: 11.01 THOUSAND/UL (ref 4.31–10.16)

## 2025-01-11 PROCEDURE — 96374 THER/PROPH/DIAG INJ IV PUSH: CPT

## 2025-01-11 PROCEDURE — 99285 EMERGENCY DEPT VISIT HI MDM: CPT

## 2025-01-11 PROCEDURE — 36415 COLL VENOUS BLD VENIPUNCTURE: CPT | Performed by: INTERNAL MEDICINE

## 2025-01-11 PROCEDURE — 96361 HYDRATE IV INFUSION ADD-ON: CPT

## 2025-01-11 PROCEDURE — 82948 REAGENT STRIP/BLOOD GLUCOSE: CPT

## 2025-01-11 PROCEDURE — 84484 ASSAY OF TROPONIN QUANT: CPT | Performed by: INTERNAL MEDICINE

## 2025-01-11 PROCEDURE — 83735 ASSAY OF MAGNESIUM: CPT | Performed by: INTERNAL MEDICINE

## 2025-01-11 PROCEDURE — 85025 COMPLETE CBC W/AUTO DIFF WBC: CPT | Performed by: INTERNAL MEDICINE

## 2025-01-11 PROCEDURE — 70450 CT HEAD/BRAIN W/O DYE: CPT

## 2025-01-11 PROCEDURE — 83520 IMMUNOASSAY QUANT NOS NONAB: CPT | Performed by: INTERNAL MEDICINE

## 2025-01-11 PROCEDURE — 80053 COMPREHEN METABOLIC PANEL: CPT | Performed by: INTERNAL MEDICINE

## 2025-01-11 PROCEDURE — 73630 X-RAY EXAM OF FOOT: CPT

## 2025-01-11 PROCEDURE — 99285 EMERGENCY DEPT VISIT HI MDM: CPT | Performed by: INTERNAL MEDICINE

## 2025-01-11 PROCEDURE — 94640 AIRWAY INHALATION TREATMENT: CPT

## 2025-01-11 PROCEDURE — 73610 X-RAY EXAM OF ANKLE: CPT

## 2025-01-11 PROCEDURE — 80156 ASSAY CARBAMAZEPINE TOTAL: CPT | Performed by: INTERNAL MEDICINE

## 2025-01-11 PROCEDURE — 93005 ELECTROCARDIOGRAM TRACING: CPT

## 2025-01-11 RX ORDER — IBUPROFEN 600 MG/1
600 TABLET, FILM COATED ORAL ONCE
Status: COMPLETED | OUTPATIENT
Start: 2025-01-11 | End: 2025-01-11

## 2025-01-11 RX ORDER — IPRATROPIUM BROMIDE AND ALBUTEROL SULFATE 2.5; .5 MG/3ML; MG/3ML
3 SOLUTION RESPIRATORY (INHALATION) ONCE
Status: COMPLETED | OUTPATIENT
Start: 2025-01-11 | End: 2025-01-11

## 2025-01-11 RX ORDER — SODIUM CHLORIDE 9 MG/ML
125 INJECTION, SOLUTION INTRAVENOUS CONTINUOUS
Status: DISCONTINUED | OUTPATIENT
Start: 2025-01-11 | End: 2025-01-11 | Stop reason: HOSPADM

## 2025-01-11 RX ORDER — LABETALOL HYDROCHLORIDE 5 MG/ML
10 INJECTION, SOLUTION INTRAVENOUS ONCE
Status: COMPLETED | OUTPATIENT
Start: 2025-01-11 | End: 2025-01-11

## 2025-01-11 RX ADMIN — IBUPROFEN 600 MG: 600 TABLET, FILM COATED ORAL at 17:09

## 2025-01-11 RX ADMIN — LABETALOL HYDROCHLORIDE 10 MG: 5 INJECTION, SOLUTION INTRAVENOUS at 15:40

## 2025-01-11 RX ADMIN — IPRATROPIUM BROMIDE AND ALBUTEROL SULFATE 3 ML: .5; 3 SOLUTION RESPIRATORY (INHALATION) at 14:07

## 2025-01-11 RX ADMIN — SODIUM CHLORIDE 125 ML/HR: 0.9 INJECTION, SOLUTION INTRAVENOUS at 14:23

## 2025-01-11 NOTE — ED NOTES
Dr aragon notified of GRF warning via ESC. Provider advised to proceed with medication.      Yolis Hernandez RN  01/11/25 [id number]

## 2025-01-11 NOTE — DISCHARGE INSTRUCTIONS
Follow-up with epilepsy clinic on Monday.  Continue taking medication as prescribed.  If condition getting worse or have any more syncopal attack or seizure come immediately to the ER.  Labs Reviewed   CBC AND DIFFERENTIAL - Abnormal       Result Value Ref Range Status    WBC 11.01 (*) 4.31 - 10.16 Thousand/uL Final    RBC 3.86  3.81 - 5.12 Million/uL Final    Hemoglobin 11.8  11.5 - 15.4 g/dL Final    Hematocrit 37.0  34.8 - 46.1 % Final    MCV 96  82 - 98 fL Final    MCH 30.6  26.8 - 34.3 pg Final    MCHC 31.9  31.4 - 37.4 g/dL Final    RDW 14.0  11.6 - 15.1 % Final    MPV 11.8  8.9 - 12.7 fL Final    Platelets 250  149 - 390 Thousands/uL Final    nRBC 0  /100 WBCs Final    Segmented % 70  43 - 75 % Final    Immature Grans % 0  0 - 2 % Final    Lymphocytes % 21  14 - 44 % Final    Monocytes % 6  4 - 12 % Final    Eosinophils Relative 2  0 - 6 % Final    Basophils Relative 1  0 - 1 % Final    Absolute Neutrophils 7.76 (*) 1.85 - 7.62 Thousands/µL Final    Absolute Immature Grans 0.04  0.00 - 0.20 Thousand/uL Final    Absolute Lymphocytes 2.33  0.60 - 4.47 Thousands/µL Final    Absolute Monocytes 0.63  0.17 - 1.22 Thousand/µL Final    Eosinophils Absolute 0.19  0.00 - 0.61 Thousand/µL Final    Basophils Absolute 0.06  0.00 - 0.10 Thousands/µL Final   COMPREHENSIVE METABOLIC PANEL - Abnormal    Sodium 140  135 - 147 mmol/L Final    Potassium 4.1  3.5 - 5.3 mmol/L Final    Comment: Slightly Hemolyzed:Results may be affected.    Chloride 105  96 - 108 mmol/L Final    CO2 27  21 - 32 mmol/L Final    ANION GAP 8  4 - 13 mmol/L Final    BUN 17  5 - 25 mg/dL Final    Creatinine 1.10  0.60 - 1.30 mg/dL Final    Comment: Standardized to IDMS reference method    Glucose 157 (*) 65 - 140 mg/dL Final    Comment: If the patient is fasting, the ADA then defines impaired fasting glucose as > 100 mg/dL and diabetes as > or equal to 123 mg/dL.    Calcium 8.5  8.4 - 10.2 mg/dL Final    Corrected Calcium 9.0  8.3 - 10.1 mg/dL Final     AST 18  13 - 39 U/L Final    Comment: Slightly Hemolyzed:Results may be affected.    ALT 11  7 - 52 U/L Final    Comment: Specimen collection should occur prior to Sulfasalazine administration due to the potential for falsely depressed results.     Alkaline Phosphatase 96  34 - 104 U/L Final    Total Protein 7.1  6.4 - 8.4 g/dL Final    Albumin 3.4 (*) 3.5 - 5.0 g/dL Final    Total Bilirubin 0.27  0.20 - 1.00 mg/dL Final    Comment: Use of this assay is not recommended for patients undergoing treatment with eltrombopag due to the potential for falsely elevated results.  N-acetyl-p-benzoquinone imine (metabolite of Acetaminophen) will generate erroneously low results in samples for patients that have taken an overdose of Acetaminophen.    eGFR 52  ml/min/1.73sq m Final    Narrative:     National Kidney Disease Foundation guidelines for Chronic Kidney Disease (CKD):     Stage 1 with normal or high GFR (GFR > 90 mL/min/1.73 square meters)    Stage 2 Mild CKD (GFR = 60-89 mL/min/1.73 square meters)    Stage 3A Moderate CKD (GFR = 45-59 mL/min/1.73 square meters)    Stage 3B Moderate CKD (GFR = 30-44 mL/min/1.73 square meters)    Stage 4 Severe CKD (GFR = 15-29 mL/min/1.73 square meters)    Stage 5 End Stage CKD (GFR <15 mL/min/1.73 square meters)  Note: GFR calculation is accurate only with a steady state creatinine   HIGH SENSITIVITY TROPONIN I RANDOM - Abnormal    HS TnI random 4 (*) 8 - 18 ng/L Final    Comment:                                              Initial (time 0) result  If >=50 ng/L, Myocardial injury suggested ;  Type of myocardial injury and treatment strategy  to be determined.  If 5-49 ng/L, a delta result at 2 hours and or 4 hours will be needed to further evaluate.  If <4 ng/L, and chest pain has been >3 hours since onset, patient may qualify for discharge based on the HEART score in the ED.  If <5 ng/L and <3hours since onset of chest pain, a delta result at 2 hours will be needed to further  evaluate.    HS Troponin 99th Percentile URL of a Health Population=12 ng/L with a 95% Confidence Interval of 8-18 ng/L.    Second Troponin (time 2 hours)  If calculated delta >= 20 ng/L,  Myocardial injury suggested ; Type of myocardial injury and treatment strategy to be determined.  If 5-49 ng/L and the calculated delta is 5-19 ng/L, consult medical service for evaluation.  Continue evaluation for ischemia on ecg and other possible etiology and repeat hs troponin at 4 hours.  If delta is <5 ng/L at 2 hours, consider discharge based on risk stratification via the HEART score (if in ED), or MER risk score in IP/Observation.    HS Troponin 99th Percentile URL of a Health Population=12 ng/L with a 95% Confidence Interval of 8-18 ng/L.   POCT GLUCOSE - Abnormal    POC Glucose 183 (*) 65 - 140 mg/dl Final   MAGNESIUM - Normal    Magnesium 1.9  1.9 - 2.7 mg/dL Final    Comment: Slightly Hemolyzed:Results may be affected.   CARBAMAZEPINE LEVEL, TOTAL   LEVETIRACETAM LEVEL     CT head wo contrast   Final Result      No acute intracranial abnormality.                  Workstation performed: [id number]

## 2025-01-11 NOTE — ED PROVIDER NOTES
Time reflects when diagnosis was documented in both MDM as applicable and the Disposition within this note       Time User Action Codes Description Comment    1/11/2025  4:32 PM Nam Casas Add [R55] Syncope     1/11/2025  4:32 PM Nam Casas Add [R56.9] Seizure (HCC)           ED Disposition       ED Disposition   Discharge    Condition   Stable    Date/Time   Sat Jan 11, 2025  4:32 PM    Comment   Cydney Lim discharge to home/self care.                   Assessment & Plan       Medical Decision Making  This is a 64-year-old came for having syncopal attack.  Patient was crossing the street and she passed out in the middle of the street.  On patient within consciousness she found him sitting on the curb of the street.  Some bystander called 911 and patient was transferred to the hospital.  Patient has long history of seizure and the last seizure was yesterday night.  Patient stated that she can set well and in one minute she has seizure. .  Patient is on Keppra 1500MG BID, and carbamazepine 200 mg bid.  Patient stated that she is compliant with her medication. Pt had seizure last night .   Patient in no distress at the time of the exam  EKG; NSR rate 91/min, no ischemic changes.   Reviewing labs CBC  WNL, CMP significant for trop 4,, magnesium 1.9, glucose 157.  CT head, no acute intracranial abnormality.   Pt at ER is A/OX3, neurologically intact and has no body injuries   Pt has wheezing and received duoneb which helped her .  Case discussed with neurologist dr Eleazar Flanagan ; and he stated;  I went through her notes from our epilepsy clinic. It's s bit complicated case where it's not known what is seizure and what's not  I suggest not changing meds, but ask her to reach to the clinic on Monday to let them know   Will discharge pt in stable condition, to follow up with epilepsy clinic .    Differential diagnoses include but not limited to; seizure disorder, cardiac arrhythmias, dehydration,  orthostatic hypotension,        Amount and/or Complexity of Data Reviewed  Labs: ordered.     Details: Reviewing labs CBC  WNL, CMP significant for trop 4,, magnesium 1.9, glucose 157.    Radiology: ordered and independent interpretation performed.     Details: CT head, no acute intracranial abnormality.     ECG/medicine tests: ordered and independent interpretation performed.     Details: EKG; NSR rate 91/min, no ischemic changes.     Discussion of management or test interpretation with external provider(s): Case discussed with neurologist dr Eleazar Flanagan ; and he stated;  I went through her notes from our epilepsy clinic. It's s bit complicated case where it's not known what is seizure and what's not  I suggest not changing meds, but ask her to reach to the clinic on Monday to let them know     Risk  Prescription drug management.        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 How often do you have a drink containing alcohol? 0 Filed at: 01/11/2025 1338   2. How many drinks containing alcohol do you have on a typical day you are drinking?  0 Filed at: 01/11/2025 1339   3b. FEMALE Any Age, or MALE 65+: How often do you have 4 or more drinks on one occassion? 0 Filed at: 01/11/2025 1339   Audit-C Score 0 Filed at: 01/11/2025 1339   JOHN: How many times in the past year have you...    Used an illegal drug or used a prescription medication for non-medical reasons? Never Filed at: 01/11/2025 1916                             History of Present Illness       Chief Complaint   Patient presents with    Altered Mental Status     Pt reported by bystander for sitting outside [facility] on the curb not knowing how she got there. LKN 1215. Denies medications PTA.        Past Medical History:   Diagnosis Date    Depression     EP (epilepsy) (HCC)     Epilepsy (HCC)     Obesity     Restless leg       Past Surgical History:   Procedure Laterality Date    DENTAL SURGERY      all teeth removed    LASER ABLATION OF THE CERVIX      MAMMO (HISTORICAL)  05/01/2017      Family History   Problem Relation Age of Onset    Kidney disease Mother     Liver disease Mother     Heart attack Mother     Heart attack Father     No Known Problems Brother     No Known Problems Son       Social History     Tobacco Use    Smoking status: Every Day     Current packs/day: 0.50     Average packs/day: 0.5 packs/day for 23.0 years (11.5 ttl pk-yrs)     Types: Cigarettes    Smokeless tobacco: Never   Vaping Use    Vaping status: Never Used   Substance Use Topics    Alcohol use: Never     Comment: pt denies alcohol use    Drug use: Never      E-Cigarette/Vaping    E-Cigarette Use Never User       E-Cigarette/Vaping Substances    Nicotine Yes     THC No     CBD No     Flavoring No     Other No     Unknown No       I have reviewed and agree with the history as documented.     This is a 64 years old came by ambulance as patient was sitting on the curb.  Patient is stated that she was coming to [place] so she left her apartment in senior house building and as she was crossing the street she passed out, and eventually she found himself on the curb.  Patient does not have she was there.  patient has history of seizure disorder since 1963.  And at that time patient has measles, mumps, pneumonia.  Patient has seizures since that time and off.  Patient she is on Keppra and Tegretol for seizure and she is compliant with her medication.  Patient has no fever,  headache, dizziness.  Patient denies biting her lips or tongue and denies urinary incontinence.  Patient has 1 seizure yesterday.  Patient stated that she has a seizure she cannot sit down and also that she has a seizure.      History provided by:  Patient   used: No        Review of Systems   Constitutional:  Negative for chills and fever.   HENT:  Negative for ear pain and sore throat.    Eyes:  Negative for pain and visual disturbance.   Respiratory:  Negative for cough and shortness of breath.    Cardiovascular:  Negative for chest pain and palpitations.   Gastrointestinal:  Negative for abdominal pain and vomiting.   Genitourinary:  Negative for dysuria and hematuria.   Musculoskeletal:  Negative for arthralgias and back pain.   Skin:  Negative for color change and rash.   Neurological:  Positive for syncope. Negative for dizziness, seizures, facial asymmetry and headaches.   Hematological:  Negative for adenopathy. Does not bruise/bleed easily.   All other systems reviewed and are negative.          Objective       ED Triage Vitals [01/11/25 1337]   Temperature Pulse Blood Pressure Respirations SpO2 Patient Position - Orthostatic VS   98.1 °F (36.7 °C) 86 (!) 186/85 18 98 % Lying      Temp Source Heart Rate Source BP Location FiO2 (%) Pain Score    Oral Monitor Right arm -- 3      Vitals      Date and Time Temp Pulse SpO2 Resp BP Pain Score FACES Pain Rating User   01/11/25 1709 -- -- -- -- -- 4 --    01/11/25 1645 -- 78 94 % 18 160/77 5 -- FN   01/11/25 1615 -- 71 94 % 18 160/79 -- --    01/11/25 1557 -- 71 93 % 20 166/80 -- --    01/11/25 1549 -- 77 94 % 20 150/70 -- -- FN   01/11/25 1530 -- 75 95 % 20 219/100 No Pain --    01/11/25 1450 -- 79 96 % 37 203/88 No Pain --    01/11/25 1340 -- -- -- -- -- 3 --    01/11/25 1337 98.1 °F (36.7 °C) 86 98 % 18 186/85 3 -- FN            Physical Exam  Vitals and nursing note reviewed.   Constitutional:       General: She is not in acute  distress.     Appearance: She is well-developed.   HENT:      Head: Normocephalic and atraumatic.   Eyes:      General: No visual field deficit.     Extraocular Movements: Extraocular movements intact.      Right eye: Normal extraocular motion and no nystagmus.      Left eye: Normal extraocular motion and no nystagmus.      Conjunctiva/sclera: Conjunctivae normal.      Pupils: Pupils are equal, round, and reactive to light.      Right eye: Pupil is round and reactive.      Left eye: Pupil is round and reactive.   Neck:      Meningeal: Brudzinski's sign and Kernig's sign absent.   Cardiovascular:      Rate and Rhythm: Normal rate and regular rhythm.      Heart sounds: No murmur heard.     No friction rub. No gallop.   Pulmonary:      Effort: Pulmonary effort is normal. No respiratory distress.      Breath sounds: Wheezing present. No rales.      Comments: Mild bilateral scattered wheezing  Abdominal:      General: There is no distension.      Palpations: Abdomen is soft. There is no mass.      Tenderness: There is no abdominal tenderness. There is no guarding.   Musculoskeletal:         General: No swelling.      Cervical back: Normal range of motion and neck supple. No rigidity.   Lymphadenopathy:      Cervical: No cervical adenopathy.   Skin:     General: Skin is warm and dry.      Capillary Refill: Capillary refill takes less than 2 seconds.   Neurological:      Mental Status: She is alert.      GCS: GCS eye subscore is 4. GCS verbal subscore is 5. GCS motor subscore is 6.      Cranial Nerves: No cranial nerve deficit, dysarthria or facial asymmetry.      Sensory: No sensory deficit.      Coordination: Romberg sign negative.      Gait: Gait normal.      Deep Tendon Reflexes: Reflexes normal. Babinski sign absent on the right side. Babinski sign absent on the left side.   Psychiatric:         Mood and Affect: Mood normal.         Behavior: Behavior normal.         Results Reviewed       Procedure Component Value  "Units Date/Time    Levetiracetam level [[id number]]  (Abnormal) Collected: 01/11/25 1403    Lab Status: Final result Specimen: Blood from Arm, Left Updated: 01/12/25 0328     Levetiracetam Lvl 47.6 ug/mL     Narrative:      \"Brivaracetam (Briviact) interferes with measurements of levetiracetam in the ARK Levetiracetam Assay. Patients undergoing a switch in drug therapy involving Keppra and Briviact should not be monitored for levetiracetam using the ARK assay. Serum levels of levetiracetam and or brivaracetam should be confirmed by a valid chromatographic method if there is a possibility these drugs are co-present in circulation.\"    High Sensitivity Troponin I Random [[id number]]  (Abnormal) Collected: 01/11/25 1403    Lab Status: Final result Specimen: Blood from Arm, Left Updated: 01/11/25 1437     HS TnI random 4 ng/L     Comprehensive metabolic panel [[id number]]  (Abnormal) Collected: 01/11/25 1403    Lab Status: Final result Specimen: Blood from Arm, Left Updated: 01/11/25 1432     Sodium 140 mmol/L      Potassium 4.1 mmol/L      Chloride 105 mmol/L      CO2 27 mmol/L      ANION GAP 8 mmol/L      BUN 17 mg/dL      Creatinine 1.10 mg/dL      Glucose 157 mg/dL      Calcium 8.5 mg/dL      Corrected Calcium 9.0 mg/dL      AST 18 U/L      ALT 11 U/L      Alkaline Phosphatase 96 U/L      Total Protein 7.1 g/dL      Albumin 3.4 g/dL      Total Bilirubin 0.27 mg/dL      eGFR 52 ml/min/1.73sq m     Narrative:      National Kidney Disease Foundation guidelines for Chronic Kidney Disease (CKD):     Stage 1 with normal or high GFR (GFR > 90 mL/min/1.73 square meters)    Stage 2 Mild CKD (GFR = 60-89 mL/min/1.73 square meters)    Stage 3A Moderate CKD (GFR = 45-59 mL/min/1.73 square meters)    Stage 3B Moderate CKD (GFR = 30-44 mL/min/1.73 square meters)    Stage 4 Severe CKD (GFR = 15-29 mL/min/1.73 square meters)    Stage 5 End Stage CKD (GFR <15 mL/min/1.73 square meters)  Note: GFR calculation is accurate only with a " steady state creatinine    Magnesium [[id number]]  (Normal) Collected: 01/11/25 1403    Lab Status: Final result Specimen: Blood from Arm, Left Updated: 01/11/25 1432     Magnesium 1.9 mg/dL     CBC and differential [[id number]]  (Abnormal) Collected: 01/11/25 1403    Lab Status: Final result Specimen: Blood from Arm, Left Updated: 01/11/25 1412     WBC 11.01 Thousand/uL      RBC 3.86 Million/uL      Hemoglobin 11.8 g/dL      Hematocrit 37.0 %      MCV 96 fL      MCH 30.6 pg      MCHC 31.9 g/dL      RDW 14.0 %      MPV 11.8 fL      Platelets 250 Thousands/uL      nRBC 0 /100 WBCs      Segmented % 70 %      Immature Grans % 0 %      Lymphocytes % 21 %      Monocytes % 6 %      Eosinophils Relative 2 %      Basophils Relative 1 %      Absolute Neutrophils 7.76 Thousands/µL      Absolute Immature Grans 0.04 Thousand/uL      Absolute Lymphocytes 2.33 Thousands/µL      Absolute Monocytes 0.63 Thousand/µL      Eosinophils Absolute 0.19 Thousand/µL      Basophils Absolute 0.06 Thousands/µL     Carbamazepine level, total [[id number]] Collected: 01/11/25 1403    Lab Status: In process Specimen: Blood from Arm, Left Updated: 01/11/25 1408    Fingerstick Glucose (POCT) [[id number]]  (Abnormal) Collected: 01/11/25 1335    Lab Status: Final result Specimen: Blood Updated: 01/11/25 1339     POC Glucose 183 mg/dl             XR ankle 3+ views LEFT   ED Interpretation by Nam Casas MD (01/11 1818)   XR L ankle; charcot joint       XR ankle 3+ views RIGHT   ED Interpretation by Nam Casas MD (01/11 1819)   XR R ankle ; severe degenerative changes       by Ajith Prather MD (01/12 1048)      XR foot 3+ vw right   ED Interpretation by Nam Casas MD (01/11 1820)   Xr R foot; degenerative changes . No fracture      XR foot 3+ vw left   ED Interpretation by Nam Casas MD (01/11 1820)   XR L foot ; degenerative changes , no fracture      CT head wo contrast   Final Interpretation by Jabier Akhtar MD  (01/11 1534)      No acute intracranial abnormality.                  Workstation performed: [id number]             ECG 12 Lead Documentation Only    Date/Time: 1/11/2025 2:05 PM    Performed by: Nam Casas MD  Authorized by: Nam Casas MD    Indications / Diagnosis:  Syncope  ECG reviewed by me, the ED Provider: no    Patient location:  ED and bedside  Previous ECG:     Previous ECG:  Compared to current    Similarity:  No change  Interpretation:     Interpretation: normal    Rate:     ECG rate:  91    ECG rate assessment: normal    Rhythm:     Rhythm: sinus rhythm    Ectopy:     Ectopy: none    QRS:     QRS axis:  Normal    QRS intervals:  Normal  Conduction:     Conduction: normal    ST segments:     ST segments:  Normal      ED Medication and Procedure Management   Prior to Admission Medications   Prescriptions Last Dose Informant Patient Reported? Taking?   albuterol (2.5 mg/3 mL) 0.083 % nebulizer solution  Self No No   Sig: Take 3 mL (2.5 mg total) by nebulization every 6 (six) hours as needed for wheezing or shortness of breath   albuterol (Proventil HFA) 90 mcg/act inhaler  Self No No   Sig: Inhale 2 puffs every 6 (six) hours as needed for wheezing   carBAMazepine (TEGretol) 200 mg tablet   No No   Sig: Take 1 tablet (200 mg total) by mouth 3 (three) times a day   carbamide peroxide (DEBROX) 6.5 % otic solution   No No   Sig: Administer 5 drops into both ears 2 (two) times a day for 4 days Do not start before September 30, 2024.   cholecalciferol 1,000 units tablet  Self No No   Sig: Take 2 tablets (2,000 Units total) by mouth daily   escitalopram (LEXAPRO) 10 mg tablet   No No   Sig: Take 1 tablet (10 mg total) by mouth daily   levETIRAcetam (KEPPRA) 750 mg tablet   No No   Sig: Take 2 tablets (1,500 mg total) by mouth 2 (two) times a day   losartan (COZAAR) 25 mg tablet   No No   Sig: Take 1 tablet (25 mg total) by mouth daily   metoprolol succinate (TOPROL-XL) 50 mg 24 hr tablet   No No    Sig: Take 1 tablet (50 mg total) by mouth daily   nicotine (NICODERM CQ) 21 mg/24 hr TD 24 hr patch  Self No No   Sig: Place 1 patch on the skin over 24 hours daily   pramipexole (MIRAPEX) 1.5 MG tablet   No No   Sig: Take 1 tablet (1.5 mg total) by mouth daily at bedtime   spironolactone (ALDACTONE) 25 mg tablet  Self No No   Sig: Take 1 tablet (25 mg total) by mouth daily      Facility-Administered Medications Last Administration Doses Remaining   cyanocobalamin injection 1,000 mcg None recorded         Discharge Medication List as of 1/11/2025  4:35 PM        CONTINUE these medications which have NOT CHANGED    Details   albuterol (2.5 mg/3 mL) 0.083 % nebulizer solution Take 3 mL (2.5 mg total) by nebulization every 6 (six) hours as needed for wheezing or shortness of breath, Starting Tue 4/9/2024, Normal      albuterol (Proventil HFA) 90 mcg/act inhaler Inhale 2 puffs every 6 (six) hours as needed for wheezing, Starting Wed 5/22/2024, Normal      carBAMazepine (TEGretol) 200 mg tablet Take 1 tablet (200 mg total) by mouth 3 (three) times a day, Starting Sat 8/3/2024, Normal      carbamide peroxide (DEBROX) 6.5 % otic solution Administer 5 drops into both ears 2 (two) times a day for 4 days Do not start before September 30, 2024., Starting Mon 9/30/2024, Until Fri 10/4/2024, Normal      cholecalciferol 1,000 units tablet Take 2 tablets (2,000 Units total) by mouth daily, Starting Tue 4/9/2024, Until Thu 8/22/2024, Normal      escitalopram (LEXAPRO) 10 mg tablet Take 1 tablet (10 mg total) by mouth daily, Starting Fri 7/19/2024, Until Wed 1/15/2025, Normal      levETIRAcetam (KEPPRA) 750 mg tablet Take 2 tablets (1,500 mg total) by mouth 2 (two) times a day, Starting Sat 8/3/2024, Normal      losartan (COZAAR) 25 mg tablet Take 1 tablet (25 mg total) by mouth daily, Starting Fri 7/19/2024, Until Wed 1/15/2025, Normal      metoprolol succinate (TOPROL-XL) 50 mg 24 hr tablet Take 1 tablet (50 mg total) by mouth  daily, Starting Fri 7/19/2024, Until Wed 1/15/2025, Normal      nicotine (NICODERM CQ) 21 mg/24 hr TD 24 hr patch Place 1 patch on the skin over 24 hours daily, Starting Tue 4/9/2024, Normal      pramipexole (MIRAPEX) 1.5 MG tablet Take 1 tablet (1.5 mg total) by mouth daily at bedtime, Starting Sat 8/3/2024, Until Fri 11/1/2024, Normal      spironolactone (ALDACTONE) 25 mg tablet Take 1 tablet (25 mg total) by mouth daily, Starting Wed 5/22/2024, Normal           No discharge procedures on file.  ED SEPSIS DOCUMENTATION   Time reflects when diagnosis was documented in both MDM as applicable and the Disposition within this note       Time User Action Codes Description Comment    1/11/2025  4:32 PM Nam Casas [R55] Syncope     1/11/2025  4:32 PM Nam Casas [R56.9] Seizure (HCC)                  Nam Casas MD  01/12/25 5364

## 2025-01-11 NOTE — ED NOTES
Rn attempted to assess pt's ambulation status. Pt is able to walk but insists that her foot and ankle are too swollen be sent home. Provider notified.      Yolis Hernandez RN  01/11/25 3934
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
## 2025-01-12 LAB
ATRIAL RATE: 87 BPM
ATRIAL RATE: 91 BPM
CARBAMAZEPINE SERPL-MCNC: 5.6 UG/ML (ref 4–12)
LEVETIRACETAM SERPL-MCNC: 47.6 UG/ML (ref 12–46)
P AXIS: 62 DEGREES
P AXIS: 63 DEGREES
PR INTERVAL: 144 MS
PR INTERVAL: 148 MS
QRS AXIS: 64 DEGREES
QRS AXIS: 68 DEGREES
QRSD INTERVAL: 66 MS
QRSD INTERVAL: 74 MS
QT INTERVAL: 356 MS
QT INTERVAL: 374 MS
QTC INTERVAL: 437 MS
QTC INTERVAL: 450 MS
T WAVE AXIS: 58 DEGREES
T WAVE AXIS: 70 DEGREES
VENTRICULAR RATE: 87 BPM
VENTRICULAR RATE: 91 BPM

## 2025-01-12 PROCEDURE — 93010 ELECTROCARDIOGRAM REPORT: CPT | Performed by: INTERNAL MEDICINE

## 2025-01-13 ENCOUNTER — APPOINTMENT (EMERGENCY)
Dept: CT IMAGING | Facility: HOSPITAL | Age: 65
End: 2025-01-13
Payer: MEDICARE

## 2025-01-13 ENCOUNTER — APPOINTMENT (EMERGENCY)
Dept: RADIOLOGY | Facility: HOSPITAL | Age: 65
End: 2025-01-13
Payer: MEDICARE

## 2025-01-13 ENCOUNTER — TELEPHONE (OUTPATIENT)
Age: 65
End: 2025-01-13

## 2025-01-13 ENCOUNTER — HOSPITAL ENCOUNTER (EMERGENCY)
Facility: HOSPITAL | Age: 65
Discharge: HOME | End: 2025-01-13
Attending: EMERGENCY MEDICINE | Admitting: EMERGENCY MEDICINE
Payer: MEDICARE

## 2025-01-13 VITALS
DIASTOLIC BLOOD PRESSURE: 74 MMHG | RESPIRATION RATE: 20 BRPM | SYSTOLIC BLOOD PRESSURE: 184 MMHG | HEART RATE: 73 BPM | TEMPERATURE: 98.2 F | OXYGEN SATURATION: 97 %

## 2025-01-13 DIAGNOSIS — G40.909 RECURRENT SEIZURES (HCC): ICD-10-CM

## 2025-01-13 DIAGNOSIS — R55 SYNCOPE: Primary | ICD-10-CM

## 2025-01-13 LAB
ALBUMIN SERPL BCG-MCNC: 3.6 G/DL (ref 3.5–5)
ALP SERPL-CCNC: 103 U/L (ref 34–104)
ALT SERPL W P-5'-P-CCNC: 11 U/L (ref 7–52)
ANION GAP SERPL CALCULATED.3IONS-SCNC: 7 MMOL/L (ref 4–13)
AST SERPL W P-5'-P-CCNC: 14 U/L (ref 13–39)
ATRIAL RATE: 81 BPM
BASOPHILS # BLD AUTO: 0.05 THOUSANDS/ΜL (ref 0–0.1)
BASOPHILS NFR BLD AUTO: 1 % (ref 0–1)
BILIRUB SERPL-MCNC: 0.23 MG/DL (ref 0.2–1)
BUN SERPL-MCNC: 20 MG/DL (ref 5–25)
CALCIUM SERPL-MCNC: 8.7 MG/DL (ref 8.4–10.2)
CARBAMAZEPINE SERPL-MCNC: 4.3 UG/ML (ref 4–12)
CHLORIDE SERPL-SCNC: 104 MMOL/L (ref 96–108)
CO2 SERPL-SCNC: 28 MMOL/L (ref 21–32)
CREAT SERPL-MCNC: 1.07 MG/DL (ref 0.6–1.3)
EOSINOPHIL # BLD AUTO: 0.25 THOUSAND/ΜL (ref 0–0.61)
EOSINOPHIL NFR BLD AUTO: 3 % (ref 0–6)
ERYTHROCYTE [DISTWIDTH] IN BLOOD BY AUTOMATED COUNT: 13.6 % (ref 11.6–15.1)
GFR SERPL CREATININE-BSD FRML MDRD: 54 ML/MIN/1.73SQ M
GLUCOSE SERPL-MCNC: 90 MG/DL (ref 65–140)
GLUCOSE SERPL-MCNC: 93 MG/DL (ref 65–140)
HCT VFR BLD AUTO: 36.3 % (ref 34.8–46.1)
HGB BLD-MCNC: 11.3 G/DL (ref 11.5–15.4)
IMM GRANULOCYTES # BLD AUTO: 0.03 THOUSAND/UL (ref 0–0.2)
IMM GRANULOCYTES NFR BLD AUTO: 0 % (ref 0–2)
LEVETIRACETAM SERPL-MCNC: 19 UG/ML (ref 12–46)
LYMPHOCYTES # BLD AUTO: 2.11 THOUSANDS/ΜL (ref 0.6–4.47)
LYMPHOCYTES NFR BLD AUTO: 22 % (ref 14–44)
MCH RBC QN AUTO: 30.1 PG (ref 26.8–34.3)
MCHC RBC AUTO-ENTMCNC: 31.1 G/DL (ref 31.4–37.4)
MCV RBC AUTO: 97 FL (ref 82–98)
MONOCYTES # BLD AUTO: 0.68 THOUSAND/ΜL (ref 0.17–1.22)
MONOCYTES NFR BLD AUTO: 7 % (ref 4–12)
NEUTROPHILS # BLD AUTO: 6.53 THOUSANDS/ΜL (ref 1.85–7.62)
NEUTS SEG NFR BLD AUTO: 67 % (ref 43–75)
NRBC BLD AUTO-RTO: 0 /100 WBCS
P AXIS: 54 DEGREES
PLATELET # BLD AUTO: 325 THOUSANDS/UL (ref 149–390)
PMV BLD AUTO: 10.6 FL (ref 8.9–12.7)
POTASSIUM SERPL-SCNC: 3.5 MMOL/L (ref 3.5–5.3)
PR INTERVAL: 136 MS
PROT SERPL-MCNC: 7.3 G/DL (ref 6.4–8.4)
QRS AXIS: 64 DEGREES
QRSD INTERVAL: 58 MS
QT INTERVAL: 388 MS
QTC INTERVAL: 439 MS
RBC # BLD AUTO: 3.76 MILLION/UL (ref 3.81–5.12)
SODIUM SERPL-SCNC: 139 MMOL/L (ref 135–147)
T WAVE AXIS: 58 DEGREES
VENTRICULAR RATE: 77 BPM
WBC # BLD AUTO: 9.65 THOUSAND/UL (ref 4.31–10.16)

## 2025-01-13 PROCEDURE — 82948 REAGENT STRIP/BLOOD GLUCOSE: CPT

## 2025-01-13 PROCEDURE — 70450 CT HEAD/BRAIN W/O DYE: CPT

## 2025-01-13 PROCEDURE — 71045 X-RAY EXAM CHEST 1 VIEW: CPT

## 2025-01-13 PROCEDURE — 93010 ELECTROCARDIOGRAM REPORT: CPT | Performed by: INTERNAL MEDICINE

## 2025-01-13 PROCEDURE — 99285 EMERGENCY DEPT VISIT HI MDM: CPT | Performed by: EMERGENCY MEDICINE

## 2025-01-13 PROCEDURE — 85025 COMPLETE CBC W/AUTO DIFF WBC: CPT | Performed by: EMERGENCY MEDICINE

## 2025-01-13 PROCEDURE — 93005 ELECTROCARDIOGRAM TRACING: CPT

## 2025-01-13 PROCEDURE — 72125 CT NECK SPINE W/O DYE: CPT

## 2025-01-13 PROCEDURE — 80053 COMPREHEN METABOLIC PANEL: CPT | Performed by: EMERGENCY MEDICINE

## 2025-01-13 PROCEDURE — 80156 ASSAY CARBAMAZEPINE TOTAL: CPT | Performed by: EMERGENCY MEDICINE

## 2025-01-13 PROCEDURE — 36415 COLL VENOUS BLD VENIPUNCTURE: CPT | Performed by: EMERGENCY MEDICINE

## 2025-01-13 PROCEDURE — 83520 IMMUNOASSAY QUANT NOS NONAB: CPT | Performed by: EMERGENCY MEDICINE

## 2025-01-13 PROCEDURE — 99285 EMERGENCY DEPT VISIT HI MDM: CPT

## 2025-01-13 NOTE — ED NOTES
EMS placed 18G L AC; IV access removed and pt given d/c papers.     Cherise Shah, RN  01/13/25 4346

## 2025-01-13 NOTE — ED PROVIDER NOTES
Emergency Department Trauma Note  Cydney Lim 64 y.o. female MRN: [id number]  Unit/Bed#: ED OVR 20/ED OVR 20 Encounter: [id number]      Trauma Alert: Trauma Acuity: Trauma Evaluation  Model of Arrival: Mode of Arrival: BLS via Trauma Squad Name and Number: Srinivasa  Trauma Team: Current Providers  Attending Provider: Александр Armendariz DO  Attending Provider: Tiana Carlos DO  ED Technician: Coleman Parker  Registered Nurse: Cherise Shah RN  Consultants:     None      History of Present Illness     Chief Complaint:   Chief Complaint   Patient presents with    Fall     Patient had a ground level fall outside of her apartment, unknown loc or head strike. Patient has hx of epilepsy. Patient is alert.     HPI:  Cydney Lim is a 64 y.o. female who presents with evaluation after syncope/fall amnestic to event concern for possible traumatic injury does not know if she struck her head or not.  Mechanism:Details of Incident: patient had a unwtinessed fall unknown loc unknown head strike Injury Date: 01/13/25         64-year-old came for having syncope versus seizure.  Patient was crossing the street and she passed out in the middle of the street.  Patient states she does not remember the episode was similar to what happened to her 2 days ago when she was seen and evaluated.  Some bystander called 911 and patient was transferred to the hospital.  Patient has long history of seizure and the last seizure was 1/11/2025 (2 days ago). Patient is on Keppra 1500MG BID, and carbamazepine 200 mg bid.  No recent change in medications.  Patient stated that she is compliant with her medication. Patient in no distress at the time of the exam. Patient has no fever, headache, dizziness.  Patient denies biting her lips or tongue and denies urinary incontinence.      History provided by:  Patient and medical records   used: No    Fall    Review of Systems   Neurological:  Positive for syncope.        Historical Information     Immunizations:   Immunization History   Administered Date(s) Administered    COVID-19 MODERNA VACC 0.5 ML IM 04/21/2021, 05/19/2021    INFLUENZA 12/01/2013, 08/14/2014, 10/04/2015, 10/07/2015, 09/15/2016, 08/11/2017, 11/24/2018, 02/23/2020    Influenza, high dose seasonal 0.7 mL 11/10/2023    Influenza, injectable, quadrivalent, preservative free 0.5 mL 11/24/2018    Influenza, recombinant, quadrivalent,injectable, preservative free 10/31/2020    Pneumococcal Polysaccharide PPV23 10/09/2015, 04/05/2016    Tetanus, adsorbed 11/04/2015       Past Medical History:   Diagnosis Date    Depression     EP (epilepsy) (HCC)     Epilepsy (HCC)     Obesity     Restless leg        Family History   Problem Relation Age of Onset    Kidney disease Mother     Liver disease Mother     Heart attack Mother     Heart attack Father     No Known Problems Brother     No Known Problems Son      Past Surgical History:   Procedure Laterality Date    DENTAL SURGERY      all teeth removed    LASER ABLATION OF THE CERVIX      MAMMO (HISTORICAL)  05/01/2017     Social History     Tobacco Use    Smoking status: Every Day     Current packs/day: 0.50     Average packs/day: 0.5 packs/day for 23.0 years (11.5 ttl pk-yrs)     Types: Cigarettes    Smokeless tobacco: Never   Vaping Use    Vaping status: Never Used   Substance Use Topics    Alcohol use: Never     Comment: pt denies alcohol use    Drug use: Never     E-Cigarette/Vaping    E-Cigarette Use Never User      E-Cigarette/Vaping Substances    Nicotine Yes     THC No     CBD No     Flavoring No     Other No     Unknown No        Family History: non-contributory    Meds/Allergies   Prior to Admission Medications   Prescriptions Last Dose Informant Patient Reported? Taking?   albuterol (2.5 mg/3 mL) 0.083 % nebulizer solution  Self No No   Sig: Take 3 mL (2.5 mg total) by nebulization every 6 (six) hours as needed for wheezing or shortness of breath   albuterol  (Proventil HFA) 90 mcg/act inhaler  Self No No   Sig: Inhale 2 puffs every 6 (six) hours as needed for wheezing   carBAMazepine (TEGretol) 200 mg tablet   No No   Sig: Take 1 tablet (200 mg total) by mouth 3 (three) times a day   carbamide peroxide (DEBROX) 6.5 % otic solution   No No   Sig: Administer 5 drops into both ears 2 (two) times a day for 4 days Do not start before September 30, 2024.   cholecalciferol 1,000 units tablet  Self No No   Sig: Take 2 tablets (2,000 Units total) by mouth daily   escitalopram (LEXAPRO) 10 mg tablet   No No   Sig: Take 1 tablet (10 mg total) by mouth daily   levETIRAcetam (KEPPRA) 750 mg tablet   No No   Sig: Take 2 tablets (1,500 mg total) by mouth 2 (two) times a day   losartan (COZAAR) 25 mg tablet   No No   Sig: Take 1 tablet (25 mg total) by mouth daily   metoprolol succinate (TOPROL-XL) 50 mg 24 hr tablet   No No   Sig: Take 1 tablet (50 mg total) by mouth daily   nicotine (NICODERM CQ) 21 mg/24 hr TD 24 hr patch  Self No No   Sig: Place 1 patch on the skin over 24 hours daily   pramipexole (MIRAPEX) 1.5 MG tablet   No No   Sig: Take 1 tablet (1.5 mg total) by mouth daily at bedtime   spironolactone (ALDACTONE) 25 mg tablet  Self No No   Sig: Take 1 tablet (25 mg total) by mouth daily      Facility-Administered Medications Last Administration Doses Remaining   cyanocobalamin injection 1,000 mcg None recorded           No Known Allergies    PHYSICAL EXAM    PE limited by: None    Objective   Vitals:   First set: Temperature: 98.2 °F (36.8 °C) (01/13/25 1239)  Pulse: 78 (01/13/25 1239)  Respirations: 18 (01/13/25 1239)  Blood Pressure: (!) 189/78 (01/13/25 1239)  SpO2: 97 % (01/13/25 1239)    Primary Survey:   (A) Airway: Intact  (B) Breathing: Equal breath sounds bilaterally  (C) Circulation: Pulses:   normal  (D) Disabliity:  GCS Total:  15  (E) Expose:  Completed    Secondary Survey: (Click on Physical Exam tab above)  Physical Exam  Vitals and nursing note reviewed.    Constitutional:       General: She is not in acute distress.     Appearance: She is well-developed.   HENT:      Head: Normocephalic and atraumatic.   Eyes:      Conjunctiva/sclera: Conjunctivae normal.   Cardiovascular:      Rate and Rhythm: Normal rate and regular rhythm.      Heart sounds: No murmur heard.  Pulmonary:      Effort: Pulmonary effort is normal. No respiratory distress.      Breath sounds: Normal breath sounds.   Abdominal:      Palpations: Abdomen is soft.      Tenderness: There is no abdominal tenderness.   Musculoskeletal:         General: No swelling.      Cervical back: Neck supple.   Skin:     General: Skin is warm and dry.      Capillary Refill: Capillary refill takes less than 2 seconds.   Neurological:      Mental Status: She is alert.   Psychiatric:         Mood and Affect: Mood normal.         Cervical spine cleared by clinical criteria? No (imaging required)      Invasive Devices       None                   Lab Results:   Results Reviewed       Procedure Component Value Units Date/Time    Carbamazepine level, total [[id number]]     Lab Status: No result Specimen: Blood     Levetiracetam level [[id number]]     Lab Status: No result Specimen: Blood     CBC and differential [[id number]]     Lab Status: No result Specimen: Blood     Comprehensive metabolic panel [[id number]]     Lab Status: No result Specimen: Blood                    Imaging Studies:   Direct to CT: Yes  XR Trauma chest portable    (Results Pending)   TRAUMA - CT head wo contrast    (Results Pending)   TRAUMA - CT spine cervical wo contrast    (Results Pending)         Procedures  ECG 12 Lead Documentation Only    Date/Time: 1/13/2025 12:38 PM    Performed by: Tiana Carlos DO  Authorized by: Tiana Carlos DO    Previous ECG:     Previous ECG:  Compared to current    Similarity:  No change  Rate:     ECG rate:  77  Rhythm:     Rhythm: sinus rhythm    Ectopy:     Ectopy: none    QRS:     QRS axis:  Normal            ED Course           Medical Decision Making  Differential diagnosis includes but is not limited to syncope, recurrent seizure, electrolyte abnormality, anemia, skull fracture, intracranial bleeding, neck fracture, compression fracture, neck sprain arrhythmia,     Problems Addressed:  Recurrent seizures (HCC): chronic illness or injury  Syncope: acute illness or injury    Amount and/or Complexity of Data Reviewed  Labs: ordered. Decision-making details documented in ED Course.  Radiology: ordered. Decision-making details documented in ED Course.  ECG/medicine tests: ordered and independent interpretation performed. Decision-making details documented in ED Course.    Risk  OTC drugs.  Prescription drug management.  Decision regarding hospitalization.  Risk Details: Discussed with internal medicine if there is any utility to admitting her as this is her second similar event of this kind over the last 2 days.  They did not feel there was any further inpatient testing needed at this time.  Discharge patient home follow-up outpatient PCP and neurology                Disposition  [facility] Transfer: No  Final diagnoses:   None     ED Disposition       None          Follow-up Information    None       Patient's Medications   Discharge Prescriptions    No medications on file     No discharge procedures on file.    PDMP Review         Value Time User    PDMP Reviewed  Yes 7/23/2021 10:52 AM Montana Schmidt MD            ED Provider  Electronically Signed by           Tiana Carlos DO  01/13/25 [id number]

## 2025-01-13 NOTE — TELEPHONE ENCOUNTER
"Pt calling to schedule follow up as pt was in ED on 1/11/25 for syncope episode. Pt hasn't been seen since 6/2024.  Pt states\"she blacked out and fell\".     Dr Gallagher please advise would you like an OVS with pt or OVL ?       "

## 2025-01-14 ENCOUNTER — VBI (OUTPATIENT)
Dept: FAMILY MEDICINE CLINIC | Facility: CLINIC | Age: 65
End: 2025-01-14

## 2025-01-14 NOTE — LETTER
[facility] [place]  [address]  [address]  [place] [id number] [phone]    Date: 01/16/25    Cydney Lim  [address]  [place] [address]    Dear Cydney:                                                                                                                                Thank you for choosing [facility] emergency department for care.  Your primary care provider wants to make sure that your ongoing medical care is being addressed. If you require follow up care as a result of your emergency department visit, there are a few things the practice would like you to know.                As part of the network's continuing commitment to caring for our patients, we have added more same day appointments and have extended office hours to meet your medical needs. After hours, on-call physicians are available via your primary care provider's main office line.               We encourage you to contact our office prior to seeking treatment to discuss your symptoms with the medical staff.  Together, we can determine the correct course of action.  A majority of non-emergent conditions such as: common cold, flu-like symptoms, fevers, strains/sprains, dislocations, minor burns, cuts and animal bites can be treated at [facility] facilities. Diagnostic testing is available at some sites.               Of course, if you are experiencing a life threatening medical emergency call 911 or proceed directly to the nearest emergency room.    Your nearest [facility] facility is conveniently located at:    [facility]  [address] [place] [address]  [phone]  SKIP THE WAIT  Conveniently offered at most [facility] locations  [place] your spot online at [email or web address] or on the [facility]    Sincerely,    [facility]  Dept: [phone]

## 2025-01-14 NOTE — TELEPHONE ENCOUNTER
01/14/25 11:54 AM    Patient contacted post ED visit, outreach attempt made but message could not be left. Additional outreach attempt will be made.     Thank you.  Baljit Mina MA  [facility]

## 2025-01-15 ENCOUNTER — TELEPHONE (OUTPATIENT)
Age: 65
End: 2025-01-15

## 2025-01-15 NOTE — TELEPHONE ENCOUNTER
01/15/25 8:59 AM    Patient contacted post ED visit, outreach attempt made but message could not be left. Additional outreach attempt will be made.     Thank you.  Baljit Mina MA  [facility]

## 2025-01-15 NOTE — TELEPHONE ENCOUNTER
Attempted to call pt, got VM and mailbox is full so unable to leave VM. Pt is to be scheduled OVS with Dr snell Per Dr Interiano. Please assist if pt calls back

## 2025-01-16 NOTE — TELEPHONE ENCOUNTER
01/16/25 9:04 AM    Patient contacted post ED visit, phone outreaches were unsuccessful and a MyChart letter has been sent to the patient as follow-up.    Thank you.  Baljit Mina MA  [facility]

## 2025-01-23 NOTE — TELEPHONE ENCOUNTER
Attempted to call pt and schedule appt. Pts mailbox full and unable to accept VM. Will call pt again tomorrow..

## 2025-02-21 ENCOUNTER — APPOINTMENT (EMERGENCY)
Dept: RADIOLOGY | Facility: HOSPITAL | Age: 65
End: 2025-02-21
Payer: MEDICARE

## 2025-02-21 ENCOUNTER — APPOINTMENT (EMERGENCY)
Dept: CT IMAGING | Facility: HOSPITAL | Age: 65
End: 2025-02-21
Payer: MEDICARE

## 2025-02-21 ENCOUNTER — HOSPITAL ENCOUNTER (EMERGENCY)
Facility: HOSPITAL | Age: 65
End: 2025-02-22
Attending: EMERGENCY MEDICINE | Admitting: EMERGENCY MEDICINE
Payer: MEDICARE

## 2025-02-21 DIAGNOSIS — R06.02 SOB (SHORTNESS OF BREATH): ICD-10-CM

## 2025-02-21 DIAGNOSIS — R45.851 SUICIDAL IDEATIONS: Primary | ICD-10-CM

## 2025-02-21 LAB
2HR DELTA HS TROPONIN: 0 NG/L
ALBUMIN SERPL BCG-MCNC: 3.6 G/DL (ref 3.5–5)
ALP SERPL-CCNC: 110 U/L (ref 34–104)
ALT SERPL W P-5'-P-CCNC: 12 U/L (ref 7–52)
AMPHETAMINES SERPL QL SCN: NEGATIVE
ANION GAP SERPL CALCULATED.3IONS-SCNC: 9 MMOL/L (ref 4–13)
AST SERPL W P-5'-P-CCNC: 16 U/L (ref 13–39)
BACTERIA UR QL AUTO: ABNORMAL /HPF
BARBITURATES UR QL: NEGATIVE
BASOPHILS # BLD AUTO: 0.05 THOUSANDS/ΜL (ref 0–0.1)
BASOPHILS NFR BLD AUTO: 1 % (ref 0–1)
BENZODIAZ UR QL: NEGATIVE
BILIRUB SERPL-MCNC: 0.24 MG/DL (ref 0.2–1)
BILIRUB UR QL STRIP: NEGATIVE
BUN SERPL-MCNC: 22 MG/DL (ref 5–25)
CALCIUM SERPL-MCNC: 8.7 MG/DL (ref 8.4–10.2)
CARDIAC TROPONIN I PNL SERPL HS: 6 NG/L (ref ?–50)
CARDIAC TROPONIN I PNL SERPL HS: 6 NG/L (ref ?–50)
CHLORIDE SERPL-SCNC: 105 MMOL/L (ref 96–108)
CLARITY UR: CLEAR
CO2 SERPL-SCNC: 27 MMOL/L (ref 21–32)
COCAINE UR QL: NEGATIVE
COLOR UR: YELLOW
CREAT SERPL-MCNC: 1.3 MG/DL (ref 0.6–1.3)
D DIMER PPP FEU-MCNC: 1.6 UG/ML FEU
EOSINOPHIL # BLD AUTO: 0.15 THOUSAND/ΜL (ref 0–0.61)
EOSINOPHIL NFR BLD AUTO: 2 % (ref 0–6)
ERYTHROCYTE [DISTWIDTH] IN BLOOD BY AUTOMATED COUNT: 13.8 % (ref 11.6–15.1)
ETHANOL SERPL-MCNC: <10 MG/DL
FENTANYL UR QL SCN: NEGATIVE
FLUAV AG UPPER RESP QL IA.RAPID: NEGATIVE
FLUBV AG UPPER RESP QL IA.RAPID: NEGATIVE
GFR SERPL CREATININE-BSD FRML MDRD: 42 ML/MIN/1.73SQ M
GLUCOSE SERPL-MCNC: 85 MG/DL (ref 65–140)
GLUCOSE UR STRIP-MCNC: NEGATIVE MG/DL
HCT VFR BLD AUTO: 40.2 % (ref 34.8–46.1)
HGB BLD-MCNC: 12.6 G/DL (ref 11.5–15.4)
HGB UR QL STRIP.AUTO: ABNORMAL
HYDROCODONE UR QL SCN: NEGATIVE
IMM GRANULOCYTES # BLD AUTO: 0.04 THOUSAND/UL (ref 0–0.2)
IMM GRANULOCYTES NFR BLD AUTO: 0 % (ref 0–2)
KETONES UR STRIP-MCNC: NEGATIVE MG/DL
LEUKOCYTE ESTERASE UR QL STRIP: NEGATIVE
LYMPHOCYTES # BLD AUTO: 1.58 THOUSANDS/ΜL (ref 0.6–4.47)
LYMPHOCYTES NFR BLD AUTO: 17 % (ref 14–44)
MAGNESIUM SERPL-MCNC: 2 MG/DL (ref 1.9–2.7)
MCH RBC QN AUTO: 29.7 PG (ref 26.8–34.3)
MCHC RBC AUTO-ENTMCNC: 31.3 G/DL (ref 31.4–37.4)
MCV RBC AUTO: 95 FL (ref 82–98)
METHADONE UR QL: NEGATIVE
MONOCYTES # BLD AUTO: 0.76 THOUSAND/ΜL (ref 0.17–1.22)
MONOCYTES NFR BLD AUTO: 8 % (ref 4–12)
NEUTROPHILS # BLD AUTO: 6.62 THOUSANDS/ΜL (ref 1.85–7.62)
NEUTS SEG NFR BLD AUTO: 72 % (ref 43–75)
NITRITE UR QL STRIP: NEGATIVE
NON-SQ EPI CELLS URNS QL MICRO: ABNORMAL /HPF
NRBC BLD AUTO-RTO: 0 /100 WBCS
OPIATES UR QL SCN: NEGATIVE
OTHER STN SPEC: ABNORMAL
OXYCODONE+OXYMORPHONE UR QL SCN: NEGATIVE
PCP UR QL: NEGATIVE
PH UR STRIP.AUTO: 7 [PH]
PLATELET # BLD AUTO: 340 THOUSANDS/UL (ref 149–390)
PMV BLD AUTO: 10.5 FL (ref 8.9–12.7)
POTASSIUM SERPL-SCNC: 3.9 MMOL/L (ref 3.5–5.3)
PROT SERPL-MCNC: 7.6 G/DL (ref 6.4–8.4)
PROT UR STRIP-MCNC: ABNORMAL MG/DL
RBC # BLD AUTO: 4.24 MILLION/UL (ref 3.81–5.12)
RBC #/AREA URNS AUTO: ABNORMAL /HPF
SARS-COV+SARS-COV-2 AG RESP QL IA.RAPID: NEGATIVE
SODIUM SERPL-SCNC: 141 MMOL/L (ref 135–147)
SP GR UR STRIP.AUTO: 1.01 (ref 1–1.03)
THC UR QL: NEGATIVE
TSH SERPL DL<=0.05 MIU/L-ACNC: 2.05 UIU/ML (ref 0.45–4.5)
UROBILINOGEN UR QL STRIP.AUTO: 0.2 E.U./DL
WBC # BLD AUTO: 9.2 THOUSAND/UL (ref 4.31–10.16)
WBC #/AREA URNS AUTO: ABNORMAL /HPF

## 2025-02-21 PROCEDURE — 85379 FIBRIN DEGRADATION QUANT: CPT

## 2025-02-21 PROCEDURE — 83735 ASSAY OF MAGNESIUM: CPT

## 2025-02-21 PROCEDURE — 85025 COMPLETE CBC W/AUTO DIFF WBC: CPT

## 2025-02-21 PROCEDURE — 36415 COLL VENOUS BLD VENIPUNCTURE: CPT

## 2025-02-21 PROCEDURE — 71045 X-RAY EXAM CHEST 1 VIEW: CPT

## 2025-02-21 PROCEDURE — 87811 SARS-COV-2 COVID19 W/OPTIC: CPT

## 2025-02-21 PROCEDURE — 87804 INFLUENZA ASSAY W/OPTIC: CPT

## 2025-02-21 PROCEDURE — 71275 CT ANGIOGRAPHY CHEST: CPT

## 2025-02-21 PROCEDURE — 84443 ASSAY THYROID STIM HORMONE: CPT

## 2025-02-21 PROCEDURE — 84484 ASSAY OF TROPONIN QUANT: CPT

## 2025-02-21 PROCEDURE — 81003 URINALYSIS AUTO W/O SCOPE: CPT

## 2025-02-21 PROCEDURE — 96374 THER/PROPH/DIAG INJ IV PUSH: CPT

## 2025-02-21 PROCEDURE — 80053 COMPREHEN METABOLIC PANEL: CPT

## 2025-02-21 PROCEDURE — 99285 EMERGENCY DEPT VISIT HI MDM: CPT

## 2025-02-21 PROCEDURE — 81001 URINALYSIS AUTO W/SCOPE: CPT

## 2025-02-21 PROCEDURE — 96372 THER/PROPH/DIAG INJ SC/IM: CPT

## 2025-02-21 PROCEDURE — 94640 AIRWAY INHALATION TREATMENT: CPT

## 2025-02-21 PROCEDURE — 99285 EMERGENCY DEPT VISIT HI MDM: CPT | Performed by: EMERGENCY MEDICINE

## 2025-02-21 PROCEDURE — 82077 ASSAY SPEC XCP UR&BREATH IA: CPT

## 2025-02-21 PROCEDURE — 80307 DRUG TEST PRSMV CHEM ANLYZR: CPT

## 2025-02-21 PROCEDURE — 93005 ELECTROCARDIOGRAM TRACING: CPT

## 2025-02-21 RX ORDER — NICOTINE 21 MG/24HR
21 PATCH, TRANSDERMAL 24 HOURS TRANSDERMAL ONCE
Status: DISCONTINUED | OUTPATIENT
Start: 2025-02-21 | End: 2025-02-22 | Stop reason: HOSPADM

## 2025-02-21 RX ORDER — ALBUTEROL SULFATE 0.83 MG/ML
2.5 SOLUTION RESPIRATORY (INHALATION) ONCE
Status: DISCONTINUED | OUTPATIENT
Start: 2025-02-21 | End: 2025-02-21

## 2025-02-21 RX ORDER — PREDNISONE 20 MG/1
60 TABLET ORAL ONCE
Status: DISCONTINUED | OUTPATIENT
Start: 2025-02-21 | End: 2025-02-21

## 2025-02-21 RX ORDER — PRAMIPEXOLE DIHYDROCHLORIDE 0.25 MG/1
1.5 TABLET ORAL 3 TIMES DAILY
Status: DISCONTINUED | OUTPATIENT
Start: 2025-02-21 | End: 2025-02-22 | Stop reason: HOSPADM

## 2025-02-21 RX ORDER — ESCITALOPRAM OXALATE 20 MG/1
10 TABLET ORAL DAILY
Status: DISCONTINUED | OUTPATIENT
Start: 2025-02-22 | End: 2025-02-22 | Stop reason: HOSPADM

## 2025-02-21 RX ORDER — LEVETIRACETAM 500 MG/1
1500 TABLET ORAL 2 TIMES DAILY
Status: DISCONTINUED | OUTPATIENT
Start: 2025-02-22 | End: 2025-02-22 | Stop reason: HOSPADM

## 2025-02-21 RX ORDER — ALBUTEROL SULFATE 0.83 MG/ML
5 SOLUTION RESPIRATORY (INHALATION) ONCE
Status: COMPLETED | OUTPATIENT
Start: 2025-02-21 | End: 2025-02-21

## 2025-02-21 RX ORDER — METHYLPREDNISOLONE SODIUM SUCCINATE 125 MG/2ML
125 INJECTION, POWDER, LYOPHILIZED, FOR SOLUTION INTRAMUSCULAR; INTRAVENOUS ONCE
Status: COMPLETED | OUTPATIENT
Start: 2025-02-21 | End: 2025-02-21

## 2025-02-21 RX ORDER — SPIRONOLACTONE 25 MG/1
25 TABLET ORAL DAILY
Status: DISCONTINUED | OUTPATIENT
Start: 2025-02-22 | End: 2025-02-22 | Stop reason: HOSPADM

## 2025-02-21 RX ORDER — LOSARTAN POTASSIUM 25 MG/1
25 TABLET ORAL DAILY
Status: DISCONTINUED | OUTPATIENT
Start: 2025-02-22 | End: 2025-02-22 | Stop reason: HOSPADM

## 2025-02-21 RX ORDER — CARBAMAZEPINE 200 MG/1
200 TABLET ORAL 3 TIMES DAILY
Status: DISCONTINUED | OUTPATIENT
Start: 2025-02-21 | End: 2025-02-22 | Stop reason: HOSPADM

## 2025-02-21 RX ORDER — HALOPERIDOL 5 MG/ML
5 INJECTION INTRAMUSCULAR ONCE
Status: COMPLETED | OUTPATIENT
Start: 2025-02-21 | End: 2025-02-21

## 2025-02-21 RX ORDER — ALBUTEROL SULFATE 0.83 MG/ML
2.5 SOLUTION RESPIRATORY (INHALATION) EVERY 6 HOURS PRN
Status: DISCONTINUED | OUTPATIENT
Start: 2025-02-21 | End: 2025-02-22 | Stop reason: HOSPADM

## 2025-02-21 RX ADMIN — METHYLPREDNISOLONE SODIUM SUCCINATE 125 MG: 125 INJECTION, POWDER, FOR SOLUTION INTRAMUSCULAR; INTRAVENOUS at 17:41

## 2025-02-21 RX ADMIN — ALBUTEROL SULFATE 5 MG: 2.5 SOLUTION RESPIRATORY (INHALATION) at 17:41

## 2025-02-21 RX ADMIN — IPRATROPIUM BROMIDE 0.5 MG: 0.5 SOLUTION RESPIRATORY (INHALATION) at 17:41

## 2025-02-21 RX ADMIN — IOHEXOL 70 ML: 350 INJECTION, SOLUTION INTRAVENOUS at 18:24

## 2025-02-21 RX ADMIN — NICOTINE 21 MG: 21 PATCH, EXTENDED RELEASE TRANSDERMAL at 22:43

## 2025-02-21 RX ADMIN — HALOPERIDOL LACTATE 5 MG: 5 INJECTION, SOLUTION INTRAMUSCULAR at 22:43

## 2025-02-21 NOTE — ED ATTENDING ATTESTATION
2/21/2025  I, Princess Sims MD, saw and evaluated the patient. I have discussed the patient with the resident/non-physician practitioner and agree with the resident's/non-physician practitioner's findings, Plan of Care, and MDM as documented in the resident's/non-physician practitioner's note, except where noted. All available labs and Radiology studies were reviewed.  I was present for key portions of any procedure(s) performed by the resident/non-physician practitioner and I was immediately available to provide assistance.       At this point I agree with the current assessment done in the Emergency Department.  I have conducted an independent evaluation of this patient a history and physical is as follows:    64-year-old female with history of depression, epilepsy, hypertension, asthma, obesity who presents for evaluation of shortness of breath as well as suicidal ideations.  Patient reports slowly progressively worsening shortness of breath over the past several days to a week.  She ran out of her albuterol and has been unable to give herself any breathing treatments at home.  She denies any chest pain.  No fevers.  This feels like her asthma.    She also reports suicidal ideations over the past 2 days.  She reports that this was precipitated by an argument with her brother.  She has a plan to overdose on her medications.  She is willing to sign herself in for psychiatric treatment.    Exam: Awake, alert, no acute distress.  Head normocephalic and atraumatic.  Moist mucous membranes.  Nose normal.  Normal conjunctiva.  Neck supple with normal range of motion.  Heart with tachycardic rate, regular rhythm.  Lungs with wheezing bilaterally.  Slight tachypnea.  Abdomen soft, nontender, nondistended.  Extremities with normal range of motion without swelling.  Skin without rashes.  No focal neurologic deficits.    64-year-old female presenting for evaluation of shortness of breath and suicidal ideations.   Differential diagnoses include not limited to ACS, asthma exacerbation, PE, pneumonia, pneumothorax.  Will obtain cardiac workup, D-dimer with PE study if elevated.  Will treat with DuoNeb and steroids.  Once medically cleared, will plan for inpatient psychiatric treatment.    ED Course         Critical Care Time  Procedures

## 2025-02-21 NOTE — ED PROVIDER NOTES
Time reflects when diagnosis was documented in both MDM as applicable and the Disposition within this note       Time User Action Codes Description Comment    2/21/2025  8:01 PM Princess Sims Add [R45.851] Suicidal ideations     2/21/2025  8:01 PM Princess Sims Add [R06.02] SOB (shortness of breath)           ED Disposition       ED Disposition   Transfer to Behavioral Health Condition   --    Date/Time   Fri Feb 21, 2025  8:01 PM    Comment   Cydney Lim should be transferred out to behavioral health and has been medically cleared.                Assessment & Plan       Medical Decision Making  64-year-old female presenting to the ED with shortness of breath, cough, wheezing, SI with a plan.    Concerning for asthma exacerbation, viral illness, URI, psych.    Will get respiratory labs as well as cardiac labs.  Will order EKG, chest x-ray.  Will get viral swabs.  Will order geriatric psych labs such as TSH, UDS, ethanol level.  Will place consult for ED crisis.    See ED course for interpretation of results.    Patient medically cleared for psych inpatient and treatment.  Patient signed a 201.  Pain placement and transfer.    Amount and/or Complexity of Data Reviewed  Labs: ordered. Decision-making details documented in ED Course.  Radiology: ordered and independent interpretation performed.    Risk  Prescription drug management.  Decision regarding hospitalization.        ED Course as of 02/21/25 2048 Fri Feb 21, 2025   1800 CBC and differential(!)  No signs of leukocytosis, no signs of anemia.  Reassuring CBC.   1800 D-Dimer, Quant(!): 1.60  Will get CT PE study, waiting for CMP to return.   1807 Comprehensive metabolic panel(!)  Elevated alk phos otherwise reassuring CMP.  Of note, creatinine has elevated to 1.3 from 1.07 previously.  No concern for BETH.   1807 MAGNESIUM: 2.0  Reassuring   1811 hs TnI 0hr: 6  Will get delta   1817 Patient started neb treatment and then threw it across the room stating  she does not want her neb treatment.  Patient then ripped off EKG leads stating she does not want that either.  I discussed with patient that we are just trying to help her and are trying to help her be more comfortable.  Patient does not want any more neb treatments but is excepting CT scan and more lab work.   1926 TSH 3RD GENERATON: 2.048  Reassuring   1926 SARS COV Rapid Antigen: Negative   1926 Influenza A Rapid Antigen: Negative   1926 Influenza B Rapid Antigen: Negative   1935 IMPRESSION:     1.  No PE.  2.  Chronic granulomatous disease.  3.  Right greater than left nodular adrenal gland thickening, unchanged.                 Workstation performed: [id number]     1935 ETHANOL: <10  reassuring   1940 Leukocytes, UA: Negative   1941 Nitrite, UA: Negative   1950 Rapid drug screen, urine  reassuring   1954 hs TnI 2hr: 6  reassuring   1955 Patient is medically cleared for psychiatric eval and inpatient treatment   2033 Will sign 201.       Medications   ipratropium (ATROVENT) 0.02 % inhalation solution 0.5 mg (0.5 mg Nebulization Given 2/21/25 1741)   methylPREDNISolone sodium succinate (Solu-MEDROL) injection 125 mg (125 mg Intravenous Given 2/21/25 1741)   albuterol inhalation solution 5 mg (5 mg Nebulization Given 2/21/25 1741)   iohexol (OMNIPAQUE) 350 MG/ML injection (SINGLE-DOSE) 100 mL (70 mL Intravenous Given 2/21/25 1824)       ED Risk Strat Scores                            SBIRT 20yo+      Flowsheet Row Most Recent Value   Initial Alcohol Screen: US AUDIT-C     1. How often do you have a drink containing alcohol? 0 Filed at: 02/21/2025 1722   2. How many drinks containing alcohol do you have on a typical day you are drinking?  0 Filed at: 02/21/2025 1722   3b. FEMALE Any Age, or MALE 65+: How often do you have 4 or more drinks on one occassion? 0 Filed at: 02/21/2025 1722   Audit-C Score 0 Filed at: 02/21/2025 1722   JOHN: How many times in the past year have you...    Used an illegal drug or used a  prescription medication for non-medical reasons? Never Filed at: 02/21/2025 1725                            History of Present Illness       Chief Complaint   Patient presents with    Shortness of Breath     Patient brought in by EMS from home due to Shortness of Breath since 1100. Hx Asthma. Pt reports not having access to prescribed inhaler since November. Audible wheezing noted.        Past Medical History:   Diagnosis Date    Depression     EP (epilepsy) (HCC)     Epilepsy (HCC)     Obesity     Restless leg       Past Surgical History:   Procedure Laterality Date    DENTAL SURGERY      all teeth removed    LASER ABLATION OF THE CERVIX      MAMMO (HISTORICAL)  05/01/2017      Family History   Problem Relation Age of Onset    Kidney disease Mother     Liver disease Mother     Heart attack Mother     Heart attack Father     No Known Problems Brother     No Known Problems Son       Social History     Tobacco Use    Smoking status: Every Day     Current packs/day: 0.50     Average packs/day: 0.5 packs/day for 23.0 years (11.5 ttl pk-yrs)     Types: Cigarettes    Smokeless tobacco: Never   Vaping Use    Vaping status: Never Used   Substance Use Topics    Alcohol use: Never     Comment: pt denies alcohol use    Drug use: Never      E-Cigarette/Vaping    E-Cigarette Use Never User       E-Cigarette/Vaping Substances    Nicotine Yes     THC No     CBD No     Flavoring No     Other No     Unknown No       I have reviewed and agree with the history as documented.     4-year-old female with significant history of obesity, depression, hypertension, asthma presenting to the ED with shortness of breath starting since 11:00 this morning.  Patient states that she has not been taking her albuterol or her hypertension medications.  Patient states that the shortness of breath has been getting worse because she has a crack in her doors at her house and cold weather is getting in.  Of note when being triaged, patient endorsed  suicidal ideations due to an ongoing fight with her brother.  Patient states that she has a plan of overdosing on her medications to kill herself.  Patient is willing to speak with crisis.        Review of Systems   Constitutional:  Negative for chills and fever.   HENT:  Negative for congestion and rhinorrhea.    Eyes:  Negative for visual disturbance.   Respiratory:  Positive for cough, chest tightness, shortness of breath and wheezing.    Cardiovascular:  Negative for chest pain and palpitations.   Gastrointestinal:  Negative for abdominal pain, diarrhea, nausea and vomiting.   Endocrine: Negative for polyuria.   Genitourinary:  Negative for dysuria.   Musculoskeletal:  Negative for back pain and neck pain.   Skin:  Negative for color change.   Neurological:  Negative for dizziness, weakness and headaches.   Psychiatric/Behavioral:  Negative for agitation and confusion.            Objective       ED Triage Vitals [02/21/25 1719]   Temperature Pulse Blood Pressure Respirations SpO2 Patient Position - Orthostatic VS   98.9 °F (37.2 °C) 104 (!) 177/125 20 97 % Lying      Temp Source Heart Rate Source BP Location FiO2 (%) Pain Score    Oral Monitor Right arm -- --      Vitals      Date and Time Temp Pulse SpO2 Resp BP Pain Score FACES Pain Rating User   02/21/25 1719 98.9 °F (37.2 °C) 104 97 % 20 177/125 -- -- DG            Physical Exam  Constitutional:       Appearance: She is well-developed. She is obese.   HENT:      Head: Normocephalic and atraumatic.      Mouth/Throat:      Mouth: Mucous membranes are moist.   Eyes:      Pupils: Pupils are equal, round, and reactive to light.   Cardiovascular:      Rate and Rhythm: Tachycardia present.   Pulmonary:      Effort: Tachypnea present.      Breath sounds: Decreased breath sounds and wheezing present.   Abdominal:      Palpations: Abdomen is soft.   Musculoskeletal:         General: Normal range of motion.      Cervical back: Normal range of motion.   Skin:      General: Skin is warm.      Capillary Refill: Capillary refill takes less than 2 seconds.   Neurological:      General: No focal deficit present.      Mental Status: She is alert and oriented to person, place, and time.   Psychiatric:         Mood and Affect: Mood normal.         Behavior: Behavior normal.         Results Reviewed       Procedure Component Value Units Date/Time    Urine Microscopic [[id number]]  (Abnormal) Collected: 02/21/25 1934    Lab Status: Final result Specimen: Urine, Clean Catch Updated: 02/21/25 2000     RBC, UA 4-10 /hpf      WBC, UA None Seen /hpf      Epithelial Cells Occasional /hpf      Bacteria, UA Occasional /hpf      OTHER OBSERVATIONS Transitional Epithelial Cells    HS Troponin I 2hr [[id number]]  (Normal) Collected: 02/21/25 1922    Lab Status: Final result Specimen: Blood from Arm, Right Updated: 02/21/25 1954     hs TnI 2hr 6 ng/L      Delta 2hr hsTnI 0 ng/L     Rapid drug screen, urine [[id number]]  (Normal) Collected: 02/21/25 1934    Lab Status: Final result Specimen: Urine, Clean Catch Updated: 02/21/25 1950     Amph/Meth UR Negative     Barbiturate Ur Negative     Benzodiazepine Urine Negative     Cocaine Urine Negative     Methadone Urine Negative     Opiate Urine Negative     PCP Ur Negative     THC Urine Negative     Oxycodone Urine Negative     Fentanyl Urine Negative     HYDROCODONE URINE Negative    Narrative:      FOR MEDICAL PURPOSES ONLY.   IF CONFIRMATION NEEDED PLEASE CONTACT THE LAB WITHIN 5 DAYS.    Drug Screen Cutoff Levels:  AMPHETAMINE/METHAMPHETAMINES  1000 ng/mL  BARBITURATES     200 ng/mL  BENZODIAZEPINES     200 ng/mL  COCAINE      300 ng/mL  METHADONE      300 ng/mL  OPIATES      300 ng/mL  PHENCYCLIDINE     25 ng/mL  THC       50 ng/mL  OXYCODONE      100 ng/mL  FENTANYL      5 ng/mL  HYDROCODONE     300 ng/mL    UA w Reflex to Microscopic w Reflex to Culture [[id number]]  (Abnormal) Collected: 02/21/25 1934    Lab Status: Final result Specimen: Urine,  Clean Catch Updated: 02/21/25 1940     Color, UA Yellow     Clarity, UA Clear     Specific Gravity, UA 1.015     pH, UA 7.0     Leukocytes, UA Negative     Nitrite, UA Negative     Protein, UA 2+ mg/dl      Glucose, UA Negative mg/dl      Ketones, UA Negative mg/dl      Urobilinogen, UA 0.2 E.U./dl      Bilirubin, UA Negative     Occult Blood, UA 2+    Ethanol [[id number]]  (Normal) Collected: 02/21/25 1737    Lab Status: Final result Specimen: Blood from Arm, Right Updated: 02/21/25 1841     Ethanol Lvl <10 mg/dL     FLU/COVID Rapid Antigen (30 min. TAT) - Preferred screening test in ED [[id number]]  (Normal) Collected: 02/21/25 1801    Lab Status: Final result Specimen: Nares from Nose Updated: 02/21/25 1824     SARS COV Rapid Antigen Negative     Influenza A Rapid Antigen Negative     Influenza B Rapid Antigen Negative    Narrative:      This test has been performed using the Openeraidel Rowena 2 FLU+SARS Antigen test under the Emergency Use Authorization (EUA). This test has been validated by the  and verified by the performing laboratory. The Rowena uses lateral flow immunofluorescent sandwich assay to detect SARS-COV, Influenza A and Influenza B Antigen.     The Quidel Rowena 2 SARS Antigen test does not differentiate between SARS-CoV and SARS-CoV-2.     Negative results are presumptive and may be confirmed with a molecular assay, if necessary, for patient management. Negative results do not rule out SARS-CoV-2 or influenza infection and should not be used as the sole basis for treatment or patient management decisions. A negative test result may occur if the level of antigen in a sample is below the limit of detection of this test.     Positive results are indicative of the presence of viral antigens, but do not rule out bacterial infection or co-infection with other viruses.     All test results should be used as an adjunct to clinical observations and other information available to the provider.    FOR  PEDIATRIC PATIENTS - copy/paste COVID Guidelines URL to browser: [email or web address]    TSH, 3rd generation with Free T4 reflex [[id number]]  (Normal) Collected: 02/21/25 1737    Lab Status: Final result Specimen: Blood from Arm, Right Updated: 02/21/25 1817     TSH 3RD GENERATON 2.048 uIU/mL     HS Troponin 0hr (reflex protocol) [[id number]]  (Normal) Collected: 02/21/25 1737    Lab Status: Final result Specimen: Blood from Arm, Right Updated: 02/21/25 1808     hs TnI 0hr 6 ng/L     Comprehensive metabolic panel [[id number]]  (Abnormal) Collected: 02/21/25 1737    Lab Status: Final result Specimen: Blood from Arm, Right Updated: 02/21/25 1806     Sodium 141 mmol/L      Potassium 3.9 mmol/L      Chloride 105 mmol/L      CO2 27 mmol/L      ANION GAP 9 mmol/L      BUN 22 mg/dL      Creatinine 1.30 mg/dL      Glucose 85 mg/dL      Calcium 8.7 mg/dL      AST 16 U/L      ALT 12 U/L      Alkaline Phosphatase 110 U/L      Total Protein 7.6 g/dL      Albumin 3.6 g/dL      Total Bilirubin 0.24 mg/dL      eGFR 42 ml/min/1.73sq m     Narrative:      National Kidney Disease Foundation guidelines for Chronic Kidney Disease (CKD):     Stage 1 with normal or high GFR (GFR > 90 mL/min/1.73 square meters)    Stage 2 Mild CKD (GFR = 60-89 mL/min/1.73 square meters)    Stage 3A Moderate CKD (GFR = 45-59 mL/min/1.73 square meters)    Stage 3B Moderate CKD (GFR = 30-44 mL/min/1.73 square meters)    Stage 4 Severe CKD (GFR = 15-29 mL/min/1.73 square meters)    Stage 5 End Stage CKD (GFR <15 mL/min/1.73 square meters)  Note: GFR calculation is accurate only with a steady state creatinine    Magnesium [[id number]]  (Normal) Collected: 02/21/25 1737    Lab Status: Final result Specimen: Blood from Arm, Right Updated: 02/21/25 1806     Magnesium 2.0 mg/dL     D-dimer, quantitative [[id number]]  (Abnormal) Collected: 02/21/25 9309    Lab Status: Final result Specimen: Blood from Arm, Right  Updated: 02/21/25 1756     D-Dimer, Quant 1.60 ug/ml FEU     Narrative:      In the evaluation for possible pulmonary embolism, in the appropriate (Well's Score of 4 or less) patient, the age adjusted d-dimer cutoff for this patient can be calculated as:    Age x 0.01 (in ug/mL) for Age-adjusted D-dimer exclusion threshold for a patient over 50 years.    CBC and differential [[id number]]  (Abnormal) Collected: 02/21/25 1737    Lab Status: Final result Specimen: Blood from Arm, Right Updated: 02/21/25 1744     WBC 9.20 Thousand/uL      RBC 4.24 Million/uL      Hemoglobin 12.6 g/dL      Hematocrit 40.2 %      MCV 95 fL      MCH 29.7 pg      MCHC 31.3 g/dL      RDW 13.8 %      MPV 10.5 fL      Platelets 340 Thousands/uL      nRBC 0 /100 WBCs      Segmented % 72 %      Immature Grans % 0 %      Lymphocytes % 17 %      Monocytes % 8 %      Eosinophils Relative 2 %      Basophils Relative 1 %      Absolute Neutrophils 6.62 Thousands/µL      Absolute Immature Grans 0.04 Thousand/uL      Absolute Lymphocytes 1.58 Thousands/µL      Absolute Monocytes 0.76 Thousand/µL      Eosinophils Absolute 0.15 Thousand/µL      Basophils Absolute 0.05 Thousands/µL             CTA chest pe study   Final Interpretation by Rishabh Young MD (02/21 1912)      1.  No PE.   2.  Chronic granulomatous disease.   3.  Right greater than left nodular adrenal gland thickening, unchanged.                  Workstation performed: [id number]         XR chest 1 view portable   ED Interpretation by Jayden Maya MD (02/21 1913)   No acute cardiopulmonary disease noted.  Similar to previous x-ray.          ECG 12 Lead Documentation Only    Date/Time: 2/21/2025 6:00 PM    Performed by: Jayden Maya MD  Authorized by: Jayden Maya MD    Indications / Diagnosis:  SOB  ECG reviewed by me, the ED Provider: yes    Patient location:  ED  Previous ECG:     Previous ECG:  Compared to current    Similarity:  No change  Interpretation:     Interpretation:  normal    Rate:     ECG rate:  93    ECG rate assessment: normal    Rhythm:     Rhythm: sinus rhythm    Ectopy:     Ectopy: none    QRS:     QRS axis:  Normal    QRS intervals:  Normal  Conduction:     Conduction: normal    ST segments:     ST segments:  Normal  T waves:     T waves: normal        ED Medication and Procedure Management   Prior to Admission Medications   Prescriptions Last Dose Informant Patient Reported? Taking?   albuterol (2.5 mg/3 mL) 0.083 % nebulizer solution  Self No No   Sig: Take 3 mL (2.5 mg total) by nebulization every 6 (six) hours as needed for wheezing or shortness of breath   albuterol (Proventil HFA) 90 mcg/act inhaler  Self No No   Sig: Inhale 2 puffs every 6 (six) hours as needed for wheezing   carBAMazepine (TEGretol) 200 mg tablet   No No   Sig: Take 1 tablet (200 mg total) by mouth 3 (three) times a day   carbamide peroxide (DEBROX) 6.5 % otic solution   No No   Sig: Administer 5 drops into both ears 2 (two) times a day for 4 days Do not start before September 30, 2024.   cholecalciferol 1,000 units tablet  Self No No   Sig: Take 2 tablets (2,000 Units total) by mouth daily   escitalopram (LEXAPRO) 10 mg tablet   No No   Sig: Take 1 tablet (10 mg total) by mouth daily   levETIRAcetam (KEPPRA) 750 mg tablet   No No   Sig: Take 2 tablets (1,500 mg total) by mouth 2 (two) times a day   losartan (COZAAR) 25 mg tablet   No No   Sig: Take 1 tablet (25 mg total) by mouth daily   metoprolol succinate (TOPROL-XL) 50 mg 24 hr tablet   No No   Sig: Take 1 tablet (50 mg total) by mouth daily   nicotine (NICODERM CQ) 21 mg/24 hr TD 24 hr patch  Self No No   Sig: Place 1 patch on the skin over 24 hours daily   pramipexole (MIRAPEX) 1.5 MG tablet   No No   Sig: Take 1 tablet (1.5 mg total) by mouth daily at bedtime   spironolactone (ALDACTONE) 25 mg tablet  Self No No   Sig: Take 1 tablet (25 mg total) by mouth daily      Facility-Administered Medications Last Administration Doses Remaining    cyanocobalamin injection 1,000 mcg None recorded         Patient's Medications   Discharge Prescriptions    No medications on file     No discharge procedures on file.  ED SEPSIS DOCUMENTATION   Time reflects when diagnosis was documented in both MDM as applicable and the Disposition within this note       Time User Action Codes Description Comment    2/21/2025  8:01 PM Princess Sims Add [R45.851] Suicidal ideations     2/21/2025  8:01 PM Princess Sims [R06.02] SOB (shortness of breath)                  Jayden Maya MD  02/21/25 2048

## 2025-02-22 VITALS
SYSTOLIC BLOOD PRESSURE: 160 MMHG | TEMPERATURE: 97.9 F | OXYGEN SATURATION: 95 % | DIASTOLIC BLOOD PRESSURE: 101 MMHG | HEART RATE: 102 BPM | RESPIRATION RATE: 18 BRPM

## 2025-02-22 LAB
ATRIAL RATE: 93 BPM
P AXIS: 26 DEGREES
PR INTERVAL: 142 MS
QRS AXIS: 49 DEGREES
QRSD INTERVAL: 68 MS
QT INTERVAL: 334 MS
QTC INTERVAL: 415 MS
T WAVE AXIS: 65 DEGREES
VENTRICULAR RATE: 93 BPM

## 2025-02-22 PROCEDURE — 93010 ELECTROCARDIOGRAM REPORT: CPT | Performed by: INTERNAL MEDICINE

## 2025-02-22 RX ORDER — LORAZEPAM 1 MG/1
2 TABLET ORAL ONCE
Status: COMPLETED | OUTPATIENT
Start: 2025-02-22 | End: 2025-02-22

## 2025-02-22 RX ADMIN — LOSARTAN POTASSIUM 25 MG: 25 TABLET, FILM COATED ORAL at 08:22

## 2025-02-22 RX ADMIN — LEVETIRACETAM 1500 MG: 500 TABLET, FILM COATED ORAL at 08:21

## 2025-02-22 RX ADMIN — CARBAMAZEPINE 200 MG: 200 TABLET ORAL at 08:21

## 2025-02-22 RX ADMIN — ESCITALOPRAM OXALATE 10 MG: 20 TABLET ORAL at 08:22

## 2025-02-22 RX ADMIN — CARBAMAZEPINE 200 MG: 200 TABLET ORAL at 00:24

## 2025-02-22 RX ADMIN — PRAMIPEXOLE DIHYDROCHLORIDE 1.5 MG: 0.25 TABLET ORAL at 00:24

## 2025-02-22 RX ADMIN — SPIRONOLACTONE 25 MG: 25 TABLET ORAL at 08:22

## 2025-02-22 RX ADMIN — LORAZEPAM 2 MG: 1 TABLET ORAL at 04:58

## 2025-02-22 RX ADMIN — ALBUTEROL SULFATE 2.5 MG: 2.5 SOLUTION RESPIRATORY (INHALATION) at 08:33

## 2025-02-22 RX ADMIN — PRAMIPEXOLE DIHYDROCHLORIDE 1.5 MG: 0.25 TABLET ORAL at 08:32

## 2025-02-22 NOTE — EMTALA/ACUTE CARE TRANSFER
[facility] EMERGENCY DEPARTMENT  [address] [place] [id number]  Dept: [phone]      EMTALA TRANSFER CONSENT    NAME Cydney Lim                                         1960                              MRN [id number]    I have been informed of my rights regarding examination, treatment, and transfer   by Dr. Eran Vanegas DO    Benefits: Specialized equipment and/or services available at the receiving facility (Include comment)________________________    Risks: Potential for delay in receiving treatment, Potential deterioration of medical condition, Increased discomfort during transfer, Possible worsening of condition or death during transfer      Consent for Transfer:  I acknowledge that my medical condition has been evaluated and explained to me by the emergency department physician or other qualified medical person and/or my attending physician, who has recommended that I be transferred to the service of  Accepting Physician: Dr. Rudd at Accepting Facility Name, City & State : [facility]. The above potential benefits of such transfer, the potential risks associated with such transfer, and the probable risks of not being transferred have been explained to me, and I fully understand them.  The doctor has explained that, in my case, the benefits of transfer outweigh the risks.  I agree to be transferred.    I authorize the performance of emergency medical procedures and treatments upon me in both transit and upon arrival at the receiving facility.  Additionally, I authorize the release of any and all medical records to the receiving facility and request they be transported with me, if possible.  I understand that the safest mode of transportation during a medical emergency is an ambulance and that the Hospital advocates the use of this mode of transport. Risks of traveling to the receiving facility by car, including absence of medical control, life sustaining equipment,  such as oxygen, and medical personnel has been explained to me and I fully understand them.    (GUMARO CORRECT BOX BELOW)  [  ]  I consent to the stated transfer and to be transported by ambulance/helicopter.  [  ]  I consent to the stated transfer, but refuse transportation by ambulance and accept full responsibility for my transportation by car.  I understand the risks of non-ambulance transfers and I exonerate the Hospital and its staff from any deterioration in my condition that results from this refusal.    X___________________________________________    DATE  25  TIME________  Signature of patient or legally responsible individual signing on patient behalf           RELATIONSHIP TO PATIENT_________________________          Provider Certification    NAME Cydney Lim                                         1960                              MRN [id number]    A medical screening exam was performed on the above named patient.  Based on the examination:    Condition Necessitating Transfer The primary encounter diagnosis was Suicidal ideations. A diagnosis of SOB (shortness of breath) was also pertinent to this visit.    Patient Condition: The patient has been stabilized such that within reasonable medical probability, no material deterioration of the patient condition or the condition of the unborn child(marcos) is likely to result from the transfer    Reason for Transfer: Level of Care needed not available at this facility    Transfer Requirements: Facility [facility]   Space available and qualified personnel available for treatment as acknowledged by PAC  Agreed to accept transfer and to provide appropriate medical treatment as acknowledged by       Dr. Rudd  Appropriate medical records of the examination and treatment of the patient are provided at the time of transfer   STAFF INITIAL WHEN COMPLETED _______  Transfer will be performed by qualified personnel from    and appropriate transfer  equipment as required, including the use of necessary and appropriate life support measures.    Provider Certification: I have examined the patient and explained the following risks and benefits of being transferred/refusing transfer to the patient/family:  General risk, such as traffic hazards, adverse weather conditions, rough terrain or turbulence, possible failure of equipment (including vehicle or aircraft), or consequences of actions of persons outside the control of the transport personnel, Unanticipated needs of medical equipment and personnel during transport, Risk of worsening condition, The possibility of a transport vehicle being unavailable      Based on these reasonable risks and benefits to the patient and/or the unborn child(marcos), and based upon the information available at the time of the patient’s examination, I certify that the medical benefits reasonably to be expected from the provision of appropriate medical treatments at another medical facility outweigh the increasing risks, if any, to the individual’s medical condition, and in the case of labor to the unborn child, from effecting the transfer.    X____________________________________________ DATE 02/22/25        TIME_______      ORIGINAL - SEND TO MEDICAL RECORDS   COPY - SEND WITH PATIENT DURING TRANSFER

## 2025-02-22 NOTE — ED NOTES
Attempted to call report to number provided. Number appears incorrect. Crisis Messaged regarding information accuracy. Awaiting response. Transport arrived for pt.      Yolis Hernandez RN  02/22/25 [id number]

## 2025-02-22 NOTE — ED NOTES
Crisis prepared 201 and sent for signatures.  Pt informed about inpatient psychiatric treatment process, 201 rights and 72 hours notice. Pt appears to be in understanding.    Pt does not want to be placed at [facility].

## 2025-02-22 NOTE — ED NOTES
Insurance Authorization for admission:       Per [facility] admission/Clementina will be completing authorization and COB.

## 2025-02-22 NOTE — ED NOTES
Pt sleeping at this time. Respirations are symmetrical, rhythmic, quiet, and unlabored. No overt signs of distress at this time. Anticipated sleep movements observed.      Yolis Hernandez RN  02/22/25 0792

## 2025-02-22 NOTE — ED NOTES
RN looked up number for [facility] and called Report to Candace PERALTA at Admissions and intake.        Yolis Hernandez RN  02/22/25 0901

## 2025-02-22 NOTE — ED CARE HANDOFF
Emergency Department Sign Out Note          ED Course as of 02/22/25 0510   Sat Feb 22, 2025   0510 The patient has been accepted at [facility].     Procedures  Medical Decision Making  Amount and/or Complexity of Data Reviewed  Labs: ordered.  Radiology: ordered and independent interpretation performed.    Risk  OTC drugs.  Prescription drug management.  Decision regarding hospitalization.            Disposition  Final diagnoses:   Suicidal ideations   SOB (shortness of breath)     Time reflects when diagnosis was documented in both MDM as applicable and the Disposition within this note       Time User Action Codes Description Comment    2/21/2025  8:01 PM Princess Sims [R45.851] Suicidal ideations     2/21/2025  8:01 PM Princess Sims [R06.02] SOB (shortness of breath)           ED Disposition       ED Disposition   Transfer to Behavioral Health    Condition   --    Date/Time   Fri Feb 21, 2025  8:01 PM    Comment   Cydneylorraine Lim should be transferred out to behavioral health and has been medically cleared.                MD Documentation      Flowsheet Row Most Recent Value   Sending MD Robbins          Follow-up Information    None       Patient's Medications   Discharge Prescriptions    No medications on file     No discharge procedures on file.       ED Provider  Electronically Signed by     Eran Vanegas DO  02/22/25 0510

## 2025-02-22 NOTE — ED NOTES
Bed search:      Intake, per Cherise, no beds available    [facility]- per admission/left message to answering personnel    [facility]- per admission/Karla, sent referral for review    [facility]- per admission/Cade, sent referral for review

## 2025-02-22 NOTE — ED NOTES
Cydney Lim is a 65 y/o female, diagnosed with  Depression, recurrent, who presents to ED due to suicidal threats.  Crisis Intake Assessment completed virtually by this writer, located at [facility].    Patient is currently located at [facility].    Patient provided verbal consent to virtually participate in this crisis intake assessment.    Dr. Sims also provided agreement for virtual assessment to take place at this time.     Pt appears agitated. Pt cooperative at time. Pt oriented x4. Currently no mental xavi services. Pt lives alone, and reports no support.  Pt reports she has 2 brothers who denying any type of help. Pt appears to be very frustrated and states there is no point of living for me. Pt reports she has intention on overdose on medication to kill herself. Pt reports previous suicide attempt but was unable to specify as to when.  Pt denies homicidal ideations. No self harming behaviors. No hallucinations. Pt reports no issues with the appetite. Pt reports some disturbances with sleep. Pt unable to contract for her safety at this time and is willing to sign voluntary admission.

## 2025-02-22 NOTE — ED NOTES
The [facility] you requested for Cydney TAVERAS in unit/room SH 01 on 02/22/2025 is scheduled to arrive at 9:00am EST! [facility].

## 2025-02-22 NOTE — ED NOTES
Patient speaking with Yamel, Crisis Worker via Virtual Rounding     Tremaine Grace RN  02/21/25 2003

## 2025-02-22 NOTE — ED NOTES
Patient is accepted at [facility].  Patient is accepted by Dr. Tobin Mcrae/admission.     Transportation is arranged with [facility].     Transportation is scheduled for pending.   Patient may go to the floor at pending.          Nurse report is to be called to [phone] prior to patient transfer.

## 2025-03-06 ENCOUNTER — HOSPITAL ENCOUNTER (EMERGENCY)
Facility: HOSPITAL | Age: 65
Discharge: HOME | End: 2025-03-06
Attending: EMERGENCY MEDICINE
Payer: MEDICARE

## 2025-03-06 ENCOUNTER — APPOINTMENT (EMERGENCY)
Dept: RADIOLOGY | Facility: HOSPITAL | Age: 65
End: 2025-03-06
Payer: MEDICARE

## 2025-03-06 VITALS
TEMPERATURE: 97.7 F | RESPIRATION RATE: 18 BRPM | OXYGEN SATURATION: 99 % | SYSTOLIC BLOOD PRESSURE: 131 MMHG | DIASTOLIC BLOOD PRESSURE: 60 MMHG | HEART RATE: 90 BPM
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
## 2025-03-06 PROCEDURE — 99283 EMERGENCY DEPT VISIT LOW MDM: CPT

## 2025-03-06 PROCEDURE — 73130 X-RAY EXAM OF HAND: CPT

## 2025-03-06 PROCEDURE — 99284 EMERGENCY DEPT VISIT MOD MDM: CPT | Performed by: EMERGENCY MEDICINE

## 2025-03-06 PROCEDURE — 90715 TDAP VACCINE 7 YRS/> IM: CPT

## 2025-03-06 PROCEDURE — 90471 IMMUNIZATION ADMIN: CPT

## 2025-03-06 RX ADMIN — TETANUS TOXOID, REDUCED DIPHTHERIA TOXOID AND ACELLULAR PERTUSSIS VACCINE, ADSORBED 0.5 ML: 5; 2.5; 8; 8; 2.5 SUSPENSION INTRAMUSCULAR at 20:03

## 2025-03-07 NOTE — ED ATTENDING ATTESTATION
3/6/2025  I, Santiago Sue MD, saw and evaluated the patient. I have discussed the patient with the resident/non-physician practitioner and agree with the resident's/non-physician practitioner's findings, Plan of Care, and MDM as documented in the resident's/non-physician practitioner's note, except where noted. All available labs and Radiology studies were reviewed.  I was present for key portions of any procedure(s) performed by the resident/non-physician practitioner and I was immediately available to provide assistance.       At this point I agree with the current assessment done in the Emergency Department.  I have conducted an independent evaluation of this patient a history and physical is as follows:    64-year-old female presents to the emergency department for evaluation after a fall.  The patient reports that approximately 30 minutes prior to arrival she slipped and fell while walking to a bus stop.  Patient reports that she braced her fall with her left hand.  She denies head strike, loss of consciousness or using any antiplatelet or anticoagulation medications.  Patient reports having a cut and pain to her left hand.  She did not take any medications to treat her symptoms prior to arrival.  Patient states that she is not sure when her last tetanus shot was.  No headache, blurry vision, neck pain, back pain or localized numbness, tingling or weakness.    A 10 point review of systems was conducted and negative except for as stated above    Physical Exam  Vitals and nursing note reviewed.   Constitutional:       General: She is not in acute distress.     Appearance: She is well-developed.   HENT:      Head: Normocephalic and atraumatic.   Eyes:      Extraocular Movements: Extraocular movements intact.      Conjunctiva/sclera: Conjunctivae normal.      Pupils: Pupils are equal, round, and reactive to light.   Cardiovascular:      Rate and Rhythm: Normal rate and regular rhythm.      Pulses:            Radial pulses are 2+ on the right side and 2+ on the left side.   Pulmonary:      Effort: Pulmonary effort is normal. No respiratory distress.      Breath sounds: Normal breath sounds.   Abdominal:      Palpations: Abdomen is soft.      Tenderness: There is no abdominal tenderness.   Musculoskeletal:         General: Tenderness present. No swelling.        Hands:       Cervical back: Normal and neck supple. No pain with movement or spinous process tenderness.      Thoracic back: Normal.      Lumbar back: Normal.      Right hip: Normal.      Left hip: Normal.      Comments: M/U/R/A nerve sensation intact.    strength 5/5.   Good capillary refill. 2+ radial pulses, bilaterally equal.   Finger abduction/adduction/opposition intact.   Supination/Pronation intact without reproduction of pain.   Able to 1+2 and 1+5 finger appose.   Able to 2+3 finger cross.   No scaphoid tenderness  No snuff box tenderness.   WE/WF/WRD/WUD 5/5, intact, without pain.   BICEPS/TRICEPS 5/5.   Shoulder abduction/adduction 5/5.      Skin:     General: Skin is warm and dry.      Capillary Refill: Capillary refill takes less than 2 seconds.      Findings: Abrasion (left hand) present.   Neurological:      Mental Status: She is alert.      GCS: GCS eye subscore is 4. GCS verbal subscore is 5. GCS motor subscore is 6.      Cranial Nerves: Cranial nerves 2-12 are intact.      Sensory: Sensation is intact.      Motor: Motor function is intact.      Gait: Gait is intact.   Psychiatric:         Mood and Affect: Mood normal.           ED Course     64-year-old female presented to the emergency department for evaluation after a mechanical fall.  On arrival patient awake, alert and in no acute distress.  On exam patient with a superficial abrasion to her left hand.  Imaging done in the emergency department on my independent interpretation with no acute fracture or dislocation.  Patient was noted to have an old distal left radius fracture.  Patient  without tenderness to this location.  Patient was provided with a tetanus shot.  All diagnostic studies were discussed with the patient in detail.  She is appropriate for discharge.  Recommendation was made for the patient to follow-up with her PCP.    The patient (and/or family present) agrees with the plan for discharge and feels comfortable to go home with proper follow-up. Diagnostic tests were reviewed. Diagnosis, care plan and treatment options were discussed. All questions were answered prior to discharge. I considered the patient's other medical conditions as applicable/noted above in my medical decision making. Advised the patient to return for worsening or additional problems. The patient (and/or family present) verbalized understanding of the discharge instructions and warnings that would necessitate return to the Emergency Department.        Critical Care Time  Procedures

## 2025-03-07 NOTE — ED PROVIDER NOTES
Time reflects when diagnosis was documented in both MDM as applicable and the Disposition within this note       Time User Action Codes Description Comment    3/6/2025  8:27 PM Jabier Guerrero Add [W19.XXXA] Fall, initial encounter           ED Disposition       ED Disposition   Discharge    Condition   Stable    Date/Time   Thu Mar 6, 2025  8:28 PM    Comment   Cydney Lim discharge to home/self care.                   Assessment & Plan       Medical Decision Making  Patient is a 64-year-old female who presents after mechanical fall without head strike. Has abrasion to left hand no other signs of trauma elsewhere. Left hand x-ray not concerning for acute fracture  Apply ice. Tdap immunization.  Return precautions discussed.  Patient stable for discharge home.    Amount and/or Complexity of Data Reviewed  Radiology: ordered and independent interpretation performed.    Risk  Prescription drug management.             Medications   tetanus-diphtheria-acellular pertussis (BOOSTRIX) IM injection 0.5 mL (0.5 mL Intramuscular Given 3/6/25 2003)       ED Risk Strat Scores                            SBIRT 22yo+      Flowsheet Row Most Recent Value   Initial Alcohol Screen: US AUDIT-C     1. How often do you have a drink containing alcohol? 0 Filed at: 03/06/2025 1940   2. How many drinks containing alcohol do you have on a typical day you are drinking?  0 Filed at: 03/06/2025 1940   3b. FEMALE Any Age, or MALE 65+: How often do you have 4 or more drinks on one occassion? 0 Filed at: 03/06/2025 1940   Audit-C Score 0 Filed at: 03/06/2025 1940   JOHN: How many times in the past year have you...    Used an illegal drug or used a prescription medication for non-medical reasons? Never Filed at: 03/06/2025 1940                            History of Present Illness       Chief Complaint   Patient presents with    Fall     Pt arrived via ems with c/o falling and landing on her left hand, small abrasion. Positive pulses       Past  "Medical History:   Diagnosis Date    Depression     EP (epilepsy) (HCC)     Epilepsy (HCC)     Obesity     Restless leg       Past Surgical History:   Procedure Laterality Date    DENTAL SURGERY      all teeth removed    LASER ABLATION OF THE CERVIX      MAMMO (HISTORICAL)  05/01/2017      Family History   Problem Relation Age of Onset    Kidney disease Mother     Liver disease Mother     Heart attack Mother     Heart attack Father     No Known Problems Brother     No Known Problems Son       Social History     Tobacco Use    Smoking status: Every Day     Current packs/day: 0.50     Average packs/day: 0.5 packs/day for 23.0 years (11.5 ttl pk-yrs)     Types: Cigarettes    Smokeless tobacco: Never   Vaping Use    Vaping status: Never Used   Substance Use Topics    Alcohol use: Never     Comment: pt denies alcohol use    Drug use: Never      E-Cigarette/Vaping    E-Cigarette Use Never User       E-Cigarette/Vaping Substances    Nicotine Yes     THC No     CBD No     Flavoring No     Other No     Unknown No       I have reviewed and agree with the history as documented.     64-year-old female who [place]  Patient states that she was walking to the bus stop and her bilateral legs \"gave out\".  Patient is a very poor historian and history does not appear consistent.  She states that she may have fallen and hit the right side of her head but she denies any pain in that area.  No sign of trauma to the area.  She states that she fell on her left side and fell with her left hand outstretched which caused the abrasion on her palm.  No other signs of trauma that she is noted.  She states that her bilateral ankles are also painful from swelling though this is not from the fall.  Denying any other pains from the fall.  Patient denying any loss of consciousness.  Patient denying fever, chills, nausea, vomiting, diarrhea, chest pain, shortness of breath, abdominal " pain.      Fall      Review of Systems   All other systems reviewed and are negative.          Objective       ED Triage Vitals [03/06/25 1941]   Temperature Pulse Blood Pressure Respirations SpO2 Patient Position - Orthostatic VS   97.7 °F (36.5 °C) 90 131/60 18 99 % Lying      Temp Source Heart Rate Source BP Location FiO2 (%) Pain Score    Oral Monitor Left arm -- --      Vitals      Date and Time Temp Pulse SpO2 Resp BP Pain Score FACES Pain Rating User   03/06/25 1941 97.7 °F (36.5 °C) 90 99 % 18 131/60 -- -- MO            Physical Exam  Constitutional:       Appearance: Normal appearance. She is not ill-appearing.   HENT:      Head: Normocephalic and atraumatic.      Nose: Nose normal.      Mouth/Throat:      Mouth: Mucous membranes are moist.      Pharynx: Oropharynx is clear.   Eyes:      Extraocular Movements: Extraocular movements intact.      Conjunctiva/sclera: Conjunctivae normal.      Pupils: Pupils are equal, round, and reactive to light.   Cardiovascular:      Rate and Rhythm: Normal rate and regular rhythm.      Heart sounds: No murmur heard.  Pulmonary:      Effort: Pulmonary effort is normal.      Breath sounds: Normal breath sounds. No wheezing, rhonchi or rales.   Abdominal:      General: Abdomen is flat. Bowel sounds are normal.      Palpations: Abdomen is soft.      Tenderness: There is no abdominal tenderness.   Musculoskeletal:         General: Swelling present. No tenderness.      Right lower leg: No edema.      Left lower leg: No edema.   Skin:     General: Skin is warm and dry.      Findings: Lesion (On the left palm at the base of index finger) present.   Neurological:      General: No focal deficit present.      Mental Status: She is alert and oriented to person, place, and time.   Psychiatric:         Mood and Affect: Mood normal.         Behavior: Behavior normal.         Results Reviewed       None            XR hand 3+ views LEFT   ED Interpretation by Santiago Seu MD (03/06  2027)   No acute fracture or dislocation.  Old distal radius fracture.          Procedures    ED Medication and Procedure Management   Prior to Admission Medications   Prescriptions Last Dose Informant Patient Reported? Taking?   albuterol (2.5 mg/3 mL) 0.083 % nebulizer solution  Self No No   Sig: Take 3 mL (2.5 mg total) by nebulization every 6 (six) hours as needed for wheezing or shortness of breath   albuterol (Proventil HFA) 90 mcg/act inhaler  Self No No   Sig: Inhale 2 puffs every 6 (six) hours as needed for wheezing   carBAMazepine (TEGretol) 200 mg tablet   No No   Sig: Take 1 tablet (200 mg total) by mouth 3 (three) times a day   carbamide peroxide (DEBROX) 6.5 % otic solution   No No   Sig: Administer 5 drops into both ears 2 (two) times a day for 4 days Do not start before September 30, 2024.   cholecalciferol 1,000 units tablet  Self No No   Sig: Take 2 tablets (2,000 Units total) by mouth daily   escitalopram (LEXAPRO) 10 mg tablet   No No   Sig: Take 1 tablet (10 mg total) by mouth daily   levETIRAcetam (KEPPRA) 750 mg tablet   No No   Sig: Take 2 tablets (1,500 mg total) by mouth 2 (two) times a day   losartan (COZAAR) 25 mg tablet   No No   Sig: Take 1 tablet (25 mg total) by mouth daily   metoprolol succinate (TOPROL-XL) 50 mg 24 hr tablet   No No   Sig: Take 1 tablet (50 mg total) by mouth daily   nicotine (NICODERM CQ) 21 mg/24 hr TD 24 hr patch  Self No No   Sig: Place 1 patch on the skin over 24 hours daily   pramipexole (MIRAPEX) 1.5 MG tablet   No No   Sig: Take 1 tablet (1.5 mg total) by mouth daily at bedtime   spironolactone (ALDACTONE) 25 mg tablet  Self No No   Sig: Take 1 tablet (25 mg total) by mouth daily   traZODone (DESYREL) 50 mg tablet   Yes No   Sig: Take 50 mg by mouth      Facility-Administered Medications Last Administration Doses Remaining   cyanocobalamin injection 1,000 mcg None recorded         Patient's Medications   Discharge Prescriptions    No medications on file      No discharge procedures on file.  ED SEPSIS DOCUMENTATION   Time reflects when diagnosis was documented in both MDM as applicable and the Disposition within this note       Time User Action Codes Description Comment    3/6/2025  8:27 PM Jabier Guerrero Add [W19.XXXA] Fall, initial encounter                  Jabier Guerrero MD  03/06/25 2029       Jabier Guerrero MD  03/06/25 2042

## 2025-03-16 ENCOUNTER — APPOINTMENT (EMERGENCY)
Dept: CT IMAGING | Facility: HOSPITAL | Age: 65
End: 2025-03-16
Payer: COMMERCIAL

## 2025-03-16 ENCOUNTER — HOSPITAL ENCOUNTER (EMERGENCY)
Facility: HOSPITAL | Age: 65
Discharge: HOME | End: 2025-03-16
Attending: SURGERY
Payer: COMMERCIAL

## 2025-03-16 ENCOUNTER — APPOINTMENT (EMERGENCY)
Dept: RADIOLOGY | Facility: HOSPITAL | Age: 65
End: 2025-03-16
Payer: COMMERCIAL

## 2025-03-16 VITALS
WEIGHT: 243.61 LBS | SYSTOLIC BLOOD PRESSURE: 133 MMHG | OXYGEN SATURATION: 95 % | HEART RATE: 87 BPM | TEMPERATURE: 97.8 F | RESPIRATION RATE: 17 BRPM | DIASTOLIC BLOOD PRESSURE: 58 MMHG
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
## 2025-03-16 PROBLEM — R00.0 SINUS TACHYCARDIA: Status: ACTIVE | Noted: 2025-03-16

## 2025-03-16 LAB
BASE EXCESS BLDA CALC-SCNC: 3 MMOL/L (ref -2–3)
CA-I BLD-SCNC: 1.18 MMOL/L (ref 1.12–1.32)
GLUCOSE SERPL-MCNC: 104 MG/DL (ref 65–140)
HCO3 BLDA-SCNC: 27.4 MMOL/L (ref 24–30)
HCT VFR BLD CALC: 35 % (ref 34.8–46.1)
HGB BLDA-MCNC: 11.9 G/DL (ref 11.5–15.4)
HOLD SPECIMEN: NORMAL
PCO2 BLD: 29 MMOL/L (ref 21–32)
PCO2 BLD: 42.4 MM HG (ref 42–50)
PH BLD: 7.42 [PH] (ref 7.3–7.4)
PO2 BLD: 29 MM HG (ref 35–45)
POTASSIUM BLD-SCNC: 3.6 MMOL/L (ref 3.5–5.3)
SAO2 % BLD FROM PO2: 55 % (ref 60–85)
SODIUM BLD-SCNC: 143 MMOL/L (ref 136–145)
SPECIMEN SOURCE: ABNORMAL

## 2025-03-16 PROCEDURE — 82803 BLOOD GASES ANY COMBINATION: CPT

## 2025-03-16 PROCEDURE — 82330 ASSAY OF CALCIUM: CPT

## 2025-03-16 PROCEDURE — 73610 X-RAY EXAM OF ANKLE: CPT

## 2025-03-16 PROCEDURE — 74177 CT ABD & PELVIS W/CONTRAST: CPT

## 2025-03-16 PROCEDURE — 99284 EMERGENCY DEPT VISIT MOD MDM: CPT

## 2025-03-16 PROCEDURE — 71045 X-RAY EXAM CHEST 1 VIEW: CPT

## 2025-03-16 PROCEDURE — 70450 CT HEAD/BRAIN W/O DYE: CPT

## 2025-03-16 PROCEDURE — 76705 ECHO EXAM OF ABDOMEN: CPT | Performed by: PHYSICIAN ASSISTANT

## 2025-03-16 PROCEDURE — 76604 US EXAM CHEST: CPT | Performed by: PHYSICIAN ASSISTANT

## 2025-03-16 PROCEDURE — 99204 OFFICE O/P NEW MOD 45 MIN: CPT | Performed by: SURGERY

## 2025-03-16 PROCEDURE — 72125 CT NECK SPINE W/O DYE: CPT

## 2025-03-16 PROCEDURE — 93308 TTE F-UP OR LMTD: CPT | Performed by: PHYSICIAN ASSISTANT

## 2025-03-16 PROCEDURE — 84132 ASSAY OF SERUM POTASSIUM: CPT

## 2025-03-16 PROCEDURE — 73630 X-RAY EXAM OF FOOT: CPT

## 2025-03-16 PROCEDURE — 82947 ASSAY GLUCOSE BLOOD QUANT: CPT

## 2025-03-16 PROCEDURE — 84295 ASSAY OF SERUM SODIUM: CPT

## 2025-03-16 PROCEDURE — 36415 COLL VENOUS BLD VENIPUNCTURE: CPT | Performed by: PHYSICIAN ASSISTANT

## 2025-03-16 PROCEDURE — 71260 CT THORAX DX C+: CPT

## 2025-03-16 PROCEDURE — EDAIR PR ED AIR: Performed by: EMERGENCY MEDICINE

## 2025-03-16 PROCEDURE — 85014 HEMATOCRIT: CPT

## 2025-03-16 RX ADMIN — IOHEXOL 85 ML: 350 INJECTION, SOLUTION INTRAVENOUS at 12:41

## 2025-03-16 NOTE — QUICK NOTE
Cervical Collar Clearance:    The patient had a CT scan of the cervical spine demonstrating no acute injury. On exam, the patient had no midline point tenderness or paresthesias/numbness/weakness in the extremities. The patient had full range of motion (was then able to flex, extend, and rotate head laterally) without pain. There were no distracting injuries and the patient was not intoxicated.      The patient's cervical spine was cleared radiologically and clinically. Cervical collar removed at this time.     Rich Kunz PA-C  3/16/2025 2:12 PM

## 2025-03-16 NOTE — ED PROVIDER NOTES
Emergency Department Airway Evaluation and Management Form    History  Obtained from: EMS and patient  Patient has no allergy information on record.  Chief Complaint   Patient presents with    Trauma     Fall down 4 steps (+)HS/eliquis     64-year-old female presents after she fell down multiple stairs, is on Eliquis, with no loss consciousness, is now complaining of pain in her bilateral hips and pain in her right parietal scalp.  No other external signs of trauma, history and review of symptoms are limited by the patient's acute nature of her presentation.        No past medical history on file.  No past surgical history on file.  No family history on file.     I have reviewed and agree with the history as documented.    Review of Systems   Unable to perform ROS: Acuity of condition       Physical Exam  BP (!) 189/95   Pulse (!) 141   SpO2 93%     Physical Exam  Vitals and nursing note reviewed.   Constitutional:       General: She is not in acute distress.     Appearance: Normal appearance. She is well-developed. She is obese.   HENT:      Head: Normocephalic and atraumatic.      Comments: Point tenderness in the right parietal scalp  Eyes:      Extraocular Movements: Extraocular movements intact.      Conjunctiva/sclera: Conjunctivae normal.   Cardiovascular:      Rate and Rhythm: Normal rate and regular rhythm.   Pulmonary:      Effort: Pulmonary effort is normal. No respiratory distress.      Breath sounds: Normal breath sounds.   Abdominal:      General: There is no distension.      Palpations: Abdomen is soft.      Tenderness: There is no abdominal tenderness.   Musculoskeletal:         General: No swelling.      Cervical back: Normal range of motion.      Comments: Tenderness in her bilateral hips without gross deformity or shortening   Skin:     General: Skin is warm and dry.      Capillary Refill: Capillary refill takes less than 2 seconds.   Neurological:      General: No focal deficit present.       Mental Status: She is alert and oriented to person, place, and time.   Psychiatric:         Mood and Affect: Mood normal.         ED Medications  Medications - No data to display    Intubation  Procedures    Notes  The patient has a GCS of 15, is oriented x3, responding to questions appropriately, protecting the airway adequately, does not need any airway intervention at this time.  The remainder of the patient's care will be deferred to the trauma team.      Final Diagnosis  Final diagnoses:   None       ED Provider  Electronically Signed by     Jayden Donnelly MD  03/16/25 1418

## 2025-03-16 NOTE — ASSESSMENT & PLAN NOTE
- Patient presented status post fall with mild scalp pain and bilateral hip pain.  - Trauma workup and imaging studies revealed no acute traumatic injuries requiring further workup or intervention at this time.  - Ambulation trial.  - Analgesia as needed.  - May use ice for pain and swelling to the scalp.  - Recommend fall precautions.  - Recommend short-term follow-up with PCP.

## 2025-03-16 NOTE — DISCHARGE INSTRUCTIONS
Trauma Discharge Instructions:    Please follow-up as instructed. If you need a follow-up appointment, please call the office when you leave to schedule an appointment.    Activity:  - You may resume activity as tolerated.    Diet:    - You may resume your normal diet.    Medications:  - You should continue your current medication regimen after discharge unless otherwise instructed. Please refer to your discharge medication list for further details.    Additional Instructions:  - May shower daily.  - If you have any questions or concerns after discharge please call the office.  - Call office or return to ER if you develop new or worsening pain.

## 2025-03-16 NOTE — H&P
"H&P - Trauma   Name: Cydney Lim 64 y.o. female I MRN: [id number]  Unit/Bed#: ED-19 I Date of Admission: 3/16/2025   Date of Service: 3/16/2025 I Hospital Day: 0     Assessment & Plan  Fall  - Patient presented status post fall with mild scalp pain and bilateral hip pain.  - Trauma workup and imaging studies revealed no acute traumatic injuries requiring further workup or intervention at this time.  - Ambulation trial.  - Analgesia as needed.  - May use ice for pain and swelling to the scalp.  - Recommend fall precautions.  - Recommend short-term follow-up with PCP.  Sinus tachycardia  - Patient with sinus tachycardia on presentation confirmed by EKG; EKG was otherwise normal.  - Following trauma workup, sinus tachycardia resolved spontaneously.  - No further workup or intervention necessary at this time.  - Recommend outpatient follow-up with PCP.    Disposition: Anticipate possible discharge home from the ED if able to ambulate appropriately.    Trauma Alert: Level B   Model of Arrival: Ambulance    Trauma Team: Attending Dr. Liu and CARMEN Kunz PA-C  Consultants:     None     History of Present Illness   Chief Complaint: \"My head hurts a little bit.\"  Mechanism:Fall     Cydney Lim is a 64 y.o. female who presents for evaluation following fall down several steps.  She noted was a mechanical slip and fall.  She did hit the back of her head, but did not losing consciousness.  On presentation, she complains of some right scalp pain as well as some pain in her hips.    Review of Systems  Medical History Review: I have reviewed the patient's PMH, PSH, Social History, Family History, Meds, and Allergies   Historical Information   History reviewed. No pertinent past medical history.  History reviewed. No pertinent surgical history.  Social History     Tobacco Use    Smoking status: Not on file    Smokeless tobacco: Not on file   Substance and Sexual Activity    Alcohol use: Not on file    Drug use: Not on " file    Sexual activity: Not on file     E-Cigarette/Vaping     E-Cigarette/Vaping Substances     Family history non-contributory  Social History     Tobacco Use    Smoking status: Not on file    Smokeless tobacco: Not on file   Substance and Sexual Activity    Alcohol use: Not on file    Drug use: Not on file    Sexual activity: Not on file     No current facility-administered medications for this encounter.  None     Patient has no known allergies.    There is no immunization history on file for this patient.  Last Tetanus: Unknown    1. Before the illness or injury that brought you to the Emergency, did you need someone to help you on a regular basis? 0=No   2. Since the illness or injury that brought you to the Emergency, have you needed more help than usual to take care of yourself? 0=No   3. Have you been hospitalized for one or more nights during the past 6 months (excluding a stay in the Emergency Department)? 0=No   4. In general, do you see well? 0=Yes   5. In general, do you have serious problems with your memory? 0=No   6. Do you take more than three different medications everyday? 1=Yes   TOTAL   1     Did you order a geriatric consult if the score was 2 or greater?: n/a       Objective :  Temp:  [97.8 °F (36.6 °C)] 97.8 °F (36.6 °C)  HR:  [] 93  BP: (146-189)/() 146/64  Resp:  [18] 18  SpO2:  [93 %-94 %] 94 %  O2 Device: None (Room air)    Initial Vitals:   Temperature: 97.8 °F (36.6 °C) (03/16/25 1230)  Pulse: (!) 141 (03/16/25 1225)  Respirations: 18 (03/16/25 1225)  Blood Pressure: (!) 189/95 (03/16/25 1225)    Primary Survey:   Airway:        Status: patent;        Pre-hospital Interventions: none        Hospital Interventions: none  Breathing:        Pre-hospital Interventions: none       Effort: normal       Right breath sounds: normal       Left breath sounds: normal  Circulation:        Rhythm: regular       Rate: regular   Right Pulses Left Pulses    R radial: 2+  R femoral: 2+  R  pedal: 2+  R carotid: 2+   L radial: 2+  L femoral: 2+  L pedal: 2+  L carotid: 2+     Disability:        GCS: Eye: 4; Verbal: 5 Motor: 6 Total: 15       Right Pupil: 4 mm;  round;  reactive         Left Pupil:  4 mm;  round;  reactive      R Motor Strength L Motor Strength    R : 5/5  R dorsiflex: 5/5  R plantarflex: 5/5 L : 5/5  L dorsiflex: 5/5  L plantarflex: 5/5        Sensory:  No sensory deficit  Exposure:       Completed: Yes      Secondary Survey:  Physical Exam  Vitals and nursing note reviewed. Exam conducted with a chaperone present.   Constitutional:       General: She is awake. She is not in acute distress.     Appearance: Normal appearance. She is obese. She is not ill-appearing, toxic-appearing or diaphoretic.      Interventions: She is not intubated.Cervical collar in place.   HENT:      Head: Normocephalic. Contusion (Mild tenderness with a tiny contusion on the right posterior scalp) present. No abrasion or laceration.      Jaw: There is normal jaw occlusion.        Right Ear: Hearing and external ear normal. No swelling or tenderness.      Left Ear: Hearing and external ear normal. No swelling or tenderness.      Nose: Nose normal. No nasal deformity, septal deviation, signs of injury, laceration or nasal tenderness.      Mouth/Throat:      Mouth: Mucous membranes are moist.      Pharynx: Oropharynx is clear.   Eyes:      General: Lids are normal. Vision grossly intact.      Extraocular Movements: Extraocular movements intact.      Conjunctiva/sclera: Conjunctivae normal.      Pupils: Pupils are equal, round, and reactive to light.   Neck:      Comments: Cervical collar in place  Cardiovascular:      Rate and Rhythm: Normal rate and regular rhythm.      Pulses: Normal pulses.           Carotid pulses are 2+ on the right side and 2+ on the left side.       Radial pulses are 2+ on the right side and 2+ on the left side.        Femoral pulses are 2+ on the right side and 2+ on the left  side.       Dorsalis pedis pulses are 2+ on the right side and 2+ on the left side.      Heart sounds: Normal heart sounds. No murmur heard.     No friction rub. No gallop.   Pulmonary:      Effort: Pulmonary effort is normal. No tachypnea, bradypnea, accessory muscle usage, prolonged expiration, respiratory distress or retractions. She is not intubated.      Breath sounds: Normal breath sounds and air entry. No stridor or decreased air movement. No decreased breath sounds, wheezing, rhonchi or rales.   Chest:      Chest wall: No lacerations, deformity, swelling, tenderness or crepitus.   Abdominal:      General: Abdomen is flat. Bowel sounds are normal. There is no distension.      Palpations: Abdomen is soft.      Tenderness: There is no abdominal tenderness. There is no guarding or rebound.   Musculoskeletal:         General: Tenderness (Bilateral hip tenderness laterally without deformity or external signs of trauma) present. No swelling or deformity.      Cervical back: Neck supple. No swelling, deformity, lacerations or tenderness. No spinous process tenderness or muscular tenderness.      Thoracic back: No swelling, deformity, lacerations or tenderness.      Lumbar back: No swelling, deformity, lacerations or tenderness.      Right hip: Tenderness (Mild right lateral hip tenderness) present. No deformity or lacerations. Normal range of motion (Pain with range of motion, but full range of motion remained intact.).      Left hip: Tenderness (Mild left lateral hip tenderness) present. No deformity or lacerations. Normal range of motion (Pain with range of motion, but full range of motion remained intact.).      Right lower le+ Edema present.      Left lower le+ Edema present.      Comments: No midline cervical, thoracic or lumbar spine tenderness, step-offs or deformities.  No paraspinal muscular tenderness in the neck or back.    Normal range of motion in the bilateral upper extremities without pain,  tenderness or deformity.  The patient had mild pain with range of motion in both hips as well as some tenderness over the lateral hips with no external signs of trauma or deformity noted.  The remainder of the bilateral lower extremity exams was unremarkable except for 2+ pitting edema in the bilateral lower legs, ankles and feet.   Skin:     General: Skin is warm and dry.      Capillary Refill: Capillary refill takes less than 2 seconds.      Coloration: Skin is not jaundiced or pale.      Findings: No abrasion, bruising, ecchymosis, erythema, laceration, lesion, rash or wound.   Neurological:      General: No focal deficit present.      Mental Status: She is alert and oriented to person, place, and time. Mental status is at baseline.      GCS: GCS eye subscore is 4. GCS verbal subscore is 5. GCS motor subscore is 6.      Sensory: Sensation is intact. No sensory deficit.      Motor: Motor function is intact. No weakness.   Psychiatric:         Mood and Affect: Mood normal.         Behavior: Behavior is cooperative.             Lab Results: I have reviewed the following results:  Recent Labs     03/16/25  1235   HGB 11.9   HCT 35   CO2 29   CAIONIZED 1.18       Imaging Results: I have personally reviewed pertinent images saved in PACS. CT scan findings (and other pertinent positive findings on images) were discussed with radiology. My interpretation of the images/reports are as follows:  Chest Xray(s): negative for acute findings   FAST exam(s): negative for acute findings   CT Scan(s): negative for acute findings   Additional Xray(s): N/A     Other Studies: Other Study Results Review: No additional pertinent studies reviewed.

## 2025-03-16 NOTE — ASSESSMENT & PLAN NOTE
- Patient with sinus tachycardia on presentation confirmed by EKG; EKG was otherwise normal.  - Following trauma workup, sinus tachycardia resolved spontaneously.  - No further workup or intervention necessary at this time.  - Recommend outpatient follow-up with PCP.

## 2025-03-16 NOTE — QUICK NOTE
Patient seen and reevaluated after successful ambulation trial.  She did complain of some left foot and ankle pain and swelling, but follow-up x-ray imaging revealed no acute osseous abnormality.  She does have degenerative changes.    No acute traumatic injuries were identified during her evaluation or during her imaging workup.    Recommend short-term outpatient follow-up by her PCP to review her hospital encounter.  Patient provided with information to contact the trauma clinic if she develops new or worsening pain or with any other questions or concerns.  She may also reach out to schedule a nonurgent appointment with orthopedic surgery if she has persistent left foot and ankle pain beyond 2 weeks.    Rich Kunz PA-C  3/16/2025  4:34 PM

## 2025-03-16 NOTE — PROCEDURES
POC FAST US    Date/Time: 3/16/2025 12:31 PM    Performed by: Rich Kunz PA-C  Authorized by: Rich Kunz PA-C    Patient location:  Trauma  Procedure details:     Exam Type:  Diagnostic    Indications: blunt abdominal trauma and blunt chest trauma      Assess for:  Intra-abdominal fluid, pericardial effusion and pneumothorax    Technique: extended FAST      Views obtained:  Heart - Pericardial sac, LUQ - Splenorenal space, Suprapubic - Pouch of Juan, RUQ - Jeffers's Pouch, Left thorax and Right thorax    Image quality: diagnostic      Image availability:  Images available in PACS and video obtained  FAST Findings:     RUQ (Hepatorenal) free fluid: absent      LUQ (Splenorenal) free fluid: absent      Suprapubic free fluid: absent      Cardiac wall motion: identified      Pericardial effusion: absent    extended FAST (Pulmonary) findings:     Left lung sliding: Present      Right lung sliding: Present    Interpretation:     Impressions: negative

## 2025-03-17 ENCOUNTER — HOSPITAL ENCOUNTER (EMERGENCY)
Facility: HOSPITAL | Age: 65
Discharge: HOME | End: 2025-03-17
Attending: EMERGENCY MEDICINE | Admitting: EMERGENCY MEDICINE
Payer: COMMERCIAL

## 2025-03-17 VITALS
SYSTOLIC BLOOD PRESSURE: 126 MMHG | HEART RATE: 88 BPM | OXYGEN SATURATION: 95 % | TEMPERATURE: 98.3 F | RESPIRATION RATE: 16 BRPM | DIASTOLIC BLOOD PRESSURE: 60 MMHG | HEIGHT: 63 IN | BODY MASS INDEX: 43.15 KG/M2

## 2025-03-17 DIAGNOSIS — R00.2 PALPITATIONS: Primary | ICD-10-CM

## 2025-03-17 DIAGNOSIS — E83.42 HYPOMAGNESEMIA: ICD-10-CM

## 2025-03-17 LAB
2HR DELTA HS TROPONIN: -1 NG/L
ALBUMIN SERPL BCG-MCNC: 3.5 G/DL (ref 3.5–5)
ALP SERPL-CCNC: 98 U/L (ref 34–104)
ALT SERPL W P-5'-P-CCNC: 11 U/L (ref 7–52)
ANION GAP SERPL CALCULATED.3IONS-SCNC: 9 MMOL/L (ref 4–13)
AST SERPL W P-5'-P-CCNC: 13 U/L (ref 13–39)
BASOPHILS # BLD AUTO: 0.04 THOUSANDS/ÂΜL (ref 0–0.1)
BASOPHILS NFR BLD AUTO: 0 % (ref 0–1)
BILIRUB SERPL-MCNC: 0.33 MG/DL (ref 0.2–1)
BUN SERPL-MCNC: 14 MG/DL (ref 5–25)
CALCIUM SERPL-MCNC: 9.1 MG/DL (ref 8.4–10.2)
CARDIAC TROPONIN I PNL SERPL HS: 5 NG/L (ref ?–50)
CARDIAC TROPONIN I PNL SERPL HS: 6 NG/L (ref ?–50)
CHLORIDE SERPL-SCNC: 104 MMOL/L (ref 96–108)
CO2 SERPL-SCNC: 29 MMOL/L (ref 21–32)
CREAT SERPL-MCNC: 1.12 MG/DL (ref 0.6–1.3)
EOSINOPHIL # BLD AUTO: 0.14 THOUSAND/ÂΜL (ref 0–0.61)
EOSINOPHIL NFR BLD AUTO: 1 % (ref 0–6)
ERYTHROCYTE [DISTWIDTH] IN BLOOD BY AUTOMATED COUNT: 13.8 % (ref 11.6–15.1)
GFR SERPL CREATININE-BSD FRML MDRD: 51 ML/MIN/1.73SQ M
GLUCOSE SERPL-MCNC: 114 MG/DL (ref 65–140)
HCT VFR BLD AUTO: 36.4 % (ref 34.8–46.1)
HGB BLD-MCNC: 11.5 G/DL (ref 11.5–15.4)
IMM GRANULOCYTES # BLD AUTO: 0.02 THOUSAND/UL (ref 0–0.2)
IMM GRANULOCYTES NFR BLD AUTO: 0 % (ref 0–2)
LYMPHOCYTES # BLD AUTO: 1.55 THOUSANDS/ÂΜL (ref 0.6–4.47)
LYMPHOCYTES NFR BLD AUTO: 15 % (ref 14–44)
MAGNESIUM SERPL-MCNC: 1.8 MG/DL (ref 1.9–2.7)
MCH RBC QN AUTO: 29.9 PG (ref 26.8–34.3)
MCHC RBC AUTO-ENTMCNC: 31.6 G/DL (ref 31.4–37.4)
MCV RBC AUTO: 95 FL (ref 82–98)
MONOCYTES # BLD AUTO: 0.66 THOUSAND/ÂΜL (ref 0.17–1.22)
MONOCYTES NFR BLD AUTO: 7 % (ref 4–12)
NEUTROPHILS # BLD AUTO: 7.73 THOUSANDS/ÂΜL (ref 1.85–7.62)
NEUTS SEG NFR BLD AUTO: 77 % (ref 43–75)
NRBC BLD AUTO-RTO: 0 /100 WBCS
PLATELET # BLD AUTO: 304 THOUSANDS/UL (ref 149–390)
PMV BLD AUTO: 10.4 FL (ref 8.9–12.7)
POTASSIUM SERPL-SCNC: 3.6 MMOL/L (ref 3.5–5.3)
PROT SERPL-MCNC: 7.2 G/DL (ref 6.4–8.4)
RBC # BLD AUTO: 3.84 MILLION/UL (ref 3.81–5.12)
SODIUM SERPL-SCNC: 142 MMOL/L (ref 135–147)
TSH SERPL DL<=0.05 MIU/L-ACNC: 1.85 UIU/ML (ref 0.45–4.5)
WBC # BLD AUTO: 10.14 THOUSAND/UL (ref 4.31–10.16)

## 2025-03-17 PROCEDURE — 83735 ASSAY OF MAGNESIUM: CPT | Performed by: EMERGENCY MEDICINE

## 2025-03-17 PROCEDURE — 84484 ASSAY OF TROPONIN QUANT: CPT | Performed by: EMERGENCY MEDICINE

## 2025-03-17 PROCEDURE — 36415 COLL VENOUS BLD VENIPUNCTURE: CPT | Performed by: EMERGENCY MEDICINE

## 2025-03-17 PROCEDURE — 80053 COMPREHEN METABOLIC PANEL: CPT | Performed by: EMERGENCY MEDICINE

## 2025-03-17 PROCEDURE — 99285 EMERGENCY DEPT VISIT HI MDM: CPT

## 2025-03-17 PROCEDURE — 84443 ASSAY THYROID STIM HORMONE: CPT | Performed by: EMERGENCY MEDICINE

## 2025-03-17 PROCEDURE — 85025 COMPLETE CBC W/AUTO DIFF WBC: CPT | Performed by: EMERGENCY MEDICINE

## 2025-03-17 PROCEDURE — 93005 ELECTROCARDIOGRAM TRACING: CPT

## 2025-03-17 PROCEDURE — 99285 EMERGENCY DEPT VISIT HI MDM: CPT | Performed by: EMERGENCY MEDICINE

## 2025-03-17 RX ORDER — LANOLIN ALCOHOL/MO/W.PET/CERES
400 CREAM (GRAM) TOPICAL ONCE
Status: COMPLETED | OUTPATIENT
Start: 2025-03-17 | End: 2025-03-17

## 2025-03-17 RX ADMIN — Medication 400 MG: at 09:07

## 2025-03-17 NOTE — ED PROVIDER NOTES
Time reflects when diagnosis was documented in both MDM as applicable and the Disposition within this note       Time User Action Codes Description Comment    3/17/2025 10:16 AM Princess Sims Add [R00.2] Palpitations     3/17/2025 10:16 AM Princess Sims Add [E83.42] Hypomagnesemia           ED Disposition       ED Disposition   Discharge    Condition   Stable    Date/Time   Mon Mar 17, 2025 10:16 AM    Comment   Cydney Lim discharge to home/self care.                   Assessment & Plan       Medical Decision Making  64-year-old female presenting for palpitations.  Vital signs stable.  Differential diagnoses include but not limited to arrhythmia, ACS, electrolyte abnormality, thyroid abnormality.  Labs notable for mild hypomagnesemia which was orally repleted.  Otherwise unremarkable.  EKG within normal limits.  Otherwise stable for discharge at this time.  Advised follow-up with PCP.  Return precautions discussed.    Problems Addressed:  Hypomagnesemia: acute illness or injury  Palpitations: acute illness or injury    Amount and/or Complexity of Data Reviewed  Labs: ordered. Decision-making details documented in ED Course.  ECG/medicine tests: ordered and independent interpretation performed. Decision-making details documented in ED Course.    Risk  OTC drugs.        ED Course as of 03/17/25 1123   Mon Mar 17, 2025   0741 Procedure Note: EKG  Date/Time: 03/17/25 7:30 AM   Interpreted by: Princess Sims  Indications / Diagnosis: palpitations  ECG reviewed by me, the ED Provider: yes   The EKG demonstrates:  Rhythm: rate 95, normal sinus  Intervals: normal intervals  Axis: normal axis  QRS/Blocks: normal QRS  ST Changes: No acute ST Changes, no STD/VINOD.    0838 TSH 3RD GENERATON: 1.848   0838 hs TnI 0hr: 6   0839 Comprehensive metabolic panel   0839 MAGNESIUM(!): 1.8   0839 CBC and differential(!)       Medications   magnesium Oxide (MAG-OX) tablet 400 mg (400 mg Oral Given 3/17/25 0907)       ED Risk Strat  Scores                            SBIRT 20yo+      Flowsheet Row Most Recent Value   Initial Alcohol Screen: US AUDIT-C     1. How often do you have a drink containing alcohol? 0 Filed at: 03/17/2025 0728   2. How many drinks containing alcohol do you have on a typical day you are drinking?  0 Filed at: 03/17/2025 0728   3b. FEMALE Any Age, or MALE 65+: How often do you have 4 or more drinks on one occassion? 0 Filed at: 03/17/2025 0728   Audit-C Score 0 Filed at: 03/17/2025 0728   JOHN: How many times in the past year have you...    Used an illegal drug or used a prescription medication for non-medical reasons? Never Filed at: 03/17/2025 0728                            History of Present Illness       Chief Complaint   Patient presents with    Rapid Heart Rate     Feels heart beat is going fast.  Did not take pulse, just felt this way.  Hx fall yesterday hitting back of head and seen at [place] for Trauma alert. Recently started on Iohexol       Past Medical History:   Diagnosis Date    Ambulatory dysfunction     B12 deficiency     CKD (chronic kidney disease), stage III (HCC)     Depression     Epilepsy (HCC)     History of sinus tachycardia     Hypertension     Morbid obesity (HCC)     Restless leg syndrome     Vitamin D deficiency       Past Surgical History:   Procedure Laterality Date    DENTAL SURGERY      LASER ABLATION OF THE CERVIX        Family History   Problem Relation Age of Onset    Heart attack Mother     Kidney disease Mother     Liver disease Mother     Heart attack Father       Social History     Tobacco Use    Smoking status: Every Day     Current packs/day: 0.50     Types: Cigarettes    Smokeless tobacco: Never   Vaping Use    Vaping status: Never Used   Substance Use Topics    Alcohol use: Not Currently    Drug use: Never      E-Cigarette/Vaping    E-Cigarette Use Never User       E-Cigarette/Vaping Substances      I have reviewed and agree with the history as documented.     64-year-old  "female with history of hypertension, epilepsy who presents for evaluation of palpitations.  Patient reports that she was started on a new medication 2 weeks ago and since then she has felt off.  She states that she has episodes where she feels shaky and feels like her heart is \"beating constantly.\"  She denies any chest pain, shortness of breath, abdominal pain, nausea, vomiting, diarrhea, fevers.  She states that she would like to be admitted because the feeling like her heart is beating constantly is making her feel anxious.        Review of Systems   Constitutional:  Negative for chills and fever.   Respiratory:  Negative for shortness of breath.    Cardiovascular:  Positive for palpitations. Negative for chest pain.   Gastrointestinal:  Negative for abdominal pain, nausea and vomiting.   Genitourinary:  Negative for flank pain.   Musculoskeletal:  Negative for gait problem.   Skin:  Negative for rash.   Neurological:  Negative for weakness and light-headedness.   All other systems reviewed and are negative.          Objective       ED Triage Vitals [03/17/25 0729]   Temperature Pulse Blood Pressure Respirations SpO2 Patient Position - Orthostatic VS   98.3 °F (36.8 °C) 92 144/83 18 98 % Lying      Temp Source Heart Rate Source BP Location FiO2 (%) Pain Score    Oral Monitor Left arm -- No Pain      Vitals      Date and Time Temp Pulse SpO2 Resp BP Pain Score FACES Pain Rating User   03/17/25 1015 -- 88 95 % -- 126/60 No Pain -- RR   03/17/25 0909 -- 87 97 % 16 130/62 No Pain -- RR   03/17/25 0729 98.3 °F (36.8 °C) 92 98 % 18 144/83 No Pain -- RR            Physical Exam  Vitals and nursing note reviewed.   Constitutional:       General: She is not in acute distress.     Appearance: She is well-developed. She is not ill-appearing.   HENT:      Head: Normocephalic and atraumatic.      Nose: Nose normal.      Mouth/Throat:      Mouth: Mucous membranes are moist.   Eyes:      Conjunctiva/sclera: Conjunctivae normal. "   Cardiovascular:      Rate and Rhythm: Normal rate and regular rhythm.      Heart sounds: No murmur heard.     No friction rub. No gallop.   Pulmonary:      Effort: Pulmonary effort is normal.      Breath sounds: Normal breath sounds. No wheezing, rhonchi or rales.   Abdominal:      General: There is no distension.      Palpations: Abdomen is soft.      Tenderness: There is no abdominal tenderness.   Musculoskeletal:         General: No swelling or tenderness. Normal range of motion.      Cervical back: Normal range of motion and neck supple.   Skin:     General: Skin is warm and dry.      Coloration: Skin is not pale.      Findings: No rash.   Neurological:      General: No focal deficit present.      Mental Status: She is alert and oriented to person, place, and time.   Psychiatric:         Behavior: Behavior normal.         Results Reviewed       Procedure Component Value Units Date/Time    HS Troponin I 2hr [[id number]]  (Normal) Collected: 03/17/25 0936    Lab Status: Final result Specimen: Blood from Arm, Right Updated: 03/17/25 1002     hs TnI 2hr 5 ng/L      Delta 2hr hsTnI -1 ng/L     TSH, 3rd generation with Free T4 reflex [[id number]]  (Normal) Collected: 03/17/25 0748    Lab Status: Final result Specimen: Blood from Arm, Right Updated: 03/17/25 0838     TSH 3RD GENERATON 1.848 uIU/mL     HS Troponin 0hr (reflex protocol) [[id number]]  (Normal) Collected: 03/17/25 0748    Lab Status: Final result Specimen: Blood from Arm, Right Updated: 03/17/25 0838     hs TnI 0hr 6 ng/L     Comprehensive metabolic panel [[id number]] Collected: 03/17/25 0748    Lab Status: Final result Specimen: Blood from Arm, Right Updated: 03/17/25 0824     Sodium 142 mmol/L      Potassium 3.6 mmol/L      Chloride 104 mmol/L      CO2 29 mmol/L      ANION GAP 9 mmol/L      BUN 14 mg/dL      Creatinine 1.12 mg/dL      Glucose 114 mg/dL      Calcium 9.1 mg/dL      AST 13 U/L      ALT 11 U/L      Alkaline Phosphatase 98 U/L      Total  Protein 7.2 g/dL      Albumin 3.5 g/dL      Total Bilirubin 0.33 mg/dL      eGFR 51 ml/min/1.73sq m     Narrative:      National Kid[employer] Foundation guidelines for Chronic Kidney Disease (CKD):     Stage 1 with normal or high GFR (GFR > 90 mL/min/1.73 square meters)    Stage 2 Mild CKD (GFR = 60-89 mL/min/1.73 square meters)    Stage 3A Moderate CKD (GFR = 45-59 mL/min/1.73 square meters)    Stage 3B Moderate CKD (GFR = 30-44 mL/min/1.73 square meters)    Stage 4 Severe CKD (GFR = 15-29 mL/min/1.73 square meters)    Stage 5 End Stage CKD (GFR <15 mL/min/1.73 square meters)  Note: GFR calculation is accurate only with a steady state creatinine    Magnesium [[id number]]  (Abnormal) Collected: 03/17/25 0748    Lab Status: Final result Specimen: Blood from Arm, Right Updated: 03/17/25 0824     Magnesium 1.8 mg/dL     CBC and differential [[id number]]  (Abnormal) Collected: 03/17/25 0748    Lab Status: Final result Specimen: Blood from Arm, Right Updated: 03/17/25 0802     WBC 10.14 Thousand/uL      RBC 3.84 Million/uL      Hemoglobin 11.5 g/dL      Hematocrit 36.4 %      MCV 95 fL      MCH 29.9 pg      MCHC 31.6 g/dL      RDW 13.8 %      MPV 10.4 fL      Platelets 304 Thousands/uL      nRBC 0 /100 WBCs      Segmented % 77 %      Immature Grans % 0 %      Lymphocytes % 15 %      Monocytes % 7 %      Eosinophils Relative 1 %      Basophils Relative 0 %      Absolute Neutrophils 7.73 Thousands/µL      Absolute Immature Grans 0.02 Thousand/uL      Absolute Lymphocytes 1.55 Thousands/µL      Absolute Monocytes 0.66 Thousand/µL      Eosinophils Absolute 0.14 Thousand/µL      Basophils Absolute 0.04 Thousands/µL             No orders to display       Procedures    ED Medication and Procedure Management   None     There are no discharge medications for this patient.    No discharge procedures on file.  ED SEPSIS DOCUMENTATION   Time reflects when diagnosis was documented in both MDM as applicable and the Disposition within  this note       Time User Action Codes Description Comment    3/17/2025 10:16 AM Princess Sims [R00.2] Palpitations     3/17/2025 10:16 AM Princess Sims [E83.42] Hypomagnesemia                  Princess Sism MD  03/17/25 1123

## 2025-03-17 NOTE — DISCHARGE INSTRUCTIONS
Follow-up with your primary care physician.  Please return to the emergency department if you develop worsening symptoms, chest pain, or anything else concerning to you.

## 2025-03-18 ENCOUNTER — HOSPITAL ENCOUNTER (EMERGENCY)
Facility: HOSPITAL | Age: 65
End: 2025-03-19
Attending: EMERGENCY MEDICINE | Admitting: EMERGENCY MEDICINE
Payer: COMMERCIAL

## 2025-03-18 ENCOUNTER — VBI (OUTPATIENT)
Dept: FAMILY MEDICINE CLINIC | Facility: CLINIC | Age: 65
End: 2025-03-18

## 2025-03-18 ENCOUNTER — OFFICE VISIT (OUTPATIENT)
Dept: FAMILY MEDICINE CLINIC | Facility: CLINIC | Age: 65
End: 2025-03-18
Payer: COMMERCIAL

## 2025-03-18 ENCOUNTER — APPOINTMENT (EMERGENCY)
Dept: RADIOLOGY | Facility: HOSPITAL | Age: 65
End: 2025-03-18
Payer: COMMERCIAL

## 2025-03-18 VITALS
HEIGHT: 63 IN | SYSTOLIC BLOOD PRESSURE: 126 MMHG | WEIGHT: 247 LBS | OXYGEN SATURATION: 98 % | DIASTOLIC BLOOD PRESSURE: 78 MMHG | TEMPERATURE: 97.9 F | BODY MASS INDEX: 43.77 KG/M2 | HEART RATE: 92 BPM

## 2025-03-18 DIAGNOSIS — F03.90 MAJOR NEUROCOGNITIVE DISORDER (HCC): ICD-10-CM

## 2025-03-18 DIAGNOSIS — R41.9 MEDICATION NONCOMPLIANCE DUE TO COGNITIVE IMPAIRMENT: ICD-10-CM

## 2025-03-18 DIAGNOSIS — R29.6 FALLS FREQUENTLY: ICD-10-CM

## 2025-03-18 DIAGNOSIS — G40.909 RECURRENT SEIZURES (HCC): Primary | ICD-10-CM

## 2025-03-18 DIAGNOSIS — I13.0 BENIGN HYPERTENSIVE HEART AND CKD, STAGE 3 (GFR 30-59), W CHF (HCC): ICD-10-CM

## 2025-03-18 DIAGNOSIS — N18.4 CKD (CHRONIC KIDNEY DISEASE) STAGE 4, GFR 15-29 ML/MIN (HCC): ICD-10-CM

## 2025-03-18 DIAGNOSIS — F41.0 PANIC ATTACK: Primary | ICD-10-CM

## 2025-03-18 DIAGNOSIS — Z99.89 DEPENDENCE ON CANE: ICD-10-CM

## 2025-03-18 DIAGNOSIS — F33.9 DEPRESSION, RECURRENT (HCC): ICD-10-CM

## 2025-03-18 DIAGNOSIS — Z78.9 SELF-CARE DEFICIT: ICD-10-CM

## 2025-03-18 DIAGNOSIS — E66.01 MORBIDLY OBESE (HCC): ICD-10-CM

## 2025-03-18 DIAGNOSIS — J44.9 CHRONIC OBSTRUCTIVE PULMONARY DISEASE, UNSPECIFIED COPD TYPE (HCC): ICD-10-CM

## 2025-03-18 DIAGNOSIS — N39.0 UTI (URINARY TRACT INFECTION): ICD-10-CM

## 2025-03-18 DIAGNOSIS — R45.851 SUICIDAL IDEATION: ICD-10-CM

## 2025-03-18 DIAGNOSIS — Z91.148 MEDICATION NONCOMPLIANCE DUE TO COGNITIVE IMPAIRMENT: ICD-10-CM

## 2025-03-18 DIAGNOSIS — N18.30 BENIGN HYPERTENSIVE HEART AND CKD, STAGE 3 (GFR 30-59), W CHF (HCC): ICD-10-CM

## 2025-03-18 DIAGNOSIS — F33.3 SEVERE EPISODE OF RECURRENT MAJOR DEPRESSIVE DISORDER, WITH PSYCHOTIC FEATURES (HCC): ICD-10-CM

## 2025-03-18 DIAGNOSIS — N18.32 STAGE 3B CHRONIC KIDNEY DISEASE (HCC): ICD-10-CM

## 2025-03-18 LAB
ALBUMIN SERPL BCG-MCNC: 3.6 G/DL (ref 3.5–5)
ALP SERPL-CCNC: 100 U/L (ref 34–104)
ALT SERPL W P-5'-P-CCNC: 11 U/L (ref 7–52)
ANION GAP SERPL CALCULATED.3IONS-SCNC: 10 MMOL/L (ref 4–13)
AST SERPL W P-5'-P-CCNC: 14 U/L (ref 13–39)
BASOPHILS # BLD AUTO: 0.05 THOUSANDS/ÂΜL (ref 0–0.1)
BASOPHILS NFR BLD AUTO: 0 % (ref 0–1)
BILIRUB SERPL-MCNC: 0.3 MG/DL (ref 0.2–1)
BUN SERPL-MCNC: 19 MG/DL (ref 5–25)
CALCIUM SERPL-MCNC: 9.1 MG/DL (ref 8.4–10.2)
CHLORIDE SERPL-SCNC: 102 MMOL/L (ref 96–108)
CO2 SERPL-SCNC: 27 MMOL/L (ref 21–32)
CREAT SERPL-MCNC: 1.26 MG/DL (ref 0.6–1.3)
EOSINOPHIL # BLD AUTO: 0.28 THOUSAND/ÂΜL (ref 0–0.61)
EOSINOPHIL NFR BLD AUTO: 2 % (ref 0–6)
ERYTHROCYTE [DISTWIDTH] IN BLOOD BY AUTOMATED COUNT: 13.8 % (ref 11.6–15.1)
ETHANOL SERPL-MCNC: <10 MG/DL
GFR SERPL CREATININE-BSD FRML MDRD: 44 ML/MIN/1.73SQ M
GLUCOSE SERPL-MCNC: 99 MG/DL (ref 65–140)
HCT VFR BLD AUTO: 35.8 % (ref 34.8–46.1)
HGB BLD-MCNC: 11.5 G/DL (ref 11.5–15.4)
HOLD SPECIMEN: NORMAL
IMM GRANULOCYTES # BLD AUTO: 0.05 THOUSAND/UL (ref 0–0.2)
IMM GRANULOCYTES NFR BLD AUTO: 0 % (ref 0–2)
LYMPHOCYTES # BLD AUTO: 2.5 THOUSANDS/ÂΜL (ref 0.6–4.47)
LYMPHOCYTES NFR BLD AUTO: 19 % (ref 14–44)
MCH RBC QN AUTO: 30.4 PG (ref 26.8–34.3)
MCHC RBC AUTO-ENTMCNC: 32.1 G/DL (ref 31.4–37.4)
MCV RBC AUTO: 95 FL (ref 82–98)
MONOCYTES # BLD AUTO: 0.83 THOUSAND/ÂΜL (ref 0.17–1.22)
MONOCYTES NFR BLD AUTO: 6 % (ref 4–12)
NEUTROPHILS # BLD AUTO: 9.78 THOUSANDS/ÂΜL (ref 1.85–7.62)
NEUTS SEG NFR BLD AUTO: 73 % (ref 43–75)
NRBC BLD AUTO-RTO: 0 /100 WBCS
PLATELET # BLD AUTO: 261 THOUSANDS/UL (ref 149–390)
PMV BLD AUTO: 10.7 FL (ref 8.9–12.7)
POTASSIUM SERPL-SCNC: 3.4 MMOL/L (ref 3.5–5.3)
PROT SERPL-MCNC: 7.6 G/DL (ref 6.4–8.4)
RBC # BLD AUTO: 3.78 MILLION/UL (ref 3.81–5.12)
SODIUM SERPL-SCNC: 139 MMOL/L (ref 135–147)
TSH SERPL DL<=0.05 MIU/L-ACNC: 1.72 UIU/ML (ref 0.45–4.5)
WBC # BLD AUTO: 13.49 THOUSAND/UL (ref 4.31–10.16)

## 2025-03-18 PROCEDURE — 81001 URINALYSIS AUTO W/SCOPE: CPT | Performed by: EMERGENCY MEDICINE

## 2025-03-18 PROCEDURE — 99285 EMERGENCY DEPT VISIT HI MDM: CPT | Performed by: EMERGENCY MEDICINE

## 2025-03-18 PROCEDURE — 36415 COLL VENOUS BLD VENIPUNCTURE: CPT | Performed by: EMERGENCY MEDICINE

## 2025-03-18 PROCEDURE — 84443 ASSAY THYROID STIM HORMONE: CPT | Performed by: EMERGENCY MEDICINE

## 2025-03-18 PROCEDURE — G2211 COMPLEX E/M VISIT ADD ON: HCPCS | Performed by: NURSE PRACTITIONER

## 2025-03-18 PROCEDURE — 82077 ASSAY SPEC XCP UR&BREATH IA: CPT | Performed by: EMERGENCY MEDICINE

## 2025-03-18 PROCEDURE — 80053 COMPREHEN METABOLIC PANEL: CPT | Performed by: EMERGENCY MEDICINE

## 2025-03-18 PROCEDURE — 99214 OFFICE O/P EST MOD 30 MIN: CPT | Performed by: NURSE PRACTITIONER

## 2025-03-18 PROCEDURE — 87186 SC STD MICRODIL/AGAR DIL: CPT | Performed by: EMERGENCY MEDICINE

## 2025-03-18 PROCEDURE — 85025 COMPLETE CBC W/AUTO DIFF WBC: CPT | Performed by: EMERGENCY MEDICINE

## 2025-03-18 PROCEDURE — 99284 EMERGENCY DEPT VISIT MOD MDM: CPT

## 2025-03-18 PROCEDURE — 71046 X-RAY EXAM CHEST 2 VIEWS: CPT

## 2025-03-18 PROCEDURE — 80307 DRUG TEST PRSMV CHEM ANLYZR: CPT | Performed by: EMERGENCY MEDICINE

## 2025-03-18 PROCEDURE — 87086 URINE CULTURE/COLONY COUNT: CPT | Performed by: EMERGENCY MEDICINE

## 2025-03-18 PROCEDURE — 87077 CULTURE AEROBIC IDENTIFY: CPT | Performed by: EMERGENCY MEDICINE

## 2025-03-18 RX ORDER — LOSARTAN POTASSIUM 25 MG/1
25 TABLET ORAL DAILY
Status: DISCONTINUED | OUTPATIENT
Start: 2025-03-19 | End: 2025-03-19 | Stop reason: HOSPADM

## 2025-03-18 RX ORDER — METOPROLOL SUCCINATE 50 MG/1
50 TABLET, EXTENDED RELEASE ORAL DAILY
Status: DISCONTINUED | OUTPATIENT
Start: 2025-03-19 | End: 2025-03-19 | Stop reason: HOSPADM

## 2025-03-18 RX ORDER — POTASSIUM CHLORIDE 1500 MG/1
20 TABLET, EXTENDED RELEASE ORAL ONCE
Status: COMPLETED | OUTPATIENT
Start: 2025-03-18 | End: 2025-03-18

## 2025-03-18 RX ORDER — LEVETIRACETAM 500 MG/1
1500 TABLET ORAL 2 TIMES DAILY
Status: DISCONTINUED | OUTPATIENT
Start: 2025-03-18 | End: 2025-03-19 | Stop reason: HOSPADM

## 2025-03-18 RX ORDER — NICOTINE 21 MG/24HR
21 PATCH, TRANSDERMAL 24 HOURS TRANSDERMAL ONCE
Status: DISCONTINUED | OUTPATIENT
Start: 2025-03-18 | End: 2025-03-19 | Stop reason: HOSPADM

## 2025-03-18 RX ORDER — TRAZODONE HYDROCHLORIDE 50 MG/1
50 TABLET ORAL
Status: DISCONTINUED | OUTPATIENT
Start: 2025-03-19 | End: 2025-03-19 | Stop reason: HOSPADM

## 2025-03-18 RX ORDER — ESCITALOPRAM OXALATE 20 MG/1
10 TABLET ORAL DAILY
Status: DISCONTINUED | OUTPATIENT
Start: 2025-03-19 | End: 2025-03-19 | Stop reason: HOSPADM

## 2025-03-18 RX ORDER — SPIRONOLACTONE 25 MG/1
25 TABLET ORAL DAILY
Status: DISCONTINUED | OUTPATIENT
Start: 2025-03-19 | End: 2025-03-19 | Stop reason: HOSPADM

## 2025-03-18 RX ORDER — ALBUTEROL SULFATE 0.83 MG/ML
2.5 SOLUTION RESPIRATORY (INHALATION) EVERY 6 HOURS PRN
Status: DISCONTINUED | OUTPATIENT
Start: 2025-03-18 | End: 2025-03-19 | Stop reason: HOSPADM

## 2025-03-18 RX ORDER — TRAZODONE HYDROCHLORIDE 50 MG/1
50 TABLET ORAL
Status: DISCONTINUED | OUTPATIENT
Start: 2025-03-19 | End: 2025-03-18

## 2025-03-18 RX ORDER — CARBAMAZEPINE 200 MG/1
200 TABLET ORAL 3 TIMES DAILY
Status: DISCONTINUED | OUTPATIENT
Start: 2025-03-18 | End: 2025-03-19 | Stop reason: HOSPADM

## 2025-03-18 RX ADMIN — POTASSIUM CHLORIDE 20 MEQ: 1500 TABLET, EXTENDED RELEASE ORAL at 20:07

## 2025-03-18 RX ADMIN — CARBAMAZEPINE 200 MG: 200 TABLET ORAL at 22:08

## 2025-03-18 RX ADMIN — LEVETIRACETAM 1500 MG: 500 TABLET, FILM COATED ORAL at 22:08

## 2025-03-18 RX ADMIN — NICOTINE 21 MG: 21 PATCH, EXTENDED RELEASE TRANSDERMAL at 19:32

## 2025-03-18 NOTE — ED NOTES
Patient unable to perform alcohol breath test. Provider made aware. Ethanol blood test drawn.      Kay Mckeon  03/18/25 1936

## 2025-03-18 NOTE — LETTER
[facility] EMERGENCY DEPARTMENT  [address] [place] [id number]  Dept: [phone]      EMTALA TRANSFER CONSENT    NAME Cydney Lim                                         1960                              MRN [id number]    I have been informed of my rights regarding examination, treatment, and transfer   by Dr. Александр Armendariz,     Benefits: Specialized equipment and/or services available at the receiving facility (Include comment)________________________ ([facility])    Risks: Potential for delay in receiving treatment      Transfer Request   I acknowledge that my medical condition has been evaluated and explained to me by the emergency department physician or other qualified medical person and/or my attending physician who has recommended and offered to me further medical examination and treatment. I understand the Hospital's obligation with respect to the treatment and stabilization of my emergency medical condition. I nevertheless request to be transferred. I release the Hospital, the doctor, and any other persons caring for me from all responsibility or liability for any injury or ill effects that may result from my transfer and agree to accept all responsibility for the consequences of my choice to transfer, rather than receive stabilizing treatment at the Hospital. I understand that because the transfer is my request, my insurance may not provide reimbursement for the services.  The Hospital will assist and direct me and my family in how to make arrangements for transfer, but the hospital is not liable for any fees charged by the transport service.  In spite of this understanding, I refuse to consent to further medical examination and treatment which has been offered to me, and request transfer to Accepting Facility Name, City & State : [facility], [place]. I authorize the performance of emergency medical procedures and treatments upon me in both transit and  upon arrival at the receiving facility.  Additionally, I authorize the release of any and all medical records to the receiving facility and request they be transported with me, if possible.    I authorize the performance of emergency medical procedures and treatments upon me in both transit and upon arrival at the receiving facility.  Additionally, I authorize the release of any and all medical records to the receiving facility and request they be transported with me, if possible.  I understand that the safest mode of transportation during a medical emergency is an ambulance and that the Hospital advocates the use of this mode of transport. Risks of traveling to the receiving facility by car, including absence of medical control, life sustaining equipment, such as oxygen, and medical personnel has been explained to me and I fully understand them.    (GUMARO CORRECT BOX BELOW)  [  ]  I consent to the stated transfer and to be transported by ambulance/helicopter.  [  ]  I consent to the stated transfer, but refuse transportation by ambulance and accept full responsibility for my transportation by car.  I understand the risks of non-ambulance transfers and I exonerate the Hospital and its staff from any deterioration in my condition that results from this refusal.    X___________________________________________    DATE  25  TIME________  Signature of patient or legally responsible individual signing on patient behalf           RELATIONSHIP TO PATIENT_________________________          Provider Certification    NAME Cydney Lim                                         1960                              MRN [id number]    A medical screening exam was performed on the above named patient.  Based on the examination:    Condition Necessitating Transfer The primary encounter diagnosis was Panic attack. A diagnosis of Suicidal ideation was also pertinent to this visit.    Patient Condition: The patient has been  stabilized such that within reasonable medical probability, no material deterioration of the patient condition or the condition of the unborn child(marcos) is likely to result from the transfer    Reason for Transfer: Level of Care needed not available at this facility (behavioral health)    Transfer Requirements: Facility [facility], [facility]   Space available and qualified personnel available for treatment as acknowledged by [facility]  Agreed to accept transfer and to provide appropriate medical treatment as acknowledged by       Dr. Rudd  Appropriate medical records of the examination and treatment of the patient are provided at the time of transfer   STAFF INITIAL WHEN COMPLETED _______  Transfer will be performed by qualified personnel from [facility]  and appropriate transfer equipment as required, including the use of necessary and appropriate life support measures.    Provider Certification: I have examined the patient and explained the following risks and benefits of being transferred/refusing transfer to the patient/family:  General risk, such as traffic hazards, adverse weather conditions, rough terrain or turbulence, possible failure of equipment (including vehicle or aircraft), or consequences of actions of persons outside the control of the transport personnel      Based on these reasonable risks and benefits to the patient and/or the unborn child(marcos), and based upon the information available at the time of the patient’s examination, I certify that the medical benefits reasonably to be expected from the provision of appropriate medical treatments at another medical facility outweigh the increasing risks, if any, to the individual’s medical condition, and in the case of labor to the unborn child, from effecting the transfer.    X____________________________________________ DATE 03/18/25        TIME_______      ORIGINAL - SEND TO MEDICAL RECORDS   COPY - SEND WITH PATIENT DURING TRANSFER

## 2025-03-18 NOTE — ASSESSMENT & PLAN NOTE
Lab Results   Component Value Date    EGFR 51 03/17/2025    EGFR 42 02/21/2025    EGFR 54 01/13/2025    CREATININE 1.12 03/17/2025    CREATININE 1.30 02/21/2025    CREATININE 1.07 01/13/2025

## 2025-03-18 NOTE — ED PROVIDER NOTES
Time reflects when diagnosis was documented in both MDM as applicable and the Disposition within this note       Time User Action Codes Description Comment    3/18/2025  7:41 PM Александр Armendariz [F41.0] Panic attack     3/18/2025  8:30 PM Александр Armendariz [R45.851] Suicidal ideation     3/19/2025 12:14 AM Александр Armendariz [N39.0] UTI (urinary tract infection)           ED Disposition       ED Disposition   Transfer to Behavioral Health Condition   --    Date/Time   Tue Mar 18, 2025  7:41 PM    Comment   Cydney Lim should be transferred out to behavioral health and has been medically cleared.                Assessment & Plan       Medical Decision Making  64-year-old female presenting for psychiatric evaluation.  She reports occasional suicidal ideation.  She reports panic attacks.  She has been a daily visitor to our emergency department over the last 2 days for panic attacks.    Patient is asking for psychiatric hospitalization.    Patient received a medical screen examination and is medically cleared for psychiatric hospitalization pending placement.  Crisis worker saw patient.    Signed 201. Accepted to [facility].    Urine shows a UTI. Will start antibiotics.    Signed out pending transport    Problems Addressed:  Panic attack: acute illness or injury  Suicidal ideation: acute illness or injury  UTI (urinary tract infection): acute illness or injury    Amount and/or Complexity of Data Reviewed  Labs: ordered. Decision-making details documented in ED Course.  Radiology: ordered and independent interpretation performed.    Risk  OTC drugs.  Prescription drug management.  Decision regarding hospitalization.        ED Course as of 03/19/25 0055   Tue Mar 18, 2025   2035 201 signed   Wed Mar 19, 2025   0014 Nitrite, UA(!): Positive   0015 Blood, UA(!): 2+   0015 Nitrite, UA(!): Positive       Medications   nicotine (NICODERM CQ) 21 mg/24 hr TD 24 hr patch 21 mg (21 mg Transdermal Medication  Applied 3/18/25 1932)   carBAMazepine (TEGretol) tablet 200 mg (200 mg Oral Given 3/18/25 2208)   escitalopram (LEXAPRO) tablet 10 mg (has no administration in time range)   levETIRAcetam (KEPPRA) tablet 1,500 mg (1,500 mg Oral Given 3/18/25 2208)   losartan (COZAAR) tablet 25 mg (has no administration in time range)   spironolactone (ALDACTONE) tablet 25 mg (has no administration in time range)   metoprolol succinate (TOPROL-XL) 24 hr tablet 50 mg (has no administration in time range)   albuterol inhalation solution 2.5 mg (has no administration in time range)   traZODone (DESYREL) tablet 50 mg (50 mg Oral Given 3/19/25 0000)   cephalexin (KEFLEX) capsule 500 mg (500 mg Oral Given 3/19/25 0039)   potassium chloride (Klor-Con M20) CR tablet 20 mEq (20 mEq Oral Given 3/18/25 2007)       ED Risk Strat Scores                            SBIRT 20yo+      Flowsheet Row Most Recent Value   Initial Alcohol Screen: US AUDIT-C     1. How often do you have a drink containing alcohol? 0 Filed at: 03/18/2025 1838   2. How many drinks containing alcohol do you have on a typical day you are drinking?  0 Filed at: 03/18/2025 1838   3b. FEMALE Any Age, or MALE 65+: How often do you have 4 or more drinks on one occassion? 0 Filed at: 03/18/2025 1838   Audit-C Score 0 Filed at: 03/18/2025 1838   JOHN: How many times in the past year have you...    Used an illegal drug or used a prescription medication for non-medical reasons? Never Filed at: 03/18/2025 1838                            History of Present Illness       Chief Complaint   Patient presents with    Panic Attack     Suddenly got a panic attack while watching tv about an hour ago. Pt contacted [facility] in [place] and would like to go there       Past Medical History:   Diagnosis Date    Ambulatory dysfunction     B12 deficiency     CKD (chronic kidney disease), stage III (HCC)     Depression     EP (epilepsy) (HCC)     Epilepsy (HCC)     History of sinus tachycardia     Hypertension      Morbid obesity (HCC)     Obesity     Restless leg     Restless leg syndrome     Vitamin D deficiency       Past Surgical History:   Procedure Laterality Date    DENTAL SURGERY      all teeth removed    DENTAL SURGERY      LASER ABLATION OF THE CERVIX      MAMMO (HISTORICAL)  05/01/2017      Family History   Problem Relation Age of Onset    Heart attack Mother     Kidney disease Mother     Liver disease Mother     Heart attack Father     No Known Problems Brother     No Known Problems Son       Social History     Tobacco Use    Smoking status: Every Day     Current packs/day: 0.50     Types: Cigarettes    Smokeless tobacco: Never   Vaping Use    Vaping status: Never Used   Substance Use Topics    Alcohol use: Not Currently     Comment: pt denies alcohol use    Drug use: Never      E-Cigarette/Vaping    E-Cigarette Use Never User       E-Cigarette/Vaping Substances    Nicotine Yes     THC No     CBD No     Flavoring No     Other No     Unknown No       I have reviewed and agree with the history as documented.     64-year-old female that presents to the emergency department complaining of panic attacks.  When asked to describe what symptoms she has she is unable to describe any symptoms.  When I list a bunch of symptoms she says yes I do a shortness of breath during these panic attacks.  Otherwise she denies any palpitations, chest pain, nausea, vomiting.    Current episode happened after she watched TV.    She is asking to go back to a psychiatric facility.  She notes that she called the psychiatric facility and they stated they would take her back.    She admits occasional suicidal ideation without plan.  Denies HI.  Denies auditory or visual hallucinations.      Panic Attack  Presenting symptoms: suicidal thoughts        Review of Systems   Psychiatric/Behavioral:  Positive for suicidal ideas.    All other systems reviewed and are negative.          Objective       ED Triage Vitals [03/18/25 1836]   Temperature  Pulse Blood Pressure Respirations SpO2 Patient Position - Orthostatic VS   97.8 °F (36.6 °C) 93 138/68 18 96 % Lying      Temp Source Heart Rate Source BP Location FiO2 (%) Pain Score    Oral Monitor Left arm -- 4      Vitals      Date and Time Temp Pulse SpO2 Resp BP Pain Score FACES Pain Rating User   03/18/25 1836 97.8 °F (36.6 °C) 93 96 % 18 138/68 4 -- RR            Physical Exam  Vitals and nursing note reviewed.   Constitutional:       General: She is not in acute distress.     Appearance: Normal appearance. She is not ill-appearing.   HENT:      Head: Normocephalic and atraumatic.      Right Ear: External ear normal.      Left Ear: External ear normal.      Nose: Nose normal.      Mouth/Throat:      Mouth: Mucous membranes are moist.   Eyes:      General:         Right eye: No discharge.         Left eye: No discharge.      Conjunctiva/sclera: Conjunctivae normal.   Cardiovascular:      Rate and Rhythm: Normal rate and regular rhythm.      Pulses: Normal pulses.      Heart sounds: No murmur heard.  Pulmonary:      Effort: Pulmonary effort is normal.      Breath sounds: Normal breath sounds.   Abdominal:      General: Abdomen is flat. There is no distension.      Tenderness: There is no abdominal tenderness.   Musculoskeletal:         General: Normal range of motion.      Cervical back: Normal range of motion.      Right lower leg: No edema.      Left lower leg: No edema.   Skin:     General: Skin is warm.      Capillary Refill: Capillary refill takes less than 2 seconds.      Findings: No rash.   Neurological:      General: No focal deficit present.      Mental Status: She is alert. Mental status is at baseline.   Psychiatric:      Comments: Admits panic attacks, SI w/o plan.         Results Reviewed       Procedure Component Value Units Date/Time    Urine Microscopic [[id number]]  (Abnormal) Collected: 03/18/25 8750    Lab Status: Final result Specimen: Urine, Other Updated: 03/19/25 0019     RBC, UA 0-5  /hpf      WBC, UA 10-20 /hpf      Epithelial Cells Occasional /hpf      Bacteria, UA Innumerable /hpf     Urine culture [[id number]] Collected: 03/18/25 2347    Lab Status: In process Specimen: Urine, Other Updated: 03/19/25 0019    Rapid drug screen, urine [[id number]]  (Normal) Collected: 03/18/25 2347    Lab Status: Final result Specimen: Urine, Other Updated: 03/19/25 0012     Amph/Meth UR Negative     Barbiturate Ur Negative     Benzodiazepine Urine Negative     Cocaine Urine Negative     Methadone Urine Negative     Opiate Urine Negative     PCP Ur Negative     THC Urine Negative     Oxycodone Urine Negative     Fentanyl Urine Negative     HYDROCODONE URINE Negative    Narrative:      FOR MEDICAL PURPOSES ONLY.   IF CONFIRMATION NEEDED PLEASE CONTACT THE LAB WITHIN 5 DAYS.    Drug Screen Cutoff Levels:  AMPHETAMINE/METHAMPHETAMINES  1000 ng/mL  BARBITURATES     200 ng/mL  BENZODIAZEPINES     200 ng/mL  COCAINE      300 ng/mL  METHADONE      300 ng/mL  OPIATES      300 ng/mL  PHENCYCLIDINE     25 ng/mL  THC       50 ng/mL  OXYCODONE      100 ng/mL  FENTANYL      5 ng/mL  HYDROCODONE     300 ng/mL    UA w Reflex to Microscopic w Reflex to Culture [[id number]]  (Abnormal) Collected: 03/18/25 2347    Lab Status: Final result Specimen: Urine, Other Updated: 03/19/25 0000     Color, UA Yellow     Clarity, UA Slightly Cloudy     Specific Gravity, UA 1.015     pH, UA 6.0     Leukocytes, UA Negative     Nitrite, UA Positive     Protein, UA 2+ mg/dl      Glucose, UA Negative mg/dl      Ketones, UA Negative mg/dl      Urobilinogen, UA 0.2 E.U./dl      Bilirubin, UA Negative     Occult Blood, UA 2+    TSH [[id number]]  (Normal) Collected: 03/18/25 1903    Lab Status: Final result Specimen: Blood from Arm, Right Updated: 03/18/25 1939     TSH 3RD GENERATON 1.722 uIU/mL     Comprehensive metabolic panel [[id number]]  (Abnormal) Collected: 03/18/25 1903    Lab Status: Final result Specimen: Blood from Arm, Right Updated: 03/18/25  1926     Sodium 139 mmol/L      Potassium 3.4 mmol/L      Chloride 102 mmol/L      CO2 27 mmol/L      ANION GAP 10 mmol/L      BUN 19 mg/dL      Creatinine 1.26 mg/dL      Glucose 99 mg/dL      Calcium 9.1 mg/dL      AST 14 U/L      ALT 11 U/L      Alkaline Phosphatase 100 U/L      Total Protein 7.6 g/dL      Albumin 3.6 g/dL      Total Bilirubin 0.30 mg/dL      eGFR 44 ml/min/1.73sq m     Narrative:      National Kidney Disease Foundation guidelines for Chronic Kidney Disease (CKD):     Stage 1 with normal or high GFR (GFR > 90 mL/min/1.73 square meters)    Stage 2 Mild CKD (GFR = 60-89 mL/min/1.73 square meters)    Stage 3A Moderate CKD (GFR = 45-59 mL/min/1.73 square meters)    Stage 3B Moderate CKD (GFR = 30-44 mL/min/1.73 square meters)    Stage 4 Severe CKD (GFR = 15-29 mL/min/1.73 square meters)    Stage 5 End Stage CKD (GFR <15 mL/min/1.73 square meters)  Note: GFR calculation is accurate only with a steady state creatinine    Ethanol [[id number]]  (Normal) Collected: 03/18/25 1903    Lab Status: Final result Specimen: Blood from Arm, Right Updated: 03/18/25 1924     Ethanol Lvl <10 mg/dL     CBC and differential [[id number]]  (Abnormal) Collected: 03/18/25 1903    Lab Status: Final result Specimen: Blood from Arm, Right Updated: 03/18/25 1909     WBC 13.49 Thousand/uL      RBC 3.78 Million/uL      Hemoglobin 11.5 g/dL      Hematocrit 35.8 %      MCV 95 fL      MCH 30.4 pg      MCHC 32.1 g/dL      RDW 13.8 %      MPV 10.7 fL      Platelets 261 Thousands/uL      nRBC 0 /100 WBCs      Segmented % 73 %      Immature Grans % 0 %      Lymphocytes % 19 %      Monocytes % 6 %      Eosinophils Relative 2 %      Basophils Relative 0 %      Absolute Neutrophils 9.78 Thousands/µL      Absolute Immature Grans 0.05 Thousand/uL      Absolute Lymphocytes 2.50 Thousands/µL      Absolute Monocytes 0.83 Thousand/µL      Eosinophils Absolute 0.28 Thousand/µL      Basophils Absolute 0.05 Thousands/µL     POCT alcohol breath test  [[id number]]     Lab Status: No result             XR chest 2 views   ED Interpretation by Александр Armendariz DO (03/18 1932)   No acute cardiopulmonary finding.          Procedures    ED Medication and Procedure Management   Prior to Admission Medications   Prescriptions Last Dose Informant Patient Reported? Taking?   albuterol (2.5 mg/3 mL) 0.083 % nebulizer solution Unknown Self No No   Sig: Take 3 mL (2.5 mg total) by nebulization every 6 (six) hours as needed for wheezing or shortness of breath   albuterol (Proventil HFA) 90 mcg/act inhaler 3/18/2025 at 10:00 AM Self No Yes   Sig: Inhale 2 puffs every 6 (six) hours as needed for wheezing   carBAMazepine (TEGretol) 200 mg tablet 3/18/2025 at 12:00 PM Self No Yes   Sig: Take 1 tablet (200 mg total) by mouth 3 (three) times a day   carbamide peroxide (DEBROX) 6.5 % otic solution   No No   Sig: Administer 5 drops into both ears 2 (two) times a day for 4 days Do not start before September 30, 2024.   Patient not taking: Reported on 3/18/2025   cholecalciferol 1,000 units tablet 3/18/2025 Morning Self No Yes   Sig: Take 2 tablets (2,000 Units total) by mouth daily   escitalopram (LEXAPRO) 10 mg tablet 3/18/2025 Morning  No Yes   Sig: Take 1 tablet (10 mg total) by mouth daily   levETIRAcetam (KEPPRA) 750 mg tablet 3/17/2025 at  8:30 PM Self No Yes   Sig: Take 2 tablets (1,500 mg total) by mouth 2 (two) times a day   losartan (COZAAR) 25 mg tablet 3/18/2025 Morning  No Yes   Sig: Take 1 tablet (25 mg total) by mouth daily   metoprolol succinate (TOPROL-XL) 50 mg 24 hr tablet 3/17/2025 at  8:30 PM  No Yes   Sig: Take 1 tablet (50 mg total) by mouth daily   nicotine (NICODERM CQ) 21 mg/24 hr TD 24 hr patch Unknown Self No No   Sig: Place 1 patch on the skin over 24 hours daily   pramipexole (MIRAPEX) 1.5 MG tablet   No No   Sig: Take 1 tablet (1.5 mg total) by mouth daily at bedtime   Patient not taking: Reported on 3/18/2025   spironolactone (ALDACTONE) 25 mg tablet  3/18/2025 Morning Self No Yes   Sig: Take 1 tablet (25 mg total) by mouth daily   traZODone (DESYREL) 50 mg tablet 3/18/2025 at  2:00 AM Self Yes Yes   Sig: Take 50 mg by mouth      Facility-Administered Medications Last Administration Doses Remaining   cyanocobalamin injection 1,000 mcg None recorded         Patient's Medications   Discharge Prescriptions    No medications on file     No discharge procedures on file.  ED SEPSIS DOCUMENTATION   Time reflects when diagnosis was documented in both MDM as applicable and the Disposition within this note       Time User Action Codes Description Comment    3/18/2025  7:41 PM Александр Armendariz [F41.0] Panic attack     3/18/2025  8:30 PM Александр Armendariz [R45.851] Suicidal ideation     3/19/2025 12:14 AM Александр Armendariz [N39.0] UTI (urinary tract infection)                  Александр Armendariz DO  03/19/25 0055

## 2025-03-18 NOTE — ASSESSMENT & PLAN NOTE
Wt Readings from Last 3 Encounters:   03/18/25 112 kg (247 lb)   03/16/25 110 kg (243 lb 9.7 oz)   09/29/24 109 kg (239 lb 6.7 oz)               Orders:    Ambulatory Referral to Social Work Care Management Program; Future

## 2025-03-18 NOTE — TELEPHONE ENCOUNTER
03/18/25 10:08 AM    Patient contacted post ED visit, first outreach attempt made. Message was left for patient to return a call to the [facility] Department at [facility]: Phone [phone].    Thank you.  [facility] Denny  [facility]

## 2025-03-18 NOTE — PROGRESS NOTES
Name: Cydney Lim      : 1960      MRN: [id number]  Encounter Provider: DRU Knott  Encounter Date: 3/18/2025   Encounter department: [facility]  :  Assessment & Plan  Recurrent seizures (HCC)    Orders:    Ambulatory Referral to Social Work Care Management Program; Future    Major neurocognitive disorder (HCC)    Orders:    Ambulatory Referral to Social Work Care Management Program; Future    Benign hypertensive heart and CKD, stage 3 (GFR 30-59), w CHF (HCC)  Wt Readings from Last 3 Encounters:   25 112 kg (247 lb)   25 110 kg (243 lb 9.7 oz)   24 109 kg (239 lb 6.7 oz)               Orders:    Ambulatory Referral to Social Work Care Management Program; Future    Stage 3b chronic kidney disease (HCC)  Lab Results   Component Value Date    EGFR 51 2025    EGFR 42 2025    EGFR 54 2025    CREATININE 1.12 2025    CREATININE 1.30 2025    CREATININE 1.07 2025            Depression, recurrent (HCC)           Severe episode of recurrent major depressive disorder, with psychotic features (HCC)      Orders:    Ambulatory Referral to Social Work Care Management Program; Future    Falls frequently    Orders:    Ambulatory Referral to Social Work Care Management Program; Future    Medication noncompliance due to cognitive impairment    Orders:    Ambulatory Referral to Social Work Care Management Program; Future    Self-care deficit    Orders:    Ambulatory Referral to Social Work Care Management Program; Future    CKD (chronic kidney disease) stage 4, GFR 15-29 ml/min ([facility])  Lab Results   Component Value Date    EGFR 51 2025    EGFR 42 2025    EGFR 54 2025    CREATININE 1.12 2025    CREATININE 1.30 2025    CREATININE 1.07 2025            Chronic obstructive pulmonary disease, unspecified COPD type (HCC)         Morbidly obese (HCC)           Dependence on cane         The case discussed with  patient using patient centered shared decision making.The patient was counseled regarding instructions for management,-- risk factor reductions,-- prognosis,-- impressions,-- risks and benefits of treatment options,-- importance of compliance with treatment. I have reviewed the instructions with the patient, answering all questions to her satisfaction.     I Had a lengthy discussion with the patient regarding follow-up and medication compliance.   I advised that she will need follow-up with appropriate specialists-psychiatrist in view of her recent inpatient psychiatric admission related to suicidal ideation; neurology follow-up in view of her seizure diagnosis; cardiology follow-up in setting of CHF diagnosis and recent onset palpitation.    I advised to call me if she is having any issues prior to going to the emergency department.  I question her capacity and I think it would be beneficial to have evaluated by neuropsych.  She continues to struggle to manage her health, medications, etc. today she for the first time admits that she is having trouble taking care of herself.  She requests referral to case management for help.   Blood pressure stable.  Surveillance labs up-to-date     Major depression-patient denies suicidal ideation today.     At increased risk of re-admission, morbidity-strongly encouraged monthly visits with me to monitor her closely      Discussed indications of cancer screenings (colonoscopy, mammogram, pap smear, Dexa) as well as consequences of missed screenings. Patient refuses all screenings at this time. Verbalizes risks of doing so (undiagnosed cancer, death, disability).           History of Present Illness   Cydney is a 64-year-old female known to me presents for emergency department follow-up  She was seen in [facility] emergency department March 16 status post accidental fall-she hit her head  She return to [facility] emergency department yesterday with complaints of  palpitations and feeling nervous after falling    She was recently admitted for inpatient psychiatric care at [facility] in [facility] on February 21-she presented to [facility] emergency department complaining of suicidal ideation.  She was discharged without psychiatric follow-up plan.  Chart review reveals [facility] has been trying to contact her for discharge follow-up but has not been successful in contacting her.  Today patient advises no one has called her she has not gotten any phone calls.      She has ongoing struggles with medical noncompliance I believe related to untreated psychiatric disease-possible cognitive impairment    She is frequently seen in the emergency room-she is recommended follow-up with specialty-neurology, cardiology, myself.  Appointments made-she subsequently no-shows for these appointments.    Today she endorses that she is in her usual state of health and is feeling okay    We discussed about her ongoing issues with her seizure disorder.  It does appear that she finally established with neurology and had her first appointment in June.  She had a subsequent no-show in November.    She admits that her balance is not great and she has episodes of nearly falling in her apartment.  I ordered her a cane as she was not agreeable to a walker.  I also ordered her a shower chair.    She has ongoing issues with edema.  She admits to dietary indiscretion.  She declines addition of a diuretic and she does not always take her spironolactone.   I had made recommendations in the past related to lymphedema management which she failed to follow through  I encouraged her to cut back on salt in the diet and elevate her legs when she is at home.  She tells me that she wraps her legs with Ace wraps.  I offered to order her compression stockings which she declined.    She has ongoing cardiac complaints-she has been recommended follow-up with cardiology-she has  "failed to do so.    I have referred her to case management.  I have completed waiver for assistance.    She continues to be noncompliant with her medications.    Apparently she is still estranged from her brother Osorio.  Her  has Alzheimer's and is admitted to nursing home few years ago      Review of Systems   Constitutional:  Positive for fatigue. Negative for fever.   HENT:  Negative for trouble swallowing.    Respiratory:  Positive for shortness of breath. Negative for cough.    Cardiovascular:  Positive for leg swelling. Negative for chest pain and palpitations.   Gastrointestinal:  Negative for abdominal pain and blood in stool.   Musculoskeletal:  Positive for arthralgias and gait problem.   Skin:  Negative for wound.   Neurological:  Positive for dizziness and seizures.            Endorses she is taking all meds as prescribed   Hematological:  Does not bruise/bleed easily.   Psychiatric/Behavioral:  Positive for confusion and dysphoric mood. Negative for agitation, self-injury, sleep disturbance and suicidal ideas. The patient is nervous/anxious.        Objective   /78 (BP Location: Left arm, Patient Position: Sitting, Cuff Size: Standard)   Pulse 92   Temp 97.9 °F (36.6 °C) (Temporal)   Ht 5' 3\" (1.6 m)   Wt 112 kg (247 lb)   SpO2 98%   BMI 43.75 kg/m²      Physical Exam  Vitals and nursing note reviewed.   Constitutional:       General: She is not in acute distress.     Appearance: Normal appearance. She is obese.   Cardiovascular:      Rate and Rhythm: Normal rate and regular rhythm.      Pulses: Normal pulses.   Pulmonary:      Effort: Pulmonary effort is normal.      Breath sounds: Normal breath sounds.   Musculoskeletal:      Right lower leg: 3+ Edema present.      Left lower leg: 3+ Edema present.   Skin:     General: Skin is warm and dry.      Coloration: Skin is not pale.   Neurological:      General: No focal deficit present.      Mental Status: She is alert. Mental status is at " baseline.      Motor: No weakness.      Gait: Gait normal.   Psychiatric:         Mood and Affect: Mood normal. Affect is inappropriate.         Speech: Speech is tangential.         Thought Content: Thought content is delusional.

## 2025-03-19 VITALS
TEMPERATURE: 97.8 F | HEART RATE: 84 BPM | SYSTOLIC BLOOD PRESSURE: 167 MMHG | DIASTOLIC BLOOD PRESSURE: 93 MMHG | RESPIRATION RATE: 18 BRPM | OXYGEN SATURATION: 96 %

## 2025-03-19 LAB
AMPHETAMINES SERPL QL SCN: NEGATIVE
ATRIAL RATE: 95 BPM
BACTERIA UR QL AUTO: ABNORMAL /HPF
BARBITURATES UR QL: NEGATIVE
BENZODIAZ UR QL: NEGATIVE
BILIRUB UR QL STRIP: NEGATIVE
CLARITY UR: ABNORMAL
COCAINE UR QL: NEGATIVE
COLOR UR: YELLOW
FENTANYL UR QL SCN: NEGATIVE
GLUCOSE UR STRIP-MCNC: NEGATIVE MG/DL
HGB UR QL STRIP.AUTO: ABNORMAL
HYDROCODONE UR QL SCN: NEGATIVE
KETONES UR STRIP-MCNC: NEGATIVE MG/DL
LEUKOCYTE ESTERASE UR QL STRIP: NEGATIVE
METHADONE UR QL: NEGATIVE
NITRITE UR QL STRIP: POSITIVE
NON-SQ EPI CELLS URNS QL MICRO: ABNORMAL /HPF
OPIATES UR QL SCN: NEGATIVE
OXYCODONE+OXYMORPHONE UR QL SCN: NEGATIVE
P AXIS: 26 DEGREES
PCP UR QL: NEGATIVE
PH UR STRIP.AUTO: 6 [PH]
PR INTERVAL: 122 MS
PROT UR STRIP-MCNC: ABNORMAL MG/DL
QRS AXIS: 51 DEGREES
QRSD INTERVAL: 66 MS
QT INTERVAL: 352 MS
QTC INTERVAL: 442 MS
RBC #/AREA URNS AUTO: ABNORMAL /HPF
SP GR UR STRIP.AUTO: 1.01 (ref 1–1.03)
T WAVE AXIS: 66 DEGREES
THC UR QL: NEGATIVE
UROBILINOGEN UR QL STRIP.AUTO: 0.2 E.U./DL
VENTRICULAR RATE: 95 BPM
WBC #/AREA URNS AUTO: ABNORMAL /HPF

## 2025-03-19 PROCEDURE — 93010 ELECTROCARDIOGRAM REPORT: CPT | Performed by: INTERNAL MEDICINE

## 2025-03-19 RX ADMIN — LOSARTAN POTASSIUM 25 MG: 25 TABLET, FILM COATED ORAL at 08:11

## 2025-03-19 RX ADMIN — LEVETIRACETAM 1500 MG: 500 TABLET, FILM COATED ORAL at 08:11

## 2025-03-19 RX ADMIN — CEPHALEXIN 500 MG: 250 CAPSULE ORAL at 00:39

## 2025-03-19 RX ADMIN — ESCITALOPRAM OXALATE 10 MG: 20 TABLET ORAL at 08:11

## 2025-03-19 RX ADMIN — CEPHALEXIN 500 MG: 250 CAPSULE ORAL at 08:11

## 2025-03-19 RX ADMIN — METOPROLOL SUCCINATE 50 MG: 50 TABLET, EXTENDED RELEASE ORAL at 08:11

## 2025-03-19 RX ADMIN — TRAZODONE HYDROCHLORIDE 50 MG: 50 TABLET ORAL at 00:00

## 2025-03-19 RX ADMIN — CARBAMAZEPINE 200 MG: 200 TABLET ORAL at 08:11

## 2025-03-19 NOTE — TELEPHONE ENCOUNTER
03/19/25 9:42 AM    Patient contacted post ED visit, VBI phone outreaches documented. Patient called practice and scheduled a follow-up ED visit.     Thank you.  Amy Denny  [facility]

## 2025-03-19 NOTE — ED NOTES
Patient aware of transport.  Patient did not have any questions for this writer.  Patient calm, eating food off meal tray. EMTALA completed. Transfer packet was prepped.

## 2025-03-19 NOTE — ED NOTES
Patient resting with lights dimmed, respirations even and unlabored. Virtual sitter continued.      Gabi Davies RN  03/19/25 0776

## 2025-03-19 NOTE — ED NOTES
Patient is accepted at [facility]  Patient is accepted by Dr. Tobin Acosta.     Transportation is arranged with [facility].     Transportation is scheduled for 0800  Patient may go to the floor after 0800 on 3/19        Nurse report is to be called to  [phone] prior to patient transfer.

## 2025-03-19 NOTE — ED NOTES
Belongings given to CYS at transport, valuables envelope [phone].      Gabi Davies RN  03/19/25 0926

## 2025-03-19 NOTE — ED NOTES
Reviewed chart, CTS at 1000 to [facility], per [facility].     Updated Iqra with [place] admissions of ETA.     No RN report prior to transport is necessary unless clinical/behavioral changes occur.

## 2025-03-19 NOTE — ED NOTES
Critical access hospital called at this time and stated that they did not receive the UDS.  This writer verbally gave them it and printed and faxed it over to them at this time      Markie Warner RN  03/19/25 9238

## 2025-03-19 NOTE — ED CARE HANDOFF
Emergency Department Sign Out Note        Sign out and transfer of care from Dr. Kaba. See Separate Emergency Department note.     The patient, Cydney Lim, was evaluated by the previous provider for anxiety and panic attacks. 201 in place, accepted to [facility].    Workup Completed:  Labs Reviewed   CBC AND DIFFERENTIAL - Abnormal       Result Value Ref Range Status    WBC 13.49 (*) 4.31 - 10.16 Thousand/uL Final    RBC 3.78 (*) 3.81 - 5.12 Million/uL Final    Hemoglobin 11.5  11.5 - 15.4 g/dL Final    Hematocrit 35.8  34.8 - 46.1 % Final    MCV 95  82 - 98 fL Final    MCH 30.4  26.8 - 34.3 pg Final    MCHC 32.1  31.4 - 37.4 g/dL Final    RDW 13.8  11.6 - 15.1 % Final    MPV 10.7  8.9 - 12.7 fL Final    Platelets 261  149 - 390 Thousands/uL Final    nRBC 0  /100 WBCs Final    Segmented % 73  43 - 75 % Final    Immature Grans % 0  0 - 2 % Final    Lymphocytes % 19  14 - 44 % Final    Monocytes % 6  4 - 12 % Final    Eosinophils Relative 2  0 - 6 % Final    Basophils Relative 0  0 - 1 % Final    Absolute Neutrophils 9.78 (*) 1.85 - 7.62 Thousands/µL Final    Absolute Immature Grans 0.05  0.00 - 0.20 Thousand/uL Final    Absolute Lymphocytes 2.50  0.60 - 4.47 Thousands/µL Final    Absolute Monocytes 0.83  0.17 - 1.22 Thousand/µL Final    Eosinophils Absolute 0.28  0.00 - 0.61 Thousand/µL Final    Basophils Absolute 0.05  0.00 - 0.10 Thousands/µL Final   COMPREHENSIVE METABOLIC PANEL - Abnormal    Sodium 139  135 - 147 mmol/L Final    Potassium 3.4 (*) 3.5 - 5.3 mmol/L Final    Chloride 102  96 - 108 mmol/L Final    CO2 27  21 - 32 mmol/L Final    ANION GAP 10  4 - 13 mmol/L Final    BUN 19  5 - 25 mg/dL Final    Creatinine 1.26  0.60 - 1.30 mg/dL Final    Comment: Standardized to IDMS reference method    Glucose 99  65 - 140 mg/dL Final    Comment: If the patient is fasting, the ADA then defines impaired fasting glucose as > 100 mg/dL and diabetes as > or equal to 123 mg/dL.    Calcium 9.1  8.4 - 10.2 mg/dL  Final    AST 14  13 - 39 U/L Final    ALT 11  7 - 52 U/L Final    Comment: Specimen collection should occur prior to Sulfasalazine administration due to the potential for falsely depressed results.     Alkaline Phosphatase 100  34 - 104 U/L Final    Total Protein 7.6  6.4 - 8.4 g/dL Final    Albumin 3.6  3.5 - 5.0 g/dL Final    Total Bilirubin 0.30  0.20 - 1.00 mg/dL Final    Comment: Use of this assay is not recommended for patients undergoing treatment with eltrombopag due to the potential for falsely elevated results.  N-acetyl-p-benzoquinone imine (metabolite of Acetaminophen) will generate erroneously low results in samples for patients that have taken an overdose of Acetaminophen.    eGFR 44  ml/min/1.73sq m Final    Narrative:     National Kidney Disease Foundation guidelines for Chronic Kidney Disease (CKD):     Stage 1 with normal or high GFR (GFR > 90 mL/min/1.73 square meters)    Stage 2 Mild CKD (GFR = 60-89 mL/min/1.73 square meters)    Stage 3A Moderate CKD (GFR = 45-59 mL/min/1.73 square meters)    Stage 3B Moderate CKD (GFR = 30-44 mL/min/1.73 square meters)    Stage 4 Severe CKD (GFR = 15-29 mL/min/1.73 square meters)    Stage 5 End Stage CKD (GFR <15 mL/min/1.73 square meters)  Note: GFR calculation is accurate only with a steady state creatinine   UA W REFLEX TO MICROSCOPIC WITH REFLEX TO CULTURE - Abnormal    Color, UA Yellow  Yellow Final    Clarity, UA Slightly Cloudy (*) Clear Final    Specific Gravity, UA 1.015  1.001 - 1.030 Final    pH, UA 6.0  5.0, 5.5, 6.0, 6.5, 7.0, 7.5, 8.0 Final    Leukocytes, UA Negative  Negative Final    Nitrite, UA Positive (*) Negative Final    Protein, UA 2+ (*) Negative, Interference- unable to analyze mg/dl Final    Glucose, UA Negative  Negative mg/dl Final    Ketones, UA Negative  Negative mg/dl Final    Urobilinogen, UA 0.2  0.2, 1.0 E.U./dl E.U./dl Final    Bilirubin, UA Negative  Negative Final    Occult Blood, UA 2+ (*) Negative Final   URINE MICROSCOPIC  - Abnormal    RBC, UA 0-5  None Seen, 0-1, 1-2, 2-4, 0-5 /hpf Final    WBC, UA 10-20 (*) None Seen, 0-1, 1-2, 0-5, 2-4 /hpf Final    Epithelial Cells Occasional  None Seen, Occasional /hpf Final    Bacteria, UA Innumerable (*) None Seen, Occasional /hpf Final   RAPID DRUG SCREEN, URINE - Normal    Amph/Meth UR Negative  Negative Final    Barbiturate Ur Negative  Negative Final    Benzodiazepine Urine Negative  Negative Final    Cocaine Urine Negative  Negative Final    Methadone Urine Negative  Negative Final    Opiate Urine Negative  Negative Final    PCP Ur Negative  Negative Final    THC Urine Negative  Negative Final    Oxycodone Urine Negative  Negative Final    Fentanyl Urine Negative  Negative Final    HYDROCODONE URINE Negative  Negative Final    Narrative:     FOR MEDICAL PURPOSES ONLY.   IF CONFIRMATION NEEDED PLEASE CONTACT THE LAB WITHIN 5 DAYS.    Drug Screen Cutoff Levels:  AMPHETAMINE/METHAMPHETAMINES  1000 ng/mL  BARBITURATES     200 ng/mL  BENZODIAZEPINES     200 ng/mL  COCAINE      300 ng/mL  METHADONE      300 ng/mL  OPIATES      300 ng/mL  PHENCYCLIDINE     25 ng/mL  THC       50 ng/mL  OXYCODONE      100 ng/mL  FENTANYL      5 ng/mL  HYDROCODONE     300 ng/mL   TSH, 3RD GENERATION - Normal    TSH 3RD GENERATON 1.722  0.450 - 4.500 uIU/mL Final    Comment: The recommended reference ranges for TSH during pregnancy are as follows:   First trimester 0.100 to 2.500 uIU/mL   Second trimester  0.200 to 3.000 uIU/mL   Third trimester 0.300 to 3.000 uIU/m    Note: Normal ranges may not apply to patients who are transgender, non-binary, or whose legal sex, sex at birth, and gender identity differ.  Adult TSH (3rd generation) reference range follows the recommended guidelines of the American Thyroid Association, January, 2020.   MEDICAL ALCOHOL - Normal    Ethanol Lvl <10  <10 mg/dL Final   URINE CULTURE        ED Course / Workup Pending (followup):      ED Course as of 03/19/25 1037   Wed Mar 19, 2025    0701 SO: increasing anxiety and panic attacks. UTI noted on ED workup. 201 completed. Accepted to [facility] at 8am.     Procedures  Medical Decision Making  64-year-old female previously evaluated for anxiety and panic attacks.  201 in place.  Accepted to [facility].  Transported in stable condition.    Problems Addressed:  Panic attack: acute illness or injury  Suicidal ideation: acute illness or injury  UTI (urinary tract infection): acute illness or injury    Amount and/or Complexity of Data Reviewed  Labs: ordered.  Radiology: ordered and independent interpretation performed.    Risk  OTC drugs.  Prescription drug management.  Decision regarding hospitalization.            Disposition  Final diagnoses:   Panic attack   Suicidal ideation   UTI (urinary tract infection)     Time reflects when diagnosis was documented in both MDM as applicable and the Disposition within this note       Time User Action Codes Description Comment    3/18/2025  7:41 PM Александр Armendariz [F41.0] Panic attack     3/18/2025  8:30 PM Александр Armendariz [R45.851] Suicidal ideation     3/19/2025 12:14 AM Александр Armendariz [N39.0] UTI (urinary tract infection)           ED Disposition       ED Disposition   Transfer to Behavioral Health    Condition   --    Date/Time   Tue Mar 18, 2025  7:41 PM    Comment   Cydney Lim should be transferred out to behavioral health and has been medically cleared.                MD Documentation      Flowsheet Row Most Recent Value   Patient Condition The patient has been stabilized such that within reasonable medical probability, no material deterioration of the patient condition or the condition of the unborn child(marcos) is likely to result from the transfer   Reason for Transfer Level of Care needed not available at this facility  [behavioral health]   Benefits of Transfer Specialized equipment and/or services available at the receiving facility (Include comment)________________________   [BHU]   Risks of Transfer Potential for delay in receiving treatment   Accepting Physician Dr. Rudd   Accepting Facility Name, City & State [facility]    (Name & Tel number) [facility]   Transported by (Company and Unit #) [facility]   Sending MD Dr. Александр Armendariz   Provider Certification General risk, such as traffic hazards, adverse weather conditions, rough terrain or turbulence, possible failure of equipment (including vehicle or aircraft), or consequences of actions of persons outside the control of the transport personnel          RN Documentation      Flowsheet Row Most Recent Value   Accepting Facility Name, City & State [facility]    (Name & Tel number) [facility]   Medications Reviewed with Next Provider of Service Yes   Transport Mode Other (Comment)   Transported by (Company and Unit #) [facility]   Copies of Medical Records Sent History and Physical   Patient Belongings Disposition Sent with patient   Transfer Date 03/19/25   Transfer Time 0800          Follow-up Information    None       Discharge Medication List as of 3/19/2025  9:57 AM        CONTINUE these medications which have NOT CHANGED    Details   albuterol (2.5 mg/3 mL) 0.083 % nebulizer solution Take 3 mL (2.5 mg total) by nebulization every 6 (six) hours as needed for wheezing or shortness of breath, Starting Tue 4/9/2024, Normal      albuterol (Proventil HFA) 90 mcg/act inhaler Inhale 2 puffs every 6 (six) hours as needed for wheezing, Starting Wed 5/22/2024, Normal      carBAMazepine (TEGretol) 200 mg tablet Take 1 tablet (200 mg total) by mouth 3 (three) times a day, Starting Sat 8/3/2024, Normal      carbamide peroxide (DEBROX) 6.5 % otic solution Administer 5 drops into both ears 2 (two) times a day for 4 days Do not start before September 30, 2024., Starting Mon 9/30/2024, Until Fri 10/4/2024, Normal      cholecalciferol 1,000 units tablet Take 2 tablets  (2,000 Units total) by mouth daily, Starting Tue 4/9/2024, Until Tue 3/18/2025, Normal      escitalopram (LEXAPRO) 10 mg tablet Take 1 tablet (10 mg total) by mouth daily, Starting Fri 7/19/2024, Until Tue 3/18/2025, Normal      levETIRAcetam (KEPPRA) 750 mg tablet Take 2 tablets (1,500 mg total) by mouth 2 (two) times a day, Starting Sat 8/3/2024, Normal      losartan (COZAAR) 25 mg tablet Take 1 tablet (25 mg total) by mouth daily, Starting Fri 7/19/2024, Until Tue 3/18/2025, Normal      metoprolol succinate (TOPROL-XL) 50 mg 24 hr tablet Take 1 tablet (50 mg total) by mouth daily, Starting Fri 7/19/2024, Until Tue 3/18/2025, Normal      nicotine (NICODERM CQ) 21 mg/24 hr TD 24 hr patch Place 1 patch on the skin over 24 hours daily, Starting Tue 4/9/2024, Normal      pramipexole (MIRAPEX) 1.5 MG tablet Take 1 tablet (1.5 mg total) by mouth daily at bedtime, Starting Sat 8/3/2024, Until Fri 11/1/2024, Normal      spironolactone (ALDACTONE) 25 mg tablet Take 1 tablet (25 mg total) by mouth daily, Starting Wed 5/22/2024, Normal      traZODone (DESYREL) 50 mg tablet Take 50 mg by mouth, Starting Tue 3/4/2025, Until Thu 4/3/2025 at 2359, Historical Med           No discharge procedures on file.       ED Provider  Electronically Signed by     Princess Sims MD  03/19/25 1037

## 2025-03-19 NOTE — ED NOTES
Met with patient to complete the crisis intake and safety risk assessment.  Patient came to ER with c/o Panic Attack. Patient was at [facility] 2 weeks ago for SI.  Patient does not have current OP services.  Patient lives alone.  She reported that her heart started racing yesterday and today while sitting at the table her heartbeat became rapid and she experienced shortness of Breath.  Patient  endorsed frequent SI with a plan to overdose. Patient had tangential speech.  She was cooperative and oriented. Patient denied HI, AVH, paranoia.  She endorsed anxiety and depression.  Patient appears to have limited supports and could benefit by being connected to the Department of Aging.  She stated that she wanted to go back to [place] and reportedly called there and they told her they would take her back.  Patient signed a 201, rights explained, served, and understood.

## 2025-03-19 NOTE — ED NOTES
Spoke to Clementina at [facility].  Advised Clementina that patient was recently there and is requesting to go back to [place].  She said to fax clinical for review    Clinical faxed to [place] for review.

## 2025-03-20 ENCOUNTER — RESULTS FOLLOW-UP (OUTPATIENT)
Dept: EMERGENCY DEPT | Facility: HOSPITAL | Age: 65
End: 2025-03-20

## 2025-03-20 ENCOUNTER — PATIENT OUTREACH (OUTPATIENT)
Dept: CASE MANAGEMENT | Facility: OTHER | Age: 65
End: 2025-03-20

## 2025-03-20 NOTE — PROGRESS NOTES
OPCM BREN received referral for patient due to concerns with her ability to live home alone.  Per chart review, patient was d/c from ED to [facility] yesterday.  OPCM BREN will f/u in 2 weeks once patient is d/c home   Sonyanadia Tavarez is a 30year old female presenting for a follow up pt states that she was having chest pain and would like to discuss this matter. Patient would like to discuss her recent visit to the ENT. Medications reviewed and updated.

## 2025-03-21 LAB — BACTERIA UR CULT: ABNORMAL

## 2025-04-03 ENCOUNTER — PATIENT OUTREACH (OUTPATIENT)
Dept: CASE MANAGEMENT | Facility: OTHER | Age: 65
End: 2025-04-03

## 2025-04-03 NOTE — PROGRESS NOTES
OPCM SW reviewed patient's chart and reached out regarding previously referral from 3/20 for waiver v. SNF.  OPCM SW left a VM.  If no return call received prior, will f/u next week

## 2025-04-10 ENCOUNTER — PATIENT OUTREACH (OUTPATIENT)
Dept: CASE MANAGEMENT | Facility: OTHER | Age: 65
End: 2025-04-10

## 2025-04-10 NOTE — PROGRESS NOTES
OPCM SW attempted second outreach to patient regarding LTC resources.  VM was left and [facility]

## 2025-04-10 NOTE — LETTER
[address] [facility] [address]  [phone]    Re: Unable to Reach   4/10/2025       Dear Cydney,    I am a [facility]  and wanted to be certain you had information to contact me should you desire assistance with or have questions about non-medical aspects of your care such as [but not limited to] medical insurance, housing, transportation, material needs, or emergency needs.  If I do not have an answer I will assist you in finding the appropriate agency or individual who can help.      Please feel free to contact me at [phone]. Thank You.    Sincerely,         ZINA Beckford

## 2025-05-14 ENCOUNTER — TELEPHONE (OUTPATIENT)
Age: 65
End: 2025-05-14

## 2025-05-14 NOTE — TELEPHONE ENCOUNTER
Patient SHERIN Valenzuela called to schedule HDC appt.    Patient scheduled for 6/3 at 130 with Dr. Gan  NP packet sent in ubitus, D/C paper work to be sent to [facility] Fax: [phone]

## 2025-05-17 ENCOUNTER — HOSPITAL ENCOUNTER (EMERGENCY)
Facility: HOSPITAL | Age: 65
Discharge: HOME | DRG: 101 | End: 2025-05-17
Attending: EMERGENCY MEDICINE | Admitting: EMERGENCY MEDICINE
Payer: MEDICARE

## 2025-05-17 VITALS
HEART RATE: 88 BPM | OXYGEN SATURATION: 99 % | DIASTOLIC BLOOD PRESSURE: 75 MMHG | TEMPERATURE: 98.9 F | SYSTOLIC BLOOD PRESSURE: 151 MMHG | RESPIRATION RATE: 20 BRPM

## 2025-05-17 DIAGNOSIS — R42 LIGHTHEADEDNESS: Primary | ICD-10-CM

## 2025-05-17 LAB
ANION GAP SERPL CALCULATED.3IONS-SCNC: 9 MMOL/L (ref 4–13)
ATRIAL RATE: 82 BPM
BASOPHILS # BLD AUTO: 0.04 THOUSANDS/ÂΜL (ref 0–0.1)
BASOPHILS NFR BLD AUTO: 0 % (ref 0–1)
BUN SERPL-MCNC: 29 MG/DL (ref 5–25)
CALCIUM SERPL-MCNC: 9.2 MG/DL (ref 8.4–10.2)
CHLORIDE SERPL-SCNC: 103 MMOL/L (ref 96–108)
CO2 SERPL-SCNC: 25 MMOL/L (ref 21–32)
CREAT SERPL-MCNC: 1.37 MG/DL (ref 0.6–1.3)
EOSINOPHIL # BLD AUTO: 0.03 THOUSAND/ÂΜL (ref 0–0.61)
EOSINOPHIL NFR BLD AUTO: 0 % (ref 0–6)
ERYTHROCYTE [DISTWIDTH] IN BLOOD BY AUTOMATED COUNT: 14.4 % (ref 11.6–15.1)
GFR SERPL CREATININE-BSD FRML MDRD: 40 ML/MIN/1.73SQ M
GLUCOSE SERPL-MCNC: 111 MG/DL (ref 65–140)
HCT VFR BLD AUTO: 32.1 % (ref 34.8–46.1)
HGB BLD-MCNC: 10.3 G/DL (ref 11.5–15.4)
IMM GRANULOCYTES # BLD AUTO: 0.05 THOUSAND/UL (ref 0–0.2)
IMM GRANULOCYTES NFR BLD AUTO: 0 % (ref 0–2)
LYMPHOCYTES # BLD AUTO: 1.82 THOUSANDS/ÂΜL (ref 0.6–4.47)
LYMPHOCYTES NFR BLD AUTO: 14 % (ref 14–44)
MCH RBC QN AUTO: 30.8 PG (ref 26.8–34.3)
MCHC RBC AUTO-ENTMCNC: 32.1 G/DL (ref 31.4–37.4)
MCV RBC AUTO: 96 FL (ref 82–98)
MONOCYTES # BLD AUTO: 0.68 THOUSAND/ÂΜL (ref 0.17–1.22)
MONOCYTES NFR BLD AUTO: 5 % (ref 4–12)
NEUTROPHILS # BLD AUTO: 10.34 THOUSANDS/ÂΜL (ref 1.85–7.62)
NEUTS SEG NFR BLD AUTO: 81 % (ref 43–75)
NRBC BLD AUTO-RTO: 0 /100 WBCS
P AXIS: 26 DEGREES
PLATELET # BLD AUTO: 341 THOUSANDS/UL (ref 149–390)
PMV BLD AUTO: 10.7 FL (ref 8.9–12.7)
POTASSIUM SERPL-SCNC: 3.9 MMOL/L (ref 3.5–5.3)
PR INTERVAL: 120 MS
QRS AXIS: 56 DEGREES
QRSD INTERVAL: 70 MS
QT INTERVAL: 370 MS
QTC INTERVAL: 432 MS
RBC # BLD AUTO: 3.34 MILLION/UL (ref 3.81–5.12)
SODIUM SERPL-SCNC: 137 MMOL/L (ref 135–147)
T WAVE AXIS: 96 DEGREES
VENTRICULAR RATE: 82 BPM
WBC # BLD AUTO: 12.96 THOUSAND/UL (ref 4.31–10.16)

## 2025-05-17 PROCEDURE — 85025 COMPLETE CBC W/AUTO DIFF WBC: CPT | Performed by: EMERGENCY MEDICINE

## 2025-05-17 PROCEDURE — 96360 HYDRATION IV INFUSION INIT: CPT

## 2025-05-17 PROCEDURE — 80048 BASIC METABOLIC PNL TOTAL CA: CPT | Performed by: EMERGENCY MEDICINE

## 2025-05-17 PROCEDURE — 93005 ELECTROCARDIOGRAM TRACING: CPT

## 2025-05-17 PROCEDURE — 99285 EMERGENCY DEPT VISIT HI MDM: CPT | Performed by: EMERGENCY MEDICINE

## 2025-05-17 PROCEDURE — 99284 EMERGENCY DEPT VISIT MOD MDM: CPT

## 2025-05-17 PROCEDURE — 36415 COLL VENOUS BLD VENIPUNCTURE: CPT | Performed by: EMERGENCY MEDICINE

## 2025-05-17 RX ADMIN — SODIUM CHLORIDE 500 ML: 0.9 INJECTION, SOLUTION INTRAVENOUS at 15:47

## 2025-05-17 NOTE — ED PROVIDER NOTES
Time reflects when diagnosis was documented in both MDM as applicable and the Disposition within this note       Time User Action Codes Description Comment    5/17/2025  4:40 PM Santiago Sue Add [R42] Lightheadedness           ED Disposition       ED Disposition   Discharge    Condition   Stable    Date/Time   Sat May 17, 2025  4:40 PM    Comment   Cydney Lim discharge to home/self care.                   Assessment & Plan       Medical Decision Making  64-year-old female presented to the emergency department for evaluation of lightheadedness.  On arrival patient awake, alert, oriented, asymptomatic and in no acute distress.  Differential includes but not limited to arrhythmia, electrolyte abnormality, BETH, anemia, polypharmacy.  These and other diagnoses were considered.  EKG on my independent interpretation with a sinus rhythm with artifact but no acute ischemic changes.  Blood work showed the patient had a mild leukocytosis that is improved from previous labs.  Blood work otherwise consistent with the patient's baseline.  Patient treated with a fluid bolus.  On reevaluation the patient continued to be asymptomatic.  Repeat exam continued to be benign.  All diagnostic studies were discussed with the patient in detail.  Recommendation was made for the patient to follow-up with her PCP and with her therapist regarding her psychiatric medications.  Return precautions were discussed.    The patient (and/or family present) agrees with the plan for discharge and feels comfortable to go home with proper follow-up. Diagnostic tests were reviewed. Diagnosis, care plan and treatment options were discussed. All questions were answered prior to discharge. I considered the patient's other medical conditions as applicable/noted above in my medical decision making. Advised the patient to return for worsening or additional problems. The patient (and/or family present) verbalized understanding of the discharge  instructions and warnings that would necessitate return to the Emergency Department.          Amount and/or Complexity of Data Reviewed  External Data Reviewed: labs, ECG and notes.     Details: Prior labs, EKG and notes reviewed  Labs: ordered. Decision-making details documented in ED Course.  ECG/medicine tests: ordered and independent interpretation performed.        ED Course as of 05/17/25 1723   Sat May 17, 2025   1605 Hemoglobin(!): 10.3  Baseline hemoglobin 10-12   1606 WBC(!): 12.96  Improved from previous   1616 Creatinine(!): 1.37  Baseline   1616 BUN(!): 29  Baseline   1638 Patient reevaluated and updated on results of all diagnostic testing.  Exam continues to be benign.  Recommendation made for the patient to follow-up with her PCP and her therapist regarding her psychiatric medications.       Medications   sodium chloride 0.9 % bolus 500 mL (0 mL Intravenous Stopped 5/17/25 1647)       ED Risk Strat Scores                    No data recorded        SBIRT 22yo+      Flowsheet Row Most Recent Value   Initial Alcohol Screen: US AUDIT-C     1. How often do you have a drink containing alcohol? 0 Filed at: 05/17/2025 1532   2. How many drinks containing alcohol do you have on a typical day you are drinking?  0 Filed at: 05/17/2025 1532   3a. Male UNDER 65: How often do you have five or more drinks on one occasion? 0 Filed at: 05/17/2025 1532   3b. FEMALE Any Age, or MALE 65+: How often do you have 4 or more drinks on one occassion? 0 Filed at: 05/17/2025 1532   Audit-C Score 0 Filed at: 05/17/2025 1532   JOHN: How many times in the past year have you...    Used an illegal drug or used a prescription medication for non-medical reasons? Never Filed at: 05/17/2025 1532                            History of Present Illness       Chief Complaint   Patient presents with    Dizziness     Pt presents to the ed with dizziness (light headiness) that started after taking iron,  gabpentin, lasix, trazdone, aripiprazole  and escitalipram        Past Medical History:   Diagnosis Date    Ambulatory dysfunction     B12 deficiency     CKD (chronic kidney disease), stage III (HCC)     Depression     EP (epilepsy) (HCC)     Epilepsy (HCC)     History of sinus tachycardia     Hypertension     Morbid obesity (HCC)     Obesity     Restless leg     Restless leg syndrome     Vitamin D deficiency       Past Surgical History:   Procedure Laterality Date    DENTAL SURGERY      all teeth removed    DENTAL SURGERY      LASER ABLATION OF THE CERVIX      MAMMO (HISTORICAL)  05/01/2017      Family History   Problem Relation Age of Onset    Heart attack Mother     Kidney disease Mother     Liver disease Mother     Heart attack Father     No Known Problems Brother     No Known Problems Son       Social History[1]   E-Cigarette/Vaping    E-Cigarette Use Never User       E-Cigarette/Vaping Substances    Nicotine Yes     THC No     CBD No     Flavoring No     Other No     Unknown No       I have reviewed and agree with the history as documented.     64-year-old female presents to the emergency department for evaluation of intermittent lightheadedness.  The patient reports that recently her psychiatric medications were adjusted and she feels like this is causing her to feel lightheaded.  Patient states that currently she is not feeling dizzy or lightheaded.  Last episode was yesterday.  She denies any alleviating or exacerbating factors.  No headache, blurry vision or localized numbness, tingling or weakness.  No chest pain, shortness of breath or lower leg pain or swelling.  No fever, chills, nausea, vomiting or diarrhea.  Patient is denying SI, HI and AVH.  Patient does not want to speak with a crisis counselor.        Review of Systems   Constitutional:  Negative for chills and fever.   HENT:  Negative for ear pain and sore throat.    Eyes:  Negative for pain and visual disturbance.   Respiratory:  Negative for cough and shortness of breath.     Cardiovascular:  Negative for chest pain and palpitations.   Gastrointestinal:  Negative for abdominal pain and vomiting.   Genitourinary:  Negative for dysuria and hematuria.   Musculoskeletal:  Negative for arthralgias and back pain.   Skin:  Negative for color change and rash.   Neurological:  Positive for dizziness and light-headedness. Negative for seizures and syncope.   All other systems reviewed and are negative.          Objective       ED Triage Vitals   Temperature Pulse Blood Pressure Respirations SpO2 Patient Position - Orthostatic VS   05/17/25 1532 05/17/25 1532 05/17/25 1532 05/17/25 1532 05/17/25 1532 05/17/25 1532   98.9 °F (37.2 °C) 105 131/95 18 98 % Lying      Temp Source Heart Rate Source BP Location FiO2 (%) Pain Score    05/17/25 1532 05/17/25 1532 05/17/25 1532 -- 05/17/25 1652    Oral Monitor Right arm  No Pain      Vitals      Date and Time Temp Pulse SpO2 Resp BP Pain Score FACES Pain Rating User   05/17/25 1652 -- 88 99 % 20 151/75 No Pain -- KH   05/17/25 1639 -- 83 98 % 18 -- -- -- KF   05/17/25 1532 98.9 °F (37.2 °C) 105 98 % 18 131/95 -- -- SD            Physical Exam  Vitals and nursing note reviewed.   Constitutional:       General: She is not in acute distress.     Appearance: She is well-developed.   HENT:      Head: Normocephalic and atraumatic.     Eyes:      General: Vision grossly intact.      Extraocular Movements: Extraocular movements intact.      Conjunctiva/sclera: Conjunctivae normal.      Pupils: Pupils are equal, round, and reactive to light.       Cardiovascular:      Rate and Rhythm: Normal rate and regular rhythm.      Heart sounds: No murmur heard.  Pulmonary:      Effort: Pulmonary effort is normal. No respiratory distress.      Breath sounds: Normal breath sounds.   Abdominal:      Palpations: Abdomen is soft.      Tenderness: There is no abdominal tenderness.     Musculoskeletal:         General: No swelling.      Cervical back: Neck supple.     Skin:      General: Skin is warm and dry.      Capillary Refill: Capillary refill takes less than 2 seconds.     Neurological:      Mental Status: She is alert and oriented to person, place, and time.      GCS: GCS eye subscore is 4. GCS verbal subscore is 5. GCS motor subscore is 6.      Cranial Nerves: Cranial nerves 2-12 are intact.      Sensory: Sensation is intact.      Motor: Motor function is intact.      Coordination: Coordination is intact.      Gait: Gait is intact.     Psychiatric:         Mood and Affect: Mood normal.         Results Reviewed       Procedure Component Value Units Date/Time    Basic metabolic panel [[id number]]  (Abnormal) Collected: 05/17/25 1546    Lab Status: Final result Specimen: Blood from Arm, Right Updated: 05/17/25 1607     Sodium 137 mmol/L      Potassium 3.9 mmol/L      Chloride 103 mmol/L      CO2 25 mmol/L      ANION GAP 9 mmol/L      BUN 29 mg/dL      Creatinine 1.37 mg/dL      Glucose 111 mg/dL      Calcium 9.2 mg/dL      eGFR 40 ml/min/1.73sq m     Narrative:      [employer] guidelines for Chronic Kidney Disease (CKD):     Stage 1 with normal or high GFR (GFR > 90 mL/min/1.73 square meters)    Stage 2 Mild CKD (GFR = 60-89 mL/min/1.73 square meters)    Stage 3A Moderate CKD (GFR = 45-59 mL/min/1.73 square meters)    Stage 3B Moderate CKD (GFR = 30-44 mL/min/1.73 square meters)    Stage 4 Severe CKD (GFR = 15-29 mL/min/1.73 square meters)    Stage 5 End Stage CKD (GFR <15 mL/min/1.73 square meters)  Note: GFR calculation is accurate only with a steady state creatinine    CBC and differential [[id number]]  (Abnormal) Collected: 05/17/25 1546    Lab Status: Final result Specimen: Blood from Arm, Right Updated: 05/17/25 1552     WBC 12.96 Thousand/uL      RBC 3.34 Million/uL      Hemoglobin 10.3 g/dL      Hematocrit 32.1 %      MCV 96 fL      MCH 30.8 pg      MCHC 32.1 g/dL      RDW 14.4 %      MPV 10.7 fL      Platelets 341 Thousands/uL      nRBC 0 /100 WBCs       Segmented % 81 %      Immature Grans % 0 %      Lymphocytes % 14 %      Monocytes % 5 %      Eosinophils Relative 0 %      Basophils Relative 0 %      Absolute Neutrophils 10.34 Thousands/µL      Absolute Immature Grans 0.05 Thousand/uL      Absolute Lymphocytes 1.82 Thousands/µL      Absolute Monocytes 0.68 Thousand/µL      Eosinophils Absolute 0.03 Thousand/µL      Basophils Absolute 0.04 Thousands/µL             No orders to display       ECG 12 Lead Documentation Only    Date/Time: 5/17/2025 3:38 PM    Performed by: Santiago Sue MD  Authorized by: Santiago Sue MD    ECG reviewed by me, the ED Provider: yes    Patient location:  ED  Previous ECG:     Previous ECG:  Compared to current    Similarity:  No change    Comparison to cardiac monitor: Yes    Interpretation:     Interpretation: normal    Rate:     ECG rate assessment: normal    Rhythm:     Rhythm: sinus rhythm    Ectopy:     Ectopy: none    QRS:     QRS axis:  Normal  Conduction:     Conduction: normal    ST segments:     ST segments:  Normal  T waves:     T waves: normal        ED Medication and Procedure Management   Prior to Admission Medications   Prescriptions Last Dose Informant Patient Reported? Taking?   albuterol (2.5 mg/3 mL) 0.083 % nebulizer solution  Self No No   Sig: Take 3 mL (2.5 mg total) by nebulization every 6 (six) hours as needed for wheezing or shortness of breath   albuterol (Proventil HFA) 90 mcg/act inhaler  Self No No   Sig: Inhale 2 puffs every 6 (six) hours as needed for wheezing   carBAMazepine (TEGretol) 200 mg tablet  Self No No   Sig: Take 1 tablet (200 mg total) by mouth 3 (three) times a day   carbamide peroxide (DEBROX) 6.5 % otic solution   No No   Sig: Administer 5 drops into both ears 2 (two) times a day for 4 days Do not start before September 30, 2024.   Patient not taking: Reported on 3/18/2025   cholecalciferol 1,000 units tablet  Self No No   Sig: Take 2 tablets (2,000 Units total) by mouth daily    escitalopram (LEXAPRO) 10 mg tablet   No No   Sig: Take 1 tablet (10 mg total) by mouth daily   levETIRAcetam (KEPPRA) 750 mg tablet  Self No No   Sig: Take 2 tablets (1,500 mg total) by mouth 2 (two) times a day   losartan (COZAAR) 25 mg tablet   No No   Sig: Take 1 tablet (25 mg total) by mouth daily   metoprolol succinate (TOPROL-XL) 50 mg 24 hr tablet   No No   Sig: Take 1 tablet (50 mg total) by mouth daily   nicotine (NICODERM CQ) 21 mg/24 hr TD 24 hr patch  Self No No   Sig: Place 1 patch on the skin over 24 hours daily   pramipexole (MIRAPEX) 1.5 MG tablet   No No   Sig: Take 1 tablet (1.5 mg total) by mouth daily at bedtime   Patient not taking: Reported on 3/18/2025   spironolactone (ALDACTONE) 25 mg tablet  Self No No   Sig: Take 1 tablet (25 mg total) by mouth daily      Facility-Administered Medications Last Administration Doses Remaining   cyanocobalamin injection 1,000 mcg None recorded         Discharge Medication List as of 5/17/2025  4:42 PM        CONTINUE these medications which have NOT CHANGED    Details   albuterol (2.5 mg/3 mL) 0.083 % nebulizer solution Take 3 mL (2.5 mg total) by nebulization every 6 (six) hours as needed for wheezing or shortness of breath, Starting Tue 4/9/2024, Normal      albuterol (Proventil HFA) 90 mcg/act inhaler Inhale 2 puffs every 6 (six) hours as needed for wheezing, Starting Wed 5/22/2024, Normal      carBAMazepine (TEGretol) 200 mg tablet Take 1 tablet (200 mg total) by mouth 3 (three) times a day, Starting Sat 8/3/2024, Normal      carbamide peroxide (DEBROX) 6.5 % otic solution Administer 5 drops into both ears 2 (two) times a day for 4 days Do not start before September 30, 2024., Starting Mon 9/30/2024, Until Fri 10/4/2024, Normal      cholecalciferol 1,000 units tablet Take 2 tablets (2,000 Units total) by mouth daily, Starting Tue 4/9/2024, Until Tue 3/18/2025, Normal      escitalopram (LEXAPRO) 10 mg tablet Take 1 tablet (10 mg total) by mouth daily,  Starting Fri 7/19/2024, Until Tue 3/18/2025, Normal      levETIRAcetam (KEPPRA) 750 mg tablet Take 2 tablets (1,500 mg total) by mouth 2 (two) times a day, Starting Sat 8/3/2024, Normal      losartan (COZAAR) 25 mg tablet Take 1 tablet (25 mg total) by mouth daily, Starting Fri 7/19/2024, Until Tue 3/18/2025, Normal      metoprolol succinate (TOPROL-XL) 50 mg 24 hr tablet Take 1 tablet (50 mg total) by mouth daily, Starting Fri 7/19/2024, Until Tue 3/18/2025, Normal      nicotine (NICODERM CQ) 21 mg/24 hr TD 24 hr patch Place 1 patch on the skin over 24 hours daily, Starting Tue 4/9/2024, Normal      pramipexole (MIRAPEX) 1.5 MG tablet Take 1 tablet (1.5 mg total) by mouth daily at bedtime, Starting Sat 8/3/2024, Until Fri 11/1/2024, Normal      spironolactone (ALDACTONE) 25 mg tablet Take 1 tablet (25 mg total) by mouth daily, Starting Wed 5/22/2024, Normal           No discharge procedures on file.  ED SEPSIS DOCUMENTATION   Time reflects when diagnosis was documented in both MDM as applicable and the Disposition within this note       Time User Action Codes Description Comment    5/17/2025  4:40 PM Santiago Sue Add [R42] Lightheadedness                    [1]   Social History  Tobacco Use    Smoking status: Every Day     Current packs/day: 0.50     Types: Cigarettes    Smokeless tobacco: Never   Vaping Use    Vaping status: Never Used   Substance Use Topics    Alcohol use: Not Currently     Comment: pt denies alcohol use    Drug use: Never        Santiago Sue MD  05/17/25 [id number]

## 2025-05-18 ENCOUNTER — RA CDI HCC (OUTPATIENT)
Dept: OTHER | Facility: HOSPITAL | Age: 65
End: 2025-05-18

## 2025-05-18 ENCOUNTER — APPOINTMENT (EMERGENCY)
Dept: CT IMAGING | Facility: HOSPITAL | Age: 65
DRG: 101 | End: 2025-05-18
Payer: MEDICARE

## 2025-05-18 ENCOUNTER — HOSPITAL ENCOUNTER (EMERGENCY)
Facility: HOSPITAL | Age: 65
Discharge: HOME | DRG: 101 | End: 2025-05-18
Attending: EMERGENCY MEDICINE
Payer: MEDICARE

## 2025-05-18 ENCOUNTER — HOSPITAL ENCOUNTER (INPATIENT)
Facility: HOSPITAL | Age: 65
LOS: 3 days | Discharge: NURSING, REHAB OR LONG-TERM CARE | DRG: 101 | End: 2025-05-22
Attending: EMERGENCY MEDICINE | Admitting: STUDENT IN AN ORGANIZED HEALTH CARE EDUCATION/TRAINING PROGRAM
Payer: MEDICARE

## 2025-05-18 VITALS
TEMPERATURE: 98.7 F | RESPIRATION RATE: 20 BRPM | DIASTOLIC BLOOD PRESSURE: 75 MMHG | SYSTOLIC BLOOD PRESSURE: 151 MMHG | HEART RATE: 85 BPM | OXYGEN SATURATION: 97 %

## 2025-05-18 DIAGNOSIS — R29.6 FALLS FREQUENTLY: ICD-10-CM
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
## 2025-05-18 DIAGNOSIS — Z79.899 POLYPHARMACY: ICD-10-CM

## 2025-05-18 DIAGNOSIS — S09.90XA CLOSED HEAD INJURY, INITIAL ENCOUNTER: ICD-10-CM

## 2025-05-18 DIAGNOSIS — T50.905A MEDICATION SIDE EFFECT, INITIAL ENCOUNTER: ICD-10-CM

## 2025-05-18 DIAGNOSIS — R42 DIZZINESS: Primary | ICD-10-CM

## 2025-05-18 DIAGNOSIS — G40.909 EPILEPSY (HCC): ICD-10-CM

## 2025-05-18 DIAGNOSIS — R26.2 AMBULATORY DYSFUNCTION: Primary | ICD-10-CM

## 2025-05-18 LAB
ALBUMIN SERPL BCG-MCNC: 3.8 G/DL (ref 3.5–5)
ALP SERPL-CCNC: 81 U/L (ref 34–104)
ALT SERPL W P-5'-P-CCNC: 9 U/L (ref 7–52)
ANION GAP SERPL CALCULATED.3IONS-SCNC: 11 MMOL/L (ref 4–13)
ANION GAP SERPL CALCULATED.3IONS-SCNC: 8 MMOL/L (ref 4–13)
AST SERPL W P-5'-P-CCNC: 14 U/L (ref 13–39)
BACTERIA UR QL AUTO: ABNORMAL /HPF
BASOPHILS # BLD AUTO: 0.03 THOUSANDS/ÂΜL (ref 0–0.1)
BASOPHILS # BLD AUTO: 0.05 THOUSANDS/ÂΜL (ref 0–0.1)
BASOPHILS NFR BLD AUTO: 0 % (ref 0–1)
BASOPHILS NFR BLD AUTO: 0 % (ref 0–1)
BILIRUB SERPL-MCNC: 0.32 MG/DL (ref 0.2–1)
BILIRUB UR QL STRIP: NEGATIVE
BUN SERPL-MCNC: 26 MG/DL (ref 5–25)
BUN SERPL-MCNC: 27 MG/DL (ref 5–25)
CALCIUM SERPL-MCNC: 9 MG/DL (ref 8.4–10.2)
CALCIUM SERPL-MCNC: 9 MG/DL (ref 8.4–10.2)
CARDIAC TROPONIN I PNL SERPL HS: 4 NG/L (ref ?–50)
CHLORIDE SERPL-SCNC: 105 MMOL/L (ref 96–108)
CHLORIDE SERPL-SCNC: 106 MMOL/L (ref 96–108)
CLARITY UR: ABNORMAL
CO2 SERPL-SCNC: 23 MMOL/L (ref 21–32)
CO2 SERPL-SCNC: 26 MMOL/L (ref 21–32)
COLOR UR: YELLOW
CREAT SERPL-MCNC: 1.35 MG/DL (ref 0.6–1.3)
CREAT SERPL-MCNC: 1.52 MG/DL (ref 0.6–1.3)
EOSINOPHIL # BLD AUTO: 0.01 THOUSAND/ÂΜL (ref 0–0.61)
EOSINOPHIL # BLD AUTO: 0.05 THOUSAND/ÂΜL (ref 0–0.61)
EOSINOPHIL NFR BLD AUTO: 0 % (ref 0–6)
EOSINOPHIL NFR BLD AUTO: 0 % (ref 0–6)
ERYTHROCYTE [DISTWIDTH] IN BLOOD BY AUTOMATED COUNT: 14.2 % (ref 11.6–15.1)
ERYTHROCYTE [DISTWIDTH] IN BLOOD BY AUTOMATED COUNT: 14.5 % (ref 11.6–15.1)
GFR SERPL CREATININE-BSD FRML MDRD: 35 ML/MIN/1.73SQ M
GFR SERPL CREATININE-BSD FRML MDRD: 40 ML/MIN/1.73SQ M
GLUCOSE SERPL-MCNC: 108 MG/DL (ref 65–140)
GLUCOSE SERPL-MCNC: 116 MG/DL (ref 65–140)
GLUCOSE UR STRIP-MCNC: NEGATIVE MG/DL
HCT VFR BLD AUTO: 30.4 % (ref 34.8–46.1)
HCT VFR BLD AUTO: 31 % (ref 34.8–46.1)
HGB BLD-MCNC: 9.6 G/DL (ref 11.5–15.4)
HGB BLD-MCNC: 9.9 G/DL (ref 11.5–15.4)
HGB UR QL STRIP.AUTO: ABNORMAL
IMM GRANULOCYTES # BLD AUTO: 0.05 THOUSAND/UL (ref 0–0.2)
IMM GRANULOCYTES # BLD AUTO: 0.06 THOUSAND/UL (ref 0–0.2)
IMM GRANULOCYTES NFR BLD AUTO: 0 % (ref 0–2)
IMM GRANULOCYTES NFR BLD AUTO: 0 % (ref 0–2)
KETONES UR STRIP-MCNC: NEGATIVE MG/DL
LEUKOCYTE ESTERASE UR QL STRIP: ABNORMAL
LYMPHOCYTES # BLD AUTO: 1.42 THOUSANDS/ÂΜL (ref 0.6–4.47)
LYMPHOCYTES # BLD AUTO: 2.16 THOUSANDS/ÂΜL (ref 0.6–4.47)
LYMPHOCYTES NFR BLD AUTO: 12 % (ref 14–44)
LYMPHOCYTES NFR BLD AUTO: 16 % (ref 14–44)
MAGNESIUM SERPL-MCNC: 1.7 MG/DL (ref 1.9–2.7)
MCH RBC QN AUTO: 30.6 PG (ref 26.8–34.3)
MCH RBC QN AUTO: 30.7 PG (ref 26.8–34.3)
MCHC RBC AUTO-ENTMCNC: 31.6 G/DL (ref 31.4–37.4)
MCHC RBC AUTO-ENTMCNC: 31.9 G/DL (ref 31.4–37.4)
MCV RBC AUTO: 96 FL (ref 82–98)
MCV RBC AUTO: 97 FL (ref 82–98)
MONOCYTES # BLD AUTO: 0.69 THOUSAND/ÂΜL (ref 0.17–1.22)
MONOCYTES # BLD AUTO: 0.86 THOUSAND/ÂΜL (ref 0.17–1.22)
MONOCYTES NFR BLD AUTO: 6 % (ref 4–12)
MONOCYTES NFR BLD AUTO: 6 % (ref 4–12)
MUCOUS THREADS UR QL AUTO: ABNORMAL
NEUTROPHILS # BLD AUTO: 10.74 THOUSANDS/ÂΜL (ref 1.85–7.62)
NEUTROPHILS # BLD AUTO: 9.82 THOUSANDS/ÂΜL (ref 1.85–7.62)
NEUTS SEG NFR BLD AUTO: 78 % (ref 43–75)
NEUTS SEG NFR BLD AUTO: 82 % (ref 43–75)
NITRITE UR QL STRIP: NEGATIVE
NON-SQ EPI CELLS URNS QL MICRO: ABNORMAL /HPF
NRBC BLD AUTO-RTO: 0 /100 WBCS
NRBC BLD AUTO-RTO: 0 /100 WBCS
PH UR STRIP.AUTO: 6 [PH]
PLATELET # BLD AUTO: 311 THOUSANDS/UL (ref 149–390)
PLATELET # BLD AUTO: 326 THOUSANDS/UL (ref 149–390)
PMV BLD AUTO: 10.5 FL (ref 8.9–12.7)
PMV BLD AUTO: 10.6 FL (ref 8.9–12.7)
POTASSIUM SERPL-SCNC: 3.5 MMOL/L (ref 3.5–5.3)
POTASSIUM SERPL-SCNC: 3.8 MMOL/L (ref 3.5–5.3)
PROT SERPL-MCNC: 7.4 G/DL (ref 6.4–8.4)
PROT UR STRIP-MCNC: ABNORMAL MG/DL
RBC # BLD AUTO: 3.14 MILLION/UL (ref 3.81–5.12)
RBC # BLD AUTO: 3.22 MILLION/UL (ref 3.81–5.12)
RBC #/AREA URNS AUTO: ABNORMAL /HPF
SODIUM SERPL-SCNC: 139 MMOL/L (ref 135–147)
SODIUM SERPL-SCNC: 140 MMOL/L (ref 135–147)
SP GR UR STRIP.AUTO: 1.01 (ref 1–1.03)
UROBILINOGEN UR QL STRIP.AUTO: 0.2 E.U./DL
WBC # BLD AUTO: 12.02 THOUSAND/UL (ref 4.31–10.16)
WBC # BLD AUTO: 13.92 THOUSAND/UL (ref 4.31–10.16)
WBC #/AREA URNS AUTO: ABNORMAL /HPF

## 2025-05-18 PROCEDURE — 85025 COMPLETE CBC W/AUTO DIFF WBC: CPT | Performed by: EMERGENCY MEDICINE

## 2025-05-18 PROCEDURE — 70450 CT HEAD/BRAIN W/O DYE: CPT

## 2025-05-18 PROCEDURE — 96365 THER/PROPH/DIAG IV INF INIT: CPT

## 2025-05-18 PROCEDURE — 80053 COMPREHEN METABOLIC PANEL: CPT | Performed by: EMERGENCY MEDICINE

## 2025-05-18 PROCEDURE — 81003 URINALYSIS AUTO W/O SCOPE: CPT | Performed by: EMERGENCY MEDICINE

## 2025-05-18 PROCEDURE — 81001 URINALYSIS AUTO W/SCOPE: CPT | Performed by: EMERGENCY MEDICINE

## 2025-05-18 PROCEDURE — 36415 COLL VENOUS BLD VENIPUNCTURE: CPT | Performed by: EMERGENCY MEDICINE

## 2025-05-18 PROCEDURE — 99285 EMERGENCY DEPT VISIT HI MDM: CPT | Performed by: EMERGENCY MEDICINE

## 2025-05-18 PROCEDURE — 99284 EMERGENCY DEPT VISIT MOD MDM: CPT

## 2025-05-18 PROCEDURE — 85025 COMPLETE CBC W/AUTO DIFF WBC: CPT

## 2025-05-18 PROCEDURE — 93005 ELECTROCARDIOGRAM TRACING: CPT

## 2025-05-18 PROCEDURE — 99223 1ST HOSP IP/OBS HIGH 75: CPT

## 2025-05-18 PROCEDURE — 84484 ASSAY OF TROPONIN QUANT: CPT | Performed by: EMERGENCY MEDICINE

## 2025-05-18 PROCEDURE — 83735 ASSAY OF MAGNESIUM: CPT | Performed by: EMERGENCY MEDICINE

## 2025-05-18 PROCEDURE — 80048 BASIC METABOLIC PNL TOTAL CA: CPT

## 2025-05-18 PROCEDURE — 99285 EMERGENCY DEPT VISIT HI MDM: CPT | Performed by: PHYSICIAN ASSISTANT

## 2025-05-18 RX ORDER — ARIPIPRAZOLE 5 MG/1
5 TABLET ORAL DAILY
COMMUNITY
Start: 2025-05-12

## 2025-05-18 RX ORDER — ALBUTEROL SULFATE 90 UG/1
2 INHALANT RESPIRATORY (INHALATION) EVERY 6 HOURS PRN
Status: DISCONTINUED | OUTPATIENT
Start: 2025-05-18 | End: 2025-05-22 | Stop reason: HOSPADM

## 2025-05-18 RX ORDER — HEPARIN SODIUM 5000 [USP'U]/ML
5000 INJECTION, SOLUTION INTRAVENOUS; SUBCUTANEOUS EVERY 8 HOURS SCHEDULED
Status: DISCONTINUED | OUTPATIENT
Start: 2025-05-19 | End: 2025-05-22 | Stop reason: HOSPADM

## 2025-05-18 RX ORDER — FERROUS SULFATE 325(65) MG
325 TABLET, DELAYED RELEASE (ENTERIC COATED) ORAL
COMMUNITY

## 2025-05-18 RX ORDER — SODIUM CHLORIDE 9 MG/ML
75 INJECTION, SOLUTION INTRAVENOUS CONTINUOUS
Status: DISCONTINUED | OUTPATIENT
Start: 2025-05-19 | End: 2025-05-19

## 2025-05-18 RX ORDER — GABAPENTIN 300 MG/1
300 CAPSULE ORAL
COMMUNITY
Start: 2025-05-12 | End: 2025-06-11

## 2025-05-18 RX ORDER — ESCITALOPRAM OXALATE 10 MG/1
10 TABLET ORAL DAILY
Status: DISCONTINUED | OUTPATIENT
Start: 2025-05-19 | End: 2025-05-22 | Stop reason: HOSPADM

## 2025-05-18 RX ORDER — ACETAMINOPHEN 325 MG/1
650 TABLET ORAL EVERY 6 HOURS PRN
Status: DISCONTINUED | OUTPATIENT
Start: 2025-05-18 | End: 2025-05-22 | Stop reason: HOSPADM

## 2025-05-18 RX ORDER — NICOTINE 21 MG/24HR
1 PATCH, TRANSDERMAL 24 HOURS TRANSDERMAL DAILY
Status: DISCONTINUED | OUTPATIENT
Start: 2025-05-19 | End: 2025-05-19

## 2025-05-18 RX ORDER — FERROUS SULFATE 325(65) MG
325 TABLET ORAL
Status: DISCONTINUED | OUTPATIENT
Start: 2025-05-19 | End: 2025-05-22 | Stop reason: HOSPADM

## 2025-05-18 RX ORDER — LEVETIRACETAM 500 MG/1
1500 TABLET ORAL EVERY 12 HOURS SCHEDULED
Status: DISCONTINUED | OUTPATIENT
Start: 2025-05-19 | End: 2025-05-22 | Stop reason: HOSPADM

## 2025-05-18 RX ORDER — TRAZODONE HYDROCHLORIDE 50 MG/1
50 TABLET ORAL
COMMUNITY
Start: 2025-05-12 | End: 2025-06-11

## 2025-05-18 RX ORDER — ARIPIPRAZOLE 5 MG/1
5 TABLET ORAL DAILY
Status: DISCONTINUED | OUTPATIENT
Start: 2025-05-19 | End: 2025-05-22 | Stop reason: HOSPADM

## 2025-05-18 RX ORDER — FUROSEMIDE 20 MG/1
10 TABLET ORAL DAILY
COMMUNITY
Start: 2025-05-15 | End: 2025-05-22

## 2025-05-18 RX ORDER — NICOTINE 21 MG/24HR
1 PATCH, TRANSDERMAL 24 HOURS TRANSDERMAL DAILY
Status: DISCONTINUED | OUTPATIENT
Start: 2025-05-19 | End: 2025-05-22 | Stop reason: HOSPADM

## 2025-05-18 RX ORDER — GABAPENTIN 300 MG/1
300 CAPSULE ORAL DAILY
Status: DISCONTINUED | OUTPATIENT
Start: 2025-05-19 | End: 2025-05-22 | Stop reason: HOSPADM

## 2025-05-18 RX ORDER — MAGNESIUM SULFATE HEPTAHYDRATE 40 MG/ML
2 INJECTION, SOLUTION INTRAVENOUS ONCE
Status: COMPLETED | OUTPATIENT
Start: 2025-05-18 | End: 2025-05-18

## 2025-05-18 RX ADMIN — MAGNESIUM SULFATE HEPTAHYDRATE 2 G: 40 INJECTION, SOLUTION INTRAVENOUS at 13:40

## 2025-05-18 NOTE — ED PROVIDER NOTES
Time reflects when diagnosis was documented in both MDM as applicable and the Disposition within this note       Time User Action Codes Description Comment    5/18/2025  2:32 PM Tiana Carlos Add [R42] Dizziness     5/18/2025  2:42 PM Tiana Carlos Add [Z79.899] Polypharmacy     5/18/2025  2:42 PM Tiana Carlos Add [T50.905A] Medication side effect, initial encounter           ED Disposition       ED Disposition   Discharge    Condition   Stable    Date/Time   Sun May 18, 2025  2:32 PM    Comment   Cydney Lim discharge to home/self care.                   Assessment & Plan       Medical Decision Making  Differential diagnosis includes but is not limited to anemia, BETH, electrolyte abnormality, arrhythmia, UTI, polypharmacy    Problems Addressed:  Dizziness: acute illness or injury  Medication side effect, initial encounter: acute illness or injury  Polypharmacy: acute illness or injury     Details: Patient was started on multiple new medications and different doses most likely her dizziness is due to a side effect from 1 of these medications discussed with patient following up with her psychiatrist so that they could adjust her medications    Amount and/or Complexity of Data Reviewed  Labs: ordered. Decision-making details documented in ED Course.  ECG/medicine tests: ordered and independent interpretation performed. Decision-making details documented in ED Course.    Risk  OTC drugs.  Prescription drug management.             Medications   magnesium sulfate 2 g/50 mL IVPB (premix) 2 g (0 g Intravenous Stopped 5/18/25 1428)       ED Risk Strat Scores                    No data recorded        SBIRT 22yo+      Flowsheet Row Most Recent Value   Initial Alcohol Screen: US AUDIT-C     1. How often do you have a drink containing alcohol? 0 Filed at: 05/18/2025 1304   2. How many drinks containing alcohol do you have on a typical day you are drinking?  0 Filed at: 05/18/2025 1304   3b. FEMALE Any Age, or  "MALE 65+: How often do you have 4 or more drinks on one occassion? 0 Filed at: 05/18/2025 1304   Audit-C Score 0 Filed at: 05/18/2025 1304   JOHN: How many times in the past year have you...    Used an illegal drug or used a prescription medication for non-medical reasons? Never Filed at: 05/18/2025 1304                            History of Present Illness       Chief Complaint   Patient presents with    Dizziness     Pt arrives via EMS from home. Reports she has felt dizzy like she was going to faint for 1.5 hours PTA.        Past Medical History:   Diagnosis Date    Ambulatory dysfunction     B12 deficiency     CKD (chronic kidney disease), stage III (HCC)     Depression     EP (epilepsy) (HCC)     Epilepsy (HCC)     History of sinus tachycardia     Hypertension     Morbid obesity (HCC)     Obesity     Restless leg     Restless leg syndrome     Vitamin D deficiency       Past Surgical History:   Procedure Laterality Date    DENTAL SURGERY      all teeth removed    DENTAL SURGERY      LASER ABLATION OF THE CERVIX      MAMMO (HISTORICAL)  05/01/2017      Family History   Problem Relation Age of Onset    Heart attack Mother     Kidney disease Mother     Liver disease Mother     Heart attack Father     No Known Problems Brother     No Known Problems Son       Social History[1]   E-Cigarette/Vaping    E-Cigarette Use Never User       E-Cigarette/Vaping Substances    Nicotine Yes     THC No     CBD No     Flavoring No     Other No     Unknown No       I have reviewed and agree with the history as documented.     64-year-old female comes in for evaluation of dizziness.  This is patient's second visit in 24 hours for same.  Workup yesterday within normal limits.  Patient returns today because she states I am afraid to be at home alone in case I pass out\".  Patient had a recent inpatient stay as well as change to multiple psychiatric medications.  Patient is unsure what she is supposed to be taking      History " provided by:  Patient   used: No    Dizziness  Quality:  Lightheadedness  Severity:  Unable to specify  Onset quality:  Sudden  Duration:  2 days  Timing:  Intermittent  Progression:  Waxing and waning  Chronicity:  New  Ineffective treatments:  None tried  Associated symptoms: chest pain, diarrhea, nausea, palpitations and weakness    Associated symptoms: no shortness of breath and no vomiting    Risk factors: multiple medications and new medications    Risk factors: no anemia        Review of Systems   Constitutional:  Negative for chills and fever.   HENT:  Negative for ear pain and sore throat.    Eyes:  Negative for pain and visual disturbance.   Respiratory:  Negative for cough and shortness of breath.    Cardiovascular:  Positive for chest pain and palpitations.   Gastrointestinal:  Positive for diarrhea and nausea. Negative for abdominal pain and vomiting.   Genitourinary:  Negative for dysuria and hematuria.   Musculoskeletal:  Negative for arthralgias and back pain.   Skin:  Negative for color change and rash.   Neurological:  Positive for dizziness and weakness. Negative for seizures and syncope.   All other systems reviewed and are negative.          Objective       ED Triage Vitals [05/18/25 1247]   Temperature Pulse Blood Pressure Respirations SpO2 Patient Position - Orthostatic VS   98.7 °F (37.1 °C) 88 151/69 16 94 % Lying      Temp Source Heart Rate Source BP Location FiO2 (%) Pain Score    Oral Monitor Left arm -- --      Vitals      Date and Time Temp Pulse SpO2 Resp BP Pain Score FACES Pain Rating User   05/18/25 1400 -- 85 -- 20 151/75 -- --    05/18/25 1357 -- 81 -- 18 159/72 -- --    05/18/25 1355 -- 79 -- 18 127/60 -- --    05/18/25 1345 -- 80 97 % 18 145/78 -- --    05/18/25 1330 -- 83 96 % 18 142/72 -- --    05/18/25 1300 -- 83 95 % 18 144/59 -- --    05/18/25 1247 98.7 °F (37.1 °C) 88 94 % 16 151/69 -- -- KR            Physical Exam  Vitals and nursing note  reviewed.   Constitutional:       General: She is not in acute distress.     Appearance: She is well-developed.   HENT:      Head: Normocephalic and atraumatic.     Eyes:      Conjunctiva/sclera: Conjunctivae normal.       Cardiovascular:      Rate and Rhythm: Normal rate and regular rhythm.      Heart sounds: No murmur heard.  Pulmonary:      Effort: Pulmonary effort is normal. No respiratory distress.      Breath sounds: Normal breath sounds.   Abdominal:      Palpations: Abdomen is soft.      Tenderness: There is no abdominal tenderness.     Musculoskeletal:         General: No swelling.      Cervical back: Neck supple.     Skin:     General: Skin is warm and dry.      Capillary Refill: Capillary refill takes less than 2 seconds.     Neurological:      Mental Status: She is alert.     Psychiatric:         Mood and Affect: Mood normal.         Results Reviewed       Procedure Component Value Units Date/Time    Urine Microscopic [[id number]]  (Abnormal) Collected: 05/18/25 1342    Lab Status: Final result Specimen: Urine, Other Updated: 05/18/25 1425     RBC, UA 4-10 /hpf      WBC, UA 4-10 /hpf      Epithelial Cells Moderate /hpf      Bacteria, UA Occasional /hpf      MUCUS THREADS Occasional    UA w Reflex to Microscopic w Reflex to Culture [[id number]]  (Abnormal) Collected: 05/18/25 1342    Lab Status: Final result Specimen: Urine, Other Updated: 05/18/25 1349     Color, UA Yellow     Clarity, UA Slightly Cloudy     Specific Gravity, UA 1.015     pH, UA 6.0     Leukocytes, UA 1+     Nitrite, UA Negative     Protein, UA 2+ mg/dl      Glucose, UA Negative mg/dl      Ketones, UA Negative mg/dl      Urobilinogen, UA 0.2 E.U./dl      Bilirubin, UA Negative     Occult Blood, UA 2+    HS Troponin 0hr (reflex protocol) [[id number]]  (Normal) Collected: 05/18/25 1255    Lab Status: Final result Specimen: Blood from Arm, Right Updated: 05/18/25 1324     hs TnI 0hr 4 ng/L     Comprehensive metabolic panel [[id number]]   (Abnormal) Collected: 05/18/25 1255    Lab Status: Final result Specimen: Blood from Arm, Right Updated: 05/18/25 1319     Sodium 139 mmol/L      Potassium 3.5 mmol/L      Chloride 105 mmol/L      CO2 23 mmol/L      ANION GAP 11 mmol/L      BUN 27 mg/dL      Creatinine 1.52 mg/dL      Glucose 116 mg/dL      Calcium 9.0 mg/dL      AST 14 U/L      ALT 9 U/L      Alkaline Phosphatase 81 U/L      Total Protein 7.4 g/dL      Albumin 3.8 g/dL      Total Bilirubin 0.32 mg/dL      eGFR 35 ml/min/1.73sq m     Narrative:      National Kid[employer] Foundation guidelines for Chronic Kidney Disease (CKD):     Stage 1 with normal or high GFR (GFR > 90 mL/min/1.73 square meters)    Stage 2 Mild CKD (GFR = 60-89 mL/min/1.73 square meters)    Stage 3A Moderate CKD (GFR = 45-59 mL/min/1.73 square meters)    Stage 3B Moderate CKD (GFR = 30-44 mL/min/1.73 square meters)    Stage 4 Severe CKD (GFR = 15-29 mL/min/1.73 square meters)    Stage 5 End Stage CKD (GFR <15 mL/min/1.73 square meters)  Note: GFR calculation is accurate only with a steady state creatinine    Magnesium [[id number]]  (Abnormal) Collected: 05/18/25 1255    Lab Status: Final result Specimen: Blood from Arm, Right Updated: 05/18/25 1319     Magnesium 1.7 mg/dL     CBC and differential [[id number]]  (Abnormal) Collected: 05/18/25 1255    Lab Status: Final result Specimen: Blood from Arm, Right Updated: 05/18/25 1301     WBC 12.02 Thousand/uL      RBC 3.22 Million/uL      Hemoglobin 9.9 g/dL      Hematocrit 31.0 %      MCV 96 fL      MCH 30.7 pg      MCHC 31.9 g/dL      RDW 14.2 %      MPV 10.6 fL      Platelets 326 Thousands/uL      nRBC 0 /100 WBCs      Segmented % 82 %      Immature Grans % 0 %      Lymphocytes % 12 %      Monocytes % 6 %      Eosinophils Relative 0 %      Basophils Relative 0 %      Absolute Neutrophils 9.82 Thousands/µL      Absolute Immature Grans 0.05 Thousand/uL      Absolute Lymphocytes 1.42 Thousands/µL      Absolute Monocytes 0.69 Thousand/µL       Eosinophils Absolute 0.01 Thousand/µL      Basophils Absolute 0.03 Thousands/µL             No orders to display       ECG 12 Lead Documentation Only    Date/Time: 5/18/2025 12:56 PM    Performed by: Tiana Carlos DO  Authorized by: Tiana Carlos DO    Previous ECG:     Previous ECG:  Compared to current    Similarity:  No change    Comparison to cardiac monitor: Yes    Rate:     ECG rate:  85  Rhythm:     Rhythm: sinus rhythm    Ectopy:     Ectopy: none    T waves:     T waves: flattening        ED Medication and Procedure Management   Prior to Admission Medications   Prescriptions Last Dose Informant Patient Reported? Taking?   ARIPiprazole (ABILIFY) 5 mg tablet   Yes Yes   Sig: Take 5 mg by mouth daily   albuterol (2.5 mg/3 mL) 0.083 % nebulizer solution Past Week Self No Yes   Sig: Take 3 mL (2.5 mg total) by nebulization every 6 (six) hours as needed for wheezing or shortness of breath   albuterol (Proventil HFA) 90 mcg/act inhaler Past Week Self No Yes   Sig: Inhale 2 puffs every 6 (six) hours as needed for wheezing   carBAMazepine (TEGretol) 200 mg tablet 5/18/2025 Self No Yes   Sig: Take 1 tablet (200 mg total) by mouth 3 (three) times a day   carbamide peroxide (DEBROX) 6.5 % otic solution   No No   Sig: Administer 5 drops into both ears 2 (two) times a day for 4 days Do not start before September 30, 2024.   Patient not taking: Reported on 3/18/2025   cholecalciferol 1,000 units tablet  Self No No   Sig: Take 2 tablets (2,000 Units total) by mouth daily   escitalopram (LEXAPRO) 10 mg tablet 5/18/2025  No Yes   Sig: Take 1 tablet (10 mg total) by mouth daily   furosemide (LASIX) 20 mg tablet 5/18/2025  Yes Yes   Sig: Take 10 mg by mouth daily   gabapentin (NEURONTIN) 300 mg capsule 5/18/2025  Yes Yes   Sig: Take 300 mg by mouth   levETIRAcetam (KEPPRA) 750 mg tablet 5/18/2025 Self No Yes   Sig: Take 2 tablets (1,500 mg total) by mouth 2 (two) times a day   losartan (COZAAR) 25 mg tablet  5/18/2025  No Yes   Sig: Take 1 tablet (25 mg total) by mouth daily   metoprolol succinate (TOPROL-XL) 50 mg 24 hr tablet 5/18/2025  No Yes   Sig: Take 1 tablet (50 mg total) by mouth daily   nicotine (NICODERM CQ) 21 mg/24 hr TD 24 hr patch  Self No No   Sig: Place 1 patch on the skin over 24 hours daily   pramipexole (MIRAPEX) 1.5 MG tablet   No No   Sig: Take 1 tablet (1.5 mg total) by mouth daily at bedtime   Patient not taking: Reported on 3/18/2025   spironolactone (ALDACTONE) 25 mg tablet 5/18/2025 Self No Yes   Sig: Take 1 tablet (25 mg total) by mouth daily   traZODone (DESYREL) 50 mg tablet 5/17/2025  Yes Yes   Sig: Take 50 mg by mouth      Facility-Administered Medications Last Administration Doses Remaining   cyanocobalamin injection 1,000 mcg None recorded         Patient's Medications   Discharge Prescriptions    No medications on file     No discharge procedures on file.  ED SEPSIS DOCUMENTATION   Time reflects when diagnosis was documented in both MDM as applicable and the Disposition within this note       Time User Action Codes Description Comment    5/18/2025  2:32 PM Tiana Carlos [R42] Dizziness     5/18/2025  2:42 PM Tiana Carlos [Z79.899] Polypharmacy     5/18/2025  2:42 PM Tiana Carlos [T50.905A] Medication side effect, initial encounter                      [1]   Social History  Tobacco Use    Smoking status: Every Day     Current packs/day: 0.50     Types: Cigarettes    Smokeless tobacco: Never   Vaping Use    Vaping status: Never Used   Substance Use Topics    Alcohol use: Not Currently     Comment: pt denies alcohol use    Drug use: Never        Tiana Carlos DO  05/18/25 1443

## 2025-05-19 PROBLEM — F32.A DEPRESSIVE DISORDER: Status: ACTIVE | Noted: 2025-05-19

## 2025-05-19 PROBLEM — Z79.899 POLYPHARMACY: Status: ACTIVE | Noted: 2025-05-19

## 2025-05-19 LAB
2HR DELTA HS TROPONIN: -1 NG/L
4HR DELTA HS TROPONIN: -1 NG/L
ALBUMIN SERPL BCG-MCNC: 3.8 G/DL (ref 3.5–5)
ALP SERPL-CCNC: 84 U/L (ref 34–104)
ALT SERPL W P-5'-P-CCNC: 8 U/L (ref 7–52)
ANION GAP SERPL CALCULATED.3IONS-SCNC: 10 MMOL/L (ref 4–13)
AST SERPL W P-5'-P-CCNC: 14 U/L (ref 13–39)
ATRIAL RATE: 82 BPM
ATRIAL RATE: 85 BPM
BASOPHILS # BLD AUTO: 0.07 THOUSANDS/ÂΜL (ref 0–0.1)
BASOPHILS NFR BLD AUTO: 1 % (ref 0–1)
BILIRUB SERPL-MCNC: 0.36 MG/DL (ref 0.2–1)
BUN SERPL-MCNC: 22 MG/DL (ref 5–25)
CALCIUM SERPL-MCNC: 9 MG/DL (ref 8.4–10.2)
CARDIAC TROPONIN I PNL SERPL HS: 4 NG/L (ref ?–50)
CARDIAC TROPONIN I PNL SERPL HS: 4 NG/L (ref ?–50)
CARDIAC TROPONIN I PNL SERPL HS: 5 NG/L (ref ?–50)
CHLORIDE SERPL-SCNC: 107 MMOL/L (ref 96–108)
CO2 SERPL-SCNC: 24 MMOL/L (ref 21–32)
CREAT SERPL-MCNC: 1.15 MG/DL (ref 0.6–1.3)
EOSINOPHIL # BLD AUTO: 0.16 THOUSAND/ÂΜL (ref 0–0.61)
EOSINOPHIL NFR BLD AUTO: 1 % (ref 0–6)
ERYTHROCYTE [DISTWIDTH] IN BLOOD BY AUTOMATED COUNT: 14.3 % (ref 11.6–15.1)
GFR SERPL CREATININE-BSD FRML MDRD: 49 ML/MIN/1.73SQ M
GLUCOSE P FAST SERPL-MCNC: 100 MG/DL (ref 65–99)
GLUCOSE SERPL-MCNC: 100 MG/DL (ref 65–140)
GLUCOSE SERPL-MCNC: 147 MG/DL (ref 65–140)
HCT VFR BLD AUTO: 32.3 % (ref 34.8–46.1)
HGB BLD-MCNC: 10.3 G/DL (ref 11.5–15.4)
IMM GRANULOCYTES # BLD AUTO: 0.06 THOUSAND/UL (ref 0–0.2)
IMM GRANULOCYTES NFR BLD AUTO: 0 % (ref 0–2)
LACTATE SERPL-SCNC: 0.8 MMOL/L (ref 0.5–2)
LEVETIRACETAM SERPL-MCNC: 80.9 UG/ML (ref 12–46)
LYMPHOCYTES # BLD AUTO: 3.06 THOUSANDS/ÂΜL (ref 0.6–4.47)
LYMPHOCYTES NFR BLD AUTO: 22 % (ref 14–44)
MCH RBC QN AUTO: 31 PG (ref 26.8–34.3)
MCHC RBC AUTO-ENTMCNC: 31.9 G/DL (ref 31.4–37.4)
MCV RBC AUTO: 97 FL (ref 82–98)
MONOCYTES # BLD AUTO: 0.95 THOUSAND/ÂΜL (ref 0.17–1.22)
MONOCYTES NFR BLD AUTO: 7 % (ref 4–12)
NEUTROPHILS # BLD AUTO: 9.96 THOUSANDS/ÂΜL (ref 1.85–7.62)
NEUTS SEG NFR BLD AUTO: 69 % (ref 43–75)
NRBC BLD AUTO-RTO: 0 /100 WBCS
P AXIS: 23 DEGREES
P AXIS: 26 DEGREES
PLATELET # BLD AUTO: 318 THOUSANDS/UL (ref 149–390)
PLATELET # BLD AUTO: 342 THOUSANDS/UL (ref 149–390)
PMV BLD AUTO: 10.9 FL (ref 8.9–12.7)
PMV BLD AUTO: 11 FL (ref 8.9–12.7)
POTASSIUM SERPL-SCNC: 3.6 MMOL/L (ref 3.5–5.3)
PR INTERVAL: 120 MS
PR INTERVAL: 124 MS
PROCALCITONIN SERPL-MCNC: 0.06 NG/ML
PROT SERPL-MCNC: 7.5 G/DL (ref 6.4–8.4)
QRS AXIS: 44 DEGREES
QRS AXIS: 56 DEGREES
QRSD INTERVAL: 70 MS
QRSD INTERVAL: 70 MS
QT INTERVAL: 352 MS
QT INTERVAL: 370 MS
QTC INTERVAL: 418 MS
QTC INTERVAL: 432 MS
RBC # BLD AUTO: 3.32 MILLION/UL (ref 3.81–5.12)
SODIUM SERPL-SCNC: 141 MMOL/L (ref 135–147)
T WAVE AXIS: 51 DEGREES
T WAVE AXIS: 96 DEGREES
VENTRICULAR RATE: 82 BPM
VENTRICULAR RATE: 85 BPM
WBC # BLD AUTO: 14.26 THOUSAND/UL (ref 4.31–10.16)

## 2025-05-19 PROCEDURE — 85049 AUTOMATED PLATELET COUNT: CPT

## 2025-05-19 PROCEDURE — 99448 NTRPROF PH1/NTRNET/EHR 21-30: CPT | Performed by: PSYCHIATRY & NEUROLOGY

## 2025-05-19 PROCEDURE — 99233 SBSQ HOSP IP/OBS HIGH 50: CPT | Performed by: STUDENT IN AN ORGANIZED HEALTH CARE EDUCATION/TRAINING PROGRAM

## 2025-05-19 PROCEDURE — 84484 ASSAY OF TROPONIN QUANT: CPT

## 2025-05-19 PROCEDURE — 82948 REAGENT STRIP/BLOOD GLUCOSE: CPT

## 2025-05-19 PROCEDURE — 93010 ELECTROCARDIOGRAM REPORT: CPT | Performed by: INTERNAL MEDICINE

## 2025-05-19 PROCEDURE — 80053 COMPREHEN METABOLIC PANEL: CPT

## 2025-05-19 PROCEDURE — 83605 ASSAY OF LACTIC ACID: CPT

## 2025-05-19 PROCEDURE — 85025 COMPLETE CBC W/AUTO DIFF WBC: CPT

## 2025-05-19 PROCEDURE — 83520 IMMUNOASSAY QUANT NOS NONAB: CPT | Performed by: STUDENT IN AN ORGANIZED HEALTH CARE EDUCATION/TRAINING PROGRAM

## 2025-05-19 PROCEDURE — 84145 PROCALCITONIN (PCT): CPT

## 2025-05-19 PROCEDURE — 99223 1ST HOSP IP/OBS HIGH 75: CPT | Performed by: PSYCHIATRY & NEUROLOGY

## 2025-05-19 RX ORDER — LORAZEPAM 2 MG/ML
2 INJECTION INTRAMUSCULAR EVERY 4 HOURS PRN
Status: DISCONTINUED | OUTPATIENT
Start: 2025-05-19 | End: 2025-05-22 | Stop reason: HOSPADM

## 2025-05-19 RX ORDER — LEVETIRACETAM 500 MG/5ML
1500 INJECTION, SOLUTION, CONCENTRATE INTRAVENOUS ONCE
Status: COMPLETED | OUTPATIENT
Start: 2025-05-19 | End: 2025-05-19

## 2025-05-19 RX ORDER — METOPROLOL SUCCINATE 50 MG/1
50 TABLET, EXTENDED RELEASE ORAL DAILY
Status: DISCONTINUED | OUTPATIENT
Start: 2025-05-19 | End: 2025-05-22 | Stop reason: HOSPADM

## 2025-05-19 RX ORDER — CEFTRIAXONE 1 G/50ML
1000 INJECTION, SOLUTION INTRAVENOUS EVERY 24 HOURS
Status: DISCONTINUED | OUTPATIENT
Start: 2025-05-19 | End: 2025-05-19

## 2025-05-19 RX ORDER — LORAZEPAM 0.5 MG/1
0.5 TABLET ORAL ONCE
Status: COMPLETED | OUTPATIENT
Start: 2025-05-19 | End: 2025-05-19

## 2025-05-19 RX ORDER — CARBAMAZEPINE 200 MG/1
200 TABLET ORAL 3 TIMES DAILY
Status: DISCONTINUED | OUTPATIENT
Start: 2025-05-19 | End: 2025-05-22 | Stop reason: HOSPADM

## 2025-05-19 RX ORDER — TRAZODONE HYDROCHLORIDE 50 MG/1
50 TABLET ORAL
Status: DISCONTINUED | OUTPATIENT
Start: 2025-05-19 | End: 2025-05-19

## 2025-05-19 RX ADMIN — METOPROLOL SUCCINATE 50 MG: 50 TABLET, EXTENDED RELEASE ORAL at 09:05

## 2025-05-19 RX ADMIN — GABAPENTIN 300 MG: 300 CAPSULE ORAL at 09:05

## 2025-05-19 RX ADMIN — CARBAMAZEPINE 200 MG: 200 TABLET ORAL at 21:18

## 2025-05-19 RX ADMIN — HEPARIN SODIUM 5000 UNITS: 5000 INJECTION INTRAVENOUS; SUBCUTANEOUS at 05:24

## 2025-05-19 RX ADMIN — CARBAMAZEPINE 200 MG: 200 TABLET ORAL at 09:04

## 2025-05-19 RX ADMIN — ESCITALOPRAM OXALATE 10 MG: 10 TABLET ORAL at 09:05

## 2025-05-19 RX ADMIN — CEFTRIAXONE 1000 MG: 1 INJECTION, SOLUTION INTRAVENOUS at 05:22

## 2025-05-19 RX ADMIN — NICOTINE 1 PATCH: 21 PATCH, EXTENDED RELEASE TRANSDERMAL at 09:04

## 2025-05-19 RX ADMIN — ARIPIPRAZOLE 5 MG: 5 TABLET ORAL at 09:05

## 2025-05-19 RX ADMIN — LEVETIRACETAM 1500 MG: 500 TABLET, FILM COATED ORAL at 00:47

## 2025-05-19 RX ADMIN — LEVETIRACETAM 1500 MG: 500 TABLET, FILM COATED ORAL at 21:18

## 2025-05-19 RX ADMIN — LEVETIRACETAM 1500 MG: 100 INJECTION, SOLUTION, CONCENTRATE INTRAVENOUS at 09:33

## 2025-05-19 RX ADMIN — LORAZEPAM 0.5 MG: 0.5 TABLET ORAL at 15:16

## 2025-05-19 RX ADMIN — HEPARIN SODIUM 5000 UNITS: 5000 INJECTION INTRAVENOUS; SUBCUTANEOUS at 14:24

## 2025-05-19 RX ADMIN — HEPARIN SODIUM 5000 UNITS: 5000 INJECTION INTRAVENOUS; SUBCUTANEOUS at 21:18

## 2025-05-19 RX ADMIN — FERROUS SULFATE TAB 325 MG (65 MG ELEMENTAL FE) 325 MG: 325 (65 FE) TAB at 09:04

## 2025-05-19 RX ADMIN — SODIUM CHLORIDE 75 ML/HR: 0.9 INJECTION, SOLUTION INTRAVENOUS at 00:52

## 2025-05-19 RX ADMIN — CARBAMAZEPINE 200 MG: 200 TABLET ORAL at 15:16

## 2025-05-19 NOTE — ASSESSMENT & PLAN NOTE
POA with complaints of dizziness every since the psych doctor changed medications on Wednesday.  She was seen earlier in the [facility], workup was negative and set home.  She appeared back to the [facility] ED tonight due to passing out and hitting head.  No focal deficits noted, patient states she is still dizzy, denies any headaches chest pain or palpitations.  She denies any incontinence of bowel or urine.  No tongue biting.  + LOC. She does have complaints of urinary burning and frequency. Denies CVA tenderness  She also states she cannot tell me what meds got changed by the psych doctor.  Chart review performed:patient recently admitted in April at [facility] for fall, hyperkalemia, and was 303. Noted at that time  spirolactone was discontinued 2/2 to hyperkalemia and was started on furosemide for 7 days and to continue to 5/22  CT head - no acute intracranial abnormality  EKG reviewed interpreted sinus rhythm rate 85 no ST-T elevation  Troponins x 2 negative  Noted leukocytosis WBC 13.92 &  afebrile  UA positive for blood, leukocytes urine micro WBC 4-10 & occasional bacteria  Pro-Jesus and lactic negative  Suspect dizziness 2/2 to furosemide usage & possible  UTI  Monitor patient on telemetry  Neurochecks every 4  Obtain orthostatic blood pressures  Chart review performed: UC 4/5/24: + Klebsiella pneumoniae   Initiated IV Rocephin  Follow up with UC adjust antbx accordingly  Psych consulted appreciate recommendations

## 2025-05-19 NOTE — ASSESSMENT & PLAN NOTE
POA with complaints of dizziness every since the psych doctor changed medications on Wednesday.  She was seen earlier in the [facility], workup was negative and set home.  She appeared back to the [facility] ED tonight due to passing out and hitting head.  No focal deficits noted, patient states she is still dizzy, denies any headaches chest pain or palpitations.  She denies any incontinence of bowel or urine.  No tongue biting.  + LOC. She does have complaints of urinary burning and frequency. Denies CVA tenderness  She also states she cannot tell me what meds got changed by the psych doctor.  Chart review performed:patient recently admitted in April at [facility] for fall, hyperkalemia, and was 303. Noted at that time  spirolactone was discontinued 2/2 to hyperkalemia and was started on furosemide for 7 days and to continue to 5/22  CT head - no acute intracranial abnormality  EKG reviewed interpreted sinus rhythm rate 85 no ST-T elevation  Troponins x 2 negative  Noted leukocytosis WBC 13.92 &  afebrile  UA positive for blood, leukocytes urine micro WBC 4-10 & occasional bacteria  Initially started on IV ceftriaxone will discontinue as UA is contaminated with epithelial cells and minimal pyuria  Pro-Jesus and lactic negative  Suspect syncopy in setting of 2/2 to seizure vs furosemide usage  Monitor patient on telemetry  Neurochecks every 4  Obtain orthostatic blood pressures  Psych consulted appreciate recommendations  Neuro consulted for seizure episode that occurred this morning

## 2025-05-19 NOTE — ASSESSMENT & PLAN NOTE
[facility] SI/HI  Home med Lexapro, gabapentin, Abilify, Trazodone continued    psych consulted appreciate recs

## 2025-05-19 NOTE — ED PROVIDER NOTES
Time reflects when diagnosis was documented in both MDM as applicable and the Disposition within this note       Time User Action Codes Description Comment    5/18/2025  9:08 PM Sudhakar Narvaez Add [R26.2] Ambulatory dysfunction     5/18/2025  9:08 PM Sudhakar Narvaez Add [S09.90XA] Closed head injury, initial encounter     5/18/2025  9:08 PM Sudhakar Narvaez Add [W19.XXXA] Fall, initial encounter     5/19/2025 12:04 AM Jocy Jain Add [R29.6] Falls frequently     5/19/2025 12:05 AM Jocy Jain Add [Z79.899] Polypharmacy     5/19/2025  9:02 AM Aysha Canseco Add [G40.909] Epilepsy (HCC)           ED Disposition       ED Disposition   Admit    Condition   Stable    Date/Time   Sun May 18, 2025  9:08 PM    Comment   Case was discussed with Jocy GONSALES and the patient's admission status was agreed to be Admission Status: observation status to the service of Dr. Canseco.               Assessment & Plan       Medical Decision Making  64-year-old female presenting to the emergency department today for recurrent lightheadedness.  Evaluated twice in the past 2 days for similar events.  Now had an episode of lightheadedness resulting in a fall with head strike.  No antiplatelets or anticoagulants.  No loss of consciousness.  Vitals are stable.  Afebrile.  Reassuring neurologic examination while here in the emergency department with no evidence of trauma to the head.  Differential diagnosis includes but is not limited to polypharmacy, seizure-like activity, CAD, ACS, MI, etc.  Patient had a comprehensive workup with blood work, EKG, and imaging earlier today.  CT head negative for any acute traumatic abnormalities while here in the emergency department.  Patient is amenable to rehabilitation or nursing home placement at this time.  She is concerned she is unsafe at home.  Case was discussed with Jocy GONSALES who accepts the patient for observation.  The patient and/or  "patient's proxy verify their understanding and agree to the plan at this time.  All questions answered to the patient and/or their proxy's satisfaction.  All labs reviewed and utilized in the medical decision making process (if labs were ordered).  Portions of the record may have been created with voice recognition software.  Occasional wrong word or \"sound a like\" substitutions may have occurred due to the inherent limitations of voice recognition software.  Read the chart carefully and recognize, using context, where substitutions have occurred.    I reviewed prior notes.  I reviewed prior labs.  I reviewed prior EKG.    Problems Addressed:  Ambulatory dysfunction: chronic illness or injury  Closed head injury, initial encounter: undiagnosed new problem with uncertain prognosis  Fall, initial encounter: undiagnosed new problem with uncertain prognosis    Amount and/or Complexity of Data Reviewed  External Data Reviewed: labs, ECG and notes.  Radiology: ordered. Decision-making details documented in ED Course.  Discussion of management or test interpretation with external provider(s): Jocy GONSALES - SLIM    Risk  Decision regarding hospitalization.             Medications       ED Risk Strat Scores                    No data recorded                            History of Present Illness       Chief Complaint   Patient presents with    Fall     Brought in by EMS from home, pt seen earlier today for dizziness, states after she was d/c dizziness returned and she fell and hit her head off couch, unknown LOC, - thinners       Past Medical History:   Diagnosis Date    Ambulatory dysfunction     B12 deficiency     CKD (chronic kidney disease), stage III (HCC)     Depression     EP (epilepsy) (HCC)     Epilepsy (HCC)     History of sinus tachycardia     Hypertension     Morbid obesity (HCC)     Obesity     Restless leg     Restless leg syndrome     Vitamin D deficiency       Past Surgical History:   Procedure " Laterality Date    DENTAL SURGERY      all teeth removed    DENTAL SURGERY      LASER ABLATION OF THE CERVIX      MAMMO (HISTORICAL)  05/01/2017      Family History   Problem Relation Age of Onset    Heart attack Mother     Kidney disease Mother     Liver disease Mother     Heart attack Father     No Known Problems Brother     No Known Problems Son       Social History[1]   E-Cigarette/Vaping    E-Cigarette Use Never User       E-Cigarette/Vaping Substances    Nicotine Yes     THC No     CBD No     Flavoring No     Other No     Unknown No       I have reviewed and agree with the history as documented.     This is a 64-year-old female with past medical history significant for CKD, depression, hypertension, and epilepsy presenting to the emergency department today status post fall.  The patient was seen in the emergency department earlier today secondary to lightheadedness and subsequently discharged home after declining placement into a nursing home or a rehabilitation facility.  The patient notes that she went home, experienced an episode of lightheadedness, and fell striking her head on her sofa.  She did not lose consciousness.  She is not on anticoagulants or antiplatelets.  She has had numerous episodes of lightheadedness over the past few days.  She does have recent medication changes including psychiatric and cardiac medications.  She denies any chest pain or shortness of breath.  She is not currently lightheaded.  She notes fatigue.  She has no nausea or vomiting.  She has no diarrhea or constipation.  She has chronic lower extremity swelling which is not worse than her baseline.  She denies any injuries to her upper or lower extremities.  The patient denies other complaints at this time.      History provided by:  Patient   used: No    Fall  Mechanism of injury: fall    Injury location:  Head/neck  Incident location:  Home  Time since incident: just PTA.  Arrived directly from scene: yes     Suspicion of alcohol use: no    Suspicion of drug use: no    Tetanus status:  Unknown  Prior to arrival data:     Patient ambulatory at scene: no      Loss of consciousness: no      Amnesic to event: no    Associated symptoms: no abdominal pain, no back pain, no blindness, no chest pain, no difficulty breathing, no headaches, no hearing loss, no loss of consciousness, no nausea, no neck pain, no seizures and no vomiting    Risk factors: no anticoagulation therapy        Review of Systems   Constitutional:  Positive for fatigue. Negative for appetite change, chills, diaphoresis and fever.   HENT:  Negative for hearing loss.    Eyes:  Negative for blindness and visual disturbance.   Respiratory:  Negative for cough, chest tightness, shortness of breath and wheezing.    Cardiovascular:  Negative for chest pain, palpitations and leg swelling.   Gastrointestinal:  Negative for abdominal pain, constipation, diarrhea, nausea and vomiting.   Musculoskeletal:  Negative for back pain, neck pain and neck stiffness.   Skin:  Negative for rash and wound.   Neurological:  Positive for light-headedness. Negative for dizziness, tremors, seizures, loss of consciousness, syncope, weakness, numbness and headaches.   Psychiatric/Behavioral:  Negative for confusion.    All other systems reviewed and are negative.          Objective       ED Triage Vitals   Temperature Pulse Blood Pressure Respirations SpO2 Patient Position - Orthostatic VS   05/18/25 1840 05/18/25 1838 05/18/25 1838 05/18/25 1838 05/18/25 1838 05/18/25 1945   98.3 °F (36.8 °C) 85 144/65 16 95 % Lying      Temp Source Heart Rate Source BP Location FiO2 (%) Pain Score    05/18/25 2302 05/18/25 1945 05/18/25 1945 -- 05/18/25 1915    Oral Monitor Left arm  4      Vitals      Date and Time Temp Pulse SpO2 Resp BP Pain Score FACES Pain Rating User   05/19/25 0905 -- 85 -- -- 147/91 -- -- HB   05/19/25 0651 98.5 °F (36.9 °C) 71 93 % 17 119/66 -- -- CB   05/19/25 0409 98.5 °F  (36.9 °C) 72 92 % 16 129/60 -- -- FV   05/19/25 0300 -- -- -- -- -- 5 -- CG   05/19/25 0053 -- 89 -- 18 167/98 -- -- CG   05/19/25 0049 -- 80 -- 18 175/100 -- -- CG   05/19/25 0044 -- 79 -- 18 160/75 -- -- CG   05/18/25 2352 -- 79 -- 18 160/75 -- -- CG   05/18/25 2346 -- -- 94 % -- -- -- -- CG   05/18/25 2344 97.8 °F (36.6 °C) 86 94 % 18 168/82 -- -- CG   05/18/25 2302 97.8 °F (36.6 °C) 86 93 % 18 168/82 5 -- AC   05/18/25 2200 -- 83 94 % 19 152/92 -- -- MEJIA   05/18/25 2100 -- 81 95 % 19 160/79 -- -- MEJIA   05/18/25 2000 -- 78 93 % 18 170/72 -- -- MEJIA   05/18/25 1945 -- 83 93 % 18 170/73 -- -- MEJIA   05/18/25 1915 -- -- -- -- -- 4 -- MEJIA   05/18/25 1840 98.3 °F (36.8 °C) -- -- -- -- -- -- BG   05/18/25 1838 -- 85 95 % 16 144/65 -- -- BG            Physical Exam  Vitals and nursing note reviewed.   Constitutional:       General: She is not in acute distress.     Appearance: Normal appearance. She is normal weight. She is not ill-appearing, toxic-appearing or diaphoretic.   HENT:      Head: Normocephalic and atraumatic.      Right Ear: Tympanic membrane, ear canal and external ear normal. There is no impacted cerumen.      Left Ear: Tympanic membrane, ear canal and external ear normal. There is no impacted cerumen.      Ears:      Comments: No hemotympanum or Ferguson sign bilaterally     Nose: Nose normal. No congestion or rhinorrhea.      Mouth/Throat:      Mouth: Mucous membranes are moist.      Pharynx: No oropharyngeal exudate or posterior oropharyngeal erythema.     Eyes:      General: No scleral icterus.        Right eye: No discharge.         Left eye: No discharge.      Extraocular Movements: Extraocular movements intact.      Pupils: Pupils are equal, round, and reactive to light.      Comments: No raccoon eyes bilaterally   Neck:      Comments: No tenderness to palpation to the midline cervical spine or bilateral paracervical musculature.  No midline step-offs or deformities.  Cardiovascular:      Rate and Rhythm:  Normal rate and regular rhythm.      Pulses: Normal pulses.      Heart sounds: Normal heart sounds. No murmur heard.     No friction rub. No gallop.   Pulmonary:      Effort: Pulmonary effort is normal. No respiratory distress.      Breath sounds: Normal breath sounds. No stridor. No wheezing, rhonchi or rales.   Chest:      Chest wall: No tenderness.     Musculoskeletal:         General: Normal range of motion.      Cervical back: Normal range of motion. No tenderness.      Right lower leg: No edema.      Left lower leg: No edema.     Skin:     General: Skin is warm and dry.      Capillary Refill: Capillary refill takes less than 2 seconds.      Coloration: Skin is not jaundiced or pale.     Neurological:      General: No focal deficit present.      Mental Status: She is alert and oriented to person, place, and time. Mental status is at baseline.      Comments: 5/5 strength in bilateral upper and lower extremities  Normal sensation of bilateral upper and lower extremities  The patient is able to smile, frown, puff out cheeks, and raise eyebrows bilaterally symmetrically without difficulty  Normal finger-to-nose examination  No cerebellar signs are dysdiadochokinesia  Overall, a normal neurologic assessment   Psychiatric:         Mood and Affect: Mood normal.         Behavior: Behavior normal.         Results Reviewed       Procedure Component Value Units Date/Time    Basic metabolic panel [[id number]]  (Abnormal) Collected: 05/18/25 2123    Lab Status: Final result Specimen: Blood from Arm, Right Updated: 05/18/25 2146     Sodium 140 mmol/L      Potassium 3.8 mmol/L      Chloride 106 mmol/L      CO2 26 mmol/L      ANION GAP 8 mmol/L      BUN 26 mg/dL      Creatinine 1.35 mg/dL      Glucose 108 mg/dL      Calcium 9.0 mg/dL      eGFR 40 ml/min/1.73sq m     Narrative:      National Kid[employer] Foundation guidelines for Chronic Kidney Disease (CKD):     Stage 1 with normal or high GFR (GFR > 90 mL/min/1.73 square  meters)    Stage 2 Mild CKD (GFR = 60-89 mL/min/1.73 square meters)    Stage 3A Moderate CKD (GFR = 45-59 mL/min/1.73 square meters)    Stage 3B Moderate CKD (GFR = 30-44 mL/min/1.73 square meters)    Stage 4 Severe CKD (GFR = 15-29 mL/min/1.73 square meters)    Stage 5 End Stage CKD (GFR <15 mL/min/1.73 square meters)  Note: GFR calculation is accurate only with a steady state creatinine    CBC and differential [[id number]]  (Abnormal) Collected: 05/18/25 2123    Lab Status: Final result Specimen: Blood from Arm, Right Updated: 05/18/25 2129     WBC 13.92 Thousand/uL      RBC 3.14 Million/uL      Hemoglobin 9.6 g/dL      Hematocrit 30.4 %      MCV 97 fL      MCH 30.6 pg      MCHC 31.6 g/dL      RDW 14.5 %      MPV 10.5 fL      Platelets 311 Thousands/uL      nRBC 0 /100 WBCs      Segmented % 78 %      Immature Grans % 0 %      Lymphocytes % 16 %      Monocytes % 6 %      Eosinophils Relative 0 %      Basophils Relative 0 %      Absolute Neutrophils 10.74 Thousands/µL      Absolute Immature Grans 0.06 Thousand/uL      Absolute Lymphocytes 2.16 Thousands/µL      Absolute Monocytes 0.86 Thousand/µL      Eosinophils Absolute 0.05 Thousand/µL      Basophils Absolute 0.05 Thousands/µL             CT head without contrast   Final Interpretation by Trent Luna MD (05/18 2058)      No acute intracranial abnormality.                  Workstation performed: [id number]             Procedures    ED Medication and Procedure Management   Prior to Admission Medications   Prescriptions Last Dose Informant Patient Reported? Taking?   ARIPiprazole (ABILIFY) 5 mg tablet 5/18/2025 Bedtime  Yes Yes   Sig: Take 5 mg by mouth daily   albuterol (2.5 mg/3 mL) 0.083 % nebulizer solution Not Taking Self No No   Sig: Take 3 mL (2.5 mg total) by nebulization every 6 (six) hours as needed for wheezing or shortness of breath   Patient not taking: Reported on 5/19/2025   albuterol (Proventil HFA) 90 mcg/act inhaler Past Month Self No Yes    Sig: Inhale 2 puffs every 6 (six) hours as needed for wheezing   carBAMazepine (TEGretol) 200 mg tablet Unknown Self No No   Sig: Take 1 tablet (200 mg total) by mouth 3 (three) times a day   carbamide peroxide (DEBROX) 6.5 % otic solution   No No   Sig: Administer 5 drops into both ears 2 (two) times a day for 4 days Do not start before September 30, 2024.   Patient not taking: Reported on 3/18/2025   cholecalciferol 1,000 units tablet  Self No No   Sig: Take 2 tablets (2,000 Units total) by mouth daily   escitalopram (LEXAPRO) 10 mg tablet 5/18/2025 Morning  No Yes   Sig: Take 1 tablet (10 mg total) by mouth daily   ferrous sulfate 325 (65 FE) MG EC tablet 5/18/2025 Morning  Yes Yes   Sig: Take 325 mg by mouth 3 (three) times a day with meals   furosemide (LASIX) 20 mg tablet 5/18/2025 Morning  Yes Yes   Sig: Take 10 mg by mouth daily   gabapentin (NEURONTIN) 300 mg capsule 5/18/2025 Evening  Yes Yes   Sig: Take 300 mg by mouth   levETIRAcetam (KEPPRA) 750 mg tablet 5/18/2025 Evening Self No Yes   Sig: Take 2 tablets (1,500 mg total) by mouth 2 (two) times a day   losartan (COZAAR) 25 mg tablet Unknown  No No   Sig: Take 1 tablet (25 mg total) by mouth daily   metoprolol succinate (TOPROL-XL) 50 mg 24 hr tablet Unknown  No No   Sig: Take 1 tablet (50 mg total) by mouth daily   nicotine (NICODERM CQ) 21 mg/24 hr TD 24 hr patch Unknown Self No No   Sig: Place 1 patch on the skin over 24 hours daily   pramipexole (MIRAPEX) 1.5 MG tablet   No No   Sig: Take 1 tablet (1.5 mg total) by mouth daily at bedtime   Patient not taking: Reported on 3/18/2025   spironolactone (ALDACTONE) 25 mg tablet Unknown Self No No   Sig: Take 1 tablet (25 mg total) by mouth daily   traZODone (DESYREL) 50 mg tablet 5/18/2025 Bedtime  Yes Yes   Sig: Take 50 mg by mouth      Facility-Administered Medications Last Administration Doses Remaining   cyanocobalamin injection 1,000 mcg None recorded         Current Discharge Medication List         CONTINUE these medications which have NOT CHANGED    Details   albuterol (Proventil HFA) 90 mcg/act inhaler Inhale 2 puffs every 6 (six) hours as needed for wheezing  Qty: 6.7 g, Refills: 3    Comments: Substitution to a formulary equivalent within the same pharmaceutical class is authorized.  Associated Diagnoses: Chronic obstructive pulmonary disease, unspecified COPD type ([facility])      ARIPiprazole (ABILIFY) 5 mg tablet Take 5 mg by mouth daily      escitalopram (LEXAPRO) 10 mg tablet Take 1 tablet (10 mg total) by mouth daily  Qty: 90 tablet, Refills: 1    Associated Diagnoses: Severe episode of recurrent major depressive disorder, with psychotic features ([facility])      ferrous sulfate 325 (65 FE) MG EC tablet Take 325 mg by mouth 3 (three) times a day with meals      furosemide (LASIX) 20 mg tablet Take 10 mg by mouth daily      gabapentin (NEURONTIN) 300 mg capsule Take 300 mg by mouth      levETIRAcetam (KEPPRA) 750 mg tablet Take 2 tablets (1,500 mg total) by mouth 2 (two) times a day  Qty: 360 tablet, Refills: 0    Associated Diagnoses: Medication refill      traZODone (DESYREL) 50 mg tablet Take 50 mg by mouth      albuterol (2.5 mg/3 mL) 0.083 % nebulizer solution Take 3 mL (2.5 mg total) by nebulization every 6 (six) hours as needed for wheezing or shortness of breath  Qty: 75 mL, Refills: 0    Associated Diagnoses: Dyspnea      carBAMazepine (TEGretol) 200 mg tablet Take 1 tablet (200 mg total) by mouth 3 (three) times a day  Qty: 270 tablet, Refills: 0    Associated Diagnoses: Epilepsy with altered consciousness with intractable epilepsy ([facility]); Medication refill      carbamide peroxide (DEBROX) 6.5 % otic solution Administer 5 drops into both ears 2 (two) times a day for 4 days Do not start before September 30, 2024.  Qty: 15 mL, Refills: 0    Associated Diagnoses: Cerumen debris on tympanic membrane of both ears      cholecalciferol 1,000 units tablet Take 2 tablets (2,000 Units total) by mouth  daily  Qty: 60 tablet, Refills: 0    Associated Diagnoses: Severe episode of recurrent major depressive disorder, with psychotic features (HCC)      losartan (COZAAR) 25 mg tablet Take 1 tablet (25 mg total) by mouth daily  Qty: 90 tablet, Refills: 1    Associated Diagnoses: Anemia due to stage 3a chronic kidney disease  (HCC); Essential hypertension; Hypokalemia; Water retention; Stage 3a chronic kidney disease (HCC); Acute renal failure superimposed on chronic kidney disease  (HCC); Parenchymal renal hypertension, stage 1 through stage 4 or unspecified chronic kidney disease; Acute renal failure with acute tubular necrosis superimposed on stage 3a chronic kidney disease (HCC)      metoprolol succinate (TOPROL-XL) 50 mg 24 hr tablet Take 1 tablet (50 mg total) by mouth daily  Qty: 90 tablet, Refills: 1    Associated Diagnoses: Essential hypertension      nicotine (NICODERM CQ) 21 mg/24 hr TD 24 hr patch Place 1 patch on the skin over 24 hours daily  Qty: 28 patch, Refills: 0    Associated Diagnoses: Tobacco abuse      pramipexole (MIRAPEX) 1.5 MG tablet Take 1 tablet (1.5 mg total) by mouth daily at bedtime  Qty: 90 tablet, Refills: 0    Associated Diagnoses: Restless leg syndrome      spironolactone (ALDACTONE) 25 mg tablet Take 1 tablet (25 mg total) by mouth daily  Qty: 90 tablet, Refills: 1    Associated Diagnoses: Essential hypertension; Lymphedema           No discharge procedures on file.  ED SEPSIS DOCUMENTATION   Time reflects when diagnosis was documented in both MDM as applicable and the Disposition within this note       Time User Action Codes Description Comment    5/18/2025  9:08 PM Sudhakar Narvaez [R26.2] Ambulatory dysfunction     5/18/2025  9:08 PM Sudhakar Narvaez [S09.90XA] Closed head injury, initial encounter     5/18/2025  9:08 PM Sudhakar Narvaez [W19.XXXA] Fall, initial encounter     5/19/2025 12:04 AM Jocy Jain Add [R29.6] Falls frequently      5/19/2025 12:05 AM Jocy Jain Add [Z79.899] Polypharmacy     5/19/2025  9:02 AM Aysha Canseco Add [G40.909] Epilepsy (HCC)                    [1]   Social History  Tobacco Use    Smoking status: Every Day     Current packs/day: 1.00     Types: Cigarettes    Smokeless tobacco: Current   Vaping Use    Vaping status: Never Used   Substance Use Topics    Alcohol use: Not Currently     Comment: pt denies alcohol use    Drug use: Never        Sudhakar Narvaez PA-C  05/19/25 1231

## 2025-05-19 NOTE — ASSESSMENT & PLAN NOTE
History noted  Home medication Keppra and tegretol continued  Seizure precautions  Obtain Keppra level

## 2025-05-19 NOTE — PROGRESS NOTES
Progress Note - Hospitalist   Name: Cydney Lim 64 y.o. female I MRN: [id number]  Unit/Bed#: -01 I Date of Admission: 5/18/2025   Date of Service: 5/19/2025 I Hospital Day: 0    Assessment & Plan  Syncope and collapse  POA with complaints of dizziness every since the psych doctor changed medications on Wednesday.  She was seen earlier in the [facility] ED, workup was negative and set home.  She appeared back to the [facility] ED tonight due to passing out and hitting head.  No focal deficits noted, patient states she is still dizzy, denies any headaches chest pain or palpitations.  She denies any incontinence of bowel or urine.  No tongue biting.  + LOC. She does have complaints of urinary burning and frequency. Denies CVA tenderness  She also states she cannot tell me what meds got changed by the psych doctor.  Chart review performed:patient recently admitted in April at [facility] for fall, hyperkalemia, and was 303. Noted at that time  spirolactone was discontinued 2/2 to hyperkalemia and was started on furosemide for 7 days and to continue to 5/22  CT head - no acute intracranial abnormality  EKG reviewed interpreted sinus rhythm rate 85 no ST-T elevation  Troponins x 2 negative  Noted leukocytosis WBC 13.92 &  afebrile  UA positive for blood, leukocytes urine micro WBC 4-10 & occasional bacteria  Initially started on IV ceftriaxone will discontinue as UA is contaminated with epithelial cells and minimal pyuria  Pro-Jesus and lactic negative  Suspect syncopy in setting of 2/2 to seizure vs furosemide usage  Monitor patient on telemetry  Neurochecks every 4  Obtain orthostatic blood pressures  Psych consulted appreciate recommendations  Neuro consulted for seizure episode that occurred this morning  Stage 3 chronic kidney disease (HCC)  Lab Results   Component Value Date    EGFR 49 05/19/2025    EGFR 40 05/18/2025    EGFR 35 05/18/2025    CREATININE 1.15 05/19/2025    CREATININE 1.35 (H) 05/18/2025    CREATININE 1.52  (H) 05/18/2025   POA with Cr level 1.35 at baseline baseline 1.4-1.5  Appears euvolemic stop fluids    Epilepsy with altered consciousness with intractable epilepsy (HCC)  Rapid response called this morning as patient was found by MA seizing with shaking/movements of right arm  Mentation returned back to baseline by the time I evaluated patient  Will give IV keppra 1500 mg x 1, continue home keppra 1500 mg bid and home tegretol  Neuro consulted  Obtain Keppra level  Tobacco abuse  Smoking  cessation  Nicotine patch  Essential hypertension  Home med metoprolol continued  Monitor bp  Falls frequently  POA with recent fall  PT/OT consulted  Fall precautions  Severe episode of recurrent major depressive disorder, with psychotic features (HCC)  Denies SI/HI  Home med Lexapro, gabapentin, Abilify, Trazodone continued    psych consulted appreciate recs  Chronic obstructive pulmonary disease, unspecified COPD type (HCC)  Not in acute exacerbation  Home inhaler continued  Polypharmacy      VTE Pharmacologic Prophylaxis:   Moderate Risk (Score 3-4) - Pharmacological DVT Prophylaxis Ordered: heparin.    Mobility:   Basic Mobility Inpatient Raw Score: 20  JH-HLM Goal: 6: Walk 10 steps or more  JH-HLM Achieved: 7: Walk 25 feet or more  JH-HLM Goal achieved. Continue to encourage appropriate mobility.    Patient Centered Rounds: I performed bedside rounds with nursing staff today.   Discussions with Specialists or Other Care Team Provider: neuro    Education and Discussions with Family / Patient: Attempted to update  (brother) via phone. Left voicemail.     Current Length of Stay: 0 day(s)  Current Patient Status: Observation   Certification Statement: The patient will continue to require additional inpatient hospital stay due to syncopy, seizure  Discharge Plan: Anticipate discharge in 24-48 hrs to discharge location to be determined pending rehab evaluations.    Code Status: Level 1 - Full Code    Subjective    Rapid response called this morning as patient found seizing by MA with shaking of right arm. Mentation returned back to baseline by the time I evaluated patient, She does not know what happened and unable to provide much history. Currently states she feels okay, admits to feeling tired and weak.     Objective :  Temp:  [97.8 °F (36.6 °C)-98.7 °F (37.1 °C)] 98.5 °F (36.9 °C)  HR:  [71-89] 71  BP: (119-175)/() 119/66  Resp:  [16-20] 17  SpO2:  [92 %-97 %] 93 %  O2 Device: None (Room air)    Body mass index is 41.79 kg/m².     Input and Output Summary (last 24 hours):     Intake/Output Summary (Last 24 hours) at 5/19/2025 0907  Last data filed at 5/19/2025 0701  Gross per 24 hour   Intake 1175 ml   Output 1500 ml   Net -325 ml       Physical Exam  Vitals and nursing note reviewed.   Constitutional:       General: She is not in acute distress.     Appearance: She is well-developed. She is obese. She is ill-appearing.   HENT:      Head: Normocephalic and atraumatic.     Eyes:      Conjunctiva/sclera: Conjunctivae normal.       Cardiovascular:      Rate and Rhythm: Normal rate and regular rhythm.      Heart sounds: No murmur heard.  Pulmonary:      Effort: Pulmonary effort is normal. No respiratory distress.      Breath sounds: Normal breath sounds.   Abdominal:      Palpations: Abdomen is soft.      Tenderness: There is no abdominal tenderness.     Musculoskeletal:         General: No swelling.     Skin:     General: Skin is warm and dry.     Neurological:      Mental Status: She is alert. Mental status is at baseline.     Psychiatric:         Mood and Affect: Mood normal.           Lines/Drains:        Telemetry:  Telemetry Orders (From admission, onward)               24 Hour Telemetry Monitoring  Continuous x 24 Hours (Telem)        Expiring   Question:  Reason for 24 Hour Telemetry  Answer:  Syncope suspected to be cardiac in origin                     Telemetry Reviewed: No significant arrhythmias  noted  Indication for Continued Telemetry Use: Syncope               Lab Results: I have reviewed the following results:   Results from last 7 days   Lab Units 05/19/25  0445   WBC Thousand/uL 14.26*   HEMOGLOBIN g/dL 10.3*   HEMATOCRIT % 32.3*   PLATELETS Thousands/uL 342   SEGS PCT % 69   LYMPHO PCT % 22   MONO PCT % 7   EOS PCT % 1     Results from last 7 days   Lab Units 05/19/25  0445   SODIUM mmol/L 141   POTASSIUM mmol/L 3.6   CHLORIDE mmol/L 107   CO2 mmol/L 24   BUN mg/dL 22   CREATININE mg/dL 1.15   ANION GAP mmol/L 10   CALCIUM mg/dL 9.0   ALBUMIN g/dL 3.8   TOTAL BILIRUBIN mg/dL 0.36   ALK PHOS U/L 84   ALT U/L 8   AST U/L 14   GLUCOSE RANDOM mg/dL 100         Results from last 7 days   Lab Units 05/19/25  0854   POC GLUCOSE mg/dl 147*         Results from last 7 days   Lab Units 05/19/25  0100   LACTIC ACID mmol/L 0.8   PROCALCITONIN ng/ml 0.06       Recent Cultures (last 7 days):         Imaging Results Review: I reviewed radiology reports from this admission including: CT chest.  Other Study Results Review: EKG was reviewed.     Last 24 Hours Medication List:     Current Facility-Administered Medications:     acetaminophen (TYLENOL) tablet 650 mg, Q6H PRN    albuterol (PROVENTIL HFA,VENTOLIN HFA) inhaler 2 puff, Q6H PRN    ARIPiprazole (ABILIFY) tablet 5 mg, Daily    carBAMazepine (TEGretol) tablet 200 mg, TID    cefTRIAXone (ROCEPHIN) IVPB (premix in dextrose) 1,000 mg 50 mL, Q24H, Last Rate: 1,000 mg (05/19/25 0522)    escitalopram (LEXAPRO) tablet 10 mg, Daily    ferrous sulfate tablet 325 mg, Daily With Breakfast    gabapentin (NEURONTIN) capsule 300 mg, Daily    heparin (porcine) subcutaneous injection 5,000 Units, Q8H CHINMAY **AND** [COMPLETED] Platelet count, Once    levETIRAcetam (KEPPRA) injection 1,500 mg, Once    levETIRAcetam (KEPPRA) tablet 1,500 mg, Q12H CHINMAY    metoprolol succinate (TOPROL-XL) 24 hr tablet 50 mg, Daily    nicotine (NICODERM CQ) 21 mg/24 hr TD 24 hr patch 1 patch, Daily     traZODone (DESYREL) tablet 50 mg, HS    Administrative Statements   Today, Patient Was Seen By: Aysha Canseco DO      **Please Note: This note may have been constructed using a voice recognition system.**

## 2025-05-19 NOTE — PROGRESS NOTES
Pt became confused and right arm shaking while PCA was in the room.  RRT called and MD at the bed side. New orders was written and RRT cancel.  Vital signs take bbg done and labs for medication levels.  Pt return to base line.  Pt is very anxious and wants staff to stay with her.  Ativan 0.5 mg was given to the pt.  Continue to check the pt and medication was able to decrease her anxiety.

## 2025-05-19 NOTE — ASSESSMENT & PLAN NOTE
"Per chart history    Patient with a history of MDD with psychosis who is admitted for syncope and collapse. Psychiatric consultation is requested due to patient having reported dizziness since her \"psych doctor\" changed her medications a few days ago. Patient is a poor historian.  She reports experiencing dizziness and having previously fainted.  She reports feeling \"nervous\" and having had a \"mental breakdown\" recently however was unable to describe these further.  When attempting to obtain recent history prior to presentation there was confusion regarding what she was referring to.  Eventually it appeared that patient was referring to her recent psychiatric hospitalization at [facility] (4/24/25-5/15/25), as noted below and discharge summary is located in the media tab.  Per that discharge summary patient appears to have been transferred off of the psychiatric unit for medical reasons and transferred back to [facility] twice.  Discharge summary notes that pramipexole was weaned and discontinued due to delusional thoughts and replaced with gabapentin, and also that Abilify was started and that a few days later patient was noted to have had an improvement in irritability, agitation, delusions -  please see discharge summary for more information.  Patient reports feeling \"a little\" depressed.  Denies current suicidal or homicidal ideation, plan, or intent.  Denies auditory or visual hallucinations.  No overt delusions verbalized.  Does not endorse current or previous episodes of cristiano/hypomania. She reports that she is not sure what medication she is taking and what she was taking before but states that she wants to be on she was taking previously.  The presentation is complicated as patient is taking multiple medications that are associated with dizziness, drowsiness, ataxia including carbamazepine for seizures, trazodone, Abilify, and patient currently is a poor historian.  Discussed with " primary team.  They plan to reach out to neurology regarding patient's carbamazepine.  Discussed that Keppra is associated with psychiatric disturbance (per discharge summary patient has exhibited history of irritability, agitation, aggression, delusions).  Discussed plan to discontinue trazodone due to dizziness.  Abilify is associated with orthostatic hypotension and therefore would check orthostatics, if not done already.  As noted above in reference to [facility] discharge summary it appears that patient had improvement in irritability, agitation, delusions with Abilify therefore would be concerned about such returning with discontinuation of the medication.  This medication is also on the lower side of dosing. Nonetheless can consider revisiting/changing the abilify if patient's dizziness endures/other changes not successful.  There is no clear indication for inpatient psychiatric hospitalization at this time.  Patient to establish outpatient psychiatric care at our resident clinic with Dr. Gan on 6/3/2025.      Continue medical management  There is no indication for inpatient psychiatric hospitalization at this time   Discontinue trazodone due to dizziness  Primary team to reach out to neurology regarding carbamazepine  Continue Lexapro 10 mg daily  Continue Abilify 5 mg daily  Can consider revisiting/changing if dizziness endures/other changes not successful  Establish care with Dr. Gan for outpatient psychiatry on 6/3/2025  Discussed with the primary team  Psychiatry to follow up as necessary  Please contact with questions and updates  For after hours and weekends please contact [facility] Psychiatry Consultation role
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
## 2025-05-19 NOTE — TELEMEDICINE
e-Consult ([facility]) - Neurology   Name: Cydney Lim 64 y.o. female I MRN: [id number]  Unit/Bed#: -01 I Date of Admission: 5/18/2025   Date of Service: 5/19/2025 I Hospital Day: 0   Inpatient consult to Neurology  Consult performed by: Trudy Jones MD  Consult ordered by: Aysha Canseco DO        Physician Requesting Evaluation: Aysha Canseco DO   Reason for Evaluation / Principal Problem: seizures    ASSESSMENT:  Cydney Lim is a 63 y/o Female with history of MDD with psychosis who is here as in the ED for seizure episode who came in for syncopal episode last night. Now had an episode of lightheadedness resulting in a fall with head strike. She might have a questionable nervous breakdown she states. CT head negative for any acute traumatic abnormalities while here in the emergency department.     This morning rapid response called due to patient found by MA confused and shaking her right arm. She has a long history of focal epilepsy epilepsy manifest as apparent focal impaired awareness seizures and distant generalized tonic clonic seizures. She also has a history of syncope and follows with neurology outpatient  The seizure episode was brief and she was back to baseline by the time hospitalist had seen her. She's on keppra 1500 mg bid and tegratol 200 mg tid. I ordered IV keppra 1500 mg to be given this morning.     RECOMMENDATIONS:  Routine eeg which has not been done in the past  Recommend MRI brain with and without contrast (seizure protocol)  If any more events, may increase to keppra 1750mg PO BID  If patient continues to have further events, can consider EMU transfer to [place] to characterize these spells  Seizure precautions  Ativan 2mg as needed      21-30 minutes, >50% of the total time devoted to medical consultative verbal/EMR discussion between providers. Written report will be generated in the EMR.

## 2025-05-19 NOTE — H&P
H&P - Hospitalist   Name: Cydney Lim 64 y.o. female I MRN: [id number]  Unit/Bed#: -01 I Date of Admission: 5/18/2025   Date of Service: 5/18/2025 I Hospital Day: 0     Assessment & Plan  Syncope and collapse  POA with complaints of dizziness every since the psych doctor changed medications on Wednesday.  She was seen earlier in the [facility], workup was negative and set home.  She appeared back to the [facility] ED tonight due to passing out and hitting head.  No focal deficits noted, patient states she is still dizzy, denies any headaches chest pain or palpitations.  She denies any incontinence of bowel or urine.  No tongue biting.  + LOC. She does have complaints of urinary burning and frequency. Denies CVA tenderness  She also states she cannot tell me what meds got changed by the psych doctor.  Chart review performed:patient recently admitted in April at [facility] for fall, hyperkalemia, and was 303. Noted at that time  spirolactone was discontinued 2/2 to hyperkalemia and was started on furosemide for 7 days and to continue to 5/22  CT head - no acute intracranial abnormality  EKG reviewed interpreted sinus rhythm rate 85 no ST-T elevation  Troponins x 2 negative  Noted leukocytosis WBC 13.92 &  afebrile  UA positive for blood, leukocytes urine micro WBC 4-10 & occasional bacteria  Pro-Jesus and lactic negative  Suspect dizziness 2/2 to furosemide usage & possible  UTI  Monitor patient on telemetry  Neurochecks every 4  Obtain orthostatic blood pressures  Chart review performed: UC 4/5/24: + Klebsiella pneumoniae   Initiated IV Rocephin  Follow up with UC adjust antbx accordingly  Psych consulted appreciate recommendations  Stage 3 chronic kidney disease (HCC)  Lab Results   Component Value Date    EGFR 40 05/18/2025    EGFR 35 05/18/2025    EGFR 40 05/17/2025    CREATININE 1.35 (H) 05/18/2025    CREATININE 1.52 (H) 05/18/2025    CREATININE 1.37 (H) 05/17/2025   POA with Cr level 1.35 at baseline baseline  1.4-1.5  Continue with gentle IV hydration  Avoid nephrotoxic agents, hypotension, NSAIDs  Monitor BMP  Epilepsy with altered consciousness with intractable epilepsy (HCC)  History noted  Home medication Keppra and tegretol continued  Seizure precautions  Obtain Keppra level  Tobacco abuse  Smoking  cessation  Nicotine patch  Essential hypertension  Home med metoprolol continued  Monitor bp  Falls frequently  POA with recent fall  PT/OT consulted  Fall precautions  Severe episode of recurrent major depressive disorder, with psychotic features (HCC)  Denies SI/HI  Home med Lexapro, gabapentin, Abilify, Trazodone continued    psych consulted appreciate recs  Chronic obstructive pulmonary disease, unspecified COPD type (HCC)  Not in acute exacerbation  Home inhaler continued      VTE Pharmacologic Prophylaxis:   High Risk (Score >/= 5) - Pharmacological DVT Prophylaxis Ordered: heparin. Sequential Compression Devices Ordered.  Code Status: Level 1 - Full Code with patient  Discussion with family: Patient declined call to .     Anticipated Length of Stay: Patient will be admitted on an observation basis with an anticipated length of stay of less than 2 midnights secondary to syncope and collapse possibly secondary to medications..    History of Present Illness   Chief Complaint: Dizziness since Wednesday and post fall with a head strike    Cydney Lim is a 64 y.o. female with a PMH of hypertension, epilepsy, CKD, severe depression disorder, COPD who presents with post fall after a syncopal episode at home and a week of dizziness.Patient states since wendesday she been have continoual dizziness.  States her medicaitons where changed by her psych doctor and that she gets her all her medicaitons from [facility]. She complains urinary frequency and buring.  She also states intermittent diarrhea also since Wednesday.  Denies chest pain, palpitations, nausea and vomiting.  In ED noted CT of the head  negative, elevated creatinine but appears to be at baseline, no EKG abnormalities.  Will admit for further medical management of dizziness.   Review of Systems   Constitutional:  Negative for chills and fever.   HENT:  Negative for ear pain and sore throat.    Eyes:  Negative for pain and visual disturbance.   Respiratory:  Negative for cough and shortness of breath.    Cardiovascular:  Negative for chest pain and palpitations.   Gastrointestinal:  Negative for abdominal pain and vomiting.   Genitourinary:  Negative for dysuria and hematuria.   Musculoskeletal:  Negative for arthralgias and back pain.   Skin:  Negative for color change and rash.   Neurological:  Positive for syncope. Negative for tremors, seizures, facial asymmetry, speech difficulty, weakness, light-headedness, numbness and headaches.   All other systems reviewed and are negative.      Historical Information   Past Medical History:   Diagnosis Date    Ambulatory dysfunction     B12 deficiency     CKD (chronic kidney disease), stage III (HCC)     Depression     EP (epilepsy) (HCC)     Epilepsy (HCC)     History of sinus tachycardia     Hypertension     Morbid obesity (HCC)     Obesity     Restless leg     Restless leg syndrome     Vitamin D deficiency      Past Surgical History:   Procedure Laterality Date    DENTAL SURGERY      all teeth removed    DENTAL SURGERY      LASER ABLATION OF THE CERVIX      MAMMO (HISTORICAL)  05/01/2017     Social History     Tobacco Use    Smoking status: Every Day     Current packs/day: 0.50     Types: Cigarettes    Smokeless tobacco: Never   Vaping Use    Vaping status: Never Used   Substance and Sexual Activity    Alcohol use: Not Currently     Comment: pt denies alcohol use    Drug use: Never    Sexual activity: Not on file     E-Cigarette/Vaping    E-Cigarette Use Never User      E-Cigarette/Vaping Substances    Nicotine Yes     THC No     CBD No     Flavoring No     Other No     Unknown No        Social  History:  Marital Status: Single   Occupation: retired  Patient Pre-hospital Living Situation: Home  Patient Pre-hospital Level of Mobility: walks with cane  Patient Pre-hospital Diet Restrictions: none    Meds/Allergies   I have reviewed home medications using recent Epic encounter.  Prior to Admission medications    Medication Sig Start Date End Date Taking? Authorizing Provider   ARIPiprazole (ABILIFY) 5 mg tablet Take 5 mg by mouth daily 5/12/25  Yes Historical Provider, MD   escitalopram (LEXAPRO) 10 mg tablet Take 1 tablet (10 mg total) by mouth daily 7/19/24 5/18/25 Yes DRU Stapleton   ferrous sulfate 325 (65 FE) MG EC tablet Take 325 mg by mouth 3 (three) times a day with meals   Yes Historical Provider, MD   furosemide (LASIX) 20 mg tablet Take 10 mg by mouth daily 5/15/25 5/22/25 Yes Historical Provider, MD   gabapentin (NEURONTIN) 300 mg capsule Take 300 mg by mouth 5/12/25 6/11/25 Yes Historical Provider, MD   levETIRAcetam (KEPPRA) 750 mg tablet Take 2 tablets (1,500 mg total) by mouth 2 (two) times a day 8/3/24  Yes Andrzej Kaba MD   traZODone (DESYREL) 50 mg tablet Take 50 mg by mouth 5/12/25 6/11/25 Yes Historical Provider, MD   albuterol (2.5 mg/3 mL) 0.083 % nebulizer solution Take 3 mL (2.5 mg total) by nebulization every 6 (six) hours as needed for wheezing or shortness of breath 4/9/24   Keena Freeman MD   albuterol (Proventil HFA) 90 mcg/act inhaler Inhale 2 puffs every 6 (six) hours as needed for wheezing 5/22/24   DRU tSapleton   carBAMazepine (TEGretol) 200 mg tablet Take 1 tablet (200 mg total) by mouth 3 (three) times a day 8/3/24   Andrzej Kaba MD   carbamide peroxide (DEBROX) 6.5 % otic solution Administer 5 drops into both ears 2 (two) times a day for 4 days Do not start before September 30, 2024.  Patient not taking: Reported on 3/18/2025 9/30/24 10/4/24  Eran Whiting MD   cholecalciferol 1,000 units tablet Take 2 tablets (2,000 Units total) by  mouth daily 4/9/24 3/18/25  Keena Freeman MD   losartan (COZAAR) 25 mg tablet Take 1 tablet (25 mg total) by mouth daily 7/19/24 5/18/25  DRU Stapleton   metoprolol succinate (TOPROL-XL) 50 mg 24 hr tablet Take 1 tablet (50 mg total) by mouth daily 7/19/24 5/18/25  DRU Stapleton   nicotine (NICODERM CQ) 21 mg/24 hr TD 24 hr patch Place 1 patch on the skin over 24 hours daily 4/9/24   Keena Freeman MD   pramipexole (MIRAPEX) 1.5 MG tablet Take 1 tablet (1.5 mg total) by mouth daily at bedtime  Patient not taking: Reported on 3/18/2025 8/3/24 11/1/24  Andrzej Kaba MD   spironolactone (ALDACTONE) 25 mg tablet Take 1 tablet (25 mg total) by mouth daily 5/22/24   DRU Stapleton     No Known Allergies    Objective :  Temp:  [97.8 °F (36.6 °C)-98.7 °F (37.1 °C)] 97.8 °F (36.6 °C)  HR:  [78-88] 86  BP: (127-170)/(59-92) 168/82  Resp:  [16-20] 18  SpO2:  [93 %-97 %] 93 %  O2 Device: None (Room air)    Physical Exam  Vitals and nursing note reviewed.   Constitutional:       General: She is not in acute distress.     Appearance: Normal appearance.   HENT:      Head: Normocephalic.     Eyes:      General: No visual field deficit.      Cardiovascular:      Rate and Rhythm: Normal rate and regular rhythm.      Heart sounds: No murmur heard.  Pulmonary:      Effort: Pulmonary effort is normal.      Breath sounds: Normal breath sounds. No wheezing, rhonchi or rales.   Abdominal:      General: Bowel sounds are normal.      Palpations: Abdomen is soft.      Tenderness: There is no abdominal tenderness.     Musculoskeletal:      Right lower leg: No edema.      Left lower leg: No edema.     Skin:     General: Skin is warm and dry.     Neurological:      Mental Status: She is alert and oriented to person, place, and time. Mental status is at baseline.      GCS: GCS eye subscore is 4. GCS verbal subscore is 5. GCS motor subscore is 6.      Cranial Nerves: No cranial nerve deficit,  dysarthria or facial asymmetry.      Sensory: No sensory deficit.      Motor: No seizure activity.      Coordination: Coordination is intact.      Comments: Complaints of dizziness   Psychiatric:         Mood and Affect: Mood normal.          Lines/Drains:            Lab Results: I have reviewed the following results:  Results from last 7 days   Lab Units 05/18/25 2123   WBC Thousand/uL 13.92*   HEMOGLOBIN g/dL 9.6*   HEMATOCRIT % 30.4*   PLATELETS Thousands/uL 311   SEGS PCT % 78*   LYMPHO PCT % 16   MONO PCT % 6   EOS PCT % 0     Results from last 7 days   Lab Units 05/18/25 2123 05/18/25  1255   SODIUM mmol/L 140 139   POTASSIUM mmol/L 3.8 3.5   CHLORIDE mmol/L 106 105   CO2 mmol/L 26 23   BUN mg/dL 26* 27*   CREATININE mg/dL 1.35* 1.52*   ANION GAP mmol/L 8 11   CALCIUM mg/dL 9.0 9.0   ALBUMIN g/dL  --  3.8   TOTAL BILIRUBIN mg/dL  --  0.32   ALK PHOS U/L  --  81   ALT U/L  --  9   AST U/L  --  14   GLUCOSE RANDOM mg/dL 108 116             Lab Results   Component Value Date    HGBA1C 5.9 06/06/2019           Imaging Results Review: I reviewed radiology reports from this admission including: CT head.  Other Study Results Review: EKG was reviewed.  EKG was personally reviewed and my interpretation is: Personally Reviewed. NSR. HR 85..    Administrative Statements       ** Please Note: This note has been constructed using a voice recognition system. **

## 2025-05-19 NOTE — ASSESSMENT & PLAN NOTE
Rapid response called this morning as patient was found by MA seizing with shaking/movements of right arm  Mentation returned back to baseline by the time I evaluated patient  Will give IV keppra 1500 mg x 1, continue home keppra 1500 mg bid and home tegretol  Neuro consulted  Obtain Keppra level

## 2025-05-19 NOTE — CONSULTS
"TeleConsultation - Behavioral Health   Cydney Lim 64 y.o. female MRN: [id number]  Unit/Bed#: -01 Encounter: [id number]        REQUIRED DOCUMENTATION:     1. This service was provided via Telemedicine.  2. Provider located at [facility].  3. TeleMed provider: Eran Stone DO.  4. Identify all parties in room with patient during tele consult:  RN  5.Patient was then informed that this was a Telemedicine visit and that the exam was being conducted confidentially over secure lines. My office door was closed. No one else was in the room.  Patient acknowledged consent and understanding of privacy and security of the Telemedicine visit, and gave us permission to have the assistant stay in the room in order to assist with the history and to conduct the exam.  I informed the patient that I have reviewed their record in Epic and presented the opportunity for them to ask any questions regarding the visit today.  The patient agreed to participate.             Assessment & Plan  Syncope and collapse  Management per primary team  Severe episode of recurrent major depressive disorder, with psychotic features (HCC)  Per chart history    Patient with a history of MDD with psychosis who is admitted for syncope and collapse. Psychiatric consultation is requested due to patient having reported dizziness since her \"psych doctor\" changed her medications a few days ago. Patient is a poor historian.  She reports experiencing dizziness and having previously fainted.  She reports feeling \"nervous\" and having had a \"mental breakdown\" recently however was unable to describe these further.  When attempting to obtain recent history prior to presentation there was confusion regarding what she was referring to.  Eventually it appeared that patient was referring to her recent psychiatric hospitalization at [facility] (4/24/25-5/15/25), as noted below and discharge summary is located in the media tab.  Per that " "discharge summary patient appears to have been transferred off of the psychiatric unit for medical reasons and transferred back to [facility] twice.  Discharge summary notes that pramipexole was weaned and discontinued due to delusional thoughts and replaced with gabapentin, and also that Abilify was started and that a few days later patient was noted to have had an improvement in irritability, agitation, delusions -  please see discharge summary for more information.  Patient reports feeling \"a little\" depressed.  Denies current suicidal or homicidal ideation, plan, or intent.  Denies auditory or visual hallucinations.  No overt delusions verbalized.  Does not endorse current or previous episodes of cristiano/hypomania. She reports that she is not sure what medication she is taking and what she was taking before but states that she wants to be on she was taking previously.  The presentation is complicated as patient is taking multiple medications that are associated with dizziness, drowsiness, ataxia including carbamazepine for seizures, trazodone, Abilify, and patient currently is a poor historian.  Discussed with primary team.  They plan to reach out to neurology regarding patient's carbamazepine.  Discussed that Keppra is associated with psychiatric disturbance (per discharge summary patient has exhibited history of irritability, agitation, aggression, delusions).  Discussed plan to discontinue trazodone due to dizziness.  Abilify is associated with orthostatic hypotension and therefore would check orthostatics, if not done already.  As noted above in reference to [facility] discharge summary it appears that patient had improvement in irritability, agitation, delusions with Abilify therefore would be concerned about such returning with discontinuation of the medication.  This medication is also on the lower side of dosing. Nonetheless can consider revisiting/changing the abilify if " "patient's dizziness endures/other changes not successful.  There is no clear indication for inpatient psychiatric hospitalization at this time.  Patient to establish outpatient psychiatric care at our resident clinic with Dr. Gan on 6/3/2025.      Continue medical management  There is no indication for inpatient psychiatric hospitalization at this time   Discontinue trazodone due to dizziness  Primary team to reach out to neurology regarding carbamazepine  Continue Lexapro 10 mg daily  Continue Abilify 5 mg daily  Can consider revisiting/changing if dizziness endures/other changes not successful  Establish care with Dr. Gan for outpatient psychiatry on 6/3/2025  Discussed with the primary team  Psychiatry to follow up as necessary  Please contact with questions and updates  For after hours and weekends please contact [facility] Psychiatry Consultation role         Risks, benefits and possible side effects of Medications:   No new medications recommended by me at this time.  Patient cannot recall the medications she is/was taking.        Chief Complaint: \"Dizzy\"      History of Present Illness   Inpatient consult to Psychiatry  Consult performed by: Eran Stone DO  Consult ordered by: DRU Dumont        Physician Requesting Consult: Aysha Canseco DO  Reason for Consult / Principal Problem: Dx: 1.  Falls frequently 2.  Polypharmacy    Cydney Lim is a 64 y.o. female with past medical history of epilepsy, hypertension, CKD stage III, falls, depression, major neurocognitive disorder, COPD, who presented for an evaluation of dizziness and is admitted for syncope and collapse.  Psychiatric consultation is requested due to patient having reported dizziness since her \"psych doctor\" changed her medications a few days ago.  Per chart it appears the patient was started on Abilify 5 mg daily last week.  Per chart she is also prescribed Lexapro 10 mg daily, gabapentin 300 mg daily, trazodone 50 mg nightly.  " "Per chart it appears she takes carbamazepine 200 mg TID for seizures.  EKG yesterday showed QTc of 418.    Patient is a poor historian.  She reports experiencing dizziness as recently as 15 minutes ago which she describes as going in circles.  She reports that she having fainted previously.  Reports feeling \"nervous\" however will had difficulty explaining this.  When asking patient about her recent medication changes she stated that she \"did not really want to see\" the psychiatrist and there was some confusion regarding what she was referring to.  It appears as though patient was referring to her recent hospitalization at [facility], and the discharge summary from there is located in the chart media tab.    Per discharge summary from [facility] (4/24/25-5/15/25): patient had presented with panic attacks and paranoia, had been \"fixated on being on the wrong medications (claiming that the treatment team had messed up all her medications), even though she was receiving medication she had been taking at home.\" Patient was transferred for medical hospitalization on 3/26/2025 due to including hypotension, dizziness, fatigue, hyperkalemia, BETH.  She subsequently was transferred back to the psychiatric unit at [facility].  Pramipexole (for restless leg syndrome?) was weaned due to delusional thoughts and replaced with gabapentin. She reportedly had not been taking carbamazepine previously.  She had been transferred again for medical hospitalization for hyperkalemia and returned to [facility] on 4/22/2025.  She was placed on aripiprazole and after a few days was noted to have an improvement in irritability, agitation, and delusions.  She has outpatient psychiatric follow-up at our resident clinic on 6/3/2025 with Dr. Gan.  Please see discharge summary for more information.    Patient reports feeling \"a little\" depressed and cites stressors including her rent.  She states that she was " "admitted to [facility] due to a \"mental breakdown\" however could not describe this further.  Reports that her sleep and appetite have been \"so-so\".  Denies current suicidal or homicidal ideation, plan, or intent.  Denies auditory or visual hallucinations.  No overt delusions verbalized. Does not endorse current or previous episodes of cristiano/hypomania.  She reports that she is not sure what medication she is taking and what she was taking before but states that she wants to be on she was taking previously.            Historical Information   Past Psychiatric History:   Past Psychiatric Hospitalizations: Reports multiple. Per chart [facility] 2/22/25-3/4/25 with suicidal thoughts and 2/24/25-5/15/25  Past Suicide attempts: Denies  Psychiatric medication trials: Abilify, Lexapro, trazodone, gabapentin      Substance Abuse History:  Denies drug or alcohol use.  Reports tobacco use       Family Psychiatric History:   Family History   Problem Relation Age of Onset    Heart attack Mother     Kidney disease Mother     Liver disease Mother     Heart attack Father     No Known Problems Brother     No Known Problems Son          Social History  Marital history:   Living arrangement, social support: Reports living alone      Allergies[1]  Past Medical History:   Diagnosis Date    Ambulatory dysfunction     B12 deficiency     CKD (chronic kidney disease), stage III (HCC)     Depression     EP (epilepsy) (HCC)     Epilepsy (HCC)     History of sinus tachycardia     Hypertension     Morbid obesity (HCC)     Obesity     Restless leg     Restless leg syndrome     Vitamin D deficiency      Past Surgical History:   Procedure Laterality Date    DENTAL SURGERY      all teeth removed    DENTAL SURGERY      LASER ABLATION OF THE CERVIX      MAMMO (HISTORICAL)  05/01/2017       Current medications:    Current Facility-Administered Medications:     acetaminophen (TYLENOL) tablet 650 mg, Q6H PRN    albuterol (PROVENTIL HFA,VENTOLIN HFA) " "inhaler 2 puff, Q6H PRN    ARIPiprazole (ABILIFY) tablet 5 mg, Daily    carBAMazepine (TEGretol) tablet 200 mg, TID    escitalopram (LEXAPRO) tablet 10 mg, Daily    ferrous sulfate tablet 325 mg, Daily With Breakfast    gabapentin (NEURONTIN) capsule 300 mg, Daily    heparin (porcine) subcutaneous injection 5,000 Units, Q8H CHINMAY **AND** [COMPLETED] Platelet count, Once    levETIRAcetam (KEPPRA) tablet 1,500 mg, Q12H CHINMAY    metoprolol succinate (TOPROL-XL) 24 hr tablet 50 mg, Daily    nicotine (NICODERM CQ) 21 mg/24 hr TD 24 hr patch 1 patch, Daily    traZODone (DESYREL) tablet 50 mg, HS      Objective     Vital signs in last 24 hours:  Temp:  [97.8 °F (36.6 °C)-98.7 °F (37.1 °C)] 98.5 °F (36.9 °C)  HR:  [71-89] 85  BP: (119-175)/() 147/91  Resp:  [16-20] 17  SpO2:  [92 %-97 %] 93 %  O2 Device: None (Room air)      Mental Status Evaluation:  Appearance:  appears older than stated age   Behavior:  calm and cooperative   Speech:  scant   Mood:  \"Nervous\"   Affect:  Anxious and Constricted   Thought Process:  organized and some poverty of thought   Thought Content:  No overt delusions or paranoia verbalize   Perceptual Disturbances: Denies auditory or visual hallucinations and Does not appear to be responding to internal stimuli   Risk Potential: Suicidal Ideations none and Homicidal Ideations none   Sensorium:  person, not assessed   Memory:  Appears somewhat impaired   Cognition:  Not assessed   Consciousness:  alert and awake    Attention: attention span appeared shorter than expected for age   Insight:  limited   Judgment: moderate   Muscle Strength and Tone: Unable to assess due to virtual encounter   Motor Activity: Unable to assess due to virtual encounter     Lab Results:    Most Recent Labs:   Lab Results   Component Value Date    WBC 14.26 (H) 05/19/2025    RBC 3.32 (L) 05/19/2025    HGB 10.3 (L) 05/19/2025    HCT 32.3 (L) 05/19/2025     05/19/2025    RDW 14.3 05/19/2025    NEUTROABS 9.96 (H) " 05/19/2025    SODIUM 141 05/19/2025    K 3.6 05/19/2025     05/19/2025    CO2 24 05/19/2025    BUN 22 05/19/2025    CREATININE 1.15 05/19/2025    GLUC 100 05/19/2025    GLUF 100 (H) 05/19/2025    CALCIUM 9.0 05/19/2025    AST 14 05/19/2025    ALT 8 05/19/2025    ALKPHOS 84 05/19/2025    TP 7.5 05/19/2025    ALB 3.8 05/19/2025    TBILI 0.36 05/19/2025    CHOLESTEROL 178 04/19/2021    HDL 72 04/19/2021    TRIG 67.6 04/19/2021    LDLCALC 92 04/19/2021    CARBAMAZEPIN 4.3 01/13/2025    AMMONIA 49.92 (H) 10/31/2020    AXY8BOHUDCLZ 1.722 03/18/2025    HGBA1C 5.9 06/06/2019     Labs in last 72 hours:   Recent Labs     05/19/25  0445   WBC 14.26*   RBC 3.32*   HGB 10.3*   HCT 32.3*      RDW 14.3   NEUTROABS 9.96*   SODIUM 141   K 3.6      CO2 24   BUN 22   CREATININE 1.15   GLUC 100   GLUF 100*   CALCIUM 9.0   AST 14   ALT 8   ALKPHOS 84   TP 7.5   ALB 3.8   TBILI 0.36     Drug Screen:   Lab Results   Component Value Date    AMPMETHUR Negative 03/18/2025    BARBTUR Negative 03/18/2025    BDZUR Negative 03/18/2025    THCUR Negative 03/18/2025    COCAINEUR Negative 03/18/2025    METHADONEUR Negative 03/18/2025    OPIATEUR Negative 03/18/2025    PCPUR Negative 03/18/2025     Medical alcohol level   Lab Results   Component Value Date    ETOH <10 03/18/2025       Imaging Studies: CT head without contrast  Result Date: 5/18/2025  Narrative: CT BRAIN - WITHOUT CONTRAST INDICATION:   fall with head strike. COMPARISON: CT brain dated 3/16/2025 TECHNIQUE:  CT examination of the brain was performed.  Multiplanar 2D reformatted images were created from the source data. Radiation dose length product (DLP) for this visit:  930.1 mGy-cm. .  This examination, like all CT scans performed in the [facility], was performed utilizing techniques to minimize radiation dose exposure, including the use of iterative  reconstruction and automated exposure control. IMAGE QUALITY:  Diagnostic. FINDINGS: PARENCHYMA:No  intracranial mass, mass effect or midline shift. No CT signs of acute infarction.  No acute parenchymal hemorrhage. There is mild periventricular white matter low attenuation which is nonspecific and most likely related to chronic small vessel ischemic changes. VENTRICLES AND EXTRA-AXIAL SPACES:  Normal for the patient's age. VISUALIZED ORBITS: Normal visualized orbits. PARANASAL SINUSES: There is a hypoplastic opacified right maxillary sinus, unchanged CALVARIUM AND EXTRACRANIAL SOFT TISSUES: Normal.     Impression: No acute intracranial abnormality. Workstation performed: [id number]       EKG/Pathology/Other Studies:   Lab Results   Component Value Date    VENTRATE 85 05/18/2025    ATRIALRATE 85 05/18/2025    PRINT 124 05/18/2025    QRSDINT 70 05/18/2025    QTINT 352 05/18/2025    QTCINT 418 05/18/2025    PAXIS 23 05/18/2025    QRSAXIS 44 05/18/2025    TWAVEAXIS 51 05/18/2025        Code Status: Level 1 - Full Code  Advance Directive and Living Will:      Power of :    POLST:          Eran Stone DO  05/19/25      This note has been constructed using a voice recognition system.  There may be translation, syntax,  or grammatical errors. If you have any questions, please contact the dictating provider.         [1] No Known Allergies

## 2025-05-19 NOTE — ASSESSMENT & PLAN NOTE
Lab Results   Component Value Date    EGFR 40 05/18/2025    EGFR 35 05/18/2025    EGFR 40 05/17/2025    CREATININE 1.35 (H) 05/18/2025    CREATININE 1.52 (H) 05/18/2025    CREATININE 1.37 (H) 05/17/2025   POA with Cr level 1.35 at baseline baseline 1.4-1.5  Continue with gentle IV hydration  Avoid nephrotoxic agents, hypotension, NSAIDs  Monitor BMP

## 2025-05-19 NOTE — PLAN OF CARE
Problem: PAIN - ADULT  Goal: Verbalizes/displays adequate comfort level or baseline comfort level  Description: Interventions:  - Encourage patient to monitor pain and request assistance  - Assess pain using appropriate pain scale  - Administer analgesics as ordered based on type and severity of pain and evaluate response  - Implement non-pharmacological measures as appropriate and evaluate response  - Consider cultural and social influences on pain and pain management  - Notify physician/advanced practitioner if interventions unsuccessful or patient reports new pain  - Educate patient/family on pain management process including their role and importance of  reporting pain   - Provide non-pharmacologic/complimentary pain relief interventions  Outcome: Progressing     Problem: INFECTION - ADULT  Goal: Absence or prevention of progression during hospitalization  Description: INTERVENTIONS:  - Assess and monitor for signs and symptoms of infection  - Monitor lab/diagnostic results  - Monitor all insertion sites, i.e. indwelling lines, tubes, and drains  - Monitor endotracheal if appropriate and nasal secretions for changes in amount and color  - [place] appropriate cooling/warming therapies per order  - Administer medications as ordered  - Instruct and encourage patient and family to use good hand hygiene technique  - Identify and instruct in appropriate isolation precautions for identified infection/condition  Outcome: Progressing     Problem: INFECTION - ADULT  Goal: Absence of fever/infection during neutropenic period  Description: INTERVENTIONS:  - Monitor WBC  - Perform strict hand hygiene  - Limit to healthy visitors only  - No plants, dried, fresh or silk flowers with reina in patient room  Outcome: Progressing     Problem: SAFETY ADULT  Goal: Patient will remain free of falls  Description: INTERVENTIONS:  - Educate patient/family on patient safety including physical limitations  - Instruct patient to call  for assistance with activity   - Consider consulting OT/PT to assist with strengthening/mobility based on AM PAC & JH-HLM score  - Consult OT/PT to assist with strengthening/mobility   - Keep Call bell within reach  - Keep bed low and locked with side rails adjusted as appropriate  - Keep care items and personal belongings within reach  - Initiate and maintain comfort rounds  - Make Fall Risk Sign visible to staff  - Offer Toileting every 2 Hours, in advance of need  - Initiate/Maintain Chair / Bed alarm  - Obtain necessary fall risk management equipment:   - Apply yellow socks and bracelet for high fall risk patients  - Consider moving patient to room near nurses station  Outcome: Progressing

## 2025-05-19 NOTE — ASSESSMENT & PLAN NOTE
Lab Results   Component Value Date    EGFR 49 05/19/2025    EGFR 40 05/18/2025    EGFR 35 05/18/2025    CREATININE 1.15 05/19/2025    CREATININE 1.35 (H) 05/18/2025    CREATININE 1.52 (H) 05/18/2025   POA with Cr level 1.35 at baseline baseline 1.4-1.5  Appears euvolemic stop fluids

## 2025-05-19 NOTE — PLAN OF CARE
Problem: PAIN - ADULT  Goal: Verbalizes/displays adequate comfort level or baseline comfort level  Description: Interventions:  - Encourage patient to monitor pain and request assistance  - Assess pain using appropriate pain scale  - Administer analgesics as ordered based on type and severity of pain and evaluate response  - Implement non-pharmacological measures as appropriate and evaluate response  - Consider cultural and social influences on pain and pain management  - Notify physician/advanced practitioner if interventions unsuccessful or patient reports new pain  - Educate patient/family on pain management process including their role and importance of  reporting pain   - Provide non-pharmacologic/complimentary pain relief interventions  Outcome: Progressing     Problem: INFECTION - ADULT  Goal: Absence or prevention of progression during hospitalization  Description: INTERVENTIONS:  - Assess and monitor for signs and symptoms of infection  - Monitor lab/diagnostic results  - Monitor all insertion sites, i.e. indwelling lines, tubes, and drains  - Monitor endotracheal if appropriate and nasal secretions for changes in amount and color  - [place] appropriate cooling/warming therapies per order  - Administer medications as ordered  - Instruct and encourage patient and family to use good hand hygiene technique  - Identify and instruct in appropriate isolation precautions for identified infection/condition  Outcome: Progressing  Goal: Absence of fever/infection during neutropenic period  Description: INTERVENTIONS:  - Monitor WBC  - Perform strict hand hygiene  - Limit to healthy visitors only  - No plants, dried, fresh or silk flowers with reina in patient room  Outcome: Progressing     Problem: SAFETY ADULT  Goal: Patient will remain free of falls  Description: INTERVENTIONS:  - Educate patient/family on patient safety including physical limitations  - Instruct patient to call for assistance with activity   -  Consider consulting OT/PT to assist with strengthening/mobility based on AM PAC & JH-HLM score  - Consult OT/PT to assist with strengthening/mobility   - Keep Call bell within reach  - Keep bed low and locked with side rails adjusted as appropriate  - Keep care items and personal belongings within reach  - Initiate and maintain comfort rounds  - Make Fall Risk Sign visible to staff  - Offer Toileting every 2 Hours, in advance of need  - Initiate/Maintain bed alarm  - Obtain necessary fall risk management equipment: yellow socks  - Apply yellow socks and bracelet for high fall risk patients  - Consider moving patient to room near nurses station  Outcome: Progressing  Goal: Maintain or return to baseline ADL function  Description: INTERVENTIONS:  -  Assess patient's ability to carry out ADLs; assess patient's baseline for ADL function and identify physical deficits which impact ability to perform ADLs (bathing, care of mouth/teeth, toileting, grooming, dressing, etc.)  - Assess/evaluate cause of self-care deficits   - Assess range of motion  - Assess patient's mobility; develop plan if impaired  - Assess patient's need for assistive devices and provide as appropriate  - Encourage maximum independence but intervene and supervise when necessary  - Involve family in performance of ADLs  - Assess for home care needs following discharge   - Consider OT consult to assist with ADL evaluation and planning for discharge  - Provide patient education as appropriate  - Monitor functional capacity and physical performance, use of AM PAC & JH-HLM   - Monitor gait, balance and fatigue with ambulation    Outcome: Progressing  Goal: Maintains/Returns to pre admission functional level  Description: INTERVENTIONS:  - Perform AM-PAC 6 Click Basic Mobility/ Daily Activity assessment daily.  - Set and communicate daily mobility goal to care team and patient/family/caregiver.   - Collaborate with rehabilitation services on mobility goals if  consulted  - Perform Range of Motion 3 times a day.  - Reposition patient every 2 hours.  - Dangle patient 3 times a day  - Stand patient 3 times a day  - Ambulate patient 3 times a day  - Out of bed to chair 3 times a day   - Out of bed for meals 3 times a day  - Out of bed for toileting  - Record patient progress and toleration of activity level   Outcome: Progressing     Problem: DISCHARGE PLANNING  Goal: Discharge to home or other facility with appropriate resources  Description: INTERVENTIONS:  - Identify barriers to discharge w/patient and caregiver  - Arrange for needed discharge resources and transportation as appropriate  - Identify discharge learning needs (meds, wound care, etc.)  - Arrange for interpretive services to assist at discharge as needed  - Refer to Case Management Department for coordinating discharge planning if the patient needs post-hospital services based on physician/advanced practitioner order or complex needs related to functional status, cognitive ability, or social support system  Outcome: Progressing     Problem: Knowledge Deficit  Goal: Patient/family/caregiver demonstrates understanding of disease process, treatment plan, medications, and discharge instructions  Description: Complete learning assessment and assess knowledge base.  Interventions:  - Provide teaching at level of understanding  - Provide teaching via preferred learning methods  Outcome: Progressing

## 2025-05-19 NOTE — PROGRESS NOTES
HCC coding opportunities       Chart reviewed, no opportunity found: CHART REVIEWED, NO OPPORTUNITY FOUND        Patients Insurance     Medicare Insurance: Medicare     Commercial Insurance: [employer] Commercial

## 2025-05-20 ENCOUNTER — APPOINTMENT (INPATIENT)
Dept: MRI IMAGING | Facility: HOSPITAL | Age: 65
DRG: 101 | End: 2025-05-20
Payer: MEDICARE

## 2025-05-20 LAB
ALBUMIN SERPL BCG-MCNC: 3.5 G/DL (ref 3.5–5)
ANION GAP SERPL CALCULATED.3IONS-SCNC: 11 MMOL/L (ref 4–13)
BUN SERPL-MCNC: 18 MG/DL (ref 5–25)
CALCIUM SERPL-MCNC: 8.9 MG/DL (ref 8.4–10.2)
CHLORIDE SERPL-SCNC: 107 MMOL/L (ref 96–108)
CO2 SERPL-SCNC: 22 MMOL/L (ref 21–32)
CREAT SERPL-MCNC: 1 MG/DL (ref 0.6–1.3)
ERYTHROCYTE [DISTWIDTH] IN BLOOD BY AUTOMATED COUNT: 14 % (ref 11.6–15.1)
GFR SERPL CREATININE-BSD FRML MDRD: 58 ML/MIN/1.73SQ M
GLUCOSE SERPL-MCNC: 75 MG/DL (ref 65–140)
HCT VFR BLD AUTO: 31 % (ref 34.8–46.1)
HGB BLD-MCNC: 9.7 G/DL (ref 11.5–15.4)
MAGNESIUM SERPL-MCNC: 2.2 MG/DL (ref 1.9–2.7)
MCH RBC QN AUTO: 30.4 PG (ref 26.8–34.3)
MCHC RBC AUTO-ENTMCNC: 31.3 G/DL (ref 31.4–37.4)
MCV RBC AUTO: 97 FL (ref 82–98)
PHOSPHATE SERPL-MCNC: 3.6 MG/DL (ref 2.3–4.1)
PLATELET # BLD AUTO: 282 THOUSANDS/UL (ref 149–390)
PMV BLD AUTO: 11.3 FL (ref 8.9–12.7)
POTASSIUM SERPL-SCNC: 4 MMOL/L (ref 3.5–5.3)
RBC # BLD AUTO: 3.19 MILLION/UL (ref 3.81–5.12)
SODIUM SERPL-SCNC: 140 MMOL/L (ref 135–147)
WBC # BLD AUTO: 9.62 THOUSAND/UL (ref 4.31–10.16)

## 2025-05-20 PROCEDURE — 97167 OT EVAL HIGH COMPLEX 60 MIN: CPT

## 2025-05-20 PROCEDURE — 80069 RENAL FUNCTION PANEL: CPT | Performed by: STUDENT IN AN ORGANIZED HEALTH CARE EDUCATION/TRAINING PROGRAM

## 2025-05-20 PROCEDURE — 97163 PT EVAL HIGH COMPLEX 45 MIN: CPT

## 2025-05-20 PROCEDURE — A9585 GADOBUTROL INJECTION: HCPCS | Performed by: STUDENT IN AN ORGANIZED HEALTH CARE EDUCATION/TRAINING PROGRAM

## 2025-05-20 PROCEDURE — 83735 ASSAY OF MAGNESIUM: CPT | Performed by: STUDENT IN AN ORGANIZED HEALTH CARE EDUCATION/TRAINING PROGRAM

## 2025-05-20 PROCEDURE — 99232 SBSQ HOSP IP/OBS MODERATE 35: CPT | Performed by: INTERNAL MEDICINE

## 2025-05-20 PROCEDURE — 85027 COMPLETE CBC AUTOMATED: CPT | Performed by: STUDENT IN AN ORGANIZED HEALTH CARE EDUCATION/TRAINING PROGRAM

## 2025-05-20 PROCEDURE — 70553 MRI BRAIN STEM W/O & W/DYE: CPT

## 2025-05-20 RX ORDER — GADOBUTROL 604.72 MG/ML
10 INJECTION INTRAVENOUS
Status: COMPLETED | OUTPATIENT
Start: 2025-05-20 | End: 2025-05-20

## 2025-05-20 RX ADMIN — HEPARIN SODIUM 5000 UNITS: 5000 INJECTION INTRAVENOUS; SUBCUTANEOUS at 05:23

## 2025-05-20 RX ADMIN — FERROUS SULFATE TAB 325 MG (65 MG ELEMENTAL FE) 325 MG: 325 (65 FE) TAB at 07:35

## 2025-05-20 RX ADMIN — ARIPIPRAZOLE 5 MG: 5 TABLET ORAL at 11:32

## 2025-05-20 RX ADMIN — ESCITALOPRAM OXALATE 10 MG: 10 TABLET ORAL at 11:32

## 2025-05-20 RX ADMIN — CARBAMAZEPINE 200 MG: 200 TABLET ORAL at 16:53

## 2025-05-20 RX ADMIN — HEPARIN SODIUM 5000 UNITS: 5000 INJECTION INTRAVENOUS; SUBCUTANEOUS at 21:00

## 2025-05-20 RX ADMIN — ACETAMINOPHEN 650 MG: 325 TABLET, FILM COATED ORAL at 20:40

## 2025-05-20 RX ADMIN — GABAPENTIN 300 MG: 300 CAPSULE ORAL at 11:32

## 2025-05-20 RX ADMIN — NICOTINE 1 PATCH: 21 PATCH, EXTENDED RELEASE TRANSDERMAL at 11:33

## 2025-05-20 RX ADMIN — CARBAMAZEPINE 200 MG: 200 TABLET ORAL at 11:32

## 2025-05-20 RX ADMIN — CARBAMAZEPINE 200 MG: 200 TABLET ORAL at 20:40

## 2025-05-20 RX ADMIN — LEVETIRACETAM 1500 MG: 500 TABLET, FILM COATED ORAL at 11:32

## 2025-05-20 RX ADMIN — METOPROLOL SUCCINATE 50 MG: 50 TABLET, EXTENDED RELEASE ORAL at 11:32

## 2025-05-20 RX ADMIN — LEVETIRACETAM 1500 MG: 500 TABLET, FILM COATED ORAL at 20:40

## 2025-05-20 RX ADMIN — GADOBUTROL 10 ML: 604.72 INJECTION INTRAVENOUS at 10:39

## 2025-05-20 RX ADMIN — HEPARIN SODIUM 5000 UNITS: 5000 INJECTION INTRAVENOUS; SUBCUTANEOUS at 14:04

## 2025-05-20 NOTE — PLAN OF CARE
"  Problem: OCCUPATIONAL THERAPY ADULT  Goal: Performs self-care activities at highest level of function for planned discharge setting.  See evaluation for individualized goals.  Description: Treatment Interventions: ADL retraining, Functional transfer training, Patient/family training, Endurance training, Compensatory technique education, Activityengagement, Neuromuscular reeducation, Equipment evaluation/education          See flowsheet documentation for full assessment, interventions and recommendations.   Note: Limitation: Decreased ADL status, Decreased self-care trans, Decreased high-level ADLs, Decreased cognition  Prognosis: Fair  Assessment: Pt is a 64 y.o. female seen for OT evaluation at [facility], admitted 5/18/2025 w/ Syncope and collapse.  Comorbidities affecting pt's functional performance at time of assessment include: Epilepsy with altered consciousness with intractable epilepsy,essential HTN, frequent falls, Major neurocognitive disorder, morbidly obese, lymphedema, , etc (see above).  Prior to admission, pt was living alone in an apt with no steps to manage.  Pt was I w/  ADLS and required some assist IADLS, & required  quad cane  PTA. Upon evaluation, pt appears to be performing below baseline functional status. Pt currently requires sup-mod I for bed mobility and supervision for UB ADLs. However, unable to assess OOB activity or LB ADLs 2* the following deficits impacting occupational performance: weakness, decreased strength, decreased balance, and decreased tolerance. Pt also with \"freezing\" episode. Full objective findings from OT assessment regarding body systems outlined above. Personal factors affecting pt at time of IE include:limited home support, difficulty performing ADLS, difficulty performing IADLS , and decreased functional mobility . These impairments, as well as risk for falls  limit pt's ability to safely engage in all baseline areas of occupation and mobility. Pt to " benefit from continued skilled OT tx while in the hospital to address deficits as defined above and maximize level of functional independence w ADL's and functional mobility. Occupational Performance areas to address include: bathing/shower, toilet hygiene, dressing, medication management, and functional mobility.  This evaluation required an extensive review of medical and/or therapy records and additional review of physical, cognitive and psychosocial history related to functional performance. Based upon functional performance deficits and assessments, this evaluation has been identified as a high complexity evaluation.  At this time, OT recommendations at time of discharge are level 2 resources.OT to follow progress closely.     Rehab Resource Intensity Level, OT: II (Moderate Resource Intensity) (OT to follow progress closely. Pt with limited ability to participate in evaluation 2/2 episode described above.)

## 2025-05-20 NOTE — PLAN OF CARE
Problem: PAIN - ADULT  Goal: Verbalizes/displays adequate comfort level or baseline comfort level  Description: Interventions:  - Encourage patient to monitor pain and request assistance  - Assess pain using appropriate pain scale  - Administer analgesics as ordered based on type and severity of pain and evaluate response  - Implement non-pharmacological measures as appropriate and evaluate response  - Consider cultural and social influences on pain and pain management  - Notify physician/advanced practitioner if interventions unsuccessful or patient reports new pain  - Educate patient/family on pain management process including their role and importance of  reporting pain   - Provide non-pharmacologic/complimentary pain relief interventions  Outcome: Progressing     Problem: INFECTION - ADULT  Goal: Absence or prevention of progression during hospitalization  Description: INTERVENTIONS:  - Assess and monitor for signs and symptoms of infection  - Monitor lab/diagnostic results  - Monitor all insertion sites, i.e. indwelling lines, tubes, and drains  - Monitor endotracheal if appropriate and nasal secretions for changes in amount and color  - [place] appropriate cooling/warming therapies per order  - Administer medications as ordered  - Instruct and encourage patient and family to use good hand hygiene technique  - Identify and instruct in appropriate isolation precautions for identified infection/condition  Outcome: Progressing     Problem: SAFETY ADULT  Goal: Patient will remain free of falls  Description: INTERVENTIONS:  - Educate patient/family on patient safety including physical limitations  - Instruct patient to call for assistance with activity   - Consider consulting OT/PT to assist with strengthening/mobility based on AM PAC & JH-HLM score  - Consult OT/PT to assist with strengthening/mobility   - Keep Call bell within reach  - Keep bed low and locked with side rails adjusted as appropriate  - Keep  care items and personal belongings within reach  - Initiate and maintain comfort rounds  - Make Fall Risk Sign visible to staff  - Offer Toileting every 2 Hours, in advance of need  - Initiate/Maintain bed alarm  - Obtain necessary fall risk management equipment: floor cushion  - Apply yellow socks and bracelet for high fall risk patients  - Consider moving patient to room near nurses station  Outcome: Progressing     Problem: DISCHARGE PLANNING  Goal: Discharge to home or other facility with appropriate resources  Description: INTERVENTIONS:  - Identify barriers to discharge w/patient and caregiver  - Arrange for needed discharge resources and transportation as appropriate  - Identify discharge learning needs (meds, wound care, etc.)  - Arrange for interpretive services to assist at discharge as needed  - Refer to Case Management Department for coordinating discharge planning if the patient needs post-hospital services based on physician/advanced practitioner order or complex needs related to functional status, cognitive ability, or social support system  Outcome: Progressing     Problem: Knowledge Deficit  Goal: Patient/family/caregiver demonstrates understanding of disease process, treatment plan, medications, and discharge instructions  Description: Complete learning assessment and assess knowledge base.  Interventions:  - Provide teaching at level of understanding  - Provide teaching via preferred learning methods  Outcome: Progressing     Problem: NEUROSENSORY - ADULT  Goal: Achieves stable or improved neurological status  Description: INTERVENTIONS  - Monitor and report changes in neurological status  - Monitor vital signs such as temperature, blood pressure, glucose, and any other labs ordered   - Initiate measures to prevent increased intracranial pressure  - Monitor for seizure activity and implement precautions if appropriate      Outcome: Progressing  Goal: Remains free of injury related to seizures  activity  Description: INTERVENTIONS  - Maintain airway, patient safety  and administer oxygen as ordered  - Monitor patient for seizure activity, document and report duration and description of seizure to physician/advanced practitioner  - If seizure occurs,  ensure patient safety during seizure  - Reorient patient post seizure  - Seizure pads on all 4 side rails  - Instruct patient/family to notify RN of any seizure activity including if an aura is experienced  - Instruct patient/family to call for assistance with activity based on nursing assessment  - Administer anti-seizure medications if ordered    Outcome: Progressing  Goal: Achieves maximal functionality and self care  Description: INTERVENTIONS  - Monitor swallowing and airway patency with patient fatigue and changes in neurological status  - Encourage and assist patient to increase activity and self care.   - Encourage visually impaired, hearing impaired and aphasic patients to use assistive/communication devices  Outcome: Progressing     Problem: CARDIOVASCULAR - ADULT  Goal: Maintains optimal cardiac output and hemodynamic stability  Description: INTERVENTIONS:  - Monitor I/O, vital signs and rhythm  - Monitor for S/S and trends of decreased cardiac output  - Administer and titrate ordered vasoactive medications to optimize hemodynamic stability  - Assess quality of pulses, skin color and temperature  - Assess for signs of decreased coronary artery perfusion  - Instruct patient to report change in severity of symptoms  Outcome: Progressing  Goal: Absence of cardiac dysrhythmias or at baseline rhythm  Description: INTERVENTIONS:  - Continuous cardiac monitoring, vital signs, obtain 12 lead EKG if ordered  - Administer antiarrhythmic and heart rate control medications as ordered  - Monitor electrolytes and administer replacement therapy as ordered  Outcome: Progressing

## 2025-05-20 NOTE — PLAN OF CARE
Problem: PHYSICAL THERAPY ADULT  Goal: Performs mobility at highest level of function for planned discharge setting.  See evaluation for individualized goals.  Description: Treatment/Interventions: ADL retraining, Functional transfer training, LE strengthening/ROM, Therapeutic exercise, Endurance training, Cognitive reorientation, Patient/family training, Equipment eval/education, Bed mobility, Gait training, Compensatory technique education, Spoke to nursing, Spoke to case management, OT          See flowsheet documentation for full assessment, interventions and recommendations.  Note:    Problem List: Decreased strength, Decreased endurance, Impaired balance, Decreased mobility, Decreased cognition, Impaired judgement, Decreased safety awareness (seizure?)  Assessment: Patient seen for Physical Therapy evaluation. Patient admitted with Syncope and collapse.  Comorbidities affecting patient's physical performance include: epilepsy, HTN, CKD3, MDD w psychosis, COPD, polypharmacy, obese, anemia, lymphedema, SIRS, sinus tachycardia, SOB.  Personal factors affecting patient at time of initial evaluation include: lives in one story house, ambulating with assistive device, inability to ambulate household distances, inability to navigate community distances, inability to navigate level surfaces without external assistance, inability to perform dynamic tasks in community, decreased cognition, limited home support, positive fall history, and depression. Prior to admission, patient was independent with functional mobility with WBQC, independent with ADLS, requiring assist for IADLS, living alone in one story home with no steps to enter, and ambulating household distance.  Please find objective findings from Physical Therapy assessment regarding body systems outlined above with impairments and limitations including weakness, impaired balance, decreased endurance, impaired coordination, decreased activity tolerance,  decreased functional mobility tolerance, decreased safety awareness, impaired judgement, fall risk, and decreased cognition.  The Barthel Index was used as a functional outcome tool presenting with a score of Barthel Index Score: 50 today indicating marked limitations of functional mobility and ADLS.  Patient's clinical presentation is currently unstable/unpredictable as seen in patient's presentation of vital sign response, varying levels of cognitive performance, increased fall risk, new onset of impairment of functional mobility, decreased endurance, and new onset of weakness. Pt would benefit from continued Physical Therapy treatment to address deficits as defined above and maximize level of functional mobility. As demonstrated by objective findings, the assigned level of complexity for this evaluation is high.The patient's AM-[facility] Basic Mobility Inpatient Short Form Raw Score is 16. A Raw score of less than or equal to 16 suggests the patient may benefit from discharge to post-acute rehabilitation services. Please also refer to the recommendation of the Physical Therapist for safe discharge planning.        Rehab Resource Intensity Level, PT: II (Moderate Resource Intensity)    See flowsheet documentation for full assessment.

## 2025-05-20 NOTE — PROGRESS NOTES
Progress Note - Hospitalist   Name: Cydney Lim 64 y.o. female I MRN: [id number]  Unit/Bed#: -01 I Date of Admission: 5/18/2025   Date of Service: 5/20/2025 I Hospital Day: 1    Assessment & Plan  Syncope and collapse  POA with complaints of dizziness every since the psych doctor changed medications on Wednesday.  She was seen earlier in the [facility], workup was negative and set home.  She appeared back to the [facility] ED tonight due to passing out and hitting head.  No focal deficits noted, patient states she is still dizzy, denies any headaches chest pain or palpitations.  She denies any incontinence of bowel or urine.  No tongue biting.  + LOC. She does have complaints of urinary burning and frequency. Denies CVA tenderness  Suspect syncope in setting of 2/2 to seizure vs furosemide usage  Psych consulted appreciate recommendations  Neurology consulted  Continue Keppra and Tegretol  Stage 3 chronic kidney disease (HCC)  Lab Results   Component Value Date    EGFR 58 05/20/2025    EGFR 49 05/19/2025    EGFR 40 05/18/2025    CREATININE 1.00 05/20/2025    CREATININE 1.15 05/19/2025    CREATININE 1.35 (H) 05/18/2025   POA with Cr level 1.35 at baseline baseline 1.4-1.5  Appears euvolemic    Epilepsy with altered consciousness with intractable epilepsy (HCC)  Rapid response called this morning as patient was found by MA seizing with shaking/movements of right arm  Mentation returned back to baseline by the time I evaluated patient  Will give IV keppra 1500 mg x 1, continue home keppra 1500 mg bid and home tegretol  Per neurology can consider uptitrating Keppra to 1750 if any further seizure episodes  Tobacco abuse  Smoking  cessation  Nicotine patch  Essential hypertension  Home med metoprolol continued  BP within normal limits  Falls frequently  POA with recent fall  PT/OT consulted  Fall precautions  Severe episode of recurrent major depressive disorder, with psychotic features (HCC)  Denies SI/HI  Home med  Lexapro, gabapentin, Abilify, Trazodone continued    psych consulted appreciate recs  No need for inpatient psych  Chronic obstructive pulmonary disease, unspecified COPD type (HCC)  Not in acute exacerbation  Home inhaler continued  Polypharmacy  Noted  Depressive disorder  Continue Lexapro and Abilify    VTE Pharmacologic Prophylaxis:   Moderate Risk (Score 3-4) - Pharmacological DVT Prophylaxis Ordered: heparin.    Mobility:   Basic Mobility Inpatient Raw Score: 20  JH-HLM Goal: 6: Walk 10 steps or more  JH-HLM Achieved: 4: Move to chair/commode  JH-HLM Goal achieved. Continue to encourage appropriate mobility.    Patient Centered Rounds: I performed bedside rounds with nursing staff today.   Discussions with Specialists or Other Care Team Provider: cm, nursing    Education and Discussions with Family / Patient: pt, brother.     Current Length of Stay: 1 day(s)  Current Patient Status: Inpatient   Certification Statement: The patient will continue to require additional inpatient hospital stay due to see below  Discharge Plan: Pending further monitoring of seizure.  Anticipate 24 hours    Code Status: Level 1 - Full Code    Subjective   Currently without any acute complaints.  Denies abdominal pain, nausea, vomiting, diarrhea    Objective :  Temp:  [97.3 °F (36.3 °C)-98.3 °F (36.8 °C)] 98 °F (36.7 °C)  HR:  [77-92] 89  BP: (135-154)/(68-95) 139/78  Resp:  [18-27] 27  SpO2:  [92 %-97 %] 95 %  O2 Device: None (Room air)    Body mass index is 40.9 kg/m².     Input and Output Summary (last 24 hours):     Intake/Output Summary (Last 24 hours) at 5/20/2025 0841  Last data filed at 5/20/2025 0732  Gross per 24 hour   Intake 390 ml   Output 1420 ml   Net -1030 ml       Physical Exam  Constitutional:       General: She is not in acute distress.     Appearance: She is well-developed. She is not diaphoretic.   HENT:      Head: Normocephalic and atraumatic.      Nose: Nose normal.      Mouth/Throat:      Pharynx: No  oropharyngeal exudate.     Eyes:      General: No scleral icterus.        Right eye: No discharge.         Left eye: No discharge.      Conjunctiva/sclera: Conjunctivae normal.     Neck:      Thyroid: No thyromegaly.      Vascular: No JVD.     Cardiovascular:      Rate and Rhythm: Normal rate and regular rhythm.      Heart sounds: Normal heart sounds. No murmur heard.     No friction rub. No gallop.   Pulmonary:      Effort: Pulmonary effort is normal. No respiratory distress.      Breath sounds: Normal breath sounds. No wheezing or rales.   Chest:      Chest wall: No tenderness.   Abdominal:      General: Bowel sounds are normal. There is no distension.      Palpations: Abdomen is soft.      Tenderness: There is no abdominal tenderness. There is no guarding or rebound.     Musculoskeletal:         General: No tenderness or deformity. Normal range of motion.      Cervical back: Normal range of motion and neck supple.     Skin:     General: Skin is warm and dry.      Findings: No erythema or rash.     Neurological:      Mental Status: She is alert. Mental status is at baseline.      Cranial Nerves: No cranial nerve deficit.      Sensory: No sensory deficit.      Motor: No abnormal muscle tone.      Coordination: Coordination normal.           Lines/Drains:        Telemetry:  Telemetry Orders (From admission, onward)               24 Hour Telemetry Monitoring  Continuous x 24 Hours (Telem)        Expiring   Question:  Reason for 24 Hour Telemetry  Answer:  Syncope suspected to be cardiac in origin                     Telemetry Reviewed: Normal Sinus Rhythm  Indication for Continued Telemetry Use: No indication for continued use. Will discontinue.                Lab Results: I have reviewed the following results:   Results from last 7 days   Lab Units 05/20/25  0449 05/19/25  0445   WBC Thousand/uL 9.62 14.26*   HEMOGLOBIN g/dL 9.7* 10.3*   HEMATOCRIT % 31.0* 32.3*   PLATELETS Thousands/uL 282 342   SEGS PCT %  --  69    LYMPHO PCT %  --  22   MONO PCT %  --  7   EOS PCT %  --  1     Results from last 7 days   Lab Units 05/20/25  0449 05/19/25  0445   SODIUM mmol/L 140 141   POTASSIUM mmol/L 4.0 3.6   CHLORIDE mmol/L 107 107   CO2 mmol/L 22 24   BUN mg/dL 18 22   CREATININE mg/dL 1.00 1.15   ANION GAP mmol/L 11 10   CALCIUM mg/dL 8.9 9.0   ALBUMIN g/dL 3.5 3.8   TOTAL BILIRUBIN mg/dL  --  0.36   ALK PHOS U/L  --  84   ALT U/L  --  8   AST U/L  --  14   GLUCOSE RANDOM mg/dL 75 100         Results from last 7 days   Lab Units 05/19/25  0854   POC GLUCOSE mg/dl 147*         Results from last 7 days   Lab Units 05/19/25  0100   LACTIC ACID mmol/L 0.8   PROCALCITONIN ng/ml 0.06       Recent Cultures (last 7 days):         Imaging Results Review: I personally reviewed the following image studies/reports in PACS and discussed pertinent findings with Radiology: chest xray. My interpretation of the radiology images/reports is:  .  Other Study Results Review: EKG was reviewed.     Last 24 Hours Medication List:     Current Facility-Administered Medications:     acetaminophen (TYLENOL) tablet 650 mg, Q6H PRN    albuterol (PROVENTIL HFA,VENTOLIN HFA) inhaler 2 puff, Q6H PRN    ARIPiprazole (ABILIFY) tablet 5 mg, Daily    carBAMazepine (TEGretol) tablet 200 mg, TID    escitalopram (LEXAPRO) tablet 10 mg, Daily    ferrous sulfate tablet 325 mg, Daily With Breakfast    gabapentin (NEURONTIN) capsule 300 mg, Daily    heparin (porcine) subcutaneous injection 5,000 Units, Q8H [place] **AND** [COMPLETED] Platelet count, Once    levETIRAcetam (KEPPRA) tablet 1,500 mg, Q12H [place]    LORazepam (ATIVAN) injection 2 mg, Q4H PRN    metoprolol succinate (TOPROL-XL) 24 hr tablet 50 mg, Daily    nicotine (NICODERM CQ) 21 mg/24 hr TD 24 hr patch 1 patch, Daily    Administrative Statements   Today, Patient Was Seen By: Jordin Barnhart MD  I have spent a total time of 30 minutes in caring for this patient on the day of the visit/encounter including Diagnostic  results.    **Please Note: This note may have been constructed using a voice recognition system.**

## 2025-05-20 NOTE — ASSESSMENT & PLAN NOTE
Rapid response called this morning as patient was found by MA seizing with shaking/movements of right arm  Mentation returned back to baseline by the time I evaluated patient  Will give IV keppra 1500 mg x 1, continue home keppra 1500 mg bid and home tegretol  Per neurology can consider uptitrating Keppra to 1750 if any further seizure episodes

## 2025-05-20 NOTE — ASSESSMENT & PLAN NOTE
Denies SI/HI  Home med Lexapro, gabapentin, Abilify, Trazodone continued    psych consulted appreciate recs  No need for inpatient psych

## 2025-05-20 NOTE — ASSESSMENT & PLAN NOTE
POA with complaints of dizziness every since the psych doctor changed medications on Wednesday.  She was seen earlier in the [facility], workup was negative and set home.  She appeared back to the [facility] ED tonight due to passing out and hitting head.  No focal deficits noted, patient states she is still dizzy, denies any headaches chest pain or palpitations.  She denies any incontinence of bowel or urine.  No tongue biting.  + LOC. She does have complaints of urinary burning and frequency. Denies CVA tenderness  Suspect syncope in setting of 2/2 to seizure vs furosemide usage  Psych consulted appreciate recommendations  Neurology consulted  Continue Keppra and Tegretol

## 2025-05-20 NOTE — PHYSICAL THERAPY NOTE
PT EVALUATION    NAME:  Cydney Lim  AGE:   64 y.o.  Mrn:   [id number]  Length Of Stay: 1    ADMIT DX:  Head injury [S09.90XA]  Closed head injury, initial encounter [S09.90XA]  Ambulatory dysfunction [R26.2]    Past Medical History:   Diagnosis Date    Ambulatory dysfunction     B12 deficiency     CKD (chronic kidney disease), stage III (HCC)     Depression     EP (epilepsy) (HCC)     Epilepsy (HCC)     History of sinus tachycardia     Hypertension     Morbid obesity (HCC)     Obesity     Restless leg     Restless leg syndrome     Vitamin D deficiency      Past Surgical History:   Procedure Laterality Date    DENTAL SURGERY      all teeth removed    DENTAL SURGERY      LASER ABLATION OF THE CERVIX      MAMMO (HISTORICAL)  05/01/2017     '   05/20/25 1209   PT Last Visit   PT Visit Date 05/20/25   Note Type   Note type Evaluation   Pain Assessment   Pain Assessment Tool 0-10   Pain Score 5   Pain Location/Orientation Orientation: Bilateral;Location: Foot   Pain Onset/Description Frequency: Intermittent   Effect of Pain on Daily Activities Limits comfort and activity tolerance   Patient's Stated Pain Goal No pain   Hospital Pain Intervention(s) Repositioned;Ambulation/increased activity;Emotional support;Rest   Restrictions/Precautions   Other Precautions Seizure;Fall Risk;Bed Alarm;Chair Alarm   Home Living   Type of Home Apartment   Home Layout One level;Able to live on main level with bedroom/bathroom;Performs ADLs on one level;Elevator   Bathroom Shower/Tub Tub/shower unit   Bathroom Toilet Standard   Bathroom Equipment   (Tub transfer bench? Per pt what she has does not fit in tub)   Bathroom Accessibility Accessible   Home Equipment Quad cane  (WBQC appears too high for pt)   Prior Function   Level of [place] Independent with ADLs;Independent with functional mobility;Needs assistance with IADLS   Lives With (S)  Alone   Receives Help From Family   IADLs Independent with meal prep;Independent with  "medication management;Family/Friend/Other provides transportation   Falls in the last 6 months 5 to 10  (5 per pt;1 as reason for admission)   Comments Pt amb with a WBQC at all times PTA   General   Additional Pertinent History Pt is adm with dizziness, syncopal episode and fall. RR while adm 2/2 seizure. Pt has a history of epilepsy.   Family/Caregiver Present No   Cognition   Overall Cognitive Status Impaired  (pt with history of MDD with psychosis)   Arousal/Participation Cooperative   Orientation Level Oriented X4   Memory Decreased recall of precautions;Decreased recall of recent events;Decreased short term memory   Following Commands Follows one step commands with increased time or repetition   Comments At least 2 pt identifiers including name and    Subjective   Subjective Pt agreeable to PT   RLE Assessment   RLE Assessment WFL  (observed functionally - at least 3/5)   LLE Assessment   LLE Assessment WFL  (observed functionally - at least 3/5)   Vision-Basic Assessment   Current Vision Wears glasses only for reading   Light Touch   RLE Light Touch Grossly intact   LLE Light Touch Grossly intact   Bed Mobility   Supine to Sit 5  Supervision   Additional items Assist x 1;Verbal cues;Increased time required;HOB elevated;Bedrails   Sit to Supine 5  Supervision   Additional items Assist x 1;Verbal cues;Increased time required;Bedrails   Additional Comments While pt seated EOB for 3 mins, PT attempted to assess LEs via MMT;pt began to jerk/shake, then held PT hands bilaterally and was squeezing them intermittently and saying \"yes, yes, yes\" repeatedly and staring off into space x 1 minute. OT also present to observe change in pt's behavior and speech - OT immediately got RN who assessed pt - by the time RN present, pt \"coming out\" of episode. /116 while pt seated EOB at end of episode;pt then returned to supine with SUP /85 and pt stating \"I'm fine now.\" Further skilled PT held at this time. "   Transfers   Sit to Stand Unable to assess   Ambulation/Elevation   Gait pattern Not appropriate;Not tested   Activity Tolerance   Activity Tolerance Other (Comment)  (limited by possible seizure - RN aware)   Medical Staff Made Aware OT;ALESSANDRA   Nurse Made Aware FABIAN Alfonso   Assessment   Problem List Decreased strength;Decreased endurance;Impaired balance;Decreased mobility;Decreased cognition;Impaired judgement;Decreased safety awareness  (seizure?)   Assessment Patient seen for Physical Therapy evaluation. Patient admitted with Syncope and collapse.  Comorbidities affecting patient's physical performance include: epilepsy, HTN, CKD3, MDD w psychosis, COPD, polypharmacy, obese, anemia, lymphedema, SIRS, sinus tachycardia, SOB.  Personal factors affecting patient at time of initial evaluation include: lives in one story house, ambulating with assistive device, inability to ambulate household distances, inability to navigate community distances, inability to navigate level surfaces without external assistance, inability to perform dynamic tasks in community, decreased cognition, limited home support, positive fall history, and depression. Prior to admission, patient was independent with functional mobility with WBQC, independent with ADLS, requiring assist for IADLS, living alone in one story home with no steps to enter, and ambulating household distance.  Please find objective findings from Physical Therapy assessment regarding body systems outlined above with impairments and limitations including weakness, impaired balance, decreased endurance, impaired coordination, decreased activity tolerance, decreased functional mobility tolerance, decreased safety awareness, impaired judgement, fall risk, and decreased cognition.  The Barthel Index was used as a functional outcome tool presenting with a score of Barthel Index Score: 50 today indicating marked limitations of functional mobility and ADLS.  Patient's clinical  presentation is currently unstable/unpredictable as seen in patient's presentation of vital sign response, varying levels of cognitive performance, increased fall risk, new onset of impairment of functional mobility, decreased endurance, and new onset of weakness. Pt would benefit from continued Physical Therapy treatment to address deficits as defined above and maximize level of functional mobility. As demonstrated by objective findings, the assigned level of complexity for this evaluation is high.    The patient's AM-PAC Basic Mobility Inpatient Short Form Raw Score is 16. A Raw score of less than or equal to 16 suggests the patient may benefit from discharge to post-acute rehabilitation services. Please also refer to the recommendation of the Physical Therapist for safe discharge planning.   Goals   Patient Goals Pt unable to state 2/2 seizure like episode - will cont to assess   STG Expiration Date 05/30/25   Short Term Goal #1 Patient will: Increase bilateral LE strength 1/2 grade to facilitate independent mobility, Perform all bed mobility tasks independently to improve pt's independence w/ repositioning for decrease risk of skin breakdown, Perform all transfers independently consistently from various height surfaces in order to improve I w/ engagement w/ real-world environments/situations, Ambulate at least 150 ft. with least restrictive assistive device independently w/o LOB to facilitate return and engagement w/ previous living environment, Increase all balance 1 grade to decrease risk for falls, Tolerate at least 30 consecutive minutes of activity to demonstrate improved activity tolerance and endurance, and Tolerate 3 hr OOB to faciliate upright tolerance   Plan   Treatment/Interventions ADL retraining;Functional transfer training;LE strengthening/ROM;Therapeutic exercise;Endurance training;Cognitive reorientation;Patient/family training;Equipment eval/education;Bed mobility;Gait training;Compensatory  technique education;Spoke to nursing;Spoke to case management;OT   PT Frequency 3-5x/wk   Discharge Recommendation   Rehab Resource Intensity Level, PT II (Moderate Resource Intensity)   Additional Comments Will cont to reassess DCP pending pt's mobility. Pt does live alone. Pt not safe to go home if episodes/seizures? and falls continue.   AM-PAC Basic Mobility Inpatient   Turning in Flat Bed Without Bedrails 3   Lying on Back to Sitting on Edge of Flat Bed Without Bedrails 3   Moving Bed to Chair 3   Standing Up From Chair Using Arms 3   Walk in Room 3   Climb 3-5 Stairs With Railing 1   Basic Mobility Inpatient Raw Score 16   Basic Mobility Standardized Score 38.32   [facility] Highest Level Of Mobility   -[facility] Goal 5: Stand one or more mins   -[facility] Achieved 3: Sit at edge of bed  (pt had a episode/seizure? during PT eval;unable to assess further mobility)   Barthel Index   Feeding 10   Bathing 0   Grooming Score 0   Dressing Score 5   Bladder Score 10   Bowels Score 10   Toilet Use Score 5   Transfers (Bed/Chair) Score 10   Mobility (Level Surface) Score 0   Stairs Score 0   Barthel Index Score 50   End of Consult   Patient Position at End of Consult Supine;All needs within reach;Bed/Chair alarm activated   Licensure   [place] License Number  Kay Durant PT

## 2025-05-20 NOTE — ASSESSMENT & PLAN NOTE
Lab Results   Component Value Date    EGFR 58 05/20/2025    EGFR 49 05/19/2025    EGFR 40 05/18/2025    CREATININE 1.00 05/20/2025    CREATININE 1.15 05/19/2025    CREATININE 1.35 (H) 05/18/2025   POA with Cr level 1.35 at baseline baseline 1.4-1.5  Appears euvolemic

## 2025-05-21 ENCOUNTER — TELEPHONE (OUTPATIENT)
Dept: PSYCHIATRY | Facility: CLINIC | Age: 65
End: 2025-05-21

## 2025-05-21 LAB
ALBUMIN SERPL BCG-MCNC: 3.5 G/DL (ref 3.5–5)
ANION GAP SERPL CALCULATED.3IONS-SCNC: 10 MMOL/L (ref 4–13)
BUN SERPL-MCNC: 21 MG/DL (ref 5–25)
CALCIUM SERPL-MCNC: 8.6 MG/DL (ref 8.4–10.2)
CHLORIDE SERPL-SCNC: 105 MMOL/L (ref 96–108)
CO2 SERPL-SCNC: 23 MMOL/L (ref 21–32)
CREAT SERPL-MCNC: 1.05 MG/DL (ref 0.6–1.3)
ERYTHROCYTE [DISTWIDTH] IN BLOOD BY AUTOMATED COUNT: 14.2 % (ref 11.6–15.1)
GFR SERPL CREATININE-BSD FRML MDRD: 55 ML/MIN/1.73SQ M
GLUCOSE SERPL-MCNC: 86 MG/DL (ref 65–140)
HCT VFR BLD AUTO: 30.1 % (ref 34.8–46.1)
HGB BLD-MCNC: 9.6 G/DL (ref 11.5–15.4)
MAGNESIUM SERPL-MCNC: 2.2 MG/DL (ref 1.9–2.7)
MCH RBC QN AUTO: 30.9 PG (ref 26.8–34.3)
MCHC RBC AUTO-ENTMCNC: 31.9 G/DL (ref 31.4–37.4)
MCV RBC AUTO: 97 FL (ref 82–98)
PHOSPHATE SERPL-MCNC: 4 MG/DL (ref 2.3–4.1)
PLATELET # BLD AUTO: 291 THOUSANDS/UL (ref 149–390)
PMV BLD AUTO: 10.9 FL (ref 8.9–12.7)
POTASSIUM SERPL-SCNC: 3.6 MMOL/L (ref 3.5–5.3)
RBC # BLD AUTO: 3.11 MILLION/UL (ref 3.81–5.12)
SODIUM SERPL-SCNC: 138 MMOL/L (ref 135–147)
WBC # BLD AUTO: 8.29 THOUSAND/UL (ref 4.31–10.16)

## 2025-05-21 PROCEDURE — 85027 COMPLETE CBC AUTOMATED: CPT | Performed by: STUDENT IN AN ORGANIZED HEALTH CARE EDUCATION/TRAINING PROGRAM

## 2025-05-21 PROCEDURE — 83735 ASSAY OF MAGNESIUM: CPT | Performed by: STUDENT IN AN ORGANIZED HEALTH CARE EDUCATION/TRAINING PROGRAM

## 2025-05-21 PROCEDURE — 80069 RENAL FUNCTION PANEL: CPT | Performed by: STUDENT IN AN ORGANIZED HEALTH CARE EDUCATION/TRAINING PROGRAM

## 2025-05-21 PROCEDURE — 97116 GAIT TRAINING THERAPY: CPT

## 2025-05-21 PROCEDURE — 99232 SBSQ HOSP IP/OBS MODERATE 35: CPT | Performed by: INTERNAL MEDICINE

## 2025-05-21 RX ADMIN — FERROUS SULFATE TAB 325 MG (65 MG ELEMENTAL FE) 325 MG: 325 (65 FE) TAB at 08:23

## 2025-05-21 RX ADMIN — LEVETIRACETAM 1500 MG: 500 TABLET, FILM COATED ORAL at 22:12

## 2025-05-21 RX ADMIN — CARBAMAZEPINE 200 MG: 200 TABLET ORAL at 15:24

## 2025-05-21 RX ADMIN — METOPROLOL SUCCINATE 50 MG: 50 TABLET, EXTENDED RELEASE ORAL at 08:23

## 2025-05-21 RX ADMIN — ESCITALOPRAM OXALATE 10 MG: 10 TABLET ORAL at 08:23

## 2025-05-21 RX ADMIN — NICOTINE 1 PATCH: 21 PATCH, EXTENDED RELEASE TRANSDERMAL at 10:32

## 2025-05-21 RX ADMIN — LEVETIRACETAM 1500 MG: 500 TABLET, FILM COATED ORAL at 08:23

## 2025-05-21 RX ADMIN — GABAPENTIN 300 MG: 300 CAPSULE ORAL at 08:23

## 2025-05-21 RX ADMIN — HEPARIN SODIUM 5000 UNITS: 5000 INJECTION INTRAVENOUS; SUBCUTANEOUS at 05:40

## 2025-05-21 RX ADMIN — HEPARIN SODIUM 5000 UNITS: 5000 INJECTION INTRAVENOUS; SUBCUTANEOUS at 13:41

## 2025-05-21 RX ADMIN — CARBAMAZEPINE 200 MG: 200 TABLET ORAL at 08:23

## 2025-05-21 RX ADMIN — CARBAMAZEPINE 200 MG: 200 TABLET ORAL at 22:12

## 2025-05-21 RX ADMIN — HEPARIN SODIUM 5000 UNITS: 5000 INJECTION INTRAVENOUS; SUBCUTANEOUS at 22:12

## 2025-05-21 RX ADMIN — ARIPIPRAZOLE 5 MG: 5 TABLET ORAL at 08:23

## 2025-05-21 RX ADMIN — ACETAMINOPHEN 650 MG: 325 TABLET, FILM COATED ORAL at 22:19

## 2025-05-21 NOTE — PLAN OF CARE
Problem: PHYSICAL THERAPY ADULT  Goal: Performs mobility at highest level of function for planned discharge setting.  See evaluation for individualized goals.  Description: Treatment/Interventions: ADL retraining, Functional transfer training, LE strengthening/ROM, Therapeutic exercise, Endurance training, Cognitive reorientation, Patient/family training, Equipment eval/education, Bed mobility, Gait training, Compensatory technique education, Spoke to nursing, Spoke to case management, OT          See flowsheet documentation for full assessment, interventions and recommendations.  Outcome: Adequate for Discharge  Note:    Problem List: Decreased cognition  Assessment: Pt exhibits physical deficits noted in problem list above.  Deficits listed contribute to functional limitations that are significant from the patient PLOF and include: fall risk    Additional considerations affecting pt's discharge planning: Progressive cognitive decline affecting safety awareness/insight    During today's session, pt did not seem to have any difficulty with bed mobility, transfers, or ambulating with a rollator community distance.  Pt appears at or close to her baseline function and seems capable of managing basic mobility and self care at home.  Pt has met her goals and no longer requires acute inpatient PT services.     The -PAC Mobility Score was used to assist in determining pt safety w/ mobility/self care & appropriate d/c recommendations, see above for scores. Patient's clinical presentation is evolving due to altered mental status and complicated social/support system.     From a PT/mobility standpoint given the above findings, DC recommendation is level: No Post Acute Rehabilitation Needs        Rehab Resource Intensity Level, PT: No post-acute rehabilitation needs    See flowsheet documentation for full assessment.

## 2025-05-21 NOTE — PHYSICAL THERAPY NOTE
PHYSICAL THERAPY Treatment and Discharge Summary  DATE: 05/21/25  TIME: 4395-2818    NAME:  Cydney Lim  AGE:   64 y.o.  Mrn:   [id number]  Length Of Stay: 2    ADMIT DX:  Head injury [S09.90XA]  Closed head injury, initial encounter [S09.90XA]  Ambulatory dysfunction [R26.2]    Past Medical History[1]  Past Surgical History[2]     05/21/25 1703   PT Last Visit   PT Visit Date 05/21/25   Note Type   Note Type Treatment   Pain Assessment   Pain Assessment Tool 0-10   Pain Score No Pain   Restrictions/Precautions   Weight Bearing Precautions Per Order No   Other Precautions Chair Alarm;Cognitive   General   Chart Reviewed Yes   Family/Caregiver Present No   Cognition   Overall Cognitive Status Impaired   Arousal/Participation Alert   Subjective   Subjective Pt denies any complaints or symptoms.   Bed Mobility   Supine to Sit 7  Independent  (flat bed)   Sit to Supine 7  Independent  (flat bed)   Transfers   Sit to Stand 7  Independent   Stand to Sit 7  Independent   Stand pivot 7  Independent   Ambulation/Elevation   Gait Assistance 6  Modified independent   Additional items Verbal cues;Assist x 1   Assistive Device 4-wheeled walker   Distance 400'   Ambulation/Elevation Additional Comments No significant balance or safety concerns.   Balance   Static Sitting Good   Dynamic Sitting Good   Static Standing Fair +   Dynamic Standing Fair +   Ambulatory Fair +  (rollator)   Endurance Deficit   Endurance Deficit No   Activity Tolerance   Activity Tolerance Patient tolerated treatment well   Assessment   Problem List Decreased cognition   Assessment Pt exhibits physical deficits noted in problem list above.  Deficits listed contribute to functional limitations that are significant from the patient PLOF and include: fall risk    Additional considerations affecting pt's discharge planning: Progressive cognitive decline affecting safety awareness/insight    During today's session, pt did not seem to have any difficulty with  bed mobility, transfers, or ambulating with a rollator community distance.  Pt appears at or close to her baseline function and seems capable of managing basic mobility and self care at home.  Pt has met her goals and no longer requires acute inpatient PT services.     The AM-PAC Mobility Score was used to assist in determining pt safety w/ mobility/self care & appropriate d/c recommendations, see above for scores. Patient's clinical presentation is evolving due to altered mental status and complicated social/support system.     From a PT/mobility standpoint given the above findings, DC recommendation is level: No Post Acute Rehabilitation Needs   Discharge Recommendation   Rehab Resource Intensity Level, PT No post-acute rehabilitation needs   -[facility] Basic Mobility Inpatient   Turning in Flat Bed Without Bedrails 4   Lying on Back to Sitting on Edge of Flat Bed Without Bedrails 4   Moving Bed to Chair 4   Standing Up From Chair Using Arms 4   Walk in Room 4   Climb 3-5 Stairs With Railing 3   Basic Mobility Inpatient Raw Score 23   Basic Mobility Standardized Score 50.88   [facility] Highest Level Of Mobility   -[facility] Goal 7: Walk 25 feet or more   [facility]M Achieved 8: Walk 250 feet ot more   Education   Education Provided Mobility training;Assistive device  (Educated and instructed pt on parts and use of rollator.)   End of Consult   Patient Position at End of Consult Bedside chair;All needs within reach;Bed/Chair alarm activated     The patient's -[facility] Basic Mobility Inpatient Short Form Raw Score is 23. A Raw score of greater than or equal to 16 suggests the patient may benefit from discharge to home. Please also refer to the recommendation of the Physical Therapist for safe discharge planning.    Jayden Andrade, PT, DPT  [place] Licensure #[id number]         [1]   Past Medical History:  Diagnosis Date    Ambulatory dysfunction     B12 deficiency     CKD (chronic kidney disease), stage III (HCC)     Depression      EP (epilepsy) (HCC)     Epilepsy (HCC)     History of sinus tachycardia     Hypertension     Morbid obesity (HCC)     Obesity     Restless leg     Restless leg syndrome     Vitamin D deficiency    [2]   Past Surgical History:  Procedure Laterality Date    DENTAL SURGERY      all teeth removed    DENTAL SURGERY      LASER ABLATION OF THE CERVIX      MAMMO (HISTORICAL)  05/01/2017

## 2025-05-21 NOTE — PROGRESS NOTES
Progress Note - Hospitalist   Name: Cydney Lim 64 y.o. female I MRN: [id number]  Unit/Bed#: -01 I Date of Admission: 5/18/2025   Date of Service: 5/21/2025 I Hospital Day: 2    Assessment & Plan  Syncope and collapse  POA with complaints of dizziness every since the psych doctor changed medications on Wednesday.  She was seen earlier in the [facility] ED, workup was negative and set home.  She appeared back to the [facility] ED tonight due to passing out and hitting head.  No focal deficits noted, patient states she is still dizzy, denies any headaches chest pain or palpitations.  She denies any incontinence of bowel or urine.  No tongue biting.  + LOC. She does have complaints of urinary burning and frequency. Denies CVA tenderness  Suspect syncope in setting of 2/2 to seizure vs furosemide usage  Psych consulted appreciate recommendations  Neurology consulted  Continue Keppra and Tegretol  Now medically clear awaiting possible placement  Stage 3 chronic kidney disease (HCC)  Lab Results   Component Value Date    EGFR 55 05/21/2025    EGFR 58 05/20/2025    EGFR 49 05/19/2025    CREATININE 1.05 05/21/2025    CREATININE 1.00 05/20/2025    CREATININE 1.15 05/19/2025   POA with Cr level 1.35 at baseline baseline 1.4-1.5  Appears euvolemic    Epilepsy with altered consciousness with intractable epilepsy (HCC)  Rapid response called this morning as patient was found by MA seizing with shaking/movements of right arm  Mentation returned back to baseline by the time I evaluated patient  Will give IV keppra 1500 mg x 1, continue home keppra 1500 mg bid and home tegretol  Per neurology can consider uptitrating Keppra to 1750 if any further seizure episodes  Tobacco abuse  Smoking  cessation  Nicotine patch  Essential hypertension  Home med metoprolol continued  BP within normal limits  Falls frequently  POA with recent fall  PT/OT consulted  Fall precautions  Severe episode of recurrent major depressive disorder, with psychotic  features (HCC)  Denies SI/HI  Home med Lexapro, gabapentin, Abilify, Trazodone continued    psych consulted appreciate recs  No need for inpatient psych  Chronic obstructive pulmonary disease, unspecified COPD type (HCC)  Not in acute exacerbation  Home inhaler continued  Polypharmacy  Noted  Depressive disorder  Continue Lexapro and Abilify    VTE Pharmacologic Prophylaxis:   Moderate Risk (Score 3-4) - Pharmacological DVT Prophylaxis Ordered: heparin.    Mobility:   Basic Mobility Inpatient Raw Score: 16  J[facility]-HLM Goal: 5: Stand one or more mins  J[facility]-HLM Achieved: 4: Move to chair/commode  J[facility]-HLM Goal achieved. Continue to encourage appropriate mobility.    Patient Centered Rounds: I performed bedside rounds with nursing staff today.   Discussions with Specialists or Other Care Team Provider: cm, nursing    Education and Discussions with Family / Patient: Patient declined call to .     Current Length of Stay: 2 day(s)  Current Patient Status: Inpatient   Certification Statement: The patient will continue to require additional inpatient hospital stay due to see below  Discharge Plan: Medically clear awaiting placement    Code Status: Level 1 - Full Code    Subjective   Denies chest pain, shortness of breath, cough or fevers, chills     Objective :  Temp:  [97.6 °F (36.4 °C)-100.2 °F (37.9 °C)] 98.4 °F (36.9 °C)  HR:  [58-76] 58  BP: (129-167)/(62-89) 129/63  Resp:  [16-18] 18  SpO2:  [92 %-96 %] 92 %  O2 Device: None (Room air)    Body mass index is 40.92 kg/m².     Input and Output Summary (last 24 hours):     Intake/Output Summary (Last 24 hours) at 5/21/2025 0832  Last data filed at 5/21/2025 0439  Gross per 24 hour   Intake 787 ml   Output 2420 ml   Net -1633 ml       Physical Exam  Constitutional:       General: She is not in acute distress.     Appearance: She is well-developed. She is not diaphoretic.   HENT:      Head: Normocephalic and atraumatic.      Nose: Nose normal.      Mouth/Throat:       Pharynx: No oropharyngeal exudate.     Eyes:      General: No scleral icterus.        Right eye: No discharge.         Left eye: No discharge.      Conjunctiva/sclera: Conjunctivae normal.     Neck:      Thyroid: No thyromegaly.      Vascular: No JVD.     Cardiovascular:      Rate and Rhythm: Normal rate and regular rhythm.      Heart sounds: Normal heart sounds. No murmur heard.     No friction rub. No gallop.   Pulmonary:      Effort: Pulmonary effort is normal. No respiratory distress.      Breath sounds: Normal breath sounds. No wheezing or rales.   Chest:      Chest wall: No tenderness.   Abdominal:      General: Bowel sounds are normal. There is no distension.      Palpations: Abdomen is soft.      Tenderness: There is no abdominal tenderness. There is no guarding or rebound.     Musculoskeletal:         General: No tenderness or deformity. Normal range of motion.      Cervical back: Normal range of motion and neck supple.     Skin:     General: Skin is warm and dry.      Findings: No erythema or rash.     Neurological:      Mental Status: She is alert. Mental status is at baseline.      Cranial Nerves: No cranial nerve deficit.      Sensory: No sensory deficit.      Motor: No abnormal muscle tone.      Coordination: Coordination normal.           Lines/Drains:              Lab Results: I have reviewed the following results:   Results from last 7 days   Lab Units 05/21/25  0510 05/20/25 0449 05/19/25 0445   WBC Thousand/uL 8.29   < > 14.26*   HEMOGLOBIN g/dL 9.6*   < > 10.3*   HEMATOCRIT % 30.1*   < > 32.3*   PLATELETS Thousands/uL 291   < > 342   SEGS PCT %  --   --  69   LYMPHO PCT %  --   --  22   MONO PCT %  --   --  7   EOS PCT %  --   --  1    < > = values in this interval not displayed.     Results from last 7 days   Lab Units 05/21/25  0510 05/20/25 0449 05/19/25  0445   SODIUM mmol/L 138   < > 141   POTASSIUM mmol/L 3.6   < > 3.6   CHLORIDE mmol/L 105   < > 107   CO2 mmol/L 23   < > 24   BUN mg/dL  21   < > 22   CREATININE mg/dL 1.05   < > 1.15   ANION GAP mmol/L 10   < > 10   CALCIUM mg/dL 8.6   < > 9.0   ALBUMIN g/dL 3.5   < > 3.8   TOTAL BILIRUBIN mg/dL  --   --  0.36   ALK PHOS U/L  --   --  84   ALT U/L  --   --  8   AST U/L  --   --  14   GLUCOSE RANDOM mg/dL 86   < > 100    < > = values in this interval not displayed.         Results from last 7 days   Lab Units 05/19/25  0854   POC GLUCOSE mg/dl 147*         Results from last 7 days   Lab Units 05/19/25  0100   LACTIC ACID mmol/L 0.8   PROCALCITONIN ng/ml 0.06       Recent Cultures (last 7 days):         Imaging Results Review: I personally reviewed the following image studies/reports in PACS and discussed pertinent findings with Radiology: chest xray. My interpretation of the radiology images/reports is:  .  Other Study Results Review: EKG was reviewed.     Last 24 Hours Medication List:     Current Facility-Administered Medications:     acetaminophen (TYLENOL) tablet 650 mg, Q6H PRN    albuterol (PROVENTIL HFA,VENTOLIN HFA) inhaler 2 puff, Q6H PRN    ARIPiprazole (ABILIFY) tablet 5 mg, Daily    carBAMazepine (TEGretol) tablet 200 mg, TID    escitalopram (LEXAPRO) tablet 10 mg, Daily    ferrous sulfate tablet 325 mg, Daily With Breakfast    gabapentin (NEURONTIN) capsule 300 mg, Daily    heparin (porcine) subcutaneous injection 5,000 Units, Q8H [place] **AND** [COMPLETED] Platelet count, Once    levETIRAcetam (KEPPRA) tablet 1,500 mg, Q12H [place]    LORazepam (ATIVAN) injection 2 mg, Q4H PRN    metoprolol succinate (TOPROL-XL) 24 hr tablet 50 mg, Daily    nicotine (NICODERM CQ) 21 mg/24 hr TD 24 hr patch 1 patch, Daily    Administrative Statements   Today, Patient Was Seen By: Jordin Barnhart MD  I have spent a total time of 30 minutes in caring for this patient on the day of the visit/encounter including Diagnostic results.    **Please Note: This note may have been constructed using a voice recognition system.**

## 2025-05-21 NOTE — TELEPHONE ENCOUNTER
One week follow up call for New Patient appointment with Elaine Gan DO on 06/03/2025 was made on 05/21/2025. Writer was not able to inform patient of New Patient paperwork needing to be completed 5 days prior to the appointment. Writer was not able to confirm that paperwork has been sent via [employer].    Appointment was made on: 05/14/2025

## 2025-05-21 NOTE — ASSESSMENT & PLAN NOTE
Lab Results   Component Value Date    EGFR 55 05/21/2025    EGFR 58 05/20/2025    EGFR 49 05/19/2025    CREATININE 1.05 05/21/2025    CREATININE 1.00 05/20/2025    CREATININE 1.15 05/19/2025   POA with Cr level 1.35 at baseline baseline 1.4-1.5  Appears euvolemic

## 2025-05-21 NOTE — PLAN OF CARE
Problem: PAIN - ADULT  Goal: Verbalizes/displays adequate comfort level or baseline comfort level  Description: Interventions:  - Encourage patient to monitor pain and request assistance  - Assess pain using appropriate pain scale  - Administer analgesics as ordered based on type and severity of pain and evaluate response  - Implement non-pharmacological measures as appropriate and evaluate response  - Consider cultural and social influences on pain and pain management  - Notify physician/advanced practitioner if interventions unsuccessful or patient reports new pain  - Educate patient/family on pain management process including their role and importance of  reporting pain   - Provide non-pharmacologic/complimentary pain relief interventions  Outcome: Progressing     Problem: INFECTION - ADULT  Goal: Absence or prevention of progression during hospitalization  Description: INTERVENTIONS:  - Assess and monitor for signs and symptoms of infection  - Monitor lab/diagnostic results  - Monitor all insertion sites, i.e. indwelling lines, tubes, and drains  - Monitor endotracheal if appropriate and nasal secretions for changes in amount and color  - [place] appropriate cooling/warming therapies per order  - Administer medications as ordered  - Instruct and encourage patient and family to use good hand hygiene technique  - Identify and instruct in appropriate isolation precautions for identified infection/condition  Outcome: Progressing     Problem: INFECTION - ADULT  Goal: Absence of fever/infection during neutropenic period  Description: INTERVENTIONS:  - Monitor WBC  - Perform strict hand hygiene  - Limit to healthy visitors only  - No plants, dried, fresh or silk flowers with reina in patient room  Outcome: Progressing     Problem: SAFETY ADULT  Goal: Patient will remain free of falls  Description: INTERVENTIONS:  - Educate patient/family on patient safety including physical limitations  - Instruct patient to call  for assistance with activity   - Consider consulting OT/PT to assist with strengthening/mobility based on AM PAC & JH-HLM score  - Consult OT/PT to assist with strengthening/mobility   - Keep Call bell within reach  - Keep bed low and locked with side rails adjusted as appropriate  - Keep care items and personal belongings within reach  - Initiate and maintain comfort rounds  - Make Fall Risk Sign visible to staff  - Offer Toileting every 2 Hours, in advance of need  - Initiate/Maintain Chair alarm  - Obtain necessary fall risk management equipment:   - Apply yellow socks and bracelet for high fall risk patients  - Consider moving patient to room near nurses station  Outcome: Progressing

## 2025-05-21 NOTE — ASSESSMENT & PLAN NOTE
POA with complaints of dizziness every since the psych doctor changed medications on Wednesday.  She was seen earlier in the [facility] ED, workup was negative and set home.  She appeared back to the [facility] ED tonight due to passing out and hitting head.  No focal deficits noted, patient states she is still dizzy, denies any headaches chest pain or palpitations.  She denies any incontinence of bowel or urine.  No tongue biting.  + LOC. She does have complaints of urinary burning and frequency. Denies CVA tenderness  Suspect syncope in setting of 2/2 to seizure vs furosemide usage  Psych consulted appreciate recommendations  Neurology consulted  Continue Keppra and Tegretol  Now medically clear awaiting possible placement

## 2025-05-21 NOTE — CASE MANAGEMENT
Case Management Progress Note    Patient name Cydney Lim  Location /-01 MRN [id number]  : 1960 Date 2025       LOS (days): 2  Geometric Mean LOS (GMLOS) (days): 2.7  Days to GMLOS:0.7        OBJECTIVE:    Current admission status: Inpatient  Preferred Pharmacy:   [facility]/pharmacy #[id number] - [place], [place] - [address]  [address] [address]  Phone: [phone] Fax: [phone]    [facility] #[id number] [place], [place] - [address]  [address] [address]  Phone: [phone] Fax: [phone]    [facility]   [address] [address]  [place] [address]  Phone: [phone] Fax: [phone]    Primary Care Provider: DRU Knott    Primary Insurance: MEDICARE  Secondary Insurance:  FOR LIFE    PROGRESS NOTE:  PT/OT recommend STR for the patient at this time. The patient is agreeable. [facility] referrals sent to facilities within 25 miles, pending acceptance/patient choice. CM will continue to follow the patient through discharge.    Patient accepted at multiple STR facilities. Patient choice for [facility]. Planned d/c tomorrow 25.

## 2025-05-22 VITALS
RESPIRATION RATE: 16 BRPM | BODY MASS INDEX: 40.93 KG/M2 | HEART RATE: 80 BPM | HEIGHT: 63 IN | WEIGHT: 231 LBS | TEMPERATURE: 99.2 F | DIASTOLIC BLOOD PRESSURE: 63 MMHG | SYSTOLIC BLOOD PRESSURE: 112 MMHG | OXYGEN SATURATION: 96 %

## 2025-05-22 LAB
ALBUMIN SERPL BCG-MCNC: 3.4 G/DL (ref 3.5–5)
ANION GAP SERPL CALCULATED.3IONS-SCNC: 9 MMOL/L (ref 4–13)
BUN SERPL-MCNC: 24 MG/DL (ref 5–25)
CALCIUM ALBUM COR SERPL-MCNC: 9.1 MG/DL (ref 8.3–10.1)
CALCIUM SERPL-MCNC: 8.6 MG/DL (ref 8.4–10.2)
CHLORIDE SERPL-SCNC: 106 MMOL/L (ref 96–108)
CO2 SERPL-SCNC: 25 MMOL/L (ref 21–32)
CREAT SERPL-MCNC: 1.1 MG/DL (ref 0.6–1.3)
ERYTHROCYTE [DISTWIDTH] IN BLOOD BY AUTOMATED COUNT: 14.2 % (ref 11.6–15.1)
GFR SERPL CREATININE-BSD FRML MDRD: 52 ML/MIN/1.73SQ M
GLUCOSE SERPL-MCNC: 82 MG/DL (ref 65–140)
HCT VFR BLD AUTO: 31.7 % (ref 34.8–46.1)
HGB BLD-MCNC: 9.9 G/DL (ref 11.5–15.4)
MAGNESIUM SERPL-MCNC: 2.1 MG/DL (ref 1.9–2.7)
MCH RBC QN AUTO: 30.6 PG (ref 26.8–34.3)
MCHC RBC AUTO-ENTMCNC: 31.2 G/DL (ref 31.4–37.4)
MCV RBC AUTO: 98 FL (ref 82–98)
PHOSPHATE SERPL-MCNC: 4.7 MG/DL (ref 2.3–4.1)
PLATELET # BLD AUTO: 252 THOUSANDS/UL (ref 149–390)
PMV BLD AUTO: 11.9 FL (ref 8.9–12.7)
POTASSIUM SERPL-SCNC: 3.8 MMOL/L (ref 3.5–5.3)
RBC # BLD AUTO: 3.24 MILLION/UL (ref 3.81–5.12)
SODIUM SERPL-SCNC: 140 MMOL/L (ref 135–147)
WBC # BLD AUTO: 9.86 THOUSAND/UL (ref 4.31–10.16)

## 2025-05-22 PROCEDURE — 99239 HOSP IP/OBS DSCHRG MGMT >30: CPT | Performed by: INTERNAL MEDICINE

## 2025-05-22 PROCEDURE — 83735 ASSAY OF MAGNESIUM: CPT | Performed by: STUDENT IN AN ORGANIZED HEALTH CARE EDUCATION/TRAINING PROGRAM

## 2025-05-22 PROCEDURE — 80069 RENAL FUNCTION PANEL: CPT | Performed by: INTERNAL MEDICINE

## 2025-05-22 PROCEDURE — 85027 COMPLETE CBC AUTOMATED: CPT | Performed by: INTERNAL MEDICINE

## 2025-05-22 RX ADMIN — FERROUS SULFATE TAB 325 MG (65 MG ELEMENTAL FE) 325 MG: 325 (65 FE) TAB at 09:02

## 2025-05-22 RX ADMIN — GABAPENTIN 300 MG: 300 CAPSULE ORAL at 08:52

## 2025-05-22 RX ADMIN — ARIPIPRAZOLE 5 MG: 5 TABLET ORAL at 08:53

## 2025-05-22 RX ADMIN — ESCITALOPRAM OXALATE 10 MG: 10 TABLET ORAL at 08:53

## 2025-05-22 RX ADMIN — NICOTINE 1 PATCH: 21 PATCH, EXTENDED RELEASE TRANSDERMAL at 08:56

## 2025-05-22 RX ADMIN — CARBAMAZEPINE 200 MG: 200 TABLET ORAL at 08:52

## 2025-05-22 RX ADMIN — METOPROLOL SUCCINATE 50 MG: 50 TABLET, EXTENDED RELEASE ORAL at 08:52

## 2025-05-22 RX ADMIN — HEPARIN SODIUM 5000 UNITS: 5000 INJECTION INTRAVENOUS; SUBCUTANEOUS at 06:12

## 2025-05-22 RX ADMIN — LEVETIRACETAM 1500 MG: 500 TABLET, FILM COATED ORAL at 08:53
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
## 2025-05-22 NOTE — PLAN OF CARE
Problem: Knowledge Deficit  Goal: Patient/family/caregiver demonstrates understanding of disease process, treatment plan, medications, and discharge instructions  Description: Complete learning assessment and assess knowledge base.  Interventions:  - Provide teaching at level of understanding  - Provide teaching via preferred learning methods  Outcome: Progressing     Problem: NEUROSENSORY - ADULT  Goal: Achieves stable or improved neurological status  Description: INTERVENTIONS  - Monitor and report changes in neurological status  - Monitor vital signs such as temperature, blood pressure, glucose, and any other labs ordered   - Initiate measures to prevent increased intracranial pressure  - Monitor for seizure activity and implement precautions if appropriate      Outcome: Progressing     Problem: CARDIOVASCULAR - ADULT  Goal: Maintains optimal cardiac output and hemodynamic stability  Description: INTERVENTIONS:  - Monitor I/O, vital signs and rhythm  - Monitor for S/S and trends of decreased cardiac output  - Administer and titrate ordered vasoactive medications to optimize hemodynamic stability  - Assess quality of pulses, skin color and temperature  - Assess for signs of decreased coronary artery perfusion  - Instruct patient to report change in severity of symptoms  Outcome: Progressing

## 2025-05-22 NOTE — ASSESSMENT & PLAN NOTE
Lab Results   Component Value Date    EGFR 52 05/22/2025    EGFR 55 05/21/2025    EGFR 58 05/20/2025    CREATININE 1.10 05/22/2025    CREATININE 1.05 05/21/2025    CREATININE 1.00 05/20/2025   POA with Cr level 1.35 at baseline baseline 1.4-1.5  Appears euvolemic

## 2025-05-22 NOTE — PLAN OF CARE
Problem: PAIN - ADULT  Goal: Verbalizes/displays adequate comfort level or baseline comfort level  Description: Interventions:  - Encourage patient to monitor pain and request assistance  - Assess pain using appropriate pain scale  - Administer analgesics as ordered based on type and severity of pain and evaluate response  - Implement non-pharmacological measures as appropriate and evaluate response  - Consider cultural and social influences on pain and pain management  - Notify physician/advanced practitioner if interventions unsuccessful or patient reports new pain  - Educate patient/family on pain management process including their role and importance of  reporting pain   - Provide non-pharmacologic/complimentary pain relief interventions  Outcome: Progressing     Problem: INFECTION - ADULT  Goal: Absence or prevention of progression during hospitalization  Description: INTERVENTIONS:  - Assess and monitor for signs and symptoms of infection  - Monitor lab/diagnostic results  - Monitor all insertion sites, i.e. indwelling lines, tubes, and drains  - Monitor endotracheal if appropriate and nasal secretions for changes in amount and color  - [place] appropriate cooling/warming therapies per order  - Administer medications as ordered  - Instruct and encourage patient and family to use good hand hygiene technique  - Identify and instruct in appropriate isolation precautions for identified infection/condition  Outcome: Progressing     Problem: DISCHARGE PLANNING  Goal: Discharge to home or other facility with appropriate resources  Description: INTERVENTIONS:  - Identify barriers to discharge w/patient and caregiver  - Arrange for needed discharge resources and transportation as appropriate  - Identify discharge learning needs (meds, wound care, etc.)  - Arrange for interpretive services to assist at discharge as needed  - Refer to Case Management Department for coordinating discharge planning if the patient needs  post-hospital services based on physician/advanced practitioner order or complex needs related to functional status, cognitive ability, or social support system  Outcome: Progressing     Problem: Knowledge Deficit  Goal: Patient/family/caregiver demonstrates understanding of disease process, treatment plan, medications, and discharge instructions  Description: Complete learning assessment and assess knowledge base.  Interventions:  - Provide teaching at level of understanding  - Provide teaching via preferred learning methods  Outcome: Progressing     Problem: NEUROSENSORY - ADULT  Goal: Achieves stable or improved neurological status  Description: INTERVENTIONS  - Monitor and report changes in neurological status  - Monitor vital signs such as temperature, blood pressure, glucose, and any other labs ordered   - Initiate measures to prevent increased intracranial pressure  - Monitor for seizure activity and implement precautions if appropriate      Outcome: Progressing     Problem: CARDIOVASCULAR - ADULT  Goal: Maintains optimal cardiac output and hemodynamic stability  Description: INTERVENTIONS:  - Monitor I/O, vital signs and rhythm  - Monitor for S/S and trends of decreased cardiac output  - Administer and titrate ordered vasoactive medications to optimize hemodynamic stability  - Assess quality of pulses, skin color and temperature  - Assess for signs of decreased coronary artery perfusion  - Instruct patient to report change in severity of symptoms  Outcome: Progressing

## 2025-05-22 NOTE — ASSESSMENT & PLAN NOTE
POA with complaints of dizziness every since the psych doctor changed medications on Wednesday.  She was seen earlier in the [facility] ED, workup was negative and set home.  She appeared back to the [facility] ED tonight due to passing out and hitting head.  No focal deficits noted, patient states she is still dizzy, denies any headaches chest pain or palpitations.  She denies any incontinence of bowel or urine.  No tongue biting.  + LOC. She does have complaints of urinary burning and frequency. Denies CVA tenderness  Suspect syncope in setting of 2/2 to seizure vs furosemide usage  Psych consulted appreciate recommendations  Neurology consulted  Continue Keppra and Tegretol  Now medically clear plan to discharge to rehab

## 2025-05-22 NOTE — DISCHARGE SUMMARY
Discharge Summary - Hospitalist   Name: Cydney Lim 64 y.o. female I MRN: [id number]  Unit/Bed#: -01 I Date of Admission: 5/18/2025   Date of Service: 5/22/2025 I Hospital Day: 3     Assessment & Plan  Syncope and collapse  POA with complaints of dizziness every since the psych doctor changed medications on Wednesday.  She was seen earlier in the [facility] ED, workup was negative and set home.  She appeared back to the [facility] ED tonight due to passing out and hitting head.  No focal deficits noted, patient states she is still dizzy, denies any headaches chest pain or palpitations.  She denies any incontinence of bowel or urine.  No tongue biting.  + LOC. She does have complaints of urinary burning and frequency. Denies CVA tenderness  Suspect syncope in setting of 2/2 to seizure vs furosemide usage  Psych consulted appreciate recommendations  Neurology consulted  Continue Keppra and Tegretol  Now medically clear plan to discharge to rehab  Stage 3 chronic kidney disease (HCC)  Lab Results   Component Value Date    EGFR 52 05/22/2025    EGFR 55 05/21/2025    EGFR 58 05/20/2025    CREATININE 1.10 05/22/2025    CREATININE 1.05 05/21/2025    CREATININE 1.00 05/20/2025   POA with Cr level 1.35 at baseline baseline 1.4-1.5  Appears euvolemic    Epilepsy with altered consciousness with intractable epilepsy (HCC)  Rapid response called this morning as patient was found by MA seizing with shaking/movements of right arm  Mentation returned back to baseline by the time I evaluated patient  Will give IV keppra 1500 mg x 1, continue home keppra 1500 mg bid and home tegretol  Per neurology can consider uptitrating Keppra to 1750 if any further seizure episodes  Tobacco abuse  Smoking  cessation  Nicotine patch  Essential hypertension  Home med metoprolol continued  BP within normal limits  Falls frequently  POA with recent fall  PT/OT consulted  Fall precautions  Severe episode of recurrent major depressive disorder, with  psychotic features (HCC)  Denies SI/HI  Home med Lexapro, gabapentin, Abilify, Trazodone continued    psych consulted appreciate recs  No need for inpatient psych  Chronic obstructive pulmonary disease, unspecified COPD type (HCC)  Not in acute exacerbation  Home inhaler continued  Polypharmacy  Noted  Depressive disorder  Continue Lexapro and Abilify   Discharging Physician / Practitioner: Jordin Barnhart MD  PCP: DRU Knott  Admission Date:   Admission Orders (From admission, onward)       Ordered        05/19/25 0916  INPATIENT ADMISSION  Once            05/18/25 2108  Place in Observation  Once                          Discharge Date: 05/22/25    Medical Problems       Resolved Problems  Date Reviewed: 5/21/2025   None         Consultations During Hospital Stay:  neurology    Procedures Performed:   none    Significant Findings / Test Results:   MRI brain w wo contrast  Result Date: 5/20/2025  Impression: 1.  Increased FLAIR signal in the left hippocampus with intrinsic architectural distortion/blurring and mild volume loss suggesting mesial temporal sclerosis. Correlate with EEG findings. 2.  Mild chronic microangiopathic change. 3.  Redemonstrated chronic complete opacification of the atelectatic right maxillary sinus suggesting silent sinus syndrome. Workstation performed: [id number]     CT head without contrast  Result Date: 5/18/2025  Impression: No acute intracranial abnormality. Workstation performed: [id number]      Incidental Findings:   none     Test Results Pending at Discharge (will require follow up):   none     Outpatient Tests Requested:  none    Complications:  none    Reason for Admission: Syncope    Hospital Course:     Cydney Lim is a 64 y.o. female patient who originally presented to the hospital on 5/18/2025 with past medical history significant of CKD, epilepsy who was noted to have seizure was treated with Keppra with resolution.  Was evaluated by PT/OT recommended rehab  "ultimately discharged to rehab facility  Will follow-up outpatient with primary care doctor      Please see above list of diagnoses and related plan for additional information.     Condition at Discharge: stable     Discharge Day Visit / Exam:     Subjective:   Denies chest pain, shortness breath, cough or fevers, chills  Vitals: Blood Pressure: 112/63 (05/22/25 0816)  Pulse: 80 (05/22/25 0816)  Temperature: 99.2 °F (37.3 °C) (05/22/25 0816)  Temp Source: Oral (05/22/25 0816)  Respirations: 16 (05/22/25 0816)  Height: 5' 3\" (160 cm) (05/18/25 2302)  Weight - Scale: 105 kg (231 lb) (05/22/25 0600)  SpO2: 96 % (05/22/25 0816)  Exam:   Physical Exam  Constitutional:       General: She is not in acute distress.     Appearance: She is well-developed. She is not diaphoretic.   HENT:      Head: Normocephalic and atraumatic.      Nose: Nose normal.      Mouth/Throat:      Pharynx: No oropharyngeal exudate.     Eyes:      General: No scleral icterus.        Right eye: No discharge.         Left eye: No discharge.      Conjunctiva/sclera: Conjunctivae normal.     Neck:      Thyroid: No thyromegaly.      Vascular: No JVD.     Cardiovascular:      Rate and Rhythm: Normal rate and regular rhythm.      Heart sounds: Normal heart sounds. No murmur heard.     No friction rub. No gallop.   Pulmonary:      Effort: Pulmonary effort is normal. No respiratory distress.      Breath sounds: Normal breath sounds. No wheezing or rales.   Chest:      Chest wall: No tenderness.   Abdominal:      General: Bowel sounds are normal. There is no distension.      Palpations: Abdomen is soft.      Tenderness: There is no abdominal tenderness. There is no guarding or rebound.     Musculoskeletal:         General: No tenderness or deformity. Normal range of motion.      Cervical back: Normal range of motion and neck supple.     Skin:     General: Skin is warm and dry.      Findings: No erythema or rash.     Neurological:      Mental Status: She is " alert. Mental status is at baseline.      Cranial Nerves: No cranial nerve deficit.      Sensory: No sensory deficit.      Motor: No abnormal muscle tone.      Coordination: Coordination normal.       (   Discussion with Family: pt, declined family update    Discharge instructions/Information to patient and family:   See after visit summary for information provided to patient and family.      Provisions for Follow-Up Care:  See after visit summary for information related to follow-up care and any pertinent home health orders.      Disposition:     Acute Rehab at      For Discharges to [facility]:   Not Applicable to this Patient - Not Applicable to this Patient    Planned Readmission: none     Discharge Statement:  I spent 60 minutes discharging the patient. This time was spent on the day of discharge. I had direct contact with the patient on the day of discharge. Greater than 50% of the total time was spent examining patient, answering all patient questions, arranging and discussing plan of care with patient as well as directly providing post-discharge instructions.  Additional time then spent on discharge activities.    Discharge Medications:  See after visit summary for reconciled discharge medications provided to patient and family.      ** Please Note: This note has been constructed using a voice recognition system **

## 2025-05-29 ENCOUNTER — TELEPHONE (OUTPATIENT)
Age: 65
End: 2025-05-29
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
## 2025-06-03 ENCOUNTER — TELEPHONE (OUTPATIENT)
Age: 65
End: 2025-06-03

## 2025-06-03 NOTE — TELEPHONE ENCOUNTER
Mireille from [facility] called requesting verbal order for skilled nursing, physical therapy and occupational therapy starting tomorrow.     Please review and call Mireille back at .

## 2025-06-13 ENCOUNTER — OFFICE VISIT (OUTPATIENT)
Dept: FAMILY MEDICINE CLINIC | Facility: CLINIC | Age: 65
End: 2025-06-13
Payer: MEDICARE

## 2025-06-13 VITALS
OXYGEN SATURATION: 97 % | SYSTOLIC BLOOD PRESSURE: 130 MMHG | RESPIRATION RATE: 19 BRPM | TEMPERATURE: 97.2 F | HEART RATE: 88 BPM | DIASTOLIC BLOOD PRESSURE: 80 MMHG | WEIGHT: 235 LBS | HEIGHT: 63 IN | BODY MASS INDEX: 41.64 KG/M2

## 2025-06-13 DIAGNOSIS — Z76.89 ENCOUNTER TO ESTABLISH CARE: Primary | ICD-10-CM

## 2025-06-13 DIAGNOSIS — I13.0 BENIGN HYPERTENSIVE HEART AND CKD, STAGE 3 (GFR 30-59), W CHF (HCC): ICD-10-CM

## 2025-06-13 DIAGNOSIS — G25.81 RESTLESS LEG SYNDROME: ICD-10-CM

## 2025-06-13 DIAGNOSIS — J44.9 CHRONIC OBSTRUCTIVE PULMONARY DISEASE, UNSPECIFIED COPD TYPE (HCC): ICD-10-CM

## 2025-06-13 DIAGNOSIS — Z72.0 TOBACCO ABUSE: ICD-10-CM

## 2025-06-13 DIAGNOSIS — E55.9 VITAMIN D DEFICIENCY: ICD-10-CM

## 2025-06-13 DIAGNOSIS — N18.30 BENIGN HYPERTENSIVE HEART AND CKD, STAGE 3 (GFR 30-59), W CHF (HCC): ICD-10-CM

## 2025-06-13 DIAGNOSIS — F33.3 SEVERE EPISODE OF RECURRENT MAJOR DEPRESSIVE DISORDER, WITH PSYCHOTIC FEATURES (HCC): ICD-10-CM

## 2025-06-13 PROCEDURE — G2211 COMPLEX E/M VISIT ADD ON: HCPCS | Performed by: STUDENT IN AN ORGANIZED HEALTH CARE EDUCATION/TRAINING PROGRAM

## 2025-06-13 PROCEDURE — 99214 OFFICE O/P EST MOD 30 MIN: CPT | Performed by: STUDENT IN AN ORGANIZED HEALTH CARE EDUCATION/TRAINING PROGRAM

## 2025-06-13 RX ORDER — NICOTINE 21 MG/24HR
1 PATCH, TRANSDERMAL 24 HOURS TRANSDERMAL DAILY
Qty: 28 PATCH | Refills: 0 | Status: ON HOLD | OUTPATIENT
Start: 2025-06-13

## 2025-06-13 RX ORDER — GABAPENTIN 300 MG/1
300 CAPSULE ORAL DAILY
Status: ON HOLD | COMMUNITY
Start: 2025-06-04

## 2025-06-13 RX ORDER — ALBUTEROL SULFATE AND BUDESONIDE 90; 80 UG/1; UG/1
2 AEROSOL, METERED RESPIRATORY (INHALATION) 2 TIMES DAILY
Status: ON HOLD | COMMUNITY

## 2025-06-13 NOTE — ASSESSMENT & PLAN NOTE
Managed by psychiatrist - to follow up; continue current regimen per psychiatry  Reports was recently at a behavioral health facility for several weeks, discharged home 2 weeks ago - however pt does not recall the name of the facility and we do not have records to review

## 2025-06-13 NOTE — PROGRESS NOTES
"Name: Cydney Lim      : 1960      MRN: [id number]  Encounter Provider: Cherise Minaya DO  Encounter Date: 2025   Encounter department: [facility] [place]    Assessment & Plan  Encounter to establish care  Reviewed history in detail       Severe episode of recurrent major depressive disorder, with psychotic features (HCC)  Managed by psychiatrist - to follow up; continue current regimen per psychiatry  Reports was recently at a behavioral health facility for several weeks, discharged home 2 weeks ago - however pt does not recall the name of the facility and we do not have records to review       Restless leg syndrome  Reports was on mirapex for a long time, but was stopped by psychiatrist due to abnormal liver and kidney function. Gabapentin was initiated instead, and pt reports it is ineffective. We do not have documentation from the psychiatrist or lab results unfortunately.     Reviewed the best I can do, knowing that labs were recently abnormal, is to check a metabolic panel - then if renal and hepatic function are acceptable, we can safely resume mirapex. Patient expresses dissatisfaction with this and her pain, which I acknowledged and apologized for, and explained that it would be unsafe for me to provide the prescription without checking labs first. Patient did not seem to understand/appreciate this despite explaining at length, and ultimately demonstrated inappropriate behavior with foul language, gestures (used the middle finger to me, reports it was directed at the psychiatrist), collected her belongings and left the room displeased, continuing to use foul language in the hallway. Patient stated she \"would rather be dead\" than have the restless leg pain (denies plan of self harm), for which I advised going the the ED given patient's history and recent behavioral health admission, and patient vehemently refused stating \"I don't want their f___ help\" and left the " "office.  Orders:  •  Comprehensive metabolic panel; Future    Chronic obstructive pulmonary disease, unspecified COPD type (HCC)  Continue airspura as prescribed       Benign hypertensive heart and CKD, stage 3 (GFR 30-59), w CHF (HCC)  Wt Readings from Last 3 Encounters:   06/13/25 107 kg (235 lb)   05/22/25 105 kg (231 lb)   03/18/25 112 kg (247 lb)     Orders:  •  Comprehensive metabolic panel; Future  Pt reports being unaware of h/o CHF  Repeat labs  Advised to see cardiology as previously referred  Vitamin D deficiency  Refilled vitamin D supplement  Orders:  •  Cholecalciferol (VITAMIN D3) 1,000 units tablet; Take 2 tablets (2,000 Units total) by mouth daily    Tobacco abuse  Reviewed cessation  Refilled nicotine  Orders:  •  nicotine (NICODERM CQ) 21 mg/24 hr TD 24 hr patch; Place 1 patch on the skin daily over 24 hours           History of Present Illness     Chief Complaint   Patient presents with   • Establish Care     Follow up from visit with previous PCP     HPI    Meeting pt for the first time today, reviewed history in detail and documentation from prior PCP including most recent visit from 3/18/25    Patient reports she was recently admitted at a behavioral health facility in [place] for several weeks, discharged home 2 weeks ago - however pt does not recall the name of the facility and we do not have records to review.    Patient's main concern today is restless leg syndrome - which is chronic. Reports was on mirapex for a long time, but was stopped by psychiatrist due to abnormal liver and kidney function. Gabapentin was initiated instead, and pt reports it is ineffective. We do not have documentation from the psychiatrist or lab results unfortunately.     Denies leg injury  \"They can cut my leg off if they need to\"  Ambulates with cane    Smokes 5 cig/day    Eats 2 meals a day  B: waffles  L: scrambled eggs  D: chicken, vegetables - frozen meals    Expresses generalized discontent and " "frustration \"with the whole state [facility]. I need to get out of the state and go back to [place].\"    Review of Systems   Constitutional:  Positive for fatigue. Negative for chills and fever.   HENT:  Negative for congestion and sore throat.    Respiratory:  Negative for cough and shortness of breath.    Gastrointestinal:  Negative for abdominal pain.   Neurological:  Negative for dizziness and headaches.        Restless leg   Psychiatric/Behavioral:  Positive for sleep disturbance (not sleeping well due to restless legs).        Objective   /80 (BP Location: Left arm, Patient Position: Sitting, Cuff Size: Standard)   Pulse 88   Temp (!) 97.2 °F (36.2 °C) (Temporal)   Resp 19   Ht 5' 3\" (1.6 m)   Wt 107 kg (235 lb)   SpO2 97%   BMI 41.63 kg/m²      Physical Exam  Constitutional:       General: She is not in acute distress.     Appearance: She is obese. She is not ill-appearing.     Cardiovascular:      Rate and Rhythm: Normal rate and regular rhythm.      Heart sounds: No murmur heard.  Pulmonary:      Effort: Pulmonary effort is normal. No respiratory distress.      Breath sounds: No wheezing or rales.     Musculoskeletal:      Right lower leg: Edema (reports chronic (h/o lymphedema)) present.      Left lower leg: Edema (reports chronic (h/o lymphedema)) present.      Comments: No calf tenderness  Negative marcella sign     Neurological:      Mental Status: She is alert.      Comments: Ambulates with cane   Psychiatric:         Mood and Affect: Affect is labile and angry.         Behavior: Behavior is aggressive and combative (verbally).      Comments: Appears to have difficulty focusing/remembering aspects of recent behavioral health admission (including name of facility)       Administrative Statements   I have spent a total time of 45 minutes in caring for this patient on the day of the visit/encounter including Diagnostic results, Risks and benefits of tx options, Instructions for management, " Patient and family education, Importance of tx compliance, Impressions, Documenting in the medical record, Reviewing/placing orders in the medical record (including tests, medications, and/or procedures), and Obtaining or reviewing history  .

## 2025-06-13 NOTE — ASSESSMENT & PLAN NOTE
Reviewed cessation  Refilled nicotine  Orders:  •  nicotine (NICODERM CQ) 21 mg/24 hr TD 24 hr patch; Place 1 patch on the skin daily over 24 hours

## 2025-06-13 NOTE — Clinical Note
Ashok Lawson, I'm routing you my note from this very difficult encounter to put on your radar re: pt's behavior, general dissatisfaction.

## 2025-06-13 NOTE — ASSESSMENT & PLAN NOTE
"Reports was on mirapex for a long time, but was stopped by psychiatrist due to abnormal liver and kidney function. Gabapentin was initiated instead, and pt reports it is ineffective. We do not have documentation from the psychiatrist or lab results unfortunately.     Reviewed the best I can do, knowing that labs were recently abnormal, is to check a metabolic panel - then if renal and hepatic function are acceptable, we can safely resume mirapex. Patient expresses dissatisfaction with this and her pain, which I acknowledged and apologized for, and explained that it would be unsafe for me to provide the prescription without checking labs first. Patient did not seem to understand/appreciate this despite explaining at length, and ultimately demonstrated inappropriate behavior with foul language, gestures (used the middle finger to me, reports it was directed at the psychiatrist), collected her belongings and left the room displeased, continuing to use foul language in the hallway. Patient stated she \"would rather be dead\" than have the restless leg pain (denies plan of self harm), for which I advised going the the ED given patient's history and recent behavioral health admission, and patient vehemently refused stating \"I don't want their f___ help\" and left the office.  Orders:  •  Comprehensive metabolic panel; Future  "

## 2025-06-13 NOTE — ASSESSMENT & PLAN NOTE
Wt Readings from Last 3 Encounters:   06/13/25 107 kg (235 lb)   05/22/25 105 kg (231 lb)   03/18/25 112 kg (247 lb)     Orders:  •  Comprehensive metabolic panel; Future  Pt reports being unaware of h/o CHF  Repeat labs  Advised to see cardiology as previously referred

## 2025-06-15 ENCOUNTER — HOSPITAL ENCOUNTER (EMERGENCY)
Facility: HOSPITAL | Age: 65
Discharge: HOME | End: 2025-06-15
Attending: EMERGENCY MEDICINE | Admitting: EMERGENCY MEDICINE
Payer: MEDICARE

## 2025-06-15 ENCOUNTER — APPOINTMENT (EMERGENCY)
Dept: RADIOLOGY | Facility: HOSPITAL | Age: 65
End: 2025-06-15
Payer: MEDICARE

## 2025-06-15 VITALS
SYSTOLIC BLOOD PRESSURE: 148 MMHG | OXYGEN SATURATION: 98 % | HEART RATE: 84 BPM | DIASTOLIC BLOOD PRESSURE: 70 MMHG | RESPIRATION RATE: 18 BRPM | TEMPERATURE: 98.1 F

## 2025-06-15 DIAGNOSIS — R79.89 ELEVATED BRAIN NATRIURETIC PEPTIDE (BNP) LEVEL: ICD-10-CM

## 2025-06-15 DIAGNOSIS — R22.43 LOCALIZED SWELLING OF BOTH LOWER LEGS: Primary | ICD-10-CM

## 2025-06-15 DIAGNOSIS — N18.9 CKD (CHRONIC KIDNEY DISEASE): ICD-10-CM

## 2025-06-15 LAB
ALBUMIN SERPL BCG-MCNC: 3.8 G/DL (ref 3.5–5)
ALP SERPL-CCNC: 93 U/L (ref 34–104)
ALT SERPL W P-5'-P-CCNC: 11 U/L (ref 7–52)
ANION GAP SERPL CALCULATED.3IONS-SCNC: 9 MMOL/L (ref 4–13)
AST SERPL W P-5'-P-CCNC: 16 U/L (ref 13–39)
ATRIAL RATE: 86 BPM
BASOPHILS # BLD AUTO: 0.04 THOUSANDS/ÂΜL (ref 0–0.1)
BASOPHILS NFR BLD AUTO: 0 % (ref 0–1)
BILIRUB SERPL-MCNC: 0.27 MG/DL (ref 0.2–1)
BNP SERPL-MCNC: 145 PG/ML (ref 0–100)
BUN SERPL-MCNC: 18 MG/DL (ref 5–25)
CALCIUM SERPL-MCNC: 9.1 MG/DL (ref 8.4–10.2)
CARDIAC TROPONIN I PNL SERPL HS: 3 NG/L (ref ?–50)
CHLORIDE SERPL-SCNC: 103 MMOL/L (ref 96–108)
CO2 SERPL-SCNC: 27 MMOL/L (ref 21–32)
CREAT SERPL-MCNC: 1.14 MG/DL (ref 0.6–1.3)
EOSINOPHIL # BLD AUTO: 0.19 THOUSAND/ÂΜL (ref 0–0.61)
EOSINOPHIL NFR BLD AUTO: 2 % (ref 0–6)
ERYTHROCYTE [DISTWIDTH] IN BLOOD BY AUTOMATED COUNT: 13.8 % (ref 11.6–15.1)
GFR SERPL CREATININE-BSD FRML MDRD: 50 ML/MIN/1.73SQ M
GLUCOSE SERPL-MCNC: 122 MG/DL (ref 65–140)
HCT VFR BLD AUTO: 35.5 % (ref 34.8–46.1)
HGB BLD-MCNC: 11.3 G/DL (ref 11.5–15.4)
IMM GRANULOCYTES # BLD AUTO: 0.03 THOUSAND/UL (ref 0–0.2)
IMM GRANULOCYTES NFR BLD AUTO: 0 % (ref 0–2)
LYMPHOCYTES # BLD AUTO: 1.79 THOUSANDS/ÂΜL (ref 0.6–4.47)
LYMPHOCYTES NFR BLD AUTO: 20 % (ref 14–44)
MCH RBC QN AUTO: 31 PG (ref 26.8–34.3)
MCHC RBC AUTO-ENTMCNC: 31.8 G/DL (ref 31.4–37.4)
MCV RBC AUTO: 97 FL (ref 82–98)
MONOCYTES # BLD AUTO: 0.56 THOUSAND/ÂΜL (ref 0.17–1.22)
MONOCYTES NFR BLD AUTO: 6 % (ref 4–12)
NEUTROPHILS # BLD AUTO: 6.51 THOUSANDS/ÂΜL (ref 1.85–7.62)
NEUTS SEG NFR BLD AUTO: 72 % (ref 43–75)
NRBC BLD AUTO-RTO: 0 /100 WBCS
P AXIS: 28 DEGREES
PLATELET # BLD AUTO: 311 THOUSANDS/UL (ref 149–390)
PMV BLD AUTO: 10.6 FL (ref 8.9–12.7)
POTASSIUM SERPL-SCNC: 4.3 MMOL/L (ref 3.5–5.3)
PR INTERVAL: 134 MS
PROT SERPL-MCNC: 7.4 G/DL (ref 6.4–8.4)
QRS AXIS: 8 DEGREES
QRSD INTERVAL: 66 MS
QT INTERVAL: 392 MS
QTC INTERVAL: 469 MS
RBC # BLD AUTO: 3.65 MILLION/UL (ref 3.81–5.12)
SODIUM SERPL-SCNC: 139 MMOL/L (ref 135–147)
T WAVE AXIS: 48 DEGREES
VENTRICULAR RATE: 86 BPM
WBC # BLD AUTO: 9.12 THOUSAND/UL (ref 4.31–10.16)

## 2025-06-15 PROCEDURE — 99285 EMERGENCY DEPT VISIT HI MDM: CPT

## 2025-06-15 PROCEDURE — 80053 COMPREHEN METABOLIC PANEL: CPT | Performed by: PHYSICIAN ASSISTANT

## 2025-06-15 PROCEDURE — 84484 ASSAY OF TROPONIN QUANT: CPT | Performed by: PHYSICIAN ASSISTANT

## 2025-06-15 PROCEDURE — 93010 ELECTROCARDIOGRAM REPORT: CPT | Performed by: INTERNAL MEDICINE

## 2025-06-15 PROCEDURE — 36415 COLL VENOUS BLD VENIPUNCTURE: CPT | Performed by: PHYSICIAN ASSISTANT

## 2025-06-15 PROCEDURE — 99285 EMERGENCY DEPT VISIT HI MDM: CPT | Performed by: EMERGENCY MEDICINE

## 2025-06-15 PROCEDURE — 85025 COMPLETE CBC W/AUTO DIFF WBC: CPT | Performed by: PHYSICIAN ASSISTANT

## 2025-06-15 PROCEDURE — 71045 X-RAY EXAM CHEST 1 VIEW: CPT

## 2025-06-15 PROCEDURE — 93005 ELECTROCARDIOGRAM TRACING: CPT

## 2025-06-15 PROCEDURE — 83880 ASSAY OF NATRIURETIC PEPTIDE: CPT | Performed by: PHYSICIAN ASSISTANT

## 2025-06-15 NOTE — ED NOTES
Multiple attempts at IV with and without US. [facility] notified and attempted. MIRTHA Tamez notified and states IV not required at this time.      Yolis Hernandez RN  06/15/25 [id number]

## 2025-06-15 NOTE — ED PROVIDER NOTES
Time reflects when diagnosis was documented in both MDM as applicable and the Disposition within this note       Time User Action Codes Description Comment    6/15/2025 12:50 PM Finn Tamez Add [I83.893] Varicose veins of leg with swelling, bilateral     6/15/2025 12:50 PM Finn Tamez Remove [I83.893] Varicose veins of leg with swelling, bilateral     6/15/2025 12:50 PM Finn Tamez Add [R22.43] Localized swelling of both lower legs     6/15/2025 12:50 PM Finn Tamez Add [R79.89] Elevated brain natriuretic peptide (BNP) level     6/15/2025 12:51 PM Finn Tamez [N18.9] CKD (chronic kidney disease)           ED Disposition       ED Disposition   Discharge    Condition   Stable    Date/Time   Sun Edgardo 15, 2025 12:50 PM    Comment   Cydney Lim discharge to home/self care.                   Assessment & Plan       Medical Decision Making  Patient is a 64-year-old female with a history of CKD, epilepsy, hypertension, restless leg syndrome, with no significant past surgical history that presents to the emergency department with bilateral intermittent leg swelling over the past 2 weeks.   Patient hemodynamically stable and afebrile  No sirs; all extremities neurovascularly intact, distal pulses bounding  Bilateral lower extremities with trace pitting edema to distal tibia bilaterally, infectious, or traumatic changes  Negative Wells  ECG normal sinus rhythm, negative troponin; ; chest x-ray with no cardiomegaly, no venous congestion, no acute disease  Normal axis, normal kidney function  Normal glucose    Ddx likely and not limited to CHF exacerbation, hyponatremia, DVT, less leg syndrome, lumbar radiculopathy  Likely restless leg syndrome    Follow-up with PCP  Follow up with emergency department if symptoms persist or exacerbate  Patient demonstrates verbal understanding of all clinical laboratory and imaging findings, discharge instructions, follow-up, and verbally agrees with current treatment  plan with teach back    *Due to voice recognition software, sound alike and misspelled words may be contained in the documentation*    Amount and/or Complexity of Data Reviewed  Labs: ordered. Decision-making details documented in ED Course.  Radiology: ordered and independent interpretation performed. Decision-making details documented in ED Course.  ECG/medicine tests: ordered and independent interpretation performed. Decision-making details documented in ED Course.        ED Course as of 06/15/25 1657   Sun Edgardo 15, 2025   1212 No evidence of BETH, serum creatinine stable, no evidence of BETH; normal electrolytes   1213 No leukocytosis, no bandemia   1213 RBC(!): 3.65   1213 Hemoglobin(!): 11.3  Stable H&H   1213 WBC: 9.12       Medications - No data to display    ED Risk Strat Scores                    (ISAR) Identification of Seniors at Risk  Before the illness or injury that brought you to the Emergency, did you need someone to help you on a regular basis?: 1  In the last 24 hours, have you needed more help than usual?: 1  Have you been hospitalized for one or more nights during the past 6 months?: 1  In general, do you see well?: 0  In general, do you have serious problems with your memory?: 0  Do you take more than three different medications every day?: 1  ISAR Score: 4                  Wells' Criteria for DVT      Flowsheet Row Most Recent Value   Wells' Criteria for DVT    Active cancer Treatment or palliation within 6 months 0 Filed at: 06/15/2025 1655   Bedridden recently >3 days or major surgery within 12 weeks 0 Filed at: 06/15/2025 1655   Calf swelling >3 cm compared to the other leg 0 Filed at: 06/15/2025 1655   Entire leg swollen 0 Filed at: 06/15/2025 1655   Collateral (nonvaricose) superficial veins present 0 Filed at: 06/15/2025 1655   Localized tenderness along the deep venous system 0 Filed at: 06/15/2025 1655   Pitting edema, confined to symptomatic leg 0 Filed at: 06/15/2025 1655   Paralysis,  paresis, or recent plaster immobilization of the lower extremity 0 Filed at: 06/15/2025 1655   Previously documented DVT 0 Filed at: 06/15/2025 1655   Alternative diagnosis to DVT as likely or more likely 0 Filed at: 06/15/2025 1655   Wells DVT Critera Score 0 Filed at: 06/15/2025 1655                      History of Present Illness       Chief Complaint   Patient presents with    Leg Swelling     Pt reports leg swelling x2 weeks after starting metoprolol making it difficult to ambulate and sleep at night     Patient is a 64-year-old female with a history of CKD, epilepsy, hypertension, restless leg syndrome, with no significant past surgical history that presents to the emergency department with bilateral intermittent leg swelling over the past 2 weeks.  Patient denies recent weight gain, and denies shortness of breath symptomatology.  Patient does report intermittent bilateral leg swelling that causes her difficulty when she ambulates while patient denies ambulating with a cane, or any other ambulatory devices.  Patient denies new rash or skin changes.  Patient denies AC/AP.  Patient denies diuretic use.  Patient denies palliative and provocative factors.  Patient denies noneffective treatment.  Patient denies fevers, chills, nausea, vomiting, diarrhea, constipation and urinary symptoms.  Patient denies recent fall and recent trauma.  Patient denies sick contacts and recent travel.  Patient denies chest pain, shortness of breath, and abdominal pain.    Past Medical History[1]   Past Surgical History[2]   Family History[3]   Social History[4]   E-Cigarette/Vaping    E-Cigarette Use Never User       E-Cigarette/Vaping Substances    Nicotine Yes     THC No     CBD No     Flavoring No     Other No     Unknown No       I have reviewed and agree with the history as documented.       History provided by:  Patient   used: No        Review of Systems   Constitutional:  Negative for activity change,  appetite change, chills and fever.   HENT:  Negative for congestion, ear pain, postnasal drip, rhinorrhea, sinus pressure, sinus pain, sore throat and tinnitus.    Eyes:  Negative for photophobia, pain and visual disturbance.   Respiratory:  Negative for cough, chest tightness and shortness of breath.    Cardiovascular:  Positive for leg swelling. Negative for chest pain and palpitations.   Gastrointestinal:  Negative for abdominal pain, constipation, diarrhea, nausea and vomiting.   Genitourinary:  Negative for difficulty urinating, dysuria, flank pain, frequency, hematuria and urgency.   Musculoskeletal:  Negative for arthralgias, back pain, gait problem, neck pain and neck stiffness.   Skin:  Negative for color change, pallor and rash.   Allergic/Immunologic: Negative for environmental allergies and food allergies.   Neurological:  Negative for dizziness, seizures, syncope, weakness, numbness and headaches.   Psychiatric/Behavioral:  Negative for confusion.    All other systems reviewed and are negative.          Objective       ED Triage Vitals   Temperature Pulse Blood Pressure Respirations SpO2 Patient Position - Orthostatic VS   06/15/25 1103 06/15/25 1102 06/15/25 1105 06/15/25 1102 06/15/25 1102 06/15/25 1304   98.1 °F (36.7 °C) 90 (!) 181/81 18 100 % Lying      Temp Source Heart Rate Source BP Location FiO2 (%) Pain Score    06/15/25 1103 06/15/25 1102 06/15/25 1304 -- 06/15/25 1304    Oral Monitor Right arm  No Pain      Vitals      Date and Time Temp Pulse SpO2 Resp BP Pain Score FACES Pain Rating User   06/15/25 1304 -- 84 98 % 18 148/70 Provider aware and approves relaxed BP No Pain -- FN   06/15/25 1105 -- -- -- -- 181/81 -- -- KR   06/15/25 1103 98.1 °F (36.7 °C) -- -- -- -- -- -- KR   06/15/25 1102 -- 90 100 % 18 -- -- -- KR            Physical Exam  Vitals and nursing note reviewed.   Constitutional:       General: She is awake.      Appearance: Normal appearance. She is well-developed and normal  weight. She is not ill-appearing, toxic-appearing or diaphoretic.      Comments: BP (!) 181/81   Pulse 90   Temp 98.1 °F (36.7 °C) (Oral)   Resp 18   SpO2 100%        HENT:      Head: Normocephalic and atraumatic.      Jaw: There is normal jaw occlusion.      Right Ear: Hearing, tympanic membrane and external ear normal. No decreased hearing noted. No drainage, swelling or tenderness. No mastoid tenderness.      Left Ear: Hearing, tympanic membrane and external ear normal. No decreased hearing noted. No drainage, swelling or tenderness. No mastoid tenderness.      Nose: Nose normal.      Mouth/Throat:      Lips: Pink.      Mouth: Mucous membranes are moist.      Pharynx: Oropharynx is clear. Uvula midline.     Eyes:      General: Lids are normal. Vision grossly intact. Gaze aligned appropriately.         Right eye: No discharge.         Left eye: No discharge.      Extraocular Movements: Extraocular movements intact.      Conjunctiva/sclera: Conjunctivae normal.      Pupils: Pupils are equal, round, and reactive to light.     Neck:      Vascular: No JVD.      Trachea: Trachea and phonation normal. No tracheal tenderness or tracheal deviation.     Cardiovascular:      Rate and Rhythm: Normal rate and regular rhythm.      Pulses: Normal pulses.           Radial pulses are 2+ on the right side and 2+ on the left side.        Dorsalis pedis pulses are 2+ on the right side and 2+ on the left side.        Posterior tibial pulses are 2+ on the right side and 2+ on the left side.      Heart sounds: Normal heart sounds.   Pulmonary:      Effort: Pulmonary effort is normal.      Breath sounds: Normal breath sounds and air entry. No stridor. No decreased breath sounds, wheezing, rhonchi or rales.   Chest:      Chest wall: No tenderness.   Abdominal:      General: Abdomen is flat. Bowel sounds are normal. There is no distension.      Palpations: Abdomen is soft. Abdomen is not rigid.      Tenderness: There is no abdominal  tenderness. There is no guarding or rebound.     Musculoskeletal:         General: Normal range of motion.      Cervical back: Full passive range of motion without pain, normal range of motion and neck supple. No rigidity. No spinous process tenderness or muscular tenderness. Normal range of motion.      Right lower leg: Normal.      Left lower leg: Normal.      Comments: Passive ROM intact  Upper and lower extremity 5/5 bilaterally  Neurovascularly intact  No grinding or clicking of joints   Feet:      Right foot:      Toenail Condition: Right toenails are normal.      Left foot:      Toenail Condition: Left toenails are normal.   Lymphadenopathy:      Head:      Right side of head: No submental, submandibular, tonsillar, preauricular, posterior auricular or occipital adenopathy.      Left side of head: No submental, submandibular, tonsillar, preauricular, posterior auricular or occipital adenopathy.      Cervical: No cervical adenopathy.      Right cervical: No superficial, deep or posterior cervical adenopathy.     Left cervical: No superficial, deep or posterior cervical adenopathy.     Skin:     General: Skin is warm.      Capillary Refill: Capillary refill takes less than 2 seconds.      Findings: No rash.     Neurological:      General: No focal deficit present.      Mental Status: She is alert and oriented to person, place, and time. Mental status is at baseline.      GCS: GCS eye subscore is 4. GCS verbal subscore is 5. GCS motor subscore is 6.      Sensory: No sensory deficit.     Psychiatric:         Attention and Perception: Attention normal.         Mood and Affect: Mood normal.         Speech: Speech normal.         Behavior: Behavior normal. Behavior is cooperative.         Thought Content: Thought content normal.         Judgment: Judgment normal.         Results Reviewed       Procedure Component Value Units Date/Time    HS Troponin 0hr (reflex protocol) [[id number]]  (Normal) Collected: 06/15/25  1148    Lab Status: Final result Specimen: Blood from Arm, Right Updated: 06/15/25 1219     hs TnI 0hr 3 ng/L     B-Type Natriuretic Peptide(BNP) [[id number]]  (Abnormal) Collected: 06/15/25 1148    Lab Status: Final result Specimen: Blood from Arm, Right Updated: 06/15/25 1218      pg/mL     Comprehensive metabolic panel [[id number]] Collected: 06/15/25 1148    Lab Status: Final result Specimen: Blood from Arm, Right Updated: 06/15/25 1211     Sodium 139 mmol/L      Potassium 4.3 mmol/L      Chloride 103 mmol/L      CO2 27 mmol/L      ANION GAP 9 mmol/L      BUN 18 mg/dL      Creatinine 1.14 mg/dL      Glucose 122 mg/dL      Calcium 9.1 mg/dL      AST 16 U/L      ALT 11 U/L      Alkaline Phosphatase 93 U/L      Total Protein 7.4 g/dL      Albumin 3.8 g/dL      Total Bilirubin 0.27 mg/dL      eGFR 50 ml/min/1.73sq m     Narrative:      National Kid[employer] Foundation guidelines for Chronic Kidney Disease (CKD):     Stage 1 with normal or high GFR (GFR > 90 mL/min/1.73 square meters)    Stage 2 Mild CKD (GFR = 60-89 mL/min/1.73 square meters)    Stage 3A Moderate CKD (GFR = 45-59 mL/min/1.73 square meters)    Stage 3B Moderate CKD (GFR = 30-44 mL/min/1.73 square meters)    Stage 4 Severe CKD (GFR = 15-29 mL/min/1.73 square meters)    Stage 5 End Stage CKD (GFR <15 mL/min/1.73 square meters)  Note: GFR calculation is accurate only with a steady state creatinine    CBC and differential [[id number]]  (Abnormal) Collected: 06/15/25 1148    Lab Status: Final result Specimen: Blood from Arm, Right Updated: 06/15/25 1155     WBC 9.12 Thousand/uL      RBC 3.65 Million/uL      Hemoglobin 11.3 g/dL      Hematocrit 35.5 %      MCV 97 fL      MCH 31.0 pg      MCHC 31.8 g/dL      RDW 13.8 %      MPV 10.6 fL      Platelets 311 Thousands/uL      nRBC 0 /100 WBCs      Segmented % 72 %      Immature Grans % 0 %      Lymphocytes % 20 %      Monocytes % 6 %      Eosinophils Relative 2 %      Basophils Relative 0 %      Absolute  Neutrophils 6.51 Thousands/µL      Absolute Immature Grans 0.03 Thousand/uL      Absolute Lymphocytes 1.79 Thousands/µL      Absolute Monocytes 0.56 Thousand/µL      Eosinophils Absolute 0.19 Thousand/µL      Basophils Absolute 0.04 Thousands/µL             XR chest 1 view portable   ED Interpretation by Finn Tamez PA-C (06/15 [id number])   No acute cardiopulmonary disease on initial read; no cardiomegaly on initial read.          ECG 12 Lead Documentation Only    Date/Time: 6/15/2025 11:44 PM    Performed by: Finn Tamez PA-C  Authorized by: Finn Tamez PA-C    Indications / Diagnosis:  Bilateral lower extremity swelling  ECG reviewed by me, the ED Provider: yes    Patient location:  ED  Previous ECG:     Previous ECG:  Compared to current    Comparison ECG info:  When compared with ECG on May 18, 2025, no significant changes were noted.    Similarity:  No change    Comparison to cardiac monitor: Yes    Interpretation:     Interpretation: normal    Rate:     ECG rate:  86    ECG rate assessment: normal    Rhythm:     Rhythm: sinus rhythm    Ectopy:     Ectopy: none    QRS:     QRS axis:  Normal    QRS intervals:  Normal  Conduction:     Conduction: normal    ST segments:     ST segments:  Normal  T waves:     T waves: normal        ED Medication and Procedure Management   Prior to Admission Medications   Prescriptions Last Dose Informant Patient Reported? Taking?   ARIPiprazole (ABILIFY) 5 mg tablet 6/14/2025 Evening  Yes Yes   Sig: Take 5 mg by mouth daily   Albuterol-Budesonide (Airsupra) 90-80 MCG/ACT AERO 6/15/2025  Yes Yes   Sig: Inhale 2 puffs in the morning and 2 puffs before bedtime.   Cholecalciferol (VITAMIN D3) 1,000 units tablet Not Taking  No No   Sig: Take 2 tablets (2,000 Units total) by mouth daily   Patient not taking: Reported on 6/15/2025   albuterol (Proventil HFA) 90 mcg/act inhaler 6/15/2025 Morning Self No Yes   Sig: Inhale 2 puffs every 6 (six) hours as needed for wheezing   carBAMazepine  (TEGretol) 200 mg tablet 6/15/2025 Self No Yes   Sig: Take 1 tablet (200 mg total) by mouth 3 (three) times a day   escitalopram (LEXAPRO) 10 mg tablet 6/15/2025 Morning  No Yes   Sig: Take 1 tablet (10 mg total) by mouth daily   ferrous sulfate 325 (65 FE) MG EC tablet 6/15/2025 Morning  Yes Yes   Sig: Take 325 mg by mouth 3 (three) times a day with meals   gabapentin (NEURONTIN) 300 mg capsule 6/15/2025 Morning  Yes Yes   Sig: Take 300 mg by mouth daily   levETIRAcetam (KEPPRA) 750 mg tablet 6/15/2025 Morning Self No Yes   Sig: Take 2 tablets (1,500 mg total) by mouth 2 (two) times a day   metoprolol succinate (TOPROL-XL) 50 mg 24 hr tablet 6/15/2025  No Yes   Sig: Take 1 tablet (50 mg total) by mouth daily   nicotine (NICODERM CQ) 21 mg/24 hr TD 24 hr patch Not Taking  No No   Sig: Place 1 patch on the skin daily over 24 hours   Patient not taking: Reported on 6/15/2025      Facility-Administered Medications Last Administration Doses Remaining   cyanocobalamin injection 1,000 mcg None recorded         Discharge Medication List as of 6/15/2025 12:51 PM        CONTINUE these medications which have NOT CHANGED    Details   albuterol (Proventil HFA) 90 mcg/act inhaler Inhale 2 puffs every 6 (six) hours as needed for wheezing, Starting Wed 5/22/2024, Normal      Albuterol-Budesonide (Airsupra) 90-80 MCG/ACT AERO Inhale 2 puffs in the morning and 2 puffs before bedtime., Historical Med      ARIPiprazole (ABILIFY) 5 mg tablet Take 5 mg by mouth daily, Starting Mon 5/12/2025, Historical Med      carBAMazepine (TEGretol) 200 mg tablet Take 1 tablet (200 mg total) by mouth 3 (three) times a day, Starting Sat 8/3/2024, Normal      escitalopram (LEXAPRO) 10 mg tablet Take 1 tablet (10 mg total) by mouth daily, Starting Fri 7/19/2024, Until Sun 6/15/2025, Normal      ferrous sulfate 325 (65 FE) MG EC tablet Take 325 mg by mouth 3 (three) times a day with meals, Historical Med      gabapentin (NEURONTIN) 300 mg capsule Take  300 mg by mouth daily, Starting Wed 6/4/2025, Historical Med      levETIRAcetam (KEPPRA) 750 mg tablet Take 2 tablets (1,500 mg total) by mouth 2 (two) times a day, Starting Sat 8/3/2024, Normal      metoprolol succinate (TOPROL-XL) 50 mg 24 hr tablet Take 1 tablet (50 mg total) by mouth daily, Starting Fri 7/19/2024, Until Sun 6/15/2025, Normal      Cholecalciferol (VITAMIN D3) 1,000 units tablet Take 2 tablets (2,000 Units total) by mouth daily, Starting Fri 6/13/2025, Until Tue 8/12/2025, Normal      nicotine (NICODERM CQ) 21 mg/24 hr TD 24 hr patch Place 1 patch on the skin daily over 24 hours, Starting Fri 6/13/2025, Normal           No discharge procedures on file.  ED SEPSIS DOCUMENTATION   Time reflects when diagnosis was documented in both MDM as applicable and the Disposition within this note       Time User Action Codes Description Comment    6/15/2025 12:50 PM Finn Tamez [I83.893] Varicose veins of leg with swelling, bilateral     6/15/2025 12:50 PM Finn Tamez Remove [I83.893] Varicose veins of leg with swelling, bilateral     6/15/2025 12:50 PM Finn Tamez [R22.43] Localized swelling of both lower legs     6/15/2025 12:50 PM Finn Tamez [R79.89] Elevated brain natriuretic peptide (BNP) level     6/15/2025 12:51 PM Finn Tamez [N18.9] CKD (chronic kidney disease)                    [1]   Past Medical History:  Diagnosis Date    Ambulatory dysfunction     B12 deficiency     CKD (chronic kidney disease), stage III (HCC)     Depression     EP (epilepsy) (HCC)     Epilepsy (HCC)     History of sinus tachycardia     Hypertension     Morbid obesity (HCC)     Obesity     Restless leg     Restless leg syndrome     Vitamin D deficiency    [2]   Past Surgical History:  Procedure Laterality Date    DENTAL SURGERY      all teeth removed    DENTAL SURGERY      LASER ABLATION OF THE CERVIX      MAMMO (HISTORICAL)  05/01/2017   [3]   Family History  Problem Relation Name Age of Onset     Heart attack Mother      Kidney disease Mother      Liver disease Mother      Heart attack Father      No Known Problems Brother      No Known Problems Son     [4]   Social History  Tobacco Use    Smoking status: Every Day     Current packs/day: 1.00     Types: Cigarettes    Smokeless tobacco: Current   Vaping Use    Vaping status: Never Used   Substance Use Topics    Alcohol use: Not Currently     Comment: pt denies alcohol use    Drug use: Never        Finn Tamez PA-C  06/15/25 0469

## 2025-06-16 ENCOUNTER — VBI (OUTPATIENT)
Dept: FAMILY MEDICINE CLINIC | Facility: CLINIC | Age: 65
End: 2025-06-16

## 2025-06-17 ENCOUNTER — TELEPHONE (OUTPATIENT)
Dept: FAMILY MEDICINE CLINIC | Facility: CLINIC | Age: 65
End: 2025-06-17

## 2025-06-17 NOTE — TELEPHONE ENCOUNTER
Call transferred to me -- [facility]NA of patient is at home reviewing Rx on behalf of patient. She is curious as to who is managing pt psychiatric medications. I informed her that Dr. Minaya is not managing any of the psychiatric Rx -- advised that they reach out to provider located in [place] as this is where prescription were previously refilled per chart (Dr. Agrawal). NFA needed at this time.

## 2025-06-18 ENCOUNTER — HOSPITAL ENCOUNTER (EMERGENCY)
Facility: HOSPITAL | Age: 65
End: 2025-06-18
Attending: EMERGENCY MEDICINE
Payer: MEDICARE

## 2025-06-18 ENCOUNTER — HOSPITAL ENCOUNTER (INPATIENT)
Facility: HOSPITAL | Age: 65
LOS: 7 days | Discharge: HOME HEALTH | DRG: 885 | End: 2025-06-25
Attending: STUDENT IN AN ORGANIZED HEALTH CARE EDUCATION/TRAINING PROGRAM | Admitting: PSYCHIATRY & NEUROLOGY
Payer: MEDICARE

## 2025-06-18 VITALS
DIASTOLIC BLOOD PRESSURE: 96 MMHG | HEART RATE: 84 BPM | OXYGEN SATURATION: 97 % | RESPIRATION RATE: 18 BRPM | SYSTOLIC BLOOD PRESSURE: 179 MMHG | TEMPERATURE: 98.2 F

## 2025-06-18 DIAGNOSIS — E55.9 VITAMIN D DEFICIENCY: ICD-10-CM

## 2025-06-18 DIAGNOSIS — R45.851 SUICIDAL IDEATIONS: ICD-10-CM

## 2025-06-18 DIAGNOSIS — F51.01 PRIMARY INSOMNIA: ICD-10-CM

## 2025-06-18 DIAGNOSIS — J44.9 CHRONIC OBSTRUCTIVE PULMONARY DISEASE, UNSPECIFIED COPD TYPE (HCC): ICD-10-CM

## 2025-06-18 DIAGNOSIS — G25.81 RESTLESS LEG SYNDROME: ICD-10-CM

## 2025-06-18 DIAGNOSIS — E53.8 B12 DEFICIENCY: ICD-10-CM

## 2025-06-18 DIAGNOSIS — R45.851 SUICIDAL IDEATIONS: Primary | ICD-10-CM

## 2025-06-18 DIAGNOSIS — F33.3 MDD (MAJOR DEPRESSIVE DISORDER), RECURRENT, SEVERE, WITH PSYCHOSIS (HCC): Primary | ICD-10-CM

## 2025-06-18 LAB
ALBUMIN SERPL BCG-MCNC: 3.6 G/DL (ref 3.5–5)
ALP SERPL-CCNC: 88 U/L (ref 34–104)
ALT SERPL W P-5'-P-CCNC: 9 U/L (ref 7–52)
AMPHETAMINES SERPL QL SCN: NEGATIVE
ANION GAP SERPL CALCULATED.3IONS-SCNC: 9 MMOL/L (ref 4–13)
AST SERPL W P-5'-P-CCNC: 11 U/L (ref 13–39)
BACTERIA UR QL AUTO: ABNORMAL /HPF
BARBITURATES UR QL: NEGATIVE
BASOPHILS # BLD AUTO: 0.06 THOUSANDS/ÂΜL (ref 0–0.1)
BASOPHILS NFR BLD AUTO: 1 % (ref 0–1)
BENZODIAZ UR QL: NEGATIVE
BILIRUB SERPL-MCNC: 0.28 MG/DL (ref 0.2–1)
BILIRUB UR QL STRIP: NEGATIVE
BUN SERPL-MCNC: 17 MG/DL (ref 5–25)
CALCIUM SERPL-MCNC: 9 MG/DL (ref 8.4–10.2)
CHLORIDE SERPL-SCNC: 106 MMOL/L (ref 96–108)
CLARITY UR: CLEAR
CO2 SERPL-SCNC: 24 MMOL/L (ref 21–32)
COCAINE UR QL: NEGATIVE
COLOR UR: YELLOW
CREAT SERPL-MCNC: 1.16 MG/DL (ref 0.6–1.3)
EOSINOPHIL # BLD AUTO: 0.25 THOUSAND/ÂΜL (ref 0–0.61)
EOSINOPHIL NFR BLD AUTO: 3 % (ref 0–6)
ERYTHROCYTE [DISTWIDTH] IN BLOOD BY AUTOMATED COUNT: 13.7 % (ref 11.6–15.1)
ETHANOL EXG-MCNC: 0 MG/DL
FENTANYL UR QL SCN: NEGATIVE
GFR SERPL CREATININE-BSD FRML MDRD: 49 ML/MIN/1.73SQ M
GLUCOSE SERPL-MCNC: 90 MG/DL (ref 65–140)
GLUCOSE UR STRIP-MCNC: NEGATIVE MG/DL
HCT VFR BLD AUTO: 34.9 % (ref 34.8–46.1)
HGB BLD-MCNC: 11.2 G/DL (ref 11.5–15.4)
HGB UR QL STRIP.AUTO: ABNORMAL
HYDROCODONE UR QL SCN: NEGATIVE
IMM GRANULOCYTES # BLD AUTO: 0.04 THOUSAND/UL (ref 0–0.2)
IMM GRANULOCYTES NFR BLD AUTO: 0 % (ref 0–2)
KETONES UR STRIP-MCNC: NEGATIVE MG/DL
LEUKOCYTE ESTERASE UR QL STRIP: NEGATIVE
LYMPHOCYTES # BLD AUTO: 2.45 THOUSANDS/ÂΜL (ref 0.6–4.47)
LYMPHOCYTES NFR BLD AUTO: 27 % (ref 14–44)
MCH RBC QN AUTO: 31.3 PG (ref 26.8–34.3)
MCHC RBC AUTO-ENTMCNC: 32.1 G/DL (ref 31.4–37.4)
MCV RBC AUTO: 98 FL (ref 82–98)
METHADONE UR QL: NEGATIVE
MONOCYTES # BLD AUTO: 0.62 THOUSAND/ÂΜL (ref 0.17–1.22)
MONOCYTES NFR BLD AUTO: 7 % (ref 4–12)
NEUTROPHILS # BLD AUTO: 5.64 THOUSANDS/ÂΜL (ref 1.85–7.62)
NEUTS SEG NFR BLD AUTO: 62 % (ref 43–75)
NITRITE UR QL STRIP: POSITIVE
NON-SQ EPI CELLS URNS QL MICRO: ABNORMAL /HPF
NRBC BLD AUTO-RTO: 0 /100 WBCS
OPIATES UR QL SCN: NEGATIVE
OXYCODONE+OXYMORPHONE UR QL SCN: NEGATIVE
PCP UR QL: NEGATIVE
PH UR STRIP.AUTO: 6 [PH]
PLATELET # BLD AUTO: 336 THOUSANDS/UL (ref 149–390)
PMV BLD AUTO: 10.2 FL (ref 8.9–12.7)
POTASSIUM SERPL-SCNC: 4 MMOL/L (ref 3.5–5.3)
PROT SERPL-MCNC: 7.1 G/DL (ref 6.4–8.4)
PROT UR STRIP-MCNC: ABNORMAL MG/DL
RBC # BLD AUTO: 3.58 MILLION/UL (ref 3.81–5.12)
RBC #/AREA URNS AUTO: ABNORMAL /HPF
SODIUM SERPL-SCNC: 139 MMOL/L (ref 135–147)
SP GR UR STRIP.AUTO: 1.02 (ref 1–1.03)
THC UR QL: NEGATIVE
TSH SERPL DL<=0.05 MIU/L-ACNC: 1 UIU/ML (ref 0.45–4.5)
UROBILINOGEN UR QL STRIP.AUTO: 0.2 E.U./DL
WBC # BLD AUTO: 9.06 THOUSAND/UL (ref 4.31–10.16)
WBC #/AREA URNS AUTO: ABNORMAL /HPF

## 2025-06-18 PROCEDURE — 80307 DRUG TEST PRSMV CHEM ANLYZR: CPT | Performed by: EMERGENCY MEDICINE

## 2025-06-18 PROCEDURE — 85025 COMPLETE CBC W/AUTO DIFF WBC: CPT | Performed by: EMERGENCY MEDICINE

## 2025-06-18 PROCEDURE — 99285 EMERGENCY DEPT VISIT HI MDM: CPT | Performed by: EMERGENCY MEDICINE

## 2025-06-18 PROCEDURE — 84443 ASSAY THYROID STIM HORMONE: CPT | Performed by: EMERGENCY MEDICINE

## 2025-06-18 PROCEDURE — 82075 ASSAY OF BREATH ETHANOL: CPT | Performed by: EMERGENCY MEDICINE

## 2025-06-18 PROCEDURE — 99285 EMERGENCY DEPT VISIT HI MDM: CPT

## 2025-06-18 PROCEDURE — 93005 ELECTROCARDIOGRAM TRACING: CPT

## 2025-06-18 PROCEDURE — 36415 COLL VENOUS BLD VENIPUNCTURE: CPT | Performed by: EMERGENCY MEDICINE

## 2025-06-18 PROCEDURE — 80053 COMPREHEN METABOLIC PANEL: CPT | Performed by: EMERGENCY MEDICINE

## 2025-06-18 PROCEDURE — 81001 URINALYSIS AUTO W/SCOPE: CPT | Performed by: EMERGENCY MEDICINE

## 2025-06-18 RX ORDER — TRAZODONE HYDROCHLORIDE 50 MG/1
50 TABLET ORAL
Status: DISCONTINUED | OUTPATIENT
Start: 2025-06-18 | End: 2025-06-19

## 2025-06-18 RX ORDER — ACETAMINOPHEN 325 MG/1
975 TABLET ORAL EVERY 6 HOURS PRN
Status: DISCONTINUED | OUTPATIENT
Start: 2025-06-18 | End: 2025-06-25 | Stop reason: HOSPADM

## 2025-06-18 RX ORDER — HYDROXYZINE HYDROCHLORIDE 25 MG/1
25 TABLET, FILM COATED ORAL
Status: CANCELLED | OUTPATIENT
Start: 2025-06-18

## 2025-06-18 RX ORDER — RISPERIDONE 0.25 MG/1
0.5 TABLET ORAL
Status: CANCELLED | OUTPATIENT
Start: 2025-06-18

## 2025-06-18 RX ORDER — HYDROXYZINE HYDROCHLORIDE 25 MG/1
25 TABLET, FILM COATED ORAL
Status: DISCONTINUED | OUTPATIENT
Start: 2025-06-18 | End: 2025-06-25 | Stop reason: HOSPADM

## 2025-06-18 RX ORDER — RISPERIDONE 0.25 MG/1
0.25 TABLET ORAL
Status: DISCONTINUED | OUTPATIENT
Start: 2025-06-18 | End: 2025-06-19

## 2025-06-18 RX ORDER — TRAZODONE HYDROCHLORIDE 50 MG/1
50 TABLET ORAL
Status: CANCELLED | OUTPATIENT
Start: 2025-06-18

## 2025-06-18 RX ORDER — RISPERIDONE 1 MG/1
1 TABLET ORAL
Status: DISCONTINUED | OUTPATIENT
Start: 2025-06-18 | End: 2025-06-25 | Stop reason: HOSPADM

## 2025-06-18 RX ORDER — ACETAMINOPHEN 325 MG/1
975 TABLET ORAL EVERY 6 HOURS PRN
Status: CANCELLED | OUTPATIENT
Start: 2025-06-18

## 2025-06-18 RX ORDER — ACETAMINOPHEN 325 MG/1
650 TABLET ORAL EVERY 4 HOURS PRN
Status: DISCONTINUED | OUTPATIENT
Start: 2025-06-18 | End: 2025-06-25 | Stop reason: HOSPADM

## 2025-06-18 RX ORDER — ACETAMINOPHEN 325 MG/1
650 TABLET ORAL EVERY 4 HOURS PRN
Status: CANCELLED | OUTPATIENT
Start: 2025-06-18

## 2025-06-18 RX ORDER — MAGNESIUM HYDROXIDE/ALUMINUM HYDROXICE/SIMETHICONE 120; 1200; 1200 MG/30ML; MG/30ML; MG/30ML
30 SUSPENSION ORAL EVERY 4 HOURS PRN
Status: CANCELLED | OUTPATIENT
Start: 2025-06-18

## 2025-06-18 RX ORDER — RISPERIDONE 0.5 MG/1
0.5 TABLET ORAL
Status: DISCONTINUED | OUTPATIENT
Start: 2025-06-18 | End: 2025-06-19

## 2025-06-18 RX ORDER — NICOTINE 21 MG/24HR
21 PATCH, TRANSDERMAL 24 HOURS TRANSDERMAL ONCE
Status: DISCONTINUED | OUTPATIENT
Start: 2025-06-18 | End: 2025-06-18 | Stop reason: HOSPADM

## 2025-06-18 RX ORDER — PROPRANOLOL HYDROCHLORIDE 10 MG/1
5 TABLET ORAL EVERY 8 HOURS PRN
Status: DISCONTINUED | OUTPATIENT
Start: 2025-06-18 | End: 2025-06-25 | Stop reason: HOSPADM

## 2025-06-18 RX ORDER — LEVETIRACETAM 500 MG/1
1500 TABLET ORAL ONCE
Status: COMPLETED | OUTPATIENT
Start: 2025-06-18 | End: 2025-06-18

## 2025-06-18 RX ORDER — MAGNESIUM HYDROXIDE/ALUMINUM HYDROXICE/SIMETHICONE 120; 1200; 1200 MG/30ML; MG/30ML; MG/30ML
30 SUSPENSION ORAL EVERY 4 HOURS PRN
Status: DISCONTINUED | OUTPATIENT
Start: 2025-06-18 | End: 2025-06-25 | Stop reason: HOSPADM

## 2025-06-18 RX ORDER — HYDROXYZINE HYDROCHLORIDE 25 MG/1
25 TABLET, FILM COATED ORAL ONCE
Status: COMPLETED | OUTPATIENT
Start: 2025-06-18 | End: 2025-06-18

## 2025-06-18 RX ORDER — HALOPERIDOL 5 MG/ML
5 INJECTION INTRAMUSCULAR
Status: DISCONTINUED | OUTPATIENT
Start: 2025-06-18 | End: 2025-06-19

## 2025-06-18 RX ORDER — HALOPERIDOL 5 MG/ML
5 INJECTION INTRAMUSCULAR
Status: CANCELLED | OUTPATIENT
Start: 2025-06-18

## 2025-06-18 RX ORDER — HYDROXYZINE HYDROCHLORIDE 50 MG/1
50 TABLET, FILM COATED ORAL
Status: DISCONTINUED | OUTPATIENT
Start: 2025-06-18 | End: 2025-06-25 | Stop reason: HOSPADM

## 2025-06-18 RX ORDER — RISPERIDONE 0.25 MG/1
0.25 TABLET ORAL
Status: CANCELLED | OUTPATIENT
Start: 2025-06-18

## 2025-06-18 RX ORDER — RISPERIDONE 1 MG/1
1 TABLET ORAL
Status: CANCELLED | OUTPATIENT
Start: 2025-06-18

## 2025-06-18 RX ORDER — HYDROXYZINE HYDROCHLORIDE 25 MG/1
50 TABLET, FILM COATED ORAL
Status: CANCELLED | OUTPATIENT
Start: 2025-06-18

## 2025-06-18 RX ORDER — PROPRANOLOL HYDROCHLORIDE 10 MG/1
5 TABLET ORAL EVERY 8 HOURS PRN
Status: CANCELLED | OUTPATIENT
Start: 2025-06-18

## 2025-06-18 RX ADMIN — NICOTINE 21 MG: 21 PATCH, EXTENDED RELEASE TRANSDERMAL at 18:06

## 2025-06-18 RX ADMIN — HYDROXYZINE HYDROCHLORIDE 25 MG: 25 TABLET, FILM COATED ORAL at 19:27

## 2025-06-18 RX ADMIN — LEVETIRACETAM 1500 MG: 500 TABLET, FILM COATED ORAL at 22:51

## 2025-06-18 RX ADMIN — ACETAMINOPHEN 975 MG: 325 TABLET, FILM COATED ORAL at 21:31

## 2025-06-18 RX ADMIN — Medication 3 MG: at 21:31
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
## 2025-06-18 NOTE — ED NOTES
201 emailed to Intake.    Patient being reviewed for [facility], [facility] per Darlyn in Intake
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
## 2025-06-18 NOTE — LETTER
[facility] EMERGENCY DEPARTMENT  [address] [place] [id number]  Dept: [phone]      EMTALA TRANSFER CONSENT    NAME Cydney Lim                                         1960                              MRN [id number]    I have been informed of my rights regarding examination, treatment, and transfer   by Dr. Santiago Sue MD    Benefits: Specialized equipment and/or services available at the receiving facility (Include comment)________________________ ([facility])    Risks:        Transfer Request   I acknowledge that my medical condition has been evaluated and explained to me by the emergency department physician or other qualified medical person and/or my attending physician who has recommended and offered to me further medical examination and treatment. I understand the Hospital's obligation with respect to the treatment and stabilization of my emergency medical condition. I nevertheless request to be transferred. I release the Hospital, the doctor, and any other persons caring for me from all responsibility or liability for any injury or ill effects that may result from my transfer and agree to accept all responsibility for the consequences of my choice to transfer, rather than receive stabilizing treatment at the Hospital. I understand that because the transfer is my request, my insurance may not provide reimbursement for the services.  The Hospital will assist and direct me and my family in how to make arrangements for transfer, but the hospital is not liable for any fees charged by the transport service.  In spite of this understanding, I refuse to consent to further medical examination and treatment which has been offered to me, and request transfer to Accepting Facility Name, City & State : [facility] [place]. I authorize the performance of emergency medical procedures and treatments upon me in both transit and upon arrival at the receiving facility.  Additionally, I  authorize the release of any and all medical records to the receiving facility and request they be transported with me, if possible.    I authorize the performance of emergency medical procedures and treatments upon me in both transit and upon arrival at the receiving facility.  Additionally, I authorize the release of any and all medical records to the receiving facility and request they be transported with me, if possible.  I understand that the safest mode of transportation during a medical emergency is an ambulance and that the Hospital advocates the use of this mode of transport. Risks of traveling to the receiving facility by car, including absence of medical control, life sustaining equipment, such as oxygen, and medical personnel has been explained to me and I fully understand them.    (GUMARO CORRECT BOX BELOW)  [  ]  I consent to the stated transfer and to be transported by ambulance/helicopter.  [  ]  I consent to the stated transfer, but refuse transportation by ambulance and accept full responsibility for my transportation by car.  I understand the risks of non-ambulance transfers and I exonerate the Hospital and its staff from any deterioration in my condition that results from this refusal.    X___________________________________________    DATE  25  TIME________  Signature of patient or legally responsible individual signing on patient behalf           RELATIONSHIP TO PATIENT_________________________          Provider Certification    NAME Cydney Lim                                         1960                              MRN [id number]    A medical screening exam was performed on the above named patient.  Based on the examination:    Condition Necessitating Transfer The encounter diagnosis was Suicidal ideations.    Patient Condition: The patient has been stabilized such that within reasonable medical probability, no material deterioration of the patient condition or the condition  of the unborn child(marcos) is likely to result from the transfer    Reason for Transfer: Level of Care needed not available at this facility (Behavioral Health)    Transfer Requirements: [facility]   Space available and qualified personnel available for treatment as acknowledged by round trip  Agreed to accept transfer and to provide appropriate medical treatment as acknowledged by       Dr. Braga  Appropriate medical records of the examination and treatment of the patient are provided at the time of transfer   STAFF INITIAL WHEN COMPLETED _______  Transfer will be performed by qualified personnel from [facility]  and appropriate transfer equipment as required, including the use of necessary and appropriate life support measures.    Provider Certification: I have examined the patient and explained the following risks and benefits of being transferred/refusing transfer to the patient/family:  General risk, such as traffic hazards, adverse weather conditions, rough terrain or turbulence, possible failure of equipment (including vehicle or aircraft), or consequences of actions of persons outside the control of the transport personnel      Based on these reasonable risks and benefits to the patient and/or the unborn child(marcos), and based upon the information available at the time of the patient’s examination, I certify that the medical benefits reasonably to be expected from the provision of appropriate medical treatments at another medical facility outweigh the increasing risks, if any, to the individual’s medical condition, and in the case of labor to the unborn child, from effecting the transfer.    X____________________________________________ DATE 06/18/25        TIME_______      ORIGINAL - SEND TO MEDICAL RECORDS   COPY - SEND WITH PATIENT DURING TRANSFER

## 2025-06-18 NOTE — ED NOTES
Patient is accepted at [facility], [facility].  Patient is accepted by Dr. Omi Santizo.     Transportation is arranged with [facility].     Transportation is scheduled for 20:00 with [facility].   Patient may go to the floor after 2000.          Nurse report is to be called to [phone] prior to patient transfer.

## 2025-06-19 PROBLEM — N28.9 RENAL IMPAIRMENT: Status: ACTIVE | Noted: 2025-05-22

## 2025-06-19 PROBLEM — F33.3 MDD (MAJOR DEPRESSIVE DISORDER), RECURRENT, SEVERE, WITH PSYCHOSIS (HCC): Status: ACTIVE | Noted: 2025-04-24

## 2025-06-19 PROBLEM — R56.9 SEIZURE (HCC): Status: ACTIVE | Noted: 2024-03-11

## 2025-06-19 PROBLEM — E04.2 MULTIPLE THYROID NODULES: Status: ACTIVE | Noted: 2024-03-11

## 2025-06-19 LAB
25(OH)D3 SERPL-MCNC: 22.1 NG/ML (ref 30–100)
ALBUMIN SERPL BCG-MCNC: 3.7 G/DL (ref 3.5–5)
ALP SERPL-CCNC: 86 U/L (ref 34–104)
ALT SERPL W P-5'-P-CCNC: 9 U/L (ref 7–52)
ANION GAP SERPL CALCULATED.3IONS-SCNC: 10 MMOL/L (ref 4–13)
AST SERPL W P-5'-P-CCNC: 15 U/L (ref 13–39)
BASOPHILS # BLD AUTO: 0.04 THOUSANDS/ÂΜL (ref 0–0.1)
BASOPHILS NFR BLD AUTO: 0 % (ref 0–1)
BILIRUB SERPL-MCNC: 0.25 MG/DL (ref 0.2–1)
BUN SERPL-MCNC: 16 MG/DL (ref 5–25)
CALCIUM SERPL-MCNC: 9.2 MG/DL (ref 8.4–10.2)
CHLORIDE SERPL-SCNC: 108 MMOL/L (ref 96–108)
CHOLEST SERPL-MCNC: 201 MG/DL (ref ?–200)
CO2 SERPL-SCNC: 24 MMOL/L (ref 21–32)
CREAT SERPL-MCNC: 1.07 MG/DL (ref 0.6–1.3)
EOSINOPHIL # BLD AUTO: 0.31 THOUSAND/ÂΜL (ref 0–0.61)
EOSINOPHIL NFR BLD AUTO: 3 % (ref 0–6)
ERYTHROCYTE [DISTWIDTH] IN BLOOD BY AUTOMATED COUNT: 13.7 % (ref 11.6–15.1)
FOLATE SERPL-MCNC: 10.5 NG/ML
GFR SERPL CREATININE-BSD FRML MDRD: 54 ML/MIN/1.73SQ M
GLUCOSE P FAST SERPL-MCNC: 92 MG/DL (ref 65–99)
GLUCOSE SERPL-MCNC: 92 MG/DL (ref 65–140)
HCT VFR BLD AUTO: 37.9 % (ref 34.8–46.1)
HDLC SERPL-MCNC: 78 MG/DL
HGB BLD-MCNC: 12.1 G/DL (ref 11.5–15.4)
IMM GRANULOCYTES # BLD AUTO: 0.03 THOUSAND/UL (ref 0–0.2)
IMM GRANULOCYTES NFR BLD AUTO: 0 % (ref 0–2)
LDLC SERPL CALC-MCNC: 98 MG/DL (ref 0–100)
LYMPHOCYTES # BLD AUTO: 3.2 THOUSANDS/ÂΜL (ref 0.6–4.47)
LYMPHOCYTES NFR BLD AUTO: 32 % (ref 14–44)
MCH RBC QN AUTO: 31.4 PG (ref 26.8–34.3)
MCHC RBC AUTO-ENTMCNC: 31.9 G/DL (ref 31.4–37.4)
MCV RBC AUTO: 98 FL (ref 82–98)
MONOCYTES # BLD AUTO: 0.63 THOUSAND/ÂΜL (ref 0.17–1.22)
MONOCYTES NFR BLD AUTO: 6 % (ref 4–12)
NEUTROPHILS # BLD AUTO: 5.83 THOUSANDS/ÂΜL (ref 1.85–7.62)
NEUTS SEG NFR BLD AUTO: 59 % (ref 43–75)
NONHDLC SERPL-MCNC: 123 MG/DL
NRBC BLD AUTO-RTO: 0 /100 WBCS
PLATELET # BLD AUTO: 354 THOUSANDS/UL (ref 149–390)
PMV BLD AUTO: 10.6 FL (ref 8.9–12.7)
POTASSIUM SERPL-SCNC: 3.7 MMOL/L (ref 3.5–5.3)
PROT SERPL-MCNC: 7.2 G/DL (ref 6.4–8.4)
RBC # BLD AUTO: 3.85 MILLION/UL (ref 3.81–5.12)
SODIUM SERPL-SCNC: 142 MMOL/L (ref 135–147)
TREPONEMA PALLIDUM IGG+IGM AB [PRESENCE] IN SERUM OR PLASMA BY IMMUNOASSAY: NORMAL
TRIGL SERPL-MCNC: 126 MG/DL (ref ?–150)
VIT B12 SERPL-MCNC: 227 PG/ML (ref 180–914)
WBC # BLD AUTO: 10.04 THOUSAND/UL (ref 4.31–10.16)

## 2025-06-19 PROCEDURE — 86780 TREPONEMA PALLIDUM: CPT | Performed by: PSYCHIATRY & NEUROLOGY

## 2025-06-19 PROCEDURE — 82607 VITAMIN B-12: CPT | Performed by: PSYCHIATRY & NEUROLOGY

## 2025-06-19 PROCEDURE — 99223 1ST HOSP IP/OBS HIGH 75: CPT | Performed by: STUDENT IN AN ORGANIZED HEALTH CARE EDUCATION/TRAINING PROGRAM

## 2025-06-19 PROCEDURE — 80053 COMPREHEN METABOLIC PANEL: CPT | Performed by: PSYCHIATRY & NEUROLOGY

## 2025-06-19 PROCEDURE — 82306 VITAMIN D 25 HYDROXY: CPT | Performed by: PSYCHIATRY & NEUROLOGY

## 2025-06-19 PROCEDURE — 80061 LIPID PANEL: CPT | Performed by: PSYCHIATRY & NEUROLOGY

## 2025-06-19 PROCEDURE — 85025 COMPLETE CBC W/AUTO DIFF WBC: CPT | Performed by: PSYCHIATRY & NEUROLOGY

## 2025-06-19 PROCEDURE — 99222 1ST HOSP IP/OBS MODERATE 55: CPT | Performed by: NURSE PRACTITIONER

## 2025-06-19 PROCEDURE — 82746 ASSAY OF FOLIC ACID SERUM: CPT | Performed by: PSYCHIATRY & NEUROLOGY

## 2025-06-19 RX ORDER — OLANZAPINE 10 MG/2ML
5 INJECTION, POWDER, FOR SOLUTION INTRAMUSCULAR EVERY 8 HOURS PRN
Status: DISCONTINUED | OUTPATIENT
Start: 2025-06-19 | End: 2025-06-25 | Stop reason: HOSPADM

## 2025-06-19 RX ORDER — HYDROXYZINE HYDROCHLORIDE 50 MG/1
100 TABLET, FILM COATED ORAL EVERY 6 HOURS PRN
Status: DISCONTINUED | OUTPATIENT
Start: 2025-06-19 | End: 2025-06-25 | Stop reason: HOSPADM

## 2025-06-19 RX ORDER — TRAZODONE HYDROCHLORIDE 50 MG/1
50 TABLET ORAL
Status: DISCONTINUED | OUTPATIENT
Start: 2025-06-19 | End: 2025-06-25 | Stop reason: HOSPADM

## 2025-06-19 RX ORDER — ERGOCALCIFEROL 1.25 MG/1
50000 CAPSULE, LIQUID FILLED ORAL WEEKLY
Status: DISCONTINUED | OUTPATIENT
Start: 2025-06-20 | End: 2025-06-25 | Stop reason: HOSPADM

## 2025-06-19 RX ORDER — BUDESONIDE AND FORMOTEROL FUMARATE DIHYDRATE 80; 4.5 UG/1; UG/1
2 AEROSOL RESPIRATORY (INHALATION) 2 TIMES DAILY
Status: DISCONTINUED | OUTPATIENT
Start: 2025-06-19 | End: 2025-06-25 | Stop reason: HOSPADM

## 2025-06-19 RX ORDER — CYANOCOBALAMIN 1000 UG/ML
1000 INJECTION, SOLUTION INTRAMUSCULAR; SUBCUTANEOUS ONCE
Status: COMPLETED | OUTPATIENT
Start: 2025-06-20 | End: 2025-06-20

## 2025-06-19 RX ORDER — LEVETIRACETAM 500 MG/1
1500 TABLET ORAL EVERY 12 HOURS SCHEDULED
Status: DISCONTINUED | OUTPATIENT
Start: 2025-06-19 | End: 2025-06-25 | Stop reason: HOSPADM

## 2025-06-19 RX ORDER — OLANZAPINE 10 MG/2ML
2.5 INJECTION, POWDER, FOR SOLUTION INTRAMUSCULAR EVERY 6 HOURS PRN
Status: DISCONTINUED | OUTPATIENT
Start: 2025-06-19 | End: 2025-06-25 | Stop reason: HOSPADM

## 2025-06-19 RX ORDER — CARBAMAZEPINE 200 MG/1
200 TABLET ORAL 3 TIMES DAILY
Status: DISCONTINUED | OUTPATIENT
Start: 2025-06-19 | End: 2025-06-25 | Stop reason: HOSPADM

## 2025-06-19 RX ORDER — ARIPIPRAZOLE 5 MG/1
5 TABLET ORAL DAILY
Status: DISCONTINUED | OUTPATIENT
Start: 2025-06-19 | End: 2025-06-19

## 2025-06-19 RX ORDER — OLANZAPINE 10 MG/2ML
5 INJECTION, POWDER, FOR SOLUTION INTRAMUSCULAR EVERY 6 HOURS PRN
Status: DISCONTINUED | OUTPATIENT
Start: 2025-06-19 | End: 2025-06-25 | Stop reason: HOSPADM

## 2025-06-19 RX ORDER — QUETIAPINE FUMARATE 25 MG/1
12.5 TABLET, FILM COATED ORAL
Status: DISCONTINUED | OUTPATIENT
Start: 2025-06-19 | End: 2025-06-25 | Stop reason: HOSPADM

## 2025-06-19 RX ORDER — AMOXICILLIN 250 MG
1 CAPSULE ORAL
Status: DISCONTINUED | OUTPATIENT
Start: 2025-06-19 | End: 2025-06-25 | Stop reason: HOSPADM

## 2025-06-19 RX ORDER — OLANZAPINE 2.5 MG/1
2.5 TABLET, FILM COATED ORAL EVERY 6 HOURS PRN
Status: DISCONTINUED | OUTPATIENT
Start: 2025-06-19 | End: 2025-06-25 | Stop reason: HOSPADM

## 2025-06-19 RX ORDER — BISACODYL 10 MG
10 SUPPOSITORY, RECTAL RECTAL DAILY PRN
Status: DISCONTINUED | OUTPATIENT
Start: 2025-06-19 | End: 2025-06-20

## 2025-06-19 RX ORDER — ROPINIROLE 0.5 MG/1
0.25 TABLET, FILM COATED ORAL
Status: DISCONTINUED | OUTPATIENT
Start: 2025-06-19 | End: 2025-06-25 | Stop reason: HOSPADM

## 2025-06-19 RX ORDER — FOLIC ACID 1 MG/1
1 TABLET ORAL DAILY
Status: DISCONTINUED | OUTPATIENT
Start: 2025-06-20 | End: 2025-06-25 | Stop reason: HOSPADM

## 2025-06-19 RX ORDER — METOPROLOL SUCCINATE 50 MG/1
50 TABLET, EXTENDED RELEASE ORAL DAILY
Status: DISCONTINUED | OUTPATIENT
Start: 2025-06-19 | End: 2025-06-25 | Stop reason: HOSPADM

## 2025-06-19 RX ORDER — OLANZAPINE 5 MG/1
5 TABLET, FILM COATED ORAL EVERY 6 HOURS PRN
Status: DISCONTINUED | OUTPATIENT
Start: 2025-06-19 | End: 2025-06-25 | Stop reason: HOSPADM

## 2025-06-19 RX ORDER — ESCITALOPRAM OXALATE 10 MG/1
10 TABLET ORAL DAILY
Status: DISCONTINUED | OUTPATIENT
Start: 2025-06-19 | End: 2025-06-23

## 2025-06-19 RX ORDER — ALBUTEROL SULFATE 90 UG/1
2 INHALANT RESPIRATORY (INHALATION) EVERY 4 HOURS PRN
Status: DISCONTINUED | OUTPATIENT
Start: 2025-06-19 | End: 2025-06-25 | Stop reason: HOSPADM

## 2025-06-19 RX ORDER — POLYETHYLENE GLYCOL 3350 17 G/17G
17 POWDER, FOR SOLUTION ORAL DAILY PRN
Status: DISCONTINUED | OUTPATIENT
Start: 2025-06-19 | End: 2025-06-20

## 2025-06-19 RX ADMIN — Medication 3 MG: at 21:53

## 2025-06-19 RX ADMIN — QUETIAPINE FUMARATE 12.5 MG: 25 TABLET ORAL at 21:52

## 2025-06-19 RX ADMIN — LEVETIRACETAM 1500 MG: 500 TABLET, FILM COATED ORAL at 08:23

## 2025-06-19 RX ADMIN — ROPINIROLE 0.25 MG: 0.5 TABLET, FILM COATED ORAL at 21:52

## 2025-06-19 RX ADMIN — ESCITALOPRAM OXALATE 10 MG: 10 TABLET ORAL at 13:25

## 2025-06-19 RX ADMIN — METOPROLOL SUCCINATE 50 MG: 50 TABLET, EXTENDED RELEASE ORAL at 08:23

## 2025-06-19 RX ADMIN — CARBAMAZEPINE 200 MG: 200 TABLET ORAL at 08:23

## 2025-06-19 RX ADMIN — CARBAMAZEPINE 200 MG: 200 TABLET ORAL at 21:53

## 2025-06-19 RX ADMIN — BUDESONIDE AND FORMOTEROL FUMARATE DIHYDRATE 2 PUFF: 80; 4.5 AEROSOL RESPIRATORY (INHALATION) at 17:32

## 2025-06-19 RX ADMIN — LEVETIRACETAM 1500 MG: 500 TABLET, FILM COATED ORAL at 21:52

## 2025-06-19 RX ADMIN — CARBAMAZEPINE 200 MG: 200 TABLET ORAL at 17:32

## 2025-06-19 RX ADMIN — BUDESONIDE AND FORMOTEROL FUMARATE DIHYDRATE 2 PUFF: 80; 4.5 AEROSOL RESPIRATORY (INHALATION) at 10:46

## 2025-06-19 RX ADMIN — HALOPERIDOL LACTATE 5 MG: 5 INJECTION, SOLUTION INTRAMUSCULAR at 00:22

## 2025-06-19 NOTE — ASSESSMENT & PLAN NOTE
Vital signs stable afebrile patient seen and examined by myself at the bedside labs from today were reviewed by myself sodium 142 potassium 3.7 creatinine 1.07  Patient medically stable for admit  Please reach out to [facility] with any medical questions or concerns

## 2025-06-19 NOTE — TREATMENT TEAM
06/18/25 2222   Provider Notification   Reason for Communication Admission   Provider Name Sanam Braga   Provider Role Other (Comment)  (DONNY)   Method of Communication Other (Comment)  (DONNY)   Response See orders   Notification Time 2222     Patient arrived the unit at 2045. Unable to verify PTA med. Provider notified at 2222.

## 2025-06-19 NOTE — NURSING NOTE
C/o agitation and physical aggression towards staff, throwing personal care things to the hallway, with an agitation of score 36, haldol 5 mg IM given at 0022 to left deltoid and patient was placed in the quiet room. The medication was effective.

## 2025-06-19 NOTE — NURSING NOTE
Pt is withdrawn to room, agitated regarding medications, provider aware. Walks independently with a walker. Endorses depression, denies all other psych s/s. Safety checks ongoing.

## 2025-06-19 NOTE — TREATMENT TEAM
Pt attended group.  Pt anxious and blunt affect.  Pt expressed concern about her feet.     06/19/25 1000   Activity/Group Checklist   Group Other (Comment)  (Community meeting: common anxiety symtoms, physical, behavioral,cognitive and emorional)   Attendance Attended   Attendance Duration (min) 46-60   Interactions Other (Comment)  (needed prompting)   Affect/Mood Blunted/flat   Goals Achieved Identified relapse prevention strategies;Identified triggers;Identified feelings;Discussed coping strategies;Able to engage in interactions;Able to listen to others;Able to manage/cope with feelings;Able to reflect/comment on own behavior

## 2025-06-19 NOTE — CMS CERTIFICATION NOTE
Certification: Based upon physical, mental and social evaluations, I certify that inpatient psychiatric services are medically necessary for this patient for a duration of 14 midnights for the treatment of MDD (major depressive disorder), recurrent, severe, with psychosis (HCC)  Available alternative community resources do not meet the patient's mental health care needs.  I further attest that an established written individualized plan of care has been implemented and is outlined in the patient's medical records.

## 2025-06-19 NOTE — TREATMENT PLAN
TREATMENT PLAN REVIEW - Behavioral Health Cydney Lim 64 y.o. 1960 female MRN: [id number]    [facility] 6B [facility] Room / Bed: [facility] 641/[facility] 641-01 Encounter: [id number]        Admit Date/Time:  6/18/2025  8:46 PM    Treatment Team:   MD Kiran Plaza RN Stacie Searle [facility]    Diagnosis: Principal Problem:    MDD (major depressive disorder), recurrent, severe, with psychosis (HCC)  Active Problems:    Major neurocognitive disorder (HCC)      Patient Strengths/Assets: average or above intelligence, good past treatment response, patient is on a voluntary commitment      Patient Barriers/Limitations: chronic mental illness, family conflict, financial instability, impaired cognition, lack of social/family support, limited family ties, limited motivation, no/few hobbies or interests, poor physical health, poor support system, self-care deficit    Short Term Goals: decrease in depressive symptoms, decrease in anxiety symptoms, decrease in suicidal thoughts, ability to stay safe on the unit, ability to stay free from restraints, improvement in ability to express basic needs, improvement in insight, improvement in reality testing, improvement in reasoning ability, improvement in self care, improvement in impulse control, sleep improvement, improvement in appetite, tolerate medications, mood stabilization, increase in group attendance, increase in group participation, increase in socialization with peers on the unit, acceptance of need for psychiatric treatment, acceptance of psychiatric medications    Long Term Goals: improvement in depression, improvement in anxiety, stabilization of mood, free of suicidal thoughts, no agitation on the unit, no aggressive behavior on the unit, able to express basic needs, acceptance of need for psychiatric medications, acceptance of need for psychiatric treatment, acceptance of  need for psychiatric follow up after discharge, acceptance of psychiatric diagnosis, adequate self care, adequate sleep, adequate appetite, adequate oral intake, appropriate interaction with peers, appropriate interaction with family, stable living arrangements upon discharge    Progress Towards Goals: starting psychiatric medications as prescribed    Recommended Treatment: medication management, patient medication education, group therapy, milieu therapy, continued Behavioral Health psychiatric evaluation/assessment process     Treatment Frequency: daily medication monitoring, group and milieu therapy daily, monitoring through interdisciplinary rounds, monitoring through weekly patient care conferences    Expected Discharge Date: 14 days - 7/3/2025    Discharge Plan: placement in alternative living arrangement, referrals as indicated    Treatment Plan Created/Updated By: Ayla Cooper DO
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
## 2025-06-19 NOTE — PLAN OF CARE
Problem: SAFETY ADULT  Goal: Patient will remain free of falls  Description: INTERVENTIONS:  - Educate patient/family on patient safety including physical limitations  - Instruct patient to call for assistance with activity   - Consider consulting OT/PT to assist with strengthening/mobility based on AM PAC & JH-HLM score  - Consult OT/PT to assist with strengthening/mobility   - Keep Call bell within reach  - Keep bed low and locked with side rails adjusted as appropriate  - Keep care items and personal belongings within reach  - Initiate and maintain comfort rounds  - Make Fall Risk Sign visible to staff  - Offer Toileting every 2 Hours, in advance of need  - Initiate/Maintain bed alarm  - Obtain necessary fall risk management equipment: walker  - Apply yellow socks and bracelet for high fall risk patients  - Consider moving patient to room near nurses station  Outcome: Progressing     Problem: Knowledge Deficit  Goal: Patient/family/caregiver demonstrates understanding of disease process, treatment plan, medications, and discharge instructions  Description: Complete learning assessment and assess knowledge base.  Interventions:  - Provide teaching at level of understanding  - Provide teaching via preferred learning methods  Outcome: Progressing     Problem: Ineffective Coping  Goal: Cooperates with admission process  Description: Interventions:   - Complete admission process  Outcome: Completed

## 2025-06-19 NOTE — ASSESSMENT & PLAN NOTE
Patient's vitamin B12 level 227  Patient will have a one-time dose of IM vitamin B12 x 1 tomorrow  On Saturday she will start on oral vitamin B12 supplementation daily  She needs a repeat vitamin B12 level drawn with her PCP in 10 to 12 weeks

## 2025-06-19 NOTE — ASSESSMENT & PLAN NOTE
Cydney Lim is a 64-year-old female with pertinent psychiatric history of major depressive disorder with psychotic features as well as multiple inpatient psychiatric admissions who presented to [facility] ED due to suicidal ideation with plan to overdose on medication. Recent stressors included family conflict with her brother who is her current rep-payee and has been limiting access to her social security income. She notes worsening dysphoric mood as a result and signed a voluntary 201 for admission, presently admitted to [facility] 6B for psychiatric stabilization.     Plan as follows:  Discontinue Abilify 5 mg as of 6/19 due to ongoing restless legs, medication could be potentially worsening symptoms   Start Seroquel 12.5 mg daily for mood, appetite, and sleep   Continue Lexapro 10 mg daily for mood and anxiety   Upward titration as indicated   Continue Tegretol 200 mg TID for seizure prophylaxis and added benefit of mood stabilization   Tegretol level ordered for 6/20  Melatonin 3 mg to regulate sleep-wake cycle   Expand collateral information
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
## 2025-06-19 NOTE — NURSING NOTE
Patient is a 201 admit from [facility] ED for depression and SI with plan to OD on med and also mentioned she has a knife at home that she can use to kill herself as well. Per ED noted patient claim her brother is committing social security fraud and that her brother take cares of her bills but refuses to give her any extra money when she wants it and that there is no sense living anymore and wants to die. Hx of epilepsy, depression, restless leg syndrome, HTN, CKD stage 3, ambulatory dysfunction, fall and COPD. Patient was cooperative with the admission process though she had an irritable edge and endorses depression and kept making death wish statements, denies all other psych s/s. Skin intact. C/o left leg 10/10, tylenol 975 mg given at 2131 was ineffective. Self ambulatory with a cane at home and a walker was given to the patient. Oriented to the unit routine/activity. Q 15 minutes safety checks initiated.

## 2025-06-19 NOTE — NURSING NOTE
Pt constricted and withdrawn but med-compliant with no agitation noted.  Good appetite and steady gait with walker.  VSS.  Attended group.  Denied SI.  Monitored for safety and support.

## 2025-06-19 NOTE — ASSESSMENT & PLAN NOTE
Patient will continue on her Toprol-XL 50 mg p.o. daily  Will continue to monitor blood pressure and ongoing basis and additively agents as needed

## 2025-06-19 NOTE — ASSESSMENT & PLAN NOTE
Lab Results   Component Value Date    EGFR 54 06/19/2025    EGFR 49 06/18/2025    EGFR 50 06/15/2025    CREATININE 1.07 06/19/2025    CREATININE 1.16 06/18/2025    CREATININE 1.14 06/15/2025   Creatinine from today 1.07 GFR 54  Continue to monitor closely

## 2025-06-19 NOTE — ASSESSMENT & PLAN NOTE
Reporting consistent symptoms of restless leg syndrome   Start Requip 0.25 mg at bed time, will watch for worsening psychotic symptoms   Will check iron panel

## 2025-06-19 NOTE — CONSULTS
Consultation - Hospitalist   Name: Cydney Lim 64 y.o. female I MRN: [id number]  Unit/Bed#: [facility] 641-01 I Date of Admission: 6/18/2025   Date of Service: 6/19/2025 I Hospital Day: 1   Inpatient consult for Medical Clearance for  patient  Consult performed by: DRU Wagoner  Consult ordered by: Milli Braga MD        Physician Requesting Evaluation: Isabel Hutchison MD   Reason for Evaluation / Principal Problem: Medical clearance for psychiatric admission    Assessment & Plan  Medical clearance for psychiatric admission  Vital signs stable afebrile patient seen and examined by myself at the bedside labs from today were reviewed by myself sodium 142 potassium 3.7 creatinine 1.07  Patient medically stable for admit  Please reach out to [facility] with any medical questions or concerns  Epilepsy with altered consciousness with intractable epilepsy (HCC)  Patient will continue on her home dose of Tegretol as well as Keppra  Morbidly obese (HCC)  BMI 40.85  Lifestyle modifications  Restless leg syndrome  Start Requip 0.25 mg p.o. nightly  Vitamin D deficiency  Vitamin D level 22.1  Start ergocalciferol 50,000 units p.o. weekly x 8 weeks  Patient needs a repeat vitamin D level drawn with her PCP in 10 to 12 weeks  Essential hypertension  Patient will continue on her Toprol-XL 50 mg p.o. daily  Will continue to monitor blood pressure and ongoing basis and additively agents as needed  Stage 3 chronic kidney disease (HCC)  Lab Results   Component Value Date    EGFR 54 06/19/2025    EGFR 49 06/18/2025    EGFR 50 06/15/2025    CREATININE 1.07 06/19/2025    CREATININE 1.16 06/18/2025    CREATININE 1.14 06/15/2025   Creatinine from today 1.07 GFR 54  Continue to monitor closely  B12 deficiency  Patient's vitamin B12 level 227  Patient will have a one-time dose of IM vitamin B12 x 1 tomorrow  On Saturday she will start on oral vitamin B12 supplementation daily  She needs a repeat vitamin B12 level drawn with her  PCP in 10 to 12 weeks        Collaboration of Care: Were Recommendations Directly Discussed with Primary Treatment Team? - No     History of Present Illness   Cydney Lim is a 64 y.o. female who is originally admitted to the psychiatry service due to SI. We are consulted for medical clearance for admission to Behavioral Health Unit and treatment of underlying psychiatric illness.      Patient presents to [facility] on 6/18/2025 endorsing SI with a plan to overdose on her medications and states that she has a knife at home.  She seems to have a lot of family dysfunction that centers around her brother who manages her money.  She has a longstanding history of ambulation dysfunction, epilepsy, hypertension, CKD stage III, restless leg syndrome as well as a seizure disorder.    Review of Systems   Constitutional:  Positive for activity change, appetite change and fatigue. Negative for chills and fever.   HENT:  Negative for ear pain and sore throat.    Eyes:  Negative for pain and visual disturbance.   Respiratory:  Negative for cough and shortness of breath.    Cardiovascular:  Negative for chest pain and palpitations.   Gastrointestinal:  Negative for abdominal pain and vomiting.   Genitourinary:  Negative for dysuria and hematuria.   Musculoskeletal:  Positive for arthralgias, back pain and gait problem.   Skin:  Negative for color change and rash.   Neurological:  Positive for weakness. Negative for seizures and syncope.   Psychiatric/Behavioral:  Positive for confusion, decreased concentration and suicidal ideas.    All other systems reviewed and are negative.      Historical Information   Past Medical History[1]  Past Surgical History[2]  Social History[3]  E-Cigarette/Vaping    E-Cigarette Use Never User      E-Cigarette/Vaping Substances    Nicotine Yes     THC No     CBD No     Flavoring No     Other No     Unknown No        Marital Status: Single    Meds/Allergies   I have reviewed home medications using  "recent Epic encounter.  Prior to Admission medications    Medication Sig Start Date End Date Taking? Authorizing Provider   albuterol (Proventil HFA) 90 mcg/act inhaler Inhale 2 puffs every 6 (six) hours as needed for wheezing 5/22/24   DRU Stapleton   Albuterol-Budesonide (Airsupra) 90-80 MCG/ACT AERO Inhale 2 puffs in the morning and 2 puffs before bedtime.    Historical Provider, MD   ARIPiprazole (ABILIFY) 5 mg tablet Take 5 mg by mouth daily 5/12/25   Historical Provider, MD   carBAMazepine (TEGretol) 200 mg tablet Take 1 tablet (200 mg total) by mouth 3 (three) times a day 8/3/24   Andrzej Kaba MD   Cholecalciferol (VITAMIN D3) 1,000 units tablet Take 2 tablets (2,000 Units total) by mouth daily 6/13/25 8/12/25  Cherise Minaya DO   escitalopram (LEXAPRO) 10 mg tablet Take 1 tablet (10 mg total) by mouth daily 7/19/24 6/18/25  DRU Stapleton   ferrous sulfate 325 (65 FE) MG EC tablet Take 325 mg by mouth 3 (three) times a day with meals    Historical Provider, MD   gabapentin (NEURONTIN) 300 mg capsule Take 300 mg by mouth daily 6/4/25   Historical Provider, MD   levETIRAcetam (KEPPRA) 750 mg tablet Take 2 tablets (1,500 mg total) by mouth 2 (two) times a day 8/3/24   Andrzej Kaba MD   metoprolol succinate (TOPROL-XL) 50 mg 24 hr tablet Take 1 tablet (50 mg total) by mouth daily 7/19/24 6/18/25  DRU Stapleton   nicotine (NICODERM CQ) 21 mg/24 hr TD 24 hr patch Place 1 patch on the skin daily over 24 hours  Patient not taking: Reported on 6/15/2025 6/13/25   Cherise Minaya DO     No Known Allergies    Objective :  Temp:  [96.9 °F (36.1 °C)-97.3 °F (36.3 °C)] 97.3 °F (36.3 °C)  HR:  [83-95] 83  BP: (129-151)/(63-81) 138/63  Resp:  [16-18] 16  SpO2:  [94 %-97 %] 97 %  O2 Device: None (Room air)    Height: 5' 3\" (160 cm) (06/18/25 2102)  Weight - Scale: 105 kg (230 lb 9.6 oz) (06/18/25 2102)  Physical Exam  Constitutional:       Appearance: Normal appearance. She is obese.   HENT:      Head: " Normocephalic and atraumatic.      Nose: Nose normal.      Mouth/Throat:      Mouth: Mucous membranes are moist.      Pharynx: Oropharynx is clear.     Eyes:      Extraocular Movements: Extraocular movements intact.      Pupils: Pupils are equal, round, and reactive to light.       Cardiovascular:      Rate and Rhythm: Normal rate and regular rhythm.      Pulses: Normal pulses.      Heart sounds: Normal heart sounds.   Pulmonary:      Effort: Pulmonary effort is normal.      Breath sounds: Normal breath sounds.   Abdominal:      General: Abdomen is flat. Bowel sounds are normal.      Palpations: Abdomen is soft.     Musculoskeletal:         General: Normal range of motion.      Cervical back: Normal range of motion and neck supple.     Skin:     General: Skin is warm and dry.     Neurological:      Mental Status: She is alert and oriented to person, place, and time.     Psychiatric:         Attention and Perception: She is inattentive.         Mood and Affect: Mood is anxious and depressed. Affect is labile.         Speech: Speech is rapid and pressured.         Behavior: Behavior is agitated. Behavior is cooperative.         Thought Content: Thought content is delusional.         Cognition and Memory: Memory is impaired.           Lab Results: I have reviewed the following results:  Results from last 7 days   Lab Units 06/19/25  0516   WBC Thousand/uL 10.04   HEMOGLOBIN g/dL 12.1   HEMATOCRIT % 37.9   PLATELETS Thousands/uL 354   SEGS PCT % 59   LYMPHO PCT % 32   MONO PCT % 6   EOS PCT % 3     Results from last 7 days   Lab Units 06/19/25  0516   SODIUM mmol/L 142   POTASSIUM mmol/L 3.7   CHLORIDE mmol/L 108   CO2 mmol/L 24   BUN mg/dL 16   CREATININE mg/dL 1.07   ANION GAP mmol/L 10   CALCIUM mg/dL 9.2   ALBUMIN g/dL 3.7   TOTAL BILIRUBIN mg/dL 0.25   ALK PHOS U/L 86   ALT U/L 9   AST U/L 15   GLUCOSE RANDOM mg/dL 92             Lab Results   Component Value Date/Time    HGBA1C 5.9 06/06/2019 12:00 AM            Imaging Results Review: No pertinent imaging studies reviewed.  Other Study Results Review: EKG was reviewed.   6/18/2025 twelve-lead EKG reviewed by myself as well as interpreted by myself normal sinus rhythm no acute EKG changes when compared with an EKG from 6/15/2025 and 5/18/2025.  Administrative Statements   I have spent a total time of 60 minutes in caring for this patient on the day of the visit/encounter including Diagnostic results, Prognosis, Risks and benefits of tx options, Instructions for management, Patient and family education, Importance of tx compliance, Risk factor reductions, Impressions, Counseling / Coordination of care, Documenting in the medical record, Reviewing/placing orders in the medical record (including tests, medications, and/or procedures), Obtaining or reviewing history  , and Communicating with other healthcare professionals .  ** Please Note: This note has been constructed using a voice recognition system. **       [1]   Past Medical History:  Diagnosis Date    Ambulatory dysfunction     B12 deficiency     CKD (chronic kidney disease), stage III (HCC)     Depression     EP (epilepsy) (HCC)     Epilepsy (HCC)     History of sinus tachycardia     Hypertension     Morbid obesity (HCC)     Obesity     Restless leg     Restless leg syndrome     Vitamin D deficiency    [2]   Past Surgical History:  Procedure Laterality Date    DENTAL SURGERY      all teeth removed    DENTAL SURGERY      LASER ABLATION OF THE CERVIX      MAMMO (HISTORICAL)  05/01/2017   [3]   Social History  Tobacco Use    Smoking status: Every Day     Current packs/day: 1.00     Types: Cigarettes    Smokeless tobacco: Current   Vaping Use    Vaping status: Never Used   Substance and Sexual Activity    Alcohol use: Not Currently     Comment: pt denies alcohol use    Drug use: Never    Sexual activity: Not Currently

## 2025-06-19 NOTE — ASSESSMENT & PLAN NOTE
Lab Results   Component Value Date    EGFR 54 06/19/2025    EGFR 49 06/18/2025    EGFR 50 06/15/2025    CREATININE 1.07 06/19/2025    CREATININE 1.16 06/18/2025    CREATININE 1.14 06/15/2025   Per SLIM   Avoid nephrotoxic agents

## 2025-06-19 NOTE — PROGRESS NOTES
06/19/25 1523   Referral Data   Referral Reason Psych   County Information   County of Residence [place]   Readmission Root Cause   30 Day Readmission No   Patient Information   Mental Status Alert   Primary Caregiver Family   Support System Immediate family   Sabianism/Cultural Requests unknown   Legal Information   Tx Plan Signed No (Comment)  (pt refused)   Current Status: 201   Legal Issues none reported   Health Care Proxy Appointed Yes - See Health Care Proxy section   Activities of Daily Living Prior to Admission   Functional Status Independent   Assistive Device No device needed   Living Arrangement Apartment   Ambulation Independent   Access to Firearms   Access to Firearms No   Income Information   Income Source SSI/SSD

## 2025-06-19 NOTE — ASSESSMENT & PLAN NOTE
Vitamin D level 22.1  Start ergocalciferol 50,000 units p.o. weekly x 8 weeks  Patient needs a repeat vitamin D level drawn with her PCP in 10 to 12 weeks

## 2025-06-19 NOTE — PLAN OF CARE
Problem: SAFETY ADULT  Goal: Patient will remain free of falls  Description: INTERVENTIONS:  - Educate patient/family on patient safety including physical limitations  - Instruct patient to call for assistance with activity   - Consider consulting OT/PT to assist with strengthening/mobility based on AM PAC & -HLM score  - Consult OT/PT to assist with strengthening/mobility   - Keep Call bell within reach  - Keep bed low and locked with side rails adjusted as appropriate  - Keep care items and personal belongings within reach  - Initiate and maintain comfort rounds  - Make Fall Risk Sign visible to staff  - Offer Toileting every 2 Hours, in advance of need  - Initiate/Maintain bed alarm  - Obtain necessary fall risk management equipment: walker  - Apply yellow socks and bracelet for high fall risk patients  - Consider moving patient to room near nurses station  6/19/2025 0241 by Jerome Morelos RN  Outcome: Not Progressing  6/19/2025 0241 by Jerome Morelos RN  Outcome: Progressing  Goal: Maintains/Returns to pre admission functional level  Description: INTERVENTIONS:  - Perform AM-PAC 6 Click Basic Mobility/ Daily Activity assessment daily.  - Set and communicate daily mobility goal to care team and patient/family/caregiver.   - Collaborate with rehabilitation services on mobility goals if consulted      - Ambulate patient 3 times a day  - Out of bed to chair 3 times a day   - Out of bed for meals 3 times a day  - Out of bed for toileting  - Record patient progress and toleration of activity level   6/19/2025 0241 by Jerome Morelos RN  Outcome: Not Progressing  6/19/2025 0241 by Jerome Morelos RN  Outcome: Progressing     Problem: DISCHARGE PLANNING  Goal: Discharge to home or other facility with appropriate resources  Description: INTERVENTIONS:  - Identify barriers to discharge w/patient and caregiver  - Arrange for needed discharge resources and transportation as appropriate  - Identify discharge  learning needs (meds, wound care, etc.)  - Arrange for interpretive services to assist at discharge as needed  - Refer to Case Management Department for coordinating discharge planning if the patient needs post-hospital services based on physician/advanced practitioner order or complex needs related to functional status, cognitive ability, or social support system  6/19/2025 0241 by Jerome Morelos RN  Outcome: Not Progressing  6/19/2025 0241 by Jerome Morelos RN  Outcome: Progressing     Problem: Knowledge Deficit  Goal: Patient/family/caregiver demonstrates understanding of disease process, treatment plan, medications, and discharge instructions  Description: Complete learning assessment and assess knowledge base.  Interventions:  - Provide teaching at level of understanding  - Provide teaching via preferred learning methods  6/19/2025 0241 by Jerome Moerlos RN  Outcome: Not Progressing  6/19/2025 0241 by Jerome Morelos RN  Outcome: Progressing     Problem: Alteration in Thoughts and Perception  Goal: Treatment Goal: Gain control of psychotic behaviors/thinking, reduce/eliminate presenting symptoms and demonstrate improved reality functioning upon discharge  6/19/2025 0241 by Jerome Morelos RN  Outcome: Not Progressing  6/19/2025 0241 by Jerome Morelos RN  Outcome: Progressing     Problem: Depression  Goal: Treatment Goal: Demonstrate behavioral control of depressive symptoms, verbalize feelings of improved mood/affect, and adopt new coping skills prior to discharge  6/19/2025 0241 by Jerome Morelos RN  Outcome: Not Progressing  6/19/2025 0241 by Jerome Morelos RN  Outcome: Progressing  Goal: Refrain from harming self  Description: Interventions:  - Monitor patient closely, per order   - Supervise medication ingestion, monitor effects and side effects   6/19/2025 0241 by Jerome Morelos RN  Outcome: Not Progressing  6/19/2025 0241 by Jerome Morelos RN  Outcome: Progressing  Goal:  Refrain from isolation  Description: Interventions:  - Develop a trusting relationship   - Encourage socialization   6/19/2025 0241 by Jerome Morelos RN  Outcome: Not Progressing  6/19/2025 0241 by Jerome Morelos RN  Outcome: Progressing     Problem: Anxiety  Goal: Anxiety is at manageable level  Description: Interventions:  - Assess and monitor patient's anxiety level.   - Monitor for signs and symptoms (heart palpitations, chest pain, shortness of breath, headaches, nausea, feeling jumpy, restlessness, irritable, apprehensive).   - Collaborate with interdisciplinary team and initiate plan and interventions as ordered.  - [place] patient to unit/surroundings  - Explain treatment plan  - Encourage participation in care  - Encourage verbalization of concerns/fears  - Identify coping mechanisms  - Assist in developing anxiety-reducing skills  - Administer/offer alternative therapies  - Limit or eliminate stimulants  6/19/2025 0241 by Jerome Morelos RN  Outcome: Not Progressing  6/19/2025 0241 by Jerome Morelos RN  Outcome: Progressing     Problem: Risk for Violence/Aggression Toward Others  Goal: Treatment Goal: Refrain from acts of violence/aggression during length of stay, and demonstrate improved impulse control at the time of discharge  6/19/2025 0241 by Jerome Morelos RN  Outcome: Not Progressing  6/19/2025 0241 by Jerome Morelos RN  Outcome: Progressing
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
## 2025-06-20 LAB
ATRIAL RATE: 79 BPM
CARBAMAZEPINE SERPL-MCNC: 8 UG/ML (ref 4–12)
FERRITIN SERPL-MCNC: 66 NG/ML (ref 30–307)
IRON SATN MFR SERPL: 23 % (ref 15–50)
IRON SERPL-MCNC: 62 UG/DL (ref 50–212)
P AXIS: 37 DEGREES
PR INTERVAL: 146 MS
QRS AXIS: 0 DEGREES
QRSD INTERVAL: 68 MS
QT INTERVAL: 370 MS
QTC INTERVAL: 424 MS
T WAVE AXIS: 90 DEGREES
TIBC SERPL-MCNC: 274.4 UG/DL (ref 250–450)
TRANSFERRIN SERPL-MCNC: 196 MG/DL (ref 203–362)
UIBC SERPL-MCNC: 212 UG/DL (ref 155–355)
VENTRICULAR RATE: 79 BPM

## 2025-06-20 PROCEDURE — 82728 ASSAY OF FERRITIN: CPT

## 2025-06-20 PROCEDURE — 97167 OT EVAL HIGH COMPLEX 60 MIN: CPT

## 2025-06-20 PROCEDURE — 99232 SBSQ HOSP IP/OBS MODERATE 35: CPT | Performed by: STUDENT IN AN ORGANIZED HEALTH CARE EDUCATION/TRAINING PROGRAM

## 2025-06-20 PROCEDURE — 93010 ELECTROCARDIOGRAM REPORT: CPT | Performed by: INTERNAL MEDICINE

## 2025-06-20 PROCEDURE — 80156 ASSAY CARBAMAZEPINE TOTAL: CPT

## 2025-06-20 PROCEDURE — 83540 ASSAY OF IRON: CPT

## 2025-06-20 PROCEDURE — 83550 IRON BINDING TEST: CPT

## 2025-06-20 RX ORDER — BISACODYL 10 MG
10 SUPPOSITORY, RECTAL RECTAL DAILY PRN
Status: DISCONTINUED | OUTPATIENT
Start: 2025-06-20 | End: 2025-06-25 | Stop reason: HOSPADM

## 2025-06-20 RX ORDER — NICOTINE 21 MG/24HR
1 PATCH, TRANSDERMAL 24 HOURS TRANSDERMAL DAILY
Status: DISCONTINUED | OUTPATIENT
Start: 2025-06-20 | End: 2025-06-25 | Stop reason: HOSPADM

## 2025-06-20 RX ORDER — GABAPENTIN 300 MG/1
300 CAPSULE ORAL 3 TIMES DAILY
Status: DISCONTINUED | OUTPATIENT
Start: 2025-06-20 | End: 2025-06-25 | Stop reason: HOSPADM

## 2025-06-20 RX ORDER — POLYETHYLENE GLYCOL 3350 17 G/17G
17 POWDER, FOR SOLUTION ORAL DAILY PRN
Status: DISCONTINUED | OUTPATIENT
Start: 2025-06-20 | End: 2025-06-25 | Stop reason: HOSPADM

## 2025-06-20 RX ADMIN — LEVETIRACETAM 1500 MG: 500 TABLET, FILM COATED ORAL at 21:07

## 2025-06-20 RX ADMIN — QUETIAPINE FUMARATE 12.5 MG: 25 TABLET ORAL at 21:07

## 2025-06-20 RX ADMIN — CYANOCOBALAMIN 1000 MCG: 1000 INJECTION INTRAMUSCULAR; SUBCUTANEOUS at 08:06

## 2025-06-20 RX ADMIN — GABAPENTIN 300 MG: 300 CAPSULE ORAL at 17:24

## 2025-06-20 RX ADMIN — METOPROLOL SUCCINATE 50 MG: 50 TABLET, EXTENDED RELEASE ORAL at 08:06

## 2025-06-20 RX ADMIN — ROPINIROLE 0.25 MG: 0.5 TABLET, FILM COATED ORAL at 21:07

## 2025-06-20 RX ADMIN — GABAPENTIN 300 MG: 300 CAPSULE ORAL at 21:07

## 2025-06-20 RX ADMIN — PROPRANOLOL HYDROCHLORIDE 5 MG: 10 TABLET ORAL at 04:55

## 2025-06-20 RX ADMIN — LEVETIRACETAM 1500 MG: 500 TABLET, FILM COATED ORAL at 08:06

## 2025-06-20 RX ADMIN — FOLIC ACID 1 MG: 1 TABLET ORAL at 08:06

## 2025-06-20 RX ADMIN — POLYETHYLENE GLYCOL 3350 17 G: 17 POWDER, FOR SOLUTION ORAL at 10:24

## 2025-06-20 RX ADMIN — CARBAMAZEPINE 200 MG: 200 TABLET ORAL at 17:24

## 2025-06-20 RX ADMIN — CARBAMAZEPINE 200 MG: 200 TABLET ORAL at 08:06

## 2025-06-20 RX ADMIN — ACETAMINOPHEN 650 MG: 325 TABLET ORAL at 21:47

## 2025-06-20 RX ADMIN — CARBAMAZEPINE 200 MG: 200 TABLET ORAL at 21:07

## 2025-06-20 RX ADMIN — BUDESONIDE AND FORMOTEROL FUMARATE DIHYDRATE 2 PUFF: 80; 4.5 AEROSOL RESPIRATORY (INHALATION) at 08:08

## 2025-06-20 RX ADMIN — BUDESONIDE AND FORMOTEROL FUMARATE DIHYDRATE 2 PUFF: 80; 4.5 AEROSOL RESPIRATORY (INHALATION) at 17:24

## 2025-06-20 RX ADMIN — Medication 3 MG: at 21:07

## 2025-06-20 RX ADMIN — GABAPENTIN 300 MG: 300 CAPSULE ORAL at 10:23

## 2025-06-20 RX ADMIN — ACETAMINOPHEN 975 MG: 325 TABLET, FILM COATED ORAL at 00:11

## 2025-06-20 RX ADMIN — NICOTINE 1 PATCH: 21 PATCH, EXTENDED RELEASE TRANSDERMAL at 15:24

## 2025-06-20 RX ADMIN — ESCITALOPRAM OXALATE 10 MG: 10 TABLET ORAL at 08:06

## 2025-06-20 RX ADMIN — ERGOCALCIFEROL 50000 UNITS: 1.25 CAPSULE ORAL at 08:06

## 2025-06-20 NOTE — PROGRESS NOTES
06/20/25 0900 06/20/25 1000 06/20/25 1100   Activity/Group Checklist   Group Other (Comment) Community meeting Music   Attendance Did not attend Did not attend Attended   Attendance Duration (min)  --   --  0-15  (late)   Interactions  --   --  Interacted appropriately   Affect/Mood  --   --  Calm;Blunted/flat   Goals Achieved  --   --  Able to listen to others;Able to engage in interactions      06/20/25 1330   Activity/Group Checklist   Group Other (Comment)  (Reflection and conversation cards)   Attendance Attended  (late)   Attendance Duration (min) 16-30   Interactions Did not interact   Affect/Mood Blunted/flat   Goals Achieved Identified triggers;Identified feelings;Discussed coping strategies;Discussed self-esteem issues;Able to listen to others

## 2025-06-20 NOTE — ASSESSMENT & PLAN NOTE
Reporting consistent symptoms of restless leg syndrome   Requip 0.25 mg at bed time, started 6/19, will watch for worsening psychotic symptoms   Will check iron panel, pending at this time     used

## 2025-06-20 NOTE — NURSING NOTE
Patient was visible in the milieu doing word search with minimal peer interaction. Denies all psych s/s. No behaviors noted. No c/o pain. No needs expressed. Had 100% for dinner.  Took her medications. Safety checks ongoing.

## 2025-06-20 NOTE — ASSESSMENT & PLAN NOTE
[facility] 14/30 on 6/19/2025  Likely will benefit from discharge with VNA/HHA or placement into SNF  PT, OT cog pending

## 2025-06-20 NOTE — PLAN OF CARE
Problem: SAFETY ADULT  Goal: Patient will remain free of falls  Description: INTERVENTIONS:  - Educate patient/family on patient safety including physical limitations  - Instruct patient to call for assistance with activity   - Consider consulting OT/PT to assist with strengthening/mobility based on AM PAC & JH-HLM score  - Consult OT/PT to assist with strengthening/mobility   - Keep Call bell within reach  - Keep bed low and locked with side rails adjusted as appropriate  - Keep care items and personal belongings within reach  - Initiate and maintain comfort rounds  - Make Fall Risk Sign visible to staff  - Offer Toileting every 2 Hours, in advance of need  - Initiate/Maintain bed alarm  - Obtain necessary fall risk management equipment: walker  - Apply yellow socks and bracelet for high fall risk patients  - Consider moving patient to room near nurses station  Outcome: Progressing     Problem: Depression  Goal: Treatment Goal: Demonstrate behavioral control of depressive symptoms, verbalize feelings of improved mood/affect, and adopt new coping skills prior to discharge  Outcome: Progressing  Goal: Refrain from harming self  Description: Interventions:  - Monitor patient closely, per order   - Supervise medication ingestion, monitor effects and side effects   Outcome: Progressing  Goal: Refrain from isolation  Description: Interventions:  - Develop a trusting relationship   - Encourage socialization   Outcome: Progressing

## 2025-06-20 NOTE — NURSING NOTE
"Patient had poor sleep this shift. She stated \"My roommate kept me up all night.\" Declined PRN medication when offered throughout the night.   "

## 2025-06-20 NOTE — PROGRESS NOTES
06/20/25 1712   Admission   Release of Information Signed Yes  (Rodney Vane (brother) [phone]; [facility]; [facility], [place])

## 2025-06-20 NOTE — PLAN OF CARE
"  Problem: OCCUPATIONAL THERAPY ADULT  Goal: Performs self-care activities at highest level of function for planned discharge setting.  See evaluation for individualized goals.  Description: Treatment Interventions: ADL retraining, Continued evaluation, Energy conservation, Activityengagement, Cognitive reorientation          See flowsheet documentation for full assessment, interventions and recommendations.   Outcome: Progressing  Note: Limitation: Decreased high-level ADLs, Decreased cognition, Decreased Safe judgement during ADL  Prognosis: Fair  Assessment: Pt is a 64 y.o. female seen for OT evaluation s/p admit to [facility] on 6/18/2025 w/ MDD (major depressive disorder), recurrent, severe, with psychosis (HCC).  See medical history above for extensive list of comorbidities affecting pt's functional performance at time of assessment. Personal factors affecting Pt at time of IE include:behavioral pattern, difficulty performing IADLS , and health management . Upon evaluation: Pt independent for transfer and ambulation. Pt does have decreased standing tolerance reporting need to sit frequently. Pt reports using a cane at home. Pt irritable at times, difficult to engage in certain tasks and questions. Pt reports frustrations with brother and perseverative on this- states he usually helps with meds including he picks them up and fills her pill box, but has \"cut her off.\" Pt identified 3/3 home safety scenarios. Pt able to verbalize response in an emergency. Pt completed full ADL without assist. See below for discharge recommendations.      Rehab Resource Intensity Level, OT:  (see note)          "

## 2025-06-20 NOTE — ASSESSMENT & PLAN NOTE
Keppra 1500 mg BID  Tegretol 200 mg TID   Tegretol level ordered for 6/20/25, pending at this time

## 2025-06-20 NOTE — NURSING NOTE
Pt refused breakfast, fair appetite at lunch.  Med-compliant with irritable edge but no agitation.  Withdrawn to room but attended group.  VSS.  Denied SI.  Monitored for safety and support.

## 2025-06-20 NOTE — OCCUPATIONAL THERAPY NOTE
Occupational Therapy Cognitive Evaluation     Patient Name: Cydney Lim  Today's Date: 6/20/2025  Problem List  Principal Problem:    MDD (major depressive disorder), recurrent, severe, with psychosis (HCC)  Active Problems:    Epilepsy with altered consciousness with intractable epilepsy (HCC)    Morbidly obese (HCC)    Restless leg syndrome    Vitamin D deficiency    Essential hypertension    Stage 3 chronic kidney disease (HCC)    Medical clearance for psychiatric admission    Major neurocognitive disorder (HCC)    B12 deficiency    Past Medical History  Past Medical History[1]  Past Surgical History  Past Surgical History[2]          06/20/25 1016   OT Last Visit   OT Visit Date 06/20/25   Note Type   Note type Evaluation   Pain Assessment   Pain Assessment Tool 0-10   Pain Score No Pain   Restrictions/Precautions   Weight Bearing Precautions Per Order No   Other Precautions Cognitive   Home Living   Type of Home House   Home Layout One level   Bathroom Shower/Tub Walk-in shower   Bathroom Toilet Standard   Home Equipment Cane   Prior Function   Level of [place] Independent with ADLs;Needs assistance with IADLS   Lives With Alone   Receives Help From Family  (brother)   ADL   Grooming Assistance 6  Modified Independent   UB Bathing Assistance 6  Modified Independent   LB Bathing Assistance 6  Modified Independent   UB Dressing Assistance 6  Modified independent   LB Dressing Assistance 6  Modified independent   Toileting Assistance  6  Modified independent   Transfers   Sit to Stand 7  Independent   Stand to Sit 7  Independent   Functional Mobility   Functional Mobility 7  Independent   Balance   Static Sitting Good   Dynamic Sitting Fair +   Static Standing Fair   Dynamic Standing Fair   Ambulatory Fair   Activity Tolerance   Activity Tolerance Patient tolerated treatment well   RUE Assessment   RUE Assessment WFL   LUE Assessment   LUE Assessment WFL   Hand Function   Gross Motor Coordination  Functional   Sensation   Light Touch No apparent deficits   Proprioception   Proprioception No apparent deficits   Perception   Inattention/Neglect Appears intact   Cognition   Arousal/Participation Alert   Attention Attends with cues to redirect   Orientation Level Oriented to person;Oriented to place;Oriented to time   Memory Within functional limits   Following Commands Follows one step commands without difficulty   Cognition Assessment Tools ACLS   Score   4.8    Administered [place] Cognitive Level Screen (ACLS).  The patient scored 4.8/6.0 indicating that they may live alone with daily assistance to monitor personal safety and check problem solving effectiveness.      Behavior:  Asks for verification.  Knows how 2 concurrent schedules fit together.  Fits daily routine into schedule of others.  Can avoid obvious hazards.  More flexible and willing to adapt to new ideas.  Insight into disability is fair.  Processing is delayed.  May be able to get to a regularly scheduled appointment without need for assistance. May frequently stop a task to examine objects. Seeks verification from others.  Grooming:  Checks results of grooming, hair styling and make up application.  Learns new grooming procedures slowly.  May rigidly follow prescribed routines.  Dressing: Considers all striking features of garments including color, pattern, condition and fit.  May not attend to cleanliness or consider social standards.  May don clothing slowly and check results in mirror.  Bathing:  May coordinate bathing schedule with others.  Attends to all features of bathing environment.  May check results and vary rate.  Reports low supplies to caregivers.   Walking/Exercising:  Learns new routes over several days.  Scans environment, reads street signs and attempts to use this information but may still get lost.  Understands explanations of safety hazards.  Learns an exercise program and does it without variation.  Eating:  Eats and attends  to social conversation though may not be able to talk and eat at the same time.  Manages all utensils.  Follows a meal time schedule.  May be reminded to check food temperatures.  Toileting:  Independently performs usual toileting routine.  May not anticipate use of bathroom before trips.  May learn bathroom etiquette like conserving paper.  Medications:  Follows new prescriptions slowly.  Considers variables like time of day and amount but with difficulty.  Follows verbal explanations.  May follow a set schedule set up by others for renewing prescriptions.  Utilize a daily pill box (set up by someone else).  Use of adaptive equipment:  Recognizes loss of strength, sensation and balance.  May learn a series of new actions slowly.    Housekeeping:  Initiates and completes a routine of housekeeping activities.  Learns new procedures slowly.  Notes all visible effects of cleaning and checking results.  Corrects visible errors one at a time.  May not anticipate supplies but reports low supplies to caregiver.  May not identify potential hazards related to electric, gas, toxins, poisons and improper storage/disposal of items.  Food preparation:  Does not plan for long term food needs.  May learn to follow a plan for purchasing food items.  May follow a weekly schedule for shopping.  Memorizes a new sequence of actions to prepare dishes.  Follows a written recipe in a rigid manner.  Check stove after use.  Spending Money:  Slowly follows budget or learns money management procedures.  May need assistance to adjust to change in income or usual banking procedures.  Shopping: Learns directions to new shopping areas.  May learn to use a shopping list or use money saving procedures like coupons.  May not read labels or consider whether it is practical or affordable.  Laundry:  Completes laundry routine in a fixed manner.  Memorizes procedures like using a new [employer].  May try to read new labels but needs assistance in  "interpreting them.  May overload machine.  Traveling:  Learns new routes or travel procedures slowly.  May learn to use a wheelchair but has difficulty maneuvering or estimating space requirements.  Does not anticipate hazards.  May follow safety precautions pointed out by others.  Telephone:  Learns to use a new telephone answering machine or pay phone slowly.  Does not vary actions. May memorize correct times to call others.   Driving:  Should NOT operate a motor vehicle                Assessment   Limitation Decreased high-level ADLs;Decreased cognition;Decreased Safe judgement during ADL   Prognosis Fair   Assessment   Pt is a 64 y.o. female seen for OT evaluation s/p admit to [facility] on 6/18/2025 w/ MDD (major depressive disorder), recurrent, severe, with psychosis (HCC).  See medical history above for extensive list of comorbidities affecting pt's functional performance at time of assessment. Personal factors affecting Pt at time of IE include:behavioral pattern, difficulty performing IADLS , and health management . Upon evaluation: Pt independent for transfer and ambulation. Pt does have decreased standing tolerance reporting need to sit frequently. Pt reports using a cane at home. Pt irritable at times, difficult to engage in certain tasks and questions. Pt reports frustrations with brother and perseverative on this- states he usually helps with meds including he picks them up and fills her pill box, but has \"cut her off.\" Pt identified 3/3 home safety scenarios. Pt able to verbalize response in an emergency. Pt completed full ADL without assist. See below for discharge recommendations.      Goals   STG Time Frame   (2 weeks)   Short Term Goals Pt will complete IADL Donna/I.   Pt will verbalize 3 positive coping strategies to utilize outside of the hospital.      Plan   Treatment Interventions ADL retraining;Continued evaluation;Energy conservation;Activityengagement;Cognitive reorientation   Goal Expiration " Date 07/04/25   OT Frequency 1-2x/wk   Discharge Recommendation   Rehab Resource Intensity Level, OT   The patient scored 4.8/6.0 indicating that they may live alone with daily assistance to monitor personal safety and check problem solving effectiveness.        Pt able to complete ADLs without assist. Recommend daily checks for safety and oversight with medication compliance. Recommend microwave meals only.                [1]   Past Medical History:  Diagnosis Date    Ambulatory dysfunction     B12 deficiency     CKD (chronic kidney disease), stage III (HCC)     Depression     EP (epilepsy) (HCC)     Epilepsy (HCC)     History of sinus tachycardia     Hypertension     Morbid obesity (HCC)     Obesity     Restless leg     Restless leg syndrome     Vitamin D deficiency    [2]   Past Surgical History:  Procedure Laterality Date    DENTAL SURGERY      all teeth removed    DENTAL SURGERY      LASER ABLATION OF THE CERVIX      MAMMO (HISTORICAL)  05/01/2017

## 2025-06-20 NOTE — PROGRESS NOTES
Progress Note - Behavioral Health   Name: Cydney Lim 64 y.o. female I MRN: [id number]  Unit/Bed#: [facility] 641-01 I Date of Admission: 6/18/2025   Date of Service: 6/20/2025 I Hospital Day: 2    Assessment & Plan  MDD (major depressive disorder), recurrent, severe, with psychosis (HCC)  Cydney Lim is a 64-year-old female with pertinent psychiatric history of major depressive disorder with psychotic features as well as multiple inpatient psychiatric admissions who presented to [facility] ED due to suicidal ideation with plan to overdose on medication. Recent stressors included family conflict with her brother who is her current rep-payee and has been limiting access to her social security income. She notes worsening dysphoric mood as a result and signed a voluntary 201 for admission, presently admitted to [facility] for psychiatric stabilization.     Plan as follows:  Continue Seroquel 12.5 mg daily for mood, appetite, and sleep - started 6/19  Continue Lexapro 10 mg daily for mood and anxiety   Upward titration as indicated   Continue Tegretol 200 mg TID for seizure prophylaxis and added benefit of mood stabilization   Tegretol level ordered for 6/20, pending at this time   Start Gabapentin 300 mg TID for anxiety and restlessness   Melatonin 3 mg to regulate sleep-wake cycle   Discontinued Abilify 5 mg as of 6/19 due to ongoing restless legs, medication could be potentially worsening symptoms   Expand collateral information  PT, OT, and OT cog eval pending with potential VHA/HHA referral versus SNF placement   Major neurocognitive disorder (HCC)  SLUMS 14/30 on 6/19/2025  Likely will benefit from discharge with VNA/HHA or placement into SNF  PT, OT cog pending   Restless leg syndrome  Reporting consistent symptoms of restless leg syndrome   Requip 0.25 mg at bed time, started 6/19, will watch for worsening psychotic symptoms   Will check iron panel, pending at this time   Epilepsy with altered consciousness with  intractable epilepsy (HCC)  Keppra 1500 mg BID  Tegretol 200 mg TID   Tegretol level ordered for 6/20/25, pending at this time   Stage 3 chronic kidney disease (HCC)  Lab Results   Component Value Date    EGFR 54 06/19/2025    EGFR 49 06/18/2025    EGFR 50 06/15/2025    CREATININE 1.07 06/19/2025    CREATININE 1.16 06/18/2025    CREATININE 1.14 06/15/2025   Per SLIM   Avoid nephrotoxic agents   Morbidly obese (HCC)  Encourage lifestyle modifcations  Vitamin D deficiency  Vitamin D2 50,000 units once weekly for 8 total doses   Repeat as outpatient   Essential hypertension  Blood Pressure: 142/73  Per SLIM   Medical clearance for psychiatric admission  Per SLIM  B12 deficiency  Received IM B-12 1000 mcg on 6/20  Continue B12 supplementation     Current Medications:    Current Facility-Administered Medications:     budesonide-formoterol (SYMBICORT) 80-4.5 MCG/ACT inhaler 2 puff, Inhalation, BID    carBAMazepine (TEGretol) tablet 200 mg, Oral, TID    [START ON 6/21/2025] cyanocobalamin (VITAMIN B-12) tablet 1,000 mcg, Oral, Daily    ergocalciferol (VITAMIN D2) capsule 50,000 Units, Oral, Weekly    escitalopram (LEXAPRO) tablet 10 mg, Oral, Daily    folic acid (FOLVITE) tablet 1 mg, Oral, Daily    gabapentin (NEURONTIN) capsule 300 mg, Oral, TID    levETIRAcetam (KEPPRA) tablet 1,500 mg, Oral, Q12H CHINMAY    melatonin tablet 3 mg, Oral, HS    metoprolol succinate (TOPROL-XL) 24 hr tablet 50 mg, Oral, Daily    QUEtiapine (SEROquel) tablet 12.5 mg, Oral, HS    rOPINIRole (REQUIP) tablet 0.25 mg, Oral, HS      Risks/Benefits of Treatment:     Risks, benefits, and possible side effects of medications explained to patient and patient verbalizes understanding and agreement for treatment.    Progress Toward Goals: progressing, still irritable    Treatment Planning:      - Encourage early mobility and having a structured day  - Provide frequent re-orientation, and cognitive stimulation  - Ensure assistive devices are in proper  "working order (eye-glasses, hearing aids)  - Encourage adequate hydration, nutrition and monitor bowel movements  - Maintain sleep-wake cycle: Uninterrupted sleep time; low-level lighting at night  - Fall precaution  - Seizure precaution  - Fall precaution  - Follow up with SLIM regarding the medical problems   - Follow up with PT/OT evaluation and recommendations  - OT Cognitive Evaluation  - Continue medication titration and treatment plan; adjust medication to optimize treatment response and as clinically indicated.   - Observation: Routine  - VS: as per unit protocol  - Encourage group attendance and milieu therapy  - Dispo: To be determined  - Estimated Discharge Day: 7/3/2025       Subjective     Patient was visited on unit for continuing care; chart reviewed and discussed with multidisciplinary treatment team.  On approach, the patient was calm but guarded and dismissive. She describes her mood as \"okay.\" She required a lot of encouragement to participate in assessment questions. Remains preoccupied with somatic sxs and medications. Denied any changes in appetite, and energy level. She did not eat her breakfast this morning however, stating there was nothing on the tray that she wanted. Per staff, patient refused to allow staff to help her with ordering meals. Strong encouragement provided by this writer to be compliance and staff alerted that patient was willing to participate in ordering now. Reported interrupted sleep. States that she is having issues with ongoing restless legs, amenable to starting standing Gabapentin TID for this. Denied A/VH currently.  Denied SI/HI, intent or plan upon direct inquiry at this time.    Patient continues to be withdrawn with minimal interactions with staff and peers. She is agitated at times and slamming doors. Received Tylenol 975 mg at 0011 and propranolol 10 mg at 0455.    Patient accepted all offered medications and no adverse effects of medications noted or reported. " "Remains on fall precaution.    Sleep: poor sleep, reporting being up every hour  Appetite: normal, though refused breakfast due to not allowing staff to complete her menu selections  Medication side effects: No  ROS: review of systems as noted above in HPI/Subjective report, all other systems are negative    Objective :  Temp:  [97.3 °F (36.3 °C)-97.8 °F (36.6 °C)] 97.7 °F (36.5 °C)  HR:  [62-91] 62  BP: (137-146)/(63-81) 142/73  Resp:  [16-18] 17  SpO2:  [92 %-97 %] 94 %  O2 Device: None (Room air)    Mental Status Evaluation:    Appearance:  Older than stated age, marginal hygiene, lying in bed, covered in blanket, poor eye contact   Behavior:  Guarded, irritable, dismissive   Speech:  Spontaneous, scant    Mood:  \"Okay\"   Affect:  Irritable, mood incongruent   Thought Process:  Decreased rate of thoughts, concrete   Thought Content:  mild paranoia, negative thinking, ruminations   Perceptual Disturbances: no auditory hallucinations, no visual hallucinations   Risk Potential: Suicidal Ideation -  None  Homicidal Ideation -  None  Potential for Aggression - Yes, due to history of violence   Sensorium:  unable to assess   Memory:  recent and remote memory grossly intact   Consciousness:  drowsy   Attention/Concentration: attention span and concentration appear shorter than expected for age   Insight:  limited   Judgment: limited   Gait/Station: Lying in bed   Motor Activity: no abnormal movements     Cognitive Assessment : - SLUMS: 14/30 on 6/19/2025        Lab Results: I have reviewed the following results:  Results from the past 24 hours: No results found for this or any previous visit (from the past 24 hours).    Administrative Statements     Counseling / Coordination of Care:   Patient's progress discussed with staff in treatment team meeting.  Medication changes reviewed with staff in treatment team meeting.    Ayla Cooper DO 06/20/25  "

## 2025-06-20 NOTE — NURSING NOTE
"Patient c/o restless legs and yelled in the hallway \"I have to have something for this during the day! Not just at night!\" Patient provided education on propanolol and was agreeable to trying medication for restlessness. PRN propanolol 5 mg administered at 0455. Patient asked \"Can't I just go home? I came here on my own. I should be able to leave.\" Encouraged to talk to treatment team in the morning.   "

## 2025-06-20 NOTE — PROGRESS NOTES
06/20/25 0813   Team Meeting   Meeting Type Daily Rounds   Initial Conference Date 06/20/25   Next Conference Date 06/23/25   Team Members Present   Team Members Present Physician;Nurse;;   Physician Team Member Foster/Carolyn/Rosy/Armin   Nursing Team Member Myra   Care Management Team Member Prasanth   Social Work Team Member Jesus   Patient/Family Present   Patient Present No   Patient's Family Present No     Labs pending, agitated, slamming doors, depressed, PRN tylenol, propanolol OT/PT cog in, refusing to fill out Menu, refused to do assessment yesterday

## 2025-06-20 NOTE — ASSESSMENT & PLAN NOTE
Cydney Lim is a 64-year-old female with pertinent psychiatric history of major depressive disorder with psychotic features as well as multiple inpatient psychiatric admissions who presented [place] [facility] due to suicidal ideation with plan to overdose on medication. Recent stressors included family conflict with her brother who is her current rep-payee and has been limiting access to her social security income. She notes worsening dysphoric mood as a result and signed a voluntary 201 for admission, [facility].     Plan as follows:  Continue Seroquel 12.5 mg daily for mood, appetite, and sleep - started 6/19  Continue Lexapro 10 mg daily for mood and anxiety   Upward titration as indicated   Continue Tegretol 200 mg TID for seizure prophylaxis and added benefit of mood stabilization   Tegretol level ordered for 6/20, pending at this time   Start Gabapentin 300 mg TID for anxiety and restlessness   Melatonin 3 mg to regulate sleep-wake cycle   Discontinued Abilify 5 mg as of 6/19 due to ongoing restless legs, medication could be potentially worsening symptoms   Expand collateral information  PT, OT, and OT cog eval pending with potential A/A referral versus SNF placement

## 2025-06-21 PROCEDURE — 99232 SBSQ HOSP IP/OBS MODERATE 35: CPT | Performed by: PSYCHIATRY & NEUROLOGY

## 2025-06-21 RX ADMIN — GABAPENTIN 300 MG: 300 CAPSULE ORAL at 09:01

## 2025-06-21 RX ADMIN — CARBAMAZEPINE 200 MG: 200 TABLET ORAL at 21:46

## 2025-06-21 RX ADMIN — ESCITALOPRAM OXALATE 10 MG: 10 TABLET ORAL at 09:00

## 2025-06-21 RX ADMIN — GABAPENTIN 300 MG: 300 CAPSULE ORAL at 16:00

## 2025-06-21 RX ADMIN — ROPINIROLE 0.25 MG: 0.5 TABLET, FILM COATED ORAL at 21:45

## 2025-06-21 RX ADMIN — LEVETIRACETAM 1500 MG: 500 TABLET, FILM COATED ORAL at 21:46

## 2025-06-21 RX ADMIN — ACETAMINOPHEN 650 MG: 325 TABLET ORAL at 17:46

## 2025-06-21 RX ADMIN — NICOTINE 1 PATCH: 21 PATCH, EXTENDED RELEASE TRANSDERMAL at 09:02

## 2025-06-21 RX ADMIN — FOLIC ACID 1 MG: 1 TABLET ORAL at 09:01

## 2025-06-21 RX ADMIN — Medication 3 MG: at 21:46

## 2025-06-21 RX ADMIN — BUDESONIDE AND FORMOTEROL FUMARATE DIHYDRATE 2 PUFF: 80; 4.5 AEROSOL RESPIRATORY (INHALATION) at 09:00

## 2025-06-21 RX ADMIN — BUDESONIDE AND FORMOTEROL FUMARATE DIHYDRATE 2 PUFF: 80; 4.5 AEROSOL RESPIRATORY (INHALATION) at 17:04

## 2025-06-21 RX ADMIN — CARBAMAZEPINE 200 MG: 200 TABLET ORAL at 09:00

## 2025-06-21 RX ADMIN — CARBAMAZEPINE 200 MG: 200 TABLET ORAL at 16:00

## 2025-06-21 RX ADMIN — GABAPENTIN 300 MG: 300 CAPSULE ORAL at 21:46

## 2025-06-21 RX ADMIN — Medication 1000 MCG: at 09:04

## 2025-06-21 RX ADMIN — QUETIAPINE FUMARATE 12.5 MG: 25 TABLET ORAL at 21:46

## 2025-06-21 RX ADMIN — LEVETIRACETAM 1500 MG: 500 TABLET, FILM COATED ORAL at 09:01

## 2025-06-21 NOTE — ASSESSMENT & PLAN NOTE
Cydney Lim is a 64-year-old female with pertinent psychiatric history of major depressive disorder with psychotic features as well as multiple inpatient psychiatric admissions who presented to [facility] ED due to suicidal ideation with plan to overdose on medication. Recent stressors included family conflict with her brother who is her current rep-payee and has been limiting access to her social security income. She notes worsening dysphoric mood as a result and signed a voluntary 201 for admission, presently admitted to \[facility]\"" 6B for psychiatric stabilization.     Plan as follows:  Continue Seroquel 12.5 mg daily for mood, appetite, and sleep - started 6/19  Continue Lexapro 10 mg daily for mood and anxiety   Upward titration as indicated   Continue Tegretol 200 mg TID for seizure prophylaxis and added benefit of mood stabilization   Tegretol level ordered for 6/20, pending at this time   Gabapentin 300 mg TID for anxiety and restlessness   Melatonin 3 mg to regulate sleep-wake cycle   Discontinued Abilify 5 mg as of 6/19 due to ongoing restless legs, medication could be potentially worsening symptoms   Expand collateral information  PT, and OT eval pending with potential A/A referral versus SNF placement   OT Cognitive Evaluation impression: The patient scored 4.8/6.0 indicating that they may live alone with daily assistance to monitor personal safety and check problem solving effectiveness.

## 2025-06-21 NOTE — PLAN OF CARE
1st attempt to contact patient to discuss urine culture results. No answer, VM left.         Results for orders placed or performed in visit on 02/28/24   CULTURE, URINE    Specimen: Urine, Clean Catch   Result Value Ref Range    Urine Culture, Routine No growth    POCT Urinalysis, Dipstick, Automated, W/O Scope   Result Value Ref Range    POC Blood, Urine Positive (A) Negative    POC Bilirubin, Urine Negative Negative    POC Urobilinogen, Urine normal 0.1 - 1.1    POC Ketones, Urine Negative Negative    POC Protein, Urine Negative Negative    POC Nitrates, Urine Negative Negative    POC Glucose, Urine Negative Negative    pH, UA 5.0     POC Specific Gravity, Urine 1.010 1.003 - 1.029    POC Leukocytes, Urine Negative Negative            Problem: SAFETY ADULT  Goal: Patient will remain free of falls  Description: INTERVENTIONS:  - Educate patient/family on patient safety including physical limitations  - Instruct patient to call for assistance with activity   - Consider consulting OT/PT to assist with strengthening/mobility based on AM PAC & JH-HLM score  - Consult OT/PT to assist with strengthening/mobility   - Keep Call bell within reach  - Keep bed low and locked with side rails adjusted as appropriate  - Keep care items and personal belongings within reach  - Initiate and maintain comfort rounds  - Make Fall Risk Sign visible to staff  - Offer Toileting every 2 Hours, in advance of need  - Initiate/Maintain bed alarm  - Obtain necessary fall risk management equipment: walker  - Apply yellow socks and bracelet for high fall risk patients  - Consider moving patient to room near nurses station  6/21/2025 0159 by Jerome Morelos RN  Outcome: Progressing  6/21/2025 0159 by Jerome Morelos RN  Outcome: Progressing     Problem: Risk for Self Injury/Neglect  Goal: Treatment Goal: Remain safe during length of stay, learn and adopt new coping skills, and be free of self-injurious ideation, impulses and acts at the time of discharge  6/21/2025 0159 by Jerome Morelos RN  Outcome: Progressing  6/21/2025 0159 by Jerome Morelos RN  Outcome: Progressing     Problem: Risk for Violence/Aggression Toward Others  Goal: Treatment Goal: Refrain from acts of violence/aggression during length of stay, and demonstrate improved impulse control at the time of discharge  6/21/2025 0159 by Jerome Morelos RN  Outcome: Progressing  6/21/2025 0159 by Jerome Morelos RN  Outcome: Progressing

## 2025-06-21 NOTE — ASSESSMENT & PLAN NOTE
Reporting consistent symptoms of restless leg syndrome   Requip 0.25 mg at bed time, started 6/19, will watch for worsening psychotic symptoms   Will check iron panel, pending at this time

## 2025-06-21 NOTE — NURSING NOTE
"Patient is medication and meal compliant. No complaints of pain or discomfort.  Patient reports improvement in all symptoms. Patient further reports that she was here to get her medications \"straightened out\" and this has been done and she is ready to go home. Patient denies current SI, AH or VH. Will continue to monitor patient for changes in mood or behavior.  "

## 2025-06-21 NOTE — ASSESSMENT & PLAN NOTE
[facility] 14/30 on 6/19/2025  Likely will benefit from discharge with VNA/HHA or placement into SNF  PT, OT cog pending

## 2025-06-21 NOTE — PROGRESS NOTES
Progress Note - Behavioral Health   Name: Cydney Lim 64 y.o. female I MRN: [id number]  Unit/Bed#: [facility] 641-01 I Date of Admission: 6/18/2025   Date of Service: 6/21/2025 I Hospital Day: 3    Assessment & Plan  MDD (major depressive disorder), recurrent, severe, with psychosis (HCC)  Cydney Lim is a 64-year-old female with pertinent psychiatric history of major depressive disorder with psychotic features as well as multiple inpatient psychiatric admissions who presented to [facility] ED due to suicidal ideation with plan to overdose on medication. Recent stressors included family conflict with her brother who is her current rep-payee and has been limiting access to her social security income. She notes worsening dysphoric mood as a result and signed a voluntary 201 for admission, presently admitted to [facility] 6B for psychiatric stabilization.     Plan as follows:  Continue Seroquel 12.5 mg daily for mood, appetite, and sleep - started 6/19  Continue Lexapro 10 mg daily for mood and anxiety   Upward titration as indicated   Continue Tegretol 200 mg TID for seizure prophylaxis and added benefit of mood stabilization   Tegretol level ordered for 6/20, pending at this time   Gabapentin 300 mg TID for anxiety and restlessness   Melatonin 3 mg to regulate sleep-wake cycle   Discontinued Abilify 5 mg as of 6/19 due to ongoing restless legs, medication could be potentially worsening symptoms   Expand collateral information  PT, and OT eval pending with potential VHA/HHA referral versus SNF placement   OT Cognitive Evaluation impression: The patient scored 4.8/6.0 indicating that they may live alone with daily assistance to monitor personal safety and check problem solving effectiveness.    Major neurocognitive disorder (HCC)  [facility] 14/30 on 6/19/2025  Likely will benefit from discharge with VNA/HHA or placement into SNF  PT, OT cog pending   Restless leg syndrome  Reporting consistent symptoms of restless leg syndrome   Requip  0.25 mg at bed time, started 6/19, will watch for worsening psychotic symptoms   Will check iron panel, pending at this time   Epilepsy with altered consciousness with intractable epilepsy (HCC)  Keppra 1500 mg BID  Tegretol 200 mg TID   Tegretol level ordered for 6/20/25, pending at this time   Stage 3 chronic kidney disease (HCC)  Lab Results   Component Value Date    EGFR 54 06/19/2025    EGFR 49 06/18/2025    EGFR 50 06/15/2025    CREATININE 1.07 06/19/2025    CREATININE 1.16 06/18/2025    CREATININE 1.14 06/15/2025   Per [facility]   Avoid nephrotoxic agents   Morbidly obese (HCC)  Encourage lifestyle modifcations  Vitamin D deficiency  Vitamin D2 50,000 units once weekly for 8 total doses   Repeat as outpatient   Essential hypertension  Blood Pressure: 109/63  Per [facility]   Medical clearance for psychiatric admission  Per [facility]  B12 deficiency  Received IM B-12 1000 mcg on 6/20  Continue B12 supplementation     Current Medications:    Current Facility-Administered Medications:     budesonide-formoterol (SYMBICORT) 80-4.5 MCG/ACT inhaler 2 puff, Inhalation, BID    carBAMazepine (TEGretol) tablet 200 mg, Oral, TID    cyanocobalamin (VITAMIN B-12) tablet 1,000 mcg, Oral, Daily    ergocalciferol (VITAMIN D2) capsule 50,000 Units, Oral, Weekly    escitalopram (LEXAPRO) tablet 10 mg, Oral, Daily    folic acid (FOLVITE) tablet 1 mg, Oral, Daily    gabapentin (NEURONTIN) capsule 300 mg, Oral, TID    levETIRAcetam (KEPPRA) tablet 1,500 mg, Oral, Q12H CHINMAY    melatonin tablet 3 mg, Oral, HS    metoprolol succinate (TOPROL-XL) 24 hr tablet 50 mg, Oral, Daily    nicotine (NICODERM CQ) 21 mg/24 hr TD 24 hr patch 1 patch, Transdermal, Daily    QUEtiapine (SEROquel) tablet 12.5 mg, Oral, HS    rOPINIRole (REQUIP) tablet 0.25 mg, Oral, HS      Risks/Benefits of Treatment:     Risks, benefits, and possible side effects of medications explained to patient. Patient has limited understanding of risks and benefits of treatment at this time  and needs ongoing explanation of treatment benefits and treatment plan.    Progress Toward Goals: progressing    Treatment Planning:      - Encourage early mobility and having a structured day  - Provide frequent re-orientation, and cognitive stimulation  - Ensure assistive devices are in proper working order (eye-glasses, hearing aids)  - Encourage adequate hydration, nutrition and monitor bowel movements  - Maintain sleep-wake cycle: Uninterrupted sleep time; low-level lighting at night  - Fall precaution  - Follow up with [facility] regarding the medical problems   - Continue medication titration and treatment plan; adjust medication to optimize treatment response and as clinically indicated.   - Observation: Routine  - VS: as per unit protocol  - Encourage group attendance and milieu therapy  - Dispo: To be determined  - Estimated Discharge Day: 7/3/2025   - Legal Status : Voluntary 201 commitment.  - Long Stay Certification : Not Applicable    Subjective     Patient was visited on unit for continuing care; chart reviewed and discussed with multidisciplinary treatment team.  On approach, the patient was guarded and superficially cooperative. She denies all symptoms and is currently preoccupied with discharge. Education provided as well as information given about recent OT Cog Eval and need for assistance upon discharge stating that she understands will need to reeducate as needed. No changes to be made at this time, Gabapentin recently increased to manage anxiety. Negative and ruminating thoughts ongoing but more redirectable. Denied any changes in appetite, and energy level. No problem initiating and maintaining sleep.  Denied A/VH currently.  Denied active SI/HI, intent or plan upon direct inquiry at this time. The patient consented for safety and agreed to verbalize any frustration or concerns to the nursing staff, immediately.    Patient continues to be visible in the milieu and interacts with staff and peers. No  reports of aggression or self-injurious behavior on unit. No PRN medications used in the past 24 hours.    Patient accepted all offered medications and no adverse effects of medications noted or reported.    Sleep: normal  Appetite: normal  Medication side effects: No  ROS: review of systems as noted above in HPI/Subjective report, all other systems are negative    Objective :  Temp:  [96.6 °F (35.9 °C)-97.5 °F (36.4 °C)] 97.4 °F (36.3 °C)  HR:  [57-89] 57  BP: (109-142)/(63-74) 109/63  Resp:  [16-18] 17  SpO2:  [92 %-98 %] 92 %  O2 Device: None (Room air)    Mental Status Evaluation:    Appearance:  adequate grooming, dressed appropriately   Behavior:  guarded, restless   Speech:  normal rate and volume, delayed, perseverative   Mood:  irritable   Affect:  constricted   Thought Process:  thought blocking, concrete   Thought Content:  paranoid ideation   Perceptual Disturbances: denies auditory hallucinations when asked, does not appear responding to internal stimuli   Risk Potential: Suicidal Ideation -  None  Homicidal Ideation -  None  Potential for Aggression - No   Sensorium:  oriented to person, place, and time/date   Memory:  recent and remote memory grossly intact   Consciousness:  alert and awake   Attention/Concentration: attention span and concentration are age appropriate   Insight:  limited   Judgment: limited   Gait/Station: normal gait/station   Motor Activity: no abnormal movements               Lab Results: I have reviewed the following results:  Results from the past 24 hours: No results found for this or any previous visit (from the past 24 hours).  Most Recent Labs:   Lab Results   Component Value Date    WBC 10.04 06/19/2025    RBC 3.85 06/19/2025    HGB 12.1 06/19/2025    HCT 37.9 06/19/2025     06/19/2025    RDW 13.7 06/19/2025    NEUTROABS 5.83 06/19/2025    SODIUM 142 06/19/2025    K 3.7 06/19/2025     06/19/2025    CO2 24 06/19/2025    BUN 16 06/19/2025    CREATININE 1.07  "06/19/2025    GLUC 92 06/19/2025    CALCIUM 9.2 06/19/2025    AST 15 06/19/2025    ALT 9 06/19/2025    ALKPHOS 86 06/19/2025    TP 7.2 06/19/2025    ALB 3.7 06/19/2025    TBILI 0.25 06/19/2025    CHOLESTEROL 201 (H) 06/19/2025    HDL 78 06/19/2025    TRIG 126 06/19/2025    LDLCALC 98 06/19/2025    NONHDLC 123 06/19/2025    CARBAMAZEPIN 8.0 06/20/2025    AMMONIA 49.92 (H) 10/31/2020    OBA1DNCQEBQB 1.003 06/18/2025    SYPHILISAB Non-reactive 06/21/2023    TREPONEMAPA Non-reactive 06/19/2025    HGBA1C 5.9 06/06/2019       Administrative Statements     Counseling / Coordination of Care:   Patient's progress discussed with staff in treatment team meeting.  Medication changes reviewed with staff in treatment team meeting.    Portions of the record may have been created with voice recognition software. Occasional wrong word or \"sound a like\" substitutions may have occurred due to the inherent limitations of voice recognition software. Read the chart carefully and recognize, using context, where substitutions have occurred.    DRU Venegas 06/21/25  "

## 2025-06-22 PROCEDURE — 99232 SBSQ HOSP IP/OBS MODERATE 35: CPT | Performed by: STUDENT IN AN ORGANIZED HEALTH CARE EDUCATION/TRAINING PROGRAM

## 2025-06-22 RX ADMIN — ROPINIROLE 0.25 MG: 0.5 TABLET, FILM COATED ORAL at 21:42

## 2025-06-22 RX ADMIN — GABAPENTIN 300 MG: 300 CAPSULE ORAL at 21:40

## 2025-06-22 RX ADMIN — QUETIAPINE FUMARATE 12.5 MG: 25 TABLET ORAL at 21:40

## 2025-06-22 RX ADMIN — CARBAMAZEPINE 200 MG: 200 TABLET ORAL at 08:29

## 2025-06-22 RX ADMIN — BUDESONIDE AND FORMOTEROL FUMARATE DIHYDRATE 2 PUFF: 80; 4.5 AEROSOL RESPIRATORY (INHALATION) at 10:04

## 2025-06-22 RX ADMIN — BUDESONIDE AND FORMOTEROL FUMARATE DIHYDRATE 2 PUFF: 80; 4.5 AEROSOL RESPIRATORY (INHALATION) at 17:29

## 2025-06-22 RX ADMIN — GABAPENTIN 300 MG: 300 CAPSULE ORAL at 08:29

## 2025-06-22 RX ADMIN — Medication 1000 MCG: at 08:28

## 2025-06-22 RX ADMIN — ESCITALOPRAM OXALATE 10 MG: 10 TABLET ORAL at 08:28

## 2025-06-22 RX ADMIN — Medication 3 MG: at 21:40

## 2025-06-22 RX ADMIN — FOLIC ACID 1 MG: 1 TABLET ORAL at 08:29

## 2025-06-22 RX ADMIN — CARBAMAZEPINE 200 MG: 200 TABLET ORAL at 17:29

## 2025-06-22 RX ADMIN — LEVETIRACETAM 1500 MG: 500 TABLET, FILM COATED ORAL at 08:29

## 2025-06-22 RX ADMIN — NICOTINE 1 PATCH: 21 PATCH, EXTENDED RELEASE TRANSDERMAL at 08:29

## 2025-06-22 RX ADMIN — LEVETIRACETAM 1500 MG: 500 TABLET, FILM COATED ORAL at 21:40

## 2025-06-22 RX ADMIN — CARBAMAZEPINE 200 MG: 200 TABLET ORAL at 21:40

## 2025-06-22 RX ADMIN — GABAPENTIN 300 MG: 300 CAPSULE ORAL at 17:29

## 2025-06-22 NOTE — NURSING NOTE
Pt was visible in the milieu and social with selective peers. Denies all psych s/s. No behaviors noted. No C/o pain. Compliant with HS medications. Safety maintained. Will continue to monitor.

## 2025-06-22 NOTE — NURSING NOTE
Pt resting in bed, denies pain or SI/HI, also denies AVH and anxiety/depression, offers no complaints, will continue to monitor

## 2025-06-22 NOTE — NURSING NOTE
Pt resting in dining room, denies pain or SI/HI, also denies AVH and anxiety/depression, offers no complaints, will continue to monitor

## 2025-06-22 NOTE — ASSESSMENT & PLAN NOTE
Cydney Lim is a 64-year-old female with pertinent psychiatric history of major depressive disorder with psychotic features as well as multiple inpatient psychiatric admissions who [place] [facility] [place] due to suicidal ideation with plan to overdose on medication. Recent stressors included family conflict with her brother who is her current rep-payee and has been limiting access to her social security income. She notes worsening dysphoric mood as a result and signed a voluntary 201 for admission, presently admitted to [facility] 6B for psychiatric stabilization.     Continue Seroquel 12.5 mg daily for mood, appetite, and sleep - started 6/19  Continue Lexapro 10 mg daily for mood and anxiety; doses to be adjusted as indicated   Continue Tegretol 200 mg TID for seizure prophylaxis and added benefit of mood stabilization   Tegretol level ordered for 6/20, pending at this time   Gabapentin 300 mg TID for anxiety and restlessness   Melatonin 3 mg to regulate sleep-wake cycle   Discontinued Abilify 5 mg as of 6/19 due to ongoing restless legs, medication could be potentially worsening symptoms   Expand collateral information  PT, and OT eval pending with potential A/A referral versus SNF placement   OT Cognitive Evaluation impression: The patient scored 4.8/6.0 indicating that they may live alone with daily assistance to monitor personal safety and check problem solving effectiveness.

## 2025-06-22 NOTE — PLAN OF CARE
Problem: Depression  Goal: Treatment Goal: Demonstrate behavioral control of depressive symptoms, verbalize feelings of improved mood/affect, and adopt new coping skills prior to discharge  Outcome: Progressing  Goal: Refrain from harming self  Description: Interventions:  - Monitor patient closely, per order   - Supervise medication ingestion, monitor effects and side effects   Outcome: Progressing  Goal: Refrain from isolation  Description: Interventions:  - Develop a trusting relationship   - Encourage socialization   Outcome: Progressing     Problem: Anxiety  Goal: Anxiety is at manageable level  Description: Interventions:  - Assess and monitor patient's anxiety level.   - Monitor for signs and symptoms (heart palpitations, chest pain, shortness of breath, headaches, nausea, feeling jumpy, restlessness, irritable, apprehensive).   - Collaborate with interdisciplinary team and initiate plan and interventions as ordered.  - [place] patient to unit/surroundings  - Explain treatment plan  - Encourage participation in care  - Encourage verbalization of concerns/fears  - Identify coping mechanisms  - Assist in developing anxiety-reducing skills  - Administer/offer alternative therapies  - Limit or eliminate stimulants  Outcome: Progressing

## 2025-06-22 NOTE — PROGRESS NOTES
Progress Note - Behavioral Health   Name: Cydney Lim 64 y.o. female I MRN: [id number]  Unit/Bed#: [facility] 641-01 I Date of Admission: 6/18/2025   Date of Service: 6/22/2025 I Hospital Day: 4     Assessment & Plan  MDD (major depressive disorder), recurrent, severe, with psychosis (HCC)  Cydney Lim is a 64-year-old female with pertinent psychiatric history of major depressive disorder with psychotic features as well as multiple inpatient psychiatric admissions who presented to [facility] ED due to suicidal ideation with plan to overdose on medication. Recent stressors included family conflict with her brother who is her current rep-payee and has been limiting access to her social security income. She notes worsening dysphoric mood as a result and signed a voluntary 201 for admission, presently admitted to [facility] for psychiatric stabilization.     Continue Seroquel 12.5 mg daily for mood, appetite, and sleep - started 6/19  Continue Lexapro 10 mg daily for mood and anxiety; doses to be adjusted as indicated   Continue Tegretol 200 mg TID for seizure prophylaxis and added benefit of mood stabilization   Tegretol level ordered for 6/20, pending at this time   Gabapentin 300 mg TID for anxiety and restlessness   Melatonin 3 mg to regulate sleep-wake cycle   Discontinued Abilify 5 mg as of 6/19 due to ongoing restless legs, medication could be potentially worsening symptoms   Expand collateral information  PT, and OT eval pending with potential VHA/HHA referral versus SNF placement   OT Cognitive Evaluation impression: The patient scored 4.8/6.0 indicating that they may live alone with daily assistance to monitor personal safety and check problem solving effectiveness.    Major neurocognitive disorder (HCC)  SLUMS 14/30 on 6/19/2025  Likely will benefit from discharge with VNA/HHA or placement into SNF  PT, OT cog pending   Restless leg syndrome  Reporting consistent symptoms of restless leg syndrome   Requip 0.25 mg at  bed time, started 6/19, will watch for worsening psychotic symptoms   Will check iron panel, pending at this time   Epilepsy with altered consciousness with intractable epilepsy (HCC)  Keppra 1500 mg BID  Tegretol 200 mg TID   Tegretol level ordered for 6/20/25, pending at this time   Stage 3 chronic kidney disease (HCC)  Lab Results   Component Value Date    EGFR 54 06/19/2025    EGFR 49 06/18/2025    EGFR 50 06/15/2025    CREATININE 1.07 06/19/2025    CREATININE 1.16 06/18/2025    CREATININE 1.14 06/15/2025   Per SLIM   Avoid nephrotoxic agents   Morbidly obese (HCC)  Encourage lifestyle modifcations  Vitamin D deficiency  Vitamin D2 50,000 units once weekly for 8 total doses   Repeat as outpatient   Essential hypertension  Blood Pressure: 106/55  Per SLIM   Medical clearance for psychiatric admission  Per SLIM  B12 deficiency  Received IM B-12 1000 mcg on 6/20  Continue B12 supplementation       Current Medications:    Current Facility-Administered Medications:     budesonide-formoterol (SYMBICORT) 80-4.5 MCG/ACT inhaler 2 puff, Inhalation, BID    carBAMazepine (TEGretol) tablet 200 mg, Oral, TID    cyanocobalamin (VITAMIN B-12) tablet 1,000 mcg, Oral, Daily    ergocalciferol (VITAMIN D2) capsule 50,000 Units, Oral, Weekly    escitalopram (LEXAPRO) tablet 10 mg, Oral, Daily    folic acid (FOLVITE) tablet 1 mg, Oral, Daily    gabapentin (NEURONTIN) capsule 300 mg, Oral, TID    levETIRAcetam (KEPPRA) tablet 1,500 mg, Oral, Q12H CHINMAY    melatonin tablet 3 mg, Oral, HS    metoprolol succinate (TOPROL-XL) 24 hr tablet 50 mg, Oral, Daily    nicotine (NICODERM CQ) 21 mg/24 hr TD 24 hr patch 1 patch, Transdermal, Daily    QUEtiapine (SEROquel) tablet 12.5 mg, Oral, HS    rOPINIRole (REQUIP) tablet 0.25 mg, Oral, HS      Risks/Benefits of Treatment:   Risks, benefits, and possible side effects of medications explained to patient and patient verbalizes understanding and agreement for treatment.    Progress Toward Goals:  gradual improvement    Treatment Planning:    - Encourage early mobility and having a structured day  - Provide frequent re-orientation, and cognitive stimulation  - Ensure assistive devices are in proper working order (eye-glasses, hearing aids)  - Encourage adequate hydration, nutrition and monitor bowel movements  - Maintain sleep-wake cycle: Uninterrupted sleep time; low-level lighting at night  - Fall precaution  - f/u SLIM recs regarding the medical problems   - Continue medication titration and treatment plan; adjust medication to optimize treatment response and as clinically indicated. .   - Observation: routine            - VS: as per unit protocol  - Encourage group attendance and milieu therapy  - Dispo: To be determined  - Estimated Discharge Day: 7/3/2025   - Legal Status : Voluntary 201 commitment.  -     Subjective     Patient was visited on unit for continuing care; chart reviewed and discussed with the nursing staff.  On approach, the patient was calm and superficially cooperative. Endorsed better mood and less anxiety sxs, but continues to have negative thinking and ruminating thoughts. The patient was preoccupied with discharge, stated that she got the money in her card and feel good, minimizing all sxs. Denied any changes in appetite, and energy level. No problem initiating and maintaining sleep.  Denied A/VH currently.  Continues to report intrusive thoughts and negative ruminations. Denied active SI/HI, intent or plan upon direct inquiry at this time. The patient consented for safety and agreed to verbalize any frustration or concerns to the nursing staff, immediately.    Patient continues to be withdrawn with minimal interactions with staff and peers. No reports of aggression or self-injurious behavior on unit. No PRN medications used in the past 24 hours.    Patient accepted all offered medications and no adverse effects of medications noted or reported.    Sleep: slept better  Appetite:  "fair  Medication side effects: No  ROS: review of systems as noted above in HPI/Subjective report, all other systems are negative    Objective :  Temp:  [96.6 °F (35.9 °C)-97.3 °F (36.3 °C)] 96.6 °F (35.9 °C)  HR:  [62-72] 62  BP: (106-131)/(55-73) 106/55  Resp:  [17-18] 17  SpO2:  [93 %-97 %] 93 %  O2 Device: None (Room air)    Mental Status Evaluation:  Appearance and attitude: appeared as stated age, dressed in hospital attire, with fair hygiene  Eye contact: fair  Motor Function: within normal limits, No PMA/PMR  Gait/station: Not observed  Speech: talking in soft tone with normal latency and decreased amount  Language: No overt abnormality  Mood/affect: \"better\", appeared depressed / Affect was blunted  Thought Processes: ruminating  Thought content: denied suicidal ideations or homicidal ideations, no overt delusions elicited, negative thinking, ruminations  Associations: concrete associations  Perceptual disturbances: denies Auditory/Visual/Tactile Hallucinations  Orientation: oriented to person and place  Cognitive Function: intact  Attention/Concentration: attention span and concentration appear shorter than expected for age  Memory: not cooperative with formal MMSE  Fund of knowledge: diminished  Impulse control: good  Insight/judgment: limited/limited            Lab Results: I have reviewed the following results:  Results from the past 24 hours: No results found for this or any previous visit (from the past 24 hours).    Administrative Statements     Counseling / Coordination of Care:   Patient's progress reviewed with nursing staff.  Medications, treatment progress and treatment plan reviewed with patient.  Medication education provided to patient.  Supportive therapy provided to patient.  Cognitive techniques utilized during the session.  Reassurance and supportive therapy provided.  Reoriented to reality and reassured.  Encouraged participation in milieu and group therapy on the unit.  Crisis/safety " plan discussed with patient.  Discharge plan discussed with patient.         Isabel Hutchison MD  Attending Psychiatrist   [facility]    06/22/25

## 2025-06-22 NOTE — ASSESSMENT & PLAN NOTE
Keppra 1500 mg BID  Tegretol 200 mg TID   Tegretol level ordered for 6/20/25, pending at this time    Advanced care planning/counseling discussion

## 2025-06-23 PROCEDURE — 99232 SBSQ HOSP IP/OBS MODERATE 35: CPT | Performed by: STUDENT IN AN ORGANIZED HEALTH CARE EDUCATION/TRAINING PROGRAM

## 2025-06-23 RX ORDER — ESCITALOPRAM OXALATE 10 MG/1
20 TABLET ORAL DAILY
Status: DISCONTINUED | OUTPATIENT
Start: 2025-06-24 | End: 2025-06-25 | Stop reason: HOSPADM

## 2025-06-23 RX ADMIN — Medication 1000 MCG: at 08:47

## 2025-06-23 RX ADMIN — LEVETIRACETAM 1500 MG: 500 TABLET, FILM COATED ORAL at 21:04

## 2025-06-23 RX ADMIN — ESCITALOPRAM OXALATE 10 MG: 10 TABLET ORAL at 08:47

## 2025-06-23 RX ADMIN — FOLIC ACID 1 MG: 1 TABLET ORAL at 08:46

## 2025-06-23 RX ADMIN — ROPINIROLE 0.25 MG: 0.5 TABLET, FILM COATED ORAL at 21:04

## 2025-06-23 RX ADMIN — QUETIAPINE FUMARATE 12.5 MG: 25 TABLET ORAL at 21:04

## 2025-06-23 RX ADMIN — CARBAMAZEPINE 200 MG: 200 TABLET ORAL at 21:04

## 2025-06-23 RX ADMIN — GABAPENTIN 300 MG: 300 CAPSULE ORAL at 21:04

## 2025-06-23 RX ADMIN — CARBAMAZEPINE 200 MG: 200 TABLET ORAL at 16:56

## 2025-06-23 RX ADMIN — METOPROLOL SUCCINATE 50 MG: 50 TABLET, EXTENDED RELEASE ORAL at 08:56

## 2025-06-23 RX ADMIN — BUDESONIDE AND FORMOTEROL FUMARATE DIHYDRATE 2 PUFF: 80; 4.5 AEROSOL RESPIRATORY (INHALATION) at 17:00

## 2025-06-23 RX ADMIN — GABAPENTIN 300 MG: 300 CAPSULE ORAL at 16:56

## 2025-06-23 RX ADMIN — GABAPENTIN 300 MG: 300 CAPSULE ORAL at 08:46

## 2025-06-23 RX ADMIN — LEVETIRACETAM 1500 MG: 500 TABLET, FILM COATED ORAL at 08:46

## 2025-06-23 RX ADMIN — CARBAMAZEPINE 200 MG: 200 TABLET ORAL at 08:46

## 2025-06-23 RX ADMIN — Medication 3 MG: at 21:05

## 2025-06-23 RX ADMIN — NICOTINE 1 PATCH: 21 PATCH, EXTENDED RELEASE TRANSDERMAL at 08:47

## 2025-06-23 RX ADMIN — ACETAMINOPHEN 650 MG: 325 TABLET ORAL at 10:09

## 2025-06-23 RX ADMIN — BUDESONIDE AND FORMOTEROL FUMARATE DIHYDRATE 2 PUFF: 80; 4.5 AEROSOL RESPIRATORY (INHALATION) at 08:50

## 2025-06-23 NOTE — CASE MANAGEMENT
Spoke to patient's brother, he did confirm that the patient does have [facility] (VNA) services coming in to her home for 15-20 hours a week. The Health  for her is [facility] [phone]. He did ask if we can try to get her rescheduled with [facility] since they will not see her anymore due to missed appts. Advised that we would be discharging her on Wednesday, he was okay with this. He would like more assistance for her, advised that we would send a referral in for an ICM to help a bit too.

## 2025-06-23 NOTE — PROGRESS NOTES
Pt attends groups.  Pt cooperative and able to self reflect.  Crisis, warmline and [phone] numbers reviewed in group with community resources (ZOË) with handout.   Information from [facility] also provided.     06/23/25 0900 06/23/25 1000 06/23/25 1100   Activity/Group Checklist   Group Exercise Other (Comment)  (Community meeting: evaluating progress and what is next?) Self Esteem   Attendance Did not attend Attended Attended   Attendance Duration (min)  --  31-45 31-45   Interactions  --  Interacted appropriately Interacted appropriately   Affect/Mood  --  Appropriate Appropriate   Goals Achieved  --  Identified feelings;Discussed coping strategies;Identified triggers;Identified relapse prevention strategies;Discussed self-esteem issues;Able to listen to others;Able to engage in interactions Able to listen to others;Able to engage in interactions;Able to self-disclose;Able to recieve feedback;Identified feelings      06/23/25 1330   Activity/Group Checklist   Group Other (Comment)  (Community support and resources)   Attendance Attended   Attendance Duration (min) 46-60   Interactions Interacted appropriately   Affect/Mood Constricted   Goals Achieved Identified triggers;Identified feelings;Discussed coping strategies;Discussed self-esteem issues;Able to reflect/comment on own behavior;Able to engage in interactions;Able to listen to others;Identified resources and support systems

## 2025-06-23 NOTE — ASSESSMENT & PLAN NOTE
GRACE 14/30 on 6/19/2025  OT cog revealing that patient may live alone with daily assistance for safety and problem starting

## 2025-06-23 NOTE — PLAN OF CARE
Problem: SAFETY ADULT  Goal: Patient will remain free of falls  Description: INTERVENTIONS:  - Educate patient/family on patient safety including physical limitations  - Instruct patient to call for assistance with activity   - Consider consulting OT/PT to assist with strengthening/mobility based on AM PAC & JH-HLM score  - Consult OT/PT to assist with strengthening/mobility   - Keep Call bell within reach  - Keep bed low and locked with side rails adjusted as appropriate  - Keep care items and personal belongings within reach  - Initiate and maintain comfort rounds  - Make Fall Risk Sign visible to staff  - Offer Toileting every 2 Hours, in advance of need  - Initiate/Maintain bed alarm  - Obtain necessary fall risk management equipment: walker  - Apply yellow socks and bracelet for high fall risk patients  - Consider moving patient to room near nurses station  Outcome: Progressing  Goal: Maintains/Returns to pre admission functional level  Description: INTERVENTIONS:  - Perform AM-PAC 6 Click Basic Mobility/ Daily Activity assessment daily.  - Set and communicate daily mobility goal to care team and patient/family/caregiver.   - Collaborate with rehabilitation services on mobility goals if consulted      - Ambulate patient 3 times a day  - Out of bed to chair 3 times a day   - Out of bed for meals 3 times a day  - Out of bed for toileting  - Record patient progress and toleration of activity level   Outcome: Progressing     Problem: Knowledge Deficit  Goal: Patient/family/caregiver demonstrates understanding of disease process, treatment plan, medications, and discharge instructions  Description: Complete learning assessment and assess knowledge base.  Interventions:  - Provide teaching at level of understanding  - Provide teaching via preferred learning methods  Outcome: Progressing     Problem: Risk for Self Injury/Neglect  Goal: Treatment Goal: Remain safe during length of stay, learn and adopt new coping  skills, and be free of self-injurious ideation, impulses and acts at the time of discharge  Outcome: Progressing     Problem: Depression  Goal: Treatment Goal: Demonstrate behavioral control of depressive symptoms, verbalize feelings of improved mood/affect, and adopt new coping skills prior to discharge  Outcome: Progressing  Goal: Refrain from harming self  Description: Interventions:  - Monitor patient closely, per order   - Supervise medication ingestion, monitor effects and side effects   Outcome: Progressing  Goal: Refrain from isolation  Description: Interventions:  - Develop a trusting relationship   - Encourage socialization   Outcome: Progressing     Problem: Anxiety  Goal: Anxiety is at manageable level  Description: Interventions:  - Assess and monitor patient's anxiety level.   - Monitor for signs and symptoms (heart palpitations, chest pain, shortness of breath, headaches, nausea, feeling jumpy, restlessness, irritable, apprehensive).   - Collaborate with interdisciplinary team and initiate plan and interventions as ordered.  - [place] patient to unit/surroundings  - Explain treatment plan  - Encourage participation in care  - Encourage verbalization of concerns/fears  - Identify coping mechanisms  - Assist in developing anxiety-reducing skills  - Administer/offer alternative therapies  - Limit or eliminate stimulants  Outcome: Progressing

## 2025-06-23 NOTE — PROGRESS NOTES
06/23/25 0744   Team Meeting   Meeting Type Daily Rounds   Initial Conference Date 06/23/25   Next Conference Date 06/24/25   Team Members Present   Team Members Present Physician;Nurse;;   Physician Team Member Foster/Carolyn/Rosy/Armin   Nursing Team Member Eliecer   Care Management Team Member Prasanth   Social Work Team Member Jesus   Patient/Family Present   Patient Present No   Patient's Family Present No     Deny s/s, no groups, 100% meals, lexapro increasing to 20 mg, discharge Wednesday.

## 2025-06-23 NOTE — PROGRESS NOTES
Progress Note - Behavioral Health   Name: Cydney Lim 64 y.o. female I MRN: [id number]  Unit/Bed#: [facility] 641-01 I Date of Admission: 6/18/2025   Date of Service: 6/23/2025 I Hospital Day: 5    Assessment & Plan  MDD (major depressive disorder), recurrent, severe, with psychosis (HCC)  Cydney Lim is a 64-year-old female with pertinent psychiatric history of major depressive disorder with psychotic features as well as multiple inpatient psychiatric admissions who presented to [facility] ED due to suicidal ideation with plan to overdose on medication. Recent stressors included family conflict with her brother who is her current rep-payee and has been limiting access to her social security income. She notes worsening dysphoric mood as a result and signed a voluntary 201 for admission, presently admitted to [facility] for psychiatric stabilization.     Continue Seroquel 12.5 mg daily for mood, appetite, and sleep - started 6/19  Increase Lexapro to 20 mg daily for mood and anxiety - increased 6/23  Continue Tegretol 200 mg TID for seizure prophylaxis and added benefit of mood stabilization   Tegretol level 8 on 6/20  Gabapentin 300 mg TID for anxiety and restlessness   Melatonin 3 mg to regulate sleep-wake cycle   Discontinued Abilify 5 mg as of 6/19 due to ongoing restless legs, medication could be potentially worsening symptoms   Expand collateral information  PT, and OT eval pending with potential A/A referral versus SNF placement   OT Cognitive Evaluation impression: The patient scored 4.8/6.0 indicating that they may live alone with daily assistance to monitor personal safety and check problem solving effectiveness.    Major neurocognitive disorder (HCC)  SLUMS 14/30 on 6/19/2025  OT cog revealing that patient may live alone with daily assistance for safety and problem starting   Restless leg syndrome  Reporting consistent symptoms of restless leg syndrome   Requip 0.25 mg at bed time, started 6/19  Lab Results    Component Value Date    IRON 62 06/20/2025    FERRITIN 66 06/20/2025    TIBC 274.4 06/20/2025    CONCFE 23 06/20/2025       Epilepsy with altered consciousness with intractable epilepsy (HCC)  Keppra 1500 mg BID  Tegretol 200 mg TID   Tegretol level ordered for 6/20/25, pending at this time   Stage 3 chronic kidney disease (HCC)  Lab Results   Component Value Date    EGFR 54 06/19/2025    EGFR 49 06/18/2025    EGFR 50 06/15/2025    CREATININE 1.07 06/19/2025    CREATININE 1.16 06/18/2025    CREATININE 1.14 06/15/2025   Per SLIM   Avoid nephrotoxic agents   Morbidly obese (HCC)  Encourage lifestyle modifcations  Vitamin D deficiency  Vitamin D2 50,000 units once weekly for 8 total doses   Repeat as outpatient   Essential hypertension  Blood Pressure: 133/76  Per SLIM   Medical clearance for psychiatric admission  Per SLIM  B12 deficiency  Received IM B-12 1000 mcg on 6/20  Continue B12 supplementation     Current Medications:    Current Facility-Administered Medications:     budesonide-formoterol (SYMBICORT) 80-4.5 MCG/ACT inhaler 2 puff, Inhalation, BID    carBAMazepine (TEGretol) tablet 200 mg, Oral, TID    cyanocobalamin (VITAMIN B-12) tablet 1,000 mcg, Oral, Daily    ergocalciferol (VITAMIN D2) capsule 50,000 Units, Oral, Weekly    [START ON 6/24/2025] escitalopram (LEXAPRO) tablet 20 mg, Oral, Daily    folic acid (FOLVITE) tablet 1 mg, Oral, Daily    gabapentin (NEURONTIN) capsule 300 mg, Oral, TID    levETIRAcetam (KEPPRA) tablet 1,500 mg, Oral, Q12H CHINMAY    melatonin tablet 3 mg, Oral, HS    metoprolol succinate (TOPROL-XL) 24 hr tablet 50 mg, Oral, Daily    nicotine (NICODERM CQ) 21 mg/24 hr TD 24 hr patch 1 patch, Transdermal, Daily    QUEtiapine (SEROquel) tablet 12.5 mg, Oral, HS    rOPINIRole (REQUIP) tablet 0.25 mg, Oral, HS      Risks/Benefits of Treatment:     Risks, benefits, and possible side effects of medications explained to patient and patient verbalizes understanding and agreement for  "treatment.    Progress Toward Goals: progressing    Treatment Planning:      - Encourage early mobility and having a structured day  - Provide frequent re-orientation, and cognitive stimulation  - Ensure assistive devices are in proper working order (eye-glasses, hearing aids)  - Encourage adequate hydration, nutrition and monitor bowel movements  - Maintain sleep-wake cycle: Uninterrupted sleep time; low-level lighting at night  - Fall precaution  - Seizure precaution  - Fall precaution  - Follow up with [facility]IM regarding the medical problems   - Continue medication titration and treatment plan; adjust medication to optimize treatment response and as clinically indicated.   - Observation: Routine  - VS: as per unit protocol  - Encourage group attendance and milieu therapy  - Dispo: To be determined  - Estimated Discharge Day: 6/25/2025       Subjective     Patient was visited on unit for continuing care; chart reviewed and discussed with multidisciplinary treatment team.  On approach, the patient was calm but on irritable edge. She was dismissive towards this writer and required repeat prompting to answer assessment questions. Notably discharge focused, stating that her pills are now where they need to be, \"I got myself in, I can get myself out.\" Responsive to redirection that tentative discharge is planned for this week.  Denied any changes in appetite, and energy level. No problem initiating and maintaining sleep.  Denied A/VH currently.  Denied SI/HI, intent or plan upon direct inquiry at this time.    The patient is intermittently visible in the milieu but remains mostly withdrawn to self. No reports of aggression or self-injurious behavior on unit. No PRN medications used in the past 24 hours. Reported headache this morning and received prn Tylenol.    Patient accepted all offered medications and no adverse effects of medications noted or reported.    Sleep: normal  Appetite: normal  Medication side effects: " "No  ROS: review of systems as noted above in HPI/Subjective report, all other systems are negative    Objective :  Temp:  [96.7 °F (35.9 °C)-98 °F (36.7 °C)] 96.7 °F (35.9 °C)  HR:  [64-73] 68  BP: (123-154)/(69-98) 133/76  Resp:  [16-18] 18  SpO2:  [95 %] 95 %  O2 Device: None (Room air)    Mental Status Evaluation:    Appearance:  Appears older than stated age, disheveled, long unkept dark hair, dressed in hospital attire, poor eye contact   Behavior:  Guarded, irritable    Speech:  Soft tone, normal rate, scant   Mood:  \"Good\"   Affect:  Irritable, constricted, mood incongruent   Thought Process:  Mackey, negative thinking   Thought Content:  no overt delusions   Perceptual Disturbances: no auditory hallucinations, no visual hallucinations   Risk Potential: Suicidal Ideation -  None at present  Homicidal Ideation -  None at present  Potential for Aggression - No   Sensorium:  grossly oriented   Memory:  recent and remote memory: unable to assess due to lack of cooperation   Consciousness:  alert and awake   Attention/Concentration: attention span and concentration appear shorter than expected for age   Insight:  limited   Judgment: limited   Gait/Station: Ambulates with walker   Motor Activity: no abnormal movements         Lab Results: I have reviewed the following results:  Most Recent Labs:   Lab Results   Component Value Date    WBC 10.04 06/19/2025    RBC 3.85 06/19/2025    HGB 12.1 06/19/2025    HCT 37.9 06/19/2025     06/19/2025    RDW 13.7 06/19/2025    NEUTROABS 5.83 06/19/2025    SODIUM 142 06/19/2025    K 3.7 06/19/2025     06/19/2025    CO2 24 06/19/2025    BUN 16 06/19/2025    CREATININE 1.07 06/19/2025    GLUC 92 06/19/2025    CALCIUM 9.2 06/19/2025    AST 15 06/19/2025    ALT 9 06/19/2025    ALKPHOS 86 06/19/2025    TP 7.2 06/19/2025    ALB 3.7 06/19/2025    TBILI 0.25 06/19/2025    CHOLESTEROL 201 (H) 06/19/2025    HDL 78 06/19/2025    TRIG 126 06/19/2025    LDLCALC 98 06/19/2025    " NONHDLC 123 06/19/2025    CARBAMAZEPIN 8.0 06/20/2025    AMMONIA 49.92 (H) 10/31/2020    QOK2CGUSVUQY 1.003 06/18/2025    SYPHILISAB Non-reactive 06/21/2023    TREPONEMAPA Non-reactive 06/19/2025    HGBA1C 5.9 06/06/2019       Administrative Statements     Counseling / Coordination of Care:   Patient's progress discussed with staff in treatment team meeting.  Medication changes reviewed with staff in treatment team meeting.  Encouraged participation in milieu and group therapy on the unit.  Discharge plan discussed with patient.    Ayla Cooper DO 06/23/25

## 2025-06-23 NOTE — ASSESSMENT & PLAN NOTE
Reporting consistent symptoms of restless leg syndrome   Requip 0.25 mg at bed time, started 6/19  Lab Results   Component Value Date    IRON 62 06/20/2025    FERRITIN 66 06/20/2025    TIBC 274.4 06/20/2025    CONCFE 23 06/20/2025

## 2025-06-23 NOTE — NURSING NOTE
Cydney visible in dayroom with peers socializing.  She denies SI/HI/AVH and no symptoms of anxiety or depression.  There are no complaints at this time.  Will continue to monitor and offer support when needed.

## 2025-06-23 NOTE — ASSESSMENT & PLAN NOTE
Cydney Lim is a 64-year-old female with pertinent psychiatric history of major depressive disorder with psychotic features as well as multiple inpatient psychiatric admissions who [place] [facility] [place] to [place] ideation with plan to overdose on medication. Recent stressors included family conflict with her brother who is her current rep-payee and has been limiting access to her social security income. She notes worsening dysphoric mood as a result and signed a voluntary 201 for admission, presently admitted to [facility] for psychiatric stabilization.     Continue Seroquel 12.5 mg daily for mood, appetite, and sleep - started 6/19  Increase Lexapro to 20 mg daily for mood and anxiety - increased 6/23  Continue Tegretol 200 mg TID for seizure prophylaxis and added benefit of mood stabilization   Tegretol level 8 on 6/20  Gabapentin 300 mg TID for anxiety and restlessness   Melatonin 3 mg to regulate sleep-wake cycle   Discontinued Abilify 5 mg as of 6/19 due to ongoing restless legs, medication could be potentially worsening symptoms   Expand collateral information  PT, and OT eval pending with potential A/A referral versus SNF placement   OT Cognitive Evaluation impression: The patient scored 4.8/6.0 indicating that they may live alone with daily assistance to monitor personal safety and check problem solving effectiveness.

## 2025-06-23 NOTE — TREATMENT TEAM
Pt completed admission Bh relapse prevention.  Pt signed and copy in chart.  Crisis, wamline and [phone] numbers provided.   Pt hopeful of d/c.  Pharmacy [facility] .  Pt did not know Provider .  Pt attends groups.      06/23/25 1045   Activity/Group Checklist   Group Admission/Discharge   Attendance Attended   Attendance Duration (min) 16-30   Interactions Interacted appropriately   Affect/Mood Appropriate   Goals Achieved Identified triggers;Identified feelings;Identified relapse prevention strategies;Discussed coping strategies;Discussed safety plan;Identified resources and support systems;Increased hopefulness;Able to engage in interactions;Able to listen to others;Able to reflect/comment on own behavior;Able to manage/cope with feelings

## 2025-06-24 ENCOUNTER — TELEPHONE (OUTPATIENT)
Age: 65
End: 2025-06-24

## 2025-06-24 PROCEDURE — 99232 SBSQ HOSP IP/OBS MODERATE 35: CPT | Performed by: STUDENT IN AN ORGANIZED HEALTH CARE EDUCATION/TRAINING PROGRAM

## 2025-06-24 PROCEDURE — 97163 PT EVAL HIGH COMPLEX 45 MIN: CPT

## 2025-06-24 RX ADMIN — QUETIAPINE FUMARATE 12.5 MG: 25 TABLET ORAL at 21:28

## 2025-06-24 RX ADMIN — LEVETIRACETAM 1500 MG: 500 TABLET, FILM COATED ORAL at 09:21

## 2025-06-24 RX ADMIN — ESCITALOPRAM OXALATE 20 MG: 10 TABLET ORAL at 09:21

## 2025-06-24 RX ADMIN — CARBAMAZEPINE 200 MG: 200 TABLET ORAL at 09:21

## 2025-06-24 RX ADMIN — FOLIC ACID 1 MG: 1 TABLET ORAL at 09:21

## 2025-06-24 RX ADMIN — GABAPENTIN 300 MG: 300 CAPSULE ORAL at 21:28

## 2025-06-24 RX ADMIN — NICOTINE 1 PATCH: 21 PATCH, EXTENDED RELEASE TRANSDERMAL at 09:20

## 2025-06-24 RX ADMIN — BUDESONIDE AND FORMOTEROL FUMARATE DIHYDRATE 2 PUFF: 80; 4.5 AEROSOL RESPIRATORY (INHALATION) at 17:15

## 2025-06-24 RX ADMIN — METOPROLOL SUCCINATE 50 MG: 50 TABLET, EXTENDED RELEASE ORAL at 09:21

## 2025-06-24 RX ADMIN — GABAPENTIN 300 MG: 300 CAPSULE ORAL at 09:21

## 2025-06-24 RX ADMIN — Medication 1000 MCG: at 09:21

## 2025-06-24 RX ADMIN — LEVETIRACETAM 1500 MG: 500 TABLET, FILM COATED ORAL at 21:28

## 2025-06-24 RX ADMIN — BUDESONIDE AND FORMOTEROL FUMARATE DIHYDRATE 2 PUFF: 80; 4.5 AEROSOL RESPIRATORY (INHALATION) at 09:23

## 2025-06-24 RX ADMIN — ROPINIROLE 0.25 MG: 0.5 TABLET, FILM COATED ORAL at 21:31

## 2025-06-24 RX ADMIN — CARBAMAZEPINE 200 MG: 200 TABLET ORAL at 21:28

## 2025-06-24 RX ADMIN — GABAPENTIN 300 MG: 300 CAPSULE ORAL at 17:00

## 2025-06-24 RX ADMIN — Medication 3 MG: at 21:28

## 2025-06-24 RX ADMIN — CARBAMAZEPINE 200 MG: 200 TABLET ORAL at 17:00

## 2025-06-24 NOTE — ASSESSMENT & PLAN NOTE
Cydney Lim is a 64-year-old female with pertinent psychiatric history of major depressive disorder with psychotic features as well as multiple inpatient psychiatric admissions who [place] [facility] [place] to [place] ideation with plan to overdose on medication. Recent stressors included family conflict with her brother who is her current rep-payee and has been limiting access to her social security income. She notes worsening dysphoric mood as a result and signed a voluntary 201 for admission, presently admitted to [facility] for psychiatric stabilization.     Continue Seroquel 12.5 mg daily for mood, appetite, and sleep - started 6/19  Continue Lexapro 20 mg daily for mood and anxiety - increased 6/23  Continue Tegretol 200 mg TID for seizure prophylaxis and added benefit of mood stabilization   Tegretol level 8 on 6/20  Gabapentin 300 mg TID for anxiety and restlessness   Melatonin 3 mg to regulate sleep-wake cycle   Discontinued Abilify 5 mg as of 6/19 due to ongoing restless legs, medication could be potentially worsening symptoms   OT Cognitive Evaluation impression: The patient scored 4.8/6.0 indicating that they may live alone with daily assistance to monitor personal safety and check problem solving effectiveness.

## 2025-06-24 NOTE — NURSING NOTE
Patient calm, cooperative, and medication compliant. Reports anxiety and denies all other s/s including SI/HI. Visible and social with peers. Monitor  for safety and support.

## 2025-06-24 NOTE — ASSESSMENT & PLAN NOTE
Improved since admission  Requip 0.25 mg at bed time, started 6/19  Gabapentin 300 mg TID  Lab Results   Component Value Date    IRON 62 06/20/2025    FERRITIN 66 06/20/2025    TIBC 274.4 06/20/2025    CONCFE 23 06/20/2025

## 2025-06-24 NOTE — PROGRESS NOTES
06/24/25 0751   Team Meeting   Meeting Type Daily Rounds   Initial Conference Date 06/24/25   Next Conference Date 06/25/25   Team Members Present   Team Members Present Physician;Nurse;   Physician Team Member Foster/Carloyn/Rosy/Armin   Nursing Team Member Eliecer   Care Management Team Member Prasanth   Social Work Team Member Jesus   Patient/Family Present   Patient Present No   Patient's Family Present No     Irritable, SLUMS 14/30, attending groups, discharge tomorrow.

## 2025-06-24 NOTE — DISCHARGE INSTR - OTHER ORDERS
You are being discharged to home at [address] [place] [address] [address] [facility], [place] [address].    Triggers you have identified during your hospitalization that led to your admission include suicidal ideation, and a distressed mood. Coping skills that you have identified during your hospitalization includes sitting outside. If you are unable to deal with your distressed mood alone please contact your psychiatrist at [facility]. If that is not effective and you continue to have suicidal ideation, a distressed mood, are feeling overwhelmed, and/or in crisis please contact [facility] Crisis at [phone], dial 911 or go to the nearest emergency center.     [facility] Warm Line: [phone] available 24 hours a day, 7 days a week.     A warm line is a phone service for people who are looking for support, engagement, and hope during non-emergency mental health struggles or distress. A warm line is staffed by peers who have personal or lived experience with mental health disorders and who can listen, share, and offer a sense of recovery. A warm line is different from a crisis line or hotline, which are intended for emergency situations    National Suicide Hotline: 575.893.6468    Crisis Text Line: 924075      Our Behavioral Health Nurse Navigators, Cassidy or Karen, will be calling you after your discharge on the phone number that you provided. They will be available as an additional support, if needed.     If you wish to speak with one of them, you may contact them at [phone].
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
## 2025-06-24 NOTE — CASE MANAGEMENT
ICM referral sent to [facility]. Patient aware that they will be contacting her once she is discharged.

## 2025-06-24 NOTE — PLAN OF CARE
Problem: SAFETY ADULT  Goal: Patient will remain free of falls  Description: INTERVENTIONS:  - Educate patient/family on patient safety including physical limitations  - Instruct patient to call for assistance with activity   - Consider consulting OT/PT to assist with strengthening/mobility based on AM PAC & JH-HLM score  - Consult OT/PT to assist with strengthening/mobility   - Keep Call bell within reach  - Keep bed low and locked with side rails adjusted as appropriate  - Keep care items and personal belongings within reach  - Initiate and maintain comfort rounds  - Make Fall Risk Sign visible to staff  - Offer Toileting every 2 Hours, in advance of need  - Initiate/Maintain bed alarm  - Obtain necessary fall risk management equipment: walker  - Apply yellow socks and bracelet for high fall risk patients  - Consider moving patient to room near nurses station  Outcome: Progressing  Goal: Maintains/Returns to pre admission functional level  Description: INTERVENTIONS:  - Perform AM-PAC 6 Click Basic Mobility/ Daily Activity assessment daily.  - Set and communicate daily mobility goal to care team and patient/family/caregiver.   - Collaborate with rehabilitation services on mobility goals if consulted      - Ambulate patient 3 times a day  - Out of bed to chair 3 times a day   - Out of bed for meals 3 times a day  - Out of bed for toileting  - Record patient progress and toleration of activity level   Outcome: Progressing     Problem: DISCHARGE PLANNING  Goal: Discharge to home or other facility with appropriate resources  Description: INTERVENTIONS:  - Identify barriers to discharge w/patient and caregiver  - Arrange for needed discharge resources and transportation as appropriate  - Identify discharge learning needs (meds, wound care, etc.)  - Arrange for interpretive services to assist at discharge as needed  - Refer to Case Management Department for coordinating discharge planning if the patient needs  post-hospital services based on physician/advanced practitioner order or complex needs related to functional status, cognitive ability, or social support system  Outcome: Progressing     Problem: Knowledge Deficit  Goal: Patient/family/caregiver demonstrates understanding of disease process, treatment plan, medications, and discharge instructions  Description: Complete learning assessment and assess knowledge base.  Interventions:  - Provide teaching at level of understanding  - Provide teaching via preferred learning methods  Outcome: Progressing     Problem: Alteration in Thoughts and Perception  Goal: Treatment Goal: Gain control of psychotic behaviors/thinking, reduce/eliminate presenting symptoms and demonstrate improved reality functioning upon discharge  Outcome: Progressing     Problem: Depression  Goal: Treatment Goal: Demonstrate behavioral control of depressive symptoms, verbalize feelings of improved mood/affect, and adopt new coping skills prior to discharge  Outcome: Progressing  Goal: Refrain from harming self  Description: Interventions:  - Monitor patient closely, per order   - Supervise medication ingestion, monitor effects and side effects   Outcome: Progressing  Goal: Refrain from isolation  Description: Interventions:  - Develop a trusting relationship   - Encourage socialization   Outcome: Progressing     Problem: Anxiety  Goal: Anxiety is at manageable level  Description: Interventions:  - Assess and monitor patient's anxiety level.   - Monitor for signs and symptoms (heart palpitations, chest pain, shortness of breath, headaches, nausea, feeling jumpy, restlessness, irritable, apprehensive).   - Collaborate with interdisciplinary team and initiate plan and interventions as ordered.  - Alpha patient to unit/surroundings  - Explain treatment plan  - Encourage participation in care  - Encourage verbalization of concerns/fears  - Identify coping mechanisms  - Assist in developing  anxiety-reducing skills  - Administer/offer alternative therapies  - Limit or eliminate stimulants  Outcome: Progressing     Problem: Risk for Violence/Aggression Toward Others  Goal: Treatment Goal: Refrain from acts of violence/aggression during length of stay, and demonstrate improved impulse control at the time of discharge  Outcome: Progressing     Problem: Alteration in Thoughts and Perception  Goal: Attend and participate in unit activities, including therapeutic, recreational, and educational groups  Description: Interventions:  -Encourage Visitation and family involvement in care  Outcome: Progressing     Problem: DISCHARGE PLANNING - CARE MANAGEMENT  Goal: Discharge to post-acute care or home with appropriate resources  Description: INTERVENTIONS:  - Conduct assessment to determine patient/family and health care team treatment goals, and need for post-acute services based on payer coverage, community resources, and patient preferences, and barriers to discharge  - Address psychosocial, clinical, and financial barriers to discharge as identified in assessment in conjunction with the patient/family and health care team  - Arrange appropriate level of post-acute services according to patient’s   needs and preference and payer coverage in collaboration with the physician and health care team  - Communicate with and update the patient/family, physician, and health care team regarding progress on the discharge plan  - Arrange appropriate transportation to post-acute venues  Outcome: Progressing

## 2025-06-24 NOTE — PROGRESS NOTES
Progress Note - Behavioral Health   Name: Cydney Lim 64 y.o. female I MRN: [id number]  Unit/Bed#: [facility] 641-01 I Date of Admission: 6/18/2025   Date of Service: 6/24/2025 I Hospital Day: 6    Assessment & Plan  MDD (major depressive disorder), recurrent, severe, with psychosis (HCC)  Cydney Lim is a 64-year-old female with pertinent psychiatric history of major depressive disorder with psychotic features as well as multiple inpatient psychiatric admissions who presented to [facility] ED due to suicidal ideation with plan to overdose on medication. Recent stressors included family conflict with her brother who is her current rep-payee and has been limiting access to her social security income. She notes worsening dysphoric mood as a result and signed a voluntary 201 for admission, presently admitted to [facility] for psychiatric stabilization.     Continue Seroquel 12.5 mg daily for mood, appetite, and sleep - started 6/19  Continue Lexapro 20 mg daily for mood and anxiety - increased 6/23  Continue Tegretol 200 mg TID for seizure prophylaxis and added benefit of mood stabilization   Tegretol level 8 on 6/20  Gabapentin 300 mg TID for anxiety and restlessness   Melatonin 3 mg to regulate sleep-wake cycle   Discontinued Abilify 5 mg as of 6/19 due to ongoing restless legs, medication could be potentially worsening symptoms   OT Cognitive Evaluation impression: The patient scored 4.8/6.0 indicating that they may live alone with daily assistance to monitor personal safety and check problem solving effectiveness.    Major neurocognitive disorder (HCC)  SLUMS 14/30 on 6/19/2025  OT cog revealing that patient may live alone with daily assistance for safety and problem starting   Restless leg syndrome  Improved since admission  Requip 0.25 mg at bed time, started 6/19  Gabapentin 300 mg TID  Lab Results   Component Value Date    IRON 62 06/20/2025    FERRITIN 66 06/20/2025    TIBC 274.4 06/20/2025    CONCFE 23 06/20/2025        Epilepsy with altered consciousness with intractable epilepsy (HCC)  Keppra 1500 mg BID  Tegretol 200 mg TID   Tegretol level: 8  Stage 3 chronic kidney disease (HCC)  Lab Results   Component Value Date    EGFR 54 06/19/2025    EGFR 49 06/18/2025    EGFR 50 06/15/2025    CREATININE 1.07 06/19/2025    CREATININE 1.16 06/18/2025    CREATININE 1.14 06/15/2025   Per [facility]   Avoid nephrotoxic agents   Morbidly obese (HCC)  Encourage lifestyle modifcations  Vitamin D deficiency  Vitamin D2 50,000 units once weekly for 8 total doses   Repeat as outpatient   Essential hypertension  Blood Pressure: 115/70  Per SLIM   Medical clearance for psychiatric admission  Per [facility]IM  B12 deficiency  Received IM B-12 1000 mcg on 6/20  Continue B12 supplementation     Current Medications:    Current Facility-Administered Medications:     budesonide-formoterol (SYMBICORT) 80-4.5 MCG/ACT inhaler 2 puff, Inhalation, BID    carBAMazepine (TEGretol) tablet 200 mg, Oral, TID    cyanocobalamin (VITAMIN B-12) tablet 1,000 mcg, Oral, Daily    ergocalciferol (VITAMIN D2) capsule 50,000 Units, Oral, Weekly    escitalopram (LEXAPRO) tablet 20 mg, Oral, Daily    folic acid (FOLVITE) tablet 1 mg, Oral, Daily    gabapentin (NEURONTIN) capsule 300 mg, Oral, TID    levETIRAcetam (KEPPRA) tablet 1,500 mg, Oral, Q12H CHINMAY    melatonin tablet 3 mg, Oral, HS    metoprolol succinate (TOPROL-XL) 24 hr tablet 50 mg, Oral, Daily    nicotine (NICODERM CQ) 21 mg/24 hr TD 24 hr patch 1 patch, Transdermal, Daily    QUEtiapine (SEROquel) tablet 12.5 mg, Oral, HS    rOPINIRole (REQUIP) tablet 0.25 mg, Oral, HS      Risks/Benefits of Treatment:     Risks, benefits, and possible side effects of medications explained to patient and patient verbalizes understanding and agreement for treatment.    Progress Toward Goals: progressing, discharge planning    Treatment Planning:      - Encourage early mobility and having a structured day  - Provide frequent re-orientation,  "and cognitive stimulation  - Ensure assistive devices are in proper working order (eye-glasses, hearing aids)  - Encourage adequate hydration, nutrition and monitor bowel movements  - Maintain sleep-wake cycle: Uninterrupted sleep time; low-level lighting at night  - Fall precaution  - Seizure precaution  - Follow up with [facility] regarding the medical problems   - Continue medication titration and treatment plan; adjust medication to optimize treatment response and as clinically indicated.   - Observation: Routine  - VS: as per unit protocol  - Encourage group attendance and milieu therapy  - Dispo: Tentative discharge tomorrow  - Estimated Discharge Day: 6/25/2025       Subjective     Patient was visited on unit for continuing care; chart reviewed and discussed with multidisciplinary treatment team.  On approach, the patient was calm and cooperative. She appeared brighter than previous days and with less irritability overall. She shares that she is feeling \"good\" and looking forward to her discharge tomorrow. Discussed with patient the recommendation to initiate ICM services upon discharge and she was agreeable to this. She notes great improvement in her restless leg symptoms with the medications. No objective signs of worsening psychosis from Requip noted. Patient was alert and oriented as well as linear and goal directed in thought process. Denied any changes in appetite, and energy level. No problem initiating and maintaining sleep.  Denied A/VH currently.  Denied SI/HI, intent or plan upon direct inquiry at this time.    Patient continues to be visible in the milieu and interacts with staff and peers. No reports of aggression or self-injurious behavior on unit. No PRN medications used in the past 24 hours.    Patient accepted all offered medications and no adverse effects of medications noted or reported. Tentative discharge tomorrow.    Sleep: normal  Appetite: normal  Medication side effects: No  ROS: review of " "systems as noted above in HPI/Subjective report, all other systems are negative    Objective :  Temp:  [96.5 °F (35.8 °C)-98.3 °F (36.8 °C)] 98.3 °F (36.8 °C)  HR:  [61-68] 65  BP: (115-177)/(69-79) 115/70  Resp:  [16-19] 19  SpO2:  [92 %-97 %] 96 %  O2 Device: None (Room air)    Mental Status Evaluation:    Appearance:  Dressed in paper scrubs, marginal hygiene, looks older than stated age, obese   Behavior:  Pleasant, less irritable, no longer guarded   Speech:  normal rate, normal volume, spontaneous   Mood:  \"Good\"   Affect:  mood-congruent, brighter   Thought Process:  organized, goal directed, logical, coherent, linear   Thought Content:  no overt delusions   Perceptual Disturbances: no auditory hallucinations, no visual hallucinations, does not appear responding to internal stimuli   Risk Potential: Suicidal Ideation -  None  Homicidal Ideation -  None  Potential for Aggression - No   Sensorium:  oriented to person, place, and time/date   Memory:  recent and remote memory grossly intact   Consciousness:  alert and awake   Attention/Concentration: attention span and concentration are age appropriate   Insight:  fair   Judgment: limited   Gait/Station: normal gait/station, normal balance   Motor Activity: no abnormal movements         Lab Results: I have reviewed the following results:  Results from the past 24 hours: No results found for this or any previous visit (from the past 24 hours).    Administrative Statements     Counseling / Coordination of Care:   Patient's progress discussed with staff in treatment team meeting.  Medication changes reviewed with staff in treatment team meeting.  Crisis/safety plan discussed with patient.  Discharge plan discussed with patient.      Ayla Cooper DO 06/24/25  "

## 2025-06-25 ENCOUNTER — HOSPITAL ENCOUNTER (EMERGENCY)
Facility: HOSPITAL | Age: 65
Discharge: HOME | End: 2025-06-25
Attending: EMERGENCY MEDICINE | Admitting: EMERGENCY MEDICINE
Payer: MEDICARE

## 2025-06-25 VITALS
HEART RATE: 78 BPM | RESPIRATION RATE: 20 BRPM | DIASTOLIC BLOOD PRESSURE: 58 MMHG | OXYGEN SATURATION: 95 % | SYSTOLIC BLOOD PRESSURE: 133 MMHG | TEMPERATURE: 98.7 F

## 2025-06-25 VITALS
DIASTOLIC BLOOD PRESSURE: 97 MMHG | SYSTOLIC BLOOD PRESSURE: 170 MMHG | TEMPERATURE: 97.8 F | HEART RATE: 68 BPM | WEIGHT: 231.25 LBS | HEIGHT: 63 IN | RESPIRATION RATE: 19 BRPM | OXYGEN SATURATION: 94 % | BODY MASS INDEX: 40.97 KG/M2

## 2025-06-25 DIAGNOSIS — G40.909 EPILEPSY (HCC): ICD-10-CM

## 2025-06-25 DIAGNOSIS — G25.81 RESTLESS LEG SYNDROME: ICD-10-CM

## 2025-06-25 DIAGNOSIS — R41.9 NEUROCOGNITIVE DISORDER: ICD-10-CM

## 2025-06-25 DIAGNOSIS — Z76.0 ENCOUNTER FOR MEDICATION REFILL: Primary | ICD-10-CM

## 2025-06-25 PROBLEM — Z00.8 MEDICAL CLEARANCE FOR PSYCHIATRIC ADMISSION: Status: RESOLVED | Noted: 2023-06-20 | Resolved: 2025-06-25

## 2025-06-25 LAB
DME PARACHUTE DELIVERY DATE REQUESTED: NORMAL
DME PARACHUTE ITEM DESCRIPTION: NORMAL
DME PARACHUTE ITEM DESCRIPTION: NORMAL
DME PARACHUTE ORDER STATUS: NORMAL
DME PARACHUTE SUPPLIER NAME: NORMAL
DME PARACHUTE SUPPLIER PHONE: NORMAL

## 2025-06-25 PROCEDURE — 99284 EMERGENCY DEPT VISIT MOD MDM: CPT | Performed by: EMERGENCY MEDICINE

## 2025-06-25 PROCEDURE — 99238 HOSP IP/OBS DSCHRG MGMT 30/<: CPT | Performed by: STUDENT IN AN ORGANIZED HEALTH CARE EDUCATION/TRAINING PROGRAM

## 2025-06-25 PROCEDURE — 99281 EMR DPT VST MAYX REQ PHY/QHP: CPT

## 2025-06-25 RX ORDER — GABAPENTIN 300 MG/1
300 CAPSULE ORAL ONCE
Status: COMPLETED | OUTPATIENT
Start: 2025-06-25 | End: 2025-06-25

## 2025-06-25 RX ORDER — QUETIAPINE FUMARATE 25 MG/1
12.5 TABLET, FILM COATED ORAL
Qty: 15 TABLET | Refills: 0 | Status: ON HOLD | OUTPATIENT
Start: 2025-06-25 | End: 2025-07-25

## 2025-06-25 RX ORDER — BUDESONIDE AND FORMOTEROL FUMARATE DIHYDRATE 80; 4.5 UG/1; UG/1
2 AEROSOL RESPIRATORY (INHALATION) 2 TIMES DAILY
Qty: 10.2 G | Refills: 0 | Status: ON HOLD | OUTPATIENT
Start: 2025-06-25 | End: 2025-07-25

## 2025-06-25 RX ORDER — ERGOCALCIFEROL 1.25 MG/1
50000 CAPSULE, LIQUID FILLED ORAL WEEKLY
Qty: 7 CAPSULE | Refills: 0 | Status: ON HOLD | OUTPATIENT
Start: 2025-06-27 | End: 2025-08-09

## 2025-06-25 RX ORDER — QUETIAPINE FUMARATE 25 MG/1
25 TABLET, FILM COATED ORAL ONCE
Status: COMPLETED | OUTPATIENT
Start: 2025-06-25 | End: 2025-06-25

## 2025-06-25 RX ORDER — ESCITALOPRAM OXALATE 20 MG/1
20 TABLET ORAL DAILY
Qty: 30 TABLET | Refills: 0 | Status: ON HOLD | OUTPATIENT
Start: 2025-06-25 | End: 2025-07-25

## 2025-06-25 RX ORDER — FOLIC ACID 1 MG/1
1 TABLET ORAL DAILY
Qty: 30 TABLET | Refills: 0 | Status: ON HOLD | OUTPATIENT
Start: 2025-06-25 | End: 2025-07-25

## 2025-06-25 RX ORDER — ROPINIROLE 0.25 MG/1
0.25 TABLET, FILM COATED ORAL
Qty: 30 TABLET | Refills: 0 | Status: ON HOLD | OUTPATIENT
Start: 2025-06-25 | End: 2025-07-25

## 2025-06-25 RX ORDER — ROPINIROLE 0.25 MG/1
0.25 TABLET, FILM COATED ORAL ONCE
Status: COMPLETED | OUTPATIENT
Start: 2025-06-25 | End: 2025-06-25

## 2025-06-25 RX ORDER — GABAPENTIN 300 MG/1
300 CAPSULE ORAL 3 TIMES DAILY
Qty: 90 CAPSULE | Refills: 0 | Status: ON HOLD | OUTPATIENT
Start: 2025-06-25 | End: 2025-07-25

## 2025-06-25 RX ADMIN — QUETIAPINE FUMARATE 25 MG: 25 TABLET ORAL at 21:22

## 2025-06-25 RX ADMIN — BUDESONIDE AND FORMOTEROL FUMARATE DIHYDRATE 2 PUFF: 80; 4.5 AEROSOL RESPIRATORY (INHALATION) at 09:25

## 2025-06-25 RX ADMIN — GABAPENTIN 300 MG: 300 CAPSULE ORAL at 09:23

## 2025-06-25 RX ADMIN — ROPINIROLE 0.25 MG: 0.25 TABLET, FILM COATED ORAL at 21:22

## 2025-06-25 RX ADMIN — LEVETIRACETAM 1500 MG: 500 TABLET, FILM COATED ORAL at 09:24

## 2025-06-25 RX ADMIN — Medication 1000 MCG: at 09:23

## 2025-06-25 RX ADMIN — GABAPENTIN 300 MG: 300 CAPSULE ORAL at 21:23

## 2025-06-25 RX ADMIN — FOLIC ACID 1 MG: 1 TABLET ORAL at 09:24

## 2025-06-25 RX ADMIN — ESCITALOPRAM OXALATE 20 MG: 10 TABLET ORAL at 09:24

## 2025-06-25 RX ADMIN — CARBAMAZEPINE 200 MG: 200 TABLET ORAL at 09:23

## 2025-06-25 RX ADMIN — METOPROLOL SUCCINATE 50 MG: 50 TABLET, EXTENDED RELEASE ORAL at 09:23

## 2025-06-25 RX ADMIN — NICOTINE 1 PATCH: 21 PATCH, EXTENDED RELEASE TRANSDERMAL at 09:23

## 2025-06-25 NOTE — PROGRESS NOTES
06/25/25 0742   Team Meeting   Meeting Type Daily Rounds   Initial Conference Date 06/25/25   Next Conference Date 06/26/25   Team Members Present   Team Members Present Physician;Nurse;   Physician Team Member Foster/Carolyn/oRsy/Armin   Nursing Team Member Eliecer   Care Management Team Member Prasanth   Social Work Team Member Jesus   Patient/Family Present   Patient Present No   Patient's Family Present No     Discharge today at 1 pm.

## 2025-06-25 NOTE — ASSESSMENT & PLAN NOTE
Lab Results   Component Value Date    EGFR 54 06/19/2025    EGFR 49 06/18/2025    EGFR 50 06/15/2025    CREATININE 1.07 06/19/2025    CREATININE 1.16 06/18/2025    CREATININE 1.14 06/15/2025   Per PCP  Avoid nephrotoxic agents

## 2025-06-25 NOTE — NURSING NOTE
Patient is alert and oriented able to make her needs known. She is visible in the milieu interacts with select peers. She is medication and meal compliant. She endorses mild anxiety and depression and denies all other psych s/s. No behaviors noted. Safety checks ongoing.

## 2025-06-25 NOTE — ASSESSMENT & PLAN NOTE
Cydney Lim is a 64-year-old female with pertinent psychiatric history of major depressive disorder with psychotic features as well as multiple inpatient psychiatric admissions who presented to [facility] ED due to suicidal ideation with plan to overdose on medication. Recent stressors included family conflict with her brother who is her current rep-payee and has been limiting access to her social security income. She notes worsening dysphoric mood as a result and signed a voluntary 201 for admission, presently admitted to [facility] for psychiatric stabilization.     Continue Seroquel 12.5 mg daily for mood, appetite, and sleep - started 6/19  Continue Lexapro 20 mg daily for mood and anxiety - increased 6/23  Continue Tegretol 200 mg TID for seizure prophylaxis and added benefit of mood stabilization   Tegretol level 8 on 6/20  Gabapentin 300 mg TID for anxiety and restlessness   Melatonin 3 mg to regulate sleep-wake cycle   Discontinued Abilify 5 mg as of 6/19 due to ongoing restless legs, medication could be potentially worsening symptoms   OT Cognitive Evaluation impression: The patient scored 4.8/6.0 indicating that they may live alone with daily assistance to monitor personal safety and check problem solving effectiveness.

## 2025-06-25 NOTE — PROGRESS NOTES
06/25/25 1100   Activity/Group Checklist   Group Wellness  (mindfulness activity / discussion)   Attendance Attended   Attendance Duration (min) 0-15  (left early returned late)   Interactions Other (Comment)  (discharge focused, not present with group activity)   Affect/Mood Appropriate;Calm   Goals Achieved Able to listen to others;Identified feelings;Discussed discharge plans;Able to self-disclose;Able to recieve feedback

## 2025-06-25 NOTE — BH TRANSITION RECORD
Contact Information: If you have any questions, concerns, pended studies, tests and/or procedures, or emergencies regarding your inpatient behavioral health visit. Please contact [facility] [phone] and ask to speak to a , nurse or physician. A contact is available 24 hours/ 7 days a week at this number.     Summary of Procedures Performed During your Stay:  Below is a list of major procedures performed during your hospital stay and a summary of results:  - Cardiac Procedures/Studies: EKG on 6/18/25: Normal sinus rhythm.  - Major Imaging Studies: CXR on 6/15/25: No acute cardiopulmonary disease..    Pending Studies (From admission, onward)      None          Please follow up on the above pending studies with your PCP and/or referring provider.
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
## 2025-06-25 NOTE — PLAN OF CARE
Problem: Depression  Goal: Treatment Goal: Demonstrate behavioral control of depressive symptoms, verbalize feelings of improved mood/affect, and adopt new coping skills prior to discharge  Outcome: Progressing  Goal: Refrain from harming self  Description: Interventions:  - Monitor patient closely, per order   - Supervise medication ingestion, monitor effects and side effects   Outcome: Progressing  Goal: Refrain from isolation  Description: Interventions:  - Develop a trusting relationship   - Encourage socialization   Outcome: Progressing     Problem: Anxiety  Goal: Anxiety is at manageable level  Description: Interventions:  - Assess and monitor patient's anxiety level.   - Monitor for signs and symptoms (heart palpitations, chest pain, shortness of breath, headaches, nausea, feeling jumpy, restlessness, irritable, apprehensive).   - Collaborate with interdisciplinary team and initiate plan and interventions as ordered.  - [place] patient to unit/surroundings  - Explain treatment plan  - Encourage participation in care  - Encourage verbalization of concerns/fears  - Identify coping mechanisms  - Assist in developing anxiety-reducing skills  - Administer/offer alternative therapies  - Limit or eliminate stimulants  Outcome: Progressing

## 2025-06-25 NOTE — PLAN OF CARE
Problem: SAFETY ADULT  Goal: Patient will remain free of falls  Description: INTERVENTIONS:  - Educate patient/family on patient safety including physical limitations  - Instruct patient to call for assistance with activity   - Consider consulting OT/PT to assist with strengthening/mobility based on AM PAC & JH-HLM score  - Consult OT/PT to assist with strengthening/mobility   - Keep Call bell within reach  - Keep bed low and locked with side rails adjusted as appropriate  - Keep care items and personal belongings within reach  - Initiate and maintain comfort rounds  - Make Fall Risk Sign visible to staff  - Offer Toileting every 2 Hours, in advance of need  - Initiate/Maintain bed alarm  - Obtain necessary fall risk management equipment: walker  - Apply yellow socks and bracelet for high fall risk patients  - Consider moving patient to room near nurses station  Outcome: Completed  Goal: Maintains/Returns to pre admission functional level  Description: INTERVENTIONS:  - Perform AM-PAC 6 Click Basic Mobility/ Daily Activity assessment daily.  - Set and communicate daily mobility goal to care team and patient/family/caregiver.   - Collaborate with rehabilitation services on mobility goals if consulted      - Ambulate patient 3 times a day  - Out of bed to chair 3 times a day   - Out of bed for meals 3 times a day  - Out of bed for toileting  - Record patient progress and toleration of activity level   Outcome: Completed     Problem: DISCHARGE PLANNING  Goal: Discharge to home or other facility with appropriate resources  Description: INTERVENTIONS:  - Identify barriers to discharge w/patient and caregiver  - Arrange for needed discharge resources and transportation as appropriate  - Identify discharge learning needs (meds, wound care, etc.)  - Arrange for interpretive services to assist at discharge as needed  - Refer to Case Management Department for coordinating discharge planning if the patient needs  post-hospital services based on physician/advanced practitioner order or complex needs related to functional status, cognitive ability, or social support system  Outcome: Completed     Problem: Knowledge Deficit  Goal: Patient/family/caregiver demonstrates understanding of disease process, treatment plan, medications, and discharge instructions  Description: Complete learning assessment and assess knowledge base.  Interventions:  - Provide teaching at level of understanding  - Provide teaching via preferred learning methods  Outcome: Completed     Problem: Alteration in Thoughts and Perception  Goal: Treatment Goal: Gain control of psychotic behaviors/thinking, reduce/eliminate presenting symptoms and demonstrate improved reality functioning upon discharge  Outcome: Completed     Problem: Risk for Self Injury/Neglect  Goal: Treatment Goal: Remain safe during length of stay, learn and adopt new coping skills, and be free of self-injurious ideation, impulses and acts at the time of discharge  Outcome: Completed     Problem: Depression  Goal: Treatment Goal: Demonstrate behavioral control of depressive symptoms, verbalize feelings of improved mood/affect, and adopt new coping skills prior to discharge  Outcome: Completed  Goal: Refrain from harming self  Description: Interventions:  - Monitor patient closely, per order   - Supervise medication ingestion, monitor effects and side effects   Outcome: Completed  Goal: Refrain from isolation  Description: Interventions:  - Develop a trusting relationship   - Encourage socialization   Outcome: Completed     Problem: Anxiety  Goal: Anxiety is at manageable level  Description: Interventions:  - Assess and monitor patient's anxiety level.   - Monitor for signs and symptoms (heart palpitations, chest pain, shortness of breath, headaches, nausea, feeling jumpy, restlessness, irritable, apprehensive).   - Collaborate with interdisciplinary team and initiate plan and interventions  as ordered.  - Richards patient to unit/surroundings  - Explain treatment plan  - Encourage participation in care  - Encourage verbalization of concerns/fears  - Identify coping mechanisms  - Assist in developing anxiety-reducing skills  - Administer/offer alternative therapies  - Limit or eliminate stimulants  Outcome: Completed     Problem: Risk for Violence/Aggression Toward Others  Goal: Treatment Goal: Refrain from acts of violence/aggression during length of stay, and demonstrate improved impulse control at the time of discharge  Outcome: Completed     Problem: Alteration in Thoughts and Perception  Goal: Attend and participate in unit activities, including therapeutic, recreational, and educational groups  Description: Interventions:  -Encourage Visitation and family involvement in care  Outcome: Completed     Problem: DISCHARGE PLANNING - CARE MANAGEMENT  Goal: Discharge to post-acute care or home with appropriate resources  Description: INTERVENTIONS:  - Conduct assessment to determine patient/family and health care team treatment goals, and need for post-acute services based on payer coverage, community resources, and patient preferences, and barriers to discharge  - Address psychosocial, clinical, and financial barriers to discharge as identified in assessment in conjunction with the patient/family and health care team  - Arrange appropriate level of post-acute services according to patient’s   needs and preference and payer coverage in collaboration with the physician and health care team  - Communicate with and update the patient/family, physician, and health care team regarding progress on the discharge plan  - Arrange appropriate transportation to post-acute venues  Outcome: Completed

## 2025-06-25 NOTE — CASE MANAGEMENT
Patient signed a release for [address] [place] [address] to resume her care at home. Sent a new order through [email or web address] as requested per phone call from Mayrsol Tabares the liaison with the [facility].     We did order her a rollator walker, she requested that with our physical therapist, but they said that a walker was previously ordered and will not be covered. Advised patient.

## 2025-06-25 NOTE — PROGRESS NOTES
06/25/25 1110   Discharge Planning   Living Arrangements Lives Alone   Support Systems Self;Family members;Home care staff;Psychiatrist   Type of Current Residence Private residence   Current Home Care Services No   Other Referral/Resources/Interventions Provided:   Financial Resources Provided Indigent Transportation   Referrals Provided: Crisis Hotline;ICM   Discharge Communications   Discharge planning discussed with: Patient, Brother Rodney   Freedom of Choice Yes   CM contacted family/caregiver? Yes   Were Treatment Team discharge recommendations reviewed with patient/caregiver? Yes   Did patient/caregiver verbalize understanding of patient care needs? Yes   Were patient/caregiver advised of the risks associated with not following Treatment Team discharge recommendations? Yes   Contacts   Patient Contacts Rodney Cifuentes (brother)   Relationship to Patient: Family   Contact Method Phone   Phone Number [phone]   Reason/Outcome Emergency Contact;Continuity of Care   [facility]   Would you like to participate in our [facility] service program?   No - Declined

## 2025-06-25 NOTE — NURSING NOTE
Reviewed AVS with patient. Verified preferred pharmacy. Denies questions and concerns. Discussed smoking cessation. Gave phone number for quit line. Patient verbalized understanding and willing to continue nicotine patches. AVS placed with belongings.
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
## 2025-06-26 ENCOUNTER — VBI (OUTPATIENT)
Dept: FAMILY MEDICINE CLINIC | Facility: CLINIC | Age: 65
End: 2025-06-26

## 2025-06-26 ENCOUNTER — APPOINTMENT (EMERGENCY)
Dept: RADIOLOGY | Facility: HOSPITAL | Age: 65
End: 2025-06-26
Payer: MEDICARE

## 2025-06-26 ENCOUNTER — HOSPITAL ENCOUNTER (EMERGENCY)
Facility: HOSPITAL | Age: 65
Discharge: HOME | End: 2025-06-26
Attending: EMERGENCY MEDICINE
Payer: MEDICARE

## 2025-06-26 VITALS
TEMPERATURE: 98 F | RESPIRATION RATE: 18 BRPM | SYSTOLIC BLOOD PRESSURE: 113 MMHG | DIASTOLIC BLOOD PRESSURE: 57 MMHG | HEART RATE: 80 BPM | OXYGEN SATURATION: 96 %

## 2025-06-26 DIAGNOSIS — Z60.8 PERSONAL ENVIRONMENT IS UNHAPPY: ICD-10-CM

## 2025-06-26 DIAGNOSIS — R06.00 DYSPNEA: Primary | ICD-10-CM

## 2025-06-26 LAB
ALBUMIN SERPL BCG-MCNC: 3.7 G/DL (ref 3.5–5)
ALP SERPL-CCNC: 94 U/L (ref 34–104)
ALT SERPL W P-5'-P-CCNC: 9 U/L (ref 7–52)
ANION GAP SERPL CALCULATED.3IONS-SCNC: 9 MMOL/L (ref 4–13)
AST SERPL W P-5'-P-CCNC: 12 U/L (ref 13–39)
BASOPHILS # BLD AUTO: 0.06 THOUSANDS/ÂΜL (ref 0–0.1)
BASOPHILS NFR BLD AUTO: 1 % (ref 0–1)
BILIRUB SERPL-MCNC: 0.31 MG/DL (ref 0.2–1)
BUN SERPL-MCNC: 26 MG/DL (ref 5–25)
CALCIUM SERPL-MCNC: 9.4 MG/DL (ref 8.4–10.2)
CARDIAC TROPONIN I PNL SERPL HS: 3 NG/L (ref ?–50)
CHLORIDE SERPL-SCNC: 105 MMOL/L (ref 96–108)
CK SERPL-CCNC: 59 U/L (ref 26–192)
CO2 SERPL-SCNC: 25 MMOL/L (ref 21–32)
CREAT SERPL-MCNC: 1.29 MG/DL (ref 0.6–1.3)
EOSINOPHIL # BLD AUTO: 0.16 THOUSAND/ÂΜL (ref 0–0.61)
EOSINOPHIL NFR BLD AUTO: 2 % (ref 0–6)
ERYTHROCYTE [DISTWIDTH] IN BLOOD BY AUTOMATED COUNT: 13.2 % (ref 11.6–15.1)
GFR SERPL CREATININE-BSD FRML MDRD: 43 ML/MIN/1.73SQ M
GLUCOSE SERPL-MCNC: 114 MG/DL (ref 65–140)
HCT VFR BLD AUTO: 35.6 % (ref 34.8–46.1)
HGB BLD-MCNC: 11.6 G/DL (ref 11.5–15.4)
IMM GRANULOCYTES # BLD AUTO: 0.02 THOUSAND/UL (ref 0–0.2)
IMM GRANULOCYTES NFR BLD AUTO: 0 % (ref 0–2)
LYMPHOCYTES # BLD AUTO: 2.8 THOUSANDS/ÂΜL (ref 0.6–4.47)
LYMPHOCYTES NFR BLD AUTO: 27 % (ref 14–44)
MCH RBC QN AUTO: 32 PG (ref 26.8–34.3)
MCHC RBC AUTO-ENTMCNC: 32.6 G/DL (ref 31.4–37.4)
MCV RBC AUTO: 98 FL (ref 82–98)
MONOCYTES # BLD AUTO: 0.8 THOUSAND/ÂΜL (ref 0.17–1.22)
MONOCYTES NFR BLD AUTO: 8 % (ref 4–12)
NEUTROPHILS # BLD AUTO: 6.59 THOUSANDS/ÂΜL (ref 1.85–7.62)
NEUTS SEG NFR BLD AUTO: 62 % (ref 43–75)
NRBC BLD AUTO-RTO: 0 /100 WBCS
PLATELET # BLD AUTO: 323 THOUSANDS/UL (ref 149–390)
PMV BLD AUTO: 10.5 FL (ref 8.9–12.7)
POTASSIUM SERPL-SCNC: 4.1 MMOL/L (ref 3.5–5.3)
PROT SERPL-MCNC: 7.4 G/DL (ref 6.4–8.4)
RBC # BLD AUTO: 3.62 MILLION/UL (ref 3.81–5.12)
SODIUM SERPL-SCNC: 139 MMOL/L (ref 135–147)
WBC # BLD AUTO: 10.43 THOUSAND/UL (ref 4.31–10.16)

## 2025-06-26 PROCEDURE — 80053 COMPREHEN METABOLIC PANEL: CPT | Performed by: EMERGENCY MEDICINE

## 2025-06-26 PROCEDURE — 82550 ASSAY OF CK (CPK): CPT | Performed by: EMERGENCY MEDICINE

## 2025-06-26 PROCEDURE — 71045 X-RAY EXAM CHEST 1 VIEW: CPT

## 2025-06-26 PROCEDURE — 85025 COMPLETE CBC W/AUTO DIFF WBC: CPT | Performed by: EMERGENCY MEDICINE

## 2025-06-26 PROCEDURE — 99285 EMERGENCY DEPT VISIT HI MDM: CPT

## 2025-06-26 PROCEDURE — 36415 COLL VENOUS BLD VENIPUNCTURE: CPT | Performed by: EMERGENCY MEDICINE

## 2025-06-26 PROCEDURE — 84484 ASSAY OF TROPONIN QUANT: CPT | Performed by: EMERGENCY MEDICINE

## 2025-06-26 PROCEDURE — 99285 EMERGENCY DEPT VISIT HI MDM: CPT | Performed by: EMERGENCY MEDICINE

## 2025-06-26 PROCEDURE — 93005 ELECTROCARDIOGRAM TRACING: CPT

## 2025-06-26 SDOH — SOCIAL STABILITY - SOCIAL INSECURITY: OTHER PROBLEMS RELATED TO SOCIAL ENVIRONMENT: Z60.8

## 2025-06-26 NOTE — LETTER
[facility] [place]  [address]  [address]  [place] [id number] [phone]    Date: 06/30/25    Cydney Lim  [address]  [place] [address]    Dear Cydney:                                                                                                                                Thank you for choosing [facility] emergency department for care.  Your primary care provider wants to make sure that your ongoing medical care is being addressed. If you require follow up care as a result of your emergency department visit, there are a few things the practice would like you to know.                As part of the network's continuing commitment to caring for our patients, we have added more same day appointments and have extended office hours to meet your medical needs. After hours, on-call physicians are available via your primary care provider's main office line.               We encourage you to contact our office prior to seeking treatment to discuss your symptoms with the medical staff.  Together, we can determine the correct course of action.  A majority of non-emergent conditions such as: common cold, flu-like symptoms, fevers, strains/sprains, dislocations, minor burns, cuts and animal bites can be treated at [facility] facilities. Diagnostic testing is available at some sites.               Of course, if you are experiencing a life threatening medical emergency call 911 or proceed directly to the nearest emergency room.    Your nearest [facility] facility is conveniently located at:    [facility] ([place])  [address] [place] [address]  [phone]  SKIP THE WAIT  Conveniently offered at most [facility] locations  [place] your spot online at [email or web address] or on the [facility]    Sincerely,    [facility]  Dept: [phone]

## 2025-06-26 NOTE — ED PROVIDER NOTES
Time reflects when diagnosis was documented in both MDM as applicable and the Disposition within this note       Time User Action Codes Description Comment    6/25/2025  9:05 PM Andrzej Kaba [Z76.0] Encounter for medication refill     6/25/2025  9:05 PM Andrzej Kaba [G25.81] Restless leg syndrome     6/25/2025  9:06 PM Andrzej Kaba [G40.909] Epilepsy (HCC)     6/25/2025  9:06 PM Andrzej Kaba [R41.9] Neurocognitive disorder           ED Disposition       ED Disposition   Discharge    Condition   Stable    Date/Time   Wed Jun 25, 2025  9:06 PM    Comment   Cydney Lim discharge to home/self care.                   Assessment & Plan       Medical Decision Making  63 y/o female with hx of HTN, epilepsy, COPD, restless leg syndrome, and neurocognitive disorder presents to the ED requesting medication refill.  The patient was recently admitted for inpatient behavioral health to [facility] and was discharged home earlier today.  The patient states that she got to the pharmacy too late to  her prescriptions and came to the ED for treatment.  She notes that she needs her evening doses of her medications, particularly her ropinirole for her restless leg syndrome.  She denies any other symptoms or complaints.  No SI, HI, or hallucinations.    Vital signs reviewed.  See physical exam documentation for exam findings.  Differential diagnosis includes but is not limited to medication refill. Patient provided with evening doses of her ropinirole 0.25 mg, quetiapine 25 mg, and gabapentin 300 mg.  Patient instructed to  her prescriptions from her pharmacy tomorrow that were sent electronically when she was discharged from her inpatient behavioral health admission earlier today and she verbalizes understanding. I discussed all findings, treatment, red flags/return precautions, and outpatient follow-up and the patient/family understand and agree. Stable for  "discharge.    Risk  Prescription drug management.             Medications   rOPINIRole (REQUIP) tablet 0.25 mg (0.25 mg Oral Given 6/25/25 2122)   gabapentin (NEURONTIN) capsule 300 mg (300 mg Oral Given 6/25/25 2123)   QUEtiapine (SEROquel) tablet 25 mg (25 mg Oral Given 6/25/25 2122)       ED Risk Strat Scores                    No data recorded                            History of Present Illness       Chief Complaint   Patient presents with    Medication Refill     Patient states she needs a refill of her medication that she takes at night otherwise \"her legs will be kicking all night\"        Past Medical History[1]   Past Surgical History[2]   Family History[3]   Social History[4]   E-Cigarette/Vaping    E-Cigarette Use Never User       E-Cigarette/Vaping Substances    Nicotine Yes     THC No     CBD No     Flavoring No     Other No     Unknown No       I have reviewed and agree with the history as documented.     65 y/o female with hx of HTN, epilepsy, COPD, restless leg syndrome, and neurocognitive disorder presents to the ED requesting medication refill.  The patient was recently admitted for inpatient behavioral health to [facility] and was discharged home earlier today.  The patient states that she got to the pharmacy too late to  her prescriptions and came to the ED for treatment.  She notes that she needs her evening doses of her medications, particularly her ropinirole for her restless leg syndrome.  She denies any other symptoms or complaints.  No SI, HI, or hallucinations.        Review of Systems   Constitutional:  Negative for chills and fever.   HENT:  Negative for congestion, rhinorrhea and sore throat.    Respiratory:  Negative for cough and shortness of breath.    Cardiovascular:  Negative for chest pain and palpitations.   Gastrointestinal:  Negative for abdominal pain, diarrhea, nausea and vomiting.   Genitourinary:  Negative for dysuria and hematuria. "   Musculoskeletal:  Negative for back pain and neck pain.   Neurological:  Negative for dizziness, weakness, light-headedness, numbness and headaches.   All other systems reviewed and are negative.          Objective       ED Triage Vitals [06/25/25 2044]   Temperature Pulse Blood Pressure Respirations SpO2 Patient Position - Orthostatic VS   98.7 °F (37.1 °C) 78 133/58 20 95 % Lying      Temp Source Heart Rate Source BP Location FiO2 (%) Pain Score    Oral Monitor Right arm -- 9      Vitals      Date and Time Temp Pulse SpO2 Resp BP Pain Score FACES Pain Rating User   06/25/25 2044 98.7 °F (37.1 °C) 78 95 % 20 133/58 9 -- KLB            Physical Exam  Vitals and nursing note reviewed.   Constitutional:       General: She is not in acute distress.     Appearance: Normal appearance. She is normal weight. She is not ill-appearing.   HENT:      Head: Normocephalic and atraumatic.      Right Ear: External ear normal.      Left Ear: External ear normal.      Nose: Nose normal. No congestion or rhinorrhea.      Mouth/Throat:      Mouth: Mucous membranes are moist.      Pharynx: Oropharynx is clear. No oropharyngeal exudate or posterior oropharyngeal erythema.     Eyes:      Extraocular Movements: Extraocular movements intact.      Conjunctiva/sclera: Conjunctivae normal.      Pupils: Pupils are equal, round, and reactive to light.       Cardiovascular:      Rate and Rhythm: Normal rate and regular rhythm.      Pulses: Normal pulses.      Heart sounds: Normal heart sounds. No murmur heard.  Pulmonary:      Effort: Pulmonary effort is normal. No respiratory distress.      Breath sounds: Normal breath sounds. No wheezing or rales.   Abdominal:      General: Abdomen is flat. Bowel sounds are normal. There is no distension.      Palpations: Abdomen is soft.      Tenderness: There is no abdominal tenderness. There is no right CVA tenderness, left CVA tenderness or guarding.     Musculoskeletal:         General: No swelling or  tenderness. Normal range of motion.      Cervical back: Normal range of motion and neck supple. No tenderness.     Skin:     General: Skin is warm and dry.      Capillary Refill: Capillary refill takes less than 2 seconds.     Neurological:      General: No focal deficit present.      Mental Status: She is alert and oriented to person, place, and time.         Results Reviewed       None            No orders to display       Procedures    ED Medication and Procedure Management   Prior to Admission Medications   Prescriptions Last Dose Informant Patient Reported? Taking?   Albuterol-Budesonide (Airsupra) 90-80 MCG/ACT AERO   Yes No   Sig: Inhale 2 puffs in the morning and 2 puffs before bedtime.   Cholecalciferol (VITAMIN D3) 1,000 units tablet   No No   Sig: Take 2 tablets (2,000 Units total) by mouth daily   QUEtiapine (SEROquel) 25 mg tablet   No No   Sig: Take 0.5 tablets (12.5 mg total) by mouth daily at bedtime   albuterol (Proventil HFA) 90 mcg/act inhaler  Self No No   Sig: Inhale 2 puffs every 6 (six) hours as needed for wheezing   budesonide-formoterol (SYMBICORT) 80-4.5 MCG/ACT inhaler   No No   Sig: Inhale 2 puffs 2 (two) times a day Rinse mouth after use.   carBAMazepine (TEGretol) 200 mg tablet  Self No No   Sig: Take 1 tablet (200 mg total) by mouth 3 (three) times a day   cyanocobalamin (VITAMIN B-12) 1000 MCG tablet   No No   Sig: Take 1 tablet (1,000 mcg total) by mouth daily   ergocalciferol (VITAMIN D2) 50,000 units   No No   Sig: Take 1 capsule (50,000 Units total) by mouth once a week for 7 doses   escitalopram (LEXAPRO) 20 mg tablet   No No   Sig: Take 1 tablet (20 mg total) by mouth daily   ferrous sulfate 325 (65 FE) MG EC tablet   Yes No   Sig: Take 325 mg by mouth 3 (three) times a day with meals   folic acid (FOLVITE) 1 mg tablet   No No   Sig: Take 1 tablet (1 mg total) by mouth daily   gabapentin (NEURONTIN) 300 mg capsule   No No   Sig: Take 1 capsule (300 mg total) by mouth 3 (three)  times a day   levETIRAcetam (KEPPRA) 750 mg tablet  Self No No   Sig: Take 2 tablets (1,500 mg total) by mouth 2 (two) times a day   melatonin 3 mg   No No   Sig: Take 1 tablet (3 mg total) by mouth daily at bedtime   metoprolol succinate (TOPROL-XL) 50 mg 24 hr tablet   No No   Sig: Take 1 tablet (50 mg total) by mouth daily   nicotine (NICODERM CQ) 21 mg/24 hr TD 24 hr patch   No No   Sig: Place 1 patch on the skin daily over 24 hours   rOPINIRole (REQUIP) 0.25 mg tablet   No No   Sig: Take 1 tablet (0.25 mg total) by mouth daily at bedtime      Facility-Administered Medications Last Administration Doses Remaining   cyanocobalamin injection 1,000 mcg None recorded         Discharge Medication List as of 6/25/2025  9:07 PM        CONTINUE these medications which have NOT CHANGED    Details   albuterol (Proventil HFA) 90 mcg/act inhaler Inhale 2 puffs every 6 (six) hours as needed for wheezing, Starting Wed 5/22/2024, Normal      Albuterol-Budesonide (Airsupra) 90-80 MCG/ACT AERO Inhale 2 puffs in the morning and 2 puffs before bedtime., Historical Med      budesonide-formoterol (SYMBICORT) 80-4.5 MCG/ACT inhaler Inhale 2 puffs 2 (two) times a day Rinse mouth after use., Starting Wed 6/25/2025, Until Fri 7/25/2025, Normal      carBAMazepine (TEGretol) 200 mg tablet Take 1 tablet (200 mg total) by mouth 3 (three) times a day, Starting Sat 8/3/2024, Normal      Cholecalciferol (VITAMIN D3) 1,000 units tablet Take 2 tablets (2,000 Units total) by mouth daily, Starting Wed 6/25/2025, Until Fri 7/25/2025, Normal      cyanocobalamin (VITAMIN B-12) 1000 MCG tablet Take 1 tablet (1,000 mcg total) by mouth daily, Starting Wed 6/25/2025, Until Fri 7/25/2025, Normal      ergocalciferol (VITAMIN D2) 50,000 units Take 1 capsule (50,000 Units total) by mouth once a week for 7 doses, Starting Fri 6/27/2025, Until Sat 8/9/2025, Normal      escitalopram (LEXAPRO) 20 mg tablet Take 1 tablet (20 mg total) by mouth daily, Starting Wed  6/25/2025, Until Fri 7/25/2025, Normal      ferrous sulfate 325 (65 FE) MG EC tablet Take 325 mg by mouth 3 (three) times a day with meals, Historical Med      folic acid (FOLVITE) 1 mg tablet Take 1 tablet (1 mg total) by mouth daily, Starting Wed 6/25/2025, Until Fri 7/25/2025, Normal      gabapentin (NEURONTIN) 300 mg capsule Take 1 capsule (300 mg total) by mouth 3 (three) times a day, Starting Wed 6/25/2025, Until Fri 7/25/2025, Normal      levETIRAcetam (KEPPRA) 750 mg tablet Take 2 tablets (1,500 mg total) by mouth 2 (two) times a day, Starting Sat 8/3/2024, Normal      melatonin 3 mg Take 1 tablet (3 mg total) by mouth daily at bedtime, Starting Wed 6/25/2025, Until Fri 7/25/2025, Normal      metoprolol succinate (TOPROL-XL) 50 mg 24 hr tablet Take 1 tablet (50 mg total) by mouth daily, Starting Fri 7/19/2024, Until Wed 6/18/2025, Normal      nicotine (NICODERM CQ) 21 mg/24 hr TD 24 hr patch Place 1 patch on the skin daily over 24 hours, Starting Fri 6/13/2025, Normal      QUEtiapine (SEROquel) 25 mg tablet Take 0.5 tablets (12.5 mg total) by mouth daily at bedtime, Starting Wed 6/25/2025, Until Fri 7/25/2025, Normal      rOPINIRole (REQUIP) 0.25 mg tablet Take 1 tablet (0.25 mg total) by mouth daily at bedtime, Starting Wed 6/25/2025, Until Fri 7/25/2025, Normal           No discharge procedures on file.  ED SEPSIS DOCUMENTATION   Time reflects when diagnosis was documented in both MDM as applicable and the Disposition within this note       Time User Action Codes Description Comment    6/25/2025  9:05 PM Andrzej Kaba [Z76.0] Encounter for medication refill     6/25/2025  9:05 PM Andrzej Kaba [G25.81] Restless leg syndrome     6/25/2025  9:06 PM Andrzej Kaba [G40.909] Epilepsy (HCC)     6/25/2025  9:06 PM Andrzej Kaba Add [R41.9] Neurocognitive disorder                    [1]   Past Medical History:  Diagnosis Date    Ambulatory dysfunction     B12 deficiency     CKD (chronic kidney  disease), stage III (HCC)     Depression     EP (epilepsy) (HCC)     Epilepsy (HCC)     History of sinus tachycardia     Hypertension     Morbid obesity (HCC)     Obesity     Restless leg     Restless leg syndrome     Vitamin D deficiency    [2]   Past Surgical History:  Procedure Laterality Date    DENTAL SURGERY      all teeth removed    DENTAL SURGERY      LASER ABLATION OF THE CERVIX      MAMMO (HISTORICAL)  05/01/2017   [3]   Family History  Problem Relation Name Age of Onset    Heart attack Mother      Kidney disease Mother      Liver disease Mother      Heart attack Father      No Known Problems Brother      No Known Problems Son     [4]   Social History  Tobacco Use    Smoking status: Every Day     Current packs/day: 1.00     Types: Cigarettes    Smokeless tobacco: Current   Vaping Use    Vaping status: Never Used   Substance Use Topics    Alcohol use: Not Currently     Comment: pt denies alcohol use    Drug use: Never        Andrzej Kaba MD  06/25/25 4614

## 2025-06-26 NOTE — ED PROVIDER NOTES
Time reflects when diagnosis was documented in both MDM as applicable and the Disposition within this note       Time User Action Codes Description Comment    6/26/2025  8:10 PM Lydia Thomas [R06.00] Dyspnea     6/26/2025  8:10 PM Lydia Thomas [Z60.8] Personal environment is unhappy           ED Disposition       ED Disposition   Discharge    Condition   Stable    Date/Time   Thu Jun 26, 2025  8:10 PM    Comment   Cydney Lim discharge to home/self care.                   Assessment & Plan       Medical Decision Making  64-year-old female history of COPD presents with complaint of shortness of breath related to not having air conditioning in her home.  Shortness of breath resolved after going outside.  EMS picked her up outside so they cannot comment on the state of her home.  She reports that she would like to go back to the rehab that she was at previously and that she does not feel safe at home because of her lack of air conditioning.  She denies chest pain or palpitations.  No leg swelling.  No recent change in her chronic cough related to COPD.  On arrival she is mildly hypertensive with otherwise normal vitals breathing comfortably with clear lungs.  Due to episode of shortness of breath we will evaluate for ACS as this could be an anginal equivalent.  Initial EKG shows normal sinus rhythm 83 bpm with some anterolateral T wave flattening which is chronic no acute ST or T wave abnormalities per my independent interpretation.  Chest x-ray shows no acute disease per my independent interpretation.  Normal CBC.  Baseline BMP.  Initial troponin of 3 which adequately rules out ACS given duration of patient's symptoms and absence of symptoms currently.  Patient continues to be well-appearing and is stable for discharge home at this time.  And is currently 67 degrees outside and patient was advised to open her windows at night to cool off her home.    Amount and/or Complexity of Data Reviewed  Labs:  ordered.  Radiology: ordered.             Medications - No data to display    ED Risk Strat Scores                    No data recorded        SBIRT 20yo+      Flowsheet Row Most Recent Value   Initial Alcohol Screen: US AUDIT-C     1. How often do you have a drink containing alcohol? 0 Filed at: 06/26/2025 1853   2. How many drinks containing alcohol do you have on a typical day you are drinking?  0 Filed at: 06/26/2025 1853   3b. FEMALE Any Age, or MALE 65+: How often do you have 4 or more drinks on one occassion? 0 Filed at: 06/26/2025 1853   Audit-C Score 0 Filed at: 06/26/2025 1853   JOHN: How many times in the past year have you...    Used an illegal drug or used a prescription medication for non-medical reasons? Never Filed at: 06/26/2025 1853                            History of Present Illness       Chief Complaint   Patient presents with    Shortness of Breath     Pt arrived via ems with c/o feeling sob in her apartment r/t not having any air conditioning.       Past Medical History[1]   Past Surgical History[2]   Family History[3]   Social History[4]   E-Cigarette/Vaping    E-Cigarette Use Never User       E-Cigarette/Vaping Substances    Nicotine Yes     THC No     CBD No     Flavoring No     Other No     Unknown No       I have reviewed and agree with the history as documented.     64-year-old female history of COPD presents with complaint of shortness of breath related to not having air conditioning in her home.  Shortness of breath resolved after going outside.  EMS picked her up outside so they cannot comment on the state of her home.  She reports that she would like to go back to the rehab that she was at previously and that she does not feel safe at home because of her lack of air conditioning.  She denies chest pain or palpitations.  No leg swelling.  No recent change in her chronic cough related to COPD.         Review of Systems   All other systems reviewed and are negative.          Objective        ED Triage Vitals [06/26/25 1854]   Temperature Pulse Blood Pressure Respirations SpO2 Patient Position - Orthostatic VS   98 °F (36.7 °C) 81 154/70 18 95 % Sitting      Temp Source Heart Rate Source BP Location FiO2 (%) Pain Score    Oral Monitor Left arm -- --      Vitals      Date and Time Temp Pulse SpO2 Resp BP Pain Score FACES Pain Rating User   06/26/25 1854 98 °F (36.7 °C) 81 95 % 18 154/70 -- -- MO            Physical Exam  Constitutional:       General: She is not in acute distress.     Comments: Mildly unkempt with greasy hair   HENT:      Head: Normocephalic.      Mouth/Throat:      Mouth: Mucous membranes are moist.     Eyes:      Conjunctiva/sclera: Conjunctivae normal.       Cardiovascular:      Rate and Rhythm: Normal rate and regular rhythm.      Heart sounds: Normal heart sounds.   Pulmonary:      Effort: Pulmonary effort is normal.      Breath sounds: Normal breath sounds.   Abdominal:      Palpations: Abdomen is soft.      Tenderness: There is no abdominal tenderness.     Musculoskeletal:      Cervical back: Neck supple.      Right lower leg: No edema.      Left lower leg: No edema.     Skin:     General: Skin is warm and dry.     Neurological:      General: No focal deficit present.      Mental Status: She is alert and oriented to person, place, and time.     Psychiatric:         Behavior: Behavior normal.         Results Reviewed       Procedure Component Value Units Date/Time    HS Troponin I 4hr [[id number]]     Lab Status: No result Specimen: Blood     HS Troponin I 2hr [[id number]]     Lab Status: No result Specimen: Blood     HS Troponin 0hr (reflex protocol) [[id number]]  (Normal) Collected: 06/26/25 1921    Lab Status: Final result Specimen: Blood from Arm, Right Updated: 06/26/25 1948     hs TnI 0hr 3 ng/L     Comprehensive metabolic panel [[id number]]  (Abnormal) Collected: 06/26/25 1921    Lab Status: Final result Specimen: Blood from Arm, Right Updated: 06/26/25 1943     Sodium 139  mmol/L      Potassium 4.1 mmol/L      Chloride 105 mmol/L      CO2 25 mmol/L      ANION GAP 9 mmol/L      BUN 26 mg/dL      Creatinine 1.29 mg/dL      Glucose 114 mg/dL      Calcium 9.4 mg/dL      AST 12 U/L      ALT 9 U/L      Alkaline Phosphatase 94 U/L      Total Protein 7.4 g/dL      Albumin 3.7 g/dL      Total Bilirubin 0.31 mg/dL      eGFR 43 ml/min/1.73sq m     Narrative:      National Kid[employer] guidelines for Chronic Kidney Disease (CKD):     Stage 1 with normal or high GFR (GFR > 90 mL/min/1.73 square meters)    Stage 2 Mild CKD (GFR = 60-89 mL/min/1.73 square meters)    Stage 3A Moderate CKD (GFR = 45-59 mL/min/1.73 square meters)    Stage 3B Moderate CKD (GFR = 30-44 mL/min/1.73 square meters)    Stage 4 Severe CKD (GFR = 15-29 mL/min/1.73 square meters)    Stage 5 End Stage CKD (GFR <15 mL/min/1.73 square meters)  Note: GFR calculation is accurate only with a steady state creatinine    CK [[id number]]  (Normal) Collected: 06/26/25 1921    Lab Status: Final result Specimen: Blood from Arm, Right Updated: 06/26/25 1943     Total CK 59 U/L     CBC and differential [[id number]]  (Abnormal) Collected: 06/26/25 1921    Lab Status: Final result Specimen: Blood from Arm, Right Updated: 06/26/25 1926     WBC 10.43 Thousand/uL      RBC 3.62 Million/uL      Hemoglobin 11.6 g/dL      Hematocrit 35.6 %      MCV 98 fL      MCH 32.0 pg      MCHC 32.6 g/dL      RDW 13.2 %      MPV 10.5 fL      Platelets 323 Thousands/uL      nRBC 0 /100 WBCs      Segmented % 62 %      Immature Grans % 0 %      Lymphocytes % 27 %      Monocytes % 8 %      Eosinophils Relative 2 %      Basophils Relative 1 %      Absolute Neutrophils 6.59 Thousands/µL      Absolute Immature Grans 0.02 Thousand/uL      Absolute Lymphocytes 2.80 Thousands/µL      Absolute Monocytes 0.80 Thousand/µL      Eosinophils Absolute 0.16 Thousand/µL      Basophils Absolute 0.06 Thousands/µL             XR chest 1 view portable    (Results Pending)        Procedures    ED Medication and Procedure Management   Prior to Admission Medications   Prescriptions Last Dose Informant Patient Reported? Taking?   Albuterol-Budesonide (Airsupra) 90-80 MCG/ACT AERO   Yes No   Sig: Inhale 2 puffs in the morning and 2 puffs before bedtime.   Cholecalciferol (VITAMIN D3) 1,000 units tablet   No No   Sig: Take 2 tablets (2,000 Units total) by mouth daily   QUEtiapine (SEROquel) 25 mg tablet   No No   Sig: Take 0.5 tablets (12.5 mg total) by mouth daily at bedtime   albuterol (Proventil HFA) 90 mcg/act inhaler  Self No No   Sig: Inhale 2 puffs every 6 (six) hours as needed for wheezing   budesonide-formoterol (SYMBICORT) 80-4.5 MCG/ACT inhaler   No No   Sig: Inhale 2 puffs 2 (two) times a day Rinse mouth after use.   carBAMazepine (TEGretol) 200 mg tablet  Self No No   Sig: Take 1 tablet (200 mg total) by mouth 3 (three) times a day   cyanocobalamin (VITAMIN B-12) 1000 MCG tablet   No No   Sig: Take 1 tablet (1,000 mcg total) by mouth daily   ergocalciferol (VITAMIN D2) 50,000 units   No No   Sig: Take 1 capsule (50,000 Units total) by mouth once a week for 7 doses   escitalopram (LEXAPRO) 20 mg tablet   No No   Sig: Take 1 tablet (20 mg total) by mouth daily   ferrous sulfate 325 (65 FE) MG EC tablet   Yes No   Sig: Take 325 mg by mouth 3 (three) times a day with meals   folic acid (FOLVITE) 1 mg tablet   No No   Sig: Take 1 tablet (1 mg total) by mouth daily   gabapentin (NEURONTIN) 300 mg capsule   No No   Sig: Take 1 capsule (300 mg total) by mouth 3 (three) times a day   levETIRAcetam (KEPPRA) 750 mg tablet  Self No No   Sig: Take 2 tablets (1,500 mg total) by mouth 2 (two) times a day   melatonin 3 mg   No No   Sig: Take 1 tablet (3 mg total) by mouth daily at bedtime   metoprolol succinate (TOPROL-XL) 50 mg 24 hr tablet   No No   Sig: Take 1 tablet (50 mg total) by mouth daily   nicotine (NICODERM CQ) 21 mg/24 hr TD 24 hr patch   No No   Sig: Place 1 patch on the skin  daily over 24 hours   rOPINIRole (REQUIP) 0.25 mg tablet   No No   Sig: Take 1 tablet (0.25 mg total) by mouth daily at bedtime      Facility-Administered Medications Last Administration Doses Remaining   cyanocobalamin injection 1,000 mcg None recorded         Patient's Medications   Discharge Prescriptions    No medications on file     No discharge procedures on file.  ED SEPSIS DOCUMENTATION   Time reflects when diagnosis was documented in both MDM as applicable and the Disposition within this note       Time User Action Codes Description Comment    6/26/2025  8:10 PM Lydia Thomas [R06.00] Dyspnea     6/26/2025  8:10 PM Lydia Thomas [Z60.8] Personal environment is unhappy                      [1]   Past Medical History:  Diagnosis Date    Ambulatory dysfunction     B12 deficiency     CKD (chronic kidney disease), stage III (HCC)     Depression     EP (epilepsy) (HCC)     Epilepsy (HCC)     History of sinus tachycardia     Hypertension     Morbid obesity (HCC)     Obesity     Restless leg     Restless leg syndrome     Vitamin D deficiency    [2]   Past Surgical History:  Procedure Laterality Date    DENTAL SURGERY      all teeth removed    DENTAL SURGERY      LASER ABLATION OF THE CERVIX      MAMMO (HISTORICAL)  05/01/2017   [3]   Family History  Problem Relation Name Age of Onset    Heart attack Mother      Kidney disease Mother      Liver disease Mother      Heart attack Father      No Known Problems Brother      No Known Problems Son     [4]   Social History  Tobacco Use    Smoking status: Every Day     Current packs/day: 1.00     Types: Cigarettes    Smokeless tobacco: Current   Vaping Use    Vaping status: Never Used   Substance Use Topics    Alcohol use: Not Currently     Comment: pt denies alcohol use    Drug use: Never        Lydia Thomas MD  06/26/25 2014

## 2025-06-26 NOTE — TELEPHONE ENCOUNTER
06/26/25 11:43 AM    Patient contacted post ED visit, first outreach attempt made. Message was left for patient to return a call to the [facility] Department at [facility]: Phone [phone].    Thank you.  Radha Julio MA  [facility]

## 2025-06-27 ENCOUNTER — HOSPITAL ENCOUNTER (INPATIENT)
Facility: HOSPITAL | Age: 65
LOS: 32 days | Discharge: NURSING, REHAB OR LONG-TERM CARE | DRG: 885 | End: 2025-07-29
Attending: STUDENT IN AN ORGANIZED HEALTH CARE EDUCATION/TRAINING PROGRAM | Admitting: PSYCHIATRY & NEUROLOGY
Payer: MEDICARE

## 2025-06-27 ENCOUNTER — HOSPITAL ENCOUNTER (EMERGENCY)
Facility: HOSPITAL | Age: 65
End: 2025-06-27
Attending: EMERGENCY MEDICINE
Payer: MEDICARE

## 2025-06-27 ENCOUNTER — VBI (OUTPATIENT)
Dept: FAMILY MEDICINE CLINIC | Facility: CLINIC | Age: 65
End: 2025-06-27

## 2025-06-27 VITALS
DIASTOLIC BLOOD PRESSURE: 78 MMHG | TEMPERATURE: 98.1 F | HEART RATE: 80 BPM | SYSTOLIC BLOOD PRESSURE: 148 MMHG | HEIGHT: 63 IN | WEIGHT: 231.04 LBS | RESPIRATION RATE: 18 BRPM | OXYGEN SATURATION: 96 % | BODY MASS INDEX: 40.94 KG/M2

## 2025-06-27 DIAGNOSIS — G25.81 RESTLESS LEG SYNDROME: ICD-10-CM

## 2025-06-27 DIAGNOSIS — F03.90 MAJOR NEUROCOGNITIVE DISORDER (HCC): Primary | ICD-10-CM

## 2025-06-27 DIAGNOSIS — G40.909 RECURRENT SEIZURES (HCC): ICD-10-CM

## 2025-06-27 DIAGNOSIS — F33.3 MDD (MAJOR DEPRESSIVE DISORDER), RECURRENT, SEVERE, WITH PSYCHOSIS (HCC): ICD-10-CM

## 2025-06-27 DIAGNOSIS — E55.9 VITAMIN D DEFICIENCY: ICD-10-CM

## 2025-06-27 DIAGNOSIS — E53.8 B12 DEFICIENCY: ICD-10-CM

## 2025-06-27 DIAGNOSIS — R45.851 SUICIDAL IDEATIONS: ICD-10-CM

## 2025-06-27 DIAGNOSIS — F51.01 PRIMARY INSOMNIA: ICD-10-CM

## 2025-06-27 DIAGNOSIS — R45.851 SUICIDAL IDEATIONS: Primary | ICD-10-CM

## 2025-06-27 DIAGNOSIS — Z00.8 ENCOUNTER FOR PSYCHOLOGICAL EVALUATION: ICD-10-CM

## 2025-06-27 DIAGNOSIS — Z00.8 MEDICAL CLEARANCE FOR PSYCHIATRIC ADMISSION: ICD-10-CM

## 2025-06-27 PROBLEM — G47.09 OTHER INSOMNIA: Status: ACTIVE | Noted: 2025-06-27

## 2025-06-27 LAB
ALBUMIN SERPL BCG-MCNC: 4.3 G/DL (ref 3.5–5)
ALP SERPL-CCNC: 103 U/L (ref 34–104)
ALT SERPL W P-5'-P-CCNC: 9 U/L (ref 7–52)
AMPHETAMINES SERPL QL SCN: NEGATIVE
ANION GAP SERPL CALCULATED.3IONS-SCNC: 6 MMOL/L (ref 4–13)
AST SERPL W P-5'-P-CCNC: 13 U/L (ref 13–39)
ATRIAL RATE: 78 BPM
ATRIAL RATE: 83 BPM
BACTERIA UR QL AUTO: ABNORMAL /HPF
BARBITURATES UR QL: NEGATIVE
BASOPHILS # BLD AUTO: 0.05 THOUSANDS/ÂΜL (ref 0–0.1)
BASOPHILS NFR BLD AUTO: 1 % (ref 0–1)
BENZODIAZ UR QL: NEGATIVE
BILIRUB SERPL-MCNC: 0.35 MG/DL (ref 0.2–1)
BILIRUB UR QL STRIP: NEGATIVE
BUN SERPL-MCNC: 25 MG/DL (ref 5–25)
CALCIUM SERPL-MCNC: 9.4 MG/DL (ref 8.4–10.2)
CARDIAC TROPONIN I PNL SERPL HS: 3 NG/L (ref ?–50)
CHLORIDE SERPL-SCNC: 107 MMOL/L (ref 96–108)
CLARITY UR: CLEAR
CO2 SERPL-SCNC: 27 MMOL/L (ref 21–32)
COCAINE UR QL: NEGATIVE
COLOR UR: ABNORMAL
CREAT SERPL-MCNC: 1.27 MG/DL (ref 0.6–1.3)
EOSINOPHIL # BLD AUTO: 0.07 THOUSAND/ÂΜL (ref 0–0.61)
EOSINOPHIL NFR BLD AUTO: 1 % (ref 0–6)
ERYTHROCYTE [DISTWIDTH] IN BLOOD BY AUTOMATED COUNT: 13.3 % (ref 11.6–15.1)
ETHANOL SERPL-MCNC: <10 MG/DL
FENTANYL UR QL SCN: NEGATIVE
GFR SERPL CREATININE-BSD FRML MDRD: 44 ML/MIN/1.73SQ M
GLUCOSE SERPL-MCNC: 113 MG/DL (ref 65–140)
GLUCOSE UR STRIP-MCNC: NEGATIVE MG/DL
HCT VFR BLD AUTO: 37.5 % (ref 34.8–46.1)
HGB BLD-MCNC: 12.2 G/DL (ref 11.5–15.4)
HGB UR QL STRIP.AUTO: ABNORMAL
HYDROCODONE UR QL SCN: NEGATIVE
IMM GRANULOCYTES # BLD AUTO: 0.03 THOUSAND/UL (ref 0–0.2)
IMM GRANULOCYTES NFR BLD AUTO: 0 % (ref 0–2)
KETONES UR STRIP-MCNC: NEGATIVE MG/DL
LEUKOCYTE ESTERASE UR QL STRIP: NEGATIVE
LYMPHOCYTES # BLD AUTO: 1.76 THOUSANDS/ÂΜL (ref 0.6–4.47)
LYMPHOCYTES NFR BLD AUTO: 17 % (ref 14–44)
MCH RBC QN AUTO: 32 PG (ref 26.8–34.3)
MCHC RBC AUTO-ENTMCNC: 32.5 G/DL (ref 31.4–37.4)
MCV RBC AUTO: 98 FL (ref 82–98)
METHADONE UR QL: NEGATIVE
MONOCYTES # BLD AUTO: 0.62 THOUSAND/ÂΜL (ref 0.17–1.22)
MONOCYTES NFR BLD AUTO: 6 % (ref 4–12)
NEUTROPHILS # BLD AUTO: 7.92 THOUSANDS/ÂΜL (ref 1.85–7.62)
NEUTS SEG NFR BLD AUTO: 75 % (ref 43–75)
NITRITE UR QL STRIP: POSITIVE
NON-SQ EPI CELLS URNS QL MICRO: ABNORMAL /HPF
NRBC BLD AUTO-RTO: 0 /100 WBCS
OPIATES UR QL SCN: NEGATIVE
OXYCODONE+OXYMORPHONE UR QL SCN: NEGATIVE
P AXIS: 31 DEGREES
P AXIS: 59 DEGREES
PCP UR QL: NEGATIVE
PH UR STRIP.AUTO: 5.5 [PH]
PLATELET # BLD AUTO: 332 THOUSANDS/UL (ref 149–390)
PMV BLD AUTO: 10.5 FL (ref 8.9–12.7)
POTASSIUM SERPL-SCNC: 4.6 MMOL/L (ref 3.5–5.3)
PR INTERVAL: 132 MS
PR INTERVAL: 142 MS
PROT SERPL-MCNC: 7.6 G/DL (ref 6.4–8.4)
PROT UR STRIP-MCNC: ABNORMAL MG/DL
QRS AXIS: 66 DEGREES
QRS AXIS: 67 DEGREES
QRSD INTERVAL: 70 MS
QRSD INTERVAL: 74 MS
QT INTERVAL: 374 MS
QT INTERVAL: 386 MS
QTC INTERVAL: 439 MS
QTC INTERVAL: 440 MS
RBC # BLD AUTO: 3.81 MILLION/UL (ref 3.81–5.12)
RBC #/AREA URNS AUTO: ABNORMAL /HPF
SODIUM SERPL-SCNC: 140 MMOL/L (ref 135–147)
SP GR UR STRIP.AUTO: 1.01 (ref 1–1.03)
T WAVE AXIS: 78 DEGREES
T WAVE AXIS: 85 DEGREES
THC UR QL: NEGATIVE
TSH SERPL DL<=0.05 MIU/L-ACNC: 1.34 UIU/ML (ref 0.45–4.5)
UROBILINOGEN UR STRIP-ACNC: <2 MG/DL
VENTRICULAR RATE: 78 BPM
VENTRICULAR RATE: 83 BPM
WBC # BLD AUTO: 10.45 THOUSAND/UL (ref 4.31–10.16)
WBC #/AREA URNS AUTO: ABNORMAL /HPF

## 2025-06-27 PROCEDURE — 36415 COLL VENOUS BLD VENIPUNCTURE: CPT | Performed by: EMERGENCY MEDICINE

## 2025-06-27 PROCEDURE — 84443 ASSAY THYROID STIM HORMONE: CPT | Performed by: EMERGENCY MEDICINE

## 2025-06-27 PROCEDURE — 93010 ELECTROCARDIOGRAM REPORT: CPT | Performed by: INTERNAL MEDICINE

## 2025-06-27 PROCEDURE — 93005 ELECTROCARDIOGRAM TRACING: CPT

## 2025-06-27 PROCEDURE — 99285 EMERGENCY DEPT VISIT HI MDM: CPT

## 2025-06-27 PROCEDURE — 84484 ASSAY OF TROPONIN QUANT: CPT | Performed by: EMERGENCY MEDICINE

## 2025-06-27 PROCEDURE — 99205 OFFICE O/P NEW HI 60 MIN: CPT | Performed by: STUDENT IN AN ORGANIZED HEALTH CARE EDUCATION/TRAINING PROGRAM

## 2025-06-27 PROCEDURE — 81001 URINALYSIS AUTO W/SCOPE: CPT | Performed by: EMERGENCY MEDICINE

## 2025-06-27 PROCEDURE — 82077 ASSAY SPEC XCP UR&BREATH IA: CPT | Performed by: EMERGENCY MEDICINE

## 2025-06-27 PROCEDURE — 93010 ELECTROCARDIOGRAM REPORT: CPT | Performed by: STUDENT IN AN ORGANIZED HEALTH CARE EDUCATION/TRAINING PROGRAM

## 2025-06-27 PROCEDURE — 80053 COMPREHEN METABOLIC PANEL: CPT | Performed by: EMERGENCY MEDICINE

## 2025-06-27 PROCEDURE — 85025 COMPLETE CBC W/AUTO DIFF WBC: CPT | Performed by: EMERGENCY MEDICINE

## 2025-06-27 PROCEDURE — 80307 DRUG TEST PRSMV CHEM ANLYZR: CPT | Performed by: EMERGENCY MEDICINE

## 2025-06-27 PROCEDURE — 99285 EMERGENCY DEPT VISIT HI MDM: CPT | Performed by: EMERGENCY MEDICINE

## 2025-06-27 RX ORDER — RISPERIDONE 1 MG/1
1 TABLET ORAL
Status: DISCONTINUED | OUTPATIENT
Start: 2025-06-27 | End: 2025-07-29 | Stop reason: HOSPADM

## 2025-06-27 RX ORDER — ROPINIROLE 0.5 MG/1
0.5 TABLET, FILM COATED ORAL
Status: DISCONTINUED | OUTPATIENT
Start: 2025-06-27 | End: 2025-06-27

## 2025-06-27 RX ORDER — ACETAMINOPHEN 325 MG/1
650 TABLET ORAL EVERY 4 HOURS PRN
Status: DISCONTINUED | OUTPATIENT
Start: 2025-06-27 | End: 2025-07-29 | Stop reason: HOSPADM

## 2025-06-27 RX ORDER — GABAPENTIN 300 MG/1
300 CAPSULE ORAL 3 TIMES DAILY
Status: DISCONTINUED | OUTPATIENT
Start: 2025-06-27 | End: 2025-07-29 | Stop reason: HOSPADM

## 2025-06-27 RX ORDER — ACETAMINOPHEN 325 MG/1
650 TABLET ORAL EVERY 4 HOURS PRN
Status: CANCELLED | OUTPATIENT
Start: 2025-06-27

## 2025-06-27 RX ORDER — LEVETIRACETAM 500 MG/1
1500 TABLET ORAL EVERY 12 HOURS SCHEDULED
Status: DISCONTINUED | OUTPATIENT
Start: 2025-06-27 | End: 2025-07-29 | Stop reason: HOSPADM

## 2025-06-27 RX ORDER — MAGNESIUM HYDROXIDE/ALUMINUM HYDROXICE/SIMETHICONE 120; 1200; 1200 MG/30ML; MG/30ML; MG/30ML
30 SUSPENSION ORAL EVERY 4 HOURS PRN
Status: DISCONTINUED | OUTPATIENT
Start: 2025-06-27 | End: 2025-07-29 | Stop reason: HOSPADM

## 2025-06-27 RX ORDER — HYDROXYZINE HYDROCHLORIDE 25 MG/1
50 TABLET, FILM COATED ORAL
Status: CANCELLED | OUTPATIENT
Start: 2025-06-27

## 2025-06-27 RX ORDER — HALOPERIDOL 5 MG/ML
5 INJECTION INTRAMUSCULAR
Status: CANCELLED | OUTPATIENT
Start: 2025-06-27

## 2025-06-27 RX ORDER — QUETIAPINE FUMARATE 25 MG/1
12.5 TABLET, FILM COATED ORAL
Status: DISCONTINUED | OUTPATIENT
Start: 2025-06-27 | End: 2025-07-28

## 2025-06-27 RX ORDER — NICOTINE 21 MG/24HR
1 PATCH, TRANSDERMAL 24 HOURS TRANSDERMAL DAILY
Status: DISCONTINUED | OUTPATIENT
Start: 2025-06-28 | End: 2025-06-27 | Stop reason: HOSPADM

## 2025-06-27 RX ORDER — ROPINIROLE 0.25 MG/1
0.25 TABLET, FILM COATED ORAL
Status: DISCONTINUED | OUTPATIENT
Start: 2025-06-27 | End: 2025-06-27 | Stop reason: HOSPADM

## 2025-06-27 RX ORDER — HYDROXYZINE HYDROCHLORIDE 25 MG/1
25 TABLET, FILM COATED ORAL
Status: DISCONTINUED | OUTPATIENT
Start: 2025-06-27 | End: 2025-07-29 | Stop reason: HOSPADM

## 2025-06-27 RX ORDER — TRAZODONE HYDROCHLORIDE 50 MG/1
50 TABLET ORAL
Status: CANCELLED | OUTPATIENT
Start: 2025-06-27

## 2025-06-27 RX ORDER — RISPERIDONE 0.25 MG/1
0.5 TABLET ORAL
Status: CANCELLED | OUTPATIENT
Start: 2025-06-27

## 2025-06-27 RX ORDER — FERROUS SULFATE 325(65) MG
325 TABLET ORAL
Status: DISCONTINUED | OUTPATIENT
Start: 2025-06-28 | End: 2025-06-27 | Stop reason: HOSPADM

## 2025-06-27 RX ORDER — BUDESONIDE AND FORMOTEROL FUMARATE DIHYDRATE 80; 4.5 UG/1; UG/1
2 AEROSOL RESPIRATORY (INHALATION) 2 TIMES DAILY
Status: DISCONTINUED | OUTPATIENT
Start: 2025-06-27 | End: 2025-06-27 | Stop reason: HOSPADM

## 2025-06-27 RX ORDER — CARBAMAZEPINE 200 MG/1
200 TABLET ORAL 2 TIMES DAILY
Status: DISCONTINUED | OUTPATIENT
Start: 2025-06-27 | End: 2025-06-28

## 2025-06-27 RX ORDER — ACETAMINOPHEN 325 MG/1
975 TABLET ORAL EVERY 6 HOURS PRN
Status: CANCELLED | OUTPATIENT
Start: 2025-06-27

## 2025-06-27 RX ORDER — PROPRANOLOL HYDROCHLORIDE 10 MG/1
5 TABLET ORAL EVERY 8 HOURS PRN
Status: DISCONTINUED | OUTPATIENT
Start: 2025-06-27 | End: 2025-07-29 | Stop reason: HOSPADM

## 2025-06-27 RX ORDER — ACETAMINOPHEN 325 MG/1
975 TABLET ORAL EVERY 6 HOURS PRN
Status: DISCONTINUED | OUTPATIENT
Start: 2025-06-27 | End: 2025-07-29 | Stop reason: HOSPADM

## 2025-06-27 RX ORDER — GABAPENTIN 300 MG/1
300 CAPSULE ORAL 3 TIMES DAILY
Status: DISCONTINUED | OUTPATIENT
Start: 2025-06-27 | End: 2025-06-27 | Stop reason: HOSPADM

## 2025-06-27 RX ORDER — LEVETIRACETAM 250 MG/1
1500 TABLET ORAL 2 TIMES DAILY
Status: DISCONTINUED | OUTPATIENT
Start: 2025-06-27 | End: 2025-06-27 | Stop reason: HOSPADM

## 2025-06-27 RX ORDER — PROPRANOLOL HYDROCHLORIDE 10 MG/1
5 TABLET ORAL EVERY 8 HOURS PRN
Status: CANCELLED | OUTPATIENT
Start: 2025-06-27

## 2025-06-27 RX ORDER — HALOPERIDOL 5 MG/ML
5 INJECTION INTRAMUSCULAR
Status: DISCONTINUED | OUTPATIENT
Start: 2025-06-27 | End: 2025-07-29 | Stop reason: HOSPADM

## 2025-06-27 RX ORDER — ESCITALOPRAM OXALATE 20 MG/1
20 TABLET ORAL DAILY
Status: DISCONTINUED | OUTPATIENT
Start: 2025-06-28 | End: 2025-06-27 | Stop reason: HOSPADM

## 2025-06-27 RX ORDER — RISPERIDONE 0.25 MG/1
0.25 TABLET ORAL
Status: CANCELLED | OUTPATIENT
Start: 2025-06-27

## 2025-06-27 RX ORDER — TRAZODONE HYDROCHLORIDE 50 MG/1
50 TABLET ORAL
Status: DISCONTINUED | OUTPATIENT
Start: 2025-06-27 | End: 2025-06-28

## 2025-06-27 RX ORDER — ALBUTEROL SULFATE 90 UG/1
2 INHALANT RESPIRATORY (INHALATION) EVERY 6 HOURS PRN
Status: DISCONTINUED | OUTPATIENT
Start: 2025-06-27 | End: 2025-06-27 | Stop reason: HOSPADM

## 2025-06-27 RX ORDER — MAGNESIUM HYDROXIDE/ALUMINUM HYDROXICE/SIMETHICONE 120; 1200; 1200 MG/30ML; MG/30ML; MG/30ML
30 SUSPENSION ORAL EVERY 4 HOURS PRN
Status: CANCELLED | OUTPATIENT
Start: 2025-06-27

## 2025-06-27 RX ORDER — HYDROXYZINE HYDROCHLORIDE 25 MG/1
25 TABLET, FILM COATED ORAL
Status: CANCELLED | OUTPATIENT
Start: 2025-06-27

## 2025-06-27 RX ORDER — ERGOCALCIFEROL 1.25 MG/1
50000 CAPSULE, LIQUID FILLED ORAL WEEKLY
Status: DISCONTINUED | OUTPATIENT
Start: 2025-06-27 | End: 2025-06-27 | Stop reason: HOSPADM

## 2025-06-27 RX ORDER — RISPERIDONE 0.25 MG/1
0.25 TABLET ORAL
Status: DISCONTINUED | OUTPATIENT
Start: 2025-06-27 | End: 2025-07-29 | Stop reason: HOSPADM

## 2025-06-27 RX ORDER — RISPERIDONE 1 MG/1
1 TABLET ORAL
Status: CANCELLED | OUTPATIENT
Start: 2025-06-27

## 2025-06-27 RX ORDER — LORAZEPAM 0.5 MG/1
0.5 TABLET ORAL ONCE
Status: COMPLETED | OUTPATIENT
Start: 2025-06-27 | End: 2025-06-27

## 2025-06-27 RX ORDER — CARBAMAZEPINE 200 MG/1
200 TABLET ORAL 3 TIMES DAILY
Status: DISCONTINUED | OUTPATIENT
Start: 2025-06-27 | End: 2025-06-27 | Stop reason: HOSPADM

## 2025-06-27 RX ORDER — METOPROLOL SUCCINATE 50 MG/1
50 TABLET, EXTENDED RELEASE ORAL DAILY
Status: DISCONTINUED | OUTPATIENT
Start: 2025-06-28 | End: 2025-06-27 | Stop reason: HOSPADM

## 2025-06-27 RX ORDER — FOLIC ACID 1 MG/1
1 TABLET ORAL DAILY
Status: DISCONTINUED | OUTPATIENT
Start: 2025-06-28 | End: 2025-06-27 | Stop reason: HOSPADM

## 2025-06-27 RX ORDER — HYDROXYZINE HYDROCHLORIDE 50 MG/1
50 TABLET, FILM COATED ORAL
Status: DISCONTINUED | OUTPATIENT
Start: 2025-06-27 | End: 2025-07-29 | Stop reason: HOSPADM

## 2025-06-27 RX ORDER — LORAZEPAM 2 MG/ML
0.5 INJECTION INTRAMUSCULAR ONCE
Status: DISCONTINUED | OUTPATIENT
Start: 2025-06-27 | End: 2025-06-27

## 2025-06-27 RX ORDER — ROPINIROLE 0.5 MG/1
0.25 TABLET, FILM COATED ORAL
Status: DISCONTINUED | OUTPATIENT
Start: 2025-06-27 | End: 2025-07-29 | Stop reason: HOSPADM

## 2025-06-27 RX ORDER — RISPERIDONE 0.5 MG/1
0.5 TABLET ORAL
Status: DISCONTINUED | OUTPATIENT
Start: 2025-06-27 | End: 2025-07-29 | Stop reason: HOSPADM

## 2025-06-27 RX ORDER — QUETIAPINE FUMARATE 25 MG/1
12.5 TABLET, FILM COATED ORAL
Status: DISCONTINUED | OUTPATIENT
Start: 2025-06-27 | End: 2025-06-27 | Stop reason: HOSPADM

## 2025-06-27 RX ADMIN — LORAZEPAM 0.5 MG: 0.5 TABLET ORAL at 18:43

## 2025-06-27 RX ADMIN — Medication 3 MG: at 22:22

## 2025-06-27 RX ADMIN — ROPINIROLE HYDROCHLORIDE 0.25 MG: 0.5 TABLET, FILM COATED ORAL at 22:22

## 2025-06-27 RX ADMIN — GABAPENTIN 300 MG: 300 CAPSULE ORAL at 22:22

## 2025-06-27 RX ADMIN — CARBAMAZEPINE 200 MG: 200 TABLET ORAL at 22:22

## 2025-06-27 RX ADMIN — QUETIAPINE FUMARATE 12.5 MG: 25 TABLET ORAL at 22:22

## 2025-06-27 RX ADMIN — LEVETIRACETAM 1500 MG: 500 TABLET, FILM COATED ORAL at 22:22

## 2025-06-27 NOTE — CONSULTS
"Consultation - Behavioral Health   Name: Cydney Lim 64 y.o. female I MRN: [id number]  Unit/Bed#: ED-22 I Date of Admission: 6/27/2025   Date of Service: 6/27/2025 I Hospital Day: 0   Consult to Psychiatry  Consult performed by: Ranjana Garay MD  Consult ordered by: Renetta Almonte MD        Physician Requesting Evaluation: Renetta Almonte MD   Reason for Evaluation / Principal Problem: \"suicidal ideations\"      Assessment:  Cydney Lim is a 64 y.o. female with a medical history of epilepsy, HTN, frequent falls, CKD, osteomalacia, RLS, lung nodule, thyroid nodule and vitamin D deficiency, and psychiatric history of MDD, tobacco use disorder, who presented to the ED on 6/27/2025 due to SI with a plan to starve herself, per chart patient stated she has not eaten for four days. Psychiatric consultation was requested due to suicidal thoughts as above.     On interview, she presents as dysphoric, irritable, depressed, in setting of social isolation and poor supports, limited independence due to epilepsy diagnosis. She endorses suicidal thoughts with recent action of not eating since she has returned home from the inpatient psychiatric hospital, although she has been drinking water. She is concerned she will further act on suicidal thoughts if she returns home, but does not verbalize any specific plan. She is requesting inpatient psychiatry - note she has been discharged a few days ago, however I cannot guarantee her safety at home (pt lives alone with limited contact with others) and concerned about her prognosis due to poor support. She does have an outpatient psychiatry appointment on 7/9/2025 however this is not too soon, and patient was not able to express to me any of her post-discharge plans, unclear if in setting of major neurocognitive disorder. Recommending patient sign 201 which she is agreeable to, and continue 1:1 for safety.   Assessment & Plan  Severe episode of recurrent major depressive " "disorder, with psychotic features (HCC)  Ongoing.   -Continue Lexapro 20 mg once daily for depressed mood  - Continue Tegretol 200 mg 3 times a day for mood stabilization and history of epilepsy  - Continue Seroquel 12.5 mg at nighttime for mood and insomnia  - Continue Neurontin 300 mg 3 times a day, can help with anxiety    Major neurocognitive disorder (HCC)  Ongoing, per chart review [facility] 14/30 on 6/19/2025  Per inpatient psych discharge summary from 6/19/25: \"Likely will benefit from discharge with VNA/HHA or placement into SNF\"  Epilepsy with altered consciousness with intractable epilepsy (HCC)  History of such, patient reports generally more frequent seizures of summer with \"petit mal\" seizures, feels last seizure was 3 weeks ago  - Continue Tegretol 200 mg 3 times a day for mood stabilization and history of epilepsy    Restless leg syndrome  - Continue Requip 0.25 mg at nighttime, started by inpatient psychiatry a couple of months ago  Other insomnia  - Continue melatonin 3 mg at nighttime for mood and insomnia  - Continue Seroquel 12.5 mg at nighttime for mood and insomnia    Recommendations/Risks/Benefits of Treatment    Medication changes/recommendations as above in Assessment & Plan Section.  Risks, benefits, and possible side effects of medications explained to patient and patient verbalizes understanding.    Legal Status: The patient meets criteria for inpatient psychiatric hospitalization when medically stable due to an acute psychiatric illness. The patient is agreeable to sign a voluntary 201 commitment. If the patient requests to leave AMA, psychiatric assessment should be requested to assure safety for discharge.  Observation Recommendations: Maintain continual 1:1 observation for safety monitoring.    History of Present Illness      Cydney Lim is a 64 y.o. female with a medical history of epilepsy, HTN, frequent falls, CKD, osteomalacia, RLS, lung nodule, thyroid nodule and vitamin D " "deficiency, and psychiatric history of MDD, tobacco use disorder, who presented to the ED on 6/27/2025 due to SI with a plan to starve herself, per chart patient stated she has not eaten for four days. Psychiatric consultation was requested due to suicidal thoughts as above.     Note patient was recently admitted to inpatient psych 2 days ago (from 6/18 to 6/25/25) due to worsening of depression and SI, patient signed a 201 at that time and was admitted to . She reported SI with a plan to overdose on her medication or use a knife to end her life in the context of depressed mood and psychosocial stressors including conflicts with her brother who is her rep-payee, and allegedly has been limiting access to her Social Security income.  During that hospital stay Tegretol level was checked and was 8 on 6/20/2025, she was restarted on Lexapro 10 mg and was titrated up to 20 mg once daily.  Her Abilify was discontinued and she was started on Seroquel 12.5 mg at nighttime instead.  Requip for restless leg syndrome was started and Neurontin was started as well and titrated up accordingly.  At time of discharge, mood was stable and patient was denying suicidal thoughts.  On discharge, family member was advised to supervise the patient and she was discharged home with [facility] and referral to [facility].    On initial psychiatric consultation Cydney presents as dysphoric, irritable, limited historian, was noted by 1:1 to be sleeping for a while before I came in. She states what brings her in is she has not been eating due to her depression. She is able to clarify she has not been eating \"at all\" since she left the inpatient psych shea, but does state she has been drinking water. She discusses she has been very isolated at her home, lives at home alone, due to her epilepsy is not able to drive anywhere. She is frustrated because she feels her brother Rodney (who is documented to be her re-payee) is not giving her funds to do things she " "enjoys, she would like to go out to bingo and do other things however cannot due to her limited funds. She says she is restricted to staying at home, does not have any hobbies she tells me, does \"nothing\" at home, expresses feeling hopeless and states \"I am so tired of living this way\". She has very limited contact with family, states she wants to call her brother but either he does not , is difficult to reach, or she feels she cannot speak to him. She does not seem to have any other supports. She endorses depressed mood, anhedonia. I begin to inquire about other symptoms and she becomes acutely irritable and raises voice, and states \"please no more questions\" and asks that I stop the interview. She does present as dysphoric and depressed. She is requesting inpatient psychiatry as she feels it will help her. She tells me she is afraid she will act on suicidal thoughts and do something further to harm herself (other than not eating) if she is allowed to return home. I ask her about any referrals from after her inpatient stay, such as case management or outpatient psychiatry, and she could not give any details about this and seems to be due to cognitive deficits vs. decreased effort in setting of depressed mood, note above diagnosis of major neurocognitive disorder. She would likely benefit from alterate placement rather than returning home to live alone.     Historical Information     Past Psychiatric History:     Past Inpatient Psychiatric Treatment:   Multiple prior:   Most recently at [address] from 6/18 to 6/25/25 2/22/25 - 3/4/25 at [facility]  6/20/23 - 7/3/23 at [facility]  2/25 - 3/25   Past Outpatient Psychiatric Treatment:    Was supposed to follow-up with Dr. Gan on 6/3/25 but has not - has appointment scheduled in the future for 7/9/25  Past Suicide Attempts: None  Past Violent Behavior: history of threatening inpatient staff on a prior admission  Past Psychiatric " Medication Trials: Per chart review, Lexapro, Seroquel, Abilify, Tegretol, Gabapentin, Trazodone    Substance Abuse History:    Tobacco, Alcohol and Drug Use History     Tobacco Use    Smoking status: Every Day     Current packs/day: 1.00     Types: Cigarettes    Smokeless tobacco: Current   Vaping Use    Vaping status: Never Used   Substance Use Topics    Alcohol use: Not Currently     Comment: pt denies alcohol use    Drug use: Never      Alcohol use: None known - patient refuses to answer - no known history of such   Recreational drug use:   Cocaine: None known - patient refuses to answer - no known history of such   Heroin: None known - patient refuses to answer - no known history of such   Cannabis: None known - patient refuses to answer - no known history of such   Other drugs:   None known - patient refuses to answer - no known history of such   Longest clean time: not applicable  History of Inpatient/Outpatient rehabilitation program: no  Smoking history: None known - patient refuses to answer - no known history of such   Use of caffeine: None known - patient refuses to answer - no known history of such     I have assessed this patient for substance use within the past 12 months    Family Psychiatric History:     Family History[1]    Social History:    Social History     Socioeconomic History    Marital status: Single     Spouse name: Not on file    Number of children: Not on file    Years of education: Not on file    Highest education level: Not on file   Occupational History    Occupation:  disable   Other Topics Concern    Not on file   Social History Narrative    ** Merged History Encounter **         · Most recent tobacco use screenin2019    · Do you currently or have you served in the [employer]:   No    · Were you activated, into active duty, as a member of the [employer] or as a Reservist:   No    · Sexual orientation:   Hetero    sexual    · Exercise level:   None    · Diet:    Regular    · General stress level:   Low    · Has smoked since age:   35    · Caffeine intake:   Moderate    · Guns present in home:   No    · Seat belts used routinely:   Yes    · Smoke alarm in home:   Ye    s    · Advance directive:   No         Education: Went to [employer] school, per chart review  Learning Disabilities: None known.  Marital History: Not known - patient refuses to answer  Children: One estranged son  Living Arrangement: Lives alone  Occupational History: Now on permanent disability, previously worked as   Functioning Relationships: Limited supports - has brother who lives in [place]  Legal History: None known   History: None known  Access to firearms: Pt denies    Past Medical History[2]  Past Surgical History[3]  Meds/Allergies     No Known Allergies    Current Facility-Administered Medications:     cyanocobalamin injection 1,000 mcg, Q30 Days  Prior to Admission Medications   Prescriptions Last Dose Informant Patient Reported? Taking?   Albuterol-Budesonide (Airsupra) 90-80 MCG/ACT AERO   Yes No   Sig: Inhale 2 puffs in the morning and 2 puffs before bedtime.   Cholecalciferol (VITAMIN D3) 1,000 units tablet   No No   Sig: Take 2 tablets (2,000 Units total) by mouth daily   QUEtiapine (SEROquel) 25 mg tablet   No No   Sig: Take 0.5 tablets (12.5 mg total) by mouth daily at bedtime   albuterol (Proventil HFA) 90 mcg/act inhaler  Self No No   Sig: Inhale 2 puffs every 6 (six) hours as needed for wheezing   budesonide-formoterol (SYMBICORT) 80-4.5 MCG/ACT inhaler   No No   Sig: Inhale 2 puffs 2 (two) times a day Rinse mouth after use.   carBAMazepine (TEGretol) 200 mg tablet  Self No No   Sig: Take 1 tablet (200 mg total) by mouth 3 (three) times a day   cyanocobalamin (VITAMIN B-12) 1000 MCG tablet   No No   Sig: Take 1 tablet (1,000 mcg total) by mouth daily   ergocalciferol (VITAMIN D2) 50,000 units   No No   Sig: Take 1 capsule (50,000 Units total) by mouth once a week for 7  "doses   escitalopram (LEXAPRO) 20 mg tablet   No No   Sig: Take 1 tablet (20 mg total) by mouth daily   ferrous sulfate 325 (65 FE) MG EC tablet   Yes No   Sig: Take 325 mg by mouth 3 (three) times a day with meals   folic acid (FOLVITE) 1 mg tablet   No No   Sig: Take 1 tablet (1 mg total) by mouth daily   gabapentin (NEURONTIN) 300 mg capsule   No No   Sig: Take 1 capsule (300 mg total) by mouth 3 (three) times a day   levETIRAcetam (KEPPRA) 750 mg tablet  Self No No   Sig: Take 2 tablets (1,500 mg total) by mouth 2 (two) times a day   melatonin 3 mg   No No   Sig: Take 1 tablet (3 mg total) by mouth daily at bedtime   metoprolol succinate (TOPROL-XL) 50 mg 24 hr tablet   No No   Sig: Take 1 tablet (50 mg total) by mouth daily   nicotine (NICODERM CQ) 21 mg/24 hr TD 24 hr patch   No No   Sig: Place 1 patch on the skin daily over 24 hours   rOPINIRole (REQUIP) 0.25 mg tablet   No No   Sig: Take 1 tablet (0.25 mg total) by mouth daily at bedtime      Facility-Administered Medications Last Administration Doses Remaining   cyanocobalamin injection 1,000 mcg None recorded           Objective :  Temp:  [98.1 °F (36.7 °C)] 98.1 °F (36.7 °C)  HR:  [80] 80  BP: (148)/(78) 148/78  Resp:  [18] 18  SpO2:  [96 %] 96 %    Mental Status Evaluation:    Appearance:  Patient covered in hospital blanket, appears disheveled   Behavior:  Guarded, irritable, uncooperative    Speech:  Normal rate and rhythm, elevated volume at times.    Mood:  \"Very depressed\"   Affect:  Irritable affect, angry at times   Language: No aphasia   Thought Process:  concrete   Thought Content:  Marvin    Perceptual Disturbances: does not appear responding to internal stimuli   Risk Potential: Suicidal Ideation - yes, however no active plan currently  Homicidal Ideations - None  Potential for Aggression - No   Sensorium:  oriented to person, place, and time/date   Memory:  recent and remote memory: unable to assess due to lack of cooperation " "  Consciousness:  alert and awake   Attention/Concentration: decreased attention span and decreased concentration   Insight:  partial   Judgment: limited   Muscle Strength:  Muscle Tone: normal  normal   Gait/Station: Not observed as patient restricted to bed   Motor Activity: no abnormal movements         Lab Results: I have reviewed the following results:  Labs in last 72 hours:   Recent Labs     06/27/25  1328   WBC 10.45*   RBC 3.81   HGB 12.2   HCT 37.5      RDW 13.3   NEUTROABS 7.92*   SODIUM 140   K 4.6      CO2 27   BUN 25   CREATININE 1.27   GLUC 113   CALCIUM 9.4   AST 13   ALT 9   ALKPHOS 103   TP 7.6   ALB 4.3   TBILI 0.35   [id number] 1.337       Imaging Results Review: No pertinent imaging studies reviewed.  Other Study Results Review: No additional pertinent studies reviewed.    Code Status: Prior  Advance Directive and Living Will:      Power of :    POLST:      Portions of the record may have been created with voice recognition software. Occasional wrong word or \"sound a like\" substitutions may have occurred due to the inherent limitations of voice recognition software. Read the chart carefully and recognize, using context, where substitutions have occurred.    Ranjana Garay MD 06/27/25         [1]   Family History  Problem Relation Name Age of Onset    Heart attack Mother      Kidney disease Mother      Liver disease Mother      Heart attack Father      No Known Problems Brother      No Known Problems Son     [2]   Past Medical History:  Diagnosis Date    Ambulatory dysfunction     B12 deficiency     CKD (chronic kidney disease), stage III (HCC)     Depression     EP (epilepsy) (HCC)     Epilepsy (HCC)     History of sinus tachycardia     Hypertension     Morbid obesity (HCC)     Obesity     Restless leg     Restless leg syndrome     Vitamin D deficiency    [3]   Past Surgical History:  Procedure Laterality Date    DENTAL SURGERY      all teeth removed    DENTAL " SURGERY      LASER ABLATION OF THE CERVIX      MAMMO (HISTORICAL)  05/01/2017

## 2025-06-27 NOTE — ED NOTES
Patient is accepted at  6B OA unit   Patient is accepted by Dr. Omi Luong    Transportation is arranged with [employer].     Transportation is scheduled for 8:15pm [facility]   Patient may go to the floor asap        Nurse report is to be called to [phone] prior to patient transfer.    Transport packet on pt chart

## 2025-06-27 NOTE — ED PROVIDER NOTES
"Time reflects when diagnosis was documented in both MDM as applicable and the Disposition within this note       Time User Action Codes Description Comment    6/27/2025  1:54 PM Srinivasa Andrew [R45.851] Suicidal ideations     6/27/2025  4:02 PM Srinivasa Andrew [Z00.8] Encounter for psychological evaluation           ED Disposition       ED Disposition   Transfer to Another Facility    Condition   --    Date/Time   Fri Jun 27, 2025  8:21 PM    Comment   Cydney Lim should be transferred out to [facility].               Assessment & Plan       Medical Decision Making  Patient with history as below presented to triage with CC: \"Patient presents with:  Psychiatric Evaluation: Pt states has a visiting nurse comes to house, sent to ER pt having SI  states wants to starve herself, said she didn't eat in 4 days is just drinking, was discharged 6-25-25 from [facility]  \"  Hx obtained from pt    This patient presents with symptoms consistent with an underlying psychiatric disorder, most likely depression. Presentation not consistent with acute organic causes to include delirium, dementia or drug induced disorders (acute ingestions or withdrawal; no evidence of toxidrome). Given the H&P, I suspect this patient is suicidal and will require CRISIS evaluation and inpatient psychiatric care. Will obtain labs for medical clearance.    Plan: ETOH level, UDS, CRISIS Consult, (if adult: labs, EKG, TSH, trop), medical detainment, reassess    DDx including but not limited to: depression, anxiety, PTSD, bipolar disorder, suicidal ideation, schizophrenia, schizoaffective disorder, personality disorder, substance abuse, metabolic abnormality, thyroid disease.     I have independently ordered, reviewed and interpreted the following: labs and/or imaging and/or EKG studies listed below  Reviewed external records including notes, and prior labs/imaging results.    Disposition: Patient condition, stable. Discussed need " "for transfer for IP psych treatment.     See ED Course for further MDM.      PLEASE NOTE:  This encounter was completed utilizing the [employer] Direct Speech Voice Recognition Software. Grammatical errors, random word insertions, pronoun errors and incomplete sentences are occasional inherent consequences of the system due to software limitations, ambient noise and hardware issues.These may be missed by proof reading prior to affixing electronic signature. Any questions or concerns about the content, text or information contained within the body of this dictation should be directly addressed to the physician for clarification. Please do not hesitate to call me directly if you have any questions or concerns.     Amount and/or Complexity of Data Reviewed  External Data Reviewed: labs, radiology, ECG and notes.  Labs: ordered. Decision-making details documented in ED Course.  ECG/medicine tests: ordered and independent interpretation performed. Decision-making details documented in ED Course.    Risk  Prescription drug management.        ED Course as of 06/28/25 0714   Fri Jun 27, 2025   1356 CBC and differential(!)  No symptoms leukocytosis at 10.45.  No anemia.  Platelets WNL.   1356 Comprehensive metabolic panel  No gross abnormalities.   1356 ETHANOL: <10   1401 hs TnI 0hr: 3   1409 TSH 3RD GENERATON: 1.337  WNL.   1409 Patient medically clear for ED crisis evaluation and consideration for inpatient medical treatment   1625 Patient care signed out to Dr. Yohana Cloud pending psych and crisis evaluation.  Psych likely to clear patient for inpatient psychiatric treatment as patient presenting with depression, SI and willing to sign to a 1.   1625 Per psych Dr. Morley \"can sign a 201, she does present as very depressed and dysphoric, very isolated, and I can't guarantee her safety at home. I think if it continues though perhaps they would consider a high utilizer plan for her. \"       Medications   LORazepam " (ATIVAN) tablet 0.5 mg (0.5 mg Oral Given 6/27/25 1843)       ED Risk Strat Scores                    No data recorded                            History of Present Illness       Chief Complaint   Patient presents with    Psychiatric Evaluation     Pt states has a visiting nurse comes to house, sent to ER pt having SI  states wants to starve herself, said she didn't eat in 4 days is just drinking, was discharged 6-25-25 from [facility]       Past Medical History[1]   Past Surgical History[2]   Family History[3]   Social History[4]   E-Cigarette/Vaping    E-Cigarette Use Never User       E-Cigarette/Vaping Substances    Nicotine Yes     THC No     CBD No     Flavoring No     Other No     Unknown No       I have reviewed and agree with the history as documented.     64-year-old female with history of HTN, CKD, depression presented to the ED for suicidal ideations ongoing for the past week.  Patient reports that she has been trying to kill himself by stabbing herself for the past 4 days.  Has been taking her medications, but not taking in solid food.  She presents today for help as she has increasing SI.  Has no physical complaints at this time.  Of note she was recently discharged from inpatient behavioral treatment at [facility] on 6/25/2025 after a week stay for SI.  Has been taking her medications as prescribed.  She does have prior inpatient admissions for psych in the past.  Denies any current outpatient therapy.  She denies prior history of suicide attempt/self-harm.  Denies AVH, paranoia.  Patient denies any specific trigger.  Has been off support system at home, lives alone.  Reports fluctuating sleep pattern and decreased appetite.  Low motivation.  Denies access to guns, no current alcohol, smoking or illicit drug use.  Requesting 201 for inpatient behavioral treatment.          Review of Systems   Constitutional:  Negative for chills and fever.   HENT:  Negative for congestion and sore throat.    Eyes:   Negative for photophobia, pain, redness and visual disturbance.   Respiratory:  Negative for cough, chest tightness and shortness of breath.    Cardiovascular:  Negative for chest pain and palpitations.   Gastrointestinal:  Negative for abdominal pain, constipation, diarrhea, nausea and vomiting.   Genitourinary:  Negative for difficulty urinating, dysuria, flank pain, frequency, hematuria, pelvic pain, urgency, vaginal bleeding, vaginal discharge and vaginal pain.   Musculoskeletal:  Negative for arthralgias, back pain, neck pain and neck stiffness.   Skin:  Negative for color change and rash.   Neurological:  Negative for dizziness, seizures, syncope, light-headedness, numbness and headaches.   Psychiatric/Behavioral:  Positive for behavioral problems, decreased concentration, self-injury, sleep disturbance and suicidal ideas.    All other systems reviewed and are negative.          Objective       ED Triage Vitals [06/27/25 1235]   Temperature Pulse Blood Pressure Respirations SpO2 Patient Position - Orthostatic VS   98.1 °F (36.7 °C) 80 148/78 18 96 % --      Temp Source Heart Rate Source BP Location FiO2 (%) Pain Score    Oral -- -- -- No Pain      Vitals      Date and Time Temp Pulse SpO2 Resp BP Pain Score FACES Pain Rating User   06/27/25 1235 98.1 °F (36.7 °C) 80 96 % 18 148/78 No Pain -- JDT            Physical Exam  Vitals and nursing note reviewed.   Constitutional:       General: She is in acute distress.      Appearance: She is well-developed. She is obese. She is not toxic-appearing.   HENT:      Head: Normocephalic and atraumatic.      Right Ear: External ear normal.      Left Ear: External ear normal.      Nose: Nose normal. No congestion.      Mouth/Throat:      Mouth: Mucous membranes are moist.      Pharynx: Oropharynx is clear. No oropharyngeal exudate or posterior oropharyngeal erythema.     Eyes:      Extraocular Movements: Extraocular movements intact.      Conjunctiva/sclera: Conjunctivae  normal.      Pupils: Pupils are equal, round, and reactive to light.       Cardiovascular:      Rate and Rhythm: Normal rate and regular rhythm.      Pulses: Normal pulses.      Heart sounds: Normal heart sounds. No murmur heard.  Pulmonary:      Effort: Pulmonary effort is normal. No respiratory distress.      Breath sounds: Normal breath sounds. No stridor. No wheezing, rhonchi or rales.   Abdominal:      General: Bowel sounds are normal.      Palpations: Abdomen is soft.      Tenderness: There is no abdominal tenderness. There is no right CVA tenderness, left CVA tenderness, guarding or rebound.     Musculoskeletal:         General: No swelling, tenderness or deformity.      Cervical back: Normal range of motion and neck supple. No rigidity or tenderness.      Right lower leg: No edema.      Left lower leg: No edema.   Lymphadenopathy:      Cervical: No cervical adenopathy.     Skin:     General: Skin is warm and dry.      Capillary Refill: Capillary refill takes less than 2 seconds.      Coloration: Skin is not jaundiced or pale.      Findings: No bruising, erythema, lesion or rash.     Neurological:      General: No focal deficit present.      Mental Status: She is alert and oriented to person, place, and time. Mental status is at baseline.      GCS: GCS eye subscore is 4. GCS verbal subscore is 5. GCS motor subscore is 6.      Cranial Nerves: Cranial nerves 2-12 are intact. No cranial nerve deficit, dysarthria or facial asymmetry.      Sensory: Sensation is intact. No sensory deficit.      Motor: Motor function is intact. No weakness.      Coordination: Coordination is intact. Coordination normal.      Gait: Gait is intact.     Psychiatric:         Attention and Perception: She is attentive. She does not perceive auditory or visual hallucinations.         Mood and Affect: Mood is depressed. Affect is flat.         Thought Content: Thought content is not paranoid or delusional. Thought content includes suicidal  ideation. Thought content does not include homicidal ideation. Thought content includes suicidal plan. Thought content does not include homicidal plan.         Results Reviewed       Procedure Component Value Units Date/Time    Rapid drug screen, urine [[id number]]  (Normal) Collected: 06/27/25 1626    Lab Status: Final result Specimen: Urine, Clean Catch Updated: 06/27/25 1657     Amph/Meth UR Negative     Barbiturate Ur Negative     Benzodiazepine Urine Negative     Cocaine Urine Negative     Methadone Urine Negative     Opiate Urine Negative     PCP Ur Negative     THC Urine Negative     Oxycodone Urine Negative     Fentanyl Urine Negative     HYDROCODONE URINE Negative    Narrative:      FOR MEDICAL PURPOSES ONLY.   IF CONFIRMATION NEEDED PLEASE CONTACT THE LAB WITHIN 5 DAYS.    Drug Screen Cutoff Levels:  AMPHETAMINE/METHAMPHETAMINES  1000 ng/mL  BARBITURATES     200 ng/mL  BENZODIAZEPINES     200 ng/mL  COCAINE      300 ng/mL  METHADONE      300 ng/mL  OPIATES      300 ng/mL  PHENCYCLIDINE     25 ng/mL  THC       50 ng/mL  OXYCODONE      100 ng/mL  FENTANYL      5 ng/mL  HYDROCODONE     300 ng/mL    Urine Microscopic [[id number]]  (Abnormal) Collected: 06/27/25 1626    Lab Status: Final result Specimen: Urine, Clean Catch Updated: 06/27/25 1651     RBC, UA 2-4 /hpf      WBC, UA 4-10 /hpf      Epithelial Cells Occasional /hpf      Bacteria, UA Occasional /hpf     UA w Reflex to Microscopic w Reflex to Culture [[id number]]  (Abnormal) Collected: 06/27/25 1626    Lab Status: Final result Specimen: Urine, Clean Catch Updated: 06/27/25 1640     Color, UA Light Yellow     Clarity, UA Clear     Specific Gravity, UA 1.012     pH, UA 5.5     Leukocytes, UA Negative     Nitrite, UA Positive     Protein, UA 30 (1+) mg/dl      Glucose, UA Negative mg/dl      Ketones, UA Negative mg/dl      Urobilinogen, UA <2.0 mg/dl      Bilirubin, UA Negative     Occult Blood, UA Small    TSH, 3rd generation with Free T4 reflex [[id number]]   (Normal) Collected: 06/27/25 1328    Lab Status: Final result Specimen: Blood from Arm, Right Updated: 06/27/25 1409     TSH 3RD GENERATION 1.337 uIU/mL     HS Troponin 0hr (reflex protocol) [[id number]]  (Normal) Collected: 06/27/25 1328    Lab Status: Final result Specimen: Blood from Arm, Right Updated: 06/27/25 1400     hs TnI 0hr 3 ng/L     Ethanol [[id number]]  (Normal) Collected: 06/27/25 1328    Lab Status: Final result Specimen: Blood from Arm, Right Updated: 06/27/25 1352     Ethanol Lvl <10 mg/dL     Comprehensive metabolic panel [[id number]] Collected: 06/27/25 1328    Lab Status: Final result Specimen: Blood from Arm, Right Updated: 06/27/25 1352     Sodium 140 mmol/L      Potassium 4.6 mmol/L      Chloride 107 mmol/L      CO2 27 mmol/L      ANION GAP 6 mmol/L      BUN 25 mg/dL      Creatinine 1.27 mg/dL      Glucose 113 mg/dL      Calcium 9.4 mg/dL      AST 13 U/L      ALT 9 U/L      Alkaline Phosphatase 103 U/L      Total Protein 7.6 g/dL      Albumin 4.3 g/dL      Total Bilirubin 0.35 mg/dL      eGFR 44 ml/min/1.73sq m     Narrative:      National Kid[employer] Disease Foundation guidelines for Chronic Kidney Disease (CKD):     Stage 1 with normal or high GFR (GFR > 90 mL/min/1.73 square meters)    Stage 2 Mild CKD (GFR = 60-89 mL/min/1.73 square meters)    Stage 3A Moderate CKD (GFR = 45-59 mL/min/1.73 square meters)    Stage 3B Moderate CKD (GFR = 30-44 mL/min/1.73 square meters)    Stage 4 Severe CKD (GFR = 15-29 mL/min/1.73 square meters)    Stage 5 End Stage CKD (GFR <15 mL/min/1.73 square meters)  Note: GFR calculation is accurate only with a steady state creatinine    CBC and differential [[id number]]  (Abnormal) Collected: 06/27/25 1328    Lab Status: Final result Specimen: Blood from Arm, Right Updated: 06/27/25 1335     WBC 10.45 Thousand/uL      RBC 3.81 Million/uL      Hemoglobin 12.2 g/dL      Hematocrit 37.5 %      MCV 98 fL      MCH 32.0 pg      MCHC 32.5 g/dL      RDW 13.3 %      MPV 10.5 fL       Platelets 332 Thousands/uL      nRBC 0 /100 WBCs      Segmented % 75 %      Immature Grans % 0 %      Lymphocytes % 17 %      Monocytes % 6 %      Eosinophils Relative 1 %      Basophils Relative 1 %      Absolute Neutrophils 7.92 Thousands/µL      Absolute Immature Grans 0.03 Thousand/uL      Absolute Lymphocytes 1.76 Thousands/µL      Absolute Monocytes 0.62 Thousand/µL      Eosinophils Absolute 0.07 Thousand/µL      Basophils Absolute 0.05 Thousands/µL             No orders to display       ECG 12 Lead Documentation Only    Date/Time: 6/28/2025 7:12 AM    Performed by: Srinivasa Andrew DO  Authorized by: Srinivasa Andrew DO    Indications / Diagnosis:  Psych clearance  ECG reviewed by me, the ED Provider: yes    Patient location:  ED  Previous ECG:     Previous ECG:  Compared to current    Similarity:  No change  Interpretation:     Interpretation: non-specific    Quality:     Tracing quality:  Limited by artifact  Rate:     ECG rate:  78  Rhythm:     Rhythm: sinus rhythm    Ectopy:     Ectopy: none    QRS:     QRS axis:  Normal    QRS intervals:  Normal  Conduction:     Conduction: normal    ST segments:     ST segments:  Non-specific  T waves:     T waves: normal        ED Medication and Procedure Management   Prior to Admission Medications   Prescriptions Last Dose Informant Patient Reported? Taking?   Albuterol-Budesonide (Airsupra) 90-80 MCG/ACT AERO 6/26/2025 at  9:00 PM Other (Specify) Yes Yes   Sig: Inhale 2 puffs in the morning and 2 puffs before bedtime.   Patient not taking: Reported on 6/27/2025   Cholecalciferol (VITAMIN D3) 1,000 units tablet Past Month Other (Specify) No Yes   Sig: Take 2 tablets (2,000 Units total) by mouth daily   QUEtiapine (SEROquel) 25 mg tablet 6/26/2025 at  9:00 PM Other (Specify) No Yes   Sig: Take 0.5 tablets (12.5 mg total) by mouth daily at bedtime   albuterol (Proventil HFA) 90 mcg/act inhaler 6/26/2025 at  9:00 PM Self, Other (Specify) No Yes   Sig: Inhale 2 puffs every 6  (six) hours as needed for wheezing   budesonide-formoterol (SYMBICORT) 80-4.5 MCG/ACT inhaler  Other (Specify) No No   Sig: Inhale 2 puffs 2 (two) times a day Rinse mouth after use.   carBAMazepine (TEGretol) 200 mg tablet 6/27/2025 at  9:00 AM Self, Other (Specify) No Yes   Sig: Take 1 tablet (200 mg total) by mouth 3 (three) times a day   cyanocobalamin (VITAMIN B-12) 1000 MCG tablet Past Week Other (Specify) No Yes   Sig: Take 1 tablet (1,000 mcg total) by mouth daily   ergocalciferol (VITAMIN D2) 50,000 units Past Month Other (Specify) No Yes   Sig: Take 1 capsule (50,000 Units total) by mouth once a week for 7 doses   escitalopram (LEXAPRO) 20 mg tablet 6/26/2025 at  9:00 AM Other (Specify) No Yes   Sig: Take 1 tablet (20 mg total) by mouth daily   ferrous sulfate 325 (65 FE) MG EC tablet 6/26/2025 at  9:00 PM Other (Specify) Yes Yes   Sig: Take 325 mg by mouth 3 (three) times a day with meals   folic acid (FOLVITE) 1 mg tablet 6/27/2025 at  9:00 AM Other (Specify) No Yes   Sig: Take 1 tablet (1 mg total) by mouth daily   gabapentin (NEURONTIN) 300 mg capsule 6/27/2025 at  9:00 AM Other (Specify) No Yes   Sig: Take 1 capsule (300 mg total) by mouth 3 (three) times a day   levETIRAcetam (KEPPRA) 750 mg tablet 6/27/2025 at  9:00 AM Self, Other (Specify) No Yes   Sig: Take 2 tablets (1,500 mg total) by mouth 2 (two) times a day   melatonin 3 mg 6/26/2025 at  9:00 PM Other (Specify) No Yes   Sig: Take 1 tablet (3 mg total) by mouth daily at bedtime   metoprolol succinate (TOPROL-XL) 50 mg 24 hr tablet 6/26/2025 at  9:00 PM Other (Specify) No Yes   Sig: Take 1 tablet (50 mg total) by mouth daily   nicotine (NICODERM CQ) 21 mg/24 hr TD 24 hr patch Past Month Other (Specify) No Yes   Sig: Place 1 patch on the skin daily over 24 hours   Patient not taking: Reported on 6/27/2025   rOPINIRole (REQUIP) 0.25 mg tablet 6/26/2025 at  9:00 PM Other (Specify) No Yes   Sig: Take 1 tablet (0.25 mg total) by mouth daily at  bedtime      Facility-Administered Medications Last Administration Doses Remaining   cyanocobalamin injection 1,000 mcg None recorded         Discharge Medication List as of 6/27/2025  8:22 PM        CONTINUE these medications which have NOT CHANGED    Details   albuterol (Proventil HFA) 90 mcg/act inhaler Inhale 2 puffs every 6 (six) hours as needed for wheezing, Starting Wed 5/22/2024, Normal      Albuterol-Budesonide (Airsupra) 90-80 MCG/ACT AERO Inhale 2 puffs in the morning and 2 puffs before bedtime., Historical Med      carBAMazepine (TEGretol) 200 mg tablet Take 1 tablet (200 mg total) by mouth 3 (three) times a day, Starting Sat 8/3/2024, Normal      Cholecalciferol (VITAMIN D3) 1,000 units tablet Take 2 tablets (2,000 Units total) by mouth daily, Starting Wed 6/25/2025, Until Fri 7/25/2025, Normal      cyanocobalamin (VITAMIN B-12) 1000 MCG tablet Take 1 tablet (1,000 mcg total) by mouth daily, Starting Wed 6/25/2025, Until Fri 7/25/2025, Normal      ergocalciferol (VITAMIN D2) 50,000 units Take 1 capsule (50,000 Units total) by mouth once a week for 7 doses, Starting Fri 6/27/2025, Until Sat 8/9/2025, Normal      escitalopram (LEXAPRO) 20 mg tablet Take 1 tablet (20 mg total) by mouth daily, Starting Wed 6/25/2025, Until Fri 7/25/2025, Normal      ferrous sulfate 325 (65 FE) MG EC tablet Take 325 mg by mouth 3 (three) times a day with meals, Historical Med      folic acid (FOLVITE) 1 mg tablet Take 1 tablet (1 mg total) by mouth daily, Starting Wed 6/25/2025, Until Fri 7/25/2025, Normal      gabapentin (NEURONTIN) 300 mg capsule Take 1 capsule (300 mg total) by mouth 3 (three) times a day, Starting Wed 6/25/2025, Until Fri 7/25/2025, Normal      levETIRAcetam (KEPPRA) 750 mg tablet Take 2 tablets (1,500 mg total) by mouth 2 (two) times a day, Starting Sat 8/3/2024, Normal      melatonin 3 mg Take 1 tablet (3 mg total) by mouth daily at bedtime, Starting Wed 6/25/2025, Until Fri 7/25/2025, Normal       metoprolol succinate (TOPROL-XL) 50 mg 24 hr tablet Take 1 tablet (50 mg total) by mouth daily, Starting Fri 7/19/2024, Until Fri 6/27/2025, Normal      nicotine (NICODERM CQ) 21 mg/24 hr TD 24 hr patch Place 1 patch on the skin daily over 24 hours, Starting Fri 6/13/2025, Normal      QUEtiapine (SEROquel) 25 mg tablet Take 0.5 tablets (12.5 mg total) by mouth daily at bedtime, Starting Wed 6/25/2025, Until Fri 7/25/2025, Normal      rOPINIRole (REQUIP) 0.25 mg tablet Take 1 tablet (0.25 mg total) by mouth daily at bedtime, Starting Wed 6/25/2025, Until Fri 7/25/2025, Normal      budesonide-formoterol (SYMBICORT) 80-4.5 MCG/ACT inhaler Inhale 2 puffs 2 (two) times a day Rinse mouth after use., Starting Wed 6/25/2025, Until Fri 7/25/2025, Normal           No discharge procedures on file.  ED SEPSIS DOCUMENTATION   Time reflects when diagnosis was documented in both MDM as applicable and the Disposition within this note       Time User Action Codes Description Comment    6/27/2025  1:54 PM Srinivasa nAdrew [R45.851] Suicidal ideations     6/27/2025  4:02 PM Srinivasa Andrew [Z00.8] Encounter for psychological evaluation                    [1]   Past Medical History:  Diagnosis Date    Ambulatory dysfunction     B12 deficiency     CKD (chronic kidney disease), stage III (HCC)     Depression     EP (epilepsy) (HCC)     Epilepsy (HCC)     History of sinus tachycardia     Hypertension     Morbid obesity (HCC)     Obesity     Restless leg     Restless leg syndrome     Vitamin D deficiency    [2]   Past Surgical History:  Procedure Laterality Date    DENTAL SURGERY      all teeth removed    DENTAL SURGERY      LASER ABLATION OF THE CERVIX      MAMMO (HISTORICAL)  05/01/2017   [3]   Family History  Problem Relation Name Age of Onset    Heart attack Mother      Kidney disease Mother      Liver disease Mother      Heart attack Father      No Known Problems Brother      No Known Problems Son     [4]   Social History  Tobacco  Use    Smoking status: Every Day     Current packs/day: 1.00     Types: Cigarettes    Smokeless tobacco: Current   Vaping Use    Vaping status: Never Used   Substance Use Topics    Alcohol use: Not Currently     Comment: pt denies alcohol use    Drug use: Never        Srinivasa Andrew DO  06/28/25 0714

## 2025-06-27 NOTE — ED CARE HANDOFF
Emergency Department Sign Out Note        Sign out and transfer of care from Dr. Almonte. See Separate Emergency Department note.     The patient, Cydney Lim, was evaluated by the previous provider for SI.    Workup Completed:  Medically cleared for inpatient psychiatric admission, 201 signed, pending appropriate placement.    ED Course / Workup Pending (followup):  Accepted at [facility], departed for that facility via secure transport without incident.        No data recorded                             Procedures  Medical Decision Making  Amount and/or Complexity of Data Reviewed  Labs: ordered.    Risk  Prescription drug management.            Disposition  Final diagnoses:   Suicidal ideations   Encounter for psychological evaluation     Time reflects when diagnosis was documented in both MDM as applicable and the Disposition within this note       Time User Action Codes Description Comment    6/27/2025  1:54 PM Srinivasa Andrew [R45.851] Suicidal ideations     6/27/2025  4:02 PM Srinivasa Andrew [Z00.8] Encounter for psychological evaluation           ED Disposition       None          MD Documentation      Flowsheet Row Most Recent Value   Patient Condition The patient has been stabilized such that within reasonable medical probability, no material deterioration of the patient condition or the condition of the unborn child(marcos) is likely to result from the transfer, An emergency transfer is being made prior to stabilization due to the need for definitive care and the benefit of transfer outweighs the risk   Reason for Transfer Level of Care needed not available at this facility  [[facility] required]   Benefits of Transfer Specialized equipment and/or services available at the receiving facility (Include comment)________________________   Risks of Transfer Potential for delay in receiving treatment   Accepting Physician Dr. Braga   Accepting Facility Name, City & State  [facility] 6B OA unit     (Name & Tel number) [facility]   Transported by (Company and Unit #) ________________________________   Sending MD Kirk Cloud MD, Renetta Almonte MD   Provider Certification General risk, such as traffic hazards, adverse weather conditions, rough terrain or turbulence, possible failure of equipment (including vehicle or aircraft), or consequences of actions of persons outside the control of the transport personnel          RN Documentation      Flowsheet Row Most Recent Value   Accepting Facility Name, City & State  [facility] 6B OA unit    (Name & Tel number) [facility]   Report Given to unit charge   Medications Reviewed with Next Provider of Service Yes   Transport Mode Other (Comment)   Transported by (Company and Unit #) ________________________________   Copies of Medical Records Sent Transfer form   Patient Belongings Disposition Sent with family   Transfer Date 06/27/25          Follow-up Information    None       Patient's Medications   Discharge Prescriptions    No medications on file     No discharge procedures on file.       ED Provider  Electronically Signed by     Eran Moreno MD  06/28/25 0134

## 2025-06-27 NOTE — ED CARE HANDOFF
Emergency Department Sign Out Note        Sign out and transfer of care from Dr. Andrew. See Separate Emergency Department note.     The patient, Cydney Lim, was evaluated by the previous provider for SI with plan to starve herself and has not eaten in 4 days. No A/C at home, plan to sign 201.    Workup Completed:  Temp:  [98.1 °F (36.7 °C)] 98.1 °F (36.7 °C)  HR:  [80] 80  BP: (148)/(78) 148/78  Resp:  [18] 18  SpO2:  [96 %] 96 %  Recent Results (from the past 12 hours)   UA w Reflex to Microscopic w Reflex to Culture    Collection Time: 06/27/25  4:26 PM    Specimen: Urine, Clean Catch   Result Value Ref Range    Color, UA Light Yellow     Clarity, UA Clear     Specific Gravity, UA 1.012 1.003 - 1.030    pH, UA 5.5 4.5, 5.0, 5.5, 6.0, 6.5, 7.0, 7.5, 8.0    Leukocytes, UA Negative Negative    Nitrite, UA Positive (A) Negative    Protein, UA 30 (1+) (A) Negative mg/dl    Glucose, UA Negative Negative mg/dl    Ketones, UA Negative Negative mg/dl    Urobilinogen, UA <2.0 <2.0 mg/dl mg/dl    Bilirubin, UA Negative Negative    Occult Blood, UA Small (A) Negative   Rapid drug screen, urine    Collection Time: 06/27/25  4:26 PM   Result Value Ref Range    Amph/Meth UR Negative Negative    Barbiturate Ur Negative Negative    Benzodiazepine Urine Negative Negative    Cocaine Urine Negative Negative    Methadone Urine Negative Negative    Opiate Urine Negative Negative    PCP Ur Negative Negative    THC Urine Negative Negative    Oxycodone Urine Negative Negative    Fentanyl Urine Negative Negative    HYDROCODONE URINE Negative Negative   Urine Microscopic    Collection Time: 06/27/25  4:26 PM   Result Value Ref Range    RBC, UA 2-4 (A) None Seen, 1-2 /hpf    WBC, UA 4-10 (A) None Seen, 1-2 /hpf    Epithelial Cells Occasional None Seen, Occasional /hpf    Bacteria, UA Occasional None Seen, Occasional /hpf     No orders to display     XR chest 1 view portable  Result Date: 6/27/2025  Narrative: XR CHEST PORTABLE  INDICATION: sob. COMPARISON: Chest radiograph 6/15/2025. FINDINGS: Clear lungs. No pneumothorax or pleural effusion. Normal cardiomediastinal silhouette. Bones are unremarkable for age. Normal upper abdomen.     Impression: No acute cardiopulmonary disease. Workstation performed: [id number]     XR chest 1 view portable  Result Date: 6/16/2025  Narrative: XR CHEST PORTABLE INDICATION: bilateral leg swelling. COMPARISON: None FINDINGS: Clear lungs. No pneumothorax or pleural effusion. Normal cardiomediastinal silhouette. Bones are unremarkable for age. Normal upper abdomen.     Impression: No acute cardiopulmonary disease. Workstation performed: [id number]         Given:    Medication Administration - last 24 hours from 06/27/2025 0308 to 06/28/2025 0308         Date/Time Order Dose Route Action Action by     06/27/2025 1843 EDT LORazepam (ATIVAN) injection 0.5 mg -- Intravenous Canceled Entry Anne Lazaro RN     06/27/2025 1843 EDT LORazepam (ATIVAN) tablet 0.5 mg 0.5 mg Oral Given Anne Lazaro RN              ED Course / Workup Pending (followup):  Pending Psych and Crisis evaluation        No data recorded                          ED Course as of 06/28/25 0308   Fri Jun 27, 2025   1620 Patient's case signed out to me by Dr. Andrew while pending evaluation by psych and crisis.  Psych had responded saying that patient is stable for 201 admission.     Procedures  Medical Decision Making  Patient's case was signed out to me by Dr. Andrew while pending crisis and psychiatry evaluation.  Patient was evaluated by crisis and psychiatry and was cleared for 201 admission to psychiatric facility.  Patient was transported to psychiatric facility without any acute complications.      Amount and/or Complexity of Data Reviewed  Labs: ordered.    Risk  Prescription drug management.            Disposition  Final diagnoses:   Suicidal ideations   Encounter for psychological evaluation     Time reflects when diagnosis  was documented in both MDM as applicable and the Disposition within this note       Time User Action Codes Description Comment    6/27/2025  1:54 PM Srinivasa Andrew Add [R45.851] Suicidal ideations     6/27/2025  4:02 PM Srinivasa Andrew [Z00.8] Encounter for psychological evaluation           ED Disposition       ED Disposition   Transfer to Another Facility    Condition   --    Date/Time   Fri Jun 27, 2025  8:21 PM    Comment   Cydney Lim should be transferred out to [facility] Unit.               MD Documentation      Flowsheet Row Most Recent Value   Patient Condition The patient has been stabilized such that within reasonable medical probability, no material deterioration of the patient condition or the condition of the unborn child(marcos) is likely to result from the transfer, An emergency transfer is being made prior to stabilization due to the need for definitive care and the benefit of transfer outweighs the risk   Reason for Transfer Level of Care needed not available at this facility  [IP required]   Benefits of Transfer Specialized equipment and/or services available at the receiving facility (Include comment)________________________   Risks of Transfer Potential for delay in receiving treatment   Accepting Physician Dr. Braga   Accepting Facility Name, [facility] & [address] unit    (Name & Tel number) [facility]   Transported by (Company and Unit #) ________________________________   Sending MD Kirk Cloud MD, Renetta Almonte MD   Provider Certification General risk, such as traffic hazards, adverse weather conditions, rough terrain or turbulence, possible failure of equipment (including vehicle or aircraft), or consequences of actions of persons outside the control of the transport personnel          RN Documentation      Flowsheet Row Most Recent Value   Accepting Facility Name, [facility] & [address]    (Name & Tel number) [facility]   Report  Given to unit charge   Medications Reviewed with Next Provider of Service Yes   Transport Mode Other (Comment)   Transported by (Company and Unit #) ________________________________   Copies of Medical Records Sent Transfer form   Patient Belongings Disposition Sent with family   Transfer Date 06/27/25          Follow-up Information    None       Discharge Medication List as of 6/27/2025  8:22 PM        CONTINUE these medications which have NOT CHANGED    Details   albuterol (Proventil HFA) 90 mcg/act inhaler Inhale 2 puffs every 6 (six) hours as needed for wheezing, Starting Wed 5/22/2024, Normal      carBAMazepine (TEGretol) 200 mg tablet Take 1 tablet (200 mg total) by mouth 3 (three) times a day, Starting Sat 8/3/2024, Normal      cyanocobalamin (VITAMIN B-12) 1000 MCG tablet Take 1 tablet (1,000 mcg total) by mouth daily, Starting Wed 6/25/2025, Until Fri 7/25/2025, Normal      escitalopram (LEXAPRO) 20 mg tablet Take 1 tablet (20 mg total) by mouth daily, Starting Wed 6/25/2025, Until Fri 7/25/2025, Normal      folic acid (FOLVITE) 1 mg tablet Take 1 tablet (1 mg total) by mouth daily, Starting Wed 6/25/2025, Until Fri 7/25/2025, Normal      gabapentin (NEURONTIN) 300 mg capsule Take 1 capsule (300 mg total) by mouth 3 (three) times a day, Starting Wed 6/25/2025, Until Fri 7/25/2025, Normal      levETIRAcetam (KEPPRA) 750 mg tablet Take 2 tablets (1,500 mg total) by mouth 2 (two) times a day, Starting Sat 8/3/2024, Normal      melatonin 3 mg Take 1 tablet (3 mg total) by mouth daily at bedtime, Starting Wed 6/25/2025, Until Fri 7/25/2025, Normal      metoprolol succinate (TOPROL-XL) 50 mg 24 hr tablet Take 1 tablet (50 mg total) by mouth daily, Starting Fri 7/19/2024, Until Fri 6/27/2025, Normal      QUEtiapine (SEROquel) 25 mg tablet Take 0.5 tablets (12.5 mg total) by mouth daily at bedtime, Starting Wed 6/25/2025, Until Fri 7/25/2025, Normal      rOPINIRole (REQUIP) 0.25 mg tablet Take 1 tablet (0.25 mg  total) by mouth daily at bedtime, Starting Wed 6/25/2025, Until Fri 7/25/2025, Normal      Albuterol-Budesonide (Airsupra) 90-80 MCG/ACT AERO Inhale 2 puffs in the morning and 2 puffs before bedtime., Historical Med      budesonide-formoterol (SYMBICORT) 80-4.5 MCG/ACT inhaler Inhale 2 puffs 2 (two) times a day Rinse mouth after use., Starting Wed 6/25/2025, Until Fri 7/25/2025, Normal      Cholecalciferol (VITAMIN D3) 1,000 units tablet Take 2 tablets (2,000 Units total) by mouth daily, Starting Wed 6/25/2025, Until Fri 7/25/2025, Normal      ergocalciferol (VITAMIN D2) 50,000 units Take 1 capsule (50,000 Units total) by mouth once a week for 7 doses, Starting Fri 6/27/2025, Until Sat 8/9/2025, Normal      ferrous sulfate 325 (65 FE) MG EC tablet Take 325 mg by mouth 3 (three) times a day with meals, Historical Med      nicotine (NICODERM CQ) 21 mg/24 hr TD 24 hr patch Place 1 patch on the skin daily over 24 hours, Starting Fri 6/13/2025, Normal           No discharge procedures on file.       ED Provider  Electronically Signed by     Kirk Cloud MD  06/28/25 8382

## 2025-06-27 NOTE — ED NOTES
Insurance Authorization for admission:     Authorization not required; Medicare A&B-    [facility]-  [facility] [place] [facility] [employer]    Eligibility Verification System checked - ([phone]).  Online system / automated system indicates: **

## 2025-06-27 NOTE — ED NOTES
BELONGINGS:    Clothing  -Shirt  -Jacket  -Crocs  -Alfie Conrads  -Underwear    Personal Effects  -Lighter  -Zip wallet w/ID, club cards, $3 cash + loose change  -Purse (brn)  -Walking cane (4point)    Meds from Home (open)  - Escitalopram 10mg  -Metoprolol 50mg  -Carbamazepine 200mg  -Albuterol/Budesonide inhaler  -Gabapentin 300mg (h6qbdroic)  -Levetiracetam 750mg  -Albuterol Sulfate inhaler  -Quetiapine Fumarate 25mg  -Ropinirole HCl 0.25mg  -Folic Acid 1mg  -B12 1000 mcg     Eduin Lopez  06/27/25 2828

## 2025-06-27 NOTE — ASSESSMENT & PLAN NOTE
"Ongoing, per chart review [facility] 14/30 on 6/19/2025  Per inpatient psych discharge summary from 6/19/25: \"Likely will benefit from discharge with VNA/HHA or placement into SNF\"  "

## 2025-06-27 NOTE — ED NOTES
Patient is a 64-year-old female with a history of MDD who presented to the ED for SI with a plan to starve herself, stating she has not eaten for 4 days.    CIS met with patient at bedside.  Patient was recently discharged from [facility] treatment at [facility] on 6/25/25 after a 7 day stay, for SI. Patient states she is taking her prescribed medications. Today, patient endorsed SI to her home health nurse, who subsequently sent patient to ER.  Patient states if she were to go home, she will kill herself.  Chart review reflects several prior IP admissions.   Patient denies having any OP therapy current.    Pt denies any history of suicide attempts/self harm. Pt denies A/VH, psychosis, or paranoia. Pt does appear depressed and has a flat affect. Poor insight. At times, she seems to be confused by questions.  Patient unable to identify any specific triggers.  Pt states she has no supports. Pt states she lives alone and collects disability, though her brother is her rep payee and takes care of it. Pt reports a fluctuating sleep pattern and okay appetite. Pt does report low motivation and anhedonia. Pt denies access to guns, no CLEVE use, no legal issues.  Patient is requesting to sign a 201 for [facility] treatment.    Psych consult requested for disposition recommendation as patient was discharged from [facility] 2 days ago.

## 2025-06-27 NOTE — ASSESSMENT & PLAN NOTE
Ongoing.   -Continue Lexapro 20 mg once daily for depressed mood  - Continue Tegretol 200 mg 3 times a day for mood stabilization and history of epilepsy  - Continue Seroquel 12.5 mg at nighttime for mood and insomnia  - Continue Neurontin 300 mg 3 times a day, can help with anxiety

## 2025-06-27 NOTE — TELEPHONE ENCOUNTER
06/27/25 10:23 AM    Patient contacted post ED visit, second outreach attempt made. Message was left for patient to return a call to the VBI Department at [facility] Phone [phone].    Thank you.  Radha Julio MA  [facility]

## 2025-06-27 NOTE — LETTER
[facility] EMERGENCY DEPARTMENT   [facility] [address]  Dept: [phone]      EMTALA TRANSFER CONSENT    NAME Cydney Lim                                         1960                              MRN [id number]    I have been informed of my rights regarding examination, treatment, and transfer   by Dr. Renetta Almonte MD    Benefits: Specialized equipment and/or services available at the receiving facility (Include comment)________________________    Risks: Potential for delay in receiving treatment      Consent for Transfer:  I acknowledge that my medical condition has been evaluated and explained to me by the emergency department physician or other qualified medical person and/or my attending physician, who has recommended that I be transferred to the service of  Accepting Physician: Dr. Braga at Accepting Facility Name, City & State : [address]. The above potential benefits of such transfer, the potential risks associated with such transfer, and the probable risks of not being transferred have been explained to me, and I fully understand them.  The doctor has explained that, in my case, the benefits of transfer outweigh the risks.  I agree to be transferred.    I authorize the performance of emergency medical procedures and treatments upon me in both transit and upon arrival at the receiving facility.  Additionally, I authorize the release of any and all medical records to the receiving facility and request they be transported with me, if possible.  I understand that the safest mode of transportation during a medical emergency is an ambulance and that the Hospital advocates the use of this mode of transport. Risks of traveling to the receiving facility by car, including absence of medical control, life sustaining equipment, such as oxygen, and medical personnel has been explained to me and I fully understand them.    (GUMARO CORRECT BOX BELOW)  [  ]  I  consent to the stated transfer and to be transported by ambulance/helicopter.  [  ]  I consent to the stated transfer, but refuse transportation by ambulance and accept full responsibility for my transportation by car.  I understand the risks of non-ambulance transfers and I exonerate the Hospital and its staff from any deterioration in my condition that results from this refusal.    X___________________________________________    DATE  25  TIME________  Signature of patient or legally responsible individual signing on patient behalf           RELATIONSHIP TO PATIENT_________________________                  Provider Certification    NAME Cydney Lim                                         1960                              MRN [id number]    A medical screening exam was performed on the above named patient.  Based on the examination:    Condition Necessitating Transfer The primary encounter diagnosis was Suicidal ideations. A diagnosis of Encounter for psychological evaluation was also pertinent to this visit.    Patient Condition: The patient has been stabilized such that within reasonable medical probability, no material deterioration of the patient condition or the condition of the unborn child(marcos) is likely to result from the transfer, An emergency transfer is being made prior to stabilization due to the need for definitive care and the benefit of transfer outweighs the risk    Reason for Transfer: Level of Care needed not available at this facility ([facility] required)    Transfer Requirements: Facility [address] OA unit   Space available and qualified personnel available for treatment as acknowledged by [facility]  Agreed to accept transfer and to provide appropriate medical treatment as acknowledged by       Dr. Braga  Appropriate medical records of the examination and treatment of the patient are provided at the time of transfer   STAFF INITIAL WHEN COMPLETED _______  Transfer will be performed by  qualified personnel from ________________________________  and appropriate transfer equipment as required, including the use of necessary and appropriate life support measures.    Provider Certification: I have examined the patient and explained the following risks and benefits of being transferred/refusing transfer to the patient/family:  General risk, such as traffic hazards, adverse weather conditions, rough terrain or turbulence, possible failure of equipment (including vehicle or aircraft), or consequences of actions of persons outside the control of the transport personnel      Based on these reasonable risks and benefits to the patient and/or the unborn child(marcos), and based upon the information available at the time of the patient’s examination, I certify that the medical benefits reasonably to be expected from the provision of appropriate medical treatments at another medical facility outweigh the increasing risks, if any, to the individual’s medical condition, and in the case of labor to the unborn child, from effecting the transfer.    X____________________________________________ DATE 06/27/25        TIME_______      ORIGINAL - SEND TO MEDICAL RECORDS   COPY - SEND WITH PATIENT DURING TRANSFER

## 2025-06-27 NOTE — ASSESSMENT & PLAN NOTE
- Continue melatonin 3 mg at nighttime for mood and insomnia  - Continue Seroquel 12.5 mg at nighttime for mood and insomnia

## 2025-06-27 NOTE — LETTER
[facility] [place]  [address]  [address]  [place] [id number] [phone]    Date: 07/01/25    Cydney Lim  [address]  [place] [address]    Dear Cydney:                                                                                                                                Thank you for choosing [facility] emergency department for care.  Your primary care provider wants to make sure that your ongoing medical care is being addressed. If you require follow up care as a result of your emergency department visit, there are a few things the practice would like you to know.                As part of the network's continuing commitment to caring for our patients, we have added more same day appointments and have extended office hours to meet your medical needs. After hours, on-call physicians are available via your primary care provider's main office line.               We encourage you to contact our office prior to seeking treatment to discuss your symptoms with the medical staff.  Together, we can determine the correct course of action.  A majority of non-emergent conditions such as: common cold, flu-like symptoms, fevers, strains/sprains, dislocations, minor burns, cuts and animal bites can be treated at [facility] facilities. Diagnostic testing is available at some sites.               Of course, if you are experiencing a life threatening medical emergency call 911 or proceed directly to the nearest emergency room.    Your nearest [facility] facility is conveniently located at:    [facility]  [address] [place] [address]  [phone]  SKIP THE WAIT  Conveniently offered at most [facility] locations  Offerle your spot online at [email or web address]/locations or on the [facility]    Sincerely,    [facility]  Dept: [phone]

## 2025-06-27 NOTE — TELEPHONE ENCOUNTER
06/27/25 9:39 AM    Patient contacted post ED visit, outreach attempt made but message could not be left. Additional outreach attempt will be made.     Thank you.  Baljit Mina MA  [facility]

## 2025-06-27 NOTE — ASSESSMENT & PLAN NOTE
"History of such, patient reports generally more frequent seizures of summer with \"petit mal\" seizures, feels last seizure was 3 weeks ago  - Continue Tegretol 200 mg 3 times a day for mood stabilization and history of epilepsy    "

## 2025-06-28 LAB
25(OH)D3 SERPL-MCNC: 24.7 NG/ML (ref 30–100)
ALBUMIN SERPL BCG-MCNC: 3.5 G/DL (ref 3.5–5)
ALP SERPL-CCNC: 83 U/L (ref 34–104)
ALT SERPL W P-5'-P-CCNC: 7 U/L (ref 7–52)
ANION GAP SERPL CALCULATED.3IONS-SCNC: 10 MMOL/L (ref 4–13)
AST SERPL W P-5'-P-CCNC: 13 U/L (ref 13–39)
BASOPHILS # BLD AUTO: 0.07 THOUSANDS/ÂΜL (ref 0–0.1)
BASOPHILS NFR BLD AUTO: 1 % (ref 0–1)
BILIRUB SERPL-MCNC: 0.37 MG/DL (ref 0.2–1)
BUN SERPL-MCNC: 19 MG/DL (ref 5–25)
CALCIUM SERPL-MCNC: 9.1 MG/DL (ref 8.4–10.2)
CHLORIDE SERPL-SCNC: 106 MMOL/L (ref 96–108)
CHOLEST SERPL-MCNC: 195 MG/DL (ref ?–200)
CO2 SERPL-SCNC: 24 MMOL/L (ref 21–32)
CREAT SERPL-MCNC: 1.05 MG/DL (ref 0.6–1.3)
EOSINOPHIL # BLD AUTO: 0.25 THOUSAND/ÂΜL (ref 0–0.61)
EOSINOPHIL NFR BLD AUTO: 3 % (ref 0–6)
ERYTHROCYTE [DISTWIDTH] IN BLOOD BY AUTOMATED COUNT: 13.1 % (ref 11.6–15.1)
FOLATE SERPL-MCNC: 17.2 NG/ML
GFR SERPL CREATININE-BSD FRML MDRD: 55 ML/MIN/1.73SQ M
GLUCOSE P FAST SERPL-MCNC: 89 MG/DL (ref 65–99)
GLUCOSE SERPL-MCNC: 89 MG/DL (ref 65–140)
HCT VFR BLD AUTO: 36.5 % (ref 34.8–46.1)
HDLC SERPL-MCNC: 64 MG/DL
HGB BLD-MCNC: 11.6 G/DL (ref 11.5–15.4)
IMM GRANULOCYTES # BLD AUTO: 0.03 THOUSAND/UL (ref 0–0.2)
IMM GRANULOCYTES NFR BLD AUTO: 0 % (ref 0–2)
LDLC SERPL CALC-MCNC: 110 MG/DL (ref 0–100)
LYMPHOCYTES # BLD AUTO: 2.47 THOUSANDS/ÂΜL (ref 0.6–4.47)
LYMPHOCYTES NFR BLD AUTO: 28 % (ref 14–44)
MCH RBC QN AUTO: 31.4 PG (ref 26.8–34.3)
MCHC RBC AUTO-ENTMCNC: 31.8 G/DL (ref 31.4–37.4)
MCV RBC AUTO: 99 FL (ref 82–98)
MONOCYTES # BLD AUTO: 0.68 THOUSAND/ÂΜL (ref 0.17–1.22)
MONOCYTES NFR BLD AUTO: 8 % (ref 4–12)
NEUTROPHILS # BLD AUTO: 5.41 THOUSANDS/ÂΜL (ref 1.85–7.62)
NEUTS SEG NFR BLD AUTO: 60 % (ref 43–75)
NONHDLC SERPL-MCNC: 131 MG/DL
NRBC BLD AUTO-RTO: 0 /100 WBCS
PLATELET # BLD AUTO: 310 THOUSANDS/UL (ref 149–390)
PMV BLD AUTO: 10.3 FL (ref 8.9–12.7)
POTASSIUM SERPL-SCNC: 3.9 MMOL/L (ref 3.5–5.3)
PROT SERPL-MCNC: 6.9 G/DL (ref 6.4–8.4)
RBC # BLD AUTO: 3.69 MILLION/UL (ref 3.81–5.12)
SODIUM SERPL-SCNC: 140 MMOL/L (ref 135–147)
TRIGL SERPL-MCNC: 104 MG/DL (ref ?–150)
VIT B12 SERPL-MCNC: 429 PG/ML (ref 180–914)
WBC # BLD AUTO: 8.91 THOUSAND/UL (ref 4.31–10.16)

## 2025-06-28 PROCEDURE — 82746 ASSAY OF FOLIC ACID SERUM: CPT | Performed by: PSYCHIATRY & NEUROLOGY

## 2025-06-28 PROCEDURE — 80053 COMPREHEN METABOLIC PANEL: CPT | Performed by: PSYCHIATRY & NEUROLOGY

## 2025-06-28 PROCEDURE — 82306 VITAMIN D 25 HYDROXY: CPT | Performed by: PSYCHIATRY & NEUROLOGY

## 2025-06-28 PROCEDURE — 80061 LIPID PANEL: CPT | Performed by: PSYCHIATRY & NEUROLOGY

## 2025-06-28 PROCEDURE — 85025 COMPLETE CBC W/AUTO DIFF WBC: CPT | Performed by: PSYCHIATRY & NEUROLOGY

## 2025-06-28 PROCEDURE — 82607 VITAMIN B-12: CPT | Performed by: PSYCHIATRY & NEUROLOGY

## 2025-06-28 PROCEDURE — 86780 TREPONEMA PALLIDUM: CPT | Performed by: PSYCHIATRY & NEUROLOGY

## 2025-06-28 PROCEDURE — 99223 1ST HOSP IP/OBS HIGH 75: CPT | Performed by: STUDENT IN AN ORGANIZED HEALTH CARE EDUCATION/TRAINING PROGRAM

## 2025-06-28 RX ORDER — ESCITALOPRAM OXALATE 10 MG/1
20 TABLET ORAL DAILY
Status: DISCONTINUED | OUTPATIENT
Start: 2025-06-28 | End: 2025-07-29 | Stop reason: HOSPADM

## 2025-06-28 RX ORDER — NICOTINE 21 MG/24HR
1 PATCH, TRANSDERMAL 24 HOURS TRANSDERMAL DAILY
Status: DISCONTINUED | OUTPATIENT
Start: 2025-06-28 | End: 2025-07-29 | Stop reason: HOSPADM

## 2025-06-28 RX ORDER — CARBAMAZEPINE 200 MG/1
200 TABLET ORAL EVERY 8 HOURS SCHEDULED
Status: DISCONTINUED | OUTPATIENT
Start: 2025-06-28 | End: 2025-07-29 | Stop reason: HOSPADM

## 2025-06-28 RX ORDER — ERGOCALCIFEROL 1.25 MG/1
50000 CAPSULE, LIQUID FILLED ORAL WEEKLY
Status: DISCONTINUED | OUTPATIENT
Start: 2025-06-28 | End: 2025-07-29 | Stop reason: HOSPADM

## 2025-06-28 RX ORDER — POLYETHYLENE GLYCOL 3350 17 G/17G
17 POWDER, FOR SOLUTION ORAL DAILY PRN
Status: DISCONTINUED | OUTPATIENT
Start: 2025-06-28 | End: 2025-06-30

## 2025-06-28 RX ORDER — BISACODYL 10 MG
10 SUPPOSITORY, RECTAL RECTAL DAILY PRN
Status: DISCONTINUED | OUTPATIENT
Start: 2025-06-28 | End: 2025-06-30

## 2025-06-28 RX ORDER — TRAZODONE HYDROCHLORIDE 50 MG/1
50 TABLET ORAL
Status: DISCONTINUED | OUTPATIENT
Start: 2025-06-28 | End: 2025-07-29 | Stop reason: HOSPADM

## 2025-06-28 RX ORDER — HYDROXYZINE HYDROCHLORIDE 50 MG/1
100 TABLET, FILM COATED ORAL EVERY 6 HOURS PRN
Status: DISCONTINUED | OUTPATIENT
Start: 2025-06-28 | End: 2025-07-29 | Stop reason: HOSPADM

## 2025-06-28 RX ORDER — AMOXICILLIN 250 MG
1 CAPSULE ORAL
Status: DISCONTINUED | OUTPATIENT
Start: 2025-06-28 | End: 2025-07-29 | Stop reason: HOSPADM

## 2025-06-28 RX ADMIN — GABAPENTIN 300 MG: 300 CAPSULE ORAL at 21:12

## 2025-06-28 RX ADMIN — LEVETIRACETAM 1500 MG: 500 TABLET, FILM COATED ORAL at 08:43

## 2025-06-28 RX ADMIN — NICOTINE 1 PATCH: 21 PATCH, EXTENDED RELEASE TRANSDERMAL at 10:11

## 2025-06-28 RX ADMIN — CARBAMAZEPINE 200 MG: 200 TABLET ORAL at 08:44

## 2025-06-28 RX ADMIN — GABAPENTIN 300 MG: 300 CAPSULE ORAL at 08:44

## 2025-06-28 RX ADMIN — ERGOCALCIFEROL 50000 UNITS: 1.25 CAPSULE ORAL at 10:11

## 2025-06-28 RX ADMIN — Medication 3 MG: at 21:13

## 2025-06-28 RX ADMIN — ESCITALOPRAM OXALATE 20 MG: 10 TABLET ORAL at 08:47

## 2025-06-28 RX ADMIN — QUETIAPINE FUMARATE 12.5 MG: 25 TABLET ORAL at 21:12

## 2025-06-28 RX ADMIN — CARBAMAZEPINE 200 MG: 200 TABLET ORAL at 21:14

## 2025-06-28 RX ADMIN — CARBAMAZEPINE 200 MG: 200 TABLET ORAL at 16:36

## 2025-06-28 RX ADMIN — GABAPENTIN 300 MG: 300 CAPSULE ORAL at 16:36

## 2025-06-28 RX ADMIN — Medication 1000 MCG: at 10:11

## 2025-06-28 RX ADMIN — ROPINIROLE HYDROCHLORIDE 0.25 MG: 0.5 TABLET, FILM COATED ORAL at 21:12

## 2025-06-28 RX ADMIN — LEVETIRACETAM 1500 MG: 500 TABLET, FILM COATED ORAL at 21:11

## 2025-06-28 NOTE — ED ATTENDING ATTESTATION
6/27/2025  I, Renetta Almonte MD, saw and evaluated the patient. I have discussed the patient with the resident/non-physician practitioner and agree with the resident's/non-physician practitioner's findings, Plan of Care, and MDM as documented in the resident's/non-physician practitioner's note, except where noted. All available labs and Radiology studies were reviewed.  I was present for key portions of any procedure(s) performed by the resident/non-physician practitioner and I was immediately available to provide assistance.       At this point I agree with the current assessment done in the Emergency Department.  I have conducted an independent evaluation of this patient a history and physical is as follows:    64-year-old female presented to the ER with thoughts of wanting to hurt herself.  Denies HI.  Denies hallucinations.  No other complaints.  Wants to sign 201.    Will have crisis evaluate for 201 placement.      ED Course  ED Course as of 06/28/25 1832   Fri Jun 27, 2025   [id number] signed         Critical Care Time  Procedures

## 2025-06-29 LAB
CARBAMAZEPINE SERPL-MCNC: 8 UG/ML (ref 4–12)
TREPONEMA PALLIDUM IGG+IGM AB [PRESENCE] IN SERUM OR PLASMA BY IMMUNOASSAY: NORMAL

## 2025-06-29 PROCEDURE — 80156 ASSAY CARBAMAZEPINE TOTAL: CPT | Performed by: STUDENT IN AN ORGANIZED HEALTH CARE EDUCATION/TRAINING PROGRAM

## 2025-06-29 PROCEDURE — 99232 SBSQ HOSP IP/OBS MODERATE 35: CPT | Performed by: STUDENT IN AN ORGANIZED HEALTH CARE EDUCATION/TRAINING PROGRAM

## 2025-06-29 RX ADMIN — GABAPENTIN 300 MG: 300 CAPSULE ORAL at 21:10

## 2025-06-29 RX ADMIN — LEVETIRACETAM 1500 MG: 500 TABLET, FILM COATED ORAL at 08:53

## 2025-06-29 RX ADMIN — ACETAMINOPHEN 650 MG: 325 TABLET ORAL at 16:50

## 2025-06-29 RX ADMIN — Medication 1000 MCG: at 08:53

## 2025-06-29 RX ADMIN — QUETIAPINE FUMARATE 12.5 MG: 25 TABLET ORAL at 21:10

## 2025-06-29 RX ADMIN — ESCITALOPRAM OXALATE 20 MG: 10 TABLET ORAL at 08:53

## 2025-06-29 RX ADMIN — GABAPENTIN 300 MG: 300 CAPSULE ORAL at 16:45

## 2025-06-29 RX ADMIN — NICOTINE 1 PATCH: 21 PATCH, EXTENDED RELEASE TRANSDERMAL at 08:54

## 2025-06-29 RX ADMIN — LEVETIRACETAM 1500 MG: 500 TABLET, FILM COATED ORAL at 21:10

## 2025-06-29 RX ADMIN — Medication 3 MG: at 21:10

## 2025-06-29 RX ADMIN — CARBAMAZEPINE 200 MG: 200 TABLET ORAL at 08:53

## 2025-06-29 RX ADMIN — ROPINIROLE HYDROCHLORIDE 0.25 MG: 0.5 TABLET, FILM COATED ORAL at 21:11

## 2025-06-29 RX ADMIN — GABAPENTIN 300 MG: 300 CAPSULE ORAL at 08:53

## 2025-06-29 RX ADMIN — CARBAMAZEPINE 200 MG: 200 TABLET ORAL at 21:10

## 2025-06-29 RX ADMIN — CARBAMAZEPINE 200 MG: 200 TABLET ORAL at 16:45

## 2025-06-30 ENCOUNTER — TELEPHONE (OUTPATIENT)
Dept: PSYCHIATRY | Facility: CLINIC | Age: 65
End: 2025-06-30

## 2025-06-30 PROCEDURE — 99232 SBSQ HOSP IP/OBS MODERATE 35: CPT | Performed by: PSYCHIATRY & NEUROLOGY

## 2025-06-30 PROCEDURE — 97167 OT EVAL HIGH COMPLEX 60 MIN: CPT

## 2025-06-30 PROCEDURE — 97163 PT EVAL HIGH COMPLEX 45 MIN: CPT

## 2025-06-30 RX ORDER — POLYETHYLENE GLYCOL 3350 17 G/17G
17 POWDER, FOR SOLUTION ORAL DAILY PRN
Status: DISCONTINUED | OUTPATIENT
Start: 2025-06-30 | End: 2025-07-29 | Stop reason: HOSPADM

## 2025-06-30 RX ORDER — BISACODYL 10 MG
10 SUPPOSITORY, RECTAL RECTAL DAILY PRN
Status: DISCONTINUED | OUTPATIENT
Start: 2025-06-30 | End: 2025-07-29 | Stop reason: HOSPADM

## 2025-06-30 RX ADMIN — QUETIAPINE FUMARATE 12.5 MG: 25 TABLET ORAL at 21:25

## 2025-06-30 RX ADMIN — ESCITALOPRAM OXALATE 20 MG: 10 TABLET ORAL at 09:06

## 2025-06-30 RX ADMIN — GABAPENTIN 300 MG: 300 CAPSULE ORAL at 21:25

## 2025-06-30 RX ADMIN — CARBAMAZEPINE 200 MG: 200 TABLET ORAL at 09:06

## 2025-06-30 RX ADMIN — Medication 3 MG: at 21:25

## 2025-06-30 RX ADMIN — LEVETIRACETAM 1500 MG: 500 TABLET, FILM COATED ORAL at 21:24

## 2025-06-30 RX ADMIN — CARBAMAZEPINE 200 MG: 200 TABLET ORAL at 21:25

## 2025-06-30 RX ADMIN — GABAPENTIN 300 MG: 300 CAPSULE ORAL at 09:06

## 2025-06-30 RX ADMIN — LEVETIRACETAM 1500 MG: 500 TABLET, FILM COATED ORAL at 09:06

## 2025-06-30 RX ADMIN — Medication 1000 MCG: at 09:06

## 2025-06-30 RX ADMIN — ROPINIROLE HYDROCHLORIDE 0.25 MG: 0.5 TABLET, FILM COATED ORAL at 21:24

## 2025-06-30 RX ADMIN — NICOTINE 1 PATCH: 21 PATCH, EXTENDED RELEASE TRANSDERMAL at 09:12

## 2025-06-30 RX ADMIN — CARBAMAZEPINE 200 MG: 200 TABLET ORAL at 17:00

## 2025-06-30 RX ADMIN — GABAPENTIN 300 MG: 300 CAPSULE ORAL at 17:00

## 2025-06-30 NOTE — TELEPHONE ENCOUNTER
06/30/25 9:41 AM    Patient contacted post ED visit, no 3rd attempt will be made since patient is currently admitted. My chart letter sent     Thank you.  Radha Julio MA  [facility]

## 2025-06-30 NOTE — TELEPHONE ENCOUNTER
One week follow up call for New Patient appointment with   Elaine Gan DO   on 07/09/2025 was made on 06/30/2025. Writer was not able to inform patient of New Patient paperwork needing to be completed 5 days prior to the appointment. Writer was not able to confirm that paperwork has been sent via [employer].    Appointment was made on: 06/24/2025

## 2025-06-30 NOTE — TELEPHONE ENCOUNTER
06/30/25 9:08 AM    Patient contacted post ED visit, outreach attempt made but message could not be left. Additional outreach attempt will be made.     Thank you.  Baljit Mina MA  [facility]

## 2025-07-01 PROCEDURE — 99232 SBSQ HOSP IP/OBS MODERATE 35: CPT | Performed by: STUDENT IN AN ORGANIZED HEALTH CARE EDUCATION/TRAINING PROGRAM

## 2025-07-01 RX ADMIN — Medication 3 MG: at 21:07

## 2025-07-01 RX ADMIN — QUETIAPINE FUMARATE 12.5 MG: 25 TABLET ORAL at 21:07

## 2025-07-01 RX ADMIN — GABAPENTIN 300 MG: 300 CAPSULE ORAL at 16:18

## 2025-07-01 RX ADMIN — CARBAMAZEPINE 200 MG: 200 TABLET ORAL at 16:18

## 2025-07-01 RX ADMIN — LEVETIRACETAM 1500 MG: 500 TABLET, FILM COATED ORAL at 08:29

## 2025-07-01 RX ADMIN — LEVETIRACETAM 1500 MG: 500 TABLET, FILM COATED ORAL at 21:07

## 2025-07-01 RX ADMIN — CARBAMAZEPINE 200 MG: 200 TABLET ORAL at 08:29

## 2025-07-01 RX ADMIN — NICOTINE 1 PATCH: 21 PATCH, EXTENDED RELEASE TRANSDERMAL at 08:30

## 2025-07-01 RX ADMIN — ESCITALOPRAM OXALATE 20 MG: 10 TABLET ORAL at 08:29

## 2025-07-01 RX ADMIN — GABAPENTIN 300 MG: 300 CAPSULE ORAL at 08:42

## 2025-07-01 RX ADMIN — GABAPENTIN 300 MG: 300 CAPSULE ORAL at 21:07

## 2025-07-01 RX ADMIN — ROPINIROLE HYDROCHLORIDE 0.25 MG: 0.5 TABLET, FILM COATED ORAL at 21:07

## 2025-07-01 RX ADMIN — Medication 1000 MCG: at 08:29

## 2025-07-01 RX ADMIN — CARBAMAZEPINE 200 MG: 200 TABLET ORAL at 21:07

## 2025-07-01 NOTE — TELEPHONE ENCOUNTER
07/01/25 8:36 AM    Patient contacted post ED visit, phone outreaches were unsuccessful and a MyChart letter has been sent to the patient as follow-up.    Thank you.  Baljit Mina MA  [facility]

## 2025-07-02 PROCEDURE — 99232 SBSQ HOSP IP/OBS MODERATE 35: CPT | Performed by: STUDENT IN AN ORGANIZED HEALTH CARE EDUCATION/TRAINING PROGRAM

## 2025-07-02 RX ADMIN — ROPINIROLE HYDROCHLORIDE 0.25 MG: 0.5 TABLET, FILM COATED ORAL at 21:50

## 2025-07-02 RX ADMIN — Medication 3 MG: at 21:50

## 2025-07-02 RX ADMIN — ESCITALOPRAM OXALATE 20 MG: 10 TABLET ORAL at 09:31

## 2025-07-02 RX ADMIN — CARBAMAZEPINE 200 MG: 200 TABLET ORAL at 09:31

## 2025-07-02 RX ADMIN — GABAPENTIN 300 MG: 300 CAPSULE ORAL at 21:50

## 2025-07-02 RX ADMIN — QUETIAPINE FUMARATE 12.5 MG: 25 TABLET ORAL at 21:50

## 2025-07-02 RX ADMIN — CARBAMAZEPINE 200 MG: 200 TABLET ORAL at 17:19

## 2025-07-02 RX ADMIN — GABAPENTIN 300 MG: 300 CAPSULE ORAL at 17:19

## 2025-07-02 RX ADMIN — Medication 1000 MCG: at 09:31

## 2025-07-02 RX ADMIN — CARBAMAZEPINE 200 MG: 200 TABLET ORAL at 21:50

## 2025-07-02 RX ADMIN — LEVETIRACETAM 1500 MG: 500 TABLET, FILM COATED ORAL at 21:50

## 2025-07-02 RX ADMIN — LEVETIRACETAM 1500 MG: 500 TABLET, FILM COATED ORAL at 09:31

## 2025-07-02 RX ADMIN — GABAPENTIN 300 MG: 300 CAPSULE ORAL at 09:31

## 2025-07-02 RX ADMIN — NICOTINE 1 PATCH: 21 PATCH, EXTENDED RELEASE TRANSDERMAL at 09:31

## 2025-07-03 PROCEDURE — 99232 SBSQ HOSP IP/OBS MODERATE 35: CPT | Performed by: STUDENT IN AN ORGANIZED HEALTH CARE EDUCATION/TRAINING PROGRAM

## 2025-07-03 RX ADMIN — Medication 1000 MCG: at 08:56

## 2025-07-03 RX ADMIN — Medication 3 MG: at 21:38

## 2025-07-03 RX ADMIN — LEVETIRACETAM 1500 MG: 500 TABLET, FILM COATED ORAL at 08:56

## 2025-07-03 RX ADMIN — CARBAMAZEPINE 200 MG: 200 TABLET ORAL at 21:38

## 2025-07-03 RX ADMIN — GABAPENTIN 300 MG: 300 CAPSULE ORAL at 21:38

## 2025-07-03 RX ADMIN — LEVETIRACETAM 1500 MG: 500 TABLET, FILM COATED ORAL at 21:38

## 2025-07-03 RX ADMIN — QUETIAPINE FUMARATE 12.5 MG: 25 TABLET ORAL at 21:38

## 2025-07-03 RX ADMIN — CARBAMAZEPINE 200 MG: 200 TABLET ORAL at 17:36

## 2025-07-03 RX ADMIN — GABAPENTIN 300 MG: 300 CAPSULE ORAL at 08:56

## 2025-07-03 RX ADMIN — ESCITALOPRAM OXALATE 20 MG: 10 TABLET ORAL at 08:56

## 2025-07-03 RX ADMIN — CARBAMAZEPINE 200 MG: 200 TABLET ORAL at 08:56

## 2025-07-03 RX ADMIN — NICOTINE 1 PATCH: 21 PATCH, EXTENDED RELEASE TRANSDERMAL at 08:57

## 2025-07-03 RX ADMIN — GABAPENTIN 300 MG: 300 CAPSULE ORAL at 17:36

## 2025-07-03 RX ADMIN — ROPINIROLE HYDROCHLORIDE 0.25 MG: 0.5 TABLET, FILM COATED ORAL at 21:38

## 2025-07-04 PROCEDURE — 99232 SBSQ HOSP IP/OBS MODERATE 35: CPT | Performed by: STUDENT IN AN ORGANIZED HEALTH CARE EDUCATION/TRAINING PROGRAM

## 2025-07-04 RX ORDER — ONDANSETRON 4 MG/1
4 TABLET, ORALLY DISINTEGRATING ORAL EVERY 6 HOURS PRN
Status: DISCONTINUED | OUTPATIENT
Start: 2025-07-04 | End: 2025-07-29 | Stop reason: HOSPADM

## 2025-07-04 RX ADMIN — CARBAMAZEPINE 200 MG: 200 TABLET ORAL at 17:46

## 2025-07-04 RX ADMIN — GABAPENTIN 300 MG: 300 CAPSULE ORAL at 21:50

## 2025-07-04 RX ADMIN — ESCITALOPRAM OXALATE 20 MG: 10 TABLET ORAL at 09:12

## 2025-07-04 RX ADMIN — NICOTINE 1 PATCH: 21 PATCH, EXTENDED RELEASE TRANSDERMAL at 09:12

## 2025-07-04 RX ADMIN — CARBAMAZEPINE 200 MG: 200 TABLET ORAL at 21:50

## 2025-07-04 RX ADMIN — BISACODYL 10 MG: 10 SUPPOSITORY RECTAL at 14:47

## 2025-07-04 RX ADMIN — QUETIAPINE FUMARATE 12.5 MG: 25 TABLET ORAL at 21:49

## 2025-07-04 RX ADMIN — Medication 3 MG: at 21:50

## 2025-07-04 RX ADMIN — Medication 1000 MCG: at 09:13

## 2025-07-04 RX ADMIN — CARBAMAZEPINE 200 MG: 200 TABLET ORAL at 09:13

## 2025-07-04 RX ADMIN — GABAPENTIN 300 MG: 300 CAPSULE ORAL at 17:46

## 2025-07-04 RX ADMIN — LEVETIRACETAM 1500 MG: 500 TABLET, FILM COATED ORAL at 09:12

## 2025-07-04 RX ADMIN — SENNOSIDES AND DOCUSATE SODIUM 1 TABLET: 50; 8.6 TABLET ORAL at 09:17

## 2025-07-04 RX ADMIN — ONDANSETRON 4 MG: 4 TABLET, ORALLY DISINTEGRATING ORAL at 14:47

## 2025-07-04 RX ADMIN — GABAPENTIN 300 MG: 300 CAPSULE ORAL at 09:12

## 2025-07-04 RX ADMIN — ROPINIROLE HYDROCHLORIDE 0.25 MG: 0.5 TABLET, FILM COATED ORAL at 21:50

## 2025-07-04 RX ADMIN — LEVETIRACETAM 1500 MG: 500 TABLET, FILM COATED ORAL at 21:50

## 2025-07-04 RX ADMIN — ACETAMINOPHEN 650 MG: 325 TABLET ORAL at 09:17

## 2025-07-05 PROCEDURE — 99232 SBSQ HOSP IP/OBS MODERATE 35: CPT | Performed by: STUDENT IN AN ORGANIZED HEALTH CARE EDUCATION/TRAINING PROGRAM

## 2025-07-05 RX ADMIN — CARBAMAZEPINE 200 MG: 200 TABLET ORAL at 16:35

## 2025-07-05 RX ADMIN — CARBAMAZEPINE 200 MG: 200 TABLET ORAL at 09:00

## 2025-07-05 RX ADMIN — ROPINIROLE HYDROCHLORIDE 0.25 MG: 0.5 TABLET, FILM COATED ORAL at 21:10

## 2025-07-05 RX ADMIN — NICOTINE 1 PATCH: 21 PATCH, EXTENDED RELEASE TRANSDERMAL at 09:01

## 2025-07-05 RX ADMIN — ESCITALOPRAM OXALATE 20 MG: 10 TABLET ORAL at 09:00

## 2025-07-05 RX ADMIN — Medication 3 MG: at 21:10

## 2025-07-05 RX ADMIN — ERGOCALCIFEROL 50000 UNITS: 1.25 CAPSULE ORAL at 09:00

## 2025-07-05 RX ADMIN — GABAPENTIN 300 MG: 300 CAPSULE ORAL at 16:35

## 2025-07-05 RX ADMIN — CARBAMAZEPINE 200 MG: 200 TABLET ORAL at 21:10

## 2025-07-05 RX ADMIN — QUETIAPINE FUMARATE 12.5 MG: 25 TABLET ORAL at 21:10

## 2025-07-05 RX ADMIN — GABAPENTIN 300 MG: 300 CAPSULE ORAL at 21:10

## 2025-07-05 RX ADMIN — LEVETIRACETAM 1500 MG: 500 TABLET, FILM COATED ORAL at 09:00

## 2025-07-05 RX ADMIN — Medication 1000 MCG: at 09:00

## 2025-07-05 RX ADMIN — LEVETIRACETAM 1500 MG: 500 TABLET, FILM COATED ORAL at 21:10

## 2025-07-05 RX ADMIN — GABAPENTIN 300 MG: 300 CAPSULE ORAL at 09:00

## 2025-07-06 PROCEDURE — 99232 SBSQ HOSP IP/OBS MODERATE 35: CPT | Performed by: STUDENT IN AN ORGANIZED HEALTH CARE EDUCATION/TRAINING PROGRAM

## 2025-07-06 RX ADMIN — LEVETIRACETAM 1500 MG: 500 TABLET, FILM COATED ORAL at 08:31

## 2025-07-06 RX ADMIN — ROPINIROLE HYDROCHLORIDE 0.25 MG: 0.5 TABLET, FILM COATED ORAL at 21:51

## 2025-07-06 RX ADMIN — CARBAMAZEPINE 200 MG: 200 TABLET ORAL at 21:52

## 2025-07-06 RX ADMIN — GABAPENTIN 300 MG: 300 CAPSULE ORAL at 08:31

## 2025-07-06 RX ADMIN — GABAPENTIN 300 MG: 300 CAPSULE ORAL at 15:54

## 2025-07-06 RX ADMIN — Medication 1000 MCG: at 08:31

## 2025-07-06 RX ADMIN — ESCITALOPRAM OXALATE 20 MG: 10 TABLET ORAL at 08:31

## 2025-07-06 RX ADMIN — LEVETIRACETAM 1500 MG: 500 TABLET, FILM COATED ORAL at 21:51

## 2025-07-06 RX ADMIN — GABAPENTIN 300 MG: 300 CAPSULE ORAL at 21:51

## 2025-07-06 RX ADMIN — CARBAMAZEPINE 200 MG: 200 TABLET ORAL at 08:31

## 2025-07-06 RX ADMIN — QUETIAPINE FUMARATE 12.5 MG: 25 TABLET ORAL at 21:51

## 2025-07-06 RX ADMIN — CARBAMAZEPINE 200 MG: 200 TABLET ORAL at 15:54

## 2025-07-06 RX ADMIN — Medication 3 MG: at 21:52

## 2025-07-06 RX ADMIN — NICOTINE 1 PATCH: 21 PATCH, EXTENDED RELEASE TRANSDERMAL at 08:32

## 2025-07-07 PROCEDURE — 99232 SBSQ HOSP IP/OBS MODERATE 35: CPT | Performed by: STUDENT IN AN ORGANIZED HEALTH CARE EDUCATION/TRAINING PROGRAM

## 2025-07-07 RX ADMIN — QUETIAPINE FUMARATE 12.5 MG: 25 TABLET ORAL at 21:21

## 2025-07-07 RX ADMIN — LEVETIRACETAM 1500 MG: 500 TABLET, FILM COATED ORAL at 08:21

## 2025-07-07 RX ADMIN — GABAPENTIN 300 MG: 300 CAPSULE ORAL at 21:21

## 2025-07-07 RX ADMIN — Medication 3 MG: at 21:21

## 2025-07-07 RX ADMIN — Medication 1000 MCG: at 08:21

## 2025-07-07 RX ADMIN — CARBAMAZEPINE 200 MG: 200 TABLET ORAL at 21:21

## 2025-07-07 RX ADMIN — GABAPENTIN 300 MG: 300 CAPSULE ORAL at 08:21

## 2025-07-07 RX ADMIN — ROPINIROLE HYDROCHLORIDE 0.25 MG: 0.5 TABLET, FILM COATED ORAL at 21:21

## 2025-07-07 RX ADMIN — GABAPENTIN 300 MG: 300 CAPSULE ORAL at 16:58

## 2025-07-07 RX ADMIN — CARBAMAZEPINE 200 MG: 200 TABLET ORAL at 16:58

## 2025-07-07 RX ADMIN — ESCITALOPRAM OXALATE 20 MG: 10 TABLET ORAL at 08:21

## 2025-07-07 RX ADMIN — CARBAMAZEPINE 200 MG: 200 TABLET ORAL at 08:21

## 2025-07-07 RX ADMIN — LEVETIRACETAM 1500 MG: 500 TABLET, FILM COATED ORAL at 21:20

## 2025-07-07 RX ADMIN — NICOTINE 1 PATCH: 21 PATCH, EXTENDED RELEASE TRANSDERMAL at 08:21

## 2025-07-08 PROCEDURE — 99232 SBSQ HOSP IP/OBS MODERATE 35: CPT | Performed by: STUDENT IN AN ORGANIZED HEALTH CARE EDUCATION/TRAINING PROGRAM

## 2025-07-08 RX ADMIN — GABAPENTIN 300 MG: 300 CAPSULE ORAL at 21:05

## 2025-07-08 RX ADMIN — NICOTINE 1 PATCH: 21 PATCH, EXTENDED RELEASE TRANSDERMAL at 08:09

## 2025-07-08 RX ADMIN — CARBAMAZEPINE 200 MG: 200 TABLET ORAL at 21:05

## 2025-07-08 RX ADMIN — GABAPENTIN 300 MG: 300 CAPSULE ORAL at 15:19

## 2025-07-08 RX ADMIN — Medication 3 MG: at 21:05

## 2025-07-08 RX ADMIN — CARBAMAZEPINE 200 MG: 200 TABLET ORAL at 15:19

## 2025-07-08 RX ADMIN — QUETIAPINE FUMARATE 12.5 MG: 25 TABLET ORAL at 21:05

## 2025-07-08 RX ADMIN — GABAPENTIN 300 MG: 300 CAPSULE ORAL at 08:09

## 2025-07-08 RX ADMIN — ROPINIROLE HYDROCHLORIDE 0.25 MG: 0.5 TABLET, FILM COATED ORAL at 21:05

## 2025-07-08 RX ADMIN — CARBAMAZEPINE 200 MG: 200 TABLET ORAL at 08:09

## 2025-07-08 RX ADMIN — ESCITALOPRAM OXALATE 20 MG: 10 TABLET ORAL at 08:09

## 2025-07-08 RX ADMIN — LEVETIRACETAM 1500 MG: 500 TABLET, FILM COATED ORAL at 08:09

## 2025-07-08 RX ADMIN — LEVETIRACETAM 1500 MG: 500 TABLET, FILM COATED ORAL at 21:05

## 2025-07-08 RX ADMIN — Medication 1000 MCG: at 08:09

## 2025-07-09 PROCEDURE — 99232 SBSQ HOSP IP/OBS MODERATE 35: CPT | Performed by: STUDENT IN AN ORGANIZED HEALTH CARE EDUCATION/TRAINING PROGRAM

## 2025-07-09 RX ADMIN — GABAPENTIN 300 MG: 300 CAPSULE ORAL at 21:40

## 2025-07-09 RX ADMIN — CARBAMAZEPINE 200 MG: 200 TABLET ORAL at 16:29

## 2025-07-09 RX ADMIN — CARBAMAZEPINE 200 MG: 200 TABLET ORAL at 09:10

## 2025-07-09 RX ADMIN — LEVETIRACETAM 1500 MG: 500 TABLET, FILM COATED ORAL at 21:40

## 2025-07-09 RX ADMIN — CARBAMAZEPINE 200 MG: 200 TABLET ORAL at 21:40

## 2025-07-09 RX ADMIN — ESCITALOPRAM OXALATE 20 MG: 10 TABLET ORAL at 09:10

## 2025-07-09 RX ADMIN — Medication 3 MG: at 21:40

## 2025-07-09 RX ADMIN — Medication 1000 MCG: at 09:10

## 2025-07-09 RX ADMIN — LEVETIRACETAM 1500 MG: 500 TABLET, FILM COATED ORAL at 09:09

## 2025-07-09 RX ADMIN — ROPINIROLE HYDROCHLORIDE 0.25 MG: 0.5 TABLET, FILM COATED ORAL at 21:40

## 2025-07-09 RX ADMIN — NICOTINE 1 PATCH: 21 PATCH, EXTENDED RELEASE TRANSDERMAL at 09:10

## 2025-07-09 RX ADMIN — QUETIAPINE FUMARATE 12.5 MG: 25 TABLET ORAL at 21:40

## 2025-07-09 RX ADMIN — GABAPENTIN 300 MG: 300 CAPSULE ORAL at 16:29

## 2025-07-09 RX ADMIN — GABAPENTIN 300 MG: 300 CAPSULE ORAL at 09:10

## 2025-07-10 PROCEDURE — 97116 GAIT TRAINING THERAPY: CPT

## 2025-07-10 PROCEDURE — 97530 THERAPEUTIC ACTIVITIES: CPT

## 2025-07-10 PROCEDURE — 99232 SBSQ HOSP IP/OBS MODERATE 35: CPT | Performed by: STUDENT IN AN ORGANIZED HEALTH CARE EDUCATION/TRAINING PROGRAM

## 2025-07-10 RX ADMIN — LEVETIRACETAM 1500 MG: 500 TABLET, FILM COATED ORAL at 21:39

## 2025-07-10 RX ADMIN — LEVETIRACETAM 1500 MG: 500 TABLET, FILM COATED ORAL at 09:44

## 2025-07-10 RX ADMIN — ESCITALOPRAM OXALATE 20 MG: 10 TABLET ORAL at 09:44

## 2025-07-10 RX ADMIN — ROPINIROLE HYDROCHLORIDE 0.25 MG: 0.5 TABLET, FILM COATED ORAL at 21:39

## 2025-07-10 RX ADMIN — GABAPENTIN 300 MG: 300 CAPSULE ORAL at 21:39

## 2025-07-10 RX ADMIN — CARBAMAZEPINE 200 MG: 200 TABLET ORAL at 21:39

## 2025-07-10 RX ADMIN — Medication 3 MG: at 21:39

## 2025-07-10 RX ADMIN — GABAPENTIN 300 MG: 300 CAPSULE ORAL at 17:15

## 2025-07-10 RX ADMIN — CARBAMAZEPINE 200 MG: 200 TABLET ORAL at 09:44

## 2025-07-10 RX ADMIN — CARBAMAZEPINE 200 MG: 200 TABLET ORAL at 17:15

## 2025-07-10 RX ADMIN — Medication 1000 MCG: at 09:44

## 2025-07-10 RX ADMIN — NICOTINE 1 PATCH: 21 PATCH, EXTENDED RELEASE TRANSDERMAL at 09:47

## 2025-07-10 RX ADMIN — QUETIAPINE FUMARATE 12.5 MG: 25 TABLET ORAL at 21:39

## 2025-07-10 RX ADMIN — GABAPENTIN 300 MG: 300 CAPSULE ORAL at 09:44

## 2025-07-11 PROCEDURE — 99232 SBSQ HOSP IP/OBS MODERATE 35: CPT | Performed by: STUDENT IN AN ORGANIZED HEALTH CARE EDUCATION/TRAINING PROGRAM

## 2025-07-11 RX ADMIN — CARBAMAZEPINE 200 MG: 200 TABLET ORAL at 16:48

## 2025-07-11 RX ADMIN — ROPINIROLE HYDROCHLORIDE 0.25 MG: 0.5 TABLET, FILM COATED ORAL at 21:39

## 2025-07-11 RX ADMIN — Medication 3 MG: at 21:40

## 2025-07-11 RX ADMIN — LEVETIRACETAM 1500 MG: 500 TABLET, FILM COATED ORAL at 09:15

## 2025-07-11 RX ADMIN — GABAPENTIN 300 MG: 300 CAPSULE ORAL at 09:15

## 2025-07-11 RX ADMIN — NICOTINE 1 PATCH: 21 PATCH, EXTENDED RELEASE TRANSDERMAL at 09:17

## 2025-07-11 RX ADMIN — CARBAMAZEPINE 200 MG: 200 TABLET ORAL at 21:39

## 2025-07-11 RX ADMIN — LEVETIRACETAM 1500 MG: 500 TABLET, FILM COATED ORAL at 21:40

## 2025-07-11 RX ADMIN — CARBAMAZEPINE 200 MG: 200 TABLET ORAL at 09:15

## 2025-07-11 RX ADMIN — ESCITALOPRAM OXALATE 20 MG: 10 TABLET ORAL at 09:15

## 2025-07-11 RX ADMIN — GABAPENTIN 300 MG: 300 CAPSULE ORAL at 21:40

## 2025-07-11 RX ADMIN — GABAPENTIN 300 MG: 300 CAPSULE ORAL at 16:48

## 2025-07-11 RX ADMIN — QUETIAPINE FUMARATE 12.5 MG: 25 TABLET ORAL at 21:40

## 2025-07-11 RX ADMIN — Medication 1000 MCG: at 09:15

## 2025-07-12 PROCEDURE — 99232 SBSQ HOSP IP/OBS MODERATE 35: CPT

## 2025-07-12 RX ADMIN — GABAPENTIN 300 MG: 300 CAPSULE ORAL at 21:19

## 2025-07-12 RX ADMIN — GABAPENTIN 300 MG: 300 CAPSULE ORAL at 17:21

## 2025-07-12 RX ADMIN — ERGOCALCIFEROL 50000 UNITS: 1.25 CAPSULE ORAL at 08:50

## 2025-07-12 RX ADMIN — QUETIAPINE FUMARATE 12.5 MG: 25 TABLET ORAL at 21:19

## 2025-07-12 RX ADMIN — Medication 1000 MCG: at 08:50

## 2025-07-12 RX ADMIN — LEVETIRACETAM 1500 MG: 500 TABLET, FILM COATED ORAL at 21:19

## 2025-07-12 RX ADMIN — Medication 3 MG: at 21:19

## 2025-07-12 RX ADMIN — CARBAMAZEPINE 200 MG: 200 TABLET ORAL at 17:21

## 2025-07-12 RX ADMIN — GABAPENTIN 300 MG: 300 CAPSULE ORAL at 08:50

## 2025-07-12 RX ADMIN — ESCITALOPRAM OXALATE 20 MG: 10 TABLET ORAL at 08:50

## 2025-07-12 RX ADMIN — CARBAMAZEPINE 200 MG: 200 TABLET ORAL at 23:14

## 2025-07-12 RX ADMIN — LEVETIRACETAM 1500 MG: 500 TABLET, FILM COATED ORAL at 08:50

## 2025-07-12 RX ADMIN — ROPINIROLE HYDROCHLORIDE 0.25 MG: 0.5 TABLET, FILM COATED ORAL at 21:19

## 2025-07-12 RX ADMIN — NICOTINE 1 PATCH: 21 PATCH, EXTENDED RELEASE TRANSDERMAL at 09:00

## 2025-07-12 RX ADMIN — CARBAMAZEPINE 200 MG: 200 TABLET ORAL at 08:50

## 2025-07-13 PROCEDURE — 99232 SBSQ HOSP IP/OBS MODERATE 35: CPT | Performed by: PHYSICIAN ASSISTANT

## 2025-07-13 RX ADMIN — CARBAMAZEPINE 200 MG: 200 TABLET ORAL at 08:26

## 2025-07-13 RX ADMIN — LEVETIRACETAM 1500 MG: 500 TABLET, FILM COATED ORAL at 21:24

## 2025-07-13 RX ADMIN — CARBAMAZEPINE 200 MG: 200 TABLET ORAL at 21:24

## 2025-07-13 RX ADMIN — Medication 1000 MCG: at 08:26

## 2025-07-13 RX ADMIN — ESCITALOPRAM OXALATE 20 MG: 10 TABLET ORAL at 08:26

## 2025-07-13 RX ADMIN — CARBAMAZEPINE 200 MG: 200 TABLET ORAL at 16:58

## 2025-07-13 RX ADMIN — NICOTINE 1 PATCH: 21 PATCH, EXTENDED RELEASE TRANSDERMAL at 08:48

## 2025-07-13 RX ADMIN — GABAPENTIN 300 MG: 300 CAPSULE ORAL at 21:24

## 2025-07-13 RX ADMIN — GABAPENTIN 300 MG: 300 CAPSULE ORAL at 08:26

## 2025-07-13 RX ADMIN — Medication 3 MG: at 21:24

## 2025-07-13 RX ADMIN — GABAPENTIN 300 MG: 300 CAPSULE ORAL at 16:58

## 2025-07-13 RX ADMIN — LEVETIRACETAM 1500 MG: 500 TABLET, FILM COATED ORAL at 08:26

## 2025-07-13 RX ADMIN — QUETIAPINE FUMARATE 12.5 MG: 25 TABLET ORAL at 21:24

## 2025-07-13 RX ADMIN — ROPINIROLE HYDROCHLORIDE 0.25 MG: 0.5 TABLET, FILM COATED ORAL at 21:24

## 2025-07-14 PROCEDURE — 99232 SBSQ HOSP IP/OBS MODERATE 35: CPT | Performed by: STUDENT IN AN ORGANIZED HEALTH CARE EDUCATION/TRAINING PROGRAM

## 2025-07-14 RX ADMIN — Medication 1000 MCG: at 08:10

## 2025-07-14 RX ADMIN — CARBAMAZEPINE 200 MG: 200 TABLET ORAL at 08:10

## 2025-07-14 RX ADMIN — GABAPENTIN 300 MG: 300 CAPSULE ORAL at 22:13

## 2025-07-14 RX ADMIN — Medication 3 MG: at 22:13

## 2025-07-14 RX ADMIN — ESCITALOPRAM OXALATE 20 MG: 10 TABLET ORAL at 08:10

## 2025-07-14 RX ADMIN — ROPINIROLE HYDROCHLORIDE 0.25 MG: 0.5 TABLET, FILM COATED ORAL at 22:13

## 2025-07-14 RX ADMIN — LEVETIRACETAM 1500 MG: 500 TABLET, FILM COATED ORAL at 22:13

## 2025-07-14 RX ADMIN — LEVETIRACETAM 1500 MG: 500 TABLET, FILM COATED ORAL at 08:10

## 2025-07-14 RX ADMIN — GABAPENTIN 300 MG: 300 CAPSULE ORAL at 08:10

## 2025-07-14 RX ADMIN — QUETIAPINE FUMARATE 12.5 MG: 25 TABLET ORAL at 22:13

## 2025-07-14 RX ADMIN — NICOTINE 1 PATCH: 21 PATCH, EXTENDED RELEASE TRANSDERMAL at 08:10

## 2025-07-14 RX ADMIN — CARBAMAZEPINE 200 MG: 200 TABLET ORAL at 17:00

## 2025-07-14 RX ADMIN — GABAPENTIN 300 MG: 300 CAPSULE ORAL at 17:00

## 2025-07-14 RX ADMIN — CARBAMAZEPINE 200 MG: 200 TABLET ORAL at 22:13

## 2025-07-15 ENCOUNTER — TELEPHONE (OUTPATIENT)
Age: 65
End: 2025-07-15

## 2025-07-15 PROCEDURE — 99232 SBSQ HOSP IP/OBS MODERATE 35: CPT | Performed by: STUDENT IN AN ORGANIZED HEALTH CARE EDUCATION/TRAINING PROGRAM

## 2025-07-15 RX ADMIN — QUETIAPINE FUMARATE 12.5 MG: 25 TABLET ORAL at 21:29

## 2025-07-15 RX ADMIN — CARBAMAZEPINE 200 MG: 200 TABLET ORAL at 21:29

## 2025-07-15 RX ADMIN — Medication 3 MG: at 21:30

## 2025-07-15 RX ADMIN — LEVETIRACETAM 1500 MG: 500 TABLET, FILM COATED ORAL at 08:13

## 2025-07-15 RX ADMIN — ESCITALOPRAM OXALATE 20 MG: 10 TABLET ORAL at 08:13

## 2025-07-15 RX ADMIN — Medication 1000 MCG: at 08:13

## 2025-07-15 RX ADMIN — NICOTINE 1 PATCH: 21 PATCH, EXTENDED RELEASE TRANSDERMAL at 08:15

## 2025-07-15 RX ADMIN — GABAPENTIN 300 MG: 300 CAPSULE ORAL at 21:29

## 2025-07-15 RX ADMIN — CARBAMAZEPINE 200 MG: 200 TABLET ORAL at 17:19

## 2025-07-15 RX ADMIN — GABAPENTIN 300 MG: 300 CAPSULE ORAL at 17:19

## 2025-07-15 RX ADMIN — CARBAMAZEPINE 200 MG: 200 TABLET ORAL at 08:13

## 2025-07-15 RX ADMIN — GABAPENTIN 300 MG: 300 CAPSULE ORAL at 08:13

## 2025-07-15 RX ADMIN — ROPINIROLE HYDROCHLORIDE 0.25 MG: 0.5 TABLET, FILM COATED ORAL at 21:29

## 2025-07-15 RX ADMIN — LEVETIRACETAM 1500 MG: 500 TABLET, FILM COATED ORAL at 21:29

## 2025-07-16 PROCEDURE — 99232 SBSQ HOSP IP/OBS MODERATE 35: CPT | Performed by: STUDENT IN AN ORGANIZED HEALTH CARE EDUCATION/TRAINING PROGRAM

## 2025-07-16 RX ADMIN — CARBAMAZEPINE 200 MG: 200 TABLET ORAL at 17:00

## 2025-07-16 RX ADMIN — QUETIAPINE FUMARATE 12.5 MG: 25 TABLET ORAL at 21:40

## 2025-07-16 RX ADMIN — LEVETIRACETAM 1500 MG: 500 TABLET, FILM COATED ORAL at 21:40

## 2025-07-16 RX ADMIN — Medication 1000 MCG: at 09:00

## 2025-07-16 RX ADMIN — CARBAMAZEPINE 200 MG: 200 TABLET ORAL at 21:41

## 2025-07-16 RX ADMIN — CARBAMAZEPINE 200 MG: 200 TABLET ORAL at 09:00

## 2025-07-16 RX ADMIN — GABAPENTIN 300 MG: 300 CAPSULE ORAL at 09:00

## 2025-07-16 RX ADMIN — GABAPENTIN 300 MG: 300 CAPSULE ORAL at 17:00

## 2025-07-16 RX ADMIN — Medication 3 MG: at 21:40

## 2025-07-16 RX ADMIN — GABAPENTIN 300 MG: 300 CAPSULE ORAL at 21:41

## 2025-07-16 RX ADMIN — ESCITALOPRAM OXALATE 20 MG: 10 TABLET ORAL at 09:00

## 2025-07-16 RX ADMIN — NICOTINE 1 PATCH: 21 PATCH, EXTENDED RELEASE TRANSDERMAL at 09:00

## 2025-07-16 RX ADMIN — ROPINIROLE HYDROCHLORIDE 0.25 MG: 0.5 TABLET, FILM COATED ORAL at 21:40

## 2025-07-16 RX ADMIN — LEVETIRACETAM 1500 MG: 500 TABLET, FILM COATED ORAL at 08:59

## 2025-07-17 PROCEDURE — 99232 SBSQ HOSP IP/OBS MODERATE 35: CPT | Performed by: STUDENT IN AN ORGANIZED HEALTH CARE EDUCATION/TRAINING PROGRAM

## 2025-07-17 RX ADMIN — ESCITALOPRAM OXALATE 20 MG: 10 TABLET ORAL at 08:37

## 2025-07-17 RX ADMIN — LEVETIRACETAM 1500 MG: 500 TABLET, FILM COATED ORAL at 08:37

## 2025-07-17 RX ADMIN — GABAPENTIN 300 MG: 300 CAPSULE ORAL at 21:29

## 2025-07-17 RX ADMIN — NICOTINE 1 PATCH: 21 PATCH, EXTENDED RELEASE TRANSDERMAL at 08:37

## 2025-07-17 RX ADMIN — Medication 3 MG: at 21:27

## 2025-07-17 RX ADMIN — Medication 1000 MCG: at 08:38

## 2025-07-17 RX ADMIN — QUETIAPINE FUMARATE 12.5 MG: 25 TABLET ORAL at 21:29

## 2025-07-17 RX ADMIN — GABAPENTIN 300 MG: 300 CAPSULE ORAL at 08:38

## 2025-07-17 RX ADMIN — CARBAMAZEPINE 200 MG: 200 TABLET ORAL at 16:27

## 2025-07-17 RX ADMIN — LEVETIRACETAM 1500 MG: 500 TABLET, FILM COATED ORAL at 21:27

## 2025-07-17 RX ADMIN — CARBAMAZEPINE 200 MG: 200 TABLET ORAL at 21:29

## 2025-07-17 RX ADMIN — CARBAMAZEPINE 200 MG: 200 TABLET ORAL at 08:38

## 2025-07-17 RX ADMIN — GABAPENTIN 300 MG: 300 CAPSULE ORAL at 16:27

## 2025-07-17 RX ADMIN — ROPINIROLE HYDROCHLORIDE 0.25 MG: 0.5 TABLET, FILM COATED ORAL at 21:27

## 2025-07-18 PROCEDURE — 99232 SBSQ HOSP IP/OBS MODERATE 35: CPT | Performed by: STUDENT IN AN ORGANIZED HEALTH CARE EDUCATION/TRAINING PROGRAM

## 2025-07-18 RX ADMIN — Medication 1000 MCG: at 08:16

## 2025-07-18 RX ADMIN — GABAPENTIN 300 MG: 300 CAPSULE ORAL at 08:16

## 2025-07-18 RX ADMIN — CARBAMAZEPINE 200 MG: 200 TABLET ORAL at 08:16

## 2025-07-18 RX ADMIN — GABAPENTIN 300 MG: 300 CAPSULE ORAL at 18:02

## 2025-07-18 RX ADMIN — CARBAMAZEPINE 200 MG: 200 TABLET ORAL at 18:02

## 2025-07-18 RX ADMIN — CARBAMAZEPINE 200 MG: 200 TABLET ORAL at 21:21

## 2025-07-18 RX ADMIN — GABAPENTIN 300 MG: 300 CAPSULE ORAL at 21:20

## 2025-07-18 RX ADMIN — ESCITALOPRAM OXALATE 20 MG: 10 TABLET ORAL at 08:16

## 2025-07-18 RX ADMIN — ROPINIROLE HYDROCHLORIDE 0.25 MG: 0.5 TABLET, FILM COATED ORAL at 21:21

## 2025-07-18 RX ADMIN — NICOTINE 1 PATCH: 21 PATCH, EXTENDED RELEASE TRANSDERMAL at 08:17

## 2025-07-18 RX ADMIN — QUETIAPINE FUMARATE 12.5 MG: 25 TABLET ORAL at 21:21

## 2025-07-18 RX ADMIN — Medication 3 MG: at 21:21

## 2025-07-18 RX ADMIN — LEVETIRACETAM 1500 MG: 500 TABLET, FILM COATED ORAL at 08:16

## 2025-07-18 RX ADMIN — LEVETIRACETAM 1500 MG: 500 TABLET, FILM COATED ORAL at 21:21

## 2025-07-19 PROCEDURE — 99232 SBSQ HOSP IP/OBS MODERATE 35: CPT

## 2025-07-19 RX ADMIN — Medication 3 MG: at 21:21

## 2025-07-19 RX ADMIN — CARBAMAZEPINE 200 MG: 200 TABLET ORAL at 21:21

## 2025-07-19 RX ADMIN — Medication 1000 MCG: at 08:17

## 2025-07-19 RX ADMIN — CARBAMAZEPINE 200 MG: 200 TABLET ORAL at 08:18

## 2025-07-19 RX ADMIN — LEVETIRACETAM 1500 MG: 500 TABLET, FILM COATED ORAL at 21:21

## 2025-07-19 RX ADMIN — GABAPENTIN 300 MG: 300 CAPSULE ORAL at 08:17

## 2025-07-19 RX ADMIN — ESCITALOPRAM OXALATE 20 MG: 10 TABLET ORAL at 08:17

## 2025-07-19 RX ADMIN — QUETIAPINE FUMARATE 12.5 MG: 25 TABLET ORAL at 21:21

## 2025-07-19 RX ADMIN — NICOTINE 1 PATCH: 21 PATCH, EXTENDED RELEASE TRANSDERMAL at 08:24

## 2025-07-19 RX ADMIN — GABAPENTIN 300 MG: 300 CAPSULE ORAL at 16:48

## 2025-07-19 RX ADMIN — GABAPENTIN 300 MG: 300 CAPSULE ORAL at 21:21

## 2025-07-19 RX ADMIN — ROPINIROLE HYDROCHLORIDE 0.25 MG: 0.5 TABLET, FILM COATED ORAL at 21:22

## 2025-07-19 RX ADMIN — LEVETIRACETAM 1500 MG: 500 TABLET, FILM COATED ORAL at 08:17

## 2025-07-19 RX ADMIN — ERGOCALCIFEROL 50000 UNITS: 1.25 CAPSULE ORAL at 08:17

## 2025-07-19 RX ADMIN — CARBAMAZEPINE 200 MG: 200 TABLET ORAL at 16:48

## 2025-07-20 PROCEDURE — 99232 SBSQ HOSP IP/OBS MODERATE 35: CPT

## 2025-07-20 RX ADMIN — LEVETIRACETAM 1500 MG: 500 TABLET, FILM COATED ORAL at 21:25

## 2025-07-20 RX ADMIN — ROPINIROLE HYDROCHLORIDE 0.25 MG: 0.5 TABLET, FILM COATED ORAL at 21:29

## 2025-07-20 RX ADMIN — CARBAMAZEPINE 200 MG: 200 TABLET ORAL at 08:44

## 2025-07-20 RX ADMIN — LEVETIRACETAM 1500 MG: 500 TABLET, FILM COATED ORAL at 08:44

## 2025-07-20 RX ADMIN — ESCITALOPRAM OXALATE 20 MG: 10 TABLET ORAL at 08:44

## 2025-07-20 RX ADMIN — CARBAMAZEPINE 200 MG: 200 TABLET ORAL at 16:36

## 2025-07-20 RX ADMIN — Medication 1000 MCG: at 08:44

## 2025-07-20 RX ADMIN — GABAPENTIN 300 MG: 300 CAPSULE ORAL at 08:44

## 2025-07-20 RX ADMIN — GABAPENTIN 300 MG: 300 CAPSULE ORAL at 16:36

## 2025-07-20 RX ADMIN — CARBAMAZEPINE 200 MG: 200 TABLET ORAL at 21:25

## 2025-07-20 RX ADMIN — NICOTINE 1 PATCH: 21 PATCH, EXTENDED RELEASE TRANSDERMAL at 08:47

## 2025-07-20 RX ADMIN — QUETIAPINE FUMARATE 12.5 MG: 25 TABLET ORAL at 21:25

## 2025-07-20 RX ADMIN — Medication 3 MG: at 21:25

## 2025-07-20 RX ADMIN — GABAPENTIN 300 MG: 300 CAPSULE ORAL at 21:26

## 2025-07-21 PROCEDURE — 99232 SBSQ HOSP IP/OBS MODERATE 35: CPT | Performed by: STUDENT IN AN ORGANIZED HEALTH CARE EDUCATION/TRAINING PROGRAM

## 2025-07-21 RX ADMIN — GABAPENTIN 300 MG: 300 CAPSULE ORAL at 08:57

## 2025-07-21 RX ADMIN — LEVETIRACETAM 1500 MG: 500 TABLET, FILM COATED ORAL at 21:17

## 2025-07-21 RX ADMIN — GABAPENTIN 300 MG: 300 CAPSULE ORAL at 16:59

## 2025-07-21 RX ADMIN — CARBAMAZEPINE 200 MG: 200 TABLET ORAL at 21:19

## 2025-07-21 RX ADMIN — ESCITALOPRAM OXALATE 20 MG: 10 TABLET ORAL at 08:57

## 2025-07-21 RX ADMIN — CARBAMAZEPINE 200 MG: 200 TABLET ORAL at 08:57

## 2025-07-21 RX ADMIN — ROPINIROLE HYDROCHLORIDE 0.25 MG: 0.5 TABLET, FILM COATED ORAL at 21:18

## 2025-07-21 RX ADMIN — QUETIAPINE FUMARATE 12.5 MG: 25 TABLET ORAL at 21:17

## 2025-07-21 RX ADMIN — NICOTINE 1 PATCH: 21 PATCH, EXTENDED RELEASE TRANSDERMAL at 08:58

## 2025-07-21 RX ADMIN — LEVETIRACETAM 1500 MG: 500 TABLET, FILM COATED ORAL at 08:57

## 2025-07-21 RX ADMIN — CARBAMAZEPINE 200 MG: 200 TABLET ORAL at 16:59

## 2025-07-21 RX ADMIN — Medication 1000 MCG: at 08:57

## 2025-07-21 RX ADMIN — Medication 3 MG: at 21:17

## 2025-07-21 RX ADMIN — GABAPENTIN 300 MG: 300 CAPSULE ORAL at 21:17

## 2025-07-22 PROCEDURE — 99232 SBSQ HOSP IP/OBS MODERATE 35: CPT | Performed by: STUDENT IN AN ORGANIZED HEALTH CARE EDUCATION/TRAINING PROGRAM

## 2025-07-22 RX ADMIN — GABAPENTIN 300 MG: 300 CAPSULE ORAL at 15:29

## 2025-07-22 RX ADMIN — CARBAMAZEPINE 200 MG: 200 TABLET ORAL at 21:15

## 2025-07-22 RX ADMIN — ROPINIROLE HYDROCHLORIDE 0.25 MG: 0.5 TABLET, FILM COATED ORAL at 21:15

## 2025-07-22 RX ADMIN — LEVETIRACETAM 1500 MG: 500 TABLET, FILM COATED ORAL at 08:57

## 2025-07-22 RX ADMIN — NICOTINE 1 PATCH: 21 PATCH, EXTENDED RELEASE TRANSDERMAL at 08:59

## 2025-07-22 RX ADMIN — QUETIAPINE FUMARATE 12.5 MG: 25 TABLET ORAL at 21:15

## 2025-07-22 RX ADMIN — CARBAMAZEPINE 200 MG: 200 TABLET ORAL at 08:57

## 2025-07-22 RX ADMIN — LEVETIRACETAM 1500 MG: 500 TABLET, FILM COATED ORAL at 21:14

## 2025-07-22 RX ADMIN — CARBAMAZEPINE 200 MG: 200 TABLET ORAL at 15:29

## 2025-07-22 RX ADMIN — Medication 1000 MCG: at 08:57

## 2025-07-22 RX ADMIN — ESCITALOPRAM OXALATE 20 MG: 10 TABLET ORAL at 08:57

## 2025-07-22 RX ADMIN — Medication 3 MG: at 21:14

## 2025-07-22 RX ADMIN — GABAPENTIN 300 MG: 300 CAPSULE ORAL at 21:15

## 2025-07-22 RX ADMIN — GABAPENTIN 300 MG: 300 CAPSULE ORAL at 08:57

## 2025-07-23 DIAGNOSIS — F33.3 MDD (MAJOR DEPRESSIVE DISORDER), RECURRENT, SEVERE, WITH PSYCHOSIS (HCC): ICD-10-CM

## 2025-07-23 PROCEDURE — 99232 SBSQ HOSP IP/OBS MODERATE 35: CPT | Performed by: STUDENT IN AN ORGANIZED HEALTH CARE EDUCATION/TRAINING PROGRAM

## 2025-07-23 RX ORDER — QUETIAPINE FUMARATE 25 MG/1
12.5 TABLET, FILM COATED ORAL
Qty: 15 TABLET | Refills: 0 | OUTPATIENT
Start: 2025-07-23

## 2025-07-23 RX ADMIN — CARBAMAZEPINE 200 MG: 200 TABLET ORAL at 08:56

## 2025-07-23 RX ADMIN — GABAPENTIN 300 MG: 300 CAPSULE ORAL at 15:58

## 2025-07-23 RX ADMIN — CARBAMAZEPINE 200 MG: 200 TABLET ORAL at 15:58

## 2025-07-23 RX ADMIN — CARBAMAZEPINE 200 MG: 200 TABLET ORAL at 21:57

## 2025-07-23 RX ADMIN — ROPINIROLE HYDROCHLORIDE 0.25 MG: 0.5 TABLET, FILM COATED ORAL at 21:57

## 2025-07-23 RX ADMIN — LEVETIRACETAM 1500 MG: 500 TABLET, FILM COATED ORAL at 08:56

## 2025-07-23 RX ADMIN — QUETIAPINE FUMARATE 12.5 MG: 25 TABLET ORAL at 21:57

## 2025-07-23 RX ADMIN — Medication 1000 MCG: at 08:56

## 2025-07-23 RX ADMIN — ESCITALOPRAM OXALATE 20 MG: 10 TABLET ORAL at 08:56

## 2025-07-23 RX ADMIN — GABAPENTIN 300 MG: 300 CAPSULE ORAL at 08:56

## 2025-07-23 RX ADMIN — GABAPENTIN 300 MG: 300 CAPSULE ORAL at 21:58

## 2025-07-23 RX ADMIN — LEVETIRACETAM 1500 MG: 500 TABLET, FILM COATED ORAL at 21:57

## 2025-07-23 RX ADMIN — Medication 3 MG: at 21:57

## 2025-07-23 RX ADMIN — NICOTINE 1 PATCH: 21 PATCH, EXTENDED RELEASE TRANSDERMAL at 08:58

## 2025-07-24 PROCEDURE — 99232 SBSQ HOSP IP/OBS MODERATE 35: CPT | Performed by: STUDENT IN AN ORGANIZED HEALTH CARE EDUCATION/TRAINING PROGRAM

## 2025-07-24 RX ADMIN — CARBAMAZEPINE 200 MG: 200 TABLET ORAL at 21:50

## 2025-07-24 RX ADMIN — ESCITALOPRAM OXALATE 20 MG: 10 TABLET ORAL at 09:06

## 2025-07-24 RX ADMIN — LEVETIRACETAM 1500 MG: 500 TABLET, FILM COATED ORAL at 21:50

## 2025-07-24 RX ADMIN — NICOTINE 1 PATCH: 21 PATCH, EXTENDED RELEASE TRANSDERMAL at 09:09

## 2025-07-24 RX ADMIN — GABAPENTIN 300 MG: 300 CAPSULE ORAL at 21:50

## 2025-07-24 RX ADMIN — CARBAMAZEPINE 200 MG: 200 TABLET ORAL at 09:07

## 2025-07-24 RX ADMIN — ROPINIROLE HYDROCHLORIDE 0.25 MG: 0.5 TABLET, FILM COATED ORAL at 21:50

## 2025-07-24 RX ADMIN — QUETIAPINE FUMARATE 12.5 MG: 25 TABLET ORAL at 21:50

## 2025-07-24 RX ADMIN — LEVETIRACETAM 1500 MG: 500 TABLET, FILM COATED ORAL at 09:06

## 2025-07-24 RX ADMIN — Medication 1000 MCG: at 09:06

## 2025-07-24 RX ADMIN — CARBAMAZEPINE 200 MG: 200 TABLET ORAL at 17:15

## 2025-07-24 RX ADMIN — GABAPENTIN 300 MG: 300 CAPSULE ORAL at 17:15

## 2025-07-24 RX ADMIN — Medication 3 MG: at 21:50

## 2025-07-24 RX ADMIN — GABAPENTIN 300 MG: 300 CAPSULE ORAL at 09:06

## 2025-07-25 PROCEDURE — 99232 SBSQ HOSP IP/OBS MODERATE 35: CPT | Performed by: STUDENT IN AN ORGANIZED HEALTH CARE EDUCATION/TRAINING PROGRAM

## 2025-07-25 RX ADMIN — ROPINIROLE HYDROCHLORIDE 0.25 MG: 0.5 TABLET, FILM COATED ORAL at 21:09

## 2025-07-25 RX ADMIN — CARBAMAZEPINE 200 MG: 200 TABLET ORAL at 21:04

## 2025-07-25 RX ADMIN — NICOTINE 1 PATCH: 21 PATCH, EXTENDED RELEASE TRANSDERMAL at 09:00

## 2025-07-25 RX ADMIN — LEVETIRACETAM 1500 MG: 500 TABLET, FILM COATED ORAL at 21:04

## 2025-07-25 RX ADMIN — GABAPENTIN 300 MG: 300 CAPSULE ORAL at 09:00

## 2025-07-25 RX ADMIN — GABAPENTIN 300 MG: 300 CAPSULE ORAL at 15:51

## 2025-07-25 RX ADMIN — Medication 3 MG: at 21:04

## 2025-07-25 RX ADMIN — ESCITALOPRAM OXALATE 20 MG: 10 TABLET ORAL at 09:00

## 2025-07-25 RX ADMIN — QUETIAPINE FUMARATE 12.5 MG: 25 TABLET ORAL at 21:03

## 2025-07-25 RX ADMIN — CARBAMAZEPINE 200 MG: 200 TABLET ORAL at 15:51

## 2025-07-25 RX ADMIN — Medication 1000 MCG: at 09:00

## 2025-07-25 RX ADMIN — LEVETIRACETAM 1500 MG: 500 TABLET, FILM COATED ORAL at 09:00

## 2025-07-25 RX ADMIN — CARBAMAZEPINE 200 MG: 200 TABLET ORAL at 09:00

## 2025-07-25 RX ADMIN — GABAPENTIN 300 MG: 300 CAPSULE ORAL at 21:04

## 2025-07-26 PROCEDURE — 99232 SBSQ HOSP IP/OBS MODERATE 35: CPT | Performed by: STUDENT IN AN ORGANIZED HEALTH CARE EDUCATION/TRAINING PROGRAM

## 2025-07-26 RX ADMIN — GABAPENTIN 300 MG: 300 CAPSULE ORAL at 08:52

## 2025-07-26 RX ADMIN — NICOTINE 1 PATCH: 21 PATCH, EXTENDED RELEASE TRANSDERMAL at 08:56

## 2025-07-26 RX ADMIN — GABAPENTIN 300 MG: 300 CAPSULE ORAL at 17:00

## 2025-07-26 RX ADMIN — ERGOCALCIFEROL 50000 UNITS: 1.25 CAPSULE ORAL at 08:52

## 2025-07-26 RX ADMIN — Medication 3 MG: at 21:50

## 2025-07-26 RX ADMIN — LEVETIRACETAM 1500 MG: 500 TABLET, FILM COATED ORAL at 21:49

## 2025-07-26 RX ADMIN — ROPINIROLE HYDROCHLORIDE 0.25 MG: 0.5 TABLET, FILM COATED ORAL at 21:56

## 2025-07-26 RX ADMIN — ACETAMINOPHEN 650 MG: 325 TABLET ORAL at 05:17

## 2025-07-26 RX ADMIN — GABAPENTIN 300 MG: 300 CAPSULE ORAL at 21:50

## 2025-07-26 RX ADMIN — Medication 1000 MCG: at 08:52

## 2025-07-26 RX ADMIN — CARBAMAZEPINE 200 MG: 200 TABLET ORAL at 08:52

## 2025-07-26 RX ADMIN — CARBAMAZEPINE 200 MG: 200 TABLET ORAL at 17:00

## 2025-07-26 RX ADMIN — LEVETIRACETAM 1500 MG: 500 TABLET, FILM COATED ORAL at 08:52

## 2025-07-26 RX ADMIN — CARBAMAZEPINE 200 MG: 200 TABLET ORAL at 21:50

## 2025-07-26 RX ADMIN — QUETIAPINE FUMARATE 12.5 MG: 25 TABLET ORAL at 21:49

## 2025-07-26 RX ADMIN — ESCITALOPRAM OXALATE 20 MG: 10 TABLET ORAL at 08:52

## 2025-07-26 RX ADMIN — ACETAMINOPHEN 650 MG: 325 TABLET ORAL at 21:55

## 2025-07-27 PROCEDURE — 99232 SBSQ HOSP IP/OBS MODERATE 35: CPT | Performed by: STUDENT IN AN ORGANIZED HEALTH CARE EDUCATION/TRAINING PROGRAM

## 2025-07-27 RX ORDER — LIDOCAINE 50 MG/G
2 PATCH TOPICAL DAILY
Status: DISCONTINUED | OUTPATIENT
Start: 2025-07-27 | End: 2025-07-29 | Stop reason: HOSPADM

## 2025-07-27 RX ADMIN — NICOTINE 1 PATCH: 21 PATCH, EXTENDED RELEASE TRANSDERMAL at 08:45

## 2025-07-27 RX ADMIN — CARBAMAZEPINE 200 MG: 200 TABLET ORAL at 16:12

## 2025-07-27 RX ADMIN — CARBAMAZEPINE 200 MG: 200 TABLET ORAL at 08:46

## 2025-07-27 RX ADMIN — Medication 3 MG: at 21:46

## 2025-07-27 RX ADMIN — ESCITALOPRAM OXALATE 20 MG: 10 TABLET ORAL at 08:46

## 2025-07-27 RX ADMIN — QUETIAPINE FUMARATE 12.5 MG: 25 TABLET ORAL at 21:46

## 2025-07-27 RX ADMIN — LEVETIRACETAM 1500 MG: 500 TABLET, FILM COATED ORAL at 08:46

## 2025-07-27 RX ADMIN — Medication 1000 MCG: at 08:46

## 2025-07-27 RX ADMIN — GABAPENTIN 300 MG: 300 CAPSULE ORAL at 08:46

## 2025-07-27 RX ADMIN — CARBAMAZEPINE 200 MG: 200 TABLET ORAL at 21:45

## 2025-07-27 RX ADMIN — LEVETIRACETAM 1500 MG: 500 TABLET, FILM COATED ORAL at 21:46

## 2025-07-27 RX ADMIN — GABAPENTIN 300 MG: 300 CAPSULE ORAL at 21:45

## 2025-07-27 RX ADMIN — LIDOCAINE 5% 2 PATCH: 700 PATCH TOPICAL at 13:35

## 2025-07-27 RX ADMIN — GABAPENTIN 300 MG: 300 CAPSULE ORAL at 16:12

## 2025-07-27 RX ADMIN — ROPINIROLE HYDROCHLORIDE 0.25 MG: 0.5 TABLET, FILM COATED ORAL at 21:45

## 2025-07-28 RX ORDER — QUETIAPINE FUMARATE 25 MG/1
25 TABLET, FILM COATED ORAL
Status: DISCONTINUED | OUTPATIENT
Start: 2025-07-28 | End: 2025-07-29 | Stop reason: HOSPADM

## 2025-07-28 RX ADMIN — GABAPENTIN 300 MG: 300 CAPSULE ORAL at 17:00

## 2025-07-28 RX ADMIN — LEVETIRACETAM 1500 MG: 500 TABLET, FILM COATED ORAL at 09:03

## 2025-07-28 RX ADMIN — CARBAMAZEPINE 200 MG: 200 TABLET ORAL at 09:03

## 2025-07-28 RX ADMIN — LEVETIRACETAM 1500 MG: 500 TABLET, FILM COATED ORAL at 21:14

## 2025-07-28 RX ADMIN — CARBAMAZEPINE 200 MG: 200 TABLET ORAL at 21:15

## 2025-07-28 RX ADMIN — NICOTINE 1 PATCH: 21 PATCH, EXTENDED RELEASE TRANSDERMAL at 09:36

## 2025-07-28 RX ADMIN — GABAPENTIN 300 MG: 300 CAPSULE ORAL at 09:03

## 2025-07-28 RX ADMIN — Medication 1000 MCG: at 09:03

## 2025-07-28 RX ADMIN — Medication 3 MG: at 21:14

## 2025-07-28 RX ADMIN — LIDOCAINE 5% 2 PATCH: 700 PATCH TOPICAL at 09:36

## 2025-07-28 RX ADMIN — CARBAMAZEPINE 200 MG: 200 TABLET ORAL at 17:00

## 2025-07-28 RX ADMIN — ESCITALOPRAM OXALATE 20 MG: 10 TABLET ORAL at 09:03

## 2025-07-28 RX ADMIN — GABAPENTIN 300 MG: 300 CAPSULE ORAL at 21:15

## 2025-07-28 RX ADMIN — QUETIAPINE FUMARATE 25 MG: 25 TABLET ORAL at 21:14

## 2025-07-28 RX ADMIN — ROPINIROLE HYDROCHLORIDE 0.25 MG: 0.5 TABLET, FILM COATED ORAL at 21:16

## 2025-07-29 VITALS
DIASTOLIC BLOOD PRESSURE: 60 MMHG | HEART RATE: 65 BPM | TEMPERATURE: 96.6 F | RESPIRATION RATE: 16 BRPM | WEIGHT: 232.25 LBS | OXYGEN SATURATION: 93 % | HEIGHT: 63 IN | BODY MASS INDEX: 41.15 KG/M2 | SYSTOLIC BLOOD PRESSURE: 99 MMHG

## 2025-07-29 PROCEDURE — 99238 HOSP IP/OBS DSCHRG MGMT 30/<: CPT | Performed by: STUDENT IN AN ORGANIZED HEALTH CARE EDUCATION/TRAINING PROGRAM

## 2025-07-29 RX ORDER — ESCITALOPRAM OXALATE 20 MG/1
20 TABLET ORAL DAILY
Qty: 30 TABLET | Refills: 0 | Status: SHIPPED | OUTPATIENT
Start: 2025-07-29 | End: 2025-08-28

## 2025-07-29 RX ORDER — LIDOCAINE 50 MG/G
2 PATCH TOPICAL DAILY
Qty: 60 PATCH | Refills: 0 | Status: SHIPPED | OUTPATIENT
Start: 2025-07-30

## 2025-07-29 RX ORDER — QUETIAPINE FUMARATE 25 MG/1
25 TABLET, FILM COATED ORAL
Qty: 30 TABLET | Refills: 0 | Status: SHIPPED | OUTPATIENT
Start: 2025-07-29

## 2025-07-29 RX ORDER — LEVETIRACETAM 750 MG/1
1500 TABLET ORAL EVERY 12 HOURS SCHEDULED
Qty: 120 TABLET | Refills: 0 | Status: SHIPPED | OUTPATIENT
Start: 2025-07-29

## 2025-07-29 RX ORDER — CARBAMAZEPINE 200 MG/1
200 TABLET ORAL EVERY 8 HOURS SCHEDULED
Qty: 90 TABLET | Refills: 0 | Status: SHIPPED | OUTPATIENT
Start: 2025-07-29

## 2025-07-29 RX ORDER — NICOTINE 21 MG/24HR
1 PATCH, TRANSDERMAL 24 HOURS TRANSDERMAL DAILY
Qty: 28 PATCH | Refills: 0 | Status: SHIPPED | OUTPATIENT
Start: 2025-07-30

## 2025-07-29 RX ORDER — ROPINIROLE 0.25 MG/1
0.25 TABLET, FILM COATED ORAL
Qty: 30 TABLET | Refills: 0 | Status: SHIPPED | OUTPATIENT
Start: 2025-07-29 | End: 2025-08-28

## 2025-07-29 RX ORDER — ERGOCALCIFEROL 1.25 MG/1
50000 CAPSULE, LIQUID FILLED ORAL WEEKLY
Qty: 7 CAPSULE | Refills: 0 | Status: SHIPPED | OUTPATIENT
Start: 2025-07-29 | End: 2025-09-10

## 2025-07-29 RX ORDER — GABAPENTIN 300 MG/1
300 CAPSULE ORAL 3 TIMES DAILY
Qty: 90 CAPSULE | Refills: 0 | Status: SHIPPED | OUTPATIENT
Start: 2025-07-29 | End: 2025-08-28

## 2025-07-29 RX ADMIN — ESCITALOPRAM OXALATE 20 MG: 10 TABLET ORAL at 08:51

## 2025-07-29 RX ADMIN — LIDOCAINE 5% 2 PATCH: 700 PATCH TOPICAL at 08:52

## 2025-07-29 RX ADMIN — GABAPENTIN 300 MG: 300 CAPSULE ORAL at 08:51

## 2025-07-29 RX ADMIN — CARBAMAZEPINE 200 MG: 200 TABLET ORAL at 08:51

## 2025-07-29 RX ADMIN — Medication 1000 MCG: at 08:51

## 2025-07-29 RX ADMIN — LEVETIRACETAM 1500 MG: 500 TABLET, FILM COATED ORAL at 08:51

## 2025-07-29 RX ADMIN — NICOTINE 1 PATCH: 21 PATCH, EXTENDED RELEASE TRANSDERMAL at 08:52
